# Patient Record
Sex: FEMALE | Race: WHITE | Employment: OTHER | URBAN - METROPOLITAN AREA
[De-identification: names, ages, dates, MRNs, and addresses within clinical notes are randomized per-mention and may not be internally consistent; named-entity substitution may affect disease eponyms.]

---

## 2017-01-05 ENCOUNTER — HOSPITAL ENCOUNTER (OUTPATIENT)
Dept: GENERAL RADIOLOGY | Age: 57
Discharge: HOME | End: 2017-01-05
Payer: COMMERCIAL

## 2017-01-05 DIAGNOSIS — C43.9 MALIGNANT MELANOMA, UNSPECIFIED SITE (HCC): ICD-10-CM

## 2017-01-05 PROCEDURE — 71020 XR CHEST STANDARD TWO VW: CPT

## 2017-01-27 ENCOUNTER — OFFICE VISIT (OUTPATIENT)
Dept: INTERNAL MEDICINE | Age: 57
End: 2017-01-27

## 2017-01-27 VITALS
WEIGHT: 237 LBS | TEMPERATURE: 97.3 F | DIASTOLIC BLOOD PRESSURE: 88 MMHG | SYSTOLIC BLOOD PRESSURE: 110 MMHG | BODY MASS INDEX: 40.46 KG/M2 | HEART RATE: 109 BPM | HEIGHT: 64 IN

## 2017-01-27 DIAGNOSIS — E53.8 LOW VITAMIN B12 LEVEL: ICD-10-CM

## 2017-01-27 DIAGNOSIS — E03.9 ACQUIRED HYPOTHYROIDISM: ICD-10-CM

## 2017-01-27 DIAGNOSIS — J01.10 ACUTE NON-RECURRENT FRONTAL SINUSITIS: Primary | ICD-10-CM

## 2017-01-27 DIAGNOSIS — E55.9 VITAMIN D INSUFFICIENCY: ICD-10-CM

## 2017-01-27 DIAGNOSIS — R73.01 IFG (IMPAIRED FASTING GLUCOSE): ICD-10-CM

## 2017-01-27 LAB
ANION GAP SERPL CALCULATED.3IONS-SCNC: 15 MEQ/L (ref 7–13)
BUN BLDV-MCNC: 8 MG/DL (ref 6–20)
CALCIUM SERPL-MCNC: 9.7 MG/DL (ref 8.6–10.2)
CHLORIDE BLD-SCNC: 101 MEQ/L (ref 98–107)
CO2: 25 MEQ/L (ref 22–29)
CREAT SERPL-MCNC: 0.84 MG/DL (ref 0.5–0.9)
FOLATE: >20 NG/ML (ref 7.3–26.1)
GFR AFRICAN AMERICAN: >60
GFR NON-AFRICAN AMERICAN: >60
GLUCOSE BLD-MCNC: 87 MG/DL (ref 74–109)
HBA1C MFR BLD: 5.1 % (ref 4.8–5.9)
POTASSIUM SERPL-SCNC: 4.3 MEQ/L (ref 3.5–5.1)
SODIUM BLD-SCNC: 141 MEQ/L (ref 132–144)
TSH SERPL DL<=0.05 MIU/L-ACNC: 3.81 UIU/ML (ref 0.27–4.2)
VITAMIN B-12: 321 PG/ML (ref 211–946)
VITAMIN D 25-HYDROXY: 27.7 NG/ML (ref 30–100)

## 2017-01-27 PROCEDURE — 99213 OFFICE O/P EST LOW 20 MIN: CPT | Performed by: INTERNAL MEDICINE

## 2017-01-27 RX ORDER — GUAIFENESIN 600 MG/1
600 TABLET, EXTENDED RELEASE ORAL 2 TIMES DAILY
Qty: 14 TABLET | Refills: 0 | Status: SHIPPED | OUTPATIENT
Start: 2017-01-27 | End: 2017-02-03

## 2017-01-27 RX ORDER — AMOXICILLIN 500 MG/1
TABLET, FILM COATED ORAL
Qty: 20 TABLET | Refills: 0 | Status: SHIPPED | OUTPATIENT
Start: 2017-01-27 | End: 2017-03-31

## 2017-01-27 RX ORDER — METHYLPREDNISOLONE 4 MG/1
TABLET ORAL
Qty: 1 KIT | Refills: 0 | Status: SHIPPED | OUTPATIENT
Start: 2017-01-27 | End: 2017-03-31

## 2017-01-27 ASSESSMENT — ENCOUNTER SYMPTOMS
TROUBLE SWALLOWING: 0
EYE PAIN: 0
DIARRHEA: 0
SWOLLEN GLANDS: 1
ABDOMINAL PAIN: 0
SHORTNESS OF BREATH: 0
VOMITING: 0
SINUS PRESSURE: 1
RESPIRATORY NEGATIVE: 1
COUGH: 0
SINUS PAIN: 1
SORE THROAT: 1
RHINORRHEA: 0
VOICE CHANGE: 0

## 2017-02-13 ENCOUNTER — APPOINTMENT (OUTPATIENT)
Dept: GENERAL RADIOLOGY | Age: 57
End: 2017-02-13
Payer: COMMERCIAL

## 2017-02-13 ENCOUNTER — HOSPITAL ENCOUNTER (EMERGENCY)
Age: 57
Discharge: HOME | End: 2017-02-13
Attending: EMERGENCY MEDICINE
Payer: COMMERCIAL

## 2017-02-13 VITALS
HEIGHT: 66 IN | SYSTOLIC BLOOD PRESSURE: 131 MMHG | BODY MASS INDEX: 33.59 KG/M2 | WEIGHT: 209 LBS | RESPIRATION RATE: 15 BRPM | DIASTOLIC BLOOD PRESSURE: 91 MMHG | HEART RATE: 101 BPM | TEMPERATURE: 98.9 F | OXYGEN SATURATION: 98 %

## 2017-02-13 DIAGNOSIS — J40 BRONCHITIS: Primary | ICD-10-CM

## 2017-02-13 LAB
ALBUMIN SERPL-MCNC: 4.6 G/DL (ref 3.9–4.9)
ALP BLD-CCNC: 82 U/L (ref 40–130)
ALT SERPL-CCNC: 30 U/L (ref 0–33)
ANION GAP SERPL CALCULATED.3IONS-SCNC: 15 MEQ/L (ref 7–13)
AST SERPL-CCNC: 31 U/L (ref 0–35)
BASE EXCESS ARTERIAL: 4 (ref -3–3)
BASOPHILS ABSOLUTE: 0 K/UL (ref 0–0.2)
BASOPHILS RELATIVE PERCENT: 0.5 %
BILIRUB SERPL-MCNC: 0.4 MG/DL (ref 0–1.2)
BUN BLDV-MCNC: 15 MG/DL (ref 6–20)
CALCIUM IONIZED: 1.23 MMOL/L (ref 1.12–1.32)
CALCIUM SERPL-MCNC: 10.4 MG/DL (ref 8.6–10.2)
CHLORIDE BLD-SCNC: 99 MEQ/L (ref 98–107)
CO2: 25 MEQ/L (ref 22–29)
CREAT SERPL-MCNC: 0.87 MG/DL (ref 0.5–0.9)
EOSINOPHILS ABSOLUTE: 0.1 K/UL (ref 0–0.7)
EOSINOPHILS RELATIVE PERCENT: 1.1 %
GFR AFRICAN AMERICAN: >60
GFR NON-AFRICAN AMERICAN: 57
GFR NON-AFRICAN AMERICAN: >60
GFR NON-AFRICAN AMERICAN: >60
GLOBULIN: 2.8 G/DL (ref 2.3–3.5)
GLUCOSE BLD-MCNC: 119 MG/DL (ref 74–109)
GLUCOSE BLD-MCNC: 132 MG/DL (ref 60–115)
HCO3 ARTERIAL: 27.9 MMOL/L (ref 21–29)
HCT VFR BLD CALC: 44.6 % (ref 37–47)
HEMOGLOBIN: 14.2 GM/DL (ref 12–16)
HEMOGLOBIN: 15.2 G/DL (ref 12–16)
LACTATE: 1.46 MMOL/L (ref 0.4–2)
LYMPHOCYTES ABSOLUTE: 3 K/UL (ref 1–4.8)
LYMPHOCYTES RELATIVE PERCENT: 28.4 %
MCH RBC QN AUTO: 30.5 PG (ref 27–31.3)
MCHC RBC AUTO-ENTMCNC: 34.1 % (ref 33–37)
MCV RBC AUTO: 89.5 FL (ref 82–100)
MONOCYTES ABSOLUTE: 0.4 K/UL (ref 0.2–0.8)
MONOCYTES RELATIVE PERCENT: 3.8 %
NEUTROPHILS ABSOLUTE: 7 K/UL (ref 1.4–6.5)
NEUTROPHILS RELATIVE PERCENT: 66.2 %
O2 SAT, ARTERIAL: 95 % (ref 93–100)
PCO2 ARTERIAL: 41 MM HG (ref 35–45)
PDW BLD-RTO: 13.7 % (ref 11.5–14.5)
PERFORMED ON: ABNORMAL
PERFORMED ON: ABNORMAL
PH ARTERIAL: 7.44 (ref 7.35–7.45)
PLATELET # BLD: 309 K/UL (ref 130–400)
PO2 ARTERIAL: 74 MM HG (ref 75–108)
POC CHLORIDE: 105 MEQ/L (ref 99–110)
POC CREATININE: 0.8 MG/DL (ref 0.6–1.1)
POC CREATININE: 1 MG/DL (ref 0.6–1.1)
POC HEMATOCRIT: 42 % (ref 36–48)
POC POTASSIUM: 3.8 MEQ/L (ref 3.5–5.1)
POC SAMPLE TYPE: ABNORMAL
POC SAMPLE TYPE: ABNORMAL
POC SODIUM: 142 MEQ/L (ref 136–145)
POTASSIUM SERPL-SCNC: 4 MEQ/L (ref 3.5–5.1)
RBC # BLD: 4.99 M/UL (ref 4.2–5.4)
SODIUM BLD-SCNC: 139 MEQ/L (ref 132–144)
TCO2 ARTERIAL: 29 (ref 22–29)
TOTAL PROTEIN: 7.4 G/DL (ref 6.4–8.1)
TROPONIN: <0.01 NG/ML (ref 0–0.01)
WBC # BLD: 10.6 K/UL (ref 4.8–10.8)

## 2017-02-13 PROCEDURE — 85025 COMPLETE CBC W/AUTO DIFF WBC: CPT

## 2017-02-13 PROCEDURE — 36415 COLL VENOUS BLD VENIPUNCTURE: CPT

## 2017-02-13 PROCEDURE — 84484 ASSAY OF TROPONIN QUANT: CPT

## 2017-02-13 PROCEDURE — 80053 COMPREHEN METABOLIC PANEL: CPT

## 2017-02-13 PROCEDURE — 82435 ASSAY OF BLOOD CHLORIDE: CPT

## 2017-02-13 PROCEDURE — 93701 BIOIMPEDANCE CV ANALYSIS: CPT

## 2017-02-13 PROCEDURE — 82803 BLOOD GASES ANY COMBINATION: CPT

## 2017-02-13 PROCEDURE — 82330 ASSAY OF CALCIUM: CPT

## 2017-02-13 PROCEDURE — 83605 ASSAY OF LACTIC ACID: CPT

## 2017-02-13 PROCEDURE — 84132 ASSAY OF SERUM POTASSIUM: CPT

## 2017-02-13 PROCEDURE — 6370000000 HC RX 637 (ALT 250 FOR IP): Performed by: EMERGENCY MEDICINE

## 2017-02-13 PROCEDURE — 85014 HEMATOCRIT: CPT

## 2017-02-13 PROCEDURE — 71020 XR CHEST STANDARD TWO VW: CPT

## 2017-02-13 PROCEDURE — 82565 ASSAY OF CREATININE: CPT

## 2017-02-13 PROCEDURE — 36600 WITHDRAWAL OF ARTERIAL BLOOD: CPT

## 2017-02-13 PROCEDURE — 99285 EMERGENCY DEPT VISIT HI MDM: CPT

## 2017-02-13 PROCEDURE — 93005 ELECTROCARDIOGRAM TRACING: CPT

## 2017-02-13 RX ORDER — AZITHROMYCIN 250 MG/1
TABLET, FILM COATED ORAL
Qty: 1 PACKET | Refills: 0 | Status: SHIPPED | OUTPATIENT
Start: 2017-02-13 | End: 2017-02-23

## 2017-02-13 RX ORDER — AZITHROMYCIN 500 MG/1
500 TABLET, FILM COATED ORAL ONCE
Status: COMPLETED | OUTPATIENT
Start: 2017-02-13 | End: 2017-02-13

## 2017-02-13 RX ORDER — METHYLPREDNISOLONE 4 MG/1
TABLET ORAL
Qty: 1 KIT | Refills: 0 | Status: SHIPPED | OUTPATIENT
Start: 2017-02-13 | End: 2017-02-19

## 2017-02-13 RX ORDER — ALBUTEROL SULFATE 90 UG/1
1-2 AEROSOL, METERED RESPIRATORY (INHALATION) EVERY 4 HOURS PRN
Qty: 1 INHALER | Refills: 0 | Status: SHIPPED | OUTPATIENT
Start: 2017-02-13 | End: 2018-06-11 | Stop reason: SDUPTHER

## 2017-02-13 RX ADMIN — AZITHROMYCIN 500 MG: 500 TABLET, FILM COATED ORAL at 17:29

## 2017-02-13 ASSESSMENT — ENCOUNTER SYMPTOMS
COLOR CHANGE: 0
STRIDOR: 0
WHEEZING: 0
ABDOMINAL PAIN: 0
SORE THROAT: 0
EYE REDNESS: 0
CHOKING: 0
ABDOMINAL DISTENTION: 0
SINUS PRESSURE: 0
SHORTNESS OF BREATH: 0
CHEST TIGHTNESS: 0
EYE PAIN: 0
NAUSEA: 0
ANAL BLEEDING: 0
RHINORRHEA: 0
VOICE CHANGE: 0
CONSTIPATION: 0
TROUBLE SWALLOWING: 0
DIARRHEA: 0
FACIAL SWELLING: 0
BACK PAIN: 0
BLOOD IN STOOL: 0
EYE ITCHING: 0
PHOTOPHOBIA: 0
VOMITING: 0
EYE DISCHARGE: 0
COUGH: 0

## 2017-02-14 LAB
EKG ATRIAL RATE: 108 BPM
EKG P AXIS: 30 DEGREES
EKG P-R INTERVAL: 142 MS
EKG Q-T INTERVAL: 340 MS
EKG QRS DURATION: 88 MS
EKG QTC CALCULATION (BAZETT): 455 MS
EKG R AXIS: -18 DEGREES
EKG T AXIS: 10 DEGREES
EKG VENTRICULAR RATE: 108 BPM

## 2017-03-13 RX ORDER — CETIRIZINE HYDROCHLORIDE 10 MG/1
TABLET ORAL
Qty: 30 TABLET | Refills: 3 | Status: SHIPPED | OUTPATIENT
Start: 2017-03-13 | End: 2017-12-12

## 2017-03-13 RX ORDER — UBIDECARENONE 100 MG
CAPSULE ORAL
Refills: 6 | COMMUNITY
Start: 2017-02-24 | End: 2017-10-25

## 2017-03-23 RX ORDER — FAMOTIDINE 20 MG/1
TABLET, FILM COATED ORAL
Qty: 60 TABLET | Refills: 2 | Status: SHIPPED | OUTPATIENT
Start: 2017-03-23 | End: 2017-06-24 | Stop reason: SDUPTHER

## 2017-03-29 RX ORDER — KETOCONAZOLE 20 MG/G
CREAM TOPICAL
Qty: 30 G | Refills: 1 | Status: SHIPPED | OUTPATIENT
Start: 2017-03-29 | End: 2017-06-07 | Stop reason: ALTCHOICE

## 2017-03-31 ENCOUNTER — OFFICE VISIT (OUTPATIENT)
Dept: INTERNAL MEDICINE | Age: 57
End: 2017-03-31

## 2017-03-31 VITALS
WEIGHT: 239 LBS | TEMPERATURE: 96.6 F | DIASTOLIC BLOOD PRESSURE: 88 MMHG | HEART RATE: 80 BPM | SYSTOLIC BLOOD PRESSURE: 130 MMHG | BODY MASS INDEX: 40.8 KG/M2 | HEIGHT: 64 IN

## 2017-03-31 DIAGNOSIS — R25.1 TREMOR OF BOTH HANDS: Primary | ICD-10-CM

## 2017-03-31 DIAGNOSIS — R10.31 RIGHT LOWER QUADRANT ABDOMINAL PAIN: ICD-10-CM

## 2017-03-31 DIAGNOSIS — E03.9 ACQUIRED HYPOTHYROIDISM: Chronic | ICD-10-CM

## 2017-03-31 PROCEDURE — 99213 OFFICE O/P EST LOW 20 MIN: CPT | Performed by: INTERNAL MEDICINE

## 2017-03-31 RX ORDER — SENNA PLUS 8.6 MG/1
1 TABLET ORAL 2 TIMES DAILY
Qty: 40 TABLET | Refills: 0 | Status: SHIPPED | OUTPATIENT
Start: 2017-03-31 | End: 2017-12-12 | Stop reason: SDUPTHER

## 2017-03-31 ASSESSMENT — ENCOUNTER SYMPTOMS
COUGH: 0
SHORTNESS OF BREATH: 0
BLOOD IN STOOL: 0
ABDOMINAL DISTENTION: 0
RESPIRATORY NEGATIVE: 1
ABDOMINAL PAIN: 1

## 2017-05-12 RX ORDER — DIPHENOXYLATE HYDROCHLORIDE AND ATROPINE SULFATE 2.5; .025 MG/1; MG/1
TABLET ORAL
Qty: 30 TABLET | Refills: 5 | Status: SHIPPED | OUTPATIENT
Start: 2017-05-12 | End: 2017-10-21 | Stop reason: SDUPTHER

## 2017-05-12 RX ORDER — BENZTROPINE MESYLATE 0.5 MG/1
0.5 TABLET ORAL DAILY
COMMUNITY
Start: 2017-05-09 | End: 2017-10-25

## 2017-05-31 ENCOUNTER — OFFICE VISIT (OUTPATIENT)
Dept: INTERNAL MEDICINE | Age: 57
End: 2017-05-31

## 2017-05-31 VITALS
OXYGEN SATURATION: 97 % | SYSTOLIC BLOOD PRESSURE: 122 MMHG | WEIGHT: 233 LBS | BODY MASS INDEX: 39.78 KG/M2 | DIASTOLIC BLOOD PRESSURE: 88 MMHG | HEIGHT: 64 IN | TEMPERATURE: 98.6 F | HEART RATE: 109 BPM

## 2017-05-31 DIAGNOSIS — R53.82 CHRONIC FATIGUE: Primary | Chronic | ICD-10-CM

## 2017-05-31 DIAGNOSIS — E66.9 OBESITY (BMI 30-39.9): Chronic | ICD-10-CM

## 2017-05-31 DIAGNOSIS — R06.83 SNORING: Chronic | ICD-10-CM

## 2017-05-31 PROCEDURE — 99213 OFFICE O/P EST LOW 20 MIN: CPT | Performed by: INTERNAL MEDICINE

## 2017-05-31 RX ORDER — BENZTROPINE MESYLATE 0.5 MG/1
0.5 TABLET ORAL DAILY
Qty: 30 TABLET | Refills: 5 | Status: CANCELLED | OUTPATIENT
Start: 2017-05-31

## 2017-05-31 RX ORDER — PRAVASTATIN SODIUM 20 MG
TABLET ORAL
Qty: 30 TABLET | Refills: 5 | Status: SHIPPED | OUTPATIENT
Start: 2017-05-31 | End: 2017-06-07 | Stop reason: SDDI

## 2017-05-31 ASSESSMENT — PATIENT HEALTH QUESTIONNAIRE - PHQ9
2. FEELING DOWN, DEPRESSED OR HOPELESS: 0
1. LITTLE INTEREST OR PLEASURE IN DOING THINGS: 0
SUM OF ALL RESPONSES TO PHQ9 QUESTIONS 1 & 2: 0
SUM OF ALL RESPONSES TO PHQ QUESTIONS 1-9: 0

## 2017-05-31 ASSESSMENT — ENCOUNTER SYMPTOMS
CHANGE IN BOWEL HABIT: 0
COUGH: 0
GASTROINTESTINAL NEGATIVE: 1
SHORTNESS OF BREATH: 0
ABDOMINAL PAIN: 0
RESPIRATORY NEGATIVE: 1
SWOLLEN GLANDS: 0

## 2017-06-07 ENCOUNTER — HOSPITAL ENCOUNTER (EMERGENCY)
Age: 57
Discharge: HOME | End: 2017-06-07
Payer: COMMERCIAL

## 2017-06-07 VITALS
OXYGEN SATURATION: 98 % | SYSTOLIC BLOOD PRESSURE: 143 MMHG | DIASTOLIC BLOOD PRESSURE: 101 MMHG | HEART RATE: 112 BPM | HEIGHT: 66 IN | RESPIRATION RATE: 20 BRPM | TEMPERATURE: 98.4 F | WEIGHT: 207 LBS | BODY MASS INDEX: 33.27 KG/M2

## 2017-06-07 DIAGNOSIS — F41.9 ANXIETY: Primary | ICD-10-CM

## 2017-06-07 DIAGNOSIS — F32.A DEPRESSION, UNSPECIFIED DEPRESSION TYPE: ICD-10-CM

## 2017-06-07 LAB
ALBUMIN SERPL-MCNC: 4.5 G/DL (ref 3.9–4.9)
ALP BLD-CCNC: 79 U/L (ref 40–130)
ALT SERPL-CCNC: 20 U/L (ref 0–33)
AMPHETAMINE SCREEN, URINE: NORMAL
ANION GAP SERPL CALCULATED.3IONS-SCNC: 14 MEQ/L (ref 7–13)
AST SERPL-CCNC: 17 U/L (ref 0–35)
BARBITURATE SCREEN URINE: NORMAL
BASOPHILS ABSOLUTE: 0.1 K/UL (ref 0–0.2)
BASOPHILS RELATIVE PERCENT: 0.9 %
BENZODIAZEPINE SCREEN, URINE: NORMAL
BILIRUB SERPL-MCNC: 0.1 MG/DL (ref 0–1.2)
BILIRUBIN URINE: NEGATIVE
BLOOD, URINE: NEGATIVE
BUN BLDV-MCNC: 8 MG/DL (ref 6–20)
CALCIUM SERPL-MCNC: 9.6 MG/DL (ref 8.6–10.2)
CANNABINOID SCREEN URINE: NORMAL
CHLORIDE BLD-SCNC: 102 MEQ/L (ref 98–107)
CLARITY: CLEAR
CO2: 22 MEQ/L (ref 22–29)
COCAINE METABOLITE SCREEN URINE: NORMAL
COLOR: YELLOW
CREAT SERPL-MCNC: 0.71 MG/DL (ref 0.5–0.9)
EOSINOPHILS ABSOLUTE: 0.1 K/UL (ref 0–0.7)
EOSINOPHILS RELATIVE PERCENT: 1.1 %
ETHANOL PERCENT: NORMAL G/DL
ETHANOL: <10 MG/DL (ref 0–0.08)
GFR AFRICAN AMERICAN: >60
GFR NON-AFRICAN AMERICAN: >60
GLOBULIN: 2.6 G/DL (ref 2.3–3.5)
GLUCOSE BLD-MCNC: 98 MG/DL (ref 74–109)
GLUCOSE URINE: NEGATIVE MG/DL
HCT VFR BLD CALC: 43 % (ref 37–47)
HEMOGLOBIN: 14.7 G/DL (ref 12–16)
KETONES, URINE: NEGATIVE MG/DL
LEUKOCYTE ESTERASE, URINE: NEGATIVE
LYMPHOCYTES ABSOLUTE: 3.4 K/UL (ref 1–4.8)
LYMPHOCYTES RELATIVE PERCENT: 31.9 %
Lab: NORMAL
MCH RBC QN AUTO: 30.1 PG (ref 27–31.3)
MCHC RBC AUTO-ENTMCNC: 34.2 % (ref 33–37)
MCV RBC AUTO: 88 FL (ref 82–100)
MONOCYTES ABSOLUTE: 0.6 K/UL (ref 0.2–0.8)
MONOCYTES RELATIVE PERCENT: 5.9 %
NEUTROPHILS ABSOLUTE: 6.3 K/UL (ref 1.4–6.5)
NEUTROPHILS RELATIVE PERCENT: 60.2 %
NITRITE, URINE: NEGATIVE
OPIATE SCREEN URINE: NORMAL
PDW BLD-RTO: 13.5 % (ref 11.5–14.5)
PH UA: 6 (ref 5–9)
PHENCYCLIDINE SCREEN URINE: NORMAL
PLATELET # BLD: 303 K/UL (ref 130–400)
POTASSIUM SERPL-SCNC: 4.3 MEQ/L (ref 3.5–5.1)
PROTEIN UA: NEGATIVE MG/DL
RBC # BLD: 4.89 M/UL (ref 4.2–5.4)
SODIUM BLD-SCNC: 138 MEQ/L (ref 132–144)
SPECIFIC GRAVITY UA: 1 (ref 1–1.03)
TOTAL CK: 148 U/L (ref 0–170)
TOTAL PROTEIN: 7.1 G/DL (ref 6.4–8.1)
TSH SERPL DL<=0.05 MIU/L-ACNC: 3.5 UIU/ML (ref 0.27–4.2)
URINE REFLEX TO CULTURE: NORMAL
UROBILINOGEN, URINE: 0.2 E.U./DL
WBC # BLD: 10.5 K/UL (ref 4.8–10.8)

## 2017-06-07 PROCEDURE — 85025 COMPLETE CBC W/AUTO DIFF WBC: CPT

## 2017-06-07 PROCEDURE — 80053 COMPREHEN METABOLIC PANEL: CPT

## 2017-06-07 PROCEDURE — 99284 EMERGENCY DEPT VISIT MOD MDM: CPT

## 2017-06-07 PROCEDURE — 81003 URINALYSIS AUTO W/O SCOPE: CPT

## 2017-06-07 PROCEDURE — G0480 DRUG TEST DEF 1-7 CLASSES: HCPCS

## 2017-06-07 PROCEDURE — 36415 COLL VENOUS BLD VENIPUNCTURE: CPT

## 2017-06-07 PROCEDURE — 82550 ASSAY OF CK (CPK): CPT

## 2017-06-07 PROCEDURE — 80307 DRUG TEST PRSMV CHEM ANLYZR: CPT

## 2017-06-07 PROCEDURE — 6370000000 HC RX 637 (ALT 250 FOR IP): Performed by: PHYSICIAN ASSISTANT

## 2017-06-07 PROCEDURE — 84443 ASSAY THYROID STIM HORMONE: CPT

## 2017-06-07 RX ORDER — LORAZEPAM 1 MG/1
1 TABLET ORAL ONCE
Status: COMPLETED | OUTPATIENT
Start: 2017-06-07 | End: 2017-06-07

## 2017-06-07 RX ORDER — HYDROXYZINE PAMOATE 25 MG/1
25-50 CAPSULE ORAL 3 TIMES DAILY PRN
Qty: 15 CAPSULE | Refills: 0 | Status: SHIPPED | OUTPATIENT
Start: 2017-06-07 | End: 2017-06-21

## 2017-06-07 RX ADMIN — LORAZEPAM 1 MG: 1 TABLET ORAL at 16:25

## 2017-06-07 ASSESSMENT — ENCOUNTER SYMPTOMS
DIARRHEA: 0
COLOR CHANGE: 0
WHEEZING: 0
NAUSEA: 0
SHORTNESS OF BREATH: 0
STRIDOR: 0
RHINORRHEA: 0
ABDOMINAL DISTENTION: 0
VOMITING: 0
ABDOMINAL PAIN: 0
COUGH: 0
SORE THROAT: 0
EYE DISCHARGE: 0
CONSTIPATION: 0
CHOKING: 0

## 2017-06-26 RX ORDER — FAMOTIDINE 20 MG/1
TABLET, FILM COATED ORAL
Qty: 60 TABLET | Refills: 2 | Status: SHIPPED | OUTPATIENT
Start: 2017-06-26 | End: 2017-09-15 | Stop reason: SDUPTHER

## 2017-07-12 ENCOUNTER — OFFICE VISIT (OUTPATIENT)
Dept: INTERNAL MEDICINE | Age: 57
End: 2017-07-12

## 2017-07-12 VITALS
BODY MASS INDEX: 38.76 KG/M2 | TEMPERATURE: 96.9 F | SYSTOLIC BLOOD PRESSURE: 102 MMHG | WEIGHT: 227 LBS | OXYGEN SATURATION: 96 % | HEART RATE: 89 BPM | HEIGHT: 64 IN | DIASTOLIC BLOOD PRESSURE: 78 MMHG

## 2017-07-12 DIAGNOSIS — E78.2 MIXED HYPERLIPIDEMIA: ICD-10-CM

## 2017-07-12 DIAGNOSIS — R25.1 TREMOR OF BOTH HANDS: ICD-10-CM

## 2017-07-12 DIAGNOSIS — R42 VERTIGO: Primary | ICD-10-CM

## 2017-07-12 DIAGNOSIS — H60.331 ACUTE SWIMMER'S EAR OF RIGHT SIDE: ICD-10-CM

## 2017-07-12 DIAGNOSIS — E78.2 MIXED HYPERLIPIDEMIA: Chronic | ICD-10-CM

## 2017-07-12 LAB
CHOLESTEROL, TOTAL: 234 MG/DL (ref 0–199)
HDLC SERPL-MCNC: 46 MG/DL (ref 40–59)
LDL CHOLESTEROL CALCULATED: 131 MG/DL (ref 0–129)
TRIGL SERPL-MCNC: 286 MG/DL (ref 0–200)

## 2017-07-12 PROCEDURE — 99214 OFFICE O/P EST MOD 30 MIN: CPT | Performed by: INTERNAL MEDICINE

## 2017-07-12 RX ORDER — NEOMYCIN SULFATE, POLYMYXIN B SULFATE AND HYDROCORTISONE 10; 3.5; 1 MG/ML; MG/ML; [USP'U]/ML
3 SUSPENSION/ DROPS AURICULAR (OTIC) 4 TIMES DAILY
Qty: 1 BOTTLE | Refills: 0 | Status: SHIPPED | OUTPATIENT
Start: 2017-07-12 | End: 2017-07-19

## 2017-07-12 RX ORDER — PRAVASTATIN SODIUM 20 MG
TABLET ORAL
Refills: 5 | COMMUNITY
Start: 2017-06-27 | End: 2017-07-19 | Stop reason: SDUPTHER

## 2017-07-12 ASSESSMENT — ENCOUNTER SYMPTOMS
ABDOMINAL PAIN: 0
COUGH: 0
SWOLLEN GLANDS: 0
RESPIRATORY NEGATIVE: 1
BLOOD IN STOOL: 0
SHORTNESS OF BREATH: 0
ABDOMINAL DISTENTION: 0

## 2017-07-19 RX ORDER — PRAVASTATIN SODIUM 40 MG
TABLET ORAL
Qty: 30 TABLET | Refills: 3 | Status: SHIPPED | OUTPATIENT
Start: 2017-07-19 | End: 2017-11-03 | Stop reason: SDUPTHER

## 2017-08-14 ENCOUNTER — HOSPITAL ENCOUNTER (OUTPATIENT)
Dept: NON INVASIVE DIAGNOSTICS | Age: 57
Discharge: HOME | End: 2017-08-14
Payer: COMMERCIAL

## 2017-08-14 LAB
EKG ATRIAL RATE: 97 BPM
EKG P AXIS: 55 DEGREES
EKG P-R INTERVAL: 138 MS
EKG Q-T INTERVAL: 344 MS
EKG QRS DURATION: 88 MS
EKG QTC CALCULATION (BAZETT): 436 MS
EKG R AXIS: -19 DEGREES
EKG T AXIS: 22 DEGREES
EKG VENTRICULAR RATE: 97 BPM

## 2017-08-14 PROCEDURE — 93005 ELECTROCARDIOGRAM TRACING: CPT

## 2017-08-14 PROCEDURE — 93010 ELECTROCARDIOGRAM REPORT: CPT | Performed by: INTERNAL MEDICINE

## 2017-09-12 ENCOUNTER — HOSPITAL ENCOUNTER (OUTPATIENT)
Dept: GENERAL RADIOLOGY | Age: 57
Discharge: HOME | End: 2017-09-12
Payer: COMMERCIAL

## 2017-09-12 ENCOUNTER — OFFICE VISIT (OUTPATIENT)
Dept: INTERNAL MEDICINE | Age: 57
End: 2017-09-12

## 2017-09-12 VITALS
SYSTOLIC BLOOD PRESSURE: 120 MMHG | HEART RATE: 94 BPM | DIASTOLIC BLOOD PRESSURE: 90 MMHG | TEMPERATURE: 97 F | WEIGHT: 217 LBS | BODY MASS INDEX: 37.05 KG/M2 | HEIGHT: 64 IN | OXYGEN SATURATION: 98 %

## 2017-09-12 DIAGNOSIS — M19.012 PRIMARY OSTEOARTHRITIS OF LEFT SHOULDER: ICD-10-CM

## 2017-09-12 DIAGNOSIS — Z23 NEED FOR INFLUENZA VACCINATION: ICD-10-CM

## 2017-09-12 DIAGNOSIS — M25.512 ACUTE PAIN OF LEFT SHOULDER: Primary | ICD-10-CM

## 2017-09-12 DIAGNOSIS — M25.512 ACUTE PAIN OF LEFT SHOULDER: ICD-10-CM

## 2017-09-12 PROCEDURE — 90688 IIV4 VACCINE SPLT 0.5 ML IM: CPT | Performed by: INTERNAL MEDICINE

## 2017-09-12 PROCEDURE — 99213 OFFICE O/P EST LOW 20 MIN: CPT | Performed by: INTERNAL MEDICINE

## 2017-09-12 PROCEDURE — 73030 X-RAY EXAM OF SHOULDER: CPT

## 2017-09-12 PROCEDURE — 90471 IMMUNIZATION ADMIN: CPT | Performed by: INTERNAL MEDICINE

## 2017-09-12 ASSESSMENT — ENCOUNTER SYMPTOMS
COLOR CHANGE: 0
RESPIRATORY NEGATIVE: 1
BACK PAIN: 0
GASTROINTESTINAL NEGATIVE: 1

## 2017-09-16 RX ORDER — FAMOTIDINE 20 MG/1
TABLET, FILM COATED ORAL
Qty: 60 TABLET | Refills: 2 | Status: SHIPPED | OUTPATIENT
Start: 2017-09-16 | End: 2017-12-12 | Stop reason: SDUPTHER

## 2017-10-23 RX ORDER — DIPHENOXYLATE HYDROCHLORIDE AND ATROPINE SULFATE 2.5; .025 MG/1; MG/1
TABLET ORAL
Qty: 30 TABLET | Refills: 3 | Status: SHIPPED | OUTPATIENT
Start: 2017-10-23 | End: 2020-02-21 | Stop reason: SDUPTHER

## 2017-11-03 RX ORDER — PRAVASTATIN SODIUM 40 MG
TABLET ORAL
Qty: 30 TABLET | Refills: 3 | Status: SHIPPED | OUTPATIENT
Start: 2017-11-03 | End: 2018-02-21 | Stop reason: SDUPTHER

## 2017-12-12 ENCOUNTER — OFFICE VISIT (OUTPATIENT)
Dept: INTERNAL MEDICINE | Age: 57
End: 2017-12-12

## 2017-12-12 VITALS
SYSTOLIC BLOOD PRESSURE: 120 MMHG | BODY MASS INDEX: 36.02 KG/M2 | WEIGHT: 211 LBS | TEMPERATURE: 97.4 F | DIASTOLIC BLOOD PRESSURE: 86 MMHG | OXYGEN SATURATION: 96 % | HEIGHT: 64 IN | HEART RATE: 102 BPM

## 2017-12-12 DIAGNOSIS — E78.2 MIXED HYPERLIPIDEMIA: Primary | ICD-10-CM

## 2017-12-12 DIAGNOSIS — K59.01 SLOW TRANSIT CONSTIPATION: ICD-10-CM

## 2017-12-12 DIAGNOSIS — Z23 NEED FOR TETANUS BOOSTER: ICD-10-CM

## 2017-12-12 PROCEDURE — 99213 OFFICE O/P EST LOW 20 MIN: CPT | Performed by: INTERNAL MEDICINE

## 2017-12-12 PROCEDURE — 90714 TD VACC NO PRESV 7 YRS+ IM: CPT | Performed by: INTERNAL MEDICINE

## 2017-12-12 PROCEDURE — G8417 CALC BMI ABV UP PARAM F/U: HCPCS | Performed by: INTERNAL MEDICINE

## 2017-12-12 PROCEDURE — 90471 IMMUNIZATION ADMIN: CPT | Performed by: INTERNAL MEDICINE

## 2017-12-12 PROCEDURE — G8484 FLU IMMUNIZE NO ADMIN: HCPCS | Performed by: INTERNAL MEDICINE

## 2017-12-12 PROCEDURE — 1036F TOBACCO NON-USER: CPT | Performed by: INTERNAL MEDICINE

## 2017-12-12 PROCEDURE — 3014F SCREEN MAMMO DOC REV: CPT | Performed by: INTERNAL MEDICINE

## 2017-12-12 PROCEDURE — G8427 DOCREV CUR MEDS BY ELIG CLIN: HCPCS | Performed by: INTERNAL MEDICINE

## 2017-12-12 PROCEDURE — 3017F COLORECTAL CA SCREEN DOC REV: CPT | Performed by: INTERNAL MEDICINE

## 2017-12-12 RX ORDER — LEVOTHYROXINE SODIUM 0.05 MG/1
TABLET ORAL
Qty: 30 TABLET | Refills: 5 | Status: CANCELLED | OUTPATIENT
Start: 2017-12-12

## 2017-12-12 RX ORDER — SENNA PLUS 8.6 MG/1
1 TABLET ORAL 2 TIMES DAILY
Qty: 60 TABLET | Refills: 3 | Status: SHIPPED | OUTPATIENT
Start: 2017-12-12 | End: 2018-07-24

## 2017-12-12 RX ORDER — FAMOTIDINE 20 MG/1
TABLET, FILM COATED ORAL
Qty: 60 TABLET | Refills: 3 | Status: SHIPPED | OUTPATIENT
Start: 2017-12-12 | End: 2018-12-12 | Stop reason: SDUPTHER

## 2017-12-12 RX ORDER — CETIRIZINE HYDROCHLORIDE 10 MG/1
TABLET ORAL
Qty: 30 TABLET | Refills: 3 | Status: SHIPPED | OUTPATIENT
Start: 2017-12-12 | End: 2018-03-31 | Stop reason: SDUPTHER

## 2017-12-12 RX ORDER — LEVOTHYROXINE SODIUM 0.05 MG/1
50 TABLET ORAL DAILY
Qty: 30 TABLET | Refills: 3 | Status: SHIPPED | OUTPATIENT
Start: 2017-12-12 | End: 2018-03-29 | Stop reason: SDUPTHER

## 2017-12-12 ASSESSMENT — ENCOUNTER SYMPTOMS
BACK PAIN: 0
SHORTNESS OF BREATH: 0
COLOR CHANGE: 0
RECTAL PAIN: 0
CONSTIPATION: 1
RESPIRATORY NEGATIVE: 1
HEMATOCHEZIA: 0
ABDOMINAL PAIN: 0

## 2017-12-12 NOTE — PROGRESS NOTES
normal.   Abdominal: Soft. She exhibits no distension. There is no tenderness. Musculoskeletal: Normal range of motion. Neurological: She is alert and oriented to person, place, and time. Skin: Skin is warm, dry and intact. Nonscarring, diffuse, moderate loss of frontal and coronal hair, no friability, no areas of alopecia    Psychiatric: Her speech is normal. Her affect is not labile. She is withdrawn. She is not slowed. Thought content is not delusional. Cognition and memory are normal. She does not express inappropriate judgment. She exhibits a depressed mood. She expresses no suicidal ideation. She exhibits normal recent memory and normal remote memory. Good motivation, insight limited  She is attentive. Assessment:    Malik Beard was seen today for hyperlipidemia, constipation and immunizations. Diagnoses and all orders for this visit:    Mixed hyperlipidemia             The patient lost weight, improve diet, motivated to continue, given positive feedback    Slow transit constipation             Resume Senokot, keep up with water intake, patient has been limited in her physical activity due to arthritic pain    Need for tetanus booster    Other orders  -     Cancel: levothyroxine (SYNTHROID) 50 MCG tablet; TAKE 1 TABLET BY MOUTH EVERY DAY  -     Cancel: metFORMIN (GLUCOPHAGE) 500 MG tablet; TAKE 1 TABLET BY MOUTH DAILY (WITH BREAKFAST)  -     cetirizine (ZYRTEC) 10 MG tablet; TAKE 1 TABLET BY MOUTH EVERY DAY AS NEEDED FOR ALLERGIES  -     famotidine (PEPCID) 20 MG tablet; TAKE 1 TABLET BY MOUTH TWICE A DAY AS NEEDED FOR HEARTBURN  -     senna (SENOKOT) 8.6 MG tablet; Take 1 tablet by mouth 2 times daily        Plan:    Reviewed with the patient (/and caregiver if present): current health status, medications, activities and diet. See also orders and comments in the assessment section.    Today's diagnosis (in the context of chronic problems) was discussed with patient (/and caregiver if present),

## 2017-12-29 ENCOUNTER — HOSPITAL ENCOUNTER (OUTPATIENT)
Dept: GENERAL RADIOLOGY | Age: 57
Discharge: HOME | End: 2017-12-29
Payer: COMMERCIAL

## 2017-12-29 PROCEDURE — 71020 XR CHEST STANDARD TWO VW: CPT

## 2018-01-10 ENCOUNTER — HOSPITAL ENCOUNTER (OUTPATIENT)
Dept: CT IMAGING | Age: 58
Discharge: HOME | End: 2018-01-10
Payer: COMMERCIAL

## 2018-01-10 VITALS
WEIGHT: 196 LBS | RESPIRATION RATE: 16 BRPM | SYSTOLIC BLOOD PRESSURE: 128 MMHG | HEART RATE: 101 BPM | HEIGHT: 66 IN | DIASTOLIC BLOOD PRESSURE: 90 MMHG | BODY MASS INDEX: 31.5 KG/M2

## 2018-01-10 DIAGNOSIS — R09.89 ABNORMAL FINDING OF LUNG: ICD-10-CM

## 2018-01-10 PROCEDURE — 6360000004 HC RX CONTRAST MEDICATION: Performed by: INTERNAL MEDICINE

## 2018-01-10 PROCEDURE — 71260 CT THORAX DX C+: CPT

## 2018-01-10 RX ADMIN — IOPAMIDOL 75 ML: 755 INJECTION, SOLUTION INTRAVENOUS at 13:53

## 2018-02-17 ENCOUNTER — APPOINTMENT (OUTPATIENT)
Dept: GENERAL RADIOLOGY | Age: 58
End: 2018-02-17
Payer: COMMERCIAL

## 2018-02-17 ENCOUNTER — HOSPITAL ENCOUNTER (EMERGENCY)
Age: 58
Discharge: HOME | End: 2018-02-17
Attending: STUDENT IN AN ORGANIZED HEALTH CARE EDUCATION/TRAINING PROGRAM
Payer: COMMERCIAL

## 2018-02-17 VITALS
HEIGHT: 66 IN | DIASTOLIC BLOOD PRESSURE: 96 MMHG | WEIGHT: 177 LBS | BODY MASS INDEX: 28.45 KG/M2 | SYSTOLIC BLOOD PRESSURE: 129 MMHG | OXYGEN SATURATION: 95 % | HEART RATE: 80 BPM | TEMPERATURE: 98.3 F | RESPIRATION RATE: 14 BRPM

## 2018-02-17 DIAGNOSIS — R07.89 CHEST WALL PAIN: ICD-10-CM

## 2018-02-17 DIAGNOSIS — J06.9 ACUTE UPPER RESPIRATORY INFECTION: ICD-10-CM

## 2018-02-17 DIAGNOSIS — J45.902 REACTIVE AIRWAY DISEASE WITH STATUS ASTHMATICUS, UNSPECIFIED ASTHMA SEVERITY, UNSPECIFIED WHETHER PERSISTENT: ICD-10-CM

## 2018-02-17 DIAGNOSIS — J11.1 INFLUENZA WITH RESPIRATORY MANIFESTATION OTHER THAN PNEUMONIA: Primary | ICD-10-CM

## 2018-02-17 LAB
EKG ATRIAL RATE: 83 BPM
EKG P AXIS: 31 DEGREES
EKG P-R INTERVAL: 124 MS
EKG Q-T INTERVAL: 372 MS
EKG QRS DURATION: 82 MS
EKG QTC CALCULATION (BAZETT): 437 MS
EKG R AXIS: -18 DEGREES
EKG T AXIS: 12 DEGREES
EKG VENTRICULAR RATE: 83 BPM

## 2018-02-17 PROCEDURE — 99285 EMERGENCY DEPT VISIT HI MDM: CPT

## 2018-02-17 PROCEDURE — 93005 ELECTROCARDIOGRAM TRACING: CPT

## 2018-02-17 PROCEDURE — 94640 AIRWAY INHALATION TREATMENT: CPT

## 2018-02-17 PROCEDURE — 71046 X-RAY EXAM CHEST 2 VIEWS: CPT

## 2018-02-17 PROCEDURE — 6370000000 HC RX 637 (ALT 250 FOR IP): Performed by: STUDENT IN AN ORGANIZED HEALTH CARE EDUCATION/TRAINING PROGRAM

## 2018-02-17 PROCEDURE — 94760 N-INVAS EAR/PLS OXIMETRY 1: CPT

## 2018-02-17 RX ORDER — IPRATROPIUM BROMIDE AND ALBUTEROL SULFATE 2.5; .5 MG/3ML; MG/3ML
1 SOLUTION RESPIRATORY (INHALATION) ONCE
Status: COMPLETED | OUTPATIENT
Start: 2018-02-17 | End: 2018-02-17

## 2018-02-17 RX ORDER — OSELTAMIVIR PHOSPHATE 75 MG/1
75 CAPSULE ORAL ONCE
Status: COMPLETED | OUTPATIENT
Start: 2018-02-17 | End: 2018-02-17

## 2018-02-17 RX ORDER — PROMETHAZINE HYDROCHLORIDE AND CODEINE PHOSPHATE 6.25; 1 MG/5ML; MG/5ML
5 SYRUP ORAL 4 TIMES DAILY PRN
Qty: 120 ML | Refills: 0 | Status: SHIPPED | OUTPATIENT
Start: 2018-02-17 | End: 2018-02-24

## 2018-02-17 RX ORDER — CITALOPRAM 40 MG/1
40 TABLET ORAL DAILY
COMMUNITY
End: 2018-07-24

## 2018-02-17 RX ORDER — LORAZEPAM 0.5 MG/1
0.5 TABLET ORAL
COMMUNITY
End: 2018-04-10

## 2018-02-17 RX ORDER — BENZONATATE 100 MG/1
100 CAPSULE ORAL 3 TIMES DAILY PRN
COMMUNITY
End: 2018-02-17 | Stop reason: ALTCHOICE

## 2018-02-17 RX ORDER — BENZONATATE 100 MG/1
200 CAPSULE ORAL 3 TIMES DAILY PRN
Qty: 21 CAPSULE | Refills: 0 | Status: SHIPPED | OUTPATIENT
Start: 2018-02-17 | End: 2018-07-24 | Stop reason: ALTCHOICE

## 2018-02-17 RX ADMIN — IPRATROPIUM BROMIDE AND ALBUTEROL SULFATE 1 AMPULE: .5; 3 SOLUTION RESPIRATORY (INHALATION) at 13:20

## 2018-02-17 RX ADMIN — OSELTAMIVIR PHOSPHATE 75 MG: 75 CAPSULE ORAL at 13:21

## 2018-02-17 ASSESSMENT — ENCOUNTER SYMPTOMS
CHEST TIGHTNESS: 0
RHINORRHEA: 1
DIARRHEA: 0
SHORTNESS OF BREATH: 0
VOMITING: 0
WHEEZING: 1
TROUBLE SWALLOWING: 0
SINUS PRESSURE: 0
ABDOMINAL PAIN: 0
COUGH: 1
BACK PAIN: 0

## 2018-02-17 ASSESSMENT — PULMONARY FUNCTION TESTS: PEFR_L/MIN: 120

## 2018-02-17 ASSESSMENT — PAIN DESCRIPTION - PAIN TYPE: TYPE: ACUTE PAIN

## 2018-02-17 ASSESSMENT — PAIN DESCRIPTION - LOCATION: LOCATION: CHEST

## 2018-02-17 ASSESSMENT — PAIN SCALES - GENERAL: PAINLEVEL_OUTOF10: 3

## 2018-02-17 NOTE — ED NOTES
Bed: 06  Expected date: 2/17/18  Expected time: 12:17 PM  Means of arrival:   Comments:  Chest pain JARAD Tompkins  02/17/18 5216

## 2018-02-17 NOTE — ED PROVIDER NOTES
[address] [facility] ED  eMERGENCY dEPARTMENT eNCOUnter      Pt Name: Tomi Yeung  MRN: [id number]  [place] 1960  Date of evaluation: 2/17/2018  Provider: Marisol Robles, [facility]       Chief Complaint   Patient presents with    Chest Pain     onset yesterday    Cough     pt at [facility] clinic and tested positive for flu         HISTORY OF PRESENT ILLNESS   (Location/Symptom, Timing/Onset, Context/Setting, Quality, Duration, Modifying Factors, Severity)  Note limiting factors. Tomi Yeung is a 65 y.o. female who presents to the emergency department With complaint of cough with yellow sputum. Patient states that she went to the [facility] clinic and tested positive for the flu. Patient states that she coughs so hard that it hurts her chest.  I asked the patient if she has chest pain at any other time, and the patient's replies no only from coughing. Patient denies any nausea or vomiting. Patient denies a sore throat. On physical exam the patient is wheezing in bilateral lung bases. HPI    Nursing Notes were reviewed. REVIEW OF SYSTEMS    (2-9 systems for level 4, 10 or more for level 5)     Review of Systems   Constitutional: Positive for fever. Negative for activity change, appetite change, chills and unexpected weight change. HENT: Positive for congestion and rhinorrhea. Negative for drooling, ear pain, nosebleeds, sinus pressure and trouble swallowing. Respiratory: Positive for cough and wheezing. Negative for chest tightness and shortness of breath. Cardiovascular: Positive for chest pain ( With cough only). Negative for leg swelling. Gastrointestinal: Negative for abdominal pain, diarrhea and vomiting. Endocrine: Negative for polydipsia and polyphagia. Genitourinary: Negative for dysuria, flank pain and frequency. Musculoskeletal: Negative for back pain and myalgias. Skin: Negative for pallor and rash.    Neurological: Negative for syncope, weakness and Patient states that she has an inhaler at home and she does have a use it. CONSULTS:  None    PROCEDURES:  Unless otherwise noted below, none     Procedures    FINAL IMPRESSION      1. Influenza with respiratory manifestation other than pneumonia    2.  Chest wall pain    3. Reactive airway disease with status asthmaticus, unspecified asthma severity, unspecified whether persistent          DISPOSITION/PLAN   DISPOSITION Decision To Discharge 02/17/2018 02:20:26 PM      PATIENT REFERRED TO:  Shannan Ley MD  [address] [place] [id number]  [phone]    Call in 2 days      Orin Schneider Edmund Tamez [address] [address]  [phone]    Schedule an appointment as soon as possible for a visit in 3 days        DISCHARGE MEDICATIONS:  New Prescriptions    No medications on file          (Please note that portions of this note were completed with a voice recognition program.  Efforts were made to edit the dictations but occasionally words are mis-transcribed.)    Eugene Logan DO (electronically signed)  Attending Emergency Physician          Eugene Logan DO  02/17/18 0205

## 2018-02-21 RX ORDER — PRAVASTATIN SODIUM 40 MG
TABLET ORAL
Qty: 30 TABLET | Refills: 3 | Status: SHIPPED | OUTPATIENT
Start: 2018-02-21 | End: 2018-06-13 | Stop reason: SDUPTHER

## 2018-02-21 RX ORDER — BENZTROPINE MESYLATE 0.5 MG/1
TABLET ORAL
Refills: 3 | COMMUNITY
Start: 2018-01-13 | End: 2018-11-02

## 2018-03-29 RX ORDER — LEVOTHYROXINE SODIUM 0.05 MG/1
50 TABLET ORAL DAILY
Qty: 30 TABLET | Refills: 3 | Status: SHIPPED | OUTPATIENT
Start: 2018-03-29 | End: 2018-07-20 | Stop reason: SDUPTHER

## 2018-04-02 RX ORDER — CETIRIZINE HYDROCHLORIDE 10 MG/1
TABLET ORAL
Qty: 30 TABLET | Refills: 3 | Status: SHIPPED | OUTPATIENT
Start: 2018-04-02 | End: 2018-07-20 | Stop reason: SDUPTHER

## 2018-04-10 ENCOUNTER — HOSPITAL ENCOUNTER (OUTPATIENT)
Dept: WOMENS IMAGING | Age: 58
Discharge: HOME | End: 2018-04-12
Payer: COMMERCIAL

## 2018-04-10 ENCOUNTER — OFFICE VISIT (OUTPATIENT)
Dept: INTERNAL MEDICINE CLINIC | Age: 58
End: 2018-04-10
Payer: COMMERCIAL

## 2018-04-10 VITALS
BODY MASS INDEX: 37.22 KG/M2 | WEIGHT: 218 LBS | DIASTOLIC BLOOD PRESSURE: 80 MMHG | HEART RATE: 91 BPM | SYSTOLIC BLOOD PRESSURE: 120 MMHG | TEMPERATURE: 96.7 F | HEIGHT: 64 IN | OXYGEN SATURATION: 99 %

## 2018-04-10 DIAGNOSIS — E78.5 HYPERLIPIDEMIA, UNSPECIFIED HYPERLIPIDEMIA TYPE: ICD-10-CM

## 2018-04-10 DIAGNOSIS — G89.29 CHRONIC RIGHT SHOULDER PAIN: Primary | Chronic | ICD-10-CM

## 2018-04-10 DIAGNOSIS — M19.019 PRIMARY OSTEOARTHRITIS OF SHOULDER, UNSPECIFIED LATERALITY: Chronic | ICD-10-CM

## 2018-04-10 DIAGNOSIS — Z12.39 BREAST CANCER SCREENING: ICD-10-CM

## 2018-04-10 DIAGNOSIS — M25.511 CHRONIC RIGHT SHOULDER PAIN: Primary | Chronic | ICD-10-CM

## 2018-04-10 PROCEDURE — G8417 CALC BMI ABV UP PARAM F/U: HCPCS | Performed by: INTERNAL MEDICINE

## 2018-04-10 PROCEDURE — 3014F SCREEN MAMMO DOC REV: CPT | Performed by: INTERNAL MEDICINE

## 2018-04-10 PROCEDURE — 3017F COLORECTAL CA SCREEN DOC REV: CPT | Performed by: INTERNAL MEDICINE

## 2018-04-10 PROCEDURE — 96372 THER/PROPH/DIAG INJ SC/IM: CPT | Performed by: INTERNAL MEDICINE

## 2018-04-10 PROCEDURE — 1036F TOBACCO NON-USER: CPT | Performed by: INTERNAL MEDICINE

## 2018-04-10 PROCEDURE — 99213 OFFICE O/P EST LOW 20 MIN: CPT | Performed by: INTERNAL MEDICINE

## 2018-04-10 PROCEDURE — 77067 SCR MAMMO BI INCL CAD: CPT

## 2018-04-10 PROCEDURE — G8427 DOCREV CUR MEDS BY ELIG CLIN: HCPCS | Performed by: INTERNAL MEDICINE

## 2018-04-10 RX ORDER — CELECOXIB 100 MG/1
100 CAPSULE ORAL 2 TIMES DAILY
Qty: 60 CAPSULE | Refills: 3 | Status: SHIPPED | OUTPATIENT
Start: 2018-04-10 | End: 2020-02-17

## 2018-04-10 RX ORDER — METHYLPREDNISOLONE ACETATE 80 MG/ML
80 INJECTION, SUSPENSION INTRA-ARTICULAR; INTRALESIONAL; INTRAMUSCULAR; SOFT TISSUE ONCE
Status: COMPLETED | OUTPATIENT
Start: 2018-04-10 | End: 2018-04-10

## 2018-04-10 RX ADMIN — METHYLPREDNISOLONE ACETATE 80 MG: 80 INJECTION, SUSPENSION INTRA-ARTICULAR; INTRALESIONAL; INTRAMUSCULAR; SOFT TISSUE at 15:50

## 2018-04-10 ASSESSMENT — ENCOUNTER SYMPTOMS
BLOOD IN STOOL: 0
RESPIRATORY NEGATIVE: 1
ABDOMINAL PAIN: 0
CHEST TIGHTNESS: 0
SHORTNESS OF BREATH: 0

## 2018-04-17 ENCOUNTER — HOSPITAL ENCOUNTER (OUTPATIENT)
Dept: PHYSICAL THERAPY | Age: 58
Setting detail: THERAPIES SERIES
Discharge: HOME | End: 2018-04-17
Payer: COMMERCIAL

## 2018-04-17 PROCEDURE — 97161 PT EVAL LOW COMPLEX 20 MIN: CPT

## 2018-04-17 PROCEDURE — 97110 THERAPEUTIC EXERCISES: CPT

## 2018-04-17 ASSESSMENT — PAIN DESCRIPTION - LOCATION: LOCATION: SHOULDER

## 2018-04-17 ASSESSMENT — PAIN DESCRIPTION - ORIENTATION: ORIENTATION: RIGHT;ANTERIOR

## 2018-04-17 ASSESSMENT — PAIN DESCRIPTION - PROGRESSION: CLINICAL_PROGRESSION: GRADUALLY WORSENING

## 2018-04-17 ASSESSMENT — PAIN DESCRIPTION - PAIN TYPE: TYPE: CHRONIC PAIN

## 2018-04-17 ASSESSMENT — PAIN DESCRIPTION - DESCRIPTORS: DESCRIPTORS: NAGGING

## 2018-04-17 ASSESSMENT — PAIN SCALES - GENERAL: PAINLEVEL_OUTOF10: 0

## 2018-04-17 ASSESSMENT — PAIN DESCRIPTION - FREQUENCY: FREQUENCY: INTERMITTENT

## 2018-04-20 ENCOUNTER — HOSPITAL ENCOUNTER (OUTPATIENT)
Dept: PHYSICAL THERAPY | Age: 58
Setting detail: THERAPIES SERIES
Discharge: HOME | End: 2018-04-20
Payer: COMMERCIAL

## 2018-04-24 ENCOUNTER — HOSPITAL ENCOUNTER (OUTPATIENT)
Dept: PHYSICAL THERAPY | Age: 58
Setting detail: THERAPIES SERIES
Discharge: HOME | End: 2018-04-24
Payer: COMMERCIAL

## 2018-04-24 PROCEDURE — 97110 THERAPEUTIC EXERCISES: CPT

## 2018-04-24 PROCEDURE — 97140 MANUAL THERAPY 1/> REGIONS: CPT

## 2018-04-24 ASSESSMENT — PAIN DESCRIPTION - ORIENTATION: ORIENTATION: RIGHT

## 2018-04-24 ASSESSMENT — PAIN DESCRIPTION - PAIN TYPE: TYPE: CHRONIC PAIN

## 2018-04-24 ASSESSMENT — PAIN SCALES - GENERAL: PAINLEVEL_OUTOF10: 7

## 2018-04-24 ASSESSMENT — PAIN DESCRIPTION - LOCATION: LOCATION: SHOULDER

## 2018-04-27 ENCOUNTER — HOSPITAL ENCOUNTER (OUTPATIENT)
Dept: PHYSICAL THERAPY | Age: 58
Setting detail: THERAPIES SERIES
Discharge: HOME | End: 2018-04-27
Payer: COMMERCIAL

## 2018-04-27 PROCEDURE — 97140 MANUAL THERAPY 1/> REGIONS: CPT

## 2018-04-27 PROCEDURE — 97110 THERAPEUTIC EXERCISES: CPT

## 2018-04-27 ASSESSMENT — PAIN DESCRIPTION - DESCRIPTORS: DESCRIPTORS: SHARP

## 2018-04-27 ASSESSMENT — PAIN DESCRIPTION - LOCATION: LOCATION: SHOULDER

## 2018-04-27 ASSESSMENT — PAIN SCALES - GENERAL: PAINLEVEL_OUTOF10: 6

## 2018-04-27 ASSESSMENT — PAIN DESCRIPTION - ORIENTATION: ORIENTATION: RIGHT

## 2018-05-01 ENCOUNTER — APPOINTMENT (OUTPATIENT)
Dept: PHYSICAL THERAPY | Age: 58
End: 2018-05-01
Payer: COMMERCIAL

## 2018-05-02 ENCOUNTER — HOSPITAL ENCOUNTER (OUTPATIENT)
Dept: PHYSICAL THERAPY | Age: 58
Setting detail: THERAPIES SERIES
Discharge: HOME | End: 2018-05-02
Payer: COMMERCIAL

## 2018-05-04 ENCOUNTER — HOSPITAL ENCOUNTER (OUTPATIENT)
Dept: PHYSICAL THERAPY | Age: 58
Setting detail: THERAPIES SERIES
Discharge: HOME | End: 2018-05-04
Payer: COMMERCIAL

## 2018-05-04 PROCEDURE — 97110 THERAPEUTIC EXERCISES: CPT

## 2018-05-04 RX ORDER — OMEGA-3/DHA/EPA/FISH OIL 35-113.5MG
1000 TABLET,CHEWABLE ORAL DAILY
Qty: 30 TABLET | Refills: 6 | Status: SHIPPED | OUTPATIENT
Start: 2018-05-04 | End: 2018-12-03 | Stop reason: SDUPTHER

## 2018-05-04 RX ORDER — UBIDECARENONE 100 MG
1000 CAPSULE ORAL
Qty: 30 CAPSULE | Refills: 6 | Status: SHIPPED | OUTPATIENT
Start: 2018-05-04 | End: 2018-12-03 | Stop reason: SDUPTHER

## 2018-05-04 ASSESSMENT — PAIN DESCRIPTION - DESCRIPTORS: DESCRIPTORS: ACHING

## 2018-05-04 ASSESSMENT — PAIN DESCRIPTION - ORIENTATION: ORIENTATION: RIGHT

## 2018-05-04 ASSESSMENT — PAIN SCALES - GENERAL: PAINLEVEL_OUTOF10: 8

## 2018-05-04 ASSESSMENT — PAIN DESCRIPTION - LOCATION: LOCATION: SHOULDER

## 2018-05-04 ASSESSMENT — PAIN DESCRIPTION - PAIN TYPE: TYPE: CHRONIC PAIN

## 2018-05-08 ENCOUNTER — HOSPITAL ENCOUNTER (OUTPATIENT)
Dept: GENERAL RADIOLOGY | Age: 58
Discharge: HOME | End: 2018-05-10
Payer: COMMERCIAL

## 2018-05-08 ENCOUNTER — OFFICE VISIT (OUTPATIENT)
Dept: INTERNAL MEDICINE CLINIC | Age: 58
End: 2018-05-08
Payer: COMMERCIAL

## 2018-05-08 VITALS
DIASTOLIC BLOOD PRESSURE: 80 MMHG | HEART RATE: 60 BPM | HEIGHT: 64 IN | BODY MASS INDEX: 36.02 KG/M2 | WEIGHT: 211 LBS | TEMPERATURE: 97.3 F | SYSTOLIC BLOOD PRESSURE: 120 MMHG

## 2018-05-08 DIAGNOSIS — M25.511 CHRONIC RIGHT SHOULDER PAIN: Primary | ICD-10-CM

## 2018-05-08 DIAGNOSIS — M75.51 DELTOID BURSITIS, RIGHT: ICD-10-CM

## 2018-05-08 DIAGNOSIS — G89.29 CHRONIC RIGHT SHOULDER PAIN: Primary | ICD-10-CM

## 2018-05-08 DIAGNOSIS — M25.511 CHRONIC RIGHT SHOULDER PAIN: ICD-10-CM

## 2018-05-08 DIAGNOSIS — G89.29 CHRONIC RIGHT SHOULDER PAIN: ICD-10-CM

## 2018-05-08 PROCEDURE — 73030 X-RAY EXAM OF SHOULDER: CPT

## 2018-05-08 PROCEDURE — 3017F COLORECTAL CA SCREEN DOC REV: CPT | Performed by: INTERNAL MEDICINE

## 2018-05-08 PROCEDURE — 99213 OFFICE O/P EST LOW 20 MIN: CPT | Performed by: INTERNAL MEDICINE

## 2018-05-08 PROCEDURE — G8427 DOCREV CUR MEDS BY ELIG CLIN: HCPCS | Performed by: INTERNAL MEDICINE

## 2018-05-08 PROCEDURE — 1036F TOBACCO NON-USER: CPT | Performed by: INTERNAL MEDICINE

## 2018-05-08 PROCEDURE — G8417 CALC BMI ABV UP PARAM F/U: HCPCS | Performed by: INTERNAL MEDICINE

## 2018-05-08 ASSESSMENT — ENCOUNTER SYMPTOMS
COUGH: 0
RESPIRATORY NEGATIVE: 1
ABDOMINAL PAIN: 0
CHEST TIGHTNESS: 0
BLOOD IN STOOL: 0
SHORTNESS OF BREATH: 0

## 2018-05-09 ENCOUNTER — HOSPITAL ENCOUNTER (OUTPATIENT)
Dept: PHYSICAL THERAPY | Age: 58
Setting detail: THERAPIES SERIES
Discharge: HOME | End: 2018-05-09
Payer: COMMERCIAL

## 2018-05-09 ENCOUNTER — TELEPHONE (OUTPATIENT)
Dept: INTERNAL MEDICINE CLINIC | Age: 58
End: 2018-05-09

## 2018-05-09 DIAGNOSIS — M19.111 POST-TRAUMATIC OSTEOARTHRITIS OF RIGHT SHOULDER: Primary | ICD-10-CM

## 2018-05-09 PROCEDURE — G0283 ELEC STIM OTHER THAN WOUND: HCPCS

## 2018-05-09 PROCEDURE — 97140 MANUAL THERAPY 1/> REGIONS: CPT

## 2018-05-09 PROCEDURE — 97110 THERAPEUTIC EXERCISES: CPT

## 2018-05-09 ASSESSMENT — PAIN DESCRIPTION - ORIENTATION: ORIENTATION: RIGHT

## 2018-05-09 ASSESSMENT — PAIN DESCRIPTION - LOCATION: LOCATION: ARM

## 2018-05-09 ASSESSMENT — PAIN DESCRIPTION - PAIN TYPE: TYPE: ACUTE PAIN

## 2018-05-09 ASSESSMENT — PAIN DESCRIPTION - FREQUENCY: FREQUENCY: INTERMITTENT

## 2018-05-09 ASSESSMENT — PAIN DESCRIPTION - DESCRIPTORS: DESCRIPTORS: STABBING

## 2018-05-09 ASSESSMENT — PAIN SCALES - GENERAL: PAINLEVEL_OUTOF10: 6

## 2018-05-11 ENCOUNTER — HOSPITAL ENCOUNTER (OUTPATIENT)
Dept: PHYSICAL THERAPY | Age: 58
Setting detail: THERAPIES SERIES
End: 2018-05-11
Payer: COMMERCIAL

## 2018-05-15 ENCOUNTER — APPOINTMENT (OUTPATIENT)
Dept: PHYSICAL THERAPY | Age: 58
End: 2018-05-15
Payer: COMMERCIAL

## 2018-05-18 ENCOUNTER — APPOINTMENT (OUTPATIENT)
Dept: PHYSICAL THERAPY | Age: 58
End: 2018-05-18
Payer: COMMERCIAL

## 2018-06-11 ENCOUNTER — OFFICE VISIT (OUTPATIENT)
Dept: INTERNAL MEDICINE CLINIC | Age: 58
End: 2018-06-11
Payer: COMMERCIAL

## 2018-06-11 VITALS
BODY MASS INDEX: 35 KG/M2 | OXYGEN SATURATION: 98 % | HEART RATE: 94 BPM | TEMPERATURE: 97.6 F | HEIGHT: 64 IN | WEIGHT: 205 LBS | DIASTOLIC BLOOD PRESSURE: 78 MMHG | SYSTOLIC BLOOD PRESSURE: 110 MMHG

## 2018-06-11 DIAGNOSIS — E78.5 HYPERLIPIDEMIA, UNSPECIFIED HYPERLIPIDEMIA TYPE: ICD-10-CM

## 2018-06-11 DIAGNOSIS — J45.20 MILD INTERMITTENT ASTHMA WITHOUT COMPLICATION: Primary | ICD-10-CM

## 2018-06-11 LAB
ANION GAP SERPL CALCULATED.3IONS-SCNC: 14 MEQ/L (ref 7–13)
BUN BLDV-MCNC: 18 MG/DL (ref 6–20)
CALCIUM SERPL-MCNC: 9.3 MG/DL (ref 8.6–10.2)
CHLORIDE BLD-SCNC: 101 MEQ/L (ref 98–107)
CHOLESTEROL, TOTAL: 232 MG/DL (ref 0–199)
CO2: 25 MEQ/L (ref 22–29)
CREAT SERPL-MCNC: 0.95 MG/DL (ref 0.5–0.9)
GFR AFRICAN AMERICAN: >60
GFR NON-AFRICAN AMERICAN: >60
GLUCOSE BLD-MCNC: 91 MG/DL (ref 74–109)
HBA1C MFR BLD: 5.4 % (ref 4.8–5.9)
HDLC SERPL-MCNC: 55 MG/DL (ref 40–59)
LDL CHOLESTEROL CALCULATED: 146 MG/DL (ref 0–129)
POTASSIUM SERPL-SCNC: 4.8 MEQ/L (ref 3.5–5.1)
SODIUM BLD-SCNC: 140 MEQ/L (ref 132–144)
TRIGL SERPL-MCNC: 157 MG/DL (ref 0–200)
TSH SERPL DL<=0.05 MIU/L-ACNC: 3.82 UIU/ML (ref 0.27–4.2)

## 2018-06-11 PROCEDURE — 99213 OFFICE O/P EST LOW 20 MIN: CPT | Performed by: INTERNAL MEDICINE

## 2018-06-11 PROCEDURE — G8427 DOCREV CUR MEDS BY ELIG CLIN: HCPCS | Performed by: INTERNAL MEDICINE

## 2018-06-11 PROCEDURE — 1036F TOBACCO NON-USER: CPT | Performed by: INTERNAL MEDICINE

## 2018-06-11 PROCEDURE — G8417 CALC BMI ABV UP PARAM F/U: HCPCS | Performed by: INTERNAL MEDICINE

## 2018-06-11 PROCEDURE — 3017F COLORECTAL CA SCREEN DOC REV: CPT | Performed by: INTERNAL MEDICINE

## 2018-06-11 RX ORDER — ALBUTEROL SULFATE 90 UG/1
2 AEROSOL, METERED RESPIRATORY (INHALATION) EVERY 4 HOURS PRN
Qty: 1 INHALER | Refills: 1 | Status: SHIPPED | OUTPATIENT
Start: 2018-06-11 | End: 2018-12-12 | Stop reason: SDUPTHER

## 2018-06-11 RX ORDER — MELOXICAM 15 MG/1
TABLET ORAL
Refills: 0 | COMMUNITY
Start: 2018-05-18 | End: 2018-07-24

## 2018-06-11 RX ORDER — EPINEPHRINE 0.3 MG/.3ML
INJECTION SUBCUTANEOUS
Qty: 2 EACH | Refills: 0 | Status: SHIPPED | OUTPATIENT
Start: 2018-06-11 | End: 2021-04-19 | Stop reason: SDUPTHER

## 2018-06-11 ASSESSMENT — ENCOUNTER SYMPTOMS
RHINORRHEA: 0
STRIDOR: 0
CHEST TIGHTNESS: 1
BLOOD IN STOOL: 0
ORTHOPNEA: 0
SHORTNESS OF BREATH: 1
TROUBLE SWALLOWING: 0
SPUTUM PRODUCTION: 0
ABDOMINAL PAIN: 0
WHEEZING: 1
COUGH: 0
SORE THROAT: 0
FACIAL SWELLING: 0
CHOKING: 0

## 2018-06-11 ASSESSMENT — PATIENT HEALTH QUESTIONNAIRE - PHQ9
1. LITTLE INTEREST OR PLEASURE IN DOING THINGS: 0
SUM OF ALL RESPONSES TO PHQ9 QUESTIONS 1 & 2: 0
2. FEELING DOWN, DEPRESSED OR HOPELESS: 0
SUM OF ALL RESPONSES TO PHQ QUESTIONS 1-9: 0

## 2018-06-14 RX ORDER — PRAVASTATIN SODIUM 40 MG
TABLET ORAL
Qty: 30 TABLET | Refills: 3 | Status: SHIPPED | OUTPATIENT
Start: 2018-06-14 | End: 2018-10-07 | Stop reason: SDUPTHER

## 2018-07-20 RX ORDER — CETIRIZINE HYDROCHLORIDE 10 MG/1
TABLET, FILM COATED ORAL
Qty: 30 TABLET | Refills: 3 | Status: SHIPPED | OUTPATIENT
Start: 2018-07-20 | End: 2018-11-08 | Stop reason: SDUPTHER

## 2018-07-20 RX ORDER — LEVOTHYROXINE SODIUM 0.05 MG/1
50 TABLET ORAL DAILY
Qty: 30 TABLET | Refills: 3 | Status: SHIPPED | OUTPATIENT
Start: 2018-07-20 | End: 2018-11-08 | Stop reason: SDUPTHER

## 2018-07-24 ENCOUNTER — OFFICE VISIT (OUTPATIENT)
Dept: INTERNAL MEDICINE CLINIC | Age: 58
End: 2018-07-24
Payer: COMMERCIAL

## 2018-07-24 VITALS
OXYGEN SATURATION: 98 % | BODY MASS INDEX: 33.63 KG/M2 | TEMPERATURE: 98.1 F | SYSTOLIC BLOOD PRESSURE: 108 MMHG | DIASTOLIC BLOOD PRESSURE: 80 MMHG | WEIGHT: 197 LBS | HEIGHT: 64 IN | HEART RATE: 110 BPM

## 2018-07-24 DIAGNOSIS — R00.0 TACHYCARDIA: ICD-10-CM

## 2018-07-24 DIAGNOSIS — G43.909 MIGRAINE WITHOUT STATUS MIGRAINOSUS, NOT INTRACTABLE, UNSPECIFIED MIGRAINE TYPE: Primary | ICD-10-CM

## 2018-07-24 PROCEDURE — G8427 DOCREV CUR MEDS BY ELIG CLIN: HCPCS | Performed by: INTERNAL MEDICINE

## 2018-07-24 PROCEDURE — 99213 OFFICE O/P EST LOW 20 MIN: CPT | Performed by: INTERNAL MEDICINE

## 2018-07-24 PROCEDURE — 1036F TOBACCO NON-USER: CPT | Performed by: INTERNAL MEDICINE

## 2018-07-24 PROCEDURE — G8417 CALC BMI ABV UP PARAM F/U: HCPCS | Performed by: INTERNAL MEDICINE

## 2018-07-24 PROCEDURE — 3017F COLORECTAL CA SCREEN DOC REV: CPT | Performed by: INTERNAL MEDICINE

## 2018-07-24 RX ORDER — ACETAMINOPHEN, ASPIRIN AND CAFFEINE 250; 250; 65 MG/1; MG/1; MG/1
1 TABLET, FILM COATED ORAL EVERY 8 HOURS PRN
Qty: 90 TABLET | Refills: 3 | Status: SHIPPED | OUTPATIENT
Start: 2018-07-24 | End: 2020-05-27

## 2018-07-24 ASSESSMENT — ENCOUNTER SYMPTOMS
EYE WATERING: 0
BLURRED VISION: 0
VISUAL CHANGE: 0
PHOTOPHOBIA: 0
RHINORRHEA: 0
CHOKING: 0
CHEST TIGHTNESS: 0
ABDOMINAL PAIN: 0
TROUBLE SWALLOWING: 0
BLOOD IN STOOL: 0
WHEEZING: 0
SINUS PRESSURE: 0
FACIAL SWELLING: 0
SCALP TENDERNESS: 0
SORE THROAT: 0

## 2018-07-24 NOTE — PROGRESS NOTES
Dany Escamilla is a 63 y.o. female nonsmoker, history of asthma, diabetes, hypothyroidism, PTSD, remote  cardiac arrhythmia, who presents with     Chief Complaint   Patient presents with    Migraine     used to have migraines in the past, now one episode last night, pain was in the back then in the front, took a Tylenol, it lasted 2 days        Interim history: Since past office visit the month ago, has not been in the emergency room or hospital.  Overall, she is doing well. Has lost 8 pounds and feels very good about it. No new medications from other providers. No lifestyle changes except for a better diet. The following laboratory reports since the past visit were reviewed (the ones pertinent to today's visit were discussed with the patient):    Orders Only on 06/11/2018   Component Date Value Ref Range Status    Sodium 06/11/2018 140  132 - 144 mEq/L Final    Potassium 06/11/2018 4.8  3.5 - 5.1 mEq/L Final    Chloride 06/11/2018 101  98 - 107 mEq/L Final    CO2 06/11/2018 25  22 - 29 mEq/L Final    Anion Gap 06/11/2018 14* 7 - 13 mEq/L Final    Glucose 06/11/2018 91  74 - 109 mg/dL Final    BUN 06/11/2018 18  6 - 20 mg/dL Final    CREATININE 06/11/2018 0.95* 0.50 - 0.90 mg/dL Final    GFR Non- 06/11/2018 >60.0  >60 Final    Comment: >60 mL/min/1.73m2 EGFR, calc. for ages 25 and older using the  MDRD formula (not corrected for weight), is valid for stable  renal function.  GFR  06/11/2018 >60.0  >60 Final    Comment: >60 mL/min/1.73m2 EGFR, calc. for ages 25 and older using the  MDRD formula (not corrected for weight), is valid for stable  renal function.  Calcium 06/11/2018 9.3  8.6 - 10.2 mg/dL Final    Cholesterol, Total 06/11/2018 232* 0 - 199 mg/dL Final    ATP III Cholesterol Classification is Borderline High.  Triglycerides 06/11/2018 157  0 - 200 mg/dL Final    ATP III Triglycerides Classification is Borderline High.     HDL 06/11/2018 55  40 - 59 mg/dL Final    Comment: ATP III HDL Cholesterol Classification is Desirable. Expected Values:    Males:    >55 = No Risk            35-55 = Moderate Risk            <35 = High Risk    Females:  >65 = No Risk            45-65 = Moderate Risk            <45 = High Risk    NCEP Guidelines: Third Report May 2001  >59 = negative risk factor for CHD  <40 = major risk factor for CHD      LDL Calculated 06/11/2018 146* 0 - 129 mg/dL Final    ATT III Classification is Borderline High.  TSH 06/11/2018 3.820  0.270 - 4.200 uIU/mL Final    Hemoglobin A1C 06/11/2018 5.4  4.8 - 5.9 % Final       HPI:    Migraine    This is a new problem. The problem has been resolved. The pain is located in the bilateral, occipital and parietal region. The pain radiates to the face. The pain quality is similar to prior headaches. The quality of the pain is described as aching and throbbing. The pain is severe. Pertinent negatives include no abdominal pain, blurred vision, dizziness, eye watering, loss of balance, neck pain, phonophobia, photophobia, rhinorrhea, scalp tenderness, sinus pressure, sore throat, tinnitus, visual change or weakness. Nothing aggravates the symptoms. She has tried acetaminophen for the symptoms. The treatment provided no relief. Her past medical history is significant for migraine headaches and obesity. There is no history of recent head traumas, sinus disease or TMJ. More detail above in the chief complaint(s), interim history and below in the review of systems.      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 She is not slowed and not withdrawn. Thought content is not delusional. Cognition and memory are normal. She does not express inappropriate judgment. She does not exhibit a depressed mood. She exhibits normal recent memory and normal remote memory. Good motivation, insight  She is attentive. Assessment:    Jennifer Almeida was seen today for migraine. Diagnoses and all orders for this visit:    Migraine without status migrainosus, not intractable, unspecified migraine type          Patient will keep migraine dairy, short interval follow up to decide on choice of medication for acute attacks, if recurrent, or for prophylaxis     Tachycardia           Isolated episode, asymptomatic, watch caffeine, reevaluate    Other orders  -     aspirin-acetaminophen-caffeine (EXCEDRIN MIGRAINE) 250-250-65 MG per tablet; Take 1 tablet by mouth every 8 hours as needed for Headaches        Plan:    Reviewed with the patient (/and caregiver if present): current health status, medications, activities and diet. See also orders and comments in the assessment section. Today's diagnosis (in the context of chronic problems) was discussed with patient (/and caregiver if present), questions answered. Side effects, adverse effects of the medication prescribed (or refilled) today, treatment plan/ rationale and result expectations have (again) been discussed with the patient who expresses understanding and consents to proceed as outlined above. Additional advise included in the \"after visit summary\". Orders Placed This Encounter   Medications    aspirin-acetaminophen-caffeine (EXCEDRIN MIGRAINE) 250-250-65 MG per tablet     Sig: Take 1 tablet by mouth every 8 hours as needed for Headaches     Dispense:  90 tablet     Refill:  3     Further workup and plan will be determined based on clinical progression and follow up test/ treatment results.     Close follow up needed to evaluate treatment results

## 2018-08-10 ENCOUNTER — OFFICE VISIT (OUTPATIENT)
Dept: INTERNAL MEDICINE CLINIC | Age: 58
End: 2018-08-10
Payer: COMMERCIAL

## 2018-08-10 VITALS
BODY MASS INDEX: 35.34 KG/M2 | OXYGEN SATURATION: 98 % | TEMPERATURE: 97.4 F | SYSTOLIC BLOOD PRESSURE: 110 MMHG | HEIGHT: 64 IN | WEIGHT: 207 LBS | HEART RATE: 90 BPM | DIASTOLIC BLOOD PRESSURE: 86 MMHG

## 2018-08-10 DIAGNOSIS — G44.219 EPISODIC TENSION-TYPE HEADACHE, NOT INTRACTABLE: Primary | ICD-10-CM

## 2018-08-10 DIAGNOSIS — Z23 NEED FOR 23-POLYVALENT PNEUMOCOCCAL POLYSACCHARIDE VACCINE: ICD-10-CM

## 2018-08-10 PROCEDURE — 90471 IMMUNIZATION ADMIN: CPT | Performed by: INTERNAL MEDICINE

## 2018-08-10 PROCEDURE — G8427 DOCREV CUR MEDS BY ELIG CLIN: HCPCS | Performed by: INTERNAL MEDICINE

## 2018-08-10 PROCEDURE — 99213 OFFICE O/P EST LOW 20 MIN: CPT | Performed by: INTERNAL MEDICINE

## 2018-08-10 PROCEDURE — 3017F COLORECTAL CA SCREEN DOC REV: CPT | Performed by: INTERNAL MEDICINE

## 2018-08-10 PROCEDURE — G8417 CALC BMI ABV UP PARAM F/U: HCPCS | Performed by: INTERNAL MEDICINE

## 2018-08-10 PROCEDURE — 90732 PPSV23 VACC 2 YRS+ SUBQ/IM: CPT | Performed by: INTERNAL MEDICINE

## 2018-08-10 PROCEDURE — 1036F TOBACCO NON-USER: CPT | Performed by: INTERNAL MEDICINE

## 2018-08-10 RX ORDER — TOPIRAMATE 50 MG/1
50 TABLET, FILM COATED ORAL NIGHTLY
Qty: 30 TABLET | Refills: 3 | Status: SHIPPED | OUTPATIENT
Start: 2018-08-10 | End: 2018-12-03 | Stop reason: SDUPTHER

## 2018-08-10 ASSESSMENT — ENCOUNTER SYMPTOMS
TROUBLE SWALLOWING: 0
VOMITING: 0
CHEST TIGHTNESS: 0
FACIAL SWELLING: 0
VISUAL CHANGE: 0
SINUS PRESSURE: 0
BLOOD IN STOOL: 0
RHINORRHEA: 0
ABDOMINAL PAIN: 0
SORE THROAT: 0
PHOTOPHOBIA: 0
CHOKING: 0
WHEEZING: 0
EYE WATERING: 0

## 2018-08-10 NOTE — PROGRESS NOTES
Risk            35-55 = Moderate Risk            <35 = High Risk    Females:  >65 = No Risk            45-65 = Moderate Risk            <45 = High Risk    NCEP Guidelines: Third Report May 2001  >59 = negative risk factor for CHD  <40 = major risk factor for CHD      LDL Calculated 06/11/2018 146* 0 - 129 mg/dL Final    ATT III Classification is Borderline High.  TSH 06/11/2018 3.820  0.270 - 4.200 uIU/mL Final    Hemoglobin A1C 06/11/2018 5.4  4.8 - 5.9 % Final       HPI:    Headache    This is a recurrent problem. The current episode started more than 1 month ago. The problem occurs intermittently. The problem has been waxing and waning. The pain is located in the bilateral, occipital, parietal and temporal region. The pain quality is similar to prior headaches. The quality of the pain is described as aching and throbbing. The pain is moderate. Pertinent negatives include no abdominal pain, dizziness, eye watering, loss of balance, neck pain, photophobia, rhinorrhea, sinus pressure, sore throat, tinnitus, visual change, vomiting or weakness. The symptoms are aggravated by emotional stress and fatigue. She has tried acetaminophen, NSAIDs and Excedrin for the symptoms. The treatment provided moderate relief. Her past medical history is significant for migraine headaches and obesity. There is no history of immunosuppression, recent head traumas or sinus disease. More detail above in the chief complaint(s), interim history and below in the review of systems.      Past Medical History:   Diagnosis Date    Arrhythmia     Asthma 2015    Depression     was with Dr Michael Goodman, now the [facility]    Diabetes mellitus [facility])     pre diabetic    Diverticulosis of sigmoid colon 2012    Dr Dykes Innocent DJD of left shoulder     Environmental allergies     Fatty liver 2013    GERD (gastroesophageal reflux disease)     Hydatidiform mole     age 26     Hyperlipidemia     Hypothyroidism 2011    IFG (impaired fasting (SYNTHROID) 50 MCG tablet TAKE 1 TABLET BY MOUTH DAILY 30 tablet 3    pravastatin (PRAVACHOL) 40 MG tablet TAKE 1 TABLET BY MOUTH EVERY EVENING. 30 tablet 3    albuterol sulfate HFA (PROVENTIL HFA) 108 (90 Base) MCG/ACT inhaler Inhale 2 puffs into the lungs every 4 hours as needed for Wheezing or Shortness of Breath Space out to every 6 hours as symptoms improve. 1 Inhaler 1    EPINEPHrine (EPIPEN 2-PRAVEENA) 0.3 MG/0.3ML SOAJ injection Use as directed for allergic reaction 2 each 0    hydrocortisone 2.5 % ointment APPLY TOPICALLY DAILY FOR 14 DAYS.  0    [employer] VITAMIN B12 1000 MCG tablet TAKE 1 TABLET BY MOUTH DAILY 30 tablet 6    D3-1000 1000 units CAPS TAKE 1 TABLET BY MOUTH DAILY WITH SUPPER 30 capsule 6    celecoxib (CELEBREX) 100 MG capsule Take 1 capsule by mouth 2 times daily 60 capsule 3    benztropine (COGENTIN) 0.5 MG tablet TAKE ONE TABLET BY MOUTH THREE TIMES A DAY FOR TREMORS  3    famotidine (PEPCID) 20 MG tablet TAKE 1 TABLET BY MOUTH TWICE A DAY AS NEEDED FOR HEARTBURN 60 tablet 3    PAIN & FEVER EXTRA STRENGTH 500 MG tablet TAKE 1 TABLET BY MOUTH EVERY 6 HOURS AS NEEDED FOR FEVER UP TO 7 DAYS MAX 6 TABS IN 24 HOURS  0    benztropine (COGENTIN) 1 MG tablet TAKE 0.5mg BY MOUTH EVERY DAY FOR TREMORS  0    fluticasone (FLONASE) 50 MCG/ACT nasal spray USE 1 SPRAY INTO EACH NOSTRIL DAILY  0    Multiple Vitamin (MULTI-VITAMINS) TABS TAKE 1 TABLET BY MOUTH EVERY DAY 30 tablet 3    ARIPiprazole (ABILIFY) 15 MG tablet TAKE 1 TABLET BY MOUTH EVERY DAY  2    buPROPion (WELLBUTRIN XL) 300 MG XL tablet Take 1 tablet by mouth daily 30 tablet 6    [DISCONTINUED] Multiple Vitamins-Minerals (THERAPEUTIC MULTIVITAMIN-MINERALS) tablet Take 1 tablet by mouth daily 30 tablet 5    [DISCONTINUED] albuterol (PROVENTIL;VENTOLIN) 90 MCG/ACT inhaler Inhale 2 puffs into the lungs every 6 hours as needed. 1 Inhaler 6     No current facility-administered medications on file prior to visit.         Review of Systems Constitutional: Positive for unexpected weight change (weight gain). HENT: Negative for congestion, facial swelling, rhinorrhea, sinus pressure, sore throat, tinnitus and trouble swallowing. Eyes: Negative for photophobia. Respiratory: Negative for choking, chest tightness and wheezing. Cardiovascular: Negative for chest pain. Gastrointestinal: Negative for abdominal pain, blood in stool and vomiting. Endocrine: Negative for cold intolerance and heat intolerance. Genitourinary: Negative for dysuria. Musculoskeletal: Negative for neck pain and neck stiffness. Skin: Negative for rash. Allergic/Immunologic: Negative for immunocompromised state. Neurological: Positive for headaches. Negative for dizziness, syncope, speech difficulty, weakness, light-headedness and loss of balance. Psychiatric/Behavioral: Negative for decreased concentration, dysphoric mood and sleep disturbance. The patient is nervous/anxious. Vitals:    08/10/18 1002   BP: 110/86   Site: Right Arm   Position: Sitting   Cuff Size: Large Adult   Pulse: 90   Temp: 97.4 °F (36.3 °C)   TempSrc: Tympanic   SpO2: 98%   Weight: 207 lb (93.9 kg)   Height: 5' 4\" (1.626 m)       Physical Exam   Constitutional: She is oriented to person, place, and time. She appears distressed (anxious). Obese, age appropriate, chronically ill-appearing   HENT:   Head: Atraumatic. Eyes: Conjunctivae are normal. Left eye exhibits no discharge. Right eye prosthesis    Neck: Neck supple. Cardiovascular: Regular rhythm, normal heart sounds and intact distal pulses. Pulmonary/Chest: Effort normal and breath sounds normal.   Abdominal: Soft. She exhibits no distension. Musculoskeletal:        Cervical back: Normal. She exhibits normal range of motion and no tenderness. Neurological: She is alert and oriented to person, place, and time. Coordination normal.   Skin: Skin is warm, dry and intact. No rash noted.    Psychiatric: Her behavior progression and follow up test/ treatment results. Close follow up needed to evaluate treatment results and for care coordination. Return in about 2 months (around 10/10/2018). I have reviewed the patient's medical and surgical, family and social history, health maintenance schedule, and updated the computerized patient record. Please note this report has been partially produced by using speech recognition hardware. It may contain errors related to the system, including grammar, punctuation and spelling as well as words and phrases that may seem inaccurate. For any questions or concerns, please feel free to contact me for clarification.         Electronically signed by Iron Blanca MD

## 2018-08-29 ENCOUNTER — OFFICE VISIT (OUTPATIENT)
Dept: INTERNAL MEDICINE CLINIC | Age: 58
End: 2018-08-29
Payer: COMMERCIAL

## 2018-08-29 VITALS
SYSTOLIC BLOOD PRESSURE: 115 MMHG | OXYGEN SATURATION: 98 % | BODY MASS INDEX: 33.97 KG/M2 | WEIGHT: 199 LBS | HEART RATE: 83 BPM | HEIGHT: 64 IN | DIASTOLIC BLOOD PRESSURE: 88 MMHG | TEMPERATURE: 97.6 F

## 2018-08-29 DIAGNOSIS — K57.10 DIVERTICULOSIS OF SMALL INTESTINE WITHOUT HEMORRHAGE: ICD-10-CM

## 2018-08-29 DIAGNOSIS — R10.32 ABDOMINAL PAIN, LEFT LOWER QUADRANT: Primary | ICD-10-CM

## 2018-08-29 PROCEDURE — 99214 OFFICE O/P EST MOD 30 MIN: CPT | Performed by: FAMILY MEDICINE

## 2018-08-29 NOTE — PROGRESS NOTES
EPINEPHrine (EPIPEN 2-PRAVEENA) 0.3 MG/0.3ML SOAJ injection Use as directed for allergic reaction 2 each 0    hydrocortisone 2.5 % ointment APPLY TOPICALLY DAILY FOR 14 DAYS.  0    CVS VITAMIN B12 1000 MCG tablet TAKE 1 TABLET BY MOUTH DAILY 30 tablet 6    D3-1000 1000 units CAPS TAKE 1 TABLET BY MOUTH DAILY WITH SUPPER 30 capsule 6    celecoxib (CELEBREX) 100 MG capsule Take 1 capsule by mouth 2 times daily 60 capsule 3    benztropine (COGENTIN) 0.5 MG tablet TAKE ONE TABLET BY MOUTH THREE TIMES A DAY FOR TREMORS  3    famotidine (PEPCID) 20 MG tablet TAKE 1 TABLET BY MOUTH TWICE A DAY AS NEEDED FOR HEARTBURN 60 tablet 3    PAIN & FEVER EXTRA STRENGTH 500 MG tablet TAKE 1 TABLET BY MOUTH EVERY 6 HOURS AS NEEDED FOR FEVER UP TO 7 DAYS MAX 6 TABS IN 24 HOURS  0    benztropine (COGENTIN) 1 MG tablet TAKE 0.5mg BY MOUTH EVERY DAY FOR TREMORS  0    fluticasone (FLONASE) 50 MCG/ACT nasal spray USE 1 SPRAY INTO EACH NOSTRIL DAILY  0    Multiple Vitamin (MULTI-VITAMINS) TABS TAKE 1 TABLET BY MOUTH EVERY DAY 30 tablet 3    ARIPiprazole (ABILIFY) 15 MG tablet TAKE 1 TABLET BY MOUTH EVERY DAY  2    buPROPion (WELLBUTRIN XL) 300 MG XL tablet Take 1 tablet by mouth daily 30 tablet 6     No current facility-administered medications for this visit. ROS  CONSTITUTIONAL: The patient denies fevers, chills, sweats and body ache. HEENT: Denies headache, blurry vision, eye pain, tinnitus, vertigo,  sore throat, neck or thyroid masses. RESPIRATORY: Denies cough, sputum, hemoptysis. CARDIAC: Denies chest pain, pressure, palpitations, Denies lower extremity edema. GASTROINTESTINAL: Admitted to abdominal pain and diarrhea. Vomiting has resolved  GENITOURINARY: Denies dysuria, hematuria, nocturia or frequency, urinary incontinence. NEUROLOGIC: Denies headaches, dizziness, syncope, weakness  MUSCULOSKELETAL: denies changes in range of motion, joint pain, stiffness. ENDOCRINOLOGY: Denies heat or cold intolerance. HEMATOLOGY: Denies easy bleeding or blood transfusion,anemia  DERMATOLOGY: Denies changes in moles or pigmentation changes. PSYCHIATRY: Denies depression, agitation or anxiety.     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 Carole Win S/P hysterectomy with oophorectomy 2012    Dr Tarik Henning Tremor     Dr [place] Vitamin D deficiency         Past Surgical History:   Procedure Laterality Date    ENDOMETRIAL ABLATION  6/2012    EYE REMOVAL      OD for melanoma, age 30    FOOT OSTEOTOMY Left 09/23/2016    Kieran Showers DPM      LINDSAY AND BSO  2012    Dr Halima Long        Family History   Problem Relation Age of Onset    Heart Failure Mother         dec 76    Diabetes Mother     Diabetes Father     Stroke Father         dec age 80        Social History     Social History    Marital status: Single     Spouse name: N/A    Number of children: 3    Years of education: N/A     Occupational History    SSI      Social History Main Topics    Smoking status: Never Smoker    Smokeless tobacco: Never Used    Alcohol use No    Drug use: No    Sexual activity: Not Currently     Other Topics Concern    Not on file     Social History Narrative    [place], one of 4 children, one sister disappeared at age 29, never found    [place]oved to [place] 40        Lives in an apartment in [facility], alone    , 3

## 2018-09-06 ENCOUNTER — HOSPITAL ENCOUNTER (OUTPATIENT)
Dept: CT IMAGING | Age: 58
Discharge: HOME | End: 2018-09-08
Payer: COMMERCIAL

## 2018-09-06 DIAGNOSIS — R10.32 ABDOMINAL PAIN, LEFT LOWER QUADRANT: ICD-10-CM

## 2018-09-06 PROCEDURE — 6360000004 HC RX CONTRAST MEDICATION: Performed by: FAMILY MEDICINE

## 2018-09-06 PROCEDURE — 2580000003 HC RX 258: Performed by: FAMILY MEDICINE

## 2018-09-06 PROCEDURE — 74177 CT ABD & PELVIS W/CONTRAST: CPT

## 2018-09-06 PROCEDURE — 2500000003 HC RX 250 WO HCPCS: Performed by: FAMILY MEDICINE

## 2018-09-06 RX ORDER — SODIUM CHLORIDE 0.9 % (FLUSH) 0.9 %
10 SYRINGE (ML) INJECTION
Status: COMPLETED | OUTPATIENT
Start: 2018-09-06 | End: 2018-09-06

## 2018-09-06 RX ADMIN — Medication 10 ML: at 12:19

## 2018-09-06 RX ADMIN — IOPAMIDOL 100 ML: 755 INJECTION, SOLUTION INTRAVENOUS at 12:13

## 2018-09-06 RX ADMIN — BARIUM SULFATE 450 ML: 20 SUSPENSION ORAL at 11:05

## 2018-09-12 ENCOUNTER — OFFICE VISIT (OUTPATIENT)
Dept: INTERNAL MEDICINE CLINIC | Age: 58
End: 2018-09-12
Payer: COMMERCIAL

## 2018-09-12 VITALS
SYSTOLIC BLOOD PRESSURE: 120 MMHG | BODY MASS INDEX: 35 KG/M2 | DIASTOLIC BLOOD PRESSURE: 80 MMHG | OXYGEN SATURATION: 98 % | TEMPERATURE: 97.9 F | HEIGHT: 64 IN | WEIGHT: 205 LBS | HEART RATE: 90 BPM

## 2018-09-12 DIAGNOSIS — Z23 NEED FOR INFLUENZA VACCINATION: ICD-10-CM

## 2018-09-12 DIAGNOSIS — K57.30 DIVERTICULOSIS OF COLON: Primary | ICD-10-CM

## 2018-09-12 PROCEDURE — 99212 OFFICE O/P EST SF 10 MIN: CPT | Performed by: INTERNAL MEDICINE

## 2018-09-12 PROCEDURE — 3017F COLORECTAL CA SCREEN DOC REV: CPT | Performed by: INTERNAL MEDICINE

## 2018-09-12 PROCEDURE — G8427 DOCREV CUR MEDS BY ELIG CLIN: HCPCS | Performed by: INTERNAL MEDICINE

## 2018-09-12 PROCEDURE — G8417 CALC BMI ABV UP PARAM F/U: HCPCS | Performed by: INTERNAL MEDICINE

## 2018-09-12 PROCEDURE — 1036F TOBACCO NON-USER: CPT | Performed by: INTERNAL MEDICINE

## 2018-09-12 ASSESSMENT — ENCOUNTER SYMPTOMS
SHORTNESS OF BREATH: 0
COLOR CHANGE: 0
ABDOMINAL DISTENTION: 0
ABDOMINAL PAIN: 0
COUGH: 0
CONSTIPATION: 1

## 2018-09-12 NOTE — PATIENT INSTRUCTIONS
Patient Education        Diverticulosis: Care Instructions  Your Care Instructions  In diverticulosis, pouches called diverticula form in the wall of the large intestine (colon). The pouches do not cause any pain or other symptoms. Most people who have diverticulosis do not know they have it. But the pouches sometimes bleed, and if they become infected, they can cause pain and other symptoms. When this happens, it is called diverticulitis. Diverticula form when pressure pushes the wall of the colon outward at certain weak points. A diet that is too low in fiber can cause diverticula. Follow-up care is a key part of your treatment and safety. Be sure to make and go to all appointments, and call your doctor if you are having problems. It's also a good idea to know your test results and keep a list of the medicines you take. How can you care for yourself at home? · Include fruits, leafy green vegetables, beans, and whole grains in your diet each day. These foods are high in fiber. · Take a fiber supplement, such as Citrucel or Metamucil, every day if needed. Read and follow all instructions on the label. · Drink plenty of fluids, enough so that your urine is light yellow or clear like water. If you have kidney, heart, or liver disease and have to limit fluids, talk with your doctor before you increase the amount of fluids you drink. · Get at least 30 minutes of exercise on most days of the week. Walking is a good choice. You also may want to do other activities, such as running, swimming, cycling, or playing tennis or team sports. · Cut out foods that cause gas, pain, or other symptoms. When should you call for help?   Call your doctor now or seek immediate medical care if:    · You have belly pain.     · You pass maroon or very bloody stools.     · You have a fever.     · You have nausea and vomiting.     · You have unusual changes in your bowel movements or abdominal swelling.     · You have burning pain a few hours to a week or more. They include:  ¨ Belly pain. This is usually in the lower left side. It is sometimes worse when you move. This is the most common symptom. ¨ Fever and chills. ¨ Bloating and gas. ¨ Diarrhea or constipation. ¨ Nausea and sometimes vomiting. ¨ Not feeling like eating. How can you prevent these problems? You may be able to lower your chance of getting diverticulitis. You can do this by taking steps to prevent constipation. · Eat fruits, vegetables, beans, and whole grains every day. These foods are high in fiber. · Drink plenty of fluids (enough so that your urine is light yellow or clear like water). If you have kidney, heart, or liver disease and have to limit fluids, talk with your doctor before you increase the amount of fluids you drink. · Get at least 30 minutes of exercise on most days of the week. Walking is a good choice. You also may want to do other activities, such as running, swimming, cycling, or playing tennis or team sports. · Take a fiber supplement, such as Citrucel or Metamucil, every day if needed. Read and follow all instructions on the label. · Schedule time each day for a bowel movement. Having a daily routine may help. Take your time and do not strain when having a bowel movement. Some people avoid nuts, seeds, berries, and popcorn. They believe that these foods might get trapped in the diverticula and cause pain. But there is no proof that these foods cause diverticulitis or make it worse. How are these problems treated? · The best way to treat diverticulosis is to avoid constipation. (See the tips above.)  · Treatment for diverticulitis includes antibiotics and often a change in your diet. You may need only liquids at first. Your doctor may suggest pain medicines for pain or belly cramps. In some cases, surgery may be needed. Follow-up care is a key part of your treatment and safety.  Be sure to make and go to all appointments, and call your doctor

## 2018-09-12 NOTE — PROGRESS NOTES
Anabell Edmondson is a 63 y.o. female with depression, asthma, hypothyroidism, who presents with     Chief Complaint   Patient presents with    Diverticulosis, Patient presented for an office visit with abdominal discomfort (left upper quadrant), CT of the abdomen revealed colon diverticulosis without diverticulitis, bilateral kidney cysts, no other significant abnormality. Patient comes today for follow-up and states the abdominal discomfort has fully resolved.  Immunizations       Interim history: as above. The following laboratory reports since the past visit were reviewed (the ones pertinent to today's visit were discussed with the patient):    No visits with results within 3 Month(s) from this visit. Latest known visit with results is:   Orders Only on 06/11/2018   Component Date Value Ref Range Status    Sodium 06/11/2018 140  132 - 144 mEq/L Final    Potassium 06/11/2018 4.8  3.5 - 5.1 mEq/L Final    Chloride 06/11/2018 101  98 - 107 mEq/L Final    CO2 06/11/2018 25  22 - 29 mEq/L Final    Anion Gap 06/11/2018 14* 7 - 13 mEq/L Final    Glucose 06/11/2018 91  74 - 109 mg/dL Final    BUN 06/11/2018 18  6 - 20 mg/dL Final    CREATININE 06/11/2018 0.95* 0.50 - 0.90 mg/dL Final    GFR Non- 06/11/2018 >60.0  >60 Final    Comment: >60 mL/min/1.73m2 EGFR, calc. for ages 25 and older using the  MDRD formula (not corrected for weight), is valid for stable  renal function.  GFR  06/11/2018 >60.0  >60 Final    Comment: >60 mL/min/1.73m2 EGFR, calc. for ages 25 and older using the  MDRD formula (not corrected for weight), is valid for stable  renal function.  Calcium 06/11/2018 9.3  8.6 - 10.2 mg/dL Final    Cholesterol, Total 06/11/2018 232* 0 - 199 mg/dL Final    ATP III Cholesterol Classification is Borderline High.  Triglycerides 06/11/2018 157  0 - 200 mg/dL Final    ATP III Triglycerides Classification is Borderline High.     HDL 06/11/2018 55  40 - 59 mg/dL Final    Comment: ATP III HDL Cholesterol Classification is Desirable. Expected Values:    Males:    >55 = No Risk            35-55 = Moderate Risk            <35 = High Risk    Females:  >65 = No Risk            45-65 = Moderate Risk            <45 = High Risk    NCEP Guidelines: Third Report May 2001  >59 = negative risk factor for CHD  <40 = major risk factor for CHD      LDL Calculated 06/11/2018 146* 0 - 129 mg/dL Final    ATT III Classification is Borderline High.  TSH 06/11/2018 3.820  0.270 - 4.200 uIU/mL Final    Hemoglobin A1C 06/11/2018 5.4  4.8 - 5.9 % Final       HPI:    HPI     Diverticulosis    Symptoms have been completely resolved since that time. Associated symptoms: none. Aggravating factors: none. Alleviating factors: none. The patient denies anorexia, chills, diarrhea, diffuse abdominal pain, fever, hematochezia, LLQ abdominal pain, melena, mucus in the stool, nausea and night sweats. She does admit to chronic constipation  As detailed above in the chief complaint(s), interim history and below in the review of systems.      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 Dasha Bennett S/P hysterectomy with oophorectomy 2012    Dr [facility]    Tremor     Dr Kimberly Eric Vitamin D deficiency        Past Surgical History:   Procedure Laterality Date    ENDOMETRIAL ABLATION  6/2012    EYE REMOVAL      OD for melanoma, age EPINEPHrine (EPIPEN 2-PRAVEENA) 0.3 MG/0.3ML SOAJ injection Use as directed for allergic reaction 2 each 0    hydrocortisone 2.5 % ointment APPLY TOPICALLY DAILY FOR 14 DAYS.  0    [employer] VITAMIN B12 1000 MCG tablet TAKE 1 TABLET BY MOUTH DAILY 30 tablet 6    D3-1000 1000 units CAPS TAKE 1 TABLET BY MOUTH DAILY WITH SUPPER 30 capsule 6    celecoxib (CELEBREX) 100 MG capsule Take 1 capsule by mouth 2 times daily 60 capsule 3    benztropine (COGENTIN) 0.5 MG tablet TAKE ONE TABLET BY MOUTH THREE TIMES A DAY FOR TREMORS  3    famotidine (PEPCID) 20 MG tablet TAKE 1 TABLET BY MOUTH TWICE A DAY AS NEEDED FOR HEARTBURN 60 tablet 3    PAIN & FEVER EXTRA STRENGTH 500 MG tablet TAKE 1 TABLET BY MOUTH EVERY 6 HOURS AS NEEDED FOR FEVER UP TO 7 DAYS MAX 6 TABS IN 24 HOURS  0    benztropine (COGENTIN) 1 MG tablet TAKE 0.5mg BY MOUTH EVERY DAY FOR TREMORS  0    fluticasone (FLONASE) 50 MCG/ACT nasal spray USE 1 SPRAY INTO EACH NOSTRIL DAILY  0    Multiple Vitamin (MULTI-VITAMINS) TABS TAKE 1 TABLET BY MOUTH EVERY DAY 30 tablet 3    ARIPiprazole (ABILIFY) 15 MG tablet TAKE 1 TABLET BY MOUTH EVERY DAY  2    buPROPion (WELLBUTRIN XL) 300 MG XL tablet Take 1 tablet by mouth daily 30 tablet 6    [DISCONTINUED] Multiple Vitamins-Minerals (THERAPEUTIC MULTIVITAMIN-MINERALS) tablet Take 1 tablet by mouth daily 30 tablet 5    [DISCONTINUED] albuterol (PROVENTIL;VENTOLIN) 90 MCG/ACT inhaler Inhale 2 puffs into the lungs every 6 hours as needed. 1 Inhaler 6     No current facility-administered medications on file prior to visit. Review of Systems   Constitutional: Negative for chills, diaphoresis, fever and unexpected weight change. HENT: Negative for nosebleeds. Respiratory: Negative for cough and shortness of breath. Cardiovascular: Negative for chest pain. Gastrointestinal: Positive for constipation. Negative for abdominal distention and abdominal pain.    Endocrine: Negative for cold intolerance and heat intolerance. Genitourinary: Negative for dysuria, hematuria and pelvic pain. Musculoskeletal: Negative for joint swelling and myalgias. Skin: Negative for color change. Neurological: Negative for dizziness and light-headedness. Psychiatric/Behavioral: Negative for decreased concentration, dysphoric mood and sleep disturbance. The patient is nervous/anxious. Vitals:    09/12/18 1226   BP: 120/80   Site: Right Upper Arm   Position: Sitting   Cuff Size: Large Adult   Pulse: 90   Temp: 97.9 °F (36.6 °C)   TempSrc: Tympanic   SpO2: 98%   Weight: 205 lb (93 kg)   Height: 5' 4\" (1.626 m)       Physical Exam   Constitutional: She is oriented to person, place, and time. No distress. Obese, age appropriate, well groomed     HENT:   Head: Atraumatic. Eyes: Conjunctivae are normal. No scleral icterus. Right eye prosthesis    Neck: Neck supple. Cardiovascular: Regular rhythm, normal heart sounds and intact distal pulses. Pulmonary/Chest: Effort normal and breath sounds normal.   Abdominal: Soft. She exhibits no distension. There is no tenderness. There is no guarding. Exam limited due to abdominal adiposity      Musculoskeletal: Normal range of motion. Cervical back: Normal. She exhibits normal range of motion and no tenderness. Neurological: She is alert and oriented to person, place, and time. Skin: Skin is warm and intact. Psychiatric: Her behavior is normal. Judgment normal. Her mood appears anxious. Her speech is rapid and/or pressured. She is not slowed and not withdrawn. Thought content is not delusional. Cognition and memory are normal. She does not express inappropriate judgment. She does not exhibit a depressed mood. She exhibits normal recent memory and normal remote memory. Good motivation, insight  She is attentive. Assessment:    Dollie Jeans was seen today for diverticulosis and immunizations.     Diagnoses and all orders for this visit:    Diverticulosis of colon health maintenance schedule, and updated the computerized patient record. Please note this report has been partially produced by using speech recognition hardware. It may contain errors related to the system, including grammar, punctuation and spelling as well as words and phrases that may seem inaccurate. For any questions or concerns, please feel free to contact me for clarification.         Electronically signed by Becky Andrews MD

## 2018-09-13 PROCEDURE — 90471 IMMUNIZATION ADMIN: CPT | Performed by: INTERNAL MEDICINE

## 2018-09-13 PROCEDURE — 90688 IIV4 VACCINE SPLT 0.5 ML IM: CPT | Performed by: INTERNAL MEDICINE

## 2018-10-08 RX ORDER — PRAVASTATIN SODIUM 40 MG
TABLET ORAL
Qty: 30 TABLET | Refills: 3 | Status: SHIPPED | OUTPATIENT
Start: 2018-10-08 | End: 2019-01-25 | Stop reason: SDUPTHER

## 2018-11-02 ENCOUNTER — HOSPITAL ENCOUNTER (OUTPATIENT)
Dept: GENERAL RADIOLOGY | Age: 58
Discharge: HOME | End: 2018-11-04
Payer: COMMERCIAL

## 2018-11-02 ENCOUNTER — OFFICE VISIT (OUTPATIENT)
Dept: FAMILY MEDICINE CLINIC | Age: 58
End: 2018-11-02
Payer: COMMERCIAL

## 2018-11-02 VITALS
BODY MASS INDEX: 33.68 KG/M2 | HEART RATE: 94 BPM | TEMPERATURE: 98 F | SYSTOLIC BLOOD PRESSURE: 124 MMHG | HEIGHT: 66 IN | WEIGHT: 209.6 LBS | OXYGEN SATURATION: 98 % | DIASTOLIC BLOOD PRESSURE: 70 MMHG

## 2018-11-02 DIAGNOSIS — M17.12 OSTEOARTHRITIS OF LEFT KNEE, UNSPECIFIED OSTEOARTHRITIS TYPE: Primary | ICD-10-CM

## 2018-11-02 DIAGNOSIS — M25.562 ACUTE PAIN OF LEFT KNEE: ICD-10-CM

## 2018-11-02 PROCEDURE — G8482 FLU IMMUNIZE ORDER/ADMIN: HCPCS | Performed by: NURSE PRACTITIONER

## 2018-11-02 PROCEDURE — 99213 OFFICE O/P EST LOW 20 MIN: CPT | Performed by: NURSE PRACTITIONER

## 2018-11-02 PROCEDURE — G8427 DOCREV CUR MEDS BY ELIG CLIN: HCPCS | Performed by: NURSE PRACTITIONER

## 2018-11-02 PROCEDURE — G8417 CALC BMI ABV UP PARAM F/U: HCPCS | Performed by: NURSE PRACTITIONER

## 2018-11-02 PROCEDURE — 96372 THER/PROPH/DIAG INJ SC/IM: CPT | Performed by: NURSE PRACTITIONER

## 2018-11-02 PROCEDURE — 73562 X-RAY EXAM OF KNEE 3: CPT

## 2018-11-02 PROCEDURE — 1036F TOBACCO NON-USER: CPT | Performed by: NURSE PRACTITIONER

## 2018-11-02 PROCEDURE — 3017F COLORECTAL CA SCREEN DOC REV: CPT | Performed by: NURSE PRACTITIONER

## 2018-11-02 RX ORDER — KETOROLAC TROMETHAMINE 30 MG/ML
30 INJECTION, SOLUTION INTRAMUSCULAR; INTRAVENOUS ONCE
Status: COMPLETED | OUTPATIENT
Start: 2018-11-02 | End: 2018-11-02

## 2018-11-02 RX ORDER — IBUPROFEN 800 MG/1
800 TABLET ORAL EVERY 8 HOURS PRN
Qty: 30 TABLET | Refills: 0 | Status: SHIPPED | OUTPATIENT
Start: 2018-11-02 | End: 2018-11-06

## 2018-11-02 RX ADMIN — KETOROLAC TROMETHAMINE 30 MG: 30 INJECTION, SOLUTION INTRAMUSCULAR; INTRAVENOUS at 13:11
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
## 2018-11-02 NOTE — PROGRESS NOTES
Please let Bryce Meyer know the XR of her knee shows severe arthritis. F/U next week with Felicity Bower MD as planned to discuss further evaluation and treatment.     Thanks,  Rodrigo Lea

## 2018-11-05 PROBLEM — M17.12 OSTEOARTHRITIS OF LEFT KNEE: Status: ACTIVE | Noted: 2018-11-05

## 2018-11-05 ASSESSMENT — ENCOUNTER SYMPTOMS
CHEST TIGHTNESS: 0
BACK PAIN: 0
COLOR CHANGE: 0
SHORTNESS OF BREATH: 0

## 2018-11-06 ENCOUNTER — OFFICE VISIT (OUTPATIENT)
Dept: INTERNAL MEDICINE CLINIC | Age: 58
End: 2018-11-06
Payer: COMMERCIAL

## 2018-11-06 VITALS
BODY MASS INDEX: 33.75 KG/M2 | WEIGHT: 210 LBS | HEIGHT: 66 IN | DIASTOLIC BLOOD PRESSURE: 88 MMHG | SYSTOLIC BLOOD PRESSURE: 112 MMHG | HEART RATE: 90 BPM | OXYGEN SATURATION: 99 % | TEMPERATURE: 96.9 F

## 2018-11-06 DIAGNOSIS — M17.12 TRICOMPARTMENT OSTEOARTHRITIS OF LEFT KNEE: Primary | ICD-10-CM

## 2018-11-06 PROCEDURE — 99213 OFFICE O/P EST LOW 20 MIN: CPT | Performed by: INTERNAL MEDICINE

## 2018-11-06 PROCEDURE — G8427 DOCREV CUR MEDS BY ELIG CLIN: HCPCS | Performed by: INTERNAL MEDICINE

## 2018-11-06 PROCEDURE — 3017F COLORECTAL CA SCREEN DOC REV: CPT | Performed by: INTERNAL MEDICINE

## 2018-11-06 PROCEDURE — G8417 CALC BMI ABV UP PARAM F/U: HCPCS | Performed by: INTERNAL MEDICINE

## 2018-11-06 PROCEDURE — 1036F TOBACCO NON-USER: CPT | Performed by: INTERNAL MEDICINE

## 2018-11-06 PROCEDURE — G8482 FLU IMMUNIZE ORDER/ADMIN: HCPCS | Performed by: INTERNAL MEDICINE

## 2018-11-06 RX ORDER — CELECOXIB 100 MG/1
100 CAPSULE ORAL 2 TIMES DAILY PRN
Qty: 60 CAPSULE | Refills: 3 | Status: SHIPPED | OUTPATIENT
Start: 2018-11-06 | End: 2019-02-26 | Stop reason: SDUPTHER

## 2018-11-06 ASSESSMENT — ENCOUNTER SYMPTOMS
BACK PAIN: 0
GASTROINTESTINAL NEGATIVE: 1
RESPIRATORY NEGATIVE: 1

## 2018-11-06 NOTE — PROGRESS NOTES
and unexpected weight change. Respiratory: Negative. Cardiovascular: Negative. Gastrointestinal: Negative. Endocrine: Negative for cold intolerance and heat intolerance. Genitourinary: Negative. Musculoskeletal: Negative for back pain, joint swelling, neck pain and neck stiffness. Skin: Negative for rash. Allergic/Immunologic: Negative for immunocompromised state. Neurological: Negative for tingling and numbness. Hematological: Does not bruise/bleed easily. Psychiatric/Behavioral: Negative for decreased concentration, dysphoric mood and sleep disturbance. The patient is nervous/anxious. Vitals:    11/06/18 1257   BP: 112/88   Site: Right Upper Arm   Position: Sitting   Cuff Size: Medium Adult   Pulse: 90   Temp: 96.9 °F (36.1 °C)   TempSrc: Tympanic   SpO2: 99%   Weight: 210 lb (95.3 kg)   Height: 5' 6\" (1.676 m)       Physical Exam   Constitutional: She is oriented to person, place, and time. No distress. Obese, age appropriate, well groomed     HENT:   Head: Atraumatic. Neck: Neck supple. Cardiovascular: Regular rhythm and normal heart sounds. Pulmonary/Chest: No respiratory distress. Abdominal: Soft. She exhibits no distension. Musculoskeletal: She exhibits no deformity. Right knee: She exhibits normal range of motion and no swelling. No tenderness found. Left knee: She exhibits abnormal patellar mobility. She exhibits normal range of motion, no swelling and no effusion. Tenderness found. Medial joint line and patellar tendon tenderness noted. Neurological: She is alert and oriented to person, place, and time. Coordination normal.   Skin: Skin is warm and dry. Psychiatric: She has a normal mood and affect. Her behavior is normal. Judgment normal.       Assessment:    Bryce Meyer was seen today for knee pain.     Diagnoses and all orders for this visit:    Tricompartment osteoarthritis of left knee              Patient advised to decrease current exercise on the stationary bike by 50%, start Celebrex 100 mg twice a day, reevaluate. She declined physical therapy referral since she has been doing biking exercises. Results of the recent x-rays discussed with the patient  -     Amb External Referral To Orthopedic Surgery    Other orders  -     celecoxib (CELEBREX) 100 MG capsule; Take 1 capsule by mouth 2 times daily as needed for Pain        Plan:    Reviewed with the patient (/and caregiver if present): current health status, medications, activities and diet. See also orders and comments in the assessment section. Today's diagnosis (in the context ofchronic problems) was discussed with patient (/and caregiver if present), questions answered. Patient counseled and encouraged re: daily effort to improve diet (a high fiber, low calorie, low sodium and caffeine diet, divided into 3 main meals daily) and exercise habits (more aerobic exercise as tolerated), better stress management, will result in improved health and help in better management of the current chronic conditions in the long run. Side effects, adverse effects of the medication prescribed (or refilled) today, treatment plan/ rationale and result expectations have (again) been discussed with the patient who expresses understanding and consents to proceed as outlined above. Additional advise included in the \"after visit summary\".        Orders Placed This Encounter   Medications    celecoxib (CELEBREX) 100 MG capsule     Sig: Take 1 capsule by mouth 2 times daily as needed for Pain     Dispense:  60 capsule     Refill:  3       Orders Placed This Encounter   Procedures    Amb External Referral To Orthopedic Surgery     Referral Priority:   Routine     Referral Type:   Consult for Advice and Opinion     Referral Reason:   Specialty Services Required     Referred to Provider:   Figueroa Faustin MD     Requested Specialty:   Orthopedic Surgery     Number of Visits Requested:   1     Further workup and plan

## 2018-12-06 RX ORDER — TOPIRAMATE 50 MG/1
TABLET, FILM COATED ORAL
Qty: 30 TABLET | Refills: 3 | Status: SHIPPED | OUTPATIENT
Start: 2018-12-06 | End: 2019-03-27 | Stop reason: SDUPTHER

## 2018-12-06 RX ORDER — UBIDECARENONE 100 MG
1000 CAPSULE ORAL
Qty: 30 CAPSULE | Refills: 6 | Status: SHIPPED | OUTPATIENT
Start: 2018-12-06 | End: 2019-07-03 | Stop reason: SDUPTHER

## 2018-12-06 RX ORDER — OMEGA-3/DHA/EPA/FISH OIL 35-113.5MG
1000 TABLET,CHEWABLE ORAL DAILY
Qty: 30 TABLET | Refills: 6 | Status: SHIPPED | OUTPATIENT
Start: 2018-12-06 | End: 2019-07-03 | Stop reason: SDUPTHER

## 2018-12-12 ENCOUNTER — OFFICE VISIT (OUTPATIENT)
Dept: INTERNAL MEDICINE CLINIC | Age: 58
End: 2018-12-12
Payer: COMMERCIAL

## 2018-12-12 VITALS
DIASTOLIC BLOOD PRESSURE: 88 MMHG | HEART RATE: 94 BPM | TEMPERATURE: 98 F | HEIGHT: 66 IN | OXYGEN SATURATION: 98 % | WEIGHT: 210 LBS | BODY MASS INDEX: 33.75 KG/M2 | SYSTOLIC BLOOD PRESSURE: 124 MMHG

## 2018-12-12 DIAGNOSIS — K21.9 GASTROESOPHAGEAL REFLUX DISEASE WITHOUT ESOPHAGITIS: ICD-10-CM

## 2018-12-12 DIAGNOSIS — J45.30 MILD PERSISTENT ASTHMA WITHOUT COMPLICATION: Primary | ICD-10-CM

## 2018-12-12 PROCEDURE — 1036F TOBACCO NON-USER: CPT | Performed by: INTERNAL MEDICINE

## 2018-12-12 PROCEDURE — G8482 FLU IMMUNIZE ORDER/ADMIN: HCPCS | Performed by: INTERNAL MEDICINE

## 2018-12-12 PROCEDURE — G8417 CALC BMI ABV UP PARAM F/U: HCPCS | Performed by: INTERNAL MEDICINE

## 2018-12-12 PROCEDURE — 3017F COLORECTAL CA SCREEN DOC REV: CPT | Performed by: INTERNAL MEDICINE

## 2018-12-12 PROCEDURE — 99213 OFFICE O/P EST LOW 20 MIN: CPT | Performed by: INTERNAL MEDICINE

## 2018-12-12 PROCEDURE — G8427 DOCREV CUR MEDS BY ELIG CLIN: HCPCS | Performed by: INTERNAL MEDICINE

## 2018-12-12 RX ORDER — ALBUTEROL SULFATE 90 UG/1
2 AEROSOL, METERED RESPIRATORY (INHALATION) EVERY 4 HOURS PRN
Qty: 1 INHALER | Refills: 3 | Status: SHIPPED | OUTPATIENT
Start: 2018-12-12 | End: 2019-07-09 | Stop reason: SDUPTHER

## 2018-12-12 RX ORDER — FAMOTIDINE 20 MG/1
TABLET, FILM COATED ORAL
Qty: 60 TABLET | Refills: 3 | Status: SHIPPED | OUTPATIENT
Start: 2018-12-12 | End: 2019-03-30 | Stop reason: SDUPTHER

## 2018-12-12 ASSESSMENT — ENCOUNTER SYMPTOMS
HEARTBURN: 1
WHEEZING: 0
SORE THROAT: 0
ABDOMINAL PAIN: 0
COUGH: 0
TROUBLE SWALLOWING: 0
BACK PAIN: 0
CHOKING: 0
HOARSE VOICE: 0

## 2018-12-12 NOTE — PROGRESS NOTES
Classification is Borderline High.  HDL 06/11/2018 55  40 - 59 mg/dL Final    Comment: ATP III HDL Cholesterol Classification is Desirable. Expected Values:    Males:    >55 = No Risk            35-55 = Moderate Risk            <35 = High Risk    Females:  >65 = No Risk            45-65 = Moderate Risk            <45 = High Risk    NCEP Guidelines: Third Report May 2001  >59 = negative risk factor for CHD  <40 = major risk factor for CHD      LDL Calculated 06/11/2018 146* 0 - 129 mg/dL Final    ATT III Classification is Borderline High.  TSH 06/11/2018 3.820  0.270 - 4.200 uIU/mL Final    Hemoglobin A1C 06/11/2018 5.4  4.8 - 5.9 % Final       HPI:    Asthma   There is no cough, hoarse voice or wheezing. This is a chronic problem. The problem occurs intermittently. The problem has been waxing and waning. Associated symptoms include heartburn. Pertinent negatives include no chest pain, dyspnea on exertion, malaise/fatigue, orthopnea, PND, postnasal drip, sore throat, trouble swallowing or weight loss. Her symptoms are aggravated by emotional stress, strenuous activity and URI. Her symptoms are alleviated by leukotriene antagonist and beta-agonist. She reports significant improvement on treatment. Her symptoms are not alleviated by OTC cough suppressant. There are no known risk factors for lung disease. Her past medical history is significant for asthma. There is no history of bronchiectasis or COPD. Gastroesophageal Reflux   She complains of heartburn. She reports no abdominal pain, no chest pain, no choking, no coughing, no dysphagia, no early satiety, no hoarse voice, no sore throat or no wheezing. This is a chronic problem. The current episode started more than 1 year ago. The problem occurs frequently. The problem has been unchanged. The heartburn is located in the substernum. The heartburn is of moderate intensity. The heartburn does not wake her from sleep. The heartburn does not limit her activity. in an apartment in [facility], alone    , 3 children, all grown, in [place]     2 granddaughters     Hobbies exercising, birds    Attends [employer], has volunteered at [employer]       Family History   Problem Relation Age of Onset    Heart Failure Mother         dec 76    Diabetes Mother     Diabetes Father     Stroke Father         dec age 80       Family and socialhistory were reviewed and pertinent changes documented. ALLERGIES    Patient has no known allergies. Current Outpatient Prescriptions on File Prior to Visit   Medication Sig Dispense Refill    topiramate (TOPAMAX) 50 MG tablet TAKE 1 TABLET BY MOUTH EVERY DAY AT NIGHT 30 tablet 3    D3-1000 1000 units CAPS TAKE 1 TABLET BY MOUTH DAILY WITH SUPPER 30 capsule 6    CVS VITAMIN B12 1000 MCG tablet TAKE 1 TABLET BY MOUTH DAILY 30 tablet 6    ALL DAY ALLERGY 10 MG tablet TAKE 1 TABLET BY MOUTH EVERY DAY AS NEEDED FOR ALLERGIES 30 tablet 5    levothyroxine (SYNTHROID) 50 MCG tablet TAKE 1 TABLET BY MOUTH DAILY 30 tablet 5    celecoxib (CELEBREX) 100 MG capsule Take 1 capsule by mouth 2 times daily as needed for Pain 60 capsule 3    pravastatin (PRAVACHOL) 40 MG tablet TAKE 1 TABLET BY MOUTH EVERY EVENING.  30 tablet 3    aspirin-acetaminophen-caffeine (EXCEDRIN MIGRAINE) 548-466-72 MG per tablet Take 1 tablet by mouth every 8 hours as needed for Headaches 90 tablet 3    EPINEPHrine (EPIPEN 2-PRAVEENA) 0.3 MG/0.3ML SOAJ injection Use as directed for allergic reaction 2 each 0    hydrocortisone 2.5 % ointment APPLY TOPICALLY DAILY FOR 14 DAYS.  0    celecoxib (CELEBREX) 100 MG capsule Take 1 capsule by mouth 2 times daily 60 capsule 3    PAIN & FEVER EXTRA STRENGTH 500 MG tablet TAKE 1 TABLET BY MOUTH EVERY 6 HOURS AS NEEDED FOR FEVER UP TO 7 DAYS MAX 6 TABS IN 24 HOURS  0    benztropine (COGENTIN) 1 MG tablet TAKE 0.5mg BY MOUTH EVERY DAY FOR TREMORS  0    fluticasone (FLONASE) 50 MCG/ACT nasal spray USE 1 SPRAY INTO EACH NOSTRIL DAILY  0    Multiple Vitamin (MULTI-VITAMINS) TABS TAKE 1 TABLET BY MOUTH EVERY DAY 30 tablet 3    ARIPiprazole (ABILIFY) 15 MG tablet TAKE 1 TABLET BY MOUTH EVERY DAY  2    buPROPion (WELLBUTRIN XL) 300 MG XL tablet Take 1 tablet by mouth daily 30 tablet 6    [DISCONTINUED] Multiple Vitamins-Minerals (THERAPEUTIC MULTIVITAMIN-MINERALS) tablet Take 1 tablet by mouth daily 30 tablet 5    [DISCONTINUED] albuterol (PROVENTIL;VENTOLIN) 90 MCG/ACT inhaler Inhale 2 puffs into the lungs every 6 hours as needed. 1 Inhaler 6     No current facility-administered medications on file prior to visit. Review of Systems   Constitutional: Negative for fatigue, malaise/fatigue, unexpected weight change and weight loss. HENT: Negative for hoarse voice, postnasal drip, sore throat and trouble swallowing. Respiratory: Negative for cough, choking and wheezing. Cardiovascular: Negative for chest pain, dyspnea on exertion and PND. Gastrointestinal: Positive for heartburn. Negative for abdominal pain and dysphagia. Endocrine: Negative for cold intolerance and heat intolerance. Genitourinary: Negative. Musculoskeletal: Negative for back pain, joint swelling, neck pain and neck stiffness. Skin: Negative for rash. Allergic/Immunologic: Negative for immunocompromised state. Neurological: Negative for numbness. Hematological: Does not bruise/bleed easily. Psychiatric/Behavioral: Negative for decreased concentration, dysphoric mood and sleep disturbance. The patient is nervous/anxious. Vitals:    12/12/18 1001 12/12/18 1029   BP: (!) 130/90 124/88   Site: Right Upper Arm    Position: Sitting    Cuff Size: Medium Adult    Pulse: 94    Temp: 98 °F (36.7 °C)    TempSrc: Tympanic    SpO2: 98%    Weight: 210 lb (95.3 kg)    Height: 5' 6\" (1.676 m)        Physical Exam   Constitutional: She is oriented to person, place, and time. No distress.    Obese, age appropriate, well groomed     HENT:   Head:

## 2018-12-14 DIAGNOSIS — C69.90 MALIGNANT MELANOMA OF EYE, UNSPECIFIED LATERALITY (HCC): ICD-10-CM

## 2018-12-14 LAB
ALBUMIN SERPL-MCNC: 4.1 G/DL (ref 3.9–4.9)
ALP BLD-CCNC: 77 U/L (ref 40–130)
ALT SERPL-CCNC: 21 U/L (ref 0–33)
ANION GAP SERPL CALCULATED.3IONS-SCNC: 16 MEQ/L (ref 7–13)
AST SERPL-CCNC: 14 U/L (ref 0–35)
BILIRUB SERPL-MCNC: <0.2 MG/DL (ref 0–1.2)
BUN BLDV-MCNC: 10 MG/DL (ref 6–20)
CALCIUM SERPL-MCNC: 9.4 MG/DL (ref 8.6–10.2)
CHLORIDE BLD-SCNC: 101 MEQ/L (ref 98–107)
CO2: 19 MEQ/L (ref 22–29)
CREAT SERPL-MCNC: 0.88 MG/DL (ref 0.5–0.9)
GFR AFRICAN AMERICAN: >60
GFR NON-AFRICAN AMERICAN: >60
GLOBULIN: 2.8 G/DL (ref 2.3–3.5)
GLUCOSE BLD-MCNC: 198 MG/DL (ref 74–109)
POTASSIUM SERPL-SCNC: 4.1 MEQ/L (ref 3.5–5.1)
SODIUM BLD-SCNC: 136 MEQ/L (ref 132–144)
TOTAL PROTEIN: 6.9 G/DL (ref 6.4–8.1)

## 2019-01-25 RX ORDER — PRAVASTATIN SODIUM 40 MG
TABLET ORAL
Qty: 30 TABLET | Refills: 3 | Status: SHIPPED | OUTPATIENT
Start: 2019-01-25 | End: 2019-05-16 | Stop reason: SDUPTHER

## 2019-03-12 ENCOUNTER — OFFICE VISIT (OUTPATIENT)
Dept: INTERNAL MEDICINE CLINIC | Age: 59
End: 2019-03-12
Payer: COMMERCIAL

## 2019-03-12 VITALS
OXYGEN SATURATION: 98 % | BODY MASS INDEX: 34.72 KG/M2 | HEIGHT: 66 IN | DIASTOLIC BLOOD PRESSURE: 80 MMHG | SYSTOLIC BLOOD PRESSURE: 110 MMHG | TEMPERATURE: 97.6 F | HEART RATE: 86 BPM | WEIGHT: 216 LBS

## 2019-03-12 DIAGNOSIS — E03.9 ACQUIRED HYPOTHYROIDISM: ICD-10-CM

## 2019-03-12 DIAGNOSIS — Z23 NEED FOR SHINGLES VACCINE: ICD-10-CM

## 2019-03-12 DIAGNOSIS — Z11.3 SCREENING FOR STD (SEXUALLY TRANSMITTED DISEASE): ICD-10-CM

## 2019-03-12 DIAGNOSIS — Z11.3 SCREENING FOR STD (SEXUALLY TRANSMITTED DISEASE): Primary | ICD-10-CM

## 2019-03-12 LAB — HEPATITIS C ANTIBODY INTERPRETATION: NORMAL

## 2019-03-12 PROCEDURE — 1036F TOBACCO NON-USER: CPT | Performed by: INTERNAL MEDICINE

## 2019-03-12 PROCEDURE — G8417 CALC BMI ABV UP PARAM F/U: HCPCS | Performed by: INTERNAL MEDICINE

## 2019-03-12 PROCEDURE — G8482 FLU IMMUNIZE ORDER/ADMIN: HCPCS | Performed by: INTERNAL MEDICINE

## 2019-03-12 PROCEDURE — 3017F COLORECTAL CA SCREEN DOC REV: CPT | Performed by: INTERNAL MEDICINE

## 2019-03-12 PROCEDURE — 99213 OFFICE O/P EST LOW 20 MIN: CPT | Performed by: INTERNAL MEDICINE

## 2019-03-12 PROCEDURE — G8427 DOCREV CUR MEDS BY ELIG CLIN: HCPCS | Performed by: INTERNAL MEDICINE

## 2019-03-12 RX ORDER — LANOLIN ALCOHOL/MO/W.PET/CERES
1 CREAM (GRAM) TOPICAL DAILY
Qty: 30 TABLET | Refills: 3 | Status: SHIPPED | OUTPATIENT
Start: 2019-03-12 | End: 2020-11-13 | Stop reason: SDUPTHER

## 2019-03-12 ASSESSMENT — ENCOUNTER SYMPTOMS
ABDOMINAL PAIN: 0
SORE THROAT: 0
BLOOD IN STOOL: 0
TROUBLE SWALLOWING: 0
CHOKING: 0
COUGH: 0
WHEEZING: 0

## 2019-03-13 LAB — RPR: NORMAL

## 2019-03-14 LAB — HIV 1,2 COMBO ANTIGEN/ANTIBODY: NEGATIVE

## 2019-03-18 LAB
C. TRACHOMATIS DNA ,URINE: NEGATIVE
N. GONORRHOEAE DNA, URINE: NEGATIVE

## 2019-03-29 RX ORDER — TOPIRAMATE 50 MG/1
TABLET, FILM COATED ORAL
Qty: 30 TABLET | Refills: 3 | Status: SHIPPED | OUTPATIENT
Start: 2019-03-29 | End: 2019-07-31 | Stop reason: SDUPTHER

## 2019-04-01 RX ORDER — FAMOTIDINE 20 MG/1
TABLET, FILM COATED ORAL
Qty: 60 TABLET | Refills: 3 | Status: SHIPPED | OUTPATIENT
Start: 2019-04-01 | End: 2019-07-31 | Stop reason: SDUPTHER

## 2019-04-19 ENCOUNTER — HOSPITAL ENCOUNTER (OUTPATIENT)
Dept: WOMENS IMAGING | Age: 59
Discharge: HOME | End: 2019-04-21
Payer: COMMERCIAL

## 2019-04-19 DIAGNOSIS — Z12.31 ENCOUNTER FOR SCREENING MAMMOGRAM FOR BREAST CANCER: ICD-10-CM

## 2019-04-19 PROCEDURE — 77067 SCR MAMMO BI INCL CAD: CPT

## 2019-05-08 RX ORDER — CETIRIZINE HYDROCHLORIDE 10 MG/1
TABLET ORAL
Qty: 30 TABLET | Refills: 5 | Status: SHIPPED | OUTPATIENT
Start: 2019-05-08 | End: 2021-08-23 | Stop reason: SDUPTHER

## 2019-05-08 RX ORDER — LEVOTHYROXINE SODIUM 0.05 MG/1
50 TABLET ORAL DAILY
Qty: 30 TABLET | Refills: 5 | Status: SHIPPED | OUTPATIENT
Start: 2019-05-08 | End: 2020-02-17 | Stop reason: ALTCHOICE

## 2019-05-16 RX ORDER — PRAVASTATIN SODIUM 40 MG
TABLET ORAL
Qty: 30 TABLET | Refills: 3 | Status: SHIPPED | OUTPATIENT
Start: 2019-05-16 | End: 2019-09-01 | Stop reason: SDUPTHER

## 2019-05-20 RX ORDER — CELECOXIB 100 MG/1
100 CAPSULE ORAL 2 TIMES DAILY PRN
Qty: 60 CAPSULE | Refills: 0 | Status: SHIPPED | OUTPATIENT
Start: 2019-05-20 | End: 2019-06-14 | Stop reason: SDUPTHER

## 2019-05-22 RX ORDER — CELECOXIB 100 MG/1
100 CAPSULE ORAL 2 TIMES DAILY PRN
Qty: 60 CAPSULE | Refills: 0 | Status: SHIPPED | OUTPATIENT
Start: 2019-05-22 | End: 2019-06-14 | Stop reason: SDUPTHER

## 2019-06-14 ENCOUNTER — OFFICE VISIT (OUTPATIENT)
Dept: INTERNAL MEDICINE CLINIC | Age: 59
End: 2019-06-14
Payer: COMMERCIAL

## 2019-06-14 VITALS
WEIGHT: 208.8 LBS | TEMPERATURE: 98.4 F | RESPIRATION RATE: 16 BRPM | SYSTOLIC BLOOD PRESSURE: 109 MMHG | DIASTOLIC BLOOD PRESSURE: 76 MMHG | HEART RATE: 84 BPM | HEIGHT: 66 IN | BODY MASS INDEX: 33.56 KG/M2

## 2019-06-14 DIAGNOSIS — R05.9 COUGH: Primary | ICD-10-CM

## 2019-06-14 DIAGNOSIS — R25.1 TREMOR: ICD-10-CM

## 2019-06-14 DIAGNOSIS — E03.9 HYPOTHYROIDISM (ACQUIRED): ICD-10-CM

## 2019-06-14 LAB
ANION GAP SERPL CALCULATED.3IONS-SCNC: 14 MEQ/L (ref 9–15)
BUN BLDV-MCNC: 17 MG/DL (ref 6–20)
CALCIUM SERPL-MCNC: 10 MG/DL (ref 8.5–9.9)
CHLORIDE BLD-SCNC: 107 MEQ/L (ref 95–107)
CO2: 23 MEQ/L (ref 20–31)
CREAT SERPL-MCNC: 0.95 MG/DL (ref 0.5–0.9)
GFR AFRICAN AMERICAN: >60
GFR NON-AFRICAN AMERICAN: >60
GLUCOSE BLD-MCNC: 107 MG/DL (ref 70–99)
POTASSIUM SERPL-SCNC: 4.4 MEQ/L (ref 3.4–4.9)
SODIUM BLD-SCNC: 144 MEQ/L (ref 135–144)
TSH SERPL DL<=0.05 MIU/L-ACNC: 2.43 UIU/ML (ref 0.44–3.86)

## 2019-06-14 PROCEDURE — G8417 CALC BMI ABV UP PARAM F/U: HCPCS | Performed by: INTERNAL MEDICINE

## 2019-06-14 PROCEDURE — G8427 DOCREV CUR MEDS BY ELIG CLIN: HCPCS | Performed by: INTERNAL MEDICINE

## 2019-06-14 PROCEDURE — 1036F TOBACCO NON-USER: CPT | Performed by: INTERNAL MEDICINE

## 2019-06-14 PROCEDURE — 99213 OFFICE O/P EST LOW 20 MIN: CPT | Performed by: INTERNAL MEDICINE

## 2019-06-14 PROCEDURE — 3017F COLORECTAL CA SCREEN DOC REV: CPT | Performed by: INTERNAL MEDICINE

## 2019-06-14 RX ORDER — PROPRANOLOL HYDROCHLORIDE 20 MG/1
20 TABLET ORAL NIGHTLY
Qty: 30 TABLET | Refills: 1 | Status: SHIPPED | OUTPATIENT
Start: 2019-06-14 | End: 2019-08-06 | Stop reason: SDUPTHER

## 2019-06-14 RX ORDER — GUAIFENESIN AND DEXTROMETHORPHAN HYDROBROMIDE 600; 30 MG/1; MG/1
1 TABLET, EXTENDED RELEASE ORAL
COMMUNITY
Start: 2019-06-07 | End: 2019-10-25

## 2019-06-14 RX ORDER — BENZONATATE 100 MG/1
100 CAPSULE ORAL 3 TIMES DAILY PRN
Qty: 30 CAPSULE | Refills: 0 | Status: SHIPPED | OUTPATIENT
Start: 2019-06-14 | End: 2019-06-21

## 2019-06-14 RX ORDER — CETIRIZINE HYDROCHLORIDE 10 MG/1
10 TABLET ORAL
COMMUNITY
Start: 2019-05-23 | End: 2019-06-14

## 2019-06-14 ASSESSMENT — ENCOUNTER SYMPTOMS
TROUBLE SWALLOWING: 0
SORE THROAT: 0
SHORTNESS OF BREATH: 0
WHEEZING: 0
VOICE CHANGE: 0
ABDOMINAL PAIN: 0
COUGH: 1

## 2019-06-14 NOTE — PROGRESS NOTES
Deborah Pantoja is a 63 y.o. female non-smoker, history of depression, asthma, PTSD, who presents with     Chief Complaint   Patient presents with    Cough     x past 3 weeks, seen at the walk in clinic, cough medication does not seem to help    Tremors     started 6+ months ago, more when emotional, Cogentin did not help, psychiatric meds changed 6 months ago, she believes some of her family members also suffer from tremors but she does not keep in touch with any    Hypothyroidism       Interim history: Since her last office visit with me 3 months ago, the patient has been well in general, lost some weight, has been in a new, happy, relationship with a male friend who plans to move in with her. Was seen at the walk-in clinic for mild asthma exacerbation. Medication refills requests were received by the office and done electronically. The following laboratory reports since the past visit were reviewed (the ones pertinent to today's visit were discussed with the patient/ caregiver):    Orders Only on 06/14/2019   Component Date Value Ref Range Status    Sodium 06/14/2019 144  135 - 144 mEq/L Final    Potassium 06/14/2019 4.4  3.4 - 4.9 mEq/L Final    Chloride 06/14/2019 107  95 - 107 mEq/L Final    CO2 06/14/2019 23  20 - 31 mEq/L Final    Anion Gap 06/14/2019 14  9 - 15 mEq/L Final    Glucose 06/14/2019 107* 70 - 99 mg/dL Final    BUN 06/14/2019 17  6 - 20 mg/dL Final    CREATININE 06/14/2019 0.95* 0.50 - 0.90 mg/dL Final    GFR Non- 06/14/2019 >60.0  >60 Final    Comment: >60 mL/min/1.73m2 EGFR, calc. for ages 25 and older using the  MDRD formula (not corrected for weight), is valid for stable  renal function.  GFR  06/14/2019 >60.0  >60 Final    Comment: >60 mL/min/1.73m2 EGFR, calc. for ages 25 and older using the  MDRD formula (not corrected for weight), is valid for stable  renal function.       Calcium 06/14/2019 10.0* 8.5 - 9.9 mg/dL Final    TSH 06/14/2019 2.430  0.440 - 3.860 uIU/mL Final       HPI:    Cough   This is a new problem. The current episode started 1 to 4 weeks ago. The problem has been waxing and waning. The cough is non-productive. Pertinent negatives include no chest pain, myalgias, postnasal drip, rash, sore throat, shortness of breath or wheezing. The symptoms are aggravated by lying down and cold air. She has tried rest and prescription cough suppressant for the symptoms. The treatment provided no relief. Her past medical history is significant for asthma. There is no history of bronchiectasis or COPD. Hypothyroidism  Current symptoms: none. Patient denies change in energy level, diarrhea, heat / cold intolerance, nervousness and palpitations. Onset of symptoms was several years ago. Symptoms have stabilized and been well-controlled. More detail above in the chiefcomplaint(s), interim history and below in the review of systems.      Past Medical History:   Diagnosis Date    Asthma 2015    Bilateral renal cysts 2013    Depression     [facility] Jasmine Nunez[facility]    Diverticulosis of sigmoid colon 2012    Dr Jayme Dorado DJD of left shoulder     Environmental allergies     Fatty liver 2013    GERD (gastroesophageal reflux disease)     Hydatidiform mole     age 26     Hyperlipidemia     Hypothyroidism 2011    IFG (impaired fasting glucose) 2013    Melanoma of eye ([address]     age 30, R eye prosthesis, Dr Neri Hernandez    Obesity     Prediabetes     PTSD (post-traumatic stress disorder)     age 35, [address] [facility]    S/P colonoscopy 04/19/2012    Dr. Iron Cook S/P hysterectomy with oophorectomy 2012    Dr Pam Mendoza Tremor     Dr Nalini Beltran Vitamin D deficiency        Past Surgical History:   Procedure Laterality Date    DILATION AND CURETTAGE OF UTERUS      ENDOMETRIAL ABLATION  6/2012    EYE REMOVAL      OD for melanoma, age 31    FOOT OSTEOTOMY Left 09/23/2016    DEJA JACKMAN DPM      LINDSAY AND BSO  2012    Dr Ly Puente Social History     Socioeconomic History    Marital status: Single     Spouse name: Not on file    Number of children: 3    Years of education: Not on file    Highest education level: Not on file   Occupational History    Occupation: SSI   Social Needs    Financial resource strain: Not on file    Food insecurity:     Worry: Not on file     Inability: Not on file    Transportation needs:     Medical: Not on file     Non-medical: Not on file   Tobacco Use    Smoking status: Never Smoker    Smokeless tobacco: Never Used   Substance and Sexual Activity    Alcohol use: No    Drug use: No    Sexual activity: Not on file   Lifestyle    Physical activity:     Days per week: Not on file     Minutes per session: Not on file    Stress: Not on file   Relationships    Social connections:     Talks on phone: Not on file     Gets together: Not on file     Attends Anabaptist service: Not on file     Active member of club or organization: Not on file     Attends meetings of clubs or organizations: Not on file     Relationship status: Not on file    Intimate partner violence:     Fear of current or ex partner: Not on file     Emotionally abused: Not on file     Physically abused: Not on file     Forced sexual activity: Not on file   Other Topics Concern    Not on file   Social History Narrative    Born in [address] [place] [address], one of 4 children, one sister disappeared at age 30, never found    Moved to [place] at age 40        Lives in an apartment in [facility], alone    , 3 children, all grown, in [place]     2 granddaughters     Hobbies exercising, birds    Attends [employer], has volunteered at [employer]       Family History   Problem Relation Age of Onset    Heart Failure Mother         dec 76    Diabetes Mother     Diabetes Father     Stroke Father         dec age 80    Cancer Father         Sarcoma    Breast Cancer Sister     Stomach Cancer Other        Family and socialhistory were reviewed and pertinent changes documented. ALLERGIES    Patient has no known allergies. Current Outpatient Medications on File Prior to Visit   Medication Sig Dispense Refill    Dextromethorphan-guaiFENesin (MUCINEX DM)  MG TB12 Take 1 tablet by mouth      pravastatin (PRAVACHOL) 40 MG tablet TAKE 1 TABLET BY MOUTH EVERY EVENING. 30 tablet 3    levothyroxine (SYNTHROID) 50 MCG tablet TAKE 1 TABLET BY MOUTH DAILY 30 tablet 5    cetirizine (ZYRTEC) 10 MG tablet TAKE 1 TABLET BY MOUTH EVERY DAY AS NEEDED FOR ALLERGIES 30 tablet 5    famotidine (PEPCID) 20 MG tablet TAKE 1 TABLET BY MOUTH TWICE A DAY AS NEEDED FOR HEARTBURN 60 tablet 3    topiramate (TOPAMAX) 50 MG tablet TAKE 1 TABLET BY MOUTH EVERY DAY AT NIGHT 30 tablet 3    Biotin 300 MCG TABS Take 1 tablet by mouth daily 30 tablet 3    albuterol sulfate HFA (PROVENTIL HFA) 108 (90 Base) MCG/ACT inhaler Inhale 2 puffs into the lungs every 4 hours as needed for Wheezing or Shortness of Breath Space out to every 6 hours as symptoms improve.  1 Inhaler 3    D3-1000 1000 units CAPS TAKE 1 TABLET BY MOUTH DAILY WITH SUPPER 30 capsule 6    CVS VITAMIN B12 1000 MCG tablet TAKE 1 TABLET BY MOUTH DAILY 30 tablet 6    aspirin-acetaminophen-caffeine (EXCEDRIN MIGRAINE) 250-250-65 MG per tablet Take 1 tablet by mouth every 8 hours as needed for Headaches 90 tablet 3    EPINEPHrine (EPIPEN 2-PRAVEENA) 0.3 MG/0.3ML SOAJ injection Use as directed for allergic reaction 2 each 0    celecoxib (CELEBREX) 100 MG capsule Take 1 capsule by mouth 2 times daily 60 capsule 3    benztropine (COGENTIN) 1 MG tablet TAKE 0.5mg BY MOUTH EVERY DAY FOR TREMORS  0    fluticasone (FLONASE) 50 MCG/ACT nasal spray USE 1 SPRAY INTO EACH NOSTRIL DAILY  0    Multiple Vitamin (MULTI-VITAMINS) TABS TAKE 1 TABLET BY MOUTH EVERY DAY 30 tablet 3    ARIPiprazole (ABILIFY) 15 MG tablet TAKE 1 TABLET BY MOUTH EVERY DAY  2    buPROPion (WELLBUTRIN XL) 300 MG XL tablet Take 1 tablet by mouth daily 30 tablet 6    PAIN & FEVER EXTRA STRENGTH 500 MG tablet TAKE 1 TABLET BY MOUTH EVERY 6 HOURS AS NEEDED FOR FEVER UP TO 7 DAYS MAX 6 TABS IN 24 HOURS  0    [DISCONTINUED] Multiple Vitamins-Minerals (THERAPEUTIC MULTIVITAMIN-MINERALS) tablet Take 1 tablet by mouth daily 30 tablet 5    [DISCONTINUED] albuterol (PROVENTIL;VENTOLIN) 90 MCG/ACT inhaler Inhale 2 puffs into the lungs every 6 hours as needed. 1 Inhaler 6     No current facility-administered medications on file prior to visit. Review of Systems   Constitutional: Negative for fatigue and unexpected weight change. HENT: Negative for congestion, postnasal drip, sore throat, trouble swallowing and voice change. Respiratory: Positive for cough. Negative for shortness of breath and wheezing. Cardiovascular: Negative for chest pain and palpitations. Gastrointestinal: Negative for abdominal pain. Endocrine: Negative for cold intolerance, heat intolerance, polydipsia and polyuria. Genitourinary: Negative for dysuria. Musculoskeletal: Negative for joint swelling and myalgias. Skin: Negative for rash. Neurological: Positive for tremors. Negative for dizziness, weakness and light-headedness. Psychiatric/Behavioral: Negative for confusion, decreased concentration and dysphoric mood. The patient is not nervous/anxious. Vitals:    06/14/19 0855   BP: 109/76   Site: Left Upper Arm   Position: Sitting   Cuff Size: Large Adult   Pulse: 84   Resp: 16   Temp: 98.4 °F (36.9 °C)   TempSrc: Tympanic   Weight: 208 lb 12.8 oz (94.7 kg)   Height: 5' 6\" (1.676 m)       Physical Exam   Constitutional: She is oriented to person, place, and time. No distress. Obese, age appropriate, well groomed     HENT:   Head: Atraumatic. Eyes: Conjunctivae are normal. No scleral icterus. Right eye prosthesis    Neck: Neck supple. Cardiovascular: Regular rhythm, normal heart sounds and intact distal pulses.    Pulmonary/Chest: Effort normal and breath sounds normal. She has no wheezes. She has no rales. Abdominal: Soft. She exhibits no distension. Exam limited due to abdominal adiposity      Musculoskeletal:        Cervical back: Normal. She exhibits normal range of motion and no tenderness. Lymphadenopathy:     She has no cervical adenopathy. Neurological: She is alert and oriented to person, place, and time. Skin: Skin is intact. No rash noted. Exam of the scalp reveals diffuse hair loss   Psychiatric: Her speech is normal and behavior is normal. Judgment normal. Her mood appears not anxious. She is not slowed and not withdrawn. Thought content is not delusional. Cognition and memory are normal. She does not express inappropriate judgment. She does not exhibit a depressed mood. She exhibits normal recent memory and normal remote memory. Good motivation, insight  She is attentive. Assessment:    Ashli Gallardo was seen today for cough, tremors and hypothyroidism. Diagnoses and all orders for this visit:    Cough             Postinfectious, in the setting of mild intermittent asthma. Start benzonatate 100 mg 3 times daily, call if no relief the next couple of weeks    Tremor              Differential includes essential tremor, drug-induced tremor, thyroid, other. Will start a treatment trial with the blocker low-dose (reassess in 1 month), dose limit due to underlying asthma, may try primidone or refer to neurology if no response  -     TSH without Reflex; Future  -     Basic Metabolic Panel; Future    Hypothyroidism (acquired)              On levothyroxine, asymptomatic, continue to monitor TSH  -     TSH without Reflex; Future  -     Basic Metabolic Panel; Future    Other orders  -     propranolol (INDERAL) 20 MG tablet; Take 1 tablet by mouth nightly  -     benzonatate (TESSALON) 100 MG capsule;  Take 1 capsule by mouth 3 times daily as needed for Cough        Plan:    Reviewed with the patient (/and caregiver if present): current health status, medications, activities and diet. See also orders and comments in the assessment section. Today's diagnosis (in the context ofchronic problems) was discussed with patient (/and caregiver if present), questions answered. Side effects, adverse effects of the medication prescribed (or refilled) today, treatment plan/ rationale and result expectations have (again) been discussed with the patient who expresses understanding and consents to proceed as outlined above. Additional advise included in the \"after visit summary\". Orders Placed This Encounter   Medications    propranolol (INDERAL) 20 MG tablet     Sig: Take 1 tablet by mouth nightly     Dispense:  30 tablet     Refill:  1    benzonatate (TESSALON) 100 MG capsule     Sig: Take 1 capsule by mouth 3 times daily as needed for Cough     Dispense:  30 capsule     Refill:  0       Orders Placed This Encounter   Procedures    TSH without Reflex     Standing Status:   Future     Number of Occurrences:   1     Standing Expiration Date:   6/13/2020    Basic Metabolic Panel     Standing Status:   Future     Number of Occurrences:   1     Standing Expiration Date:   6/13/2020     Further workup and plan will be determined based on clinical progression and follow up test/ treatment results. Close follow up needed to evaluate treatment results and for care coordination. Return in about 2 weeks (around 6/28/2019). I have reviewed the patient's medical and surgical, family and social history, health maintenance schedule, and updated the computerized patient record. Please note this report has been partially produced by using speech recognition hardware. It may contain errors related to the system, including grammar, punctuation and spelling as well as words and phrases that may seem inaccurate. For anyquestions or concerns, please feel free to contact me for clarification.         Electronically signed by Lb Walton MD

## 2019-07-09 RX ORDER — UBIDECARENONE 100 MG
1000 CAPSULE ORAL
Qty: 30 CAPSULE | Refills: 6 | Status: SHIPPED | OUTPATIENT
Start: 2019-07-09 | End: 2020-02-21 | Stop reason: SDUPTHER

## 2019-07-09 RX ORDER — ALBUTEROL SULFATE 90 UG/1
2 AEROSOL, METERED RESPIRATORY (INHALATION) EVERY 4 HOURS PRN
Qty: 1 INHALER | Refills: 3 | Status: SHIPPED | OUTPATIENT
Start: 2019-07-09 | End: 2020-10-13 | Stop reason: SDUPTHER

## 2019-07-09 RX ORDER — OMEGA-3/DHA/EPA/FISH OIL 35-113.5MG
1000 TABLET,CHEWABLE ORAL DAILY
Qty: 30 TABLET | Refills: 6 | Status: SHIPPED | OUTPATIENT
Start: 2019-07-09 | End: 2020-02-21 | Stop reason: SDUPTHER

## 2019-07-19 ENCOUNTER — APPOINTMENT (OUTPATIENT)
Dept: GENERAL RADIOLOGY | Age: 59
End: 2019-07-19
Payer: COMMERCIAL

## 2019-07-19 ENCOUNTER — HOSPITAL ENCOUNTER (EMERGENCY)
Age: 59
Discharge: HOME | End: 2019-07-19
Attending: EMERGENCY MEDICINE
Payer: COMMERCIAL

## 2019-07-19 VITALS
SYSTOLIC BLOOD PRESSURE: 136 MMHG | BODY MASS INDEX: 32.14 KG/M2 | DIASTOLIC BLOOD PRESSURE: 83 MMHG | HEIGHT: 66 IN | HEART RATE: 86 BPM | WEIGHT: 200 LBS | TEMPERATURE: 97.7 F | RESPIRATION RATE: 18 BRPM

## 2019-07-19 DIAGNOSIS — M25.562 ACUTE PAIN OF LEFT KNEE: Primary | ICD-10-CM

## 2019-07-19 PROCEDURE — 99283 EMERGENCY DEPT VISIT LOW MDM: CPT

## 2019-07-19 PROCEDURE — 6370000000 HC RX 637 (ALT 250 FOR IP): Performed by: EMERGENCY MEDICINE

## 2019-07-19 PROCEDURE — 73562 X-RAY EXAM OF KNEE 3: CPT

## 2019-07-19 RX ORDER — OXYCODONE HYDROCHLORIDE AND ACETAMINOPHEN 5; 325 MG/1; MG/1
1 TABLET ORAL ONCE
Status: COMPLETED | OUTPATIENT
Start: 2019-07-19 | End: 2019-07-19

## 2019-07-19 RX ORDER — TRAMADOL HYDROCHLORIDE 50 MG/1
50 TABLET ORAL EVERY 4 HOURS PRN
Qty: 18 TABLET | Refills: 0 | Status: SHIPPED | OUTPATIENT
Start: 2019-07-19 | End: 2019-07-22

## 2019-07-19 RX ORDER — ALBUTEROL SULFATE 90 UG/1
AEROSOL, METERED RESPIRATORY (INHALATION)
Qty: 1 INHALER | Refills: 3 | Status: SHIPPED | OUTPATIENT
Start: 2019-07-19 | End: 2021-08-06 | Stop reason: SDUPTHER

## 2019-07-19 RX ADMIN — OXYCODONE HYDROCHLORIDE AND ACETAMINOPHEN 1 TABLET: 5; 325 TABLET ORAL at 08:21

## 2019-07-19 ASSESSMENT — PAIN DESCRIPTION - LOCATION: LOCATION: KNEE

## 2019-07-19 ASSESSMENT — ENCOUNTER SYMPTOMS
SHORTNESS OF BREATH: 0
SORE THROAT: 0
COUGH: 0
BACK PAIN: 0
NAUSEA: 0
DIARRHEA: 0
ABDOMINAL PAIN: 0
VOMITING: 0

## 2019-07-19 ASSESSMENT — PAIN DESCRIPTION - ORIENTATION: ORIENTATION: LEFT

## 2019-07-19 ASSESSMENT — PAIN DESCRIPTION - DESCRIPTORS: DESCRIPTORS: STABBING

## 2019-07-19 ASSESSMENT — PAIN DESCRIPTION - ONSET: ONSET: ON-GOING

## 2019-07-19 ASSESSMENT — PAIN DESCRIPTION - PAIN TYPE: TYPE: ACUTE PAIN

## 2019-07-19 ASSESSMENT — PAIN DESCRIPTION - PROGRESSION: CLINICAL_PROGRESSION: GRADUALLY WORSENING

## 2019-07-19 ASSESSMENT — PAIN SCALES - GENERAL
PAINLEVEL_OUTOF10: 7
PAINLEVEL_OUTOF10: 7

## 2019-07-19 ASSESSMENT — PAIN DESCRIPTION - FREQUENCY: FREQUENCY: CONTINUOUS

## 2019-07-19 NOTE — ED NOTES
Knee immabalizer applied  msps intact      Mike Lima [facility], [address] [facility]  07/19/19 0539

## 2019-07-30 ENCOUNTER — HOSPITAL ENCOUNTER (EMERGENCY)
Age: 59
Discharge: HOME | End: 2019-07-30
Payer: COMMERCIAL

## 2019-07-30 VITALS
RESPIRATION RATE: 18 BRPM | HEIGHT: 66 IN | HEART RATE: 90 BPM | SYSTOLIC BLOOD PRESSURE: 130 MMHG | OXYGEN SATURATION: 99 % | DIASTOLIC BLOOD PRESSURE: 70 MMHG | BODY MASS INDEX: 31.98 KG/M2 | WEIGHT: 199 LBS | TEMPERATURE: 98.1 F

## 2019-07-30 DIAGNOSIS — R10.819 SUPRAPUBIC TENDERNESS: ICD-10-CM

## 2019-07-30 DIAGNOSIS — N89.8 VAGINAL DISCHARGE: Primary | ICD-10-CM

## 2019-07-30 LAB
BACTERIA: ABNORMAL /HPF
BILIRUBIN URINE: NEGATIVE
BLOOD, URINE: NEGATIVE
CLARITY: CLEAR
CLUE CELLS: ABNORMAL
COLOR: YELLOW
EPITHELIAL CELLS, UA: ABNORMAL /HPF (ref 0–5)
GLUCOSE URINE: NEGATIVE MG/DL
HYALINE CASTS: ABNORMAL /HPF (ref 0–5)
KETONES, URINE: NEGATIVE MG/DL
LEUKOCYTE ESTERASE, URINE: ABNORMAL
NITRITE, URINE: NEGATIVE
PH UA: 7.5 (ref 5–9)
PROTEIN UA: NEGATIVE MG/DL
RBC UA: ABNORMAL /HPF (ref 0–5)
SPECIFIC GRAVITY UA: 1 (ref 1–1.03)
TRICHOMONAS PREP: ABNORMAL
TRICHOMONAS VAGINALIS SCREEN: NEGATIVE
URINE REFLEX TO CULTURE: YES
UROBILINOGEN, URINE: 0.2 E.U./DL
WBC UA: ABNORMAL /HPF (ref 0–5)
YEAST WET PREP: ABNORMAL

## 2019-07-30 PROCEDURE — 87661 TRICHOMONAS VAGINALIS AMPLIF: CPT

## 2019-07-30 PROCEDURE — 96372 THER/PROPH/DIAG INJ SC/IM: CPT

## 2019-07-30 PROCEDURE — 6360000002 HC RX W HCPCS: Performed by: PHYSICIAN ASSISTANT

## 2019-07-30 PROCEDURE — 87186 SC STD MICRODIL/AGAR DIL: CPT

## 2019-07-30 PROCEDURE — 99283 EMERGENCY DEPT VISIT LOW MDM: CPT

## 2019-07-30 PROCEDURE — 87591 N.GONORRHOEAE DNA AMP PROB: CPT

## 2019-07-30 PROCEDURE — 87491 CHLMYD TRACH DNA AMP PROBE: CPT

## 2019-07-30 PROCEDURE — 87077 CULTURE AEROBIC IDENTIFY: CPT

## 2019-07-30 PROCEDURE — 6370000000 HC RX 637 (ALT 250 FOR IP): Performed by: PHYSICIAN ASSISTANT

## 2019-07-30 PROCEDURE — 87210 SMEAR WET MOUNT SALINE/INK: CPT

## 2019-07-30 PROCEDURE — 81001 URINALYSIS AUTO W/SCOPE: CPT

## 2019-07-30 PROCEDURE — 2580000003 HC RX 258

## 2019-07-30 PROCEDURE — 87660 TRICHOMONAS VAGIN DIR PROBE: CPT

## 2019-07-30 PROCEDURE — 87086 URINE CULTURE/COLONY COUNT: CPT

## 2019-07-30 RX ORDER — CEFTRIAXONE SODIUM 250 MG/1
250 INJECTION, POWDER, FOR SOLUTION INTRAMUSCULAR; INTRAVENOUS ONCE
Status: COMPLETED | OUTPATIENT
Start: 2019-07-30 | End: 2019-07-30

## 2019-07-30 RX ORDER — AZITHROMYCIN 250 MG/1
1000 TABLET, FILM COATED ORAL ONCE
Status: COMPLETED | OUTPATIENT
Start: 2019-07-30 | End: 2019-07-30

## 2019-07-30 RX ORDER — IBUPROFEN 800 MG/1
800 TABLET ORAL ONCE
Status: COMPLETED | OUTPATIENT
Start: 2019-07-30 | End: 2019-07-30

## 2019-07-30 RX ORDER — METRONIDAZOLE 500 MG/1
2000 TABLET ORAL ONCE
Status: COMPLETED | OUTPATIENT
Start: 2019-07-30 | End: 2019-07-30

## 2019-07-30 RX ADMIN — AZITHROMYCIN MONOHYDRATE 1000 MG: 250 TABLET ORAL at 12:12

## 2019-07-30 RX ADMIN — CEFTRIAXONE SODIUM 250 MG: 250 INJECTION, POWDER, FOR SOLUTION INTRAMUSCULAR; INTRAVENOUS at 12:13

## 2019-07-30 RX ADMIN — IBUPROFEN 800 MG: 800 TABLET, FILM COATED ORAL at 12:53

## 2019-07-30 RX ADMIN — METRONIDAZOLE 2000 MG: 500 TABLET, FILM COATED ORAL at 12:12

## 2019-07-30 RX ADMIN — WATER 10 ML: 1 INJECTION INTRAMUSCULAR; INTRAVENOUS; SUBCUTANEOUS at 12:14

## 2019-07-30 ASSESSMENT — ENCOUNTER SYMPTOMS
RESPIRATORY NEGATIVE: 1
EYES NEGATIVE: 1
GASTROINTESTINAL NEGATIVE: 1

## 2019-07-30 ASSESSMENT — PAIN DESCRIPTION - ONSET: ONSET: PROGRESSIVE

## 2019-07-30 ASSESSMENT — PAIN DESCRIPTION - PAIN TYPE: TYPE: ACUTE PAIN

## 2019-07-30 ASSESSMENT — PAIN SCALES - GENERAL
PAINLEVEL_OUTOF10: 5
PAINLEVEL_OUTOF10: 7

## 2019-07-30 ASSESSMENT — PAIN DESCRIPTION - LOCATION: LOCATION: BACK;FLANK

## 2019-07-30 ASSESSMENT — PAIN DESCRIPTION - DESCRIPTORS: DESCRIPTORS: SHARP

## 2019-07-30 ASSESSMENT — PAIN DESCRIPTION - FREQUENCY: FREQUENCY: CONTINUOUS

## 2019-07-30 ASSESSMENT — PAIN DESCRIPTION - ORIENTATION: ORIENTATION: RIGHT

## 2019-07-30 NOTE — ED PROVIDER NOTES
ENDOMETRIAL ABLATION  6/2012    EYE REMOVAL      OD for melanoma, age 30    FOOT OSTEOTOMY Left 09/23/2016    DEJA JACKMAN DPM      LINDSAY AND BSO  2012    Dr Maggie Kahn       Previous Medications    ALBUTEROL SULFATE HFA (PROVENTIL HFA) 108 (90 BASE) MCG/ACT INHALER    Inhale 2 puffs into the lungs every 4 hours as needed for Wheezing or Shortness of Breath Space out to every 6 hours as symptoms improve.     ALBUTEROL SULFATE  (90 BASE) MCG/ACT INHALER    USE 2 PUFFS EVERY 4 HRS AS NEEDED FOR WHEEZING/SOB-SPACE OUT TO EVERY 6 HR AS SYMPTOMS IMPROVE    ARIPIPRAZOLE (ABILIFY) 15 MG TABLET    TAKE 1 TABLET BY MOUTH EVERY DAY    ASPIRIN-ACETAMINOPHEN-CAFFEINE (EXCEDRIN MIGRAINE) 250-250-65 MG PER TABLET    Take 1 tablet by mouth every 8 hours as needed for Headaches    BENZTROPINE (COGENTIN) 1 MG TABLET    TAKE 0.5mg BY MOUTH EVERY DAY FOR TREMORS    BIOTIN 300 MCG TABS    Take 1 tablet by mouth daily    BUPROPION (WELLBUTRIN XL) 300 MG XL TABLET    Take 1 tablet by mouth daily    CELECOXIB (CELEBREX) 100 MG CAPSULE    Take 1 capsule by mouth 2 times daily    CETIRIZINE (ZYRTEC) 10 MG TABLET    TAKE 1 TABLET BY MOUTH EVERY DAY AS NEEDED FOR ALLERGIES    CVS VITAMIN B12 1000 MCG TABLET    TAKE 1 TABLET BY MOUTH DAILY    D3-1000 1000 UNITS CAPS    TAKE 1 TABLET BY MOUTH DAILY WITH SUPPER    DEXTROMETHORPHAN-GUAIFENESIN (MUCINEX DM)  MG TB12    Take 1 tablet by mouth    EPINEPHRINE (EPIPEN 2-PRAVEENA) 0.3 MG/0.3ML SOAJ INJECTION    Use as directed for allergic reaction    FAMOTIDINE (PEPCID) 20 MG TABLET    TAKE 1 TABLET BY MOUTH TWICE A DAY AS NEEDED FOR HEARTBURN    FLUTICASONE (FLONASE) 50 MCG/ACT NASAL SPRAY    USE 1 SPRAY INTO EACH NOSTRIL DAILY    LEVOTHYROXINE (SYNTHROID) 50 MCG TABLET    TAKE 1 TABLET BY MOUTH DAILY    MULTIPLE VITAMIN (MULTI-VITAMINS) TABS    TAKE 1 TABLET BY MOUTH EVERY DAY    PAIN & FEVER EXTRA STRENGTH 500 MG TABLET    TAKE 1 TABLET BY MOUTH EVERY 6 HOURS AS

## 2019-08-01 LAB
ORGANISM: ABNORMAL
URINE CULTURE, ROUTINE: ABNORMAL
URINE CULTURE, ROUTINE: ABNORMAL

## 2019-08-02 ENCOUNTER — OFFICE VISIT (OUTPATIENT)
Dept: INTERNAL MEDICINE CLINIC | Age: 59
End: 2019-08-02
Payer: COMMERCIAL

## 2019-08-02 VITALS
HEART RATE: 88 BPM | WEIGHT: 205 LBS | DIASTOLIC BLOOD PRESSURE: 83 MMHG | BODY MASS INDEX: 33.09 KG/M2 | OXYGEN SATURATION: 94 % | SYSTOLIC BLOOD PRESSURE: 132 MMHG

## 2019-08-02 DIAGNOSIS — B37.31 CANDIDA VAGINITIS: ICD-10-CM

## 2019-08-02 DIAGNOSIS — R25.1 TREMOR OF BOTH HANDS: Primary | ICD-10-CM

## 2019-08-02 LAB
SPECIMEN SOURCE: NORMAL
T. VAGINALIS AMPLIFIED: NEGATIVE

## 2019-08-02 PROCEDURE — 3017F COLORECTAL CA SCREEN DOC REV: CPT | Performed by: INTERNAL MEDICINE

## 2019-08-02 PROCEDURE — G8417 CALC BMI ABV UP PARAM F/U: HCPCS | Performed by: INTERNAL MEDICINE

## 2019-08-02 PROCEDURE — 99213 OFFICE O/P EST LOW 20 MIN: CPT | Performed by: INTERNAL MEDICINE

## 2019-08-02 PROCEDURE — 1036F TOBACCO NON-USER: CPT | Performed by: INTERNAL MEDICINE

## 2019-08-02 PROCEDURE — G8428 CUR MEDS NOT DOCUMENT: HCPCS | Performed by: INTERNAL MEDICINE

## 2019-08-02 RX ORDER — TOPIRAMATE 50 MG/1
TABLET, FILM COATED ORAL
Qty: 30 TABLET | Refills: 3 | Status: SHIPPED | OUTPATIENT
Start: 2019-08-02 | End: 2020-10-27

## 2019-08-02 RX ORDER — FLUCONAZOLE 100 MG/1
100 TABLET ORAL DAILY
Qty: 3 TABLET | Refills: 0 | Status: SHIPPED | OUTPATIENT
Start: 2019-08-02 | End: 2019-08-05

## 2019-08-02 RX ORDER — CELECOXIB 100 MG/1
100 CAPSULE ORAL DAILY PRN
Qty: 30 CAPSULE | Refills: 0 | Status: SHIPPED | OUTPATIENT
Start: 2019-08-02 | End: 2019-08-29 | Stop reason: SDUPTHER

## 2019-08-02 RX ORDER — METHYLPREDNISOLONE 4 MG/1
TABLET ORAL
Refills: 0 | COMMUNITY
Start: 2019-07-26 | End: 2020-02-17

## 2019-08-02 RX ORDER — CYANOCOBALAMIN (VITAMIN B-12) 1000 MCG
TABLET ORAL
COMMUNITY
Start: 2019-03-28 | End: 2020-02-17 | Stop reason: SDUPTHER

## 2019-08-02 RX ORDER — FAMOTIDINE 20 MG/1
TABLET, FILM COATED ORAL
Qty: 60 TABLET | Refills: 3 | Status: SHIPPED | OUTPATIENT
Start: 2019-08-02 | End: 2020-07-28 | Stop reason: SDUPTHER

## 2019-08-02 ASSESSMENT — ENCOUNTER SYMPTOMS
WHEEZING: 0
SHORTNESS OF BREATH: 0
VOICE CHANGE: 0
TROUBLE SWALLOWING: 0
SORE THROAT: 0
ABDOMINAL PAIN: 0
DIARRHEA: 0
COUGH: 0
EYE PAIN: 0
CONSTIPATION: 0

## 2019-08-02 NOTE — PROGRESS NOTES
Kevan Omer is a 63 y.o. female non-smoker, history of hypothyroidism, hyperlipidemia, depression, asthma, who presents for evaluation of:     Chief Complaint   Patient presents with    Tremors     The hands tremors have diminished since she started taking propranolol, no increased asthma symptoms    Vaginitis     Concerned about STD and other relationship problems       Interim history: Since her last office visit 6 weeks ago, the patient has been in the emergency room twice, 2 weeks ago for left knee pain which has resolved, 3 days ago for vaginal discharge and STD concerns. She tested positive for yeast and for E. coli UTI, received Rocephin and azithromycin with some improvement. Continues to be concerned about her relationship of 6 months duration with her boyfriend. [place] has been brought into question by both partners. The following laboratory reports since the past visit were reviewed (the ones pertinent to today's visit were discussed with the patient/ caregiver):     Admission on 07/30/2019, Discharged on 07/30/2019   Component Date Value Ref Range Status    Color, UA 07/30/2019 Yellow  Straw/Yellow Final    Clarity, UA 07/30/2019 Clear  Clear Final    Glucose, Ur 07/30/2019 Negative  Negative mg/dL Final    Bilirubin Urine 07/30/2019 Negative  Negative Final    Ketones, Urine 07/30/2019 Negative  Negative mg/dL Final    Specific Gravity, UA 07/30/2019 1.004  1.005 - 1.030 Final    Blood, Urine 07/30/2019 Negative  Negative Final    pH, UA 07/30/2019 7.5  5.0 - 9.0 Final    Protein, UA 07/30/2019 Negative  Negative mg/dL Final    Urobilinogen, Urine 07/30/2019 0.2  <2.0 E.U./dL Final    Nitrite, Urine 07/30/2019 Negative  Negative Final    Leukocyte Esterase, Urine 07/30/2019 TRACE* Negative Final    Urine Reflex to Culture 07/30/2019 YES   Final    Specimen Source 07/30/2019 Urine   Final    Organism 07/30/2019 Escherichia coli*  Final    Urine Culture, Routine 07/30/2019

## 2019-08-06 LAB
C. TRACHOMATIS DNA ,URINE: NEGATIVE
N. GONORRHOEAE DNA, URINE: NEGATIVE

## 2019-08-06 RX ORDER — PROPRANOLOL HYDROCHLORIDE 20 MG/1
TABLET ORAL
Qty: 30 TABLET | Refills: 3 | Status: SHIPPED | OUTPATIENT
Start: 2019-08-06 | End: 2019-11-05 | Stop reason: SDUPTHER

## 2019-08-29 NOTE — TELEPHONE ENCOUNTER
[facility] requesting medication refill.  Please approve or deny this request.    Rx requested:  Requested Prescriptions     Pending Prescriptions Disp Refills    celecoxib (CELEBREX) 100 MG capsule [Pharmacy Med Name: CELECOXIB 100 MG CAPSULE] 30 capsule 0     Sig: TAKE 1 CAPSULE BY MOUTH DAILY AS NEEDED FOR PAIN         Last Office Visit:   8/2/2019      Next Visit Date:  Future Appointments   Date Time Provider Jay Wong   11/5/2019  9:30 AM Bernarda Aguilera MD [facility]

## 2019-08-30 RX ORDER — CELECOXIB 100 MG/1
100 CAPSULE ORAL DAILY PRN
Qty: 30 CAPSULE | Refills: 0 | Status: SHIPPED | OUTPATIENT
Start: 2019-08-30 | End: 2020-02-17

## 2019-09-03 RX ORDER — PRAVASTATIN SODIUM 40 MG
TABLET ORAL
Qty: 90 TABLET | Refills: 1 | Status: SHIPPED | OUTPATIENT
Start: 2019-09-03 | End: 2021-05-20 | Stop reason: SDUPTHER

## 2019-09-03 NOTE — TELEPHONE ENCOUNTER
pharmacy requesting medication refill.  Please approve or deny this request.    Rx requested:  Requested Prescriptions     Pending Prescriptions Disp Refills    pravastatin (PRAVACHOL) 40 MG tablet [Pharmacy Med Name: PRAVASTATIN SODIUM 40 MG TAB] 30 tablet 3     Sig: TAKE 1 TABLET BY MOUTH EVERY DAY IN THE EVENING         Last Office Visit:   Visit date not found      Next Visit Date:  Future Appointments   Date Time Provider Jay Wong   11/5/2019  9:30 AM Balbir Dunn MD [facility]

## 2019-10-18 ENCOUNTER — OFFICE VISIT (OUTPATIENT)
Dept: FAMILY MEDICINE CLINIC | Age: 59
End: 2019-10-18
Payer: COMMERCIAL

## 2019-10-18 VITALS
OXYGEN SATURATION: 99 % | SYSTOLIC BLOOD PRESSURE: 114 MMHG | HEIGHT: 66 IN | RESPIRATION RATE: 18 BRPM | BODY MASS INDEX: 33.43 KG/M2 | WEIGHT: 208 LBS | DIASTOLIC BLOOD PRESSURE: 68 MMHG | TEMPERATURE: 97.6 F | HEART RATE: 72 BPM

## 2019-10-18 DIAGNOSIS — K13.0 CHAPPED LIPS: Primary | Chronic | ICD-10-CM

## 2019-10-18 DIAGNOSIS — K13.0 ANGULAR CHEILITIS: ICD-10-CM

## 2019-10-18 PROCEDURE — 3017F COLORECTAL CA SCREEN DOC REV: CPT | Performed by: FAMILY MEDICINE

## 2019-10-18 PROCEDURE — G8482 FLU IMMUNIZE ORDER/ADMIN: HCPCS | Performed by: FAMILY MEDICINE

## 2019-10-18 PROCEDURE — 1036F TOBACCO NON-USER: CPT | Performed by: FAMILY MEDICINE

## 2019-10-18 PROCEDURE — 99213 OFFICE O/P EST LOW 20 MIN: CPT | Performed by: FAMILY MEDICINE

## 2019-10-18 PROCEDURE — G8417 CALC BMI ABV UP PARAM F/U: HCPCS | Performed by: FAMILY MEDICINE

## 2019-10-18 PROCEDURE — G8427 DOCREV CUR MEDS BY ELIG CLIN: HCPCS | Performed by: FAMILY MEDICINE

## 2019-10-18 RX ORDER — BENZTROPINE MESYLATE 0.5 MG/1
TABLET ORAL DAILY
COMMUNITY
Start: 2019-10-10 | End: 2020-10-13 | Stop reason: SDUPTHER

## 2019-10-18 RX ORDER — CLOTRIMAZOLE AND BETAMETHASONE DIPROPIONATE 10; .64 MG/G; MG/G
CREAM TOPICAL
Qty: 15 G | Refills: 0 | Status: SHIPPED | OUTPATIENT
Start: 2019-10-18 | End: 2020-02-17

## 2019-10-25 RX ORDER — FLUTICASONE FUROATE AND VILANTEROL 100; 25 UG/1; UG/1
1 POWDER RESPIRATORY (INHALATION) DAILY
Qty: 28 EACH | Refills: 3 | Status: SHIPPED | OUTPATIENT
Start: 2019-10-25 | End: 2020-03-10

## 2019-10-28 ENCOUNTER — APPOINTMENT (OUTPATIENT)
Dept: GENERAL RADIOLOGY | Age: 59
End: 2019-10-28
Payer: COMMERCIAL

## 2019-10-28 ENCOUNTER — HOSPITAL ENCOUNTER (EMERGENCY)
Age: 59
Discharge: HOME | End: 2019-10-28
Payer: COMMERCIAL

## 2019-10-28 VITALS
HEIGHT: 66 IN | BODY MASS INDEX: 33.59 KG/M2 | DIASTOLIC BLOOD PRESSURE: 93 MMHG | SYSTOLIC BLOOD PRESSURE: 148 MMHG | TEMPERATURE: 98.5 F | RESPIRATION RATE: 16 BRPM | WEIGHT: 209 LBS | OXYGEN SATURATION: 98 % | HEART RATE: 84 BPM

## 2019-10-28 DIAGNOSIS — S82.831A CLOSED FRACTURE OF DISTAL END OF RIGHT FIBULA, UNSPECIFIED FRACTURE MORPHOLOGY, INITIAL ENCOUNTER: Primary | ICD-10-CM

## 2019-10-28 PROCEDURE — 99283 EMERGENCY DEPT VISIT LOW MDM: CPT

## 2019-10-28 PROCEDURE — 73610 X-RAY EXAM OF ANKLE: CPT

## 2019-10-28 PROCEDURE — 73630 X-RAY EXAM OF FOOT: CPT

## 2019-10-28 RX ORDER — TRAMADOL HYDROCHLORIDE 50 MG/1
50 TABLET ORAL EVERY 4 HOURS PRN
Qty: 18 TABLET | Refills: 0 | Status: SHIPPED | OUTPATIENT
Start: 2019-10-28 | End: 2019-10-31

## 2019-10-28 ASSESSMENT — PAIN DESCRIPTION - ORIENTATION: ORIENTATION: RIGHT

## 2019-10-28 ASSESSMENT — PAIN DESCRIPTION - PAIN TYPE: TYPE: ACUTE PAIN

## 2019-10-28 ASSESSMENT — PAIN SCALES - GENERAL: PAINLEVEL_OUTOF10: 9

## 2019-10-28 ASSESSMENT — ENCOUNTER SYMPTOMS
ABDOMINAL PAIN: 0
COUGH: 0
SHORTNESS OF BREATH: 0
BACK PAIN: 0

## 2019-10-28 ASSESSMENT — PAIN DESCRIPTION - LOCATION: LOCATION: ANKLE

## 2019-11-05 ENCOUNTER — OFFICE VISIT (OUTPATIENT)
Dept: INTERNAL MEDICINE CLINIC | Age: 59
End: 2019-11-05
Payer: COMMERCIAL

## 2019-11-05 VITALS
DIASTOLIC BLOOD PRESSURE: 90 MMHG | TEMPERATURE: 96.8 F | HEIGHT: 66 IN | WEIGHT: 209 LBS | SYSTOLIC BLOOD PRESSURE: 127 MMHG | BODY MASS INDEX: 33.59 KG/M2 | OXYGEN SATURATION: 99 % | RESPIRATION RATE: 18 BRPM | HEART RATE: 78 BPM

## 2019-11-05 DIAGNOSIS — E03.9 HYPOTHYROIDISM, UNSPECIFIED TYPE: ICD-10-CM

## 2019-11-05 DIAGNOSIS — R30.0 DYSURIA: Primary | ICD-10-CM

## 2019-11-05 DIAGNOSIS — J45.30 MILD PERSISTENT ASTHMA WITHOUT COMPLICATION: ICD-10-CM

## 2019-11-05 DIAGNOSIS — R30.0 DYSURIA: ICD-10-CM

## 2019-11-05 DIAGNOSIS — L82.1 SEBORRHEIC KERATOSIS: ICD-10-CM

## 2019-11-05 LAB
BACTERIA: ABNORMAL /HPF
BILIRUBIN URINE: NEGATIVE
BLOOD, URINE: NEGATIVE
CHOLESTEROL, TOTAL: 194 MG/DL (ref 0–199)
CLARITY: CLEAR
COLOR: YELLOW
EPITHELIAL CELLS, UA: ABNORMAL /HPF (ref 0–5)
GLUCOSE URINE: NEGATIVE MG/DL
HDLC SERPL-MCNC: 53 MG/DL (ref 40–59)
HYALINE CASTS: ABNORMAL /HPF (ref 0–5)
KETONES, URINE: NEGATIVE MG/DL
LDL CHOLESTEROL CALCULATED: 116 MG/DL (ref 0–129)
LEUKOCYTE ESTERASE, URINE: ABNORMAL
NITRITE, URINE: POSITIVE
PH UA: 5.5 (ref 5–9)
PROTEIN UA: NEGATIVE MG/DL
RBC UA: ABNORMAL /HPF (ref 0–5)
SPECIFIC GRAVITY UA: 1.01 (ref 1–1.03)
TRIGL SERPL-MCNC: 127 MG/DL (ref 0–150)
URINE REFLEX TO CULTURE: YES
UROBILINOGEN, URINE: 0.2 E.U./DL
WBC UA: ABNORMAL /HPF (ref 0–5)

## 2019-11-05 PROCEDURE — G8482 FLU IMMUNIZE ORDER/ADMIN: HCPCS | Performed by: INTERNAL MEDICINE

## 2019-11-05 PROCEDURE — 3017F COLORECTAL CA SCREEN DOC REV: CPT | Performed by: INTERNAL MEDICINE

## 2019-11-05 PROCEDURE — 1036F TOBACCO NON-USER: CPT | Performed by: INTERNAL MEDICINE

## 2019-11-05 PROCEDURE — G8417 CALC BMI ABV UP PARAM F/U: HCPCS | Performed by: INTERNAL MEDICINE

## 2019-11-05 PROCEDURE — G8427 DOCREV CUR MEDS BY ELIG CLIN: HCPCS | Performed by: INTERNAL MEDICINE

## 2019-11-05 PROCEDURE — 99213 OFFICE O/P EST LOW 20 MIN: CPT | Performed by: INTERNAL MEDICINE

## 2019-11-05 RX ORDER — FLUORIDE TOOTHPASTE
15 TOOTHPASTE DENTAL 3 TIMES DAILY PRN
Qty: 237 ML | Refills: 1 | Status: SHIPPED | OUTPATIENT
Start: 2019-11-05 | End: 2020-02-17

## 2019-11-05 RX ORDER — NITROFURANTOIN 25; 75 MG/1; MG/1
100 CAPSULE ORAL 2 TIMES DAILY
Qty: 10 CAPSULE | Refills: 1 | Status: SHIPPED | OUTPATIENT
Start: 2019-11-05 | End: 2019-11-10

## 2019-11-05 RX ORDER — PROPRANOLOL HYDROCHLORIDE 20 MG/1
TABLET ORAL
Qty: 30 TABLET | Refills: 3 | Status: SHIPPED | OUTPATIENT
Start: 2019-11-05 | End: 2020-01-06

## 2019-11-05 ASSESSMENT — ENCOUNTER SYMPTOMS
EYE PAIN: 0
COUGH: 0
VOICE CHANGE: 0
NAUSEA: 0
WHEEZING: 0
DIARRHEA: 0
ABDOMINAL PAIN: 0
SHORTNESS OF BREATH: 0
CONSTIPATION: 0
TROUBLE SWALLOWING: 0

## 2019-11-07 LAB
ORGANISM: ABNORMAL
URINE CULTURE, ROUTINE: ABNORMAL

## 2020-02-07 ENCOUNTER — OFFICE VISIT (OUTPATIENT)
Dept: INTERNAL MEDICINE CLINIC | Age: 60
End: 2020-02-07
Payer: COMMERCIAL

## 2020-02-07 VITALS
DIASTOLIC BLOOD PRESSURE: 84 MMHG | BODY MASS INDEX: 33.57 KG/M2 | RESPIRATION RATE: 10 BRPM | WEIGHT: 208 LBS | HEART RATE: 75 BPM | TEMPERATURE: 97.7 F | SYSTOLIC BLOOD PRESSURE: 125 MMHG

## 2020-02-07 DIAGNOSIS — I49.8 OTHER CARDIAC ARRHYTHMIA: ICD-10-CM

## 2020-02-07 DIAGNOSIS — E03.9 ACQUIRED HYPOTHYROIDISM: ICD-10-CM

## 2020-02-07 DIAGNOSIS — Z86.39 HISTORY OF VITAMIN D DEFICIENCY: ICD-10-CM

## 2020-02-07 DIAGNOSIS — R73.9 BLOOD GLUCOSE ELEVATED: ICD-10-CM

## 2020-02-07 LAB
ANION GAP SERPL CALCULATED.3IONS-SCNC: 10 MEQ/L (ref 9–15)
BUN BLDV-MCNC: 15 MG/DL (ref 6–20)
CALCIUM SERPL-MCNC: 9.4 MG/DL (ref 8.5–9.9)
CHLORIDE BLD-SCNC: 102 MEQ/L (ref 95–107)
CO2: 25 MEQ/L (ref 20–31)
CREAT SERPL-MCNC: 0.86 MG/DL (ref 0.5–0.9)
FOLATE: 18.1 NG/ML (ref 7.3–26.1)
GFR AFRICAN AMERICAN: >60
GFR NON-AFRICAN AMERICAN: >60
GLUCOSE BLD-MCNC: 89 MG/DL (ref 70–99)
HBA1C MFR BLD: 5.4 % (ref 4.8–5.9)
POTASSIUM SERPL-SCNC: 4.5 MEQ/L (ref 3.4–4.9)
SODIUM BLD-SCNC: 137 MEQ/L (ref 135–144)
TSH SERPL DL<=0.05 MIU/L-ACNC: 2.7 UIU/ML (ref 0.44–3.86)
VITAMIN B-12: 649 PG/ML (ref 232–1245)
VITAMIN D 25-HYDROXY: 36 NG/ML (ref 30–100)

## 2020-02-07 PROCEDURE — G8482 FLU IMMUNIZE ORDER/ADMIN: HCPCS | Performed by: INTERNAL MEDICINE

## 2020-02-07 PROCEDURE — G8417 CALC BMI ABV UP PARAM F/U: HCPCS | Performed by: INTERNAL MEDICINE

## 2020-02-07 PROCEDURE — 93000 ELECTROCARDIOGRAM COMPLETE: CPT | Performed by: INTERNAL MEDICINE

## 2020-02-07 PROCEDURE — 3017F COLORECTAL CA SCREEN DOC REV: CPT | Performed by: INTERNAL MEDICINE

## 2020-02-07 PROCEDURE — 1036F TOBACCO NON-USER: CPT | Performed by: INTERNAL MEDICINE

## 2020-02-07 PROCEDURE — G8427 DOCREV CUR MEDS BY ELIG CLIN: HCPCS | Performed by: INTERNAL MEDICINE

## 2020-02-07 PROCEDURE — 99214 OFFICE O/P EST MOD 30 MIN: CPT | Performed by: INTERNAL MEDICINE

## 2020-02-07 RX ORDER — PROPRANOLOL HYDROCHLORIDE 40 MG/1
TABLET ORAL
Qty: 30 TABLET | Refills: 2 | Status: SHIPPED | OUTPATIENT
Start: 2020-02-07 | End: 2020-03-20 | Stop reason: SDUPTHER

## 2020-02-07 RX ORDER — ASPIRIN 81 MG/1
81 TABLET ORAL DAILY
Qty: 90 TABLET | Refills: 0 | Status: SHIPPED | OUTPATIENT
Start: 2020-02-07 | End: 2020-02-07

## 2020-02-07 ASSESSMENT — ENCOUNTER SYMPTOMS
SHORTNESS OF BREATH: 0
COLOR CHANGE: 0
COUGH: 0
SORE THROAT: 0
VOMITING: 0
NAUSEA: 0
WHEEZING: 0
ABDOMINAL PAIN: 0
ABDOMINAL DISTENTION: 0
CHEST TIGHTNESS: 0

## 2020-02-07 NOTE — PROGRESS NOTES
pg/mL Final    Folate 02/07/2020 18.1  7.3 - 26.1 ng/mL Final    Comment: As of 8/10/16, the methodology has changed. Results from  this methodology should not be compared with results from  previous methodology.  Vit D, 25-Hydroxy 02/07/2020 36.0  30.0 - 100.0 ng/mL Final    Comment: ( ng/mL)  Optimum Level    This assay accurately quantifies the sum of vitamin D3, 25-Hydroxy and  vitamin D2, 25-Hyroxy. HPI:    Palpitations    This is a new problem. The current episode started more than 1 month ago. The problem occurs intermittently. The problem has been waxing and waning. The symptoms are aggravated by stress. Associated symptoms include anxiety and malaise/fatigue. Pertinent negatives include no chest pain, coughing, diaphoresis, dizziness, nausea, near-syncope, shortness of breath, syncope or vomiting. She has tried nothing for the symptoms. Risk factors include stress, sedentary lifestyle, post menopause and obesity. Her past medical history is significant for anxiety. There is no history of drug use. History of cardiac arrythmia, remote episode, due to stress, was on medication       More detail above in the chiefcomplaint(s), interim history and below in the review of systems.      Past Medical History:   Diagnosis Date    Asthma 2015    Bilateral renal cysts 2013    Depression     since age 35, was with Dr Phill Shafer, now the [facility] Diverticulosis of sigmoid colon 2012    Dr Cirilo Knowles DJD of left shoulder     Environmental allergies     Fatty liver 2013    GERD (gastroesophageal reflux disease)     History of cardiac arrhythmia     \"due to stress, was placed on medication\"    Hydatidiform mole     age 26     Hyperlipidemia     Hypothyroidism 2011    IFG (impaired fasting glucose) 2013    Melanoma of eye ([address]     age 30, R eye prosthesis, Dr Ladarius Linda    Obesity     Prediabetes     PTSD (post-traumatic stress disorder)     age 35, [facility]    S/P colonoscopy 04/19/2012    Dr. Olga Islas S/P hysterectomy with oophorectomy 2012    Dr Phu Loaiza Service Vitamin D deficiency        Past Surgical History:   Procedure Laterality Date    DILATION AND CURETTAGE OF UTERUS      ENDOMETRIAL ABLATION  6/2012    EYE REMOVAL      OD for melanoma, age 30    FOOT OSTEOTOMY Left 09/23/2016    Kellie Dillon DPM      LINDSAY AND BSO  2012    Dr Owen To       Social History     Socioeconomic History    Marital status: Single     Spouse name: Not on file    Number of children: 3    Years of education: Not on file    Highest education level: Not on file   Occupational History    Occupation: SSI   Social Needs    Financial resource strain: Not on file    Food insecurity:     Worry: Not on file     Inability: Not on file    Transportation needs:     Medical: Not on file     Non-medical: Not on file   Tobacco Use    Smoking status: Never Smoker    Smokeless tobacco: Never Used   Substance and Sexual Activity    Alcohol use: No    Drug use: No    Sexual activity: Not on file   Lifestyle    Physical activity:     Days per week: Not on file     Minutes per session: Not on file    Stress: Not on file   Relationships    Social connections:     Talks on phone: Not on file     Gets together: Not on file     Attends Che[employer] service: Not on file     Active member of club or organization: Not on file     Attends meetings of clubs or organizations: Not on file     Relationship status: Not on file    Intimate partner violence:     Fear of current or ex partner: Not on file     Emotionally abused: Not on file     Physically abused: Not on file     Forced sexual activity: Not on file   Other Topics Concern    Not on file   Social History Narrative    Born in [place], one of 4 children, one sister disappeared at age 30, never found    Moved to [place] at age 40        Lives in an apartment in [facility], alone    , 3 children, all grown, in [place]     2 granddaughters Hobbies exercising, birds    Attends [employer], has volunteered at [employer]       Family History   Problem Relation Age of Onset    Heart Failure Mother         dec 76    Diabetes Mother     Diabetes Father     Stroke Father         dec age 80    Cancer Father         Sarcoma    Breast Cancer Sister     Stomach Cancer Other        Family and socialhistory were reviewed and pertinent changes documented. ALLERGIES    Patient has no known allergies. Current Outpatient Medications on File Prior to Visit   Medication Sig Dispense Refill    Mouthwashes (BIOTENE) LIQD oral solution Swish and spit 15 mLs 3 times daily as needed (dry mouth) 237 mL 1    fluticasone-vilanterol (BREO ELLIPTA) 100-25 MCG/INH AEPB inhaler Inhale 1 puff into the lungs daily 28 each 3    benztropine (COGENTIN) 0.5 MG tablet       clotrimazole-betamethasone (LOTRISONE) 1-0.05 % cream Apply topically 2 times daily.  15 g 0    pravastatin (PRAVACHOL) 40 MG tablet TAKE 1 TABLET BY MOUTH EVERY DAY IN THE EVENING 90 tablet 1    celecoxib (CELEBREX) 100 MG capsule TAKE 1 CAPSULE BY MOUTH DAILY AS NEEDED FOR PAIN 30 capsule 0    famotidine (PEPCID) 20 MG tablet TAKE 1 TABLET BY MOUTH TWICE A DAY AS NEEDED FOR HEARTBURN 60 tablet 3    topiramate (TOPAMAX) 50 MG tablet TAKE 1 TABLET BY MOUTH EVERY DAY AT NIGHT 30 tablet 3    methylPREDNISolone (MEDROL DOSEPACK) 4 MG tablet TAKE AS INSTRUSTED ON PACKAGE  0    Cyanocobalamin (B-12) 1000 MCG TABS       albuterol sulfate  (90 Base) MCG/ACT inhaler USE 2 PUFFS EVERY 4 HRS AS NEEDED FOR WHEEZING/SOB-SPACE OUT TO EVERY 6 HR AS SYMPTOMS IMPROVE 1 Inhaler 3    CVS VITAMIN B12 1000 MCG tablet TAKE 1 TABLET BY MOUTH DAILY 30 tablet 6    D3-1000 1000 units CAPS TAKE 1 TABLET BY MOUTH DAILY WITH SUPPER 30 capsule 6    albuterol sulfate HFA (PROVENTIL HFA) 108 (90 Base) MCG/ACT inhaler Inhale 2 puffs into the lungs every 4 hours as needed for Wheezing or Shortness of Breath Space lower leg: No edema. Left lower leg: No edema. Lymphadenopathy:      Cervical: No cervical adenopathy. Skin:     General: Skin is warm. Comments: Exam of the scalp reveals diffuse hair loss and now braiding    Neurological:      Mental Status: She is oriented to person, place, and time. Mental status is at baseline. Motor: No weakness. Coordination: Coordination normal.      Comments: Coarse intentional tremor of the hands   Psychiatric:         Attention and Perception: She is attentive. Mood and Affect: Mood is anxious. Mood is not depressed. Affect is blunt. Speech: Speech is rapid and pressured. Behavior: Behavior is not slowed or withdrawn. Thought Content: Thought content is not paranoid or delusional.         Cognition and Memory: She does not exhibit impaired recent memory or impaired remote memory. Judgment: Judgment normal. Judgment is not impulsive or inappropriate. Comments: Good motivation, insight          Assessment:    Stephon Cheung was seen today for irregular heart beat, hypothyroidism and abnormal test results. Diagnoses and all orders for this visit:    Intermittent atrial flutter ([address]              Repeat EKG today in the office appears inconclusive  -     Holter Monitor 48 Hour; Future  -     Jerry Griggs MD, Cardiology, [place]  -     Echocardiogram complete; Future    Other cardiac arrhythmia  -     EKG 12 lead; Future  -     Basic Metabolic Panel; Future  -     EKG 12 lead  -     Jerry Griggs MD, Cardiology, [place]  -     Echocardiogram complete; Future    Acquired hypothyroidism  -     TSH without Reflex; Future  -     Vitamin B12 & Folate; Future    Blood glucose elevated              Resume metformin bid, reevaluate  -     Hemoglobin A1C; Future    History of vitamin D deficiency  -     Vitamin D 25 Hydroxy; Future    Other orders  -     propranolol (INDERAL) 40 MG tablet;  Take 1 tab po daily at HS  -     metFORMIN (GLUCOPHAGE) 500 MG tablet; Take 1 tablet by mouth 2 times daily (with meals)  -     aspirin EC 81 MG EC tablet; Take 1 tablet by mouth daily        Plan:    See all orders and comments in the assessment section. Reviewed with the patient (/and caregiver if present): current health status, medications, activities and diet. Today's diagnosis (in the context ofchronic problems) was discussed with patient (/and caregiver if present), questions answered. Patient counseled and encouraged re: daily effort to improve diet (a high fiber, low calorie, low sodium and caffeine diet, divided into 3 main meals daily) and exercise habits (more aerobic exercise as tolerated), better stress management, will result in improved health and help in better management of the current chronic conditions in the long run. Side effects, adverse effects of the medication prescribed (or refilled) today, treatment plan/ rationale and result expectations have (again) been discussed with the patient who expresses understanding and consents to proceed as outlined above. Additional advise included in the \"after visit summary\". Orders Placed This Encounter   Medications    propranolol (INDERAL) 40 MG tablet     Sig: Take 1 tab po daily at HS     Dispense:  30 tablet     Refill:  2    metFORMIN (GLUCOPHAGE) 500 MG tablet     Sig: Take 1 tablet by mouth 2 times daily (with meals)     Dispense:  60 tablet     Refill:  2    aspirin EC 81 MG EC tablet     Sig: Take 1 tablet by mouth daily     Dispense:  90 tablet     Refill:  0     Laboratories ordered to monitor chronic illness status and to help direct management. Age appropriate screening tests ordered per current clinical guidelines and based on patient's age and risk factors.     Orders Placed This Encounter   Procedures    Hemoglobin A1C     Standing Status:   Future     Number of Occurrences:   1     Standing Expiration Date:   2/6/2021    TSH without Reflex     Standing Status:   Future     Number of Occurrences:   1     Standing Expiration Date:   2/6/2021    Basic Metabolic Panel     Standing Status:   Future     Number of Occurrences:   1     Standing Expiration Date:   2/6/2021    Vitamin B12 & Folate     Standing Status:   Future     Number of Occurrences:   1     Standing Expiration Date:   2/6/2021    Vitamin D 25 Hydroxy     Standing Status:   Future     Number of Occurrences:   1     Standing Expiration Date:   2/6/2021   Tavares Foote MD, Cardiology, [place]     Referral Priority:   Routine     Referral Type:   Eval and Treat     Referral Reason:   Specialty Services Required     Referred to Provider:   Saray Coppola MD     Requested Specialty:   Interventional Cardiology     Number of Visits Requested:   1    Holter Monitor 48 Hour     Standing Status:   Future     Standing Expiration Date:   2/7/2021     Order Specific Question:   Reason for Exam?     Answer:   Irregular heart rate     Order Specific Question:   Reason for Exam?     Answer: Tachycardia    EKG 12 lead     Standing Status:   Future     Number of Occurrences:   1     Standing Expiration Date:   4/7/2020     Order Specific Question:   Reason for Exam?     Answer:   Irregular heart rate    Echocardiogram complete     Standing Status:   Future     Standing Expiration Date:   4/7/2020     Order Specific Question:   Reason for exam:     Answer:   new onset arrhythmia     Further workup and plan will be determined based on clinical progression and follow up test/ treatment results. Close follow up needed to evaluate treatment results and for care coordination. Return in about 2 weeks (around 2/21/2020). I have reviewed the patient's medical and surgical, family and social history, health maintenance schedule, and updated the computerized patient record.     Please note this report has been partially produced by using speech recognition

## 2020-02-17 ENCOUNTER — OFFICE VISIT (OUTPATIENT)
Dept: CARDIOLOGY CLINIC | Age: 60
End: 2020-02-17
Payer: COMMERCIAL

## 2020-02-17 VITALS
DIASTOLIC BLOOD PRESSURE: 78 MMHG | WEIGHT: 202.2 LBS | RESPIRATION RATE: 16 BRPM | BODY MASS INDEX: 32.64 KG/M2 | SYSTOLIC BLOOD PRESSURE: 108 MMHG | HEART RATE: 66 BPM | OXYGEN SATURATION: 97 %

## 2020-02-17 PROCEDURE — G8427 DOCREV CUR MEDS BY ELIG CLIN: HCPCS | Performed by: INTERNAL MEDICINE

## 2020-02-17 PROCEDURE — G8482 FLU IMMUNIZE ORDER/ADMIN: HCPCS | Performed by: INTERNAL MEDICINE

## 2020-02-17 PROCEDURE — 99244 OFF/OP CNSLTJ NEW/EST MOD 40: CPT | Performed by: INTERNAL MEDICINE

## 2020-02-17 PROCEDURE — G8417 CALC BMI ABV UP PARAM F/U: HCPCS | Performed by: INTERNAL MEDICINE

## 2020-02-17 RX ORDER — TRAZODONE HYDROCHLORIDE 50 MG/1
TABLET ORAL NIGHTLY
COMMUNITY
End: 2021-10-25 | Stop reason: ALTCHOICE

## 2020-02-17 NOTE — PROGRESS NOTES
[facility] OFFICE CONSULT      Patient: Michoacano Sandoval  YOB: 1960  MRN: [id number]    Chief Complaint:  Chief Complaint   Patient presents with   [facility] Establ[facility] Cardiologist     referral Dr. Naldo Stevens     notes h/o arrythmia at age 30    Chest Pain     happens in the cold       Subjective/HPI    The patient presents for evaluation as a referral from Dr. Gildardo Salcedo. The patient has been having the following problems: abnormal EKG at psychiatrist office. Cardiac diagnoses prior to this visit: none    Testing performed prior to this visit: none    The patient reports chest tightness in cold air and shortnessof breath with stairs chronically. Chronic ankle swelling. Additional history includes benign essential tremor, sees Dr. Simeon Antunez. EKG: NSR artifact from tremor.     Past Medical History:   Diagnosis Date    Asthma 2015    Bilateral renal cysts 2013    Depression     since age 35, was with Dr Jairo Em, now the [facility]    Diverticulosis of sigmoid colon 2012    Dr Kaiser Smoke DJD of left shoulder     Environmental allergies     Fatty liver 2013    GERD (gastroesophageal reflux disease)     History of cardiac arrhythmia     \"due to stress, was placed on medication\"    Hydatidiform mole     age 26     Hyperlipidemia     Hypothyroidism 2011    IFG (impaired fasting glucose) 2013    Melanoma of eye ([address]     age 30, R eye prosthesis, Dr King Khanna    Obesity     Prediabetes     PTSD (post-traumatic stress disorder)     age 35, [facility]    S/P colonoscopy 04/19/2012    Dr. Kerry Irene S/P hysterectomy with oophorectomy 2012    Dr Jeannie Charles Vitamin D deficiency        Past Surgical History:   Procedure Laterality Date    DILATION AND CURETTAGE OF UTERUS      ENDOMETRIAL ABLATION  6/2012    EYE REMOVAL      OD for melanoma, age 31    FOOT OSTEOTOMY Left 09/23/2016    DEJA JACKMAN DPM      LINDSAY AND BSO  2012    Dr Von Aguilera Family History   Problem Relation Age of Onset    Heart Failure Mother         dec 76    Diabetes Mother     Diabetes Father     Stroke Father         dec age 80    Cancer Father         Sarcoma    Breast Cancer Sister     Stomach Cancer Other        Social History     Socioeconomic History    Marital status: Single     Spouse name: None    Number of children: 3    Years of education: None    Highest education level: None   Occupational History    Occupation: SSI   Social Needs    Financial resource strain: None    Food insecurity:     Worry: None     Inability: None    Transportation needs:     Medical: None     Non-medical: None   Tobacco Use    Smoking status: Never Smoker    Smokeless tobacco: Never Used   Substance and Sexual Activity    Alcohol use: No    Drug use: No    Sexual activity: None   Lifestyle    Physical activity:     Days per week: None     Minutes per session: None    Stress: None   Relationships    Social connections:     Talks on phone: None     Gets together: None     Attends Jewish service: None     Active member of club or organization: None     Attends meetings of clubs or organizations: None     Relationship status: None    Intimate partner violence:     Fear of current or ex partner: None     Emotionally abused: None     Physically abused: None     Forced sexual activity: None   Other Topics Concern    None   Social History Narrative    Born in [address] [place] [address], one of 4 children, one sister disappeared at age 30, never found    Moved to [place] at age 40        Lives in an apartment in [facility], alone    , 3 children, all grown, in [place]     2 granddaughters     Hobbies exercising, birds    Attends [employer], has volunteered at [employer]       No Known Allergies      Review of Systems:   Review of Systems   Constitutional: Negative for activity change and appetite change. HENT: Negative for congestion.     Respiratory: Negative for apnea, choking and traZODone (DESYREL) 50 MG tablet Take by mouth nightly 1-2 tablets nightly      propranolol (INDERAL) 40 MG tablet Take 1 tab po daily at HS 30 tablet 2    metFORMIN (GLUCOPHAGE) 500 MG tablet Take 1 tablet by mouth 2 times daily (with meals) 60 tablet 2    aspirin EC 81 MG EC tablet Take 1 tablet by mouth daily 90 tablet 0    fluticasone-vilanterol (BREO ELLIPTA) 100-25 MCG/INH AEPB inhaler Inhale 1 puff into the lungs daily 28 each 3    benztropine (COGENTIN) 0.5 MG tablet       famotidine (PEPCID) 20 MG tablet TAKE 1 TABLET BY MOUTH TWICE A DAY AS NEEDED FOR HEARTBURN 60 tablet 3    topiramate (TOPAMAX) 50 MG tablet TAKE 1 TABLET BY MOUTH EVERY DAY AT NIGHT 30 tablet 3    cetirizine (ZYRTEC) 10 MG tablet TAKE 1 TABLET BY MOUTH EVERY DAY AS NEEDED FOR ALLERGIES 30 tablet 5    Biotin 300 MCG TABS Take 1 tablet by mouth daily 30 tablet 3    EPINEPHrine (EPIPEN 2-PRAVEENA) 0.3 MG/0.3ML SOAJ injection Use as directed for allergic reaction 2 each 0    PAIN & FEVER EXTRA STRENGTH 500 MG tablet TAKE 1 TABLET BY MOUTH EVERY 6 HOURS AS NEEDED FOR FEVER UP TO 7 DAYS MAX 6 TABS IN 24 HOURS  0    fluticasone (FLONASE) 50 MCG/ACT nasal spray USE 1 SPRAY INTO EACH NOSTRIL DAILY  0    ARIPiprazole (ABILIFY) 15 MG tablet TAKE 1 TABLET BY MOUTH EVERY DAY  2    buPROPion (WELLBUTRIN XL) 300 MG XL tablet Take 1 tablet by mouth daily 30 tablet 6    acetaminophen-codeine (TYLENOL #3) 300-30 MG per tablet TAKE 1 TABLET BY MOUTH EVERY 6 HOURS AS NEEDED FOR PAIN      levothyroxine (SYNTHROID) 50 MCG tablet       cyanocobalamin (CVS VITAMIN B12) 1000 MCG tablet Take 1 tablet by mouth daily 30 tablet 5    vitamin D (D3-1000) 25 MCG (1000 UT) CAPS Take 1 capsule by mouth Daily with supper 30 capsule 5    Multiple Vitamin (MULTI-VITAMINS) TABS TAKE 1 TABLET BY MOUTH EVERY DAY 30 tablet 5    pravastatin (PRAVACHOL) 40 MG tablet TAKE 1 TABLET BY MOUTH EVERY DAY IN THE EVENING 90 tablet 1    albuterol sulfate  (90 Base)

## 2020-02-21 ENCOUNTER — OFFICE VISIT (OUTPATIENT)
Dept: INTERNAL MEDICINE CLINIC | Age: 60
End: 2020-02-21
Payer: COMMERCIAL

## 2020-02-21 VITALS
DIASTOLIC BLOOD PRESSURE: 86 MMHG | SYSTOLIC BLOOD PRESSURE: 126 MMHG | RESPIRATION RATE: 12 BRPM | WEIGHT: 198 LBS | BODY MASS INDEX: 31.96 KG/M2 | TEMPERATURE: 98.1 F | OXYGEN SATURATION: 97 % | HEART RATE: 67 BPM

## 2020-02-21 DIAGNOSIS — R53.83 OTHER FATIGUE: ICD-10-CM

## 2020-02-21 LAB
ALBUMIN SERPL-MCNC: 4.4 G/DL (ref 3.5–4.6)
ALP BLD-CCNC: 76 U/L (ref 40–130)
ALT SERPL-CCNC: 13 U/L (ref 0–33)
AST SERPL-CCNC: 14 U/L (ref 0–35)
BILIRUB SERPL-MCNC: 0.5 MG/DL (ref 0.2–0.7)
BILIRUBIN DIRECT: <0.2 MG/DL (ref 0–0.4)
BILIRUBIN, INDIRECT: NORMAL MG/DL (ref 0–0.6)
HCT VFR BLD CALC: 44.2 % (ref 37–47)
HEMOGLOBIN: 14.8 G/DL (ref 12–16)
MCH RBC QN AUTO: 30.9 PG (ref 27–31.3)
MCHC RBC AUTO-ENTMCNC: 33.5 % (ref 33–37)
MCV RBC AUTO: 92.3 FL (ref 82–100)
PDW BLD-RTO: 13.2 % (ref 11.5–14.5)
PLATELET # BLD: 308 K/UL (ref 130–400)
RBC # BLD: 4.79 M/UL (ref 4.2–5.4)
TOTAL PROTEIN: 7.3 G/DL (ref 6.3–8)
WBC # BLD: 5.7 K/UL (ref 4.8–10.8)

## 2020-02-21 PROCEDURE — G8482 FLU IMMUNIZE ORDER/ADMIN: HCPCS | Performed by: INTERNAL MEDICINE

## 2020-02-21 PROCEDURE — G8417 CALC BMI ABV UP PARAM F/U: HCPCS | Performed by: INTERNAL MEDICINE

## 2020-02-21 PROCEDURE — G8427 DOCREV CUR MEDS BY ELIG CLIN: HCPCS | Performed by: INTERNAL MEDICINE

## 2020-02-21 PROCEDURE — 1036F TOBACCO NON-USER: CPT | Performed by: INTERNAL MEDICINE

## 2020-02-21 PROCEDURE — 3017F COLORECTAL CA SCREEN DOC REV: CPT | Performed by: INTERNAL MEDICINE

## 2020-02-21 PROCEDURE — 99213 OFFICE O/P EST LOW 20 MIN: CPT | Performed by: INTERNAL MEDICINE

## 2020-02-21 RX ORDER — DIPHENOXYLATE HYDROCHLORIDE AND ATROPINE SULFATE 2.5; .025 MG/1; MG/1
TABLET ORAL
Qty: 30 TABLET | Refills: 5 | Status: SHIPPED | OUTPATIENT
Start: 2020-02-21 | End: 2020-08-19

## 2020-02-21 RX ORDER — ACETAMINOPHEN AND CODEINE PHOSPHATE 300; 30 MG/1; MG/1
TABLET ORAL
COMMUNITY
Start: 2020-02-11 | End: 2020-06-11 | Stop reason: ALTCHOICE

## 2020-02-21 RX ORDER — LEVOTHYROXINE SODIUM 0.05 MG/1
TABLET ORAL
COMMUNITY
Start: 2020-02-17 | End: 2020-03-20 | Stop reason: SDUPTHER

## 2020-02-21 ASSESSMENT — ENCOUNTER SYMPTOMS
ABDOMINAL DISTENTION: 0
COLOR CHANGE: 0
SORE THROAT: 0
WHEEZING: 0
ABDOMINAL PAIN: 0
TROUBLE SWALLOWING: 0
CHANGE IN BOWEL HABIT: 0
COUGH: 0
SHORTNESS OF BREATH: 0
NAUSEA: 0
VOMITING: 0
CHEST TIGHTNESS: 0

## 2020-02-21 NOTE — PROGRESS NOTES
Humberto Silva is a 63 y.o. female non-smoker, history of asthma, depression, PTSD, hypothyroidism, essential tremor, who presents for evaluation of:     Chief Complaint   Patient presents with    2 Week Follow-Up    Tremors  Of both hands, partially controlled by taking propranolol a slightly higher dose of 40 mg daily    Fatigue       Interim history: Since her last office visit with me 2 weeks ago the patient has seen the cardiologist who confirmed patient's arrhythmia (atrial flutter). No new medications were started. She was scheduled for monitoring and for an echocardiogram.  With increase of the beta-blocker dose at the last visit, the patient feels better overall. The following laboratory reports since the past visit were reviewed (the ones pertinent to today's visit were discussed with the patient/ caregiver):    Orders Only on 02/21/2020   Component Date Value Ref Range Status    Total Protein 02/21/2020 7.3  6.3 - 8.0 g/dL Final    Alb 02/21/2020 4.4  3.5 - 4.6 g/dL Final    Alkaline Phosphatase 02/21/2020 76  40 - 130 U/L Final    ALT 02/21/2020 13  0 - 33 U/L Final    AST 02/21/2020 14  0 - 35 U/L Final    Total Bilirubin 02/21/2020 0.5  0.2 - 0.7 mg/dL Final    Bilirubin, Direct 02/21/2020 <0.2  0.0 - 0.4 mg/dL Final    Bilirubin, Indirect 02/21/2020 see below  0.0 - 0.6 mg/dL Final    Comment: Indirect Bilirubin cannot be calculated since Total Bilirubin  and/or Direct Bilirubin is below measurable range.       WBC 02/21/2020 5.7  4.8 - 10.8 K/uL Final    RBC 02/21/2020 4.79  4.20 - 5.40 M/uL Final    Hemoglobin 02/21/2020 14.8  12.0 - 16.0 g/dL Final    Hematocrit 02/21/2020 44.2  37.0 - 47.0 % Final    MCV 02/21/2020 92.3  82.0 - 100.0 fL Final    MCH 02/21/2020 30.9  27.0 - 31.3 pg Final    MCHC 02/21/2020 33.5  33.0 - 37.0 % Final    RDW 02/21/2020 13.2  11.5 - 14.5 % Final    Platelets 44/02/3791 308  130 - 400 K/uL Final   Orders Only on 02/07/2020   Component Date Value the symptoms. The treatment provided mild relief. More detail above in the chiefcomplaint(s), interim history and below in the review of systems.      Past Medical History:   Diagnosis Date    Asthma 2015    Bilateral renal cysts 2013    Depression     since age 35, was with Dr Naresh Stock, now the 2000 [employer] Diverticulosis of sigmoid colon 2012    Dr Elaine Lee DJD of left shoulder     Environmental allergies     Fatty liver 2013    GERD (gastroesophageal reflux disease)     History of cardiac arrhythmia     \"due to stress, was placed on medication\"    Hydatidiform mole     age 26     Hyperlipidemia     Hypothyroidism 2011    IFG (impaired fasting glucose) 2013    Melanoma of eye ([address]     age 30, R eye prosthesis, Dr Carlene Zavala    Obesity     Prediabetes     PTSD (post-traumatic stress disorder)     age 35, [facility]    S/P colonoscopy 04/19/2012    Dr. Brannon Stone S/P hysterectomy with oophorectomy 2012    Dr Annika Ventura Vitamin D deficiency        Past Surgical History:   Procedure Laterality Date    DILATION AND CURETTAGE OF UTERUS      ENDOMETRIAL ABLATION  6/2012    EYE REMOVAL      OD for melanoma, age 30    FOOT OSTEOTOMY Left 09/23/2016    [facility]      LINDSAY AND BSO  2012    Dr Keyon Kennedy       Social History     Socioeconomic History    Marital status: Single     Spouse name: Not on file    Number of children: 3    Years of education: Not on file    Highest education level: Not on file   Occupational History    Occupation: SSI   Social Needs    Financial resource strain: Not on file    Food insecurity:     Worry: Not on file     Inability: Not on file    Transportation needs:     Medical: Not on file     Non-medical: Not on file   Tobacco Use    Smoking status: Never Smoker    Smokeless tobacco: Never Used   Substance and Sexual Activity    Alcohol use: No    Drug use: No    Sexual activity: Not on file   Lifestyle    Physical activity: Days per week: Not on file     Minutes per session: Not on file    Stress: Not on file   Relationships    Social connections:     Talks on phone: Not on file     Gets together: Not on file     Attends Spiritism service: Not on file     Active member of club or organization: Not on file     Attends meetings of clubs or organizations: Not on file     Relationship status: Not on file    Intimate partner violence:     Fear of current or ex partner: Not on file     Emotionally abused: Not on file     Physically abused: Not on file     Forced sexual activity: Not on file   Other Topics Concern    Not on file   Social History Narrative    Born in [address] [place] [address], one of 4 children, one sister disappeared at age 30, never found    Moved to [place] at age 40        Lives in an apartment in [facility], alone    , 3 children, all grown, in [place]     2 granddaughters     Hobbies exercising, birds    Attends [employer], has volunteered at [employer]       Family History   Problem Relation Age of Onset    Heart Failure Mother         dec 76    Diabetes Mother     Diabetes Father     Stroke Father         dec age 80    Cancer Father         Sarcoma    Breast Cancer Sister     Stomach Cancer Other        Family and socialhistory were reviewed and pertinent changes documented. ALLERGIES    Patient has no known allergies.     Current Outpatient Medications on File Prior to Visit   Medication Sig Dispense Refill    acetaminophen-codeine (TYLENOL #3) 300-30 MG per tablet TAKE 1 TABLET BY MOUTH EVERY 6 HOURS AS NEEDED FOR PAIN      levothyroxine (SYNTHROID) 50 MCG tablet       traZODone (DESYREL) 50 MG tablet Take by mouth nightly 1-2 tablets nightly      propranolol (INDERAL) 40 MG tablet Take 1 tab po daily at HS 30 tablet 2    metFORMIN (GLUCOPHAGE) 500 MG tablet Take 1 tablet by mouth 2 times daily (with meals) 60 tablet 2    aspirin EC 81 MG EC tablet Take 1 tablet by mouth daily 90 tablet 0    fluticasone-vilanterol (BREO ELLIPTA) 100-25 MCG/INH AEPB inhaler Inhale 1 puff into the lungs daily 28 each 3    benztropine (COGENTIN) 0.5 MG tablet       pravastatin (PRAVACHOL) 40 MG tablet TAKE 1 TABLET BY MOUTH EVERY DAY IN THE EVENING 90 tablet 1    famotidine (PEPCID) 20 MG tablet TAKE 1 TABLET BY MOUTH TWICE A DAY AS NEEDED FOR HEARTBURN 60 tablet 3    topiramate (TOPAMAX) 50 MG tablet TAKE 1 TABLET BY MOUTH EVERY DAY AT NIGHT 30 tablet 3    albuterol sulfate  (90 Base) MCG/ACT inhaler USE 2 PUFFS EVERY 4 HRS AS NEEDED FOR WHEEZING/SOB-SPACE OUT TO EVERY 6 HR AS SYMPTOMS IMPROVE 1 Inhaler 3    albuterol sulfate HFA (PROVENTIL HFA) 108 (90 Base) MCG/ACT inhaler Inhale 2 puffs into the lungs every 4 hours as needed for Wheezing or Shortness of Breath Space out to every 6 hours as symptoms improve. 1 Inhaler 3    cetirizine (ZYRTEC) 10 MG tablet TAKE 1 TABLET BY MOUTH EVERY DAY AS NEEDED FOR ALLERGIES 30 tablet 5    Biotin 300 MCG TABS Take 1 tablet by mouth daily 30 tablet 3    aspirin-acetaminophen-caffeine (EXCEDRIN MIGRAINE) 250-250-65 MG per tablet Take 1 tablet by mouth every 8 hours as needed for Headaches 90 tablet 3    EPINEPHrine (EPIPEN 2-PRAVEENA) 0.3 MG/0.3ML SOAJ injection Use as directed for allergic reaction 2 each 0    PAIN & FEVER EXTRA STRENGTH 500 MG tablet TAKE 1 TABLET BY MOUTH EVERY 6 HOURS AS NEEDED FOR FEVER UP TO 7 DAYS MAX 6 TABS IN 24 HOURS  0    fluticasone (FLONASE) 50 MCG/ACT nasal spray USE 1 SPRAY INTO EACH NOSTRIL DAILY  0    ARIPiprazole (ABILIFY) 15 MG tablet TAKE 1 TABLET BY MOUTH EVERY DAY  2    buPROPion (WELLBUTRIN XL) 300 MG XL tablet Take 1 tablet by mouth daily 30 tablet 6    [DISCONTINUED] Multiple Vitamins-Minerals (THERAPEUTIC MULTIVITAMIN-MINERALS) tablet Take 1 tablet by mouth daily 30 tablet 5    [DISCONTINUED] albuterol (PROVENTIL;VENTOLIN) 90 MCG/ACT inhaler Inhale 2 puffs into the lungs every 6 hours as needed.  1 Inhaler 6     No current facility-administered medications on file prior to visit. Review of Systems   Constitutional: Positive for fatigue. Negative for diaphoresis and fever. HENT: Negative for congestion, nosebleeds, sore throat and trouble swallowing. Eyes: Negative for visual disturbance. Respiratory: Negative for cough, chest tightness, shortness of breath and wheezing. Cardiovascular: Negative for chest pain, palpitations and leg swelling. Gastrointestinal: Negative for abdominal distention, abdominal pain, change in bowel habit, nausea and vomiting. Endocrine: Negative for cold intolerance, heat intolerance, polydipsia and polyuria. Genitourinary: Negative for flank pain and hematuria. Musculoskeletal: Negative for joint swelling and myalgias. Skin: Negative for color change. Neurological: Positive for tremors. Negative for dizziness, syncope, speech difficulty, weakness and light-headedness. Psychiatric/Behavioral: Positive for dysphoric mood and sleep disturbance. Negative for confusion, decreased concentration and suicidal ideas. The patient is nervous/anxious. Vitals:    02/21/20 0939   BP: 126/86   Site: Left Upper Arm   Position: Sitting   Cuff Size: Large Adult   Pulse: 67   Resp: 12   Temp: 98.1 °F (36.7 °C)   TempSrc: Oral   SpO2: 97%   Weight: 198 lb (89.8 kg)       Physical Exam  Constitutional:       General: She is not in acute distress. Appearance: She is obese. She is not ill-appearing. HENT:      Head: Atraumatic. Nose: No congestion. Eyes:      Conjunctiva/sclera: Conjunctivae normal.      Comments: Right eye prosthesis    Neck:      Musculoskeletal: Neck supple. Cardiovascular:      Rate and Rhythm: Regular rhythm. Bradycardia present. Occasional extrasystoles are present. Pulses: Normal pulses. Carotid pulses are 2+ on the right side and 2+ on the left side. Radial pulses are 2+ on the right side and 2+ on the left side.       Heart sounds: Heart sounds are distant. No gallop. Pulmonary:      Effort: Pulmonary effort is normal.      Breath sounds: Normal breath sounds. Abdominal:      General: There is no distension. Palpations: Abdomen is soft. Comments: Exam limited due to abdominal adiposity      Musculoskeletal:      Cervical back: Normal. She exhibits normal range of motion and no tenderness. Right lower leg: No edema. Left lower leg: No edema. Skin:     General: Skin is warm. Coloration: Skin is pale. Comments: Exam of the scalp reveals diffuse hair loss and now braiding    Neurological:      Mental Status: She is oriented to person, place, and time. Mental status is at baseline. Motor: No weakness. Coordination: Coordination normal.      Comments: The intentional tremor of the hands appears diminished   Psychiatric:         Attention and Perception: She is attentive. Mood and Affect: Mood is anxious and depressed. Speech: Speech normal.         Behavior: Behavior is not slowed or withdrawn. Thought Content: Thought content is not paranoid or delusional. Thought content does not include suicidal ideation. Cognition and Memory: She does not exhibit impaired recent memory or impaired remote memory. Judgment: Judgment normal. Judgment is not impulsive or inappropriate. Comments: Good motivation, insight          Assessment:    Brown Garcia was seen today for 2 week follow-up, tremors and fatigue. Diagnoses and all orders for this visit:    Tremor of both hands             Slight improvement with increased in the beta-blocker dose, continue current dose. Of note is that patient's heart rate is decreased as well  Multifactorial,    Other fatigue               Contributing factors are reactive depression due to recent separation, beta-blocker and other medication effect,  Recent onset of cardiac arrhythmia.   Continue close follow-up in the office, proceed with cardiology work-up as scheduled              -     CBC; Future  -     Hepatic Function Panel; Future    Other orders  -     cyanocobalamin ([employer] VITAMIN B12) 1000 MCG tablet; Take 1 tablet by mouth daily  -     vitamin D (D3-1000) 25 MCG (1000 UT) CAPS; Take 1 capsule by mouth Daily with supper  -     Multiple Vitamin (MULTI-VITAMINS) TABS; TAKE 1 TABLET BY MOUTH EVERY DAY        Plan:    See all orders and comments in the assessment section. Reviewed with the patient (/and caregiver if present): current health status, medications, activities and diet. Today's diagnosis (in the context ofchronic problems) was discussed with patient (/and caregiver if present), questions answered. The current medical regimen is effective;  continue present plan and medications. Side effects, adverse effects of the medication prescribed (or refilled) today, treatment plan/ rationale and result expectations have (again) been discussed with the patient who expresses understanding and consents to proceed as outlined above. Additional advise included in the \"after visit summary\". Orders Placed This Encounter   Medications    cyanocobalamin ([employer] VITAMIN B12) 1000 MCG tablet     Sig: Take 1 tablet by mouth daily     Dispense:  30 tablet     Refill:  5    vitamin D (D3-1000) 25 MCG (1000 UT) CAPS     Sig: Take 1 capsule by mouth Daily with supper     Dispense:  30 capsule     Refill:  5    Multiple Vitamin (MULTI-VITAMINS) TABS     Sig: TAKE 1 TABLET BY MOUTH EVERY DAY     Dispense:  30 tablet     Refill:  5       Orders Placed This Encounter   Procedures    CBC     Standing Status:   Future     Number of Occurrences:   1     Standing Expiration Date:   2/20/2021    Hepatic Function Panel     Standing Status:   Future     Number of Occurrences:   1     Standing Expiration Date:   2/20/2021       Close follow up needed to evaluate treatment results and for care coordination.    Return in about 4 weeks (around 3/20/2020). I have reviewed the patient's medical and surgical, family and social history, health maintenance schedule, and updated the computerized patient record. Please note this report has been partially produced by using speech recognition hardware. It may contain errors related to the system, including grammar, punctuation and spelling as well as words and phrases that may seem inaccurate. For anyquestions or concerns, please feel free to contact me for clarification.         Electronically signed by Ashley Og MD

## 2020-02-23 ASSESSMENT — ENCOUNTER SYMPTOMS
ABDOMINAL PAIN: 0
CHEST TIGHTNESS: 0
COLOR CHANGE: 0
ABDOMINAL DISTENTION: 0
CHOKING: 0
BACK PAIN: 0
APNEA: 0

## 2020-02-28 ENCOUNTER — HOSPITAL ENCOUNTER (OUTPATIENT)
Dept: NON INVASIVE DIAGNOSTICS | Age: 60
Discharge: HOME | End: 2020-02-28
Payer: COMMERCIAL

## 2020-02-28 LAB
LV EF: 60 %
LVEF MODALITY: NORMAL

## 2020-02-28 PROCEDURE — 93225 XTRNL ECG REC<48 HRS REC: CPT

## 2020-02-28 PROCEDURE — 93306 TTE W/DOPPLER COMPLETE: CPT

## 2020-02-28 PROCEDURE — 93226 XTRNL ECG REC<48 HR SCAN A/R: CPT

## 2020-03-03 ENCOUNTER — TELEPHONE (OUTPATIENT)
Dept: INTERNAL MEDICINE CLINIC | Age: 60
End: 2020-03-03

## 2020-03-06 ENCOUNTER — TELEPHONE (OUTPATIENT)
Dept: INTERNAL MEDICINE CLINIC | Age: 60
End: 2020-03-06

## 2020-03-06 ENCOUNTER — TELEPHONE (OUTPATIENT)
Dept: ENDOCRINOLOGY | Age: 60
End: 2020-03-06

## 2020-03-06 NOTE — TELEPHONE ENCOUNTER
Called the patient, discussed tests reports, she also has appointment with the cardiologist at the end of March

## 2020-03-10 RX ORDER — BUDESONIDE AND FORMOTEROL FUMARATE DIHYDRATE 160; 4.5 UG/1; UG/1
2 AEROSOL RESPIRATORY (INHALATION) 2 TIMES DAILY
Qty: 1 INHALER | Refills: 3 | Status: SHIPPED | OUTPATIENT
Start: 2020-03-10 | End: 2021-08-23 | Stop reason: SDUPTHER

## 2020-03-20 ENCOUNTER — OFFICE VISIT (OUTPATIENT)
Dept: INTERNAL MEDICINE CLINIC | Age: 60
End: 2020-03-20
Payer: COMMERCIAL

## 2020-03-20 VITALS
WEIGHT: 199 LBS | HEART RATE: 79 BPM | RESPIRATION RATE: 12 BRPM | SYSTOLIC BLOOD PRESSURE: 116 MMHG | DIASTOLIC BLOOD PRESSURE: 81 MMHG | BODY MASS INDEX: 32.12 KG/M2 | TEMPERATURE: 98.2 F | OXYGEN SATURATION: 98 %

## 2020-03-20 PROBLEM — K13.0 CHEILOSIS: Status: ACTIVE | Noted: 2019-10-18

## 2020-03-20 PROCEDURE — 99213 OFFICE O/P EST LOW 20 MIN: CPT | Performed by: INTERNAL MEDICINE

## 2020-03-20 PROCEDURE — G8417 CALC BMI ABV UP PARAM F/U: HCPCS | Performed by: INTERNAL MEDICINE

## 2020-03-20 PROCEDURE — G8427 DOCREV CUR MEDS BY ELIG CLIN: HCPCS | Performed by: INTERNAL MEDICINE

## 2020-03-20 PROCEDURE — G8482 FLU IMMUNIZE ORDER/ADMIN: HCPCS | Performed by: INTERNAL MEDICINE

## 2020-03-20 PROCEDURE — 1036F TOBACCO NON-USER: CPT | Performed by: INTERNAL MEDICINE

## 2020-03-20 PROCEDURE — 3017F COLORECTAL CA SCREEN DOC REV: CPT | Performed by: INTERNAL MEDICINE

## 2020-03-20 RX ORDER — LEVOTHYROXINE SODIUM 0.05 MG/1
50 TABLET ORAL EVERY MORNING
Qty: 90 TABLET | Refills: 1 | Status: SHIPPED | OUTPATIENT
Start: 2020-03-20 | End: 2020-06-04 | Stop reason: SDUPTHER

## 2020-03-20 RX ORDER — PROPRANOLOL HYDROCHLORIDE 40 MG/1
TABLET ORAL
Qty: 90 TABLET | Refills: 1 | Status: SHIPPED | OUTPATIENT
Start: 2020-03-20 | End: 2020-09-02 | Stop reason: SDUPTHER

## 2020-03-21 ASSESSMENT — ENCOUNTER SYMPTOMS
COUGH: 0
SHORTNESS OF BREATH: 0
TROUBLE SWALLOWING: 0
ABDOMINAL PAIN: 0
CHEST TIGHTNESS: 0

## 2020-03-21 NOTE — PROGRESS NOTES
capsule by mouth Daily with supper 30 capsule 5    Multiple Vitamin (MULTI-VITAMINS) TABS TAKE 1 TABLET BY MOUTH EVERY DAY 30 tablet 5    traZODone (DESYREL) 50 MG tablet Take by mouth nightly 1-2 tablets nightly      metFORMIN (GLUCOPHAGE) 500 MG tablet Take 1 tablet by mouth 2 times daily (with meals) 60 tablet 2    aspirin EC 81 MG EC tablet Take 1 tablet by mouth daily 90 tablet 0    benztropine (COGENTIN) 0.5 MG tablet       pravastatin (PRAVACHOL) 40 MG tablet TAKE 1 TABLET BY MOUTH EVERY DAY IN THE EVENING 90 tablet 1    famotidine (PEPCID) 20 MG tablet TAKE 1 TABLET BY MOUTH TWICE A DAY AS NEEDED FOR HEARTBURN 60 tablet 3    topiramate (TOPAMAX) 50 MG tablet TAKE 1 TABLET BY MOUTH EVERY DAY AT NIGHT 30 tablet 3    albuterol sulfate  (90 Base) MCG/ACT inhaler USE 2 PUFFS EVERY 4 HRS AS NEEDED FOR WHEEZING/SOB-SPACE OUT TO EVERY 6 HR AS SYMPTOMS IMPROVE 1 Inhaler 3    albuterol sulfate HFA (PROVENTIL HFA) 108 (90 Base) MCG/ACT inhaler Inhale 2 puffs into the lungs every 4 hours as needed for Wheezing or Shortness of Breath Space out to every 6 hours as symptoms improve.  1 Inhaler 3    cetirizine (ZYRTEC) 10 MG tablet TAKE 1 TABLET BY MOUTH EVERY DAY AS NEEDED FOR ALLERGIES 30 tablet 5    Biotin 300 MCG TABS Take 1 tablet by mouth daily 30 tablet 3    aspirin-acetaminophen-caffeine (EXCEDRIN MIGRAINE) 250-250-65 MG per tablet Take 1 tablet by mouth every 8 hours as needed for Headaches 90 tablet 3    EPINEPHrine (EPIPEN 2-PRAVEENA) 0.3 MG/0.3ML SOAJ injection Use as directed for allergic reaction 2 each 0    PAIN & FEVER EXTRA STRENGTH 500 MG tablet TAKE 1 TABLET BY MOUTH EVERY 6 HOURS AS NEEDED FOR FEVER UP TO 7 DAYS MAX 6 TABS IN 24 HOURS  0    fluticasone (FLONASE) 50 MCG/ACT nasal spray USE 1 SPRAY INTO EACH NOSTRIL DAILY  0    ARIPiprazole (ABILIFY) 15 MG tablet TAKE 1 TABLET BY MOUTH EVERY DAY  2    buPROPion (WELLBUTRIN XL) 300 MG XL tablet Take 1 tablet by mouth daily 30 tablet 6    [DISCONTINUED] Multiple Vitamins-Minerals (THERAPEUTIC MULTIVITAMIN-MINERALS) tablet Take 1 tablet by mouth daily 30 tablet 5    [DISCONTINUED] albuterol (PROVENTIL;VENTOLIN) 90 MCG/ACT inhaler Inhale 2 puffs into the lungs every 6 hours as needed. 1 Inhaler 6     No current facility-administered medications on file prior to visit. Review of Systems   Constitutional: Positive for fatigue. Negative for diaphoresis. HENT: Negative for congestion, nosebleeds and trouble swallowing. Respiratory: Negative for cough, chest tightness and shortness of breath. Cardiovascular: Negative for chest pain, palpitations and leg swelling. Gastrointestinal: Negative for abdominal pain. Endocrine: Negative for cold intolerance, heat intolerance, polydipsia and polyuria. Genitourinary: Negative for dysuria and flank pain. Musculoskeletal: Negative for gait problem and joint swelling. Skin: Negative for rash. Neurological: Positive for tremors. Negative for dizziness, syncope, speech difficulty, weakness and light-headedness. Psychiatric/Behavioral: Positive for dysphoric mood and sleep disturbance. Negative for confusion and decreased concentration. The patient is nervous/anxious. Vitals:    03/20/20 0914   BP: 116/81   Site: Left Upper Arm   Position: Sitting   Cuff Size: Medium Adult   Pulse: 79   Resp: 12   Temp: 98.2 °F (36.8 °C)   TempSrc: Oral   SpO2: 98%   Weight: 199 lb (90.3 kg)       Physical Exam  Constitutional:       General: She is not in acute distress. Appearance: She is obese. She is not ill-appearing. HENT:      Head: Normocephalic. Nose: No rhinorrhea. Mouth/Throat:      Mouth: Mucous membranes are moist.   Eyes:      Conjunctiva/sclera: Conjunctivae normal.      Comments: Right eye prosthesis    Neck:      Musculoskeletal: No neck rigidity. Cardiovascular:      Rate and Rhythm: Regular rhythm. Occasional extrasystoles are present. Pulses: Normal pulses. Carotid pulses are 2+ on the right side and 2+ on the left side. Radial pulses are 2+ on the right side and 2+ on the left side. Heart sounds: Heart sounds are distant. Pulmonary:      Effort: No respiratory distress. Abdominal:      General: There is no distension. Comments: Exam limited due to abdominal adiposity      Musculoskeletal:      Cervical back: Normal. She exhibits normal range of motion and no tenderness. Right lower leg: No edema. Left lower leg: No edema. Skin:     General: Skin is dry. Comments: Exam of the scalp reveals diffuse hair loss and now braiding    Neurological:      Mental Status: She is oriented to person, place, and time. Mental status is at baseline. Motor: No weakness. Coordination: Coordination normal.      Gait: Gait normal.      Comments: The intentional tremor of the hands appears diminished   Psychiatric:         Attention and Perception: Attention normal.         Mood and Affect: Mood is anxious and depressed. Speech: Speech normal.         Behavior: Behavior is not withdrawn. Behavior is cooperative. Thought Content: Thought content is not delusional.         Cognition and Memory: Memory normal.         Judgment: Judgment normal.      Comments: Good motivation, insight          Assessment:    Carroll Gandhi was seen today for hypothyroidism, medication refill and results. Diagnoses and all orders for this visit:    Cardiac arrhythmia, unspecified cardiac arrhythmia type              Stable, continue propranolol 40 mg daily, reassured of normal cardiac studies findings, follow-up with cardiologist as scheduled      Other fatigue  -     [facility] Sleep Study with PAP Titration; Future    Snoring  -     [facility] Sleep Study with PAP Titration; Future    Acquired hypothyroidism              Stable on levothyroxine which is well-tolerated      Other orders  -     levothyroxine (SYNTHROID) 50 MCG tablet;  Take 1 tablet by mouth every morning  -     propranolol (INDERAL) 40 MG tablet; Take 1 tab po daily at HS        Plan:    See all orders and comments in the assessment section. Reviewed with the patient (/and caregiver if present): current health status, medications, activities and diet. Today's diagnosis (in the context ofchronic problems) was discussed with patient (/and caregiver if present), questions answered. Side effects, adverse effects of the medication prescribed (or refilled) today, treatment plan/ rationale and result expectations have (again) been discussed with the patient who expresses understanding and consents to proceed as outlined above. Additional advise included in the \"after visit summary\". Patient counseled and encouraged re: daily effort to improve diet (a high fiber, low calorie, low sodium and caffeine diet, divided into 3 main meals daily) and exercise habits (more aerobic exercise as tolerated), better stress management, will result in improved health and help in better management of the current chronic conditions in the long run. Orders Placed This Encounter   Medications    levothyroxine (SYNTHROID) 50 MCG tablet     Sig: Take 1 tablet by mouth every morning     Dispense:  90 tablet     Refill:  1    propranolol (INDERAL) 40 MG tablet     Sig: Take 1 tab po daily at HS     Dispense:  90 tablet     Refill:  1       Orders Placed This Encounter   Procedures   [facility] Sleep Study with PAP Titration     Patients with abnormal results will be assessed and referred to the sleep  as needed.      Standing Status:   Future     Standing Expiration Date:   3/20/2021     Scheduling Instructions:      Sleep Study with PAP Titration - [place]       Scheduling and Pre-Certification: [phone]      The following must be completed and FAXED to [phone]      1) Sleep Evaluation Orders Form      2) Office note justifying study      3) Demographic info / insurance card     Order Specific Question:   Sleep Study Titration Type     Answer:   Split Night Study (Baseline followed by PAP Titration)     Order Specific Question:   Location For Sleep Study     Answer:   [place]     Order Specific Question:   Select Sleep Lab Location     Answer:   [facility]     Comments:   [place]       Close follow up needed to evaluate treatment results and for care coordination. Return in about 6 weeks (around 5/1/2020). I have reviewed the patient's medical and surgical, family and social history, health maintenance schedule, and updated the computerized patient record. Please note this report has been partially produced by using speech recognition hardware. It may contain errors related to the system, including grammar, punctuation and spelling as well as words and phrases that may seem inaccurate. For anyquestions or concerns, please feel free to contact me for clarification.         Electronically signed by Makenzie Valenzuela MD

## 2020-04-20 ENCOUNTER — OFFICE VISIT (OUTPATIENT)
Dept: FAMILY MEDICINE CLINIC | Age: 60
End: 2020-04-20
Payer: COMMERCIAL

## 2020-04-20 ENCOUNTER — TELEPHONE (OUTPATIENT)
Dept: ADMINISTRATIVE | Age: 60
End: 2020-04-20

## 2020-04-20 VITALS
HEART RATE: 101 BPM | DIASTOLIC BLOOD PRESSURE: 80 MMHG | WEIGHT: 202 LBS | HEIGHT: 66 IN | BODY MASS INDEX: 32.47 KG/M2 | RESPIRATION RATE: 16 BRPM | TEMPERATURE: 98.4 F | OXYGEN SATURATION: 98 % | SYSTOLIC BLOOD PRESSURE: 124 MMHG

## 2020-04-20 PROCEDURE — G8427 DOCREV CUR MEDS BY ELIG CLIN: HCPCS | Performed by: FAMILY MEDICINE

## 2020-04-20 PROCEDURE — G8417 CALC BMI ABV UP PARAM F/U: HCPCS | Performed by: FAMILY MEDICINE

## 2020-04-20 PROCEDURE — 3017F COLORECTAL CA SCREEN DOC REV: CPT | Performed by: FAMILY MEDICINE

## 2020-04-20 PROCEDURE — 99213 OFFICE O/P EST LOW 20 MIN: CPT | Performed by: FAMILY MEDICINE

## 2020-04-20 PROCEDURE — 1036F TOBACCO NON-USER: CPT | Performed by: FAMILY MEDICINE

## 2020-04-20 RX ORDER — DIAPER,BRIEF,INFANT-TODD,DISP
EACH MISCELLANEOUS
Qty: 30 G | Refills: 0 | Status: CANCELLED | OUTPATIENT
Start: 2020-04-20

## 2020-04-20 RX ORDER — DIAPER,BRIEF,INFANT-TODD,DISP
EACH MISCELLANEOUS
Qty: 30 G | Refills: 0 | Status: SHIPPED | OUTPATIENT
Start: 2020-04-20 | End: 2020-06-11 | Stop reason: ALTCHOICE

## 2020-04-20 NOTE — TELEPHONE ENCOUNTER
The Pt called to scheduled a smart phone appt with dr Ondina Capps. The Upper & Lower Lips are   Cracked & Bleeding. Dr Joann Ferrer was not in office today. The Pt wanted  to seen by someone today. I called the office for assistance. I spoke  To [facility]. There was no one in the office today. She recommended the  Pt be seen at the nearest [address] to be seen today. The Pt stated she doesn't have transportation. She was going to call her    for assistance to take to the [facility] clinic. If Not she was going   to walk to the near by [facility] close to where she works.

## 2020-04-20 NOTE — PROGRESS NOTES
Chief Complaint   Patient presents with    Skin Problem     dry lips x's 2 months, pt has oral pain cannot get dentures in has tried OTC        HPI: Giuseppe Dunbar 62 y.o. female presenting for     Patient coming with a chief complaint of cracked lips. Is been going for last 2 months. Patient reports that she is unable to put any dentures or her teeth in. Patient denies any fevers, chills, nausea vomiting, chest pain, shortness breath, abdominal again to the nares, change in stools. Patient denies any bleeding or discharge. Current Outpatient Medications   Medication Sig Dispense Refill    hydrocortisone 1 % ointment Apply topically 2 times daily on the affected area for 1 week.  30 g 0    levothyroxine (SYNTHROID) 50 MCG tablet Take 1 tablet by mouth every morning 90 tablet 1    propranolol (INDERAL) 40 MG tablet Take 1 tab po daily at HS 90 tablet 1    budesonide-formoterol (SYMBICORT) 160-4.5 MCG/ACT AERO Inhale 2 puffs into the lungs 2 times daily 1 Inhaler 3    acetaminophen-codeine (TYLENOL #3) 300-30 MG per tablet TAKE 1 TABLET BY MOUTH EVERY 6 HOURS AS NEEDED FOR PAIN      cyanocobalamin ([employer] VITAMIN B12) 1000 MCG tablet Take 1 tablet by mouth daily 30 tablet 5    vitamin D (D3-1000) 25 MCG (1000 UT) CAPS Take 1 capsule by mouth Daily with supper 30 capsule 5    Multiple Vitamin (MULTI-VITAMINS) TABS TAKE 1 TABLET BY MOUTH EVERY DAY 30 tablet 5    traZODone (DESYREL) 50 MG tablet Take by mouth nightly 1-2 tablets nightly      metFORMIN (GLUCOPHAGE) 500 MG tablet Take 1 tablet by mouth 2 times daily (with meals) 60 tablet 2    aspirin EC 81 MG EC tablet Take 1 tablet by mouth daily 90 tablet 0    benztropine (COGENTIN) 0.5 MG tablet       pravastatin (PRAVACHOL) 40 MG tablet TAKE 1 TABLET BY MOUTH EVERY DAY IN THE EVENING 90 tablet 1    famotidine (PEPCID) 20 MG tablet TAKE 1 TABLET BY MOUTH TWICE A DAY AS NEEDED FOR HEARTBURN 60 tablet 3    topiramate (TOPAMAX) 50 MG tablet TAKE 1 recognition software  and may cause  and /or contain errors related to that system including grammar, punctuation and spelling as well as words and phrases that may seem inappropriate. If there are questions or concerns please feel free to contact me to clarify.

## 2020-05-19 ENCOUNTER — OFFICE VISIT (OUTPATIENT)
Dept: INTERNAL MEDICINE CLINIC | Age: 60
End: 2020-05-19
Payer: COMMERCIAL

## 2020-05-19 VITALS
BODY MASS INDEX: 34.54 KG/M2 | WEIGHT: 214 LBS | OXYGEN SATURATION: 97 % | TEMPERATURE: 98.7 F | HEART RATE: 100 BPM | SYSTOLIC BLOOD PRESSURE: 126 MMHG | DIASTOLIC BLOOD PRESSURE: 89 MMHG | RESPIRATION RATE: 16 BRPM

## 2020-05-19 DIAGNOSIS — K13.0 CHEILITIS: ICD-10-CM

## 2020-05-19 LAB
ALBUMIN SERPL-MCNC: 4.5 G/DL (ref 3.5–4.6)
ALP BLD-CCNC: 81 U/L (ref 40–130)
ALT SERPL-CCNC: 20 U/L (ref 0–33)
ANION GAP SERPL CALCULATED.3IONS-SCNC: 9 MEQ/L (ref 9–15)
AST SERPL-CCNC: 17 U/L (ref 0–35)
BILIRUB SERPL-MCNC: <0.2 MG/DL (ref 0.2–0.7)
BUN BLDV-MCNC: 10 MG/DL (ref 6–20)
CALCIUM SERPL-MCNC: 9.5 MG/DL (ref 8.5–9.9)
CHLORIDE BLD-SCNC: 103 MEQ/L (ref 95–107)
CO2: 25 MEQ/L (ref 20–31)
CREAT SERPL-MCNC: 0.85 MG/DL (ref 0.5–0.9)
GFR AFRICAN AMERICAN: >60
GFR NON-AFRICAN AMERICAN: >60
GLOBULIN: 2.7 G/DL (ref 2.3–3.5)
GLUCOSE BLD-MCNC: 111 MG/DL (ref 70–99)
HCT VFR BLD CALC: 43.3 % (ref 37–47)
HEMOGLOBIN: 14.5 G/DL (ref 12–16)
MCH RBC QN AUTO: 30.9 PG (ref 27–31.3)
MCHC RBC AUTO-ENTMCNC: 33.5 % (ref 33–37)
MCV RBC AUTO: 92.2 FL (ref 82–100)
PDW BLD-RTO: 13.5 % (ref 11.5–14.5)
PLATELET # BLD: 328 K/UL (ref 130–400)
POTASSIUM SERPL-SCNC: 4.6 MEQ/L (ref 3.4–4.9)
RBC # BLD: 4.69 M/UL (ref 4.2–5.4)
SODIUM BLD-SCNC: 137 MEQ/L (ref 135–144)
TOTAL PROTEIN: 7.2 G/DL (ref 6.3–8)
VITAMIN B-12: 1747 PG/ML (ref 232–1245)
WBC # BLD: 8.1 K/UL (ref 4.8–10.8)

## 2020-05-19 PROCEDURE — 99213 OFFICE O/P EST LOW 20 MIN: CPT | Performed by: FAMILY MEDICINE

## 2020-05-19 PROCEDURE — G8417 CALC BMI ABV UP PARAM F/U: HCPCS | Performed by: FAMILY MEDICINE

## 2020-05-19 PROCEDURE — 3017F COLORECTAL CA SCREEN DOC REV: CPT | Performed by: FAMILY MEDICINE

## 2020-05-19 PROCEDURE — 1036F TOBACCO NON-USER: CPT | Performed by: FAMILY MEDICINE

## 2020-05-19 PROCEDURE — G8427 DOCREV CUR MEDS BY ELIG CLIN: HCPCS | Performed by: FAMILY MEDICINE

## 2020-05-19 RX ORDER — METHYLPREDNISOLONE 4 MG/1
TABLET ORAL
Qty: 1 KIT | Refills: 0 | Status: SHIPPED | OUTPATIENT
Start: 2020-05-19 | End: 2020-05-27

## 2020-05-19 NOTE — PROGRESS NOTES
Vit b12  Subjective:      Patient ID: Minerva Thompson is a 62 y.o. female who presents for:  Chief Complaint   Patient presents with    Dental Pain     Patient states she is having oral pain. Dry lips have been present for over a month and the patient states nothing she is doing is helping      Patient was seen and examined in the office today. She reports having several non-healing \"cracks\" on her lips of more than 3 months' duration and it is associated with burning but lingering pain severe enough as to prevent her from wearing her dentures. She also admits to associated erosion with bleeding, swelling of her upper lip and her inability to place her dentures in her mouth and thus cannot chew her food well enough which most likely resulted in her recent weight loss but she denies any associated anorexia, or nocturnal night sweat. She has been seen by other Physicians for this condition but to no avail and she would like to have another opinion.     Past Medical History:   Diagnosis Date    Asthma 2015    Bilateral renal cysts 2013    Depression     since age 35, was with Dr Ulysses Florence, now the [facility]    Diverticulosis of sigmoid colon 2012    Dr Ruth Hermosillo DJD of left shoulder     Environmental allergies     Fatty liver 2013    GERD (gastroesophageal reflux disease)     History of cardiac arrhythmia     \"due to stress, was placed on medication\"    Hydatidiform mole     age 26     Hyperlipidemia     Hypothyroidism 2011    IFG (impaired fasting glucose) 2013    Melanoma of eye ([address]     age 30, R eye prosthesis, Dr Luma Cornejo    Obesity     Prediabetes     PTSD (post-traumatic stress disorder)     age 35, 1970 [facility]    S/P colonoscopy 04/19/2012    Dr. Deepak Horne S/P hysterectomy with oophorectomy 2012    Dr Genny Morris Tremor     Dr Rudolph Holliday D deficiency      Past Surgical History:   Procedure Laterality Date    DILATION AND CURETTAGE OF UTERUS      ENDOMETRIAL ABLATION  6/2012    EYE Sarcoma    Breast Cancer Sister     Stomach Cancer Other      Allergies:  Patient has no known allergies. Review of Systems   Constitutional: Positive for unexpected weight change. Negative for appetite change. HENT: Negative for trouble swallowing and voice change. Gastrointestinal: Negative for nausea and vomiting. Musculoskeletal: Positive for myalgias. Skin: Positive for color change and wound (on her lips). Hematological: Does not bruise/bleed easily. Psychiatric/Behavioral: The patient is nervous/anxious. Objective:   /89   Pulse 100   Temp 98.7 °F (37.1 °C) (Oral)   Resp 16   Wt 214 lb (97.1 kg)   SpO2 97%   BMI 34.54 kg/m²      Physical Exam  Vitals signs and nursing note reviewed. Exam conducted with a chaperone present. Constitutional:       Appearance: She is obese. She is not ill-appearing or toxic-appearing. HENT:      Head: Normocephalic and atraumatic. Right Ear: Tympanic membrane, ear canal and external ear normal. There is no impacted cerumen. Left Ear: Tympanic membrane, ear canal and external ear normal. There is no impacted cerumen. Nose: Nose normal.      Mouth/Throat:      Mouth: Mucous membranes are moist.      Comments: Edentulous oral cavity and moderately tender indurated swelling of the upper lip with shallow ulcerations couple of which bled during this examination. Eyes:      Extraocular Movements: Extraocular movements intact. Comments: Prosthetic right eye   Neck:      Musculoskeletal: Neck supple. Cardiovascular:      Rate and Rhythm: Normal rate. Heart sounds: Normal heart sounds. Pulmonary:      Effort: Pulmonary effort is normal.      Breath sounds: Normal breath sounds. Skin:     General: Skin is warm and dry. Neurological:      Mental Status: She is alert and oriented to person, place, and time. Assessment:       Diagnosis Orders   1.  Cheilitis  Vitamin B12    MELLISA Screen with Reflex    CBC

## 2020-05-19 NOTE — PATIENT INSTRUCTIONS
[employer] disclaims any warranty or liability for your use of this information.

## 2020-05-22 LAB — ANA IGG, ELISA: NORMAL

## 2020-05-25 ASSESSMENT — ENCOUNTER SYMPTOMS
COLOR CHANGE: 1
TROUBLE SWALLOWING: 0
NAUSEA: 0
VOMITING: 0
VOICE CHANGE: 0

## 2020-05-27 ENCOUNTER — OFFICE VISIT (OUTPATIENT)
Dept: INTERNAL MEDICINE CLINIC | Age: 60
End: 2020-05-27
Payer: COMMERCIAL

## 2020-05-27 ENCOUNTER — OFFICE VISIT (OUTPATIENT)
Dept: CARDIOLOGY CLINIC | Age: 60
End: 2020-05-27
Payer: COMMERCIAL

## 2020-05-27 VITALS
BODY MASS INDEX: 33.96 KG/M2 | SYSTOLIC BLOOD PRESSURE: 118 MMHG | RESPIRATION RATE: 16 BRPM | HEART RATE: 97 BPM | WEIGHT: 210.4 LBS | DIASTOLIC BLOOD PRESSURE: 72 MMHG | OXYGEN SATURATION: 98 %

## 2020-05-27 VITALS
TEMPERATURE: 98.7 F | WEIGHT: 210.6 LBS | OXYGEN SATURATION: 96 % | BODY MASS INDEX: 33.99 KG/M2 | DIASTOLIC BLOOD PRESSURE: 84 MMHG | HEART RATE: 95 BPM | RESPIRATION RATE: 12 BRPM | SYSTOLIC BLOOD PRESSURE: 127 MMHG

## 2020-05-27 PROCEDURE — 3017F COLORECTAL CA SCREEN DOC REV: CPT | Performed by: INTERNAL MEDICINE

## 2020-05-27 PROCEDURE — G8427 DOCREV CUR MEDS BY ELIG CLIN: HCPCS | Performed by: FAMILY MEDICINE

## 2020-05-27 PROCEDURE — 99213 OFFICE O/P EST LOW 20 MIN: CPT | Performed by: FAMILY MEDICINE

## 2020-05-27 PROCEDURE — 1036F TOBACCO NON-USER: CPT | Performed by: INTERNAL MEDICINE

## 2020-05-27 PROCEDURE — 99213 OFFICE O/P EST LOW 20 MIN: CPT | Performed by: INTERNAL MEDICINE

## 2020-05-27 PROCEDURE — G8417 CALC BMI ABV UP PARAM F/U: HCPCS | Performed by: INTERNAL MEDICINE

## 2020-05-27 PROCEDURE — G8417 CALC BMI ABV UP PARAM F/U: HCPCS | Performed by: FAMILY MEDICINE

## 2020-05-27 PROCEDURE — 3017F COLORECTAL CA SCREEN DOC REV: CPT | Performed by: FAMILY MEDICINE

## 2020-05-27 PROCEDURE — G8427 DOCREV CUR MEDS BY ELIG CLIN: HCPCS | Performed by: INTERNAL MEDICINE

## 2020-05-27 PROCEDURE — 1036F TOBACCO NON-USER: CPT | Performed by: FAMILY MEDICINE

## 2020-05-27 RX ORDER — CEPHALEXIN 500 MG/1
500 CAPSULE ORAL 2 TIMES DAILY
Qty: 14 CAPSULE | Refills: 0 | Status: SHIPPED | OUTPATIENT
Start: 2020-05-27 | End: 2020-06-03

## 2020-05-27 RX ORDER — PREDNISONE 20 MG/1
40 TABLET ORAL DAILY
Qty: 10 TABLET | Refills: 0 | Status: SHIPPED | OUTPATIENT
Start: 2020-05-27 | End: 2020-06-01

## 2020-05-27 ASSESSMENT — ENCOUNTER SYMPTOMS
RHINORRHEA: 0
TROUBLE SWALLOWING: 0
NAUSEA: 0
VOICE CHANGE: 0
VOMITING: 0
BLOOD IN STOOL: 0
CONSTIPATION: 0

## 2020-05-27 NOTE — PROGRESS NOTES
[facility] CARDIOLOGY OFFICE FOLLOW-UP      Patient: Kalry Flores  YOB: 1960  MRN: [id number]    Chief Complaint:  Chief Complaint   Patient presents with    Results     Holter, ECHO    Atrial Flutter       Subjective/HPI    The patient is followed in the cardiology office chronically for the following problems: abnormal EKG, thought to be atrial flutter but is artifact    The last encounter focused on the following: testing and assessement    Testing/hospitalizations/procedures performed since last encounter: holter negative for aflutter    Since the last encounter, the patient has had a episode of chest pain that awakened her from sleep, lasting for several minutes. No agg or all. No assoc.     Past Medical History:   Diagnosis Date    Asthma 2015    Bilateral renal cysts 2013    Depression     since age 35, was with Dr Ada Camejo, now the [facility]    Diverticulosis of sigmoid colon 2012    Dr Margie Samayoa DJD of left shoulder     Environmental allergies     Fatty liver 2013    GERD (gastroesophageal reflux disease)     History of cardiac arrhythmia     \"due to stress, was placed on medication\"    Hydatidiform mole     age 26     Hyperlipidemia     Hypothyroidism 2011    IFG (impaired fasting glucose) 2013    Melanoma of eye ([address]     age 30, R eye prosthesis, Dr Vila Cook    Obesity     Prediabetes     PTSD (post-traumatic stress disorder)     age 35, 1970 [facility]    S/P colonoscopy 04/19/2012    Dr. Elton Beverly S/P hysterectomy with oophorectomy 2012    Dr Estephania Iraheta Vitamin D deficiency        Past Surgical History:   Procedure Laterality Date    DILATION AND CURETTAGE OF UTERUS      ENDOMETRIAL ABLATION  6/2012    EYE REMOVAL      OD for melanoma, age 30    FOOT OSTEOTOMY Left 09/23/2016    Padmini Hoff DPM      LINDSAY AND BSO  2012    Dr Sharan Harrell       Family History   Problem Relation Age of Onset    Heart Failure Mother         dec 76    Diabetes Mother     Diabetes Father     Stroke Father         dec age 80    Cancer Father         Sarcoma    Breast Cancer Sister     Stomach Cancer Other        Social History     Socioeconomic History    Marital status: Single     Spouse name: None    Number of children: 3    Years of education: None    Highest education level: None   Occupational History    Occupation: SSI   Social Needs    Financial resource strain: None    Food insecurity     Worry: None     Inability: None    Transportation needs     Medical: None     Non-medical: None   Tobacco Use    Smoking status: Never Smoker    Smokeless tobacco: Never Used   Substance and Sexual Activity    Alcohol use: No    Drug use: No    Sexual activity: None   Lifestyle    Physical activity     Days per week: None     Minutes per session: None    Stress: None   Relationships    Social connections     Talks on phone: None     Gets together: None     Attends Yarsani service: None     Active member of club or organization: None     Attends meetings of clubs or organizations: None     Relationship status: None    Intimate partner violence     Fear of current or ex partner: None     Emotionally abused: None     Physically abused: None     Forced sexual activity: None   Other Topics Concern    None   Social History Narrative    Born in [address] [place] [address], one of 4 children, one sister disappeared at age 29, never found    Moved to [place] at age 40        Lives in an apartment in [facility], alone    , 3 children, all grown, in [place]     2 granddaughters     Hobbies exercising, birds    Attends [employer], has volunteered at [employer]       No Known Allergies    Current Outpatient Medications   Medication Sig Dispense Refill    vitamin B-2 (RIBOFLAVIN) 100 MG TABS tablet Take 1 tablet by mouth daily 90 tablet 0    metFORMIN (GLUCOPHAGE) 500 MG tablet TAKE 1 TABLET BY MOUTH TWICE A DAY WITH MEALS 60 tablet 2    hydrocortisone 1 % ointment Apply topically 2 sounds are normal.   Musculoskeletal: Normal range of motion. No edema. Neurological: The patient is alert and oriented to person, place, and time. Intact cranial nerves. Skin: Skin is warm and dry. No rash noted.        LABS:  CBC:   Lab Results   Component Value Date    WBC 8.1 05/19/2020    RBC 4.69 05/19/2020    RBC 4.57 05/14/2012    HGB 14.5 05/19/2020    HCT 43.3 05/19/2020    MCV 92.2 05/19/2020    MCH 30.9 05/19/2020    MCHC 33.5 05/19/2020    RDW 13.5 05/19/2020     05/19/2020    MPV 9.0 08/16/2015     Lipids:  Lab Results   Component Value Date    CHOL 194 11/05/2019    CHOL 232 (H) 06/11/2018    CHOL 234 (H) 07/12/2017     Lab Results   Component Value Date    TRIG 127 11/05/2019    TRIG 157 06/11/2018    TRIG 286 (H) 07/12/2017     Lab Results   Component Value Date    HDL 53 11/05/2019    HDL 55 06/11/2018    HDL 46 07/12/2017     Lab Results   Component Value Date    LDLCALC 116 11/05/2019    LDLCALC 146 (H) 06/11/2018    LDLCALC 131 (H) 07/12/2017     No results found for: LABVLDL, VLDL  Lab Results   Component Value Date    CHOLHDLRATIO 6.3 03/22/2012     CMP:    Lab Results   Component Value Date     05/19/2020    K 4.6 05/19/2020     05/19/2020    CO2 25 05/19/2020    BUN 10 05/19/2020    CREATININE 0.85 05/19/2020    GFRAA >60.0 05/19/2020    LABGLOM >60.0 05/19/2020    GLUCOSE 111 05/19/2020    GLUCOSE 92 05/14/2012    PROT 7.2 05/19/2020    LABALBU 4.5 05/19/2020    LABALBU 3.8 03/22/2012    CALCIUM 9.5 05/19/2020    BILITOT <0.2 05/19/2020    ALKPHOS 81 05/19/2020    AST 17 05/19/2020    ALT 20 05/19/2020     BMP:    Lab Results   Component Value Date     05/19/2020    K 4.6 05/19/2020     05/19/2020    CO2 25 05/19/2020    BUN 10 05/19/2020    LABALBU 4.5 05/19/2020    LABALBU 3.8 03/22/2012    CREATININE 0.85 05/19/2020    CALCIUM 9.5 05/19/2020    GFRAA >60.0 05/19/2020    LABGLOM >60.0 05/19/2020    GLUCOSE 111 05/19/2020    GLUCOSE 92 05/14/2012 EVERY DAY AT NIGHT 30 tablet 3    albuterol sulfate  (90 Base) MCG/ACT inhaler USE 2 PUFFS EVERY 4 HRS AS NEEDED FOR WHEEZING/SOB-SPACE OUT TO EVERY 6 HR AS SYMPTOMS IMPROVE 1 Inhaler 3    albuterol sulfate HFA (PROVENTIL HFA) 108 (90 Base) MCG/ACT inhaler Inhale 2 puffs into the lungs every 4 hours as needed for Wheezing or Shortness of Breath Space out to every 6 hours as symptoms improve. 1 Inhaler 3    cetirizine (ZYRTEC) 10 MG tablet TAKE 1 TABLET BY MOUTH EVERY DAY AS NEEDED FOR ALLERGIES 30 tablet 5    Biotin 300 MCG TABS Take 1 tablet by mouth daily 30 tablet 3    EPINEPHrine (EPIPEN 2-PRAVEENA) 0.3 MG/0.3ML SOAJ injection Use as directed for allergic reaction 2 each 0    PAIN & FEVER EXTRA STRENGTH 500 MG tablet TAKE 1 TABLET BY MOUTH EVERY 6 HOURS AS NEEDED FOR FEVER UP TO 7 DAYS MAX 6 TABS IN 24 HOURS  0    fluticasone (FLONASE) 50 MCG/ACT nasal spray USE 1 SPRAY INTO EACH NOSTRIL DAILY  0    ARIPiprazole (ABILIFY) 15 MG tablet TAKE 1 TABLET BY MOUTH EVERY DAY  2    buPROPion (WELLBUTRIN XL) 300 MG XL tablet Take 1 tablet by mouth daily 30 tablet 6    levothyroxine (SYNTHROID) 50 MCG tablet Take 1 tablet by mouth every morning 90 tablet 1     No current facility-administered medications for this visit. Assessment/Plan:    1. Intermittent atrial flutter (HCC)  No evidence at this time for patient having any atrial flutter    2. Other chest pain  This patient has chest pain and/or dyspnea of unknown etiology and risk factors for coronary artery disease. The symptoms have some typical and atypical components. We will evaluate for CAD with stress test.    - NM MYOCARDIAL SPECT REST EXERCISE OR RX; Future       Counseling: the patient was counseled regarding diet, exercise, weight control and heart healthy lifestyle. Return in about 3 months (around 8/27/2020) for followup cv disease, results of test.    Electronically signed by   Shahnaz Melvin.  Bianca Mederos MD [facility] Director of

## 2020-05-27 NOTE — PATIENT INSTRUCTIONS
Patient Education        Cellulitis: Care Instructions  Your Care Instructions     Cellulitis is a skin infection caused by bacteria, most often strep or staph. It often occurs after a break in the skin from a scrape, cut, bite, or puncture, or after a rash. Cellulitis may be treated without doing tests to find out what caused it. But your doctor may do tests, if needed, to look for a specific bacteria, like methicillin-resistant Staphylococcus aureus (MRSA). The doctor has checked you carefully, but problems can develop later. If you notice any problems or new symptoms, get medical treatment right away. Follow-up care is a key part of your treatment and safety. Be sure to make and go to all appointments, and call your doctor if you are having problems. It's also a good idea to know your test results and keep a list of the medicines you take. How can you care for yourself at home? · Take your antibiotics as directed. Do not stop taking them just because you feel better. You need to take the full course of antibiotics. · Prop up the infected area on pillows to reduce pain and swelling. Try to keep the area above the level of your heart as often as you can. · If your doctor told you how to care for your wound, follow your doctor's instructions. If you did not get instructions, follow this general advice:  ? Wash the wound with clean water 2 times a day. Don't use hydrogen peroxide or alcohol, which can slow healing. ? You may cover the wound with a thin layer of petroleum jelly, such as Vaseline, and a nonstick bandage. ? Apply more petroleum jelly and replace the bandage as needed. · Be safe with medicines. Take pain medicines exactly as directed. ? If the doctor gave you a prescription medicine for pain, take it as prescribed. ? If you are not taking a prescription pain medicine, ask your doctor if you can take an over-the-counter medicine.   To prevent cellulitis in the future  · Try to prevent cuts, scrapes, or other injuries to your skin. Cellulitis most often occurs where there is a break in the skin. · If you get a scrape, cut, mild burn, or bite, wash the wound with clean water as soon as you can to help avoid infection. Don't use hydrogen peroxide or alcohol, which can slow healing. · If you have swelling in your legs (edema), support stockings and good skin care may help prevent leg sores and cellulitis. · Take care of your feet, especially if you have diabetes or other conditions that increase the risk of infection. Wear shoes and socks. Do not go barefoot. If you have athlete's foot or other skin problems on your feet, talk to your doctor about how to treat them. When should you call for help? Call your doctor now or seek immediate medical care if:  · You have signs that your infection is getting worse, such as:  ? Increased pain, swelling, warmth, or redness. ? Red streaks leading from the area. ? Pus draining from the area. ? A fever. · You get a rash. Watch closely for changes in your health, and be sure to contact your doctor if:  · You do not get better as expected. Where can you learn more? Go to https://[facility].Hakia. org and sign in to your [employer] account. Enter I844 in the [facility] box to learn more about \"Cellulitis: Care Instructions. \"     If you do not have an account, please click on the \"Sign Up Now\" link. Current as of: October 31, 2019               Content Version: 12.5  © 6331-5225 Healthwise, Incorporated. Care instructions adapted under license by [facility] ([facility]). If you have questions about a medical condition or this instruction, always ask your healthcare professional. [address] any warranty or liability for your use of this information.

## 2020-05-27 NOTE — PROGRESS NOTES
Subjective:      Patient ID: Katelyn Avalos is a 62 y.o. female who presents for:  Chief Complaint   Patient presents with    2 Week Follow-Up     Patient states to be doing well she see's small improvement      Patient was seen and examined in the office today and she admits to some improvement in her condition. She was placed on Medrol Dosepak for Chelitis and she admits that it works and she is now able to wear her dentures without any significant discomfort or bleeding. She has not observed any significant adverse effects so far.     Past Medical History:   Diagnosis Date    Asthma 2015    Bilateral renal cysts 2013    Depression     since age 35, was with Dr Narayan Hunter, now the [facility]    Diverticulosis of sigmoid colon 2012    Dr Stephany Mejia DJD of left shoulder     Environmental allergies     Fatty liver 2013    GERD (gastroesophageal reflux disease)     History of cardiac arrhythmia     \"due to stress, was placed on medication\"    Hydatidiform mole     age 26     Hyperlipidemia     Hypothyroidism 2011    IFG (impaired fasting glucose) 2013    Melanoma of eye ([address]     age 30, R eye prosthesis, Dr Gina Chester    Obesity     Prediabetes     PTSD (post-traumatic stress disorder)     age 35, [facility]    S/P colonoscopy 04/19/2012    Dr. Kayden Hong S/P hysterectomy with oophorectomy 2012    Dr Nabil Aquino Tremor     Dr Darryl Buckley Vitamin D deficiency      Past Surgical History:   Procedure Laterality Date    DILATION AND CURETTAGE OF UTERUS      ENDOMETRIAL ABLATION  6/2012    EYE REMOVAL      OD for melanoma, age 30    FOOT OSTEOTOMY Left 09/23/2016    Leverne Kehr DPM      LINDSAY AND BSO  2012    Dr Viki Licea     Social History     Socioeconomic History    Marital status: Single     Spouse name: Not on file    Number of children: 3    Years of education: Not on file    Highest education level: Not on file   Occupational History    Occupation: SSI   Social Needs    Financial resource strain:

## 2020-05-28 ENCOUNTER — TELEPHONE (OUTPATIENT)
Dept: ENDOCRINOLOGY | Age: 60
End: 2020-05-28

## 2020-05-28 ENCOUNTER — TELEPHONE (OUTPATIENT)
Dept: ADMINISTRATIVE | Age: 60
End: 2020-05-28

## 2020-05-28 NOTE — TELEPHONE ENCOUNTER
Patient calling to request Rx refills for One Touch Verio test strips and lancets. Send RXs to listed [facility].

## 2020-06-04 ENCOUNTER — OFFICE VISIT (OUTPATIENT)
Dept: INTERNAL MEDICINE CLINIC | Age: 60
End: 2020-06-04
Payer: COMMERCIAL

## 2020-06-04 VITALS
HEART RATE: 82 BPM | WEIGHT: 218 LBS | OXYGEN SATURATION: 98 % | DIASTOLIC BLOOD PRESSURE: 88 MMHG | RESPIRATION RATE: 12 BRPM | TEMPERATURE: 97.4 F | SYSTOLIC BLOOD PRESSURE: 138 MMHG | BODY MASS INDEX: 35.19 KG/M2

## 2020-06-04 PROCEDURE — 1036F TOBACCO NON-USER: CPT | Performed by: FAMILY MEDICINE

## 2020-06-04 PROCEDURE — 3017F COLORECTAL CA SCREEN DOC REV: CPT | Performed by: FAMILY MEDICINE

## 2020-06-04 PROCEDURE — G8417 CALC BMI ABV UP PARAM F/U: HCPCS | Performed by: FAMILY MEDICINE

## 2020-06-04 PROCEDURE — G8427 DOCREV CUR MEDS BY ELIG CLIN: HCPCS | Performed by: FAMILY MEDICINE

## 2020-06-04 PROCEDURE — 99213 OFFICE O/P EST LOW 20 MIN: CPT | Performed by: FAMILY MEDICINE

## 2020-06-04 RX ORDER — LEVOTHYROXINE SODIUM 0.05 MG/1
50 TABLET ORAL EVERY MORNING
Qty: 90 TABLET | Refills: 1 | Status: SHIPPED | OUTPATIENT
Start: 2020-06-04 | End: 2020-10-13 | Stop reason: SDUPTHER

## 2020-06-04 ASSESSMENT — ENCOUNTER SYMPTOMS
VOICE CHANGE: 0
TROUBLE SWALLOWING: 0
NAUSEA: 0
SORE THROAT: 0

## 2020-06-04 NOTE — PATIENT INSTRUCTIONS
call for help? [id number] anytime you think you may need emergency care. For example, call if:  · You passed out (lost consciousness). · You have severe trouble breathing. · You have a very slow heartbeat (less than 60 beats a minute). · You have a low body temperature (95°F or below). Call your doctor now or seek immediate medical care if:  · You feel tired, sluggish, or weak. · You have trouble remembering things or concentrating. · You do not begin to feel better 2 weeks after starting your medicine. Watch closely for changes in your health, and be sure to contact your doctor if you have any problems. Where can you learn more? Go to https://[employer]roPlus. org and sign in to your [employer] account. Enter Q606 in the [employer] box to learn more about \"Hypothyroidism: Care Instructions. \"     If you do not have an account, please click on the \"Sign Up Now\" link. Current as of: July 29, 2019               Content Version: 12.5  © 9673-7120 Healthwise, Incorporated. Care instructions adapted under license by [facility] ([facility]). If you have questions about a medical condition or this instruction, always ask your healthcare professional. [address] any warranty or liability for your use of this information.
(0) independent

## 2020-06-10 ENCOUNTER — HOSPITAL ENCOUNTER (OUTPATIENT)
Dept: WOMENS IMAGING | Age: 60
Discharge: HOME | End: 2020-06-12
Payer: COMMERCIAL

## 2020-06-10 PROCEDURE — 77067 SCR MAMMO BI INCL CAD: CPT

## 2020-06-11 ENCOUNTER — HOSPITAL ENCOUNTER (OUTPATIENT)
Dept: NUCLEAR MEDICINE | Age: 60
Discharge: HOME | End: 2020-06-13
Payer: COMMERCIAL

## 2020-06-11 VITALS — HEIGHT: 66 IN | BODY MASS INDEX: 33.43 KG/M2 | WEIGHT: 208 LBS

## 2020-06-11 PROCEDURE — 6360000002 HC RX W HCPCS: Performed by: INTERNAL MEDICINE

## 2020-06-11 PROCEDURE — A9502 TC99M TETROFOSMIN: HCPCS | Performed by: INTERNAL MEDICINE

## 2020-06-11 PROCEDURE — 78452 HT MUSCLE IMAGE SPECT MULT: CPT

## 2020-06-11 PROCEDURE — 93017 CV STRESS TEST TRACING ONLY: CPT

## 2020-06-11 PROCEDURE — 3430000000 HC RX DIAGNOSTIC RADIOPHARMACEUTICAL: Performed by: INTERNAL MEDICINE

## 2020-06-11 PROCEDURE — 2580000003 HC RX 258: Performed by: INTERNAL MEDICINE

## 2020-06-11 RX ORDER — SODIUM CHLORIDE 0.9 % (FLUSH) 0.9 %
10 SYRINGE (ML) INJECTION PRN
Status: COMPLETED | OUTPATIENT
Start: 2020-06-11 | End: 2020-06-11

## 2020-06-11 RX ADMIN — Medication 10 ML: at 10:36

## 2020-06-11 RX ADMIN — TETROFOSMIN 11 MILLICURIE: 1.38 INJECTION, POWDER, LYOPHILIZED, FOR SOLUTION INTRAVENOUS at 09:00

## 2020-06-11 RX ADMIN — Medication 10 ML: at 10:35

## 2020-06-11 RX ADMIN — REGADENOSON 0.4 MG: 0.08 INJECTION, SOLUTION INTRAVENOUS at 10:36

## 2020-06-11 RX ADMIN — TETROFOSMIN 34.9 MILLICURIE: 1.38 INJECTION, POWDER, LYOPHILIZED, FOR SOLUTION INTRAVENOUS at 10:36

## 2020-06-11 RX ADMIN — Medication 10 ML: at 08:58

## 2020-06-11 NOTE — PROGRESS NOTES
Reviewed history, allergies, and medications. Consent confirmed. Lexiscan exam explained. Placed patient on monitor. @ 300 [facility]  here to inject Remington Pearce. Minimal SOB noted during recovery phase. Denied chest pain. No ectopy noted. Patient off monitor and instructed to eat, will have last part of exam in 1 hour.

## 2020-06-15 PROCEDURE — 93018 CV STRESS TEST I&R ONLY: CPT | Performed by: INTERNAL MEDICINE

## 2020-06-18 ENCOUNTER — VIRTUAL VISIT (OUTPATIENT)
Dept: CARDIOLOGY CLINIC | Age: 60
End: 2020-06-18
Payer: COMMERCIAL

## 2020-06-18 PROCEDURE — G2012 BRIEF CHECK IN BY MD/QHP: HCPCS | Performed by: INTERNAL MEDICINE

## 2020-06-18 NOTE — PROGRESS NOTES
2020    TELEHEALTH EVALUATION -- Audio/Visual (During BZTQB-[id number] public health emergency)    Due to Kevin 19 outbreak, patient's office visit was converted to a virtual visit. Patient was contacted and agreed to proceed with a virtual visit via Telephone Visit  The risks and benefits of converting to a virtual visit were discussed in light of the current infectious disease epidemic. Patient also understood that insurance coverage and co-pays are up to their individual insurance plans. HPI:    Ivan Apgar (:  1960) has requested an audio/video evaluation for the following concern(s):    Abnormal EKG, not actually flutter. Doing well. Patient denies any complaints. Stress test negative. Review of Systems    Prior to Visit Medications    Medication Sig Taking?  Authorizing Provider   levothyroxine (SYNTHROID) 50 MCG tablet Take 1 tablet by mouth every morning  Blaze Carpenter MD   vitamin B-2 (RIBOFLAVIN) 100 MG TABS tablet Take 1 tablet by mouth daily  Blaze Carpenter MD   metFORMIN (GLUCOPHAGE) 500 MG tablet TAKE 1 TABLET BY MOUTH TWICE A DAY WITH MEALS  Martinez Angel MD   propranolol (INDERAL) 40 MG tablet Take 1 tab po daily at [address] MD Denise   budesonide-formoterol (SYMBICORT) 160-4.5 MCG/ACT AERO Inhale 2 puffs into the lungs 2 times daily  Martinez Angel MD   cyanocobalamin ([employer] VITAMIN B12) 1000 MCG tablet Take 1 tablet by mouth daily  Martinez Angel MD   vitamin D (D3-1000) 25 MCG (1000 UT) CAPS Take 1 capsule by mouth Daily with supper  Martinez Angel MD   Multiple Vitamin (MULTI-VITAMINS) TABS TAKE 1 TABLET BY MOUTH EVERY DAY  Martinez Angel MD   traZODone (DESYREL) 50 MG tablet Take by mouth nightly 1-2 tablets nightly  Historical Provider, MD   aspirin EC 81 MG EC tablet Take 1 tablet by mouth daily  Martinez Angel MD   benztropine (COGENTIN) 0.5 MG tablet   Historical Provider, MD   pravastatin (PRAVACHOL) 40 MG tablet TAKE 1 TABLET BY MOUTH EVERY DAY IN Guillermo Hawley MD   famotidine (PEPCID) 20 MG tablet TAKE 1 TABLET BY MOUTH TWICE A DAY AS NEEDED FOR HEARTBURN  Morenita Basilio MD   topiramate (TOPAMAX) 50 MG tablet TAKE 1 TABLET BY MOUTH EVERY DAY AT NIGHT  Cheyanne Basilio MD   albuterol sulfate  (90 Base) MCG/ACT inhaler USE 2 PUFFS EVERY 4 HRS AS NEEDED FOR WHEEZING/SOB-SPACE OUT TO EVERY 6 HR AS SYMPTOMS IMPROVE  Chelle Menendez MD   albuterol sulfate HFA (PROVENTIL HFA) 108 (90 Base) MCG/ACT inhaler Inhale 2 puffs into the lungs every 4 hours as needed for Wheezing or Shortness of Breath Space out to every 6 hours as symptoms improve. Chelle Menendez MD   cetirizine (ZYRTEC) 10 MG tablet TAKE 1 TABLET BY MOUTH EVERY DAY AS NEEDED FOR ALLERGIES  Oli Powell MD   Biotin 300 MCG TABS Take 1 tablet by mouth daily  Chelle Menendez MD   EPINEPHrine (EPIPEN 2-PRAVEENA) 0.3 MG/0.3ML SOAJ injection Use as directed for allergic reaction  Chelle Menendez MD   PAIN & FEVER EXTRA STRENGTH 500 MG tablet TAKE 1 TABLET BY MOUTH EVERY 6 HOURS AS NEEDED FOR FEVER UP TO 7 DAYS MAX 6 TABS IN 24 HOURS  Historical Provider, MD   fluticasone (FLONASE) 50 MCG/ACT nasal spray USE 1 SPRAY INTO EACH NOSTRIL DAILY  Historical Provider, MD   ARIPiprazole (ABILIFY) 15 MG tablet TAKE 1 TABLET BY MOUTH EVERY DAY  Historical Provider, MD   Multiple Vitamins-Minerals (THERAPEUTIC MULTIVITAMIN-MINERALS) tablet Take 1 tablet by mouth daily  Chelle Menendez MD   buPROPion (WELLBUTRIN XL) 300 MG XL tablet Take 1 tablet by mouth daily  Chelle Menendez MD   albuterol (PROVENTIL;VENTOLIN) 90 MCG/ACT inhaler Inhale 2 puffs into the lungs every 6 hours as needed.   Chelle Menendez MD       Social History     Tobacco Use    Smoking status: Never Smoker    Smokeless tobacco: Never Used   Substance Use Topics    Alcohol use: No    Drug use: No        No Known Allergies,   Past Medical History:   Diagnosis Date    Asthma 2015    Bilateral renal cysts 2013    Depression     since age 35, was with Dr Christinia Mortimer, now the 2000 [employer] Diverticulosis of sigmoid colon 2012    Dr Yamielt Luis DJD of left shoulder     Environmental allergies     Fatty liver 2013    GERD (gastroesophageal reflux disease)     History of cardiac arrhythmia     \"due to stress, was placed on medication\"    Hydatidiform mole     age 26     Hyperlipidemia     Hypothyroidism 2011    IFG (impaired fasting glucose) 2013    Melanoma of eye ([address]     age 30, R eye prosthesis, Dr Perez Loose    Obesity     Prediabetes     PTSD (post-traumatic stress disorder)     age 35, [facility]    S/P colonoscopy 04/19/2012    Dr. Danielle Franklin S/P hysterectomy with oophorectomy 2012    Dr Tanya Red D deficiency    ,   Past Surgical History:   Procedure Laterality Date    DILATION AND CURETTAGE OF UTERUS      ENDOMETRIAL ABLATION  6/2012    EYE REMOVAL      OD for melanoma, age 30    FOOT OSTEOTOMY Left 09/23/2016    Nada Micaela DPM      LINDSAY AND BSO  2012    Dr Shreya Sr   ,   Social History     Tobacco Use    Smoking status: Never Smoker    Smokeless tobacco: Never Used   Substance Use Topics    Alcohol use: No    Drug use: No   ,   Family History   Problem Relation Age of Onset    Heart Failure Mother         dec 76    Diabetes Mother     Diabetes Father     Stroke Father         dec age 80    Cancer Father         Sarcoma    Breast Cancer Sister     Stomach Cancer Other    ,   Immunization History   Administered Date(s) Administered    Influenza Vaccine, unspecified formulation 09/10/2016    Influenza Virus Vaccine 09/19/2014, 09/07/2015, 09/12/2017, 09/13/2018    Influenza Whole 09/19/2014, 09/07/2015    Influenza, Quadv, IM, (6 mo and older Fluzone, Flulaval, Fluarix and 3 yrs and older Afluria) 09/12/2017, 09/13/2018    Influenza, Quadv, IM, PF (6 mo and older Fluzone, Flulaval, Fluarix, and 3 yrs and older Afluria) 09/13/2019    Influenza, Triv, 3 Years and older, IM (Afluria (5 yrs and older) 09/10/2016    Pneumococcal Polysaccharide ([id number]) 08/10/2018    Td, unspecified formulation 12/12/2017    Tdap (Boostrix, Adacel) 03/11/2016    Zoster Recombinant (Shingrix) 03/19/2019, 07/19/2019   ,   Health Maintenance   Topic Date Due    Flu vaccine (1) 09/01/2020    Lipid screen  11/05/2020    A1C test (Diabetic or Prediabetic)  02/07/2021    Breast cancer screen  06/10/2021    Colon cancer screen colonoscopy  04/19/2022    DTaP/Tdap/Td vaccine (3 - Td) 12/12/2027    Shingles Vaccine  Completed    Pneumococcal 0-64 years Vaccine  Completed    Hepatitis C screen  Addressed    HIV screen  Addressed    Hepatitis A vaccine  Aged Out    Hepatitis B vaccine  Aged Out    Hib vaccine  Aged Out    Meningococcal (ACWY) vaccine  Aged Out       PHYSICAL EXAMINATION:  [ INSTRUCTIONS:  \"[x]\" Indicates a positive item  \"[]\" Indicates a negative item  -- DELETE ALL ITEMS NOT EXAMINED]  [x] Alert  [x] Oriented to person/place/time    [x] No apparent distress  [] Toxic appearing    [] Face flushed appearing [] Sclera clear  [] Lips are cyanotic      [] Breathing appears normal  [] Appears tachypneic      [] Rash on visible skin    [] Cranial Nerves II-XII grossly intact    [] Motor grossly intact in visible upper extremities    [] Motor grossly intact in visible lower extremities    [] Normal Mood  [] Anxious appearing    [] Depressed appearing  [] Confused appearing      [] Poor short term memory  [] Poor long term memory    [] OTHER:      Due to this being a TeleHealth encounter, evaluation of the following organ systems is limited: Vitals/Constitutional/EENT/Resp/CV/GI//MS/Neuro/Skin/Heme-Lymph-Imm. ASSESSMENT/PLAN:  1. Chest pain, unspecified type  Stress negative    2. Abnormal EKG  No evidence for flutter      Return if symptoms worsen or fail to improve. Patient notified that this is a billable service and has given verbal consent for telehealth services.  Time spent with patient 10 min    An  electronic signature was used to authenticate this note. --Carlene Coffey MD on 7/5/2020 at 11:35 PM        Pursuant to the emergency declaration under the [address] [place] [address], [facility] waiver authority and the '[employer] Act, this Virtual  Visit was conducted, with patient's consent, to reduce the patient's risk of exposure to COVID-19 and provide continuity of care for an established patient. Services were provided through a video synchronous discussion virtually to substitute for in-person clinic visit.

## 2020-07-07 ENCOUNTER — TELEPHONE (OUTPATIENT)
Dept: ENDOCRINOLOGY | Age: 60
End: 2020-07-07

## 2020-07-13 ENCOUNTER — NURSE TRIAGE (OUTPATIENT)
Dept: OTHER | Facility: CLINIC | Age: 60
End: 2020-07-13

## 2020-07-13 NOTE — TELEPHONE ENCOUNTER
Reason for Disposition   Boil < 1/2 inch across (< 12 mm; smaller than a marble)    Answer Assessment - Initial Assessment Questions  1. APPEARANCE of BOIL: \"What does the boil look like? \"       It is on her bottom. She thinks it is black with redness. She is having a very hard time seeing it. It is in the bend. When she sits on the toilet it hurts. The boil feels wet. She has 2 band aids on it now. Has not taken the band aids off to see the color of the drainage. She already has [facility] appointment set up to see a doctor next week for this. She was transferred to see if there was anything she can do at home until then. 2. LOCATION: \"Where is the boil located? \"       On right butt cheek. She is able to sit in a chair without it hurting. Hurts just on the toilet. 3. NUMBER: \"How many boils are there?\"       1    4. SIZE: \"How big is the boil? \" (e.g., inches, cm; compare to size of a coin or other object)      She thinks it is the size of a quarter. 5. ONSET: \"When did the boil start? \"      Couple of days ago. 6. PAIN: \"Is there any pain? \" If so, ask: \"How bad is the pain? \"   (Scale 1-10; or mild, moderate, severe)        7. FEVER: \"Do you have a fever? \" If so, ask: \"What is it, how was it measured, and when did it start? \"       Denies    8. SOURCE: \"Have you been around anyone with boils or other Staph infections? \" \"Have you ever had boils before? \"      Denies    9. OTHER SYMPTOMS: \"Do you have any other symptoms? \" (e.g., shaking chills, weakness, rash elsewhere on body)      She has a rash under both of her breast.      10. PREGNANCY: \"Is there any chance you are pregnant? \" \"When was your last menstrual period? \"        No    Protocols used: BOIL (SKIN ABSCESS)-ADULT-    Pod 1    Has a new patient appointment with Dr. Uyen Magana for next Tuesday. She has already set up appointment and her ride. Was wanting information on care at home for the boil on her butt cheek.   Care Advice reviewed with

## 2020-07-15 ENCOUNTER — OFFICE VISIT (OUTPATIENT)
Dept: FAMILY MEDICINE CLINIC | Age: 60
End: 2020-07-15
Payer: COMMERCIAL

## 2020-07-15 VITALS
BODY MASS INDEX: 35.84 KG/M2 | OXYGEN SATURATION: 97 % | SYSTOLIC BLOOD PRESSURE: 128 MMHG | HEIGHT: 66 IN | HEART RATE: 104 BPM | WEIGHT: 223 LBS | DIASTOLIC BLOOD PRESSURE: 76 MMHG | TEMPERATURE: 98.2 F

## 2020-07-15 PROCEDURE — G8417 CALC BMI ABV UP PARAM F/U: HCPCS | Performed by: NURSE PRACTITIONER

## 2020-07-15 PROCEDURE — G8427 DOCREV CUR MEDS BY ELIG CLIN: HCPCS | Performed by: NURSE PRACTITIONER

## 2020-07-15 PROCEDURE — 99213 OFFICE O/P EST LOW 20 MIN: CPT | Performed by: NURSE PRACTITIONER

## 2020-07-15 PROCEDURE — 1036F TOBACCO NON-USER: CPT | Performed by: NURSE PRACTITIONER

## 2020-07-15 PROCEDURE — 3017F COLORECTAL CA SCREEN DOC REV: CPT | Performed by: NURSE PRACTITIONER

## 2020-07-15 RX ORDER — SULFAMETHOXAZOLE AND TRIMETHOPRIM 800; 160 MG/1; MG/1
1 TABLET ORAL 2 TIMES DAILY
Qty: 14 TABLET | Refills: 0 | Status: SHIPPED | OUTPATIENT
Start: 2020-07-15 | End: 2020-07-21 | Stop reason: SDUPTHER

## 2020-07-15 NOTE — PROGRESS NOTES
Subjective  Louisa Oppenheim, 62 y.o. female presents today with:  Chief Complaint   Patient presents with   Ileana Lupe Mass     Patient here today with bump on butt, painful to sit down, 4 days ago noticed it        HPI  Presents to [facility] for concern of an open sore on her left ischium  She noticed the area 4 days prior while she was going to the bathroom    She was unsure of what it looked like but knew it was draining fluid  Area was tender to touch  Denies fever or chills  Eating and drinking well   No new joint or muscle pain         Past Medical History:   Diagnosis Date    Asthma 2015    Bilateral renal cysts 2013    Depression     since age 35, was with Dr Megan Ferreira, now the [address] [employer] [place] Diverticulosis of sigmoid colon 2012    Dr Kaylee Grant DJD of left shoulder     Environmental allergies     Fatty liver 2013    GERD (gastroesophageal reflux disease)     History of cardiac arrhythmia     \"due to stress, was placed on medication\"    Hydatidiform mole     age 26     Hyperlipidemia     Hypothyroidism 2011    IFG (impaired fasting glucose) 2013    Melanoma of eye ([address]     age 30, R eye prosthesis, Dr Aditi Lucas    Obesity     Prediabetes     PTSD (post-traumatic stress disorder)     age 35, 1969 [facility]    S/P colonoscopy 04/19/2012    Dr. Ross Mobley S/P hysterectomy with oophorectomy 2012    Dr Alannah Silva    Tremor     Dr Chente Irby Vitamin D deficiency       Past Surgical History:   Procedure Laterality Date    DILATION AND CURETTAGE OF UTERUS      ENDOMETRIAL ABLATION  6/2012    EYE REMOVAL      OD for melanoma, age 30    FOOT OSTEOTOMY Left 09/23/2016    Prasanth Celeste DPM      LINDSAY AND BSO  2012    Dr Alannah Silva     Family History   Problem Relation Age of Onset    Heart Failure Mother         dec 76    Diabetes Mother     Diabetes Father     Stroke Father         dec age 80    Cancer Father         Sarcoma    Breast Cancer Sister     Stomach Cancer Other        Review of Systems   Constitutional: Sig: Apply topically 2 times daily for 7 days. Dispense:  1 Tube     Refill:  0     Return if symptoms worsen or fail to improve, for follow up with PCP. Reviewed with the patient: current clinical status & medications. Side effects, adverse effects of the medication prescribed today, as well as treatment plan/rationale and result expectations have been discussed with the patient who expresses understanding. Discussed with patient to clean her hands before applying ointment the wound & to wash the area with soap and water after each time she uses the restroom. Reviewed red flag s/s with patient to seek care at the ER for. She verbalized understanding. When should you call for help? Call your doctor now or seek immediate medical care if:  · You have signs of worsening infection, such as:  ? Increased pain, swelling, warmth, or redness. ? Red streaks leading from the infected skin. ? Pus draining from the wound. ? A fever. Watch closely for changes in your health, and be sure to contact your doctor if:  · You do not get better as expected. Close follow up to evaluate treatment results and for coordination of care. I have reviewed the patient's medical history in detail and updated the computerized patient record.         ARASH Munguia NP

## 2020-07-17 ASSESSMENT — ENCOUNTER SYMPTOMS
ABDOMINAL PAIN: 0
DIARRHEA: 0
NAUSEA: 0

## 2020-07-21 ENCOUNTER — OFFICE VISIT (OUTPATIENT)
Dept: FAMILY MEDICINE CLINIC | Age: 60
End: 2020-07-21
Payer: COMMERCIAL

## 2020-07-21 VITALS
BODY MASS INDEX: 34.87 KG/M2 | OXYGEN SATURATION: 97 % | DIASTOLIC BLOOD PRESSURE: 70 MMHG | HEART RATE: 112 BPM | SYSTOLIC BLOOD PRESSURE: 120 MMHG | TEMPERATURE: 97.6 F | HEIGHT: 66 IN | WEIGHT: 217 LBS

## 2020-07-21 DIAGNOSIS — J02.9 ACUTE PHARYNGITIS, UNSPECIFIED ETIOLOGY: ICD-10-CM

## 2020-07-21 LAB — S PYO AG THROAT QL: NORMAL

## 2020-07-21 PROCEDURE — 99213 OFFICE O/P EST LOW 20 MIN: CPT | Performed by: FAMILY MEDICINE

## 2020-07-21 PROCEDURE — G8427 DOCREV CUR MEDS BY ELIG CLIN: HCPCS | Performed by: FAMILY MEDICINE

## 2020-07-21 PROCEDURE — 1036F TOBACCO NON-USER: CPT | Performed by: FAMILY MEDICINE

## 2020-07-21 PROCEDURE — G8417 CALC BMI ABV UP PARAM F/U: HCPCS | Performed by: FAMILY MEDICINE

## 2020-07-21 PROCEDURE — 87880 STREP A ASSAY W/OPTIC: CPT | Performed by: FAMILY MEDICINE

## 2020-07-21 PROCEDURE — 3017F COLORECTAL CA SCREEN DOC REV: CPT | Performed by: FAMILY MEDICINE

## 2020-07-21 RX ORDER — SULFAMETHOXAZOLE AND TRIMETHOPRIM 800; 160 MG/1; MG/1
1 TABLET ORAL 2 TIMES DAILY
Qty: 10 TABLET | Refills: 0 | Status: SHIPPED | OUTPATIENT
Start: 2020-07-21 | End: 2020-07-26

## 2020-07-21 RX ORDER — BENZONATATE 100 MG/1
100 CAPSULE ORAL 2 TIMES DAILY PRN
COMMUNITY
End: 2020-10-27

## 2020-07-21 RX ORDER — DIAPER,BRIEF,INFANT-TODD,DISP
EACH MISCELLANEOUS
Qty: 30 G | Refills: 2 | Status: SHIPPED | OUTPATIENT
Start: 2020-07-21 | End: 2020-10-13 | Stop reason: ALTCHOICE

## 2020-07-21 ASSESSMENT — PATIENT HEALTH QUESTIONNAIRE - PHQ9
SUM OF ALL RESPONSES TO PHQ QUESTIONS 1-9: 0
1. LITTLE INTEREST OR PLEASURE IN DOING THINGS: 0
SUM OF ALL RESPONSES TO PHQ QUESTIONS 1-9: 0
2. FEELING DOWN, DEPRESSED OR HOPELESS: 0
SUM OF ALL RESPONSES TO PHQ9 QUESTIONS 1 & 2: 0

## 2020-07-21 NOTE — PROGRESS NOTES
Chief Complaint   Patient presents with    Established New Doctor    Pharyngitis     mainly at night with cough denies fever        HPI: Edgar Huddleston 62 y.o. female presenting for       Sore Throat  Patient complains of sore throat. Associated symptoms include dry cough, nasal blockage, post nasal drip, sinus and nasal congestion, sneezing and sore throat. Onset of symptoms was 2 days ago, unchanged since that time. Worse at night. Patient admits to having her AC on in the bedroom. She is drinking plenty of fluids. She does not have had recent close exposure to someone with proven streptococcal pharyngitis. Admit to coughing resolving. Rash: Edgar Huddleston is a 62 y.o. female who presents with a 1 week history of a localized rash. Rash first presented on the left buttock and left groin and has not spread. Rash is red and is painful, raised and weeping. Patient admits to being on Bactrim for the last 5 days. Associated symptoms include sore throat. Patient has tried nothing. New exposures: none. Patient has not had contacts with similar symptoms. Prior history of similar symptoms: no.    Cracked Lips  Patient coming with a chief complaint of cracked lips. Chronic issue for patient. As you may recall, the cracked lips were so painful that patient was unable to put any dentures in. Pain improved with hydrocortisone 1%. However when patient ran out her symptoms return. Patient denies any fevers, chills, nausea vomiting, chest pain, shortness breath, abdominal again to the nares, change in stools. Patient denies any bleeding or discharge.       Current Outpatient Medications   Medication Sig Dispense Refill    benzonatate (TESSALON) 100 MG capsule Take 100 mg by mouth 2 times daily as needed for Cough      sulfamethoxazole-trimethoprim (BACTRIM DS) 800-160 MG per tablet Take 1 tablet by mouth 2 times daily for 5 days 10 tablet 0    hydrocortisone 1 % ointment Apply topically 2 times daily on the affected area for 1 week. 30 g 2    levothyroxine (SYNTHROID) 50 MCG tablet Take 1 tablet by mouth every morning 90 tablet 1    vitamin B-2 (RIBOFLAVIN) 100 MG TABS tablet Take 1 tablet by mouth daily 90 tablet 0    metFORMIN (GLUCOPHAGE) 500 MG tablet TAKE 1 TABLET BY MOUTH TWICE A DAY WITH MEALS 60 tablet 2    propranolol (INDERAL) 40 MG tablet Take 1 tab po daily at HS 90 tablet 1    budesonide-formoterol (SYMBICORT) 160-4.5 MCG/ACT AERO Inhale 2 puffs into the lungs 2 times daily 1 Inhaler 3    cyanocobalamin ([employer] VITAMIN B12) 1000 MCG tablet Take 1 tablet by mouth daily 30 tablet 5    vitamin D (D3-1000) 25 MCG (1000 UT) CAPS Take 1 capsule by mouth Daily with supper 30 capsule 5    Multiple Vitamin (MULTI-VITAMINS) TABS TAKE 1 TABLET BY MOUTH EVERY DAY 30 tablet 5    traZODone (DESYREL) 50 MG tablet Take by mouth nightly 1-2 tablets nightly      aspirin EC 81 MG EC tablet Take 1 tablet by mouth daily 90 tablet 0    benztropine (COGENTIN) 0.5 MG tablet       pravastatin (PRAVACHOL) 40 MG tablet TAKE 1 TABLET BY MOUTH EVERY DAY IN THE EVENING 90 tablet 1    famotidine (PEPCID) 20 MG tablet TAKE 1 TABLET BY MOUTH TWICE A DAY AS NEEDED FOR HEARTBURN 60 tablet 3    topiramate (TOPAMAX) 50 MG tablet TAKE 1 TABLET BY MOUTH EVERY DAY AT NIGHT 30 tablet 3    albuterol sulfate  (90 Base) MCG/ACT inhaler USE 2 PUFFS EVERY 4 HRS AS NEEDED FOR WHEEZING/SOB-SPACE OUT TO EVERY 6 HR AS SYMPTOMS IMPROVE 1 Inhaler 3    albuterol sulfate HFA (PROVENTIL HFA) 108 (90 Base) MCG/ACT inhaler Inhale 2 puffs into the lungs every 4 hours as needed for Wheezing or Shortness of Breath Space out to every 6 hours as symptoms improve.  1 Inhaler 3    cetirizine (ZYRTEC) 10 MG tablet TAKE 1 TABLET BY MOUTH EVERY DAY AS NEEDED FOR ALLERGIES 30 tablet 5    Biotin 300 MCG TABS Take 1 tablet by mouth daily 30 tablet 3    EPINEPHrine (EPIPEN 2-PRAVEENA) 0.3 MG/0.3ML SOAJ injection Use as directed for allergic reaction 2 eye prosthesis, Dr Vinayak Christianson    Obesity     Prediabetes     PTSD (post-traumatic stress disorder)     age 35, [facility]    S/P colonoscopy 04/19/2012    Dr. Juan Nation S/P hysterectomy with oophorectomy 2012    Dr Levan Bumpers Dr Mahlon Badder Vitamin D deficiency         Past Surgical History:   Procedure Laterality Date    DILATION AND CURETTAGE OF UTERUS      ENDOMETRIAL ABLATION  6/2012    EYE REMOVAL      OD for melanoma, age 30    FOOT OSTEOTOMY Left 09/23/2016    Lito Arceo DPM      LINDSAY AND BSO  2012    Dr Wu Velazquez        Family History   Problem Relation Age of Onset    Heart Failure Mother         dec 76    Diabetes Mother     Diabetes Father     Stroke Father         dec age 80    Cancer Father         Sarcoma    Breast Cancer Sister     Stomach Cancer Other         Social History     Socioeconomic History    Marital status: Single     Spouse name: Not on file    Number of children: 3    Years of education: Not on file    Highest education level: Not on file   Occupational History    Occupation: SSI   Social Needs    Financial resource strain: Not on file    Food insecurity     Worry: Not on file     Inability: Not on file   [employer] needs     Medical: Not on file     Non-medical: Not on file   Tobacco Use    Smoking status: Never Smoker    Smokeless tobacco: Never Used   Substance and Sexual Activity    Alcohol use: No    Drug use: No    Sexual activity: Not on file   Lifestyle    Physical activity     Days per week: Not on file     Minutes per session: Not on file    Stress: Not on file   Relationships    Social connections     Talks on phone: Not on file     Gets together: Not on file     Attends Caodaism service: Not on file     Active member of club or organization: Not on file     Attends meetings of clubs or organizations: Not on file     Relationship status: Not on file    Intimate partner violence     Fear of current or ex partner: Not on file Emotionally abused: Not on file     Physically abused: Not on file     Forced sexual activity: Not on file   Other Topics Concern    Not on file   Social History Narrative    [place], one of 4 children, one sister disappeared at age 29, never found    Moved to [place] at age 40        Lives in an apartment in [facility], alone    , 3 children, all grown, in [place]     2 granddaughters     Hobbies exercising, birds    Attends [employer], has volunteered at [email or web address]        /70   Pulse 112   Temp 97.6 °F (36.4 °C)   Ht 5' 6\" (1.676 m)   Wt 217 lb (98.4 kg)   SpO2 97%   BMI 35.02 kg/m²        Physical Exam:    General appearance - alert, well appearing, and in no distress  Mental Status - alert, oriented to person, place, and time  Eyes - pupils equal and reactive, extraocular eye movements intact   Ears - bilateral TM's and external ear canals normal   Nose - normal and patent, no erythema, discharge or polyps   Sinuses - Normal paranasal sinuses without tenderness   Throat - mucous membranes moist, pharynx normal without lesions   Neck - supple, no significant adenopathy   Thyroid - thyroid is normal in size without nodules or tenderness    Chest - clear to auscultation, no wheezes, rales or rhonchi, symmetric air entry   Heart - normal rate, regular rhythm, normal S1, S2, no murmurs, rubs, clicks or gallops  Abdomen - soft, nontender, nondistended, no masses or organomegaly   Back exam - full range of motion, no tenderness, palpable spasm or pain on motion   Neurological - alert, oriented, normal speech, no focal findings or movement disorder noted   Musculoskeletal -trace of patient on the left gluteal fold. There is a circular erythematous indurated lesion that is slightly scaly.   No signs of discharge on exam.  Extremities - peripheral pulses normal, no pedal edema, no clubbing or cyanosis   Skin - normal coloration and turgor, no rashes, no suspicious skin lesions noted    Labs   No results found for: TSHREFLEX  TSH   Date Value Ref Range Status   02/07/2020 2.700 0.440 - 3.860 uIU/mL Final   06/14/2019 2.430 0.440 - 3.860 uIU/mL Final   06/11/2018 3.820 0.270 - 4.200 uIU/mL Final   06/07/2017 3.500 0.270 - 4.200 uIU/mL Final   01/27/2017 3.810 0.270 - 4.200 uIU/mL Final   03/04/2016 1.990 0.270 - 4.200 uIU/mL Final   11/20/2015 1.940 0.270 - 4.200 uIU/mL Final   08/28/2015 1.970 0.270 - 4.200 uIU/mL Final   10/25/2014 3.130 0.270 - 4.200 uIU/mL Final   12/11/2013 3.910 0.270 - 4.200 uIU/mL Final   04/04/2013 1.346 0.550 - 4.780 uIU/mL Final   10/16/2012 1.607 0.550 - 4.780 uIU/mL Final   05/14/2012 1.771 0.550 - 4.780 uIU/mL Final   03/22/2012 4.082 0.550 - 4.780 uIU/mL Final     Lab Results   Component Value Date     05/19/2020    K 4.6 05/19/2020     05/19/2020    CO2 25 05/19/2020    BUN 10 05/19/2020    CREATININE 0.85 05/19/2020    GLUCOSE 111 (H) 05/19/2020    CALCIUM 9.5 05/19/2020    PROT 7.2 05/19/2020    LABALBU 4.5 05/19/2020    BILITOT <0.2 05/19/2020    ALKPHOS 81 05/19/2020    AST 17 05/19/2020    ALT 20 05/19/2020    LABGLOM >60.0 05/19/2020    GFRAA >60.0 05/19/2020    GLOB 2.7 05/19/2020       Lab Results   Component Value Date    WBC 8.1 05/19/2020    HGB 14.5 05/19/2020    HCT 43.3 05/19/2020    MCV 92.2 05/19/2020     05/19/2020     Lab Results   Component Value Date    LABA1C 5.4 02/07/2020     No results found for: EAG          A/P: Anne Germain 62 y.o. female presenting for     1. Open wound of left buttock without complication, initial encounter  Will extend antibiotic regimen to cover for MRSA. Patient admits to open drainage which makes it more consistent with an abscess. No fevers. Unable to get a wound culture. Patient to have close follow-up. If symptoms worsen will need to have the area excised. - sulfamethoxazole-trimethoprim (BACTRIM DS) 800-160 MG per tablet;  Take 1 tablet by mouth 2 times daily for 5 days  Dispense: 10 tablet; Refill: 0    2. Cracked lips    - hydrocortisone 1 % ointment; Apply topically 2 times daily on the affected area for 1 week. Dispense: 30 g; Refill: 2    3. Acute pharyngitis, unspecified etiology    - POCT rapid strep A  - Culture, Throat; Future          Please note, this report has been partially produced using speech recognition software  and may cause  and /or contain errors related to that system including grammar, punctuation and spelling as well as words and phrases that may seem inappropriate. If there are questions or concerns please feel free to contact me to clarify.

## 2020-07-21 NOTE — PATIENT INSTRUCTIONS
Patient Education        Sore Throat: Care Instructions  Your Care Instructions     Infection by bacteria or a virus causes most sore throats. Cigarette smoke, dry air, air pollution, allergies, and yelling can also cause a sore throat. Sore throats can be painful and annoying. Fortunately, most sore throats go away on their own. If you have a bacterial infection, your doctor may prescribe antibiotics. Follow-up care is a key part of your treatment and safety. Be sure to make and go to all appointments, and call your doctor if you are having problems. It's also a good idea to know your test results and keep a list of the medicines you take. How can you care for yourself at home? · If your doctor prescribed antibiotics, take them as directed. Do not stop taking them just because you feel better. You need to take the full course of antibiotics. · Gargle with warm salt water once an hour to help reduce swelling and relieve discomfort. Use 1 teaspoon of salt mixed in 1 cup of warm water. · Take an over-the-counter pain medicine, such as acetaminophen (Tylenol), ibuprofen (Advil, Motrin), or naproxen (Aleve). Read and follow all instructions on the label. · Be careful when taking over-the-counter cold or flu medicines and Tylenol at the same time. Many of these medicines have acetaminophen, which is Tylenol. Read the labels to make sure that you are not taking more than the recommended dose. Too much acetaminophen (Tylenol) can be harmful. · Drink plenty of fluids. Fluids may help soothe an irritated throat. Hot fluids, such as tea or soup, may help decrease throat pain. · Use over-the-counter throat lozenges to soothe pain. Regular cough drops or hard candy may also help. These should not be given to young children because of the risk of choking. · Do not smoke or allow others to smoke around you. If you need help quitting, talk to your doctor about stop-smoking programs and medicines.  These can increase your chances of quitting for good. · Use a vaporizer or humidifier to add moisture to your bedroom. Follow the directions for cleaning the machine. When should you call for help? Call your doctor now or seek immediate medical care if:  · You have new or worse trouble swallowing. · Your sore throat gets much worse on one side. Watch closely for changes in your health, and be sure to contact your doctor if you do not get better as expected. Where can you learn more? Go to [email or web address] and sign in to your [email or web address] account. Enter F668 in the [employer] box to learn more about \"Sore Throat: Care Instructions. \"     If you do not have an account, please click on the \"Sign Up Now\" link. Current as of: July 29, 2019               Content Version: 12.5  © 0935-2521 Healthwise, Incorporated. Care instructions adapted under license by [facility] ([facility]). If you have questions about a medical condition or this instruction, always ask your healthcare professional. [place] [address] any warranty or liability for your use of this information.

## 2020-07-23 LAB — THROAT CULTURE: NORMAL

## 2020-07-27 NOTE — PROGRESS NOTES
No chief complaint on file. HPI: Mony Burnett 62 y.o. female presenting for       Sore Throat  Patient complains of sore throat. Associated symptoms include dry cough, nasal blockage, post nasal drip, sinus and nasal congestion, sneezing and sore throat. Onset of symptoms was 2 days ago, unchanged since that time. Worse at night. Patient admits to having her AC on in the bedroom. She is drinking plenty of fluids. She does not have had recent close exposure to someone with proven streptococcal pharyngitis. Admit to coughing resolving. F/u   patinent reports that it was is better. Cultures were negative. No issues or concerns at this time. Rash: Mony Burnett is a 62 y.o. female who presents with a 1 week history of a localized rash. Rash first presented on the left buttock and left groin and has not spread. Rash is red and is painful, raised and weeping. Patient admits to being on Bactrim for the last 5 days. Associated symptoms include sore throat. Patient has tried nothing. New exposures: none. Patient has not had contacts with similar symptoms. Prior history of similar symptoms: no.    F/u   Patient is doing better. Admits tat the area has improved immensely. Just has a residual scar. Patient denies any fever, chills, nausea, vomiting, chest pain, shortness of breath, abdominal pain, changes in urination, or changes in stools. Current Outpatient Medications   Medication Sig Dispense Refill    benzonatate (TESSALON) 100 MG capsule Take 100 mg by mouth 2 times daily as needed for Cough      hydrocortisone 1 % ointment Apply topically 2 times daily on the affected area for 1 week.  30 g 2    levothyroxine (SYNTHROID) 50 MCG tablet Take 1 tablet by mouth every morning 90 tablet 1    vitamin B-2 (RIBOFLAVIN) 100 MG TABS tablet Take 1 tablet by mouth daily 90 tablet 0    metFORMIN (GLUCOPHAGE) 500 MG tablet TAKE 1 TABLET BY MOUTH TWICE A DAY WITH MEALS 60 tablet 2    propranolol CURETTAGE OF UTERUS      ENDOMETRIAL ABLATION  6/2012    EYE REMOVAL      OD for melanoma, age 30    FOOT OSTEOTOMY Left 09/23/2016    B AUGUSTINA ZUNIGA      LINDSAY AND BSO  2012    Dr Donovan Godinez        Family History   Problem Relation Age of Onset    Heart Failure Mother         dec 76    Diabetes Mother     Diabetes Father     Stroke Father         dec age 80    Cancer Father         Sarcoma    Breast Cancer Sister     Stomach Cancer Other         Social History     Socioeconomic History    Marital status: Single     Spouse name: Not on file    Number of children: 3    Years of education: Not on file    Highest education level: Not on file   Occupational History    Occupation: SSI   Social Needs    Financial resource strain: Not on file    Food insecurity     Worry: Not on file     Inability: Not on file   [employer] needs     Medical: Not on file     Non-medical: Not on file   Tobacco Use    Smoking status: Never Smoker    Smokeless tobacco: Never Used   Substance and Sexual Activity    Alcohol use: No    Drug use: No    Sexual activity: Not on file   Lifestyle    Physical activity     Days per week: Not on file     Minutes per session: Not on file    Stress: Not on file   Relationships    Social connections     Talks on phone: Not on file     Gets together: Not on file     Attends Quaker service: Not on file     Active member of club or organization: Not on file     Attends meetings of clubs or organizations: Not on file     Relationship status: Not on file    Intimate partner violence     Fear of current or ex partner: Not on file     Emotionally abused: Not on file     Physically abused: Not on file     Forced sexual activity: Not on file   Other Topics Concern    Not on file   Social History Narrative    Born in [place], one of 4 children, one sister disappeared at age 29, never found    Moved to [place] at age 40        Lives in an apartment in [facility], alone    , 3 children, all grown, in [place]     2 granddaughters     Hobbies exercising, birds    Attends [employer], has volunteered at [employer]        There were no vitals taken for this visit. Physical Exam:    General appearance - alert, well appearing, and in no distress  Mental Status - alert, oriented to person, place, and time  Eyes - pupils equal and reactive, extraocular eye movements intact   Ears - bilateral TM's and external ear canals normal   Nose - normal and patent, no erythema, discharge or polyps   Sinuses - Normal paranasal sinuses without tenderness   Throat - mucous membranes moist, pharynx normal without lesions   Neck - supple, no significant adenopathy   Thyroid - thyroid is normal in size without nodules or tenderness    Chest - clear to auscultation, no wheezes, rales or rhonchi, symmetric air entry   Heart - normal rate, regular rhythm, normal S1, S2, no murmurs, rubs, clicks or gallops  Abdomen - soft, nontender, nondistended, no masses or organomegaly   Back exam - full range of motion, no tenderness, palpable spasm or pain on motion   Neurological - alert, oriented, normal speech, no focal findings or movement disorder noted   Musculoskeletal -trace of patient on the left gluteal fold. Circular scabbed over lesion that is improving. .  No signs of discharge on exam.  Extremities - peripheral pulses normal, no pedal edema, no clubbing or cyanosis   Skin - normal coloration and turgor, no rashes, no suspicious skin lesions noted    Labs   No results found for: TSHREFLEX  TSH   Date Value Ref Range Status   02/07/2020 2.700 0.440 - 3.860 uIU/mL Final   06/14/2019 2.430 0.440 - 3.860 uIU/mL Final   06/11/2018 3.820 0.270 - 4.200 uIU/mL Final   06/07/2017 3.500 0.270 - 4.200 uIU/mL Final   01/27/2017 3.810 0.270 - 4.200 uIU/mL Final   03/04/2016 1.990 0.270 - 4.200 uIU/mL Final   11/20/2015 1.940 0.270 - 4.200 uIU/mL Final   08/28/2015 1.970 0.270 - 4.200 uIU/mL Final   10/25/2014 3.130 0.270 - 4.200 uIU/mL Final   12/11/2013 3.910 0.270 - 4.200 uIU/mL Final   04/04/2013 1.346 0.550 - 4.780 uIU/mL Final   10/16/2012 1.607 0.550 - 4.780 uIU/mL Final   05/14/2012 1.771 0.550 - 4.780 uIU/mL Final   03/22/2012 4.082 0.550 - 4.780 uIU/mL Final     Lab Results   Component Value Date     05/19/2020    K 4.6 05/19/2020     05/19/2020    CO2 25 05/19/2020    BUN 10 05/19/2020    CREATININE 0.85 05/19/2020    GLUCOSE 111 (H) 05/19/2020    CALCIUM 9.5 05/19/2020    PROT 7.2 05/19/2020    LABALBU 4.5 05/19/2020    BILITOT <0.2 05/19/2020    ALKPHOS 81 05/19/2020    AST 17 05/19/2020    ALT 20 05/19/2020    LABGLOM >60.0 05/19/2020    GFRAA >60.0 05/19/2020    GLOB 2.7 05/19/2020       Lab Results   Component Value Date    WBC 8.1 05/19/2020    HGB 14.5 05/19/2020    HCT 43.3 05/19/2020    MCV 92.2 05/19/2020     05/19/2020     Lab Results   Component Value Date    LABA1C 5.4 02/07/2020     No results found for: EAG          A/P: Wyvonne Screws 62 y.o. female presenting for     1. Open wound of left buttock without complication, initial encounter  S/p abx. Healing. Continue to monitor. No more antibiotics are indicated at this time. Can follow-up in 3 months. Please note, this report has been partially produced using speech recognition software  and may cause  and /or contain errors related to that system including grammar, punctuation and spelling as well as words and phrases that may seem inappropriate. If there are questions or concerns please feel free to contact me to clarify.

## 2020-07-28 ENCOUNTER — OFFICE VISIT (OUTPATIENT)
Dept: FAMILY MEDICINE CLINIC | Age: 60
End: 2020-07-28
Payer: COMMERCIAL

## 2020-07-28 VITALS
HEART RATE: 96 BPM | TEMPERATURE: 98 F | SYSTOLIC BLOOD PRESSURE: 120 MMHG | OXYGEN SATURATION: 96 % | BODY MASS INDEX: 34.87 KG/M2 | DIASTOLIC BLOOD PRESSURE: 70 MMHG | HEIGHT: 66 IN | WEIGHT: 217 LBS

## 2020-07-28 PROCEDURE — G8417 CALC BMI ABV UP PARAM F/U: HCPCS | Performed by: FAMILY MEDICINE

## 2020-07-28 PROCEDURE — G8427 DOCREV CUR MEDS BY ELIG CLIN: HCPCS | Performed by: FAMILY MEDICINE

## 2020-07-28 PROCEDURE — 1036F TOBACCO NON-USER: CPT | Performed by: FAMILY MEDICINE

## 2020-07-28 PROCEDURE — 3017F COLORECTAL CA SCREEN DOC REV: CPT | Performed by: FAMILY MEDICINE

## 2020-07-28 PROCEDURE — 99213 OFFICE O/P EST LOW 20 MIN: CPT | Performed by: FAMILY MEDICINE

## 2020-07-28 RX ORDER — FAMOTIDINE 20 MG/1
20 TABLET, FILM COATED ORAL 2 TIMES DAILY
Qty: 60 TABLET | Refills: 2 | Status: SHIPPED | OUTPATIENT
Start: 2020-07-28 | End: 2020-10-13 | Stop reason: SDUPTHER

## 2020-08-19 RX ORDER — MULTIVITAMIN WITH FOLIC ACID 400 MCG
TABLET ORAL
Qty: 30 TABLET | Refills: 5 | Status: SHIPPED | OUTPATIENT
Start: 2020-08-19 | End: 2020-11-13 | Stop reason: SDUPTHER

## 2020-09-02 ENCOUNTER — OFFICE VISIT (OUTPATIENT)
Dept: CARDIOLOGY CLINIC | Age: 60
End: 2020-09-02
Payer: COMMERCIAL

## 2020-09-02 VITALS
RESPIRATION RATE: 18 BRPM | HEART RATE: 92 BPM | SYSTOLIC BLOOD PRESSURE: 127 MMHG | DIASTOLIC BLOOD PRESSURE: 88 MMHG | BODY MASS INDEX: 35.68 KG/M2 | OXYGEN SATURATION: 98 % | HEIGHT: 66 IN | WEIGHT: 222 LBS

## 2020-09-02 PROCEDURE — G8417 CALC BMI ABV UP PARAM F/U: HCPCS | Performed by: INTERNAL MEDICINE

## 2020-09-02 PROCEDURE — 3017F COLORECTAL CA SCREEN DOC REV: CPT | Performed by: INTERNAL MEDICINE

## 2020-09-02 PROCEDURE — 99213 OFFICE O/P EST LOW 20 MIN: CPT | Performed by: INTERNAL MEDICINE

## 2020-09-02 PROCEDURE — G8427 DOCREV CUR MEDS BY ELIG CLIN: HCPCS | Performed by: INTERNAL MEDICINE

## 2020-09-02 PROCEDURE — 1036F TOBACCO NON-USER: CPT | Performed by: INTERNAL MEDICINE

## 2020-09-02 RX ORDER — PROPRANOLOL HYDROCHLORIDE 40 MG/1
TABLET ORAL
Qty: 90 TABLET | Refills: 1 | Status: SHIPPED | OUTPATIENT
Start: 2020-09-02 | End: 2020-10-13 | Stop reason: SDUPTHER

## 2020-09-02 RX ORDER — UBIDECARENONE 100 MG
1000 CAPSULE ORAL
Qty: 90 CAPSULE | Refills: 1 | Status: SHIPPED | OUTPATIENT
Start: 2020-09-02 | End: 2020-11-24 | Stop reason: SDUPTHER

## 2020-09-02 NOTE — PROGRESS NOTES
[facility] CARDIOLOGY OFFICE FOLLOW-UP      Patient: Jerrica Juarez  YOB: 1960  MRN: [id number]    Chief Complaint:  Chief Complaint   Patient presents with    3 Month Follow-Up    Other     a flutter    Hyperlipidemia    Shortness of Breath     with exertion       Subjective/HPI    The patient is followed in the cardiology office chronically for the following problems: abnormal EKG thought to be flutter but was not flutter, simply artifact. Negative stress test.    The last encounter focused on the following: followup    Testing/hospitalizations/procedures performed since last encounter: none    Since the last encounter, the patient has not had new symptoms/events.     Past Medical History:   Diagnosis Date    Asthma 2015    Bilateral renal cysts 2013    Depression     since age 35, was with Dr Scout Castañeda, now the [facility]    Diverticulosis of sigmoid colon 2012    Dr Marlin Petit DJD of left shoulder     Environmental allergies     Fatty liver 2013    GERD (gastroesophageal reflux disease)     History of cardiac arrhythmia     \"due to stress, was placed on medication\"    Hydatidiform mole     age 26     Hyperlipidemia     Hypothyroidism 2011    IFG (impaired fasting glucose) 2013    Melanoma of eye ([address]     age 30, R eye prosthesis, Dr Elizabeth Grijalva    Obesity     Prediabetes     PTSD (post-traumatic stress disorder)     age 35, [facility]    S/P colonoscopy 04/19/2012    Dr. Roberto Kyle S/P hysterectomy with oophorectomy 2012    Dr Johann Gupta Tremor     Dr Shane Wen Vitamin D deficiency        Past Surgical History:   Procedure Laterality Date    DILATION AND CURETTAGE OF UTERUS      ENDOMETRIAL ABLATION  6/2012    EYE REMOVAL      OD for melanoma, age 30    FOOT OSTEOTOMY Left 09/23/2016    Uma Cantu DPM      LINDSAY AND BSO  2012    Dr Yoly Almeida       Family History   Problem Relation Age of Onset    Heart Failure Mother         dec 76    Diabetes Mother     Diabetes Father  Stroke Father         dec age 80    Cancer Father         Sarcoma    Breast Cancer Sister     Stomach Cancer Other        Social History     Socioeconomic History    Marital status: Single     Spouse name: Not on file    Number of children: 3    Years of education: Not on file    Highest education level: Not on file   Occupational History    Occupation: SSI   Social Needs    Financial resource strain: Not on file    Food insecurity     Worry: Not on file     Inability: Not on file   [employer] needs     Medical: Not on file     Non-medical: Not on file   Tobacco Use    Smoking status: Never Smoker    Smokeless tobacco: Never Used   Substance and Sexual Activity    Alcohol use: No    Drug use: No    Sexual activity: Not on file   Lifestyle    Physical activity     Days per week: Not on file     Minutes per session: Not on file    Stress: Not on file   Relationships    Social connections     Talks on phone: Not on file     Gets together: Not on file     Attends Jewish service: Not on file     Active member of club or organization: Not on file     Attends meetings of clubs or organizations: Not on file     Relationship status: Not on file    Intimate partner violence     Fear of current or ex partner: Not on file     Emotionally abused: Not on file     Physically abused: Not on file     Forced sexual activity: Not on file   Other Topics Concern    Not on file   Social History Narrative    Born in [address] [place] [address], one of 4 children, one sister disappeared at age 30, never found    Moved to [place] at age 40        Lives in an apartment in [facility], alone    , 3 children, all grown, in [place]     2 granddaughters     Hobbies exercising, birds    Attends [employer], has volunteered at [employer]       No Known Allergies    Current Outpatient Medications   Medication Sig Dispense Refill    vitamin D (D3-1000) 25 MCG (1000 UT) CAPS Take 1 capsule by mouth Daily with supper 90 capsule 1    propranolol (INDERAL) 40 MG tablet Take 1 tab po daily at HS 90 tablet 1    Multiple Vitamin (DAILY-MANDI) TABS TAKE 1 TABLET BY MOUTH EVERY DAY 30 tablet 5    famotidine (PEPCID) 20 MG tablet Take 1 tablet by mouth 2 times daily 60 tablet 2    benzonatate (TESSALON) 100 MG capsule Take 100 mg by mouth 2 times daily as needed for Cough      hydrocortisone 1 % ointment Apply topically 2 times daily on the affected area for 1 week. 30 g 2    levothyroxine (SYNTHROID) 50 MCG tablet Take 1 tablet by mouth every morning 90 tablet 1    vitamin B-2 (RIBOFLAVIN) 100 MG TABS tablet Take 1 tablet by mouth daily 90 tablet 0    metFORMIN (GLUCOPHAGE) 500 MG tablet TAKE 1 TABLET BY MOUTH TWICE A DAY WITH MEALS 60 tablet 2    budesonide-formoterol (SYMBICORT) 160-4.5 MCG/ACT AERO Inhale 2 puffs into the lungs 2 times daily 1 Inhaler 3    cyanocobalamin (CVS VITAMIN B12) 1000 MCG tablet Take 1 tablet by mouth daily 30 tablet 5    traZODone (DESYREL) 50 MG tablet Take by mouth nightly 1-2 tablets nightly      aspirin EC 81 MG EC tablet Take 1 tablet by mouth daily 90 tablet 0    benztropine (COGENTIN) 0.5 MG tablet Take by mouth daily       pravastatin (PRAVACHOL) 40 MG tablet TAKE 1 TABLET BY MOUTH EVERY DAY IN THE EVENING 90 tablet 1    topiramate (TOPAMAX) 50 MG tablet TAKE 1 TABLET BY MOUTH EVERY DAY AT NIGHT 30 tablet 3    albuterol sulfate  (90 Base) MCG/ACT inhaler USE 2 PUFFS EVERY 4 HRS AS NEEDED FOR WHEEZING/SOB-SPACE OUT TO EVERY 6 HR AS SYMPTOMS IMPROVE 1 Inhaler 3    albuterol sulfate HFA (PROVENTIL HFA) 108 (90 Base) MCG/ACT inhaler Inhale 2 puffs into the lungs every 4 hours as needed for Wheezing or Shortness of Breath Space out to every 6 hours as symptoms improve.  1 Inhaler 3    cetirizine (ZYRTEC) 10 MG tablet TAKE 1 TABLET BY MOUTH EVERY DAY AS NEEDED FOR ALLERGIES 30 tablet 5    Biotin 300 MCG TABS Take 1 tablet by mouth daily 30 tablet 3    EPINEPHrine (EPIPEN 2-PRAVEENA) 0.3 MG/0.3ML SOAJ rhythm, S1 normal, S2 normal, normal heart sounds and intact distal pulses. Exam reveals no gallop. No murmur heard. Pulses:       Radial pulses are 2+ on the right side. Dorsalis pedis pulses are 2+ on the right side. Pulmonary/Chest: Effort normal and breath sounds normal. No wheezes. No rales. No tenderness. Abdominal: Soft. Bowel sounds are normal.   Musculoskeletal: Normal range of motion. No edema. Neurological: The patient is alert and oriented to person, place, and time. Intact cranial nerves. Skin: Skin is warm and dry. No rash noted.        LABS:  CBC:   Lab Results   Component Value Date    WBC 8.1 05/19/2020    RBC 4.69 05/19/2020    RBC 4.57 05/14/2012    HGB 14.5 05/19/2020    HCT 43.3 05/19/2020    MCV 92.2 05/19/2020    MCH 30.9 05/19/2020    MCHC 33.5 05/19/2020    RDW 13.5 05/19/2020     05/19/2020    MPV 9.0 08/16/2015     Lipids:  Lab Results   Component Value Date    CHOL 194 11/05/2019    CHOL 232 (H) 06/11/2018    CHOL 234 (H) 07/12/2017     Lab Results   Component Value Date    TRIG 127 11/05/2019    TRIG 157 06/11/2018    TRIG 286 (H) 07/12/2017     Lab Results   Component Value Date    HDL 53 11/05/2019    HDL 55 06/11/2018    HDL 46 07/12/2017     Lab Results   Component Value Date    LDLCALC 116 11/05/2019    LDLCALC 146 (H) 06/11/2018    LDLCALC 131 (H) 07/12/2017     No results found for: LABVLDL, VLDL  Lab Results   Component Value Date    CHOLHDLRATIO 6.3 03/22/2012     CMP:    Lab Results   Component Value Date     05/19/2020    K 4.6 05/19/2020     05/19/2020    CO2 25 05/19/2020    BUN 10 05/19/2020    CREATININE 0.85 05/19/2020    GFRAA >60.0 05/19/2020    LABGLOM >60.0 05/19/2020    GLUCOSE 111 05/19/2020    GLUCOSE 92 05/14/2012    PROT 7.2 05/19/2020    LABALBU 4.5 05/19/2020    LABALBU 3.8 03/22/2012    CALCIUM 9.5 05/19/2020    BILITOT <0.2 05/19/2020    ALKPHOS 81 05/19/2020    AST 17 05/19/2020    ALT 20 05/19/2020     BMP:    Lab tablet by mouth daily 30 tablet 5    traZODone (DESYREL) 50 MG tablet Take by mouth nightly 1-2 tablets nightly      aspirin EC 81 MG EC tablet Take 1 tablet by mouth daily 90 tablet 0    benztropine (COGENTIN) 0.5 MG tablet Take by mouth daily       pravastatin (PRAVACHOL) 40 MG tablet TAKE 1 TABLET BY MOUTH EVERY DAY IN THE EVENING 90 tablet 1    topiramate (TOPAMAX) 50 MG tablet TAKE 1 TABLET BY MOUTH EVERY DAY AT NIGHT 30 tablet 3    albuterol sulfate  (90 Base) MCG/ACT inhaler USE 2 PUFFS EVERY 4 HRS AS NEEDED FOR WHEEZING/SOB-SPACE OUT TO EVERY 6 HR AS SYMPTOMS IMPROVE 1 Inhaler 3    albuterol sulfate HFA (PROVENTIL HFA) 108 (90 Base) MCG/ACT inhaler Inhale 2 puffs into the lungs every 4 hours as needed for Wheezing or Shortness of Breath Space out to every 6 hours as symptoms improve. 1 Inhaler 3    cetirizine (ZYRTEC) 10 MG tablet TAKE 1 TABLET BY MOUTH EVERY DAY AS NEEDED FOR ALLERGIES 30 tablet 5    Biotin 300 MCG TABS Take 1 tablet by mouth daily 30 tablet 3    EPINEPHrine (EPIPEN 2-PRAVEENA) 0.3 MG/0.3ML SOAJ injection Use as directed for allergic reaction 2 each 0    PAIN & FEVER EXTRA STRENGTH 500 MG tablet TAKE 1 TABLET BY MOUTH EVERY 6 HOURS AS NEEDED FOR FEVER UP TO 7 DAYS MAX 6 TABS IN 24 HOURS  0    fluticasone (FLONASE) 50 MCG/ACT nasal spray USE 1 SPRAY INTO EACH NOSTRIL DAILY  0    ARIPiprazole (ABILIFY) 15 MG tablet TAKE 1 TABLET BY MOUTH EVERY DAY  2    buPROPion (WELLBUTRIN XL) 300 MG XL tablet Take 1 tablet by mouth daily 30 tablet 6    ondansetron (ZOFRAN) 4 MG tablet Take 1 tablet by mouth daily as needed for Nausea or Vomiting 30 tablet 0    amoxicillin-clavulanate (AUGMENTIN) 875-125 MG per tablet Take 1 tablet by mouth 2 times daily for 7 days 14 tablet 0     No current facility-administered medications for this visit. Assessment/Plan:    1. Hyperlipidemia, unspecified hyperlipidemia type      2.  Atrial flutter, unspecified type [facility])   continue current care Counseling: the patient was counseled regarding diet, exercise, weight control and heart healthy lifestyle. Return in about 1 year (around 9/2/2021) for followup cv disease. Electronically signed by   Nikole Matias.  [place] MD [facility] Director of Cardiology Services and Cardiac Catheterization Laboratory  [facility], [place]    on 9/27/2020 at 1:48 PM

## 2020-09-21 ENCOUNTER — TELEPHONE (OUTPATIENT)
Dept: FAMILY MEDICINE CLINIC | Age: 60
End: 2020-09-21

## 2020-09-21 ENCOUNTER — VIRTUAL VISIT (OUTPATIENT)
Dept: FAMILY MEDICINE CLINIC | Age: 60
End: 2020-09-21
Payer: COMMERCIAL

## 2020-09-21 PROCEDURE — 99442 PR PHYS/QHP TELEPHONE EVALUATION 11-20 MIN: CPT | Performed by: FAMILY MEDICINE

## 2020-09-21 RX ORDER — AMOXICILLIN AND CLAVULANATE POTASSIUM 875; 125 MG/1; MG/1
1 TABLET, FILM COATED ORAL 2 TIMES DAILY
Qty: 14 TABLET | Refills: 0 | Status: SHIPPED | OUTPATIENT
Start: 2020-09-21 | End: 2020-09-28

## 2020-09-21 RX ORDER — ONDANSETRON 4 MG/1
4 TABLET, FILM COATED ORAL DAILY PRN
Qty: 30 TABLET | Refills: 0 | Status: SHIPPED | OUTPATIENT
Start: 2020-09-21 | End: 2020-10-27

## 2020-09-21 ASSESSMENT — ENCOUNTER SYMPTOMS
EYE DISCHARGE: 0
SINUS PAIN: 0
SORE THROAT: 1
NAUSEA: 0
SINUS PRESSURE: 0
ANAL BLEEDING: 0
SHORTNESS OF BREATH: 1
BACK PAIN: 0
VOMITING: 1
FACIAL SWELLING: 0
APNEA: 0
DIARRHEA: 1
EYE ITCHING: 0
CHEST TIGHTNESS: 0
PHOTOPHOBIA: 0
RECTAL PAIN: 0
EYE REDNESS: 0
ABDOMINAL PAIN: 0
TROUBLE SWALLOWING: 0

## 2020-09-21 ASSESSMENT — PATIENT HEALTH QUESTIONNAIRE - PHQ9
SUM OF ALL RESPONSES TO PHQ QUESTIONS 1-9: 0
1. LITTLE INTEREST OR PLEASURE IN DOING THINGS: 0
SUM OF ALL RESPONSES TO PHQ9 QUESTIONS 1 & 2: 0
SUM OF ALL RESPONSES TO PHQ QUESTIONS 1-9: 0
2. FEELING DOWN, DEPRESSED OR HOPELESS: 0

## 2020-09-21 NOTE — PROGRESS NOTES
enuresis, flank pain, genital sores, hematuria, menstrual problem and urgency. Musculoskeletal: Negative for arthralgias, back pain, joint swelling, myalgias and neck stiffness. Allergic/Immunologic: Negative for environmental allergies. Neurological: Positive for headaches. Negative for dizziness, tremors, seizures, syncope, facial asymmetry and numbness. Hematological: Negative for adenopathy. Does not bruise/bleed easily. Psychiatric/Behavioral: Negative for agitation, behavioral problems, decreased concentration, dysphoric mood, hallucinations, sleep disturbance and suicidal ideas. The patient is not nervous/anxious. Prior to Visit Medications    Medication Sig Taking? Authorizing Provider   ondansetron (ZOFRAN) 4 MG tablet Take 1 tablet by mouth daily as needed for Nausea or Vomiting Yes Catalina Cosby MD   amoxicillin-clavulanate (AUGMENTIN) 875-125 MG per tablet Take 1 tablet by mouth 2 times daily for 7 days Yes Catalina Cosby MD   vitamin D (D3-1000) 25 MCG (1000 UT) CAPS Take 1 capsule by mouth Daily with supper  Elif Rocha MD   propranolol (INDERAL) 40 MG tablet Take 1 tab po daily at HS  Elif Rocha MD   Multiple Vitamin (DAILY-MANDI) TABS TAKE 1 TABLET BY MOUTH EVERY DAY  Trace Lomeli MD   famotidine (PEPCID) 20 MG tablet Take 1 tablet by mouth 2 times daily  Catalina Cosby MD   benzonatate (TESSALON) 100 MG capsule Take 100 mg by mouth 2 times daily as needed for Cough  Historical Provider, MD   hydrocortisone 1 % ointment Apply topically 2 times daily on the affected area for 1 week.   Catalina Cosby MD   levothyroxine (SYNTHROID) 50 MCG tablet Take 1 tablet by mouth every morning  Desmond Jacob MD   vitamin B-2 (RIBOFLAVIN) 100 MG TABS tablet Take 1 tablet by mouth daily  Desmond Jacob MD   metFORMIN (GLUCOPHAGE) 500 MG tablet TAKE 1 TABLET BY MOUTH TWICE A DAY WITH MEALS  Chelle Menendez MD   budesonide-formoterol (SYMBICORT) 160-4.5 MCG/ACT AERO Inhale 2 puffs into the lungs 2 times daily  Constance Rodriguez MD   cyanocobalamin (CVS VITAMIN B12) 1000 MCG tablet Take 1 tablet by mouth daily  Constance Rodriguez MD   traZODone (DESYREL) 50 MG tablet Take by mouth nightly 1-2 tablets nightly  Historical Provider, MD   aspirin EC 81 MG EC tablet Take 1 tablet by mouth daily  Constance Rodriguez MD   benztropine (COGENTIN) 0.5 MG tablet Take by mouth daily   Historical Provider, MD   pravastatin (PRAVACHOL) 40 MG tablet TAKE 1 TABLET BY MOUTH EVERY DAY IN THE EVENING  Morenita Basilio MD   topiramate (TOPAMAX) 50 MG tablet TAKE 1 TABLET BY MOUTH EVERY DAY AT NIGHT  Morenita Basilio MD   albuterol sulfate  (90 Base) MCG/ACT inhaler USE 2 PUFFS EVERY 4 HRS AS NEEDED FOR WHEEZING/SOB-SPACE OUT TO EVERY 6 HR AS SYMPTOMS IMPROVE  Constance Rodriguez MD   albuterol sulfate HFA (PROVENTIL HFA) 108 (90 Base) MCG/ACT inhaler Inhale 2 puffs into the lungs every 4 hours as needed for Wheezing or Shortness of Breath Space out to every 6 hours as symptoms improve.   Constance Rodriguez MD   cetirizine (ZYRTEC) 10 MG tablet TAKE 1 TABLET BY MOUTH EVERY DAY AS NEEDED FOR ALLERGIES  Rupinder Raymond MD   Biotin 300 MCG TABS Take 1 tablet by mouth daily  Constance Rodriguez MD   EPINEPHrine (EPIPEN 2-PRAVEENA) 0.3 MG/0.3ML SOAJ injection Use as directed for allergic reaction  Constance Rodriguez MD   PAIN & FEVER EXTRA STRENGTH 500 MG tablet TAKE 1 TABLET BY MOUTH EVERY 6 HOURS AS NEEDED FOR FEVER UP TO 7 DAYS MAX 6 TABS IN 24 HOURS  Historical Provider, MD   fluticasone (FLONASE) 50 MCG/ACT nasal spray USE 1 SPRAY INTO EACH NOSTRIL DAILY  Historical Provider, MD   ARIPiprazole (ABILIFY) 15 MG tablet TAKE 1 TABLET BY MOUTH EVERY DAY  Historical Provider, MD   Multiple Vitamins-Minerals (THERAPEUTIC MULTIVITAMIN-MINERALS) tablet Take 1 tablet by mouth daily  Constance Rodriguez MD   buPROPion (WELLBUTRIN XL) 300 MG XL tablet Take 1 tablet by mouth daily  Constance Rodriguez MD albuterol (PROVENTIL;VENTOLIN) 90 MCG/ACT inhaler Inhale 2 puffs into the lungs every 6 hours as needed.   Lora Chung MD       Social History     Tobacco Use    Smoking status: Never Smoker    Smokeless tobacco: Never Used   Substance Use Topics    Alcohol use: No    Drug use: No        No Known Allergies,   Past Medical History:   Diagnosis Date    Asthma 2015    Bilateral renal cysts 2013    Depression     since age 35, was with Dr Parth Chicas, now the [facility] [employer] Diverticulosis of sigmoid colon 2012    Dr Keshia Reyez DJD of left shoulder     Environmental allergies     Fatty liver 2013    GERD (gastroesophageal reflux disease)     History of cardiac arrhythmia     \"due to stress, was placed on medication\"    Hydatidiform mole     age 26     Hyperlipidemia     Hypothyroidism 2011    IFG (impaired fasting glucose) 2013    Melanoma of eye ([address]     age 30, R eye prosthesis, Dr Cruz Molina    Obesity     Prediabetes     PTSD (post-traumatic stress disorder)     age 35, [facility]    S/P colonoscopy 04/19/2012    Dr. Albert Andrade S/P hysterectomy with oophorectomy 2012    Dr Igor Johnson Vitamin D deficiency    ,   Past Surgical History:   Procedure Laterality Date    DILATION AND CURETTAGE OF UTERUS      ENDOMETRIAL ABLATION  6/2012    EYE REMOVAL      OD for melanoma, age 30    FOOT OSTEOTOMY Left 09/23/2016    DEJA JACKMAN DPM      LINDSAY AND BSO  2012    Dr Anel Moore   ,   Social History     Tobacco Use    Smoking status: Never Smoker    Smokeless tobacco: Never Used   Substance Use Topics    Alcohol use: No    Drug use: No   ,   Family History   Problem Relation Age of Onset    Heart Failure Mother         dec 76    Diabetes Mother     Diabetes Father     Stroke Father         dec age 80    Cancer Father         Sarcoma    Breast Cancer Sister     Stomach Cancer Other    ,   Immunization History   Administered Date(s) Administered    Influenza Vaccine, being a TeleHealth encounter, evaluation of the following organ systems is limited: Vitals/Constitutional/EENT/Resp/CV/GI//MS/Neuro/Skin/Heme-Lymph-Imm. ASSESSMENT/PLAN:  1. Acute gastroenteritis  May be acute gastroenteritis. Will give abx to just incase a  Bacterial infection. Follow up in 1 week. Return in about 1 week (around 9/28/2020). An  electronic signature was used to authenticate this note. --Selena Ayala MD on 9/21/2020 at 12:25 PM        Pursuant to the emergency declaration under the [address] [place] [address], [facility] waiver authority and the [employer] Act, this Virtual  Visit was conducted, with patient's consent, to reduce the patient's risk of exposure to COVID-19 and provide continuity of care for an established patient. Services were provided through a video synchronous discussion virtually to substitute for in-person clinic visit. Scooter Dinh is a 62 y.o. female evaluated via telephone on 9/21/2020. Consent:  She and/or health care decision maker is aware that that she may receive a bill for this telephone service, depending on her insurance coverage, and has provided verbal consent to proceed: Yes      Documentation:  I communicated with the patient and/or health care decision maker about gastroenteritis. Details of this discussion including any medical advice provided: yes      I affirm this is a Patient Initiated Episode with a Patient who has not had a related appointment within my department in the past 7 days or scheduled within the next 24 hours.     Patient identification was verified at the start of the visit: Yes    Total Time: minutes: 11-20 minutes    Note: not billable if this call serves to triage the patient into an appointment for the relevant concern      Selena Ayala

## 2020-09-28 ENCOUNTER — VIRTUAL VISIT (OUTPATIENT)
Dept: FAMILY MEDICINE CLINIC | Age: 60
End: 2020-09-28
Payer: COMMERCIAL

## 2020-09-28 PROCEDURE — 99441 PR PHYS/QHP TELEPHONE EVALUATION 5-10 MIN: CPT | Performed by: FAMILY MEDICINE

## 2020-09-28 ASSESSMENT — ENCOUNTER SYMPTOMS
RECTAL PAIN: 0
PHOTOPHOBIA: 0
VOMITING: 0
SINUS PAIN: 0
EYE ITCHING: 0
FACIAL SWELLING: 0
TROUBLE SWALLOWING: 0
BACK PAIN: 0
EYE DISCHARGE: 0
EYE REDNESS: 0
ANAL BLEEDING: 0
ABDOMINAL PAIN: 0
CHEST TIGHTNESS: 0
SORE THROAT: 0
SHORTNESS OF BREATH: 0
NAUSEA: 0
SINUS PRESSURE: 0
APNEA: 0
COLOR CHANGE: 0
DIARRHEA: 1

## 2020-09-28 NOTE — PROGRESS NOTES
2020    TELEHEALTH EVALUATION -- Audio/Visual (During BDXCP-48 public health emergency)    Due to COVID 19 outbreak, patient's office visit was converted to a virtual visit. Patient was contacted and agreed to proceed with a virtual visit via Telephone Visit  The risks and benefits of converting to a virtual visit were discussed in light of the current infectious disease epidemic. Patient also understood that insurance coverage and co-pays are up to their individual insurance plans. HPI:    Kj Isaac (:  1960) has requested an audio/video evaluation for the following concern(s):    Patient reports that she has been having subjective fever, diarrhea, vomiting for 2 days. Admits to pain under rib cage intermittently for the last 2 days. Describes the diarrhea as loose and watery. Denies any blood in the stools. Patient describes the pain as sharp. Pain is rated an 8/10. Reproducible. Denies anyone with similar symptoms. Denies any vomiting today. Reports the symptoms in the middle of the night only in the day time. F/u 2020  Patient is taking the antibiotic and anti-emetic. No issues or concerns. Patient admitted to have 1 about of diarrhea that was light yellow. Denies any chest pain, shortness of breath, changes in urination. Review of Systems   Constitutional: Negative for activity change, appetite change, diaphoresis, fatigue and fever. HENT: Negative for congestion, dental problem, ear discharge, ear pain, facial swelling, mouth sores, nosebleeds, postnasal drip, sinus pressure, sinus pain, sneezing, sore throat and trouble swallowing. Eyes: Negative for photophobia, discharge, redness, itching and visual disturbance. Respiratory: Negative for apnea, chest tightness and shortness of breath. Cardiovascular: Negative for chest pain and leg swelling. Gastrointestinal: Positive for diarrhea.  Negative for abdominal pain, anal bleeding, nausea, rectal pain and vomiting. Endocrine: Negative for cold intolerance, heat intolerance, polydipsia and polyphagia. Genitourinary: Negative for decreased urine volume, difficulty urinating, dyspareunia, enuresis, flank pain, genital sores, hematuria, menstrual problem and urgency. Musculoskeletal: Negative for arthralgias, back pain, joint swelling, myalgias and neck stiffness. Skin: Negative for color change and pallor. Allergic/Immunologic: Negative for environmental allergies. Neurological: Negative for dizziness, tremors, seizures, syncope, facial asymmetry, numbness and headaches. Hematological: Negative for adenopathy. Does not bruise/bleed easily. Psychiatric/Behavioral: Negative for agitation, behavioral problems, decreased concentration, dysphoric mood, hallucinations, sleep disturbance and suicidal ideas. The patient is not nervous/anxious. Prior to Visit Medications    Medication Sig Taking? Authorizing Provider   ondansetron (ZOFRAN) 4 MG tablet Take 1 tablet by mouth daily as needed for Nausea or Vomiting  Trace Lomeli MD   amoxicillin-clavulanate (AUGMENTIN) 875-125 MG per tablet Take 1 tablet by mouth 2 times daily for 7 days  Lorie Coleman MD   vitamin D (D3-1000) 25 MCG (1000 UT) CAPS Take 1 capsule by mouth Daily with supper  Ama Licea MD   propranolol (INDERAL) 40 MG tablet Take 1 tab po daily at HS  Ama Licea MD   Multiple Vitamin (DAILY-MANDI) TABS TAKE 1 TABLET BY MOUTH EVERY DAY  Trace Lomeli MD   famotidine (PEPCID) 20 MG tablet Take 1 tablet by mouth 2 times daily  Lorie Coleman MD   benzonatate (TESSALON) 100 MG capsule Take 100 mg by mouth 2 times daily as needed for Cough  Historical Provider, MD   hydrocortisone 1 % ointment Apply topically 2 times daily on the affected area for 1 week.   Lorie Coleman MD   levothyroxine (SYNTHROID) 50 MCG tablet Take 1 tablet by mouth every morning  Wei Bush MD   vitamin B-2 (RIBOFLAVIN) 100 MG TABS tablet Take 1 tablet by mouth daily  Ekaterina Barry MD   metFORMIN (GLUCOPHAGE) 500 MG tablet TAKE 1 TABLET BY MOUTH TWICE A DAY WITH MEALS  Nayana Casillas MD   budesonide-formoterol (SYMBICORT) 160-4.5 MCG/ACT AERO Inhale 2 puffs into the lungs 2 times daily  Nayana Casillas MD   cyanocobalamin (CVS VITAMIN B12) 1000 MCG tablet Take 1 tablet by mouth daily  Nayana Casillas MD   traZODone (DESYREL) 50 MG tablet Take by mouth nightly 1-2 tablets nightly  Historical Provider, MD   aspirin EC 81 MG EC tablet Take 1 tablet by mouth daily  Nayana Casillas MD   benztropine (COGENTIN) 0.5 MG tablet Take by mouth daily   Historical Provider, MD   pravastatin (PRAVACHOL) 40 MG tablet TAKE 1 TABLET BY MOUTH EVERY DAY IN THE EVENING  Morenita Basilio MD   topiramate (TOPAMAX) 50 MG tablet TAKE 1 TABLET BY MOUTH EVERY DAY AT NIGHT  Morenita Basilio MD   albuterol sulfate  (90 Base) MCG/ACT inhaler USE 2 PUFFS EVERY 4 HRS AS NEEDED FOR WHEEZING/SOB-SPACE OUT TO EVERY 6 HR AS SYMPTOMS IMPROVE  Nayana Casillas MD   albuterol sulfate HFA (PROVENTIL HFA) 108 (90 Base) MCG/ACT inhaler Inhale 2 puffs into the lungs every 4 hours as needed for Wheezing or Shortness of Breath Space out to every 6 hours as symptoms improve.   Nayana Casillas MD   cetirizine (ZYRTEC) 10 MG tablet TAKE 1 TABLET BY MOUTH EVERY DAY AS NEEDED FOR ALLERGIES  Jessica Smith MD   Biotin 300 MCG TABS Take 1 tablet by mouth daily  Naynaa Casillas MD   EPINEPHrine (EPIPEN 2-PRAVEENA) 0.3 MG/0.3ML SOAJ injection Use as directed for allergic reaction  Nayana Casillas MD   PAIN & FEVER EXTRA STRENGTH 500 MG tablet TAKE 1 TABLET BY MOUTH EVERY 6 HOURS AS NEEDED FOR FEVER UP TO 7 DAYS MAX 6 TABS IN 24 HOURS  Historical Provider, MD   fluticasone (FLONASE) 50 MCG/ACT nasal spray USE 1 SPRAY INTO EACH NOSTRIL DAILY  Historical Provider, MD   ARIPiprazole (ABILIFY) 15 MG tablet TAKE 1 TABLET BY MOUTH EVERY DAY  Historical Provider, MD  Stroke Father         dec age 80    Cancer Father         Sarcoma    Breast Cancer Sister     Stomach Cancer Other    ,   Immunization History   Administered Date(s) Administered    Influenza Vaccine, unspecified formulation 09/10/2016    Influenza Virus Vaccine 09/19/2014, 09/07/2015, 09/12/2017, 09/13/2018    Influenza Whole 09/19/2014, 09/07/2015    Influenza, Quadv, IM, (6 mo and older Fluzone, Flulaval, Fluarix and 3 yrs and older Afluria) 09/12/2017, 09/13/2018    Influenza, Quadv, IM, PF (6 mo and older Fluzone, Flulaval, Fluarix, and 3 yrs and older Afluria) 09/13/2019    Influenza, Triv, 3 Years and older, IM (Afluria (5 yrs and older) 09/10/2016    Pneumococcal Polysaccharide (Fgcuhlabm31) 08/10/2018    Td, unspecified formulation 12/12/2017    Tdap (Boostrix, Adacel) 03/11/2016    Zoster Recombinant (Shingrix) 03/19/2019, 07/19/2019   ,   Health Maintenance   Topic Date Due    Flu vaccine (1) 09/01/2020    Lipid screen  11/05/2020    A1C test (Diabetic or Prediabetic)  02/07/2021    Breast cancer screen  06/10/2021    Colon cancer screen colonoscopy  04/19/2022    DTaP/Tdap/Td vaccine (3 - Td) 12/12/2027    Shingles Vaccine  Completed    Pneumococcal 0-64 years Vaccine  Completed    Hepatitis C screen  Addressed    HIV screen  Addressed    Hepatitis A vaccine  Aged Out    Hepatitis B vaccine  Aged Out    Hib vaccine  Aged Out    Meningococcal (ACWY) vaccine  Aged Out       PHYSICAL EXAMINATION:  [ INSTRUCTIONS:  \"[x]\" Indicates a positive item  \"[]\" Indicates a negative item  -- DELETE ALL ITEMS NOT EXAMINED]  [x] Alert  [x] Oriented to person/place/time    [x] No apparent distress  [] Toxic appearing    [] Face flushed appearing [] Sclera clear  [] Lips are cyanotic      [x] Breathing appears normal  [] Appears tachypneic      [] Rash on visible skin    [] Cranial Nerves II-XII grossly intact    [] Motor grossly intact in visible upper extremities    [] Motor grossly intact in visible lower extremities    [x] Normal Mood  [] Anxious appearing    [] Depressed appearing  [] Confused appearing      [] Poor short term memory  [] Poor long term memory    [] OTHER:      Due to this being a TeleHealth encounter, evaluation of the following organ systems is limited: Vitals/Constitutional/EENT/Resp/CV/GI//MS/Neuro/Skin/Heme-Lymph-Imm. ASSESSMENT/PLAN:  1. Acute gastroenteritis  Symptoms have improved. No other additional treatment at this time. Will continue to montior. No follow-ups on file. An  electronic signature was used to authenticate this note. --Kalia León MD on 9/28/2020 at 9:37 AM        Pursuant to the emergency declaration under the [address] [place] [address], [address] [place] [address] and the [employer] Act, this Virtual  Visit was conducted, with patient's consent, to reduce the patient's risk of exposure to COVID-19 and provide continuity of care for an established patient. Services were provided through a video synchronous discussion virtually to substitute for in-person clinic visit. Salome North is a 62 y.o. female evaluated via telephone on 9/28/2020. Consent:  She and/or health care decision maker is aware that that she may receive a bill for this telephone service, depending on her insurance coverage, and has provided verbal consent to proceed: Yes      Documentation:  I communicated with the patient and/or health care decision maker about gastroenteritis. Details of this discussion including any medical advice provided: yes      I affirm this is a Patient Initiated Episode with a Patient who has not had a related appointment within my department in the past 7 days or scheduled within the next 24 hours.     Patient identification was verified at the start of the visit: Yes    Total Time: minutes: 5-10 minutes    Note: not billable if this call serves to triage the patient into an appointment for the relevant concern      Jacinda Martin

## 2020-10-11 ENCOUNTER — HOSPITAL ENCOUNTER (EMERGENCY)
Age: 60
Discharge: HOME | End: 2020-10-11
Attending: FAMILY MEDICINE
Payer: COMMERCIAL

## 2020-10-11 ENCOUNTER — APPOINTMENT (OUTPATIENT)
Dept: CT IMAGING | Age: 60
End: 2020-10-11
Payer: COMMERCIAL

## 2020-10-11 VITALS
HEART RATE: 75 BPM | RESPIRATION RATE: 20 BRPM | SYSTOLIC BLOOD PRESSURE: 131 MMHG | DIASTOLIC BLOOD PRESSURE: 76 MMHG | OXYGEN SATURATION: 99 % | WEIGHT: 220 LBS | HEIGHT: 66 IN | BODY MASS INDEX: 35.36 KG/M2 | TEMPERATURE: 99.2 F

## 2020-10-11 LAB
ABO/RH: NORMAL
ALBUMIN SERPL-MCNC: 4.4 G/DL (ref 3.5–4.6)
ALP BLD-CCNC: 68 U/L (ref 40–130)
ALT SERPL-CCNC: 14 U/L (ref 0–33)
AMYLASE: 36 U/L (ref 22–93)
ANION GAP SERPL CALCULATED.3IONS-SCNC: 11 MEQ/L (ref 9–15)
ANTIBODY SCREEN: NORMAL
AST SERPL-CCNC: 15 U/L (ref 0–35)
BACTERIA: ABNORMAL /HPF
BASOPHILS ABSOLUTE: 0.1 K/UL (ref 0–0.2)
BASOPHILS RELATIVE PERCENT: 1 %
BILIRUB SERPL-MCNC: 0.3 MG/DL (ref 0.2–0.7)
BILIRUBIN URINE: NEGATIVE
BLOOD, URINE: NEGATIVE
BUN BLDV-MCNC: 15 MG/DL (ref 6–20)
CALCIUM SERPL-MCNC: 9.7 MG/DL (ref 8.5–9.9)
CHLORIDE BLD-SCNC: 105 MEQ/L (ref 95–107)
CLARITY: ABNORMAL
CO2: 25 MEQ/L (ref 20–31)
COLOR: YELLOW
CREAT SERPL-MCNC: 0.85 MG/DL (ref 0.5–0.9)
CRYSTALS, UA: ABNORMAL /HPF
EOSINOPHILS ABSOLUTE: 0.1 K/UL (ref 0–0.7)
EOSINOPHILS RELATIVE PERCENT: 1.8 %
EPITHELIAL CELLS, UA: ABNORMAL /HPF (ref 0–5)
GFR AFRICAN AMERICAN: >60
GFR NON-AFRICAN AMERICAN: >60
GLOBULIN: 2.5 G/DL (ref 2.3–3.5)
GLUCOSE BLD-MCNC: 113 MG/DL (ref 70–99)
GLUCOSE URINE: NEGATIVE MG/DL
HCT VFR BLD CALC: 43.4 % (ref 37–47)
HEMOGLOBIN: 14.8 G/DL (ref 12–16)
HYALINE CASTS: ABNORMAL /HPF (ref 0–5)
KETONES, URINE: NEGATIVE MG/DL
LEUKOCYTE ESTERASE, URINE: ABNORMAL
LIPASE: 20 U/L (ref 12–95)
LYMPHOCYTES ABSOLUTE: 2.2 K/UL (ref 1–4.8)
LYMPHOCYTES RELATIVE PERCENT: 35.5 %
MCH RBC QN AUTO: 30.6 PG (ref 27–31.3)
MCHC RBC AUTO-ENTMCNC: 34.2 % (ref 33–37)
MCV RBC AUTO: 89.7 FL (ref 82–100)
MONOCYTES ABSOLUTE: 0.3 K/UL (ref 0.2–0.8)
MONOCYTES RELATIVE PERCENT: 5.6 %
NEUTROPHILS ABSOLUTE: 3.4 K/UL (ref 1.4–6.5)
NEUTROPHILS RELATIVE PERCENT: 56.1 %
NITRITE, URINE: NEGATIVE
PDW BLD-RTO: 14 % (ref 11.5–14.5)
PH UA: 6.5 (ref 5–9)
PLATELET # BLD: 303 K/UL (ref 130–400)
POC CREATININE WHOLE BLOOD: 0.9
POTASSIUM SERPL-SCNC: 4.6 MEQ/L (ref 3.4–4.9)
PROTEIN UA: NEGATIVE MG/DL
RBC # BLD: 4.84 M/UL (ref 4.2–5.4)
RBC UA: ABNORMAL /HPF (ref 0–2)
SODIUM BLD-SCNC: 141 MEQ/L (ref 135–144)
SPECIFIC GRAVITY UA: 1.02 (ref 1–1.03)
TOTAL PROTEIN: 6.9 G/DL (ref 6.3–8)
URINE REFLEX TO CULTURE: ABNORMAL
UROBILINOGEN, URINE: 0.2 E.U./DL
WBC # BLD: 6.1 K/UL (ref 4.8–10.8)
WBC UA: ABNORMAL /HPF (ref 0–5)
YEAST: PRESENT /HPF

## 2020-10-11 PROCEDURE — 83690 ASSAY OF LIPASE: CPT

## 2020-10-11 PROCEDURE — 99283 EMERGENCY DEPT VISIT LOW MDM: CPT

## 2020-10-11 PROCEDURE — 81001 URINALYSIS AUTO W/SCOPE: CPT

## 2020-10-11 PROCEDURE — 6360000004 HC RX CONTRAST MEDICATION: Performed by: NURSE PRACTITIONER

## 2020-10-11 PROCEDURE — 74177 CT ABD & PELVIS W/CONTRAST: CPT

## 2020-10-11 PROCEDURE — 6360000002 HC RX W HCPCS: Performed by: NURSE PRACTITIONER

## 2020-10-11 PROCEDURE — 96374 THER/PROPH/DIAG INJ IV PUSH: CPT

## 2020-10-11 PROCEDURE — 80053 COMPREHEN METABOLIC PANEL: CPT

## 2020-10-11 PROCEDURE — 36415 COLL VENOUS BLD VENIPUNCTURE: CPT

## 2020-10-11 PROCEDURE — 82150 ASSAY OF AMYLASE: CPT

## 2020-10-11 PROCEDURE — 86850 RBC ANTIBODY SCREEN: CPT

## 2020-10-11 PROCEDURE — 2580000003 HC RX 258: Performed by: NURSE PRACTITIONER

## 2020-10-11 PROCEDURE — 86900 BLOOD TYPING SEROLOGIC ABO: CPT

## 2020-10-11 PROCEDURE — 86901 BLOOD TYPING SEROLOGIC RH(D): CPT

## 2020-10-11 PROCEDURE — 85025 COMPLETE CBC W/AUTO DIFF WBC: CPT

## 2020-10-11 RX ORDER — ONDANSETRON 2 MG/ML
4 INJECTION INTRAMUSCULAR; INTRAVENOUS ONCE
Status: COMPLETED | OUTPATIENT
Start: 2020-10-11 | End: 2020-10-11

## 2020-10-11 RX ORDER — 0.9 % SODIUM CHLORIDE 0.9 %
1000 INTRAVENOUS SOLUTION INTRAVENOUS ONCE
Status: COMPLETED | OUTPATIENT
Start: 2020-10-11 | End: 2020-10-11

## 2020-10-11 RX ORDER — MAGNESIUM CARB/ALUMINUM HYDROX 105-160MG
296 TABLET,CHEWABLE ORAL ONCE
Qty: 1 BOTTLE | Refills: 0 | Status: SHIPPED | OUTPATIENT
Start: 2020-10-11 | End: 2020-10-11

## 2020-10-11 RX ADMIN — IOPAMIDOL 100 ML: 612 INJECTION, SOLUTION INTRAVENOUS at 11:16

## 2020-10-11 RX ADMIN — SODIUM CHLORIDE 1000 ML: 9 INJECTION, SOLUTION INTRAVENOUS at 10:18

## 2020-10-11 RX ADMIN — ONDANSETRON 4 MG: 2 INJECTION INTRAMUSCULAR; INTRAVENOUS at 10:18

## 2020-10-11 ASSESSMENT — ENCOUNTER SYMPTOMS
COUGH: 0
TROUBLE SWALLOWING: 0
SORE THROAT: 0
WHEEZING: 0
BACK PAIN: 0
NAUSEA: 1
SINUS PAIN: 0
SHORTNESS OF BREATH: 0
VOMITING: 0
ABDOMINAL PAIN: 1
DIARRHEA: 0

## 2020-10-11 ASSESSMENT — PAIN DESCRIPTION - PAIN TYPE: TYPE: ACUTE PAIN

## 2020-10-11 ASSESSMENT — PAIN SCALES - GENERAL: PAINLEVEL_OUTOF10: 7

## 2020-10-11 ASSESSMENT — PAIN DESCRIPTION - LOCATION: LOCATION: ABDOMEN

## 2020-10-11 ASSESSMENT — PAIN DESCRIPTION - FREQUENCY: FREQUENCY: CONTINUOUS

## 2020-10-11 ASSESSMENT — PAIN DESCRIPTION - ORIENTATION: ORIENTATION: LOWER;MID

## 2020-10-11 ASSESSMENT — PAIN DESCRIPTION - DESCRIPTORS: DESCRIPTORS: ACHING

## 2020-10-11 NOTE — ED PROVIDER NOTES
[address] [facility] ED  eMERGENCY dEPARTMENT eNCOUnter      Pt Name: Elijah Rivera  MRN: [id number]  [place] 1960  Date of evaluation: 10/11/2020  Provider: ARASH Hui CNP      HISTORY OF PRESENT ILLNESS    Elijah Rivera is a 62 y.o. female who presents to the Emergency Department with lower abdominal pain x 3 days. Patient states she has been having tarry stools x 1 month. She does take an iron supplement. She states her appetitie has been fair for the last 3 days ans she is able to keep food and drink down with some nausea. Denies fever, vomiting or recent illness. Pain is moderate. REVIEW OF SYSTEMS       Review of Systems   Constitutional: Negative for activity change, appetite change and fever. HENT: Negative for congestion, drooling, ear pain, sinus pain, sore throat and trouble swallowing. Respiratory: Negative for cough, shortness of breath and wheezing. Cardiovascular: Negative for chest pain. Gastrointestinal: Positive for abdominal pain and nausea. Negative for diarrhea and vomiting. Genitourinary: Negative for dysuria. Musculoskeletal: Negative for arthralgias and back pain. Skin: Negative for rash. All other systems reviewed and are negative.         PAST MEDICAL HISTORY     Past Medical History:   Diagnosis Date    Asthma 2015    Bilateral renal cysts 2013    Depression     since age 35, was with Dr Kendrick Devries, now the [facility]    Diverticulosis of sigmoid colon 2012    Dr Karen VILLANUEVA of left shoulder     Environmental allergies     Fatty liver 2013    GERD (gastroesophageal reflux disease)     History of cardiac arrhythmia     \"due to stress, was placed on medication\"    Hydatidiform mole     age 26     Hyperlipidemia     Hypothyroidism 2011    IFG (impaired fasting glucose) 2013    Melanoma of eye ([facility] [address]     age 30, R eye prosthesis, Dr Jeannie Fried    Obesity     Prediabetes     PTSD (post-traumatic stress disorder)     age 35, [employer]    S/P colonoscopy 04/19/2012    Dr. Toney Dent S/P hysterectomy with oophorectomy 2012    Dr Ace Gay Vitamin D deficiency          SURGICAL HISTORY       Past Surgical History:   Procedure Laterality Date    DILATION AND CURETTAGE OF UTERUS      ENDOMETRIAL ABLATION  6/2012    EYE REMOVAL      OD for melanoma, age 30    FOOT OSTEOTOMY Left 09/23/2016    B AUGUSTINA DPM      LINDSAY AND BSO  2012    Dr Wandy Mancia       Previous Medications    ALBUTEROL SULFATE HFA (PROVENTIL HFA) 108 (90 BASE) MCG/ACT INHALER    Inhale 2 puffs into the lungs every 4 hours as needed for Wheezing or Shortness of Breath Space out to every 6 hours as symptoms improve. ALBUTEROL SULFATE  (90 BASE) MCG/ACT INHALER    USE 2 PUFFS EVERY 4 HRS AS NEEDED FOR WHEEZING/SOB-SPACE OUT TO EVERY 6 HR AS SYMPTOMS IMPROVE    ARIPIPRAZOLE (ABILIFY) 15 MG TABLET    TAKE 1 TABLET BY MOUTH EVERY DAY    ASPIRIN EC 81 MG EC TABLET    Take 1 tablet by mouth daily    BENZONATATE (TESSALON) 100 MG CAPSULE    Take 100 mg by mouth 2 times daily as needed for Cough    BENZTROPINE (COGENTIN) 0.5 MG TABLET    Take by mouth daily     BIOTIN 300 MCG TABS    Take 1 tablet by mouth daily    BUDESONIDE-FORMOTEROL (SYMBICORT) 160-4.5 MCG/ACT AERO    Inhale 2 puffs into the lungs 2 times daily    BUPROPION (WELLBUTRIN XL) 300 MG XL TABLET    Take 1 tablet by mouth daily    CETIRIZINE (ZYRTEC) 10 MG TABLET    TAKE 1 TABLET BY MOUTH EVERY DAY AS NEEDED FOR ALLERGIES    CYANOCOBALAMIN (CVS VITAMIN B12) 1000 MCG TABLET    Take 1 tablet by mouth daily    EPINEPHRINE (EPIPEN 2-PRAVEENA) 0.3 MG/0.3ML SOAJ INJECTION    Use as directed for allergic reaction    FAMOTIDINE (PEPCID) 20 MG TABLET    Take 1 tablet by mouth 2 times daily    FLUTICASONE (FLONASE) 50 MCG/ACT NASAL SPRAY    USE 1 SPRAY INTO EACH NOSTRIL DAILY    HYDROCORTISONE 1 % OINTMENT    Apply topically 2 times daily on the affected area for 1 week. LEVOTHYROXINE (SYNTHROID) 50 MCG TABLET    Take 1 tablet by mouth every morning    METFORMIN (GLUCOPHAGE) 500 MG TABLET    TAKE 1 TABLET BY MOUTH TWICE A DAY WITH MEALS    MULTIPLE VITAMIN (DAILY-MANDI) TABS    TAKE 1 TABLET BY MOUTH EVERY DAY    ONDANSETRON (ZOFRAN) 4 MG TABLET    Take 1 tablet by mouth daily as needed for Nausea or Vomiting    PAIN & FEVER EXTRA STRENGTH 500 MG TABLET    TAKE 1 TABLET BY MOUTH EVERY 6 HOURS AS NEEDED FOR FEVER UP TO 7 DAYS MAX 6 TABS IN 24 HOURS    PRAVASTATIN (PRAVACHOL) 40 MG TABLET    TAKE 1 TABLET BY MOUTH EVERY DAY IN THE EVENING    PROPRANOLOL (INDERAL) 40 MG TABLET    Take 1 tab po daily at HS    TOPIRAMATE (TOPAMAX) 50 MG TABLET    TAKE 1 TABLET BY MOUTH EVERY DAY AT NIGHT    TRAZODONE (DESYREL) 50 MG TABLET    Take by mouth nightly 1-2 tablets nightly    VITAMIN B-2 (RIBOFLAVIN) 100 MG TABS TABLET    Take 1 tablet by mouth daily    VITAMIN D (D3-1000) 25 MCG (1000 UT) CAPS    Take 1 capsule by mouth Daily with supper       ALLERGIES     Patient has no known allergies.     FAMILY HISTORY       Family History   Problem Relation Age of Onset    Heart Failure Mother         dec 76    Diabetes Mother     Diabetes Father     Stroke Father         dec age 80    Cancer Father         Sarcoma    Breast Cancer Sister     Stomach Cancer Other           SOCIAL HISTORY       Social History     Socioeconomic History    Marital status: Single     Spouse name: None    Number of children: 3    Years of education: None    Highest education level: None   Occupational History    Occupation: SSI   Social Needs    Financial resource strain: None    Food insecurity     Worry: None     Inability: None    Transportation needs     Medical: None     Non-medical: None   Tobacco Use    Smoking status: Never Smoker    Smokeless tobacco: Never Used   Substance and Sexual Activity    Alcohol use: No    Drug use: No    Sexual activity: None   Lifestyle    Physical activity     Days per week: None     Minutes per session: None    Stress: None   Relationships    Social connections     Talks on phone: None     Gets together: None     Attends Yarsanism service: None     Active member of club or organization: None     Attends meetings of clubs or organizations: None     Relationship status: None    Intimate partner violence     Fear of current or ex partner: None     Emotionally abused: None     Physically abused: None     Forced sexual activity: None   Other Topics Concern    None   Social History Narrative    Born in [place], one of 4 children, one sister disappeared at age 29, never found    Moved to [place] at age 40        Lives in an apartment in [facility], alone    , 3 children, all grown, in [place]     2 granddaughters     Hobbies exercising, birds    Attends [employer], has volunteered at [facility] [address]    (up to 7 for level 4, 8 or more for level 5)     ED Triage Vitals   BP Temp Temp src Pulse Resp SpO2 Height Weight   -- -- -- -- -- -- -- --       Physical Exam  Vitals signs and nursing note reviewed. Constitutional:       Appearance: She is well-developed. HENT:      Head: Normocephalic and atraumatic. Right Ear: External ear normal.      Left Ear: External ear normal.      Nose: Nose normal.      Mouth/Throat:      Lips: Pink. Mouth: Mucous membranes are moist.      Pharynx: Oropharynx is clear. Eyes:      Conjunctiva/sclera: Conjunctivae normal.      Pupils: Pupils are equal, round, and reactive to light. Neck:      Musculoskeletal: Normal range of motion and neck supple. Cardiovascular:      Rate and Rhythm: Normal rate and regular rhythm. Heart sounds: Normal heart sounds. Pulmonary:      Effort: Pulmonary effort is normal. No accessory muscle usage or respiratory distress. Breath sounds: Normal breath sounds. No decreased air movement. No decreased breath sounds or wheezing.    Abdominal: General: Bowel sounds are normal. There is no distension. Palpations: Abdomen is soft. Tenderness: There is abdominal tenderness in the right lower quadrant and left lower quadrant. There is no right CVA tenderness or left CVA tenderness. Genitourinary:     Rectum: Normal. Guaiac result negative. No mass, tenderness, anal fissure, external hemorrhoid or internal hemorrhoid. Normal anal tone. Musculoskeletal: Normal range of motion. Skin:     General: Skin is warm and dry. Neurological:      Mental Status: She is alert and oriented to person, place, and time. Deep Tendon Reflexes: Reflexes are normal and symmetric. Psychiatric:         Judgment: Judgment normal.           All other labs were within normal range or not returned as of this dictation. EMERGENCY DEPARTMENT COURSE and DIFFERENTIALDIAGNOSIS/MDM:   Vitals:    Vitals:    10/11/20 0947 10/11/20 1200   BP: (!) 128/91 131/76   Pulse: 75    Resp: 20    Temp: 99.2 °F (37.3 °C)    TempSrc: Oral    SpO2: 96% 99%   Weight: 220 lb (99.8 kg)    Height: 5' 6\" (1.676 m)             62 yr old female with constipation. Prescription for magnesium citrate was given to the patient. F/U With PCP in 2 days. Patient is comfortable at discharge, drinking pop without difficulty. Patient verbalizes understanding. PROCEDURES:  Unless otherwise noted below, none     Procedures      FINAL IMPRESSION      1.  Constipation, unspecified constipation type          DISPOSITION/PLAN   DISPOSITION Decision To Discharge 10/11/2020 12:14:42 PM          ARASH Goodwin CNP (electronically signed)  Attending Emergency Physician     ARASH Goodwin CNP  10/11/20 3237

## 2020-10-11 NOTE — ED TRIAGE NOTES
Pt states that she feeling weak, having lower ABD pain, and headache. Pt states that she is also having dark stool \"black tar\". Pt states that this has been going on for x1 month, pt states that ABD pain and other symptoms started 2 days ago.

## 2020-10-11 NOTE — ED NOTES
Patient given DC instructions education and script  IV discontinued  To FU with PCP  Up with steady gait and guided to waiting room to call insurance for ride   Denies questions at time of 90 Monika Fields RN  10/11/20 0580

## 2020-10-11 NOTE — ED NOTES
Bed: 16  Expected date: 10/11/20  Expected time: 9:41 AM  Means of arrival: [facility]  Comments:  61 yo female  General Illness  130/76  HR 82  96% KARLA Tapia RN  10/11/20 2033

## 2020-10-12 ENCOUNTER — HOSPITAL ENCOUNTER (EMERGENCY)
Age: 60
Discharge: HOME | End: 2020-10-12
Payer: COMMERCIAL

## 2020-10-12 ENCOUNTER — CARE COORDINATION (OUTPATIENT)
Dept: CARE COORDINATION | Age: 60
End: 2020-10-12

## 2020-10-12 VITALS
HEART RATE: 70 BPM | WEIGHT: 220 LBS | HEIGHT: 66 IN | BODY MASS INDEX: 35.36 KG/M2 | RESPIRATION RATE: 14 BRPM | SYSTOLIC BLOOD PRESSURE: 121 MMHG | TEMPERATURE: 98.6 F | DIASTOLIC BLOOD PRESSURE: 76 MMHG | OXYGEN SATURATION: 98 %

## 2020-10-12 LAB
ALBUMIN SERPL-MCNC: 3.8 G/DL (ref 3.5–4.6)
ALP BLD-CCNC: 57 U/L (ref 40–130)
ALT SERPL-CCNC: 18 U/L (ref 0–33)
ANION GAP SERPL CALCULATED.3IONS-SCNC: 7 MEQ/L (ref 9–15)
AST SERPL-CCNC: 20 U/L (ref 0–35)
BASOPHILS ABSOLUTE: 0.1 K/UL (ref 0–0.2)
BASOPHILS RELATIVE PERCENT: 0.9 %
BILIRUB SERPL-MCNC: 0.3 MG/DL (ref 0.2–0.7)
BILIRUBIN URINE: NEGATIVE
BLOOD, URINE: NEGATIVE
BUN BLDV-MCNC: 16 MG/DL (ref 6–20)
CALCIUM SERPL-MCNC: 8.6 MG/DL (ref 8.5–9.9)
CHLORIDE BLD-SCNC: 106 MEQ/L (ref 95–107)
CLARITY: CLEAR
CO2: 27 MEQ/L (ref 20–31)
COLOR: YELLOW
CREAT SERPL-MCNC: 0.77 MG/DL (ref 0.5–0.9)
EKG ATRIAL RATE: 76 BPM
EKG P AXIS: 55 DEGREES
EKG P-R INTERVAL: 142 MS
EKG Q-T INTERVAL: 400 MS
EKG QRS DURATION: 94 MS
EKG QTC CALCULATION (BAZETT): 450 MS
EKG R AXIS: -14 DEGREES
EKG T AXIS: 40 DEGREES
EKG VENTRICULAR RATE: 76 BPM
EOSINOPHILS ABSOLUTE: 0.1 K/UL (ref 0–0.7)
EOSINOPHILS RELATIVE PERCENT: 1.3 %
GFR AFRICAN AMERICAN: >60
GFR AFRICAN AMERICAN: >60
GFR NON-AFRICAN AMERICAN: >60
GFR NON-AFRICAN AMERICAN: >60
GLOBULIN: 2.1 G/DL (ref 2.3–3.5)
GLUCOSE BLD-MCNC: 112 MG/DL (ref 70–99)
GLUCOSE URINE: NEGATIVE MG/DL
HCT VFR BLD CALC: 42.8 % (ref 37–47)
HEMOGLOBIN: 14.6 G/DL (ref 12–16)
KETONES, URINE: NEGATIVE MG/DL
LEUKOCYTE ESTERASE, URINE: NEGATIVE
LIPASE: 19 U/L (ref 12–95)
LYMPHOCYTES ABSOLUTE: 2.2 K/UL (ref 1–4.8)
LYMPHOCYTES RELATIVE PERCENT: 37.1 %
MCH RBC QN AUTO: 30.9 PG (ref 27–31.3)
MCHC RBC AUTO-ENTMCNC: 34.2 % (ref 33–37)
MCV RBC AUTO: 90.3 FL (ref 82–100)
MONOCYTES ABSOLUTE: 0.4 K/UL (ref 0.2–0.8)
MONOCYTES RELATIVE PERCENT: 6.9 %
NEUTROPHILS ABSOLUTE: 3.2 K/UL (ref 1.4–6.5)
NEUTROPHILS RELATIVE PERCENT: 53.8 %
NITRITE, URINE: NEGATIVE
PDW BLD-RTO: 14.3 % (ref 11.5–14.5)
PERFORMED ON: NORMAL
PH UA: 7 (ref 5–9)
PLATELET # BLD: 240 K/UL (ref 130–400)
POC CREATININE: 0.9 MG/DL (ref 0.6–1.1)
POC SAMPLE TYPE: NORMAL
POTASSIUM SERPL-SCNC: 4.1 MEQ/L (ref 3.4–4.9)
PROTEIN UA: NEGATIVE MG/DL
RBC # BLD: 4.74 M/UL (ref 4.2–5.4)
REASON FOR REJECTION: NORMAL
REJECTED TEST: NORMAL
SODIUM BLD-SCNC: 140 MEQ/L (ref 135–144)
SPECIFIC GRAVITY UA: 1.02 (ref 1–1.03)
TOTAL PROTEIN: 5.9 G/DL (ref 6.3–8)
URINE REFLEX TO CULTURE: NORMAL
UROBILINOGEN, URINE: 0.2 E.U./DL
WBC # BLD: 6 K/UL (ref 4.8–10.8)

## 2020-10-12 PROCEDURE — 96374 THER/PROPH/DIAG INJ IV PUSH: CPT

## 2020-10-12 PROCEDURE — 99285 EMERGENCY DEPT VISIT HI MDM: CPT

## 2020-10-12 PROCEDURE — 81003 URINALYSIS AUTO W/O SCOPE: CPT

## 2020-10-12 PROCEDURE — 80053 COMPREHEN METABOLIC PANEL: CPT

## 2020-10-12 PROCEDURE — 6360000002 HC RX W HCPCS: Performed by: PHYSICIAN ASSISTANT

## 2020-10-12 PROCEDURE — 99283 EMERGENCY DEPT VISIT LOW MDM: CPT

## 2020-10-12 PROCEDURE — 85025 COMPLETE CBC W/AUTO DIFF WBC: CPT

## 2020-10-12 PROCEDURE — 93005 ELECTROCARDIOGRAM TRACING: CPT | Performed by: PHYSICIAN ASSISTANT

## 2020-10-12 PROCEDURE — 83690 ASSAY OF LIPASE: CPT

## 2020-10-12 PROCEDURE — 2580000003 HC RX 258: Performed by: PHYSICIAN ASSISTANT

## 2020-10-12 PROCEDURE — 36415 COLL VENOUS BLD VENIPUNCTURE: CPT

## 2020-10-12 RX ORDER — 0.9 % SODIUM CHLORIDE 0.9 %
1000 INTRAVENOUS SOLUTION INTRAVENOUS ONCE
Status: COMPLETED | OUTPATIENT
Start: 2020-10-12 | End: 2020-10-12

## 2020-10-12 RX ORDER — ONDANSETRON 2 MG/ML
4 INJECTION INTRAMUSCULAR; INTRAVENOUS ONCE
Status: COMPLETED | OUTPATIENT
Start: 2020-10-12 | End: 2020-10-12

## 2020-10-12 RX ADMIN — ONDANSETRON 4 MG: 2 INJECTION INTRAMUSCULAR; INTRAVENOUS at 08:54

## 2020-10-12 RX ADMIN — SODIUM CHLORIDE 1000 ML: 9 INJECTION, SOLUTION INTRAVENOUS at 08:54

## 2020-10-12 ASSESSMENT — PAIN DESCRIPTION - DESCRIPTORS: DESCRIPTORS: PRESSURE

## 2020-10-12 ASSESSMENT — ENCOUNTER SYMPTOMS
SORE THROAT: 0
CHOKING: 0
COLOR CHANGE: 0
ABDOMINAL PAIN: 0
DIARRHEA: 0
RHINORRHEA: 0
CONSTIPATION: 0
SHORTNESS OF BREATH: 0
VOMITING: 0
EYE DISCHARGE: 0
NAUSEA: 1
COUGH: 0
BLOOD IN STOOL: 1
ABDOMINAL DISTENTION: 0

## 2020-10-12 ASSESSMENT — PAIN DESCRIPTION - ORIENTATION: ORIENTATION: LOWER

## 2020-10-12 ASSESSMENT — PAIN SCALES - GENERAL: PAINLEVEL_OUTOF10: 8

## 2020-10-12 ASSESSMENT — PAIN DESCRIPTION - PAIN TYPE: TYPE: CHRONIC PAIN

## 2020-10-12 ASSESSMENT — PAIN DESCRIPTION - LOCATION: LOCATION: ABDOMEN

## 2020-10-12 ASSESSMENT — PAIN DESCRIPTION - ONSET: ONSET: ON-GOING

## 2020-10-12 ASSESSMENT — PAIN DESCRIPTION - FREQUENCY: FREQUENCY: CONTINUOUS

## 2020-10-12 NOTE — ED TRIAGE NOTES
Pt states lower abd pain with rectal bleeding, c/o dizziness and nausea. Pt is A&Ox4, skin intact, msps intact, afebrile, breaths are equal and unlabored, lung sounds clear.

## 2020-10-12 NOTE — CARE COORDINATION
Contacted patient by phone. Patient currently in [address] [facility]. Informed patient that I will reach out to her after she is discharged from ER.

## 2020-10-12 NOTE — ED PROVIDER NOTES
[address] [facility] ED  eMERGENCY dEPARTMENT eNCOUnter      Pt Name: Chantal Lopez  MRN: [id number]  [place] 1960  Date of evaluation: 10/12/2020  Provider: Carlos Vanegas PA-C    CHIEF COMPLAINT       Chief Complaint   Patient presents with    Rectal Bleeding     nausea,+ weakness. HISTORY OF PRESENT ILLNESS   (Location/Symptom, Timing/Onset,Context/Setting, Quality, Duration, Modifying Factors, Severity)  Note limiting factors. Chantal Lopez is a 62 y.o. female who presents to the emergency department with a complaint of nausea, general weakness, and rectal bleeding which patient states she noticed yesterday. She states this is a GI upset for the last couple of days and is been using Pepto-Bismol. She states yesterday she noticed some black or dark stool. She states she feels weak and fatigued. She has nausea, and ongoing diarrhea. She is rating her current pain at this time is 8 out of 10 generalized abdominal pain. HPI    NursingNotes were reviewed. REVIEW OF SYSTEMS    (2-9 systems for level 4, 10 or more for level 5)     Review of Systems   Constitutional: Negative for activity change and appetite change. HENT: Negative for congestion, ear discharge, ear pain, nosebleeds, rhinorrhea and sore throat. Eyes: Negative for discharge. Respiratory: Negative for cough, choking and shortness of breath. Cardiovascular: Negative for chest pain, palpitations and leg swelling. Gastrointestinal: Positive for blood in stool and nausea. Negative for abdominal distention, abdominal pain, constipation, diarrhea and vomiting. Genitourinary: Negative for difficulty urinating, dysuria and flank pain. Musculoskeletal: Negative for arthralgias. Skin: Negative for color change, pallor, rash and wound. Neurological: Positive for weakness. Negative for dizziness, tremors, syncope, numbness and headaches. Psychiatric/Behavioral: Negative for agitation and confusion. Exam  Vitals signs and nursing note reviewed. Constitutional:       General: She is not in acute distress. Appearance: Normal appearance. She is well-developed. She is not ill-appearing, toxic-appearing or diaphoretic. HENT:      Head: Normocephalic. Right Ear: Tympanic membrane normal.      Left Ear: Tympanic membrane normal.      Nose: Nose normal. No congestion. Mouth/Throat:      Mouth: Mucous membranes are moist.      Pharynx: No oropharyngeal exudate or posterior oropharyngeal erythema. Eyes:      Extraocular Movements: Extraocular movements intact. Conjunctiva/sclera: Conjunctivae normal.      Pupils: Pupils are equal, round, and reactive to light. Neck:      Musculoskeletal: Normal range of motion and neck supple. No neck rigidity. Vascular: No JVD. Trachea: No tracheal deviation. Cardiovascular:      Rate and Rhythm: Normal rate. Pulses: Normal pulses. Heart sounds: Normal heart sounds. No murmur. No friction rub. No gallop. Pulmonary:      Effort: Pulmonary effort is normal. No tachypnea, accessory muscle usage, respiratory distress or retractions. Breath sounds: No stridor. No wheezing, rhonchi or rales. Chest:      Chest wall: No tenderness. Abdominal:      General: Abdomen is flat. Bowel sounds are normal. There is no distension or abdominal bruit. Palpations: Abdomen is soft. There is no shifting dullness, fluid wave, hepatomegaly, splenomegaly, mass or pulsatile mass. Tenderness: There is no abdominal tenderness. There is no right CVA tenderness, left CVA tenderness, guarding or rebound. Negative signs include Cannon's sign, Rovsing's sign and McBurney's sign. Genitourinary:     Rectum: Guaiac result negative. Comments: Rectal exam was completed with nurse Juhi present, there is no signs of internal or external hemorrhoids, stool is light green in color there is no gross blood, and guaiac is negative.   Musculoskeletal: otherwise noted below, none     Procedures    FINAL IMPRESSION      1. Diarrhea, unspecified type    2.  Dark stools          DISPOSITION/PLAN   DISPOSITION Decision To Discharge 10/12/2020 11:05:48 AM      PATIENT REFERRED TO:  Reid Almanzar MD  [address] [address] [phone]    In 3 days        DISCHARGE MEDICATIONS:  New Prescriptions    No medications on file          (Please note that portions of this note were completed with a voice recognition program.  Efforts were made to edit the dictations but occasionally words are mis-transcribed.)    Eddie Brandon PA-C (electronically signed)  Attending Emergency Physician         Eddie Brandon PA-C  10/12/20 1105

## 2020-10-12 NOTE — ED NOTES
Bed: 07  Expected date: 10/12/20  Expected time: 8:15 AM  Means of arrival: [facility]  Comments:  Rectal bleeding vss.       April Worthy JARAD Pruett  10/12/20 8648

## 2020-10-13 ENCOUNTER — TELEPHONE (OUTPATIENT)
Dept: PHARMACY | Facility: CLINIC | Age: 60
End: 2020-10-13

## 2020-10-13 ENCOUNTER — CARE COORDINATION (OUTPATIENT)
Dept: CARE COORDINATION | Age: 60
End: 2020-10-13

## 2020-10-13 PROCEDURE — 93010 ELECTROCARDIOGRAM REPORT: CPT | Performed by: INTERNAL MEDICINE

## 2020-10-13 RX ORDER — ASPIRIN 81 MG/1
81 TABLET ORAL DAILY
Qty: 90 TABLET | Refills: 1 | Status: SHIPPED | OUTPATIENT
Start: 2020-10-13 | End: 2020-11-24 | Stop reason: SDUPTHER

## 2020-10-13 RX ORDER — FLUTICASONE PROPIONATE 50 MCG
SPRAY, SUSPENSION (ML) NASAL
Qty: 1 BOTTLE | Refills: 0 | Status: SHIPPED | OUTPATIENT
Start: 2020-10-13 | End: 2021-05-06 | Stop reason: ALTCHOICE

## 2020-10-13 RX ORDER — LEVOTHYROXINE SODIUM 0.05 MG/1
50 TABLET ORAL EVERY MORNING
Qty: 90 TABLET | Refills: 1 | Status: SHIPPED | OUTPATIENT
Start: 2020-10-13 | End: 2020-11-13 | Stop reason: SDUPTHER

## 2020-10-13 RX ORDER — PROPRANOLOL HYDROCHLORIDE 40 MG/1
TABLET ORAL
Qty: 90 TABLET | Refills: 1 | Status: SHIPPED | OUTPATIENT
Start: 2020-10-13 | End: 2020-10-27

## 2020-10-13 RX ORDER — BENZTROPINE MESYLATE 0.5 MG/1
0.5 TABLET ORAL DAILY
Qty: 90 TABLET | Refills: 0 | Status: SHIPPED | OUTPATIENT
Start: 2020-10-13 | End: 2020-10-27

## 2020-10-13 RX ORDER — FAMOTIDINE 20 MG/1
20 TABLET, FILM COATED ORAL 2 TIMES DAILY
Qty: 60 TABLET | Refills: 2 | Status: SHIPPED | OUTPATIENT
Start: 2020-10-13 | End: 2020-11-13 | Stop reason: SDUPTHER

## 2020-10-13 SDOH — ECONOMIC STABILITY: FOOD INSECURITY: WITHIN THE PAST 12 MONTHS, THE FOOD YOU BOUGHT JUST DIDN'T LAST AND YOU DIDN'T HAVE MONEY TO GET MORE.: SOMETIMES TRUE

## 2020-10-13 SDOH — ECONOMIC STABILITY: INCOME INSECURITY: HOW HARD IS IT FOR YOU TO PAY FOR THE VERY BASICS LIKE FOOD, HOUSING, MEDICAL CARE, AND HEATING?: NOT VERY HARD

## 2020-10-13 SDOH — HEALTH STABILITY: MENTAL HEALTH
STRESS IS WHEN SOMEONE FEELS TENSE, NERVOUS, ANXIOUS, OR CAN'T SLEEP AT NIGHT BECAUSE THEIR MIND IS TROUBLED. HOW STRESSED ARE YOU?: RATHER MUCH

## 2020-10-13 SDOH — HEALTH STABILITY: PHYSICAL HEALTH: ON AVERAGE, HOW MANY DAYS PER WEEK DO YOU ENGAGE IN MODERATE TO STRENUOUS EXERCISE (LIKE A BRISK WALK)?: 0 DAYS

## 2020-10-13 SDOH — SOCIAL STABILITY: SOCIAL NETWORK
DO YOU BELONG TO ANY CLUBS OR ORGANIZATIONS SUCH AS CHURCH GROUPS UNIONS, FRATERNAL OR ATHLETIC GROUPS, OR SCHOOL GROUPS?: NO

## 2020-10-13 SDOH — SOCIAL STABILITY: SOCIAL NETWORK: ARE YOU MARRIED, WIDOWED, DIVORCED, SEPARATED, NEVER MARRIED, OR LIVING WITH A PARTNER?: DIVORCED

## 2020-10-13 SDOH — SOCIAL STABILITY: SOCIAL NETWORK: HOW OFTEN DO YOU GET TOGETHER WITH FRIENDS OR RELATIVES?: NEVER

## 2020-10-13 SDOH — SOCIAL STABILITY: SOCIAL NETWORK: IN A TYPICAL WEEK, HOW MANY TIMES DO YOU TALK ON THE PHONE WITH FAMILY, FRIENDS, OR NEIGHBORS?: TWICE A WEEK

## 2020-10-13 SDOH — HEALTH STABILITY: PHYSICAL HEALTH: ON AVERAGE, HOW MANY MINUTES DO YOU ENGAGE IN EXERCISE AT THIS LEVEL?: 0 MIN

## 2020-10-13 SDOH — SOCIAL STABILITY: SOCIAL NETWORK: HOW OFTEN DO YOU ATTEND CHURCH OR RELIGIOUS SERVICES?: NEVER

## 2020-10-13 SDOH — ECONOMIC STABILITY: FOOD INSECURITY: WITHIN THE PAST 12 MONTHS, YOU WORRIED THAT YOUR FOOD WOULD RUN OUT BEFORE YOU GOT MONEY TO BUY MORE.: SOMETIMES TRUE

## 2020-10-13 SDOH — ECONOMIC STABILITY: TRANSPORTATION INSECURITY
IN THE PAST 12 MONTHS, HAS LACK OF TRANSPORTATION KEPT YOU FROM MEETINGS, WORK, OR FROM GETTING THINGS NEEDED FOR DAILY LIVING?: NO

## 2020-10-13 SDOH — ECONOMIC STABILITY: TRANSPORTATION INSECURITY
IN THE PAST 12 MONTHS, HAS THE LACK OF TRANSPORTATION KEPT YOU FROM MEDICAL APPOINTMENTS OR FROM GETTING MEDICATIONS?: NO

## 2020-10-13 SDOH — SOCIAL STABILITY: SOCIAL NETWORK: HOW OFTEN DO YOU ATTENT MEETINGS OF THE CLUB OR ORGANIZATION YOU BELONG TO?: NEVER

## 2020-10-13 NOTE — LETTER
10/13/2020    Vargas Isaac  [address] [facility] [place] [address]    Dear Vargas Isaac,    I enjoyed speaking with you and wanted to send some additional information. Jessica Virk MD and I will work together to ensure your needs are met and help you achieve your health goals. We are committed to walk with you on this journey and look forward to working with you. Please feel free to contact me with any questions or concerns. I am available by phone or for appointments at the office. You can reach me at [phone].       In good health,       Julee Mancuso RN       Encl: Asthma Action Plan and Asthma Triggers

## 2020-10-13 NOTE — CARE COORDINATION
Patient contacted regarding recent discharge and COVID-19 risk. Discussed COVID-19 related testing which was not done at this time. Test results were not done. Patient informed of results, if available? N/A     Care Transition Nurse/ Ambulatory Care Manager contacted the patient by telephone to perform post discharge assessment. Verified name and  with patient as identifiers. Patient has following risk factors of: asthma. CTN/ACM reviewed discharge instructions, medical action plan and red flags related to discharge diagnosis. Reviewed and educated them on any new and changed medications related to discharge diagnosis. Advised obtaining a 90-day supply of all daily and as-needed medications. Education provided regarding infection prevention, and signs and symptoms of COVID-19 and when to seek medical attention with patient who verbalized understanding. Discussed exposure protocols and quarantine from [address] you at higher risk for severe illness  and given an opportunity for questions and concerns. The patient agrees to contact the COVID-19 hotline [phone] or PCP office for questions related to their healthcare. CTN/ACM provided contact information for future reference. From CDC: Are you at higher risk for severe illness?  Wash your hands often.  Avoid close contact (6 feet, which is about two arm lengths) with people who are sick.  Put distance between yourself and other people if COVID-19 is spreading in your community.  Clean and disinfect frequently touched surfaces.  Avoid all cruise travel and non-essential air travel.  Call your healthcare professional if you have concerns about COVID-19 and your underlying condition or if you are sick. For more information on steps you can take to protect yourself, see CDC's How to Protect Yourself    No further out reach planned at this time based on severity of symptoms and risk factors.

## 2020-10-13 NOTE — CARE COORDINATION
Ambulatory Care Coordination Note  10/13/2020  CM Risk Score: 2  Charlson 10 Year Mortality Risk Score: 79%     ACC: Leonarda Berkowitz RN    Summary Note:  Patient contacted by phone for Care Coordination enrollment. Introduced myself and the Care Coordination program. Patient agrees to participate in program. Completed initial Care Coordination assessment. Reconciled home medications. Patient requested refills on some of her medications. Discussed Clinical Rx referral. Patient verbalizes agreement. Discussed referral to [address] [place] [address]. Patient declined referral. Reviewed ACP documents. Patient does not have a Health Care Decision Maker. Patient does not have an Advance Directive Living Will . Referral to ACP. Patient verbalizes agreement. Patient indicates she will need assistance filling out forms. Instructed patient on availability of same day, next day, and Walk-In care. Discussed [facility]  referral for transportation and community resources. Patient declined. Referral to Clinical Rx for ACC review made by In-Basket messages. Referral to ACP. Refill request routed to Juan Grant MD.    A CC Welcome Letter with Asthma Action Plan and Asthma Triggers printed and mailed to patients home address. General Assessment    Do you have any symptoms that are causing concern?:  Yes  Progression since Onset:  Intermittent - Waxing/Waning  Reported Symptoms:  Nausea           Ambulatory Care Coordination Assessment    Care Coordination Protocol  Program Enrollment:  Rising Risk  Referral from Primary Care Provider:  No  Week 1 - Initial Assessment     Do you have all of your prescriptions and are they filled?:  Yes  Barriers to medication adherence:  None  Are you able to afford your medications?:  Yes  How often do you have trouble taking your medications the way you have been told to take them?:  Sometimes I take them as prescribed.      Do you have Home O2 Therapy?:  No      Ability to seek help/take action for Emergent Urgent situations i.e. fire, crime, inclement weather or health crisis. :  Independent  Ability to ambulate to restroom:  Independent  Ability handle personal hygeine needs (bathing/dressing/grooming): Independent  Ability to manage Medications: Independent  Ability to prepare Food Preparation:  Independent  Ability to maintain home (clean home, laundry): Independent  Ability to drive and/or has transportation:  Needs Assistance  Ability to do shopping:  Needs Assistance  Ability to manage finances: Independent  Is patient able to live independently?:  Yes     Current Housing:  Apartment        Per the Fall Risk Screening, did the patient have 2 or more falls or 1 fall with injury in the past year?:  No     Frequent urination at night?:  Yes  Do you use rails/bars?:  No  Do you have a non-slip tub mat?:  No     Are you experiencing loss of meaning?:  No  Are you experiencing loss of hope and peace?:  No     Thinking about your patient's physical health needs, are there any symptoms or problems (risk indicators) you are unsure about that require further investigation?:  No identified areas of uncertainly or problems already being investigated   Are the patients physical health problems impacting on their mental well-being?:  Mild impact on mental well-being e.g. \"\"feeling fed-up\"\", \"\"reduced enjoyment\"\"   Are there any problems with your patients lifestyle behaviors (alcohol, drugs, diet, exercise) that are impacting on physical or mental well-being?:  No identified areas of concern   Do you have any other concerns about your patients mental well-being?  How would you rate their severity and impact on the patient?:  Mild problems - don't interfere with function   How would you rate their home environment in terms of safety and stability (including domestic violence, insecure housing, neighbor harassment)?:  Consistently safe, supportive, stable, no identified problems   How do daily activities impact on the patient's well-being? (include current or anticipated unemployment, work, caregiving, access to transportation or other):  Some general dissatisfaction but no concern   How would you rate their social network (family, work, friends)?:  Restricted participation with some degree of social isolation   How would you rate their financial resources (including ability to afford all required medical care)?:  Financially secure, some resource challenges   How wells does the patient now understand their health and well-being (symptoms, signs or risk factors) and what they need to do to manage their health?:  Reasonable to good understanding and already engages in managing health or is willing to undertake better management   How well do you think your patient can engage in healthcare discussions? (Barriers include language, deafness, aphasia, alcohol or drug problems, learning difficulties, concentration):  Clear and open communication, no identified barriers   Do other services need to be involved to help this patient?:  Other care/services in place and adequate   Are current services involved with this patient well-coordinated? (Include coordination with other services you are now recommendation):  Required care/services in place and adequately coordinated   Suggested Interventions and [address]:   (Comment: Currently followed by [facility])     Other Services or Interventions:  10/13/2020 Instructed on same day, next day, and Walk-In Care. Pharmacist:  In Process   Registered Dietician:  Declined   Other Services: In Process   Transportation Services:  Declined         Set up/Review an Education Plan, Set up/Review Goals              Prior to Admission medications    Medication Sig Start Date End Date Taking?  Authorizing Provider   vitamin D (D3-1000) 25 MCG (1000 UT) CAPS Take 1 capsule by mouth Daily with supper 9/2/20  Yes Lily Carrasco MD propranolol (INDERAL) 40 MG tablet Take 1 tab po daily at [facility] 9/2/20  Yes Steve Be[place] MD   Multiple Vitamin (DAILY-MANDI) TABS TAKE 1 TABLET BY MOUTH EVERY DAY 8/19/20  Yes Manasa Baptiste MD   famotidine (PEPCID) 20 MG tablet Take 1 tablet by mouth 2 times daily 7/28/20  Yes Manasa Baptiste MD   levothyroxine (SYNTHROID) 50 MCG tablet Take 1 tablet by mouth every morning 6/4/20  Yes Cheryle Brave, MD   vitamin B-2 (RIBOFLAVIN) 100 MG TABS tablet Take 1 tablet by mouth daily 5/25/20  Yes Cheryle Brave, MD   metFORMIN (GLUCOPHAGE) 500 MG tablet TAKE 1 TABLET BY MOUTH TWICE A DAY WITH MEALS 5/15/20  Yes Naveen Hernandez MD   budesonide-formoterol (SYMBICORT) 160-4.5 MCG/ACT AERO Inhale 2 puffs into the lungs 2 times daily 3/10/20  Yes Naveen Hernandez MD   traZODone (DESYREL) 50 MG tablet Take by mouth nightly 1-2 tablets nightly   Yes Historical Provider, MD   benztropine (COGENTIN) 0.5 MG tablet Take by mouth daily  10/10/19  Yes Historical Provider, MD   topiramate (TOPAMAX) 50 MG tablet TAKE 1 TABLET BY MOUTH EVERY DAY AT NIGHT 8/2/19  Yes Naveen Hernandez MD   albuterol sulfate HFA (PROVENTIL HFA) 108 (90 Base) MCG/ACT inhaler Inhale 2 puffs into the lungs every 4 hours as needed for Wheezing or Shortness of Breath Space out to every 6 hours as symptoms improve.  7/9/19  Yes Naveen Hernandez MD   Biotin 300 MCG TABS Take 1 tablet by mouth daily 3/12/19  Yes Naveen Hernandez MD   ARIPiprazole (ABILIFY) 15 MG tablet TAKE 1 TABLET BY MOUTH EVERY DAY 1/17/17  Yes Historical Provider, MD   buPROPion (WELLBUTRIN XL) 300 MG XL tablet Take 1 tablet by mouth daily 8/28/15  Yes Naveen Hernandez MD   ondansetron (ZOFRAN) 4 MG tablet Take 1 tablet by mouth daily as needed for Nausea or Vomiting  Patient not taking: Reported on 10/13/2020 9/21/20   Manasa Baptiste MD   benzonatate (TESSALON) 100 MG capsule Take 100 mg by mouth 2 times daily as needed for Cough    Historical Provider, MD

## 2020-10-13 NOTE — TELEPHONE ENCOUNTER
----- Message from Ector Hernández RN sent at 10/13/2020 12:28 PM EDT -----  Regarding: ACC Clinical Rx Referral  Clinical Rx Referral

## 2020-10-13 NOTE — TELEPHONE ENCOUNTER
Received a referral:  from Care Coordinator to review patients medications. Called patient to schedule a time to speak with a pharmacist over the telephone. Spoke to patient and advised them of the above message. Patient verified understanding and scheduled their appointment: tomorrow at 12:30PM    Sharlene Reyna CPhT.   Clinical Pharmacy   Toll free: [phone], option 7

## 2020-10-14 ENCOUNTER — SCHEDULED TELEPHONE ENCOUNTER (OUTPATIENT)
Dept: PHARMACY | Facility: CLINIC | Age: 60
End: 2020-10-14

## 2020-10-14 ENCOUNTER — TELEPHONE (OUTPATIENT)
Dept: CARE COORDINATION | Age: 60
End: 2020-10-14

## 2020-10-14 NOTE — TELEPHONE ENCOUNTER
ACP specialist received referral for ACP 10/13. Called to continue discussion on Advance Care Planning. Patient reports that she would like to complete the Advance Directive and will need some assistance. Patient request to have forms mailed to her. Will follow up next week.

## 2020-10-14 NOTE — LETTER
[address]  [address], [place] [address]  Phone: [phone], option [address] [place] [facility] [address]           10/14/20     Dear Belkis Santana,    You are eligible for a complete medication therapy review performed by a [address] [place] [address],[address] licensed clinical pharmacist. This review helps ensure that you are getting the most benefit from the medications you receive and includes the following:  ? Review of your medications, including over-the-counter and herbal medications. ? Answering questions about your medications and how to get the most benefit from them. ? Identifying and helping to prevent potential drug interactions or side effects. ? Possibly identifying less costly alternatives that are equally effective. Under this program, [employer] will work with you and your doctor to manage your drug therapy. Please contact the [address] [place] [address] office to set up a time for your medication review with one of our clinical pharmacists. To contact us call [phone] and select Option 7. This will be a phone consult and therefore will not require a trip to the medical office. Please note: This is an optional program. It is a free service provided to help ensure that your medicines are safe, necessary, and effective. Your participation is encouraged, but not required.     Sincerely,  Louisa Wang, PharmD, [address] [facility], [address] [place] [address]  [phone], option 7

## 2020-10-14 NOTE — TELEPHONE ENCOUNTER
CLINICAL PHARMACY NOTE - Medication Review    Camilla Stone is a 62 y.o. female referred to a clinical pharmacy specialist by care coordinator for medication review and education given patient's recent admission into care coordination program. Multiple attempts made to reach patient by telephone for medication review - phone line busy each time. Appears patient's MyChart is active but unclear if patient uses so will prepare letter to mail to patient. Will let ACC know appointment missed.      Luis Hawley, PharmD, Aimee Russell, [facility]  Ambulatory Clinical Care Pharmacist   [address] [facility]  [phone], Option 7    =======================================================    For Pharmacy Admin Tracking Only  PHSO: Yes  Recommended intervention potential cost savings: 0  Time Spent (min): 15

## 2020-10-15 ENCOUNTER — HOSPITAL ENCOUNTER (OUTPATIENT)
Dept: GENERAL RADIOLOGY | Age: 60
Discharge: HOME | End: 2020-10-17
Payer: COMMERCIAL

## 2020-10-15 PROCEDURE — 71046 X-RAY EXAM CHEST 2 VIEWS: CPT

## 2020-10-20 ENCOUNTER — CARE COORDINATION (OUTPATIENT)
Dept: CARE COORDINATION | Age: 60
End: 2020-10-20

## 2020-10-20 NOTE — CARE COORDINATION
Contacted Pt for CC Outreach Call week 2. Pt stated that she is sob when going up and down stairs. She also stated that if she gets up too fast she can get dizzy at times. Pt received Asthma Action Plan in the mail. Went over Asthma Action Plan step by step. Pt verbalized understanding. Went over steps to reduce the risk for falls d/t dizziness. Pt verbalized understanding. Pt stated that she received ACP documents. She is concerned about how much there is to fill out. Advised her that the Health  will be contacting her to answer any questions she may have. Advised Pt that Clinical Pharmacist tried to reach her to go over her medications with her. Pt stated that private calls are blocked from her phone d/t past problems with an Ex. Let Pt know that Pharmacist has sent out a letter with contact information. Pt will call Pharmacist for Medication review. Pt has PCP f/u scheduled 10/27/2020.

## 2020-10-27 ENCOUNTER — TELEPHONE (OUTPATIENT)
Dept: CARE COORDINATION | Age: 60
End: 2020-10-27

## 2020-10-27 ENCOUNTER — CARE COORDINATION (OUTPATIENT)
Dept: CARE COORDINATION | Age: 60
End: 2020-10-27

## 2020-10-27 ENCOUNTER — OFFICE VISIT (OUTPATIENT)
Dept: FAMILY MEDICINE CLINIC | Age: 60
End: 2020-10-27
Payer: COMMERCIAL

## 2020-10-27 VITALS
WEIGHT: 219.2 LBS | BODY MASS INDEX: 35.23 KG/M2 | HEART RATE: 105 BPM | TEMPERATURE: 98.1 F | OXYGEN SATURATION: 98 % | DIASTOLIC BLOOD PRESSURE: 78 MMHG | HEIGHT: 66 IN | SYSTOLIC BLOOD PRESSURE: 118 MMHG

## 2020-10-27 PROCEDURE — 99213 OFFICE O/P EST LOW 20 MIN: CPT | Performed by: FAMILY MEDICINE

## 2020-10-27 PROCEDURE — G8417 CALC BMI ABV UP PARAM F/U: HCPCS | Performed by: FAMILY MEDICINE

## 2020-10-27 PROCEDURE — G8427 DOCREV CUR MEDS BY ELIG CLIN: HCPCS | Performed by: FAMILY MEDICINE

## 2020-10-27 PROCEDURE — 1036F TOBACCO NON-USER: CPT | Performed by: FAMILY MEDICINE

## 2020-10-27 PROCEDURE — 3017F COLORECTAL CA SCREEN DOC REV: CPT | Performed by: FAMILY MEDICINE

## 2020-10-27 PROCEDURE — G8482 FLU IMMUNIZE ORDER/ADMIN: HCPCS | Performed by: FAMILY MEDICINE

## 2020-10-27 RX ORDER — CITALOPRAM 40 MG/1
40 TABLET ORAL DAILY
COMMUNITY
End: 2021-10-25

## 2020-10-27 RX ORDER — BENZTROPINE MESYLATE 1 MG/1
1 TABLET ORAL 3 TIMES DAILY
COMMUNITY
Start: 2020-10-15 | End: 2021-08-05 | Stop reason: SINTOL

## 2020-10-27 ASSESSMENT — PATIENT HEALTH QUESTIONNAIRE - PHQ9
SUM OF ALL RESPONSES TO PHQ9 QUESTIONS 1 & 2: 0
SUM OF ALL RESPONSES TO PHQ QUESTIONS 1-9: 0
SUM OF ALL RESPONSES TO PHQ QUESTIONS 1-9: 0
2. FEELING DOWN, DEPRESSED OR HOPELESS: 0
1. LITTLE INTEREST OR PLEASURE IN DOING THINGS: 0
SUM OF ALL RESPONSES TO PHQ QUESTIONS 1-9: 0

## 2020-10-27 NOTE — CARE COORDINATION
Ambulatory Care Coordination Note  10/27/2020  CM Risk Score: 6  Charlson 10 Year Mortality Risk Score: 79%     ACC: Cirilo Ferreira RN    Summary Note: Patient denies any recent falls. Patient reports that her BM have changed from black to yellow in color. Patient reports daily BM. Answered patient's questions. Patient seen by Bisi Bland MD today. Patient ordered to have stool culture and  A gastrointestinal panel by DNA. Patient confirms she has container to collect specimen. Patient reports compliance with medications. Discussed CC follow-up in 1 week. Patient verbalizes agreement. General Assessment    Do you have any symptoms that are causing concern?:  Yes  Progression since Onset:  Unchanged  Reported Symptoms:  Other (Comment: BM have changed from black to yellow.)         Care Coordination Interventions    Program Enrollment:  Rising Risk  Referral from Primary Care Provider:  No  Suggested Interventions and [id number]:   (Comment: Currently followed by [facility])  Palliative Care:   (Comment: 10/13/20 Discussed services)  Pharmacist:  Completed (Comment: 10/13/20 Clinical Rx Referral)  Registered Dietician:  Declined (Comment: 10/13/2020)  Other Services:  Completed (Comment: 10/13/20 ACP Referral)  Transportation Support:  Declined  Zone Management Tools:  Completed (Comment: 10/13/2020 Asthma Action Plan (Zones). )  Other Services or Interventions:  10/13/2020 Instructed on same day, next day, and Walk-In Care. Goals Addressed                 This Visit's Progress     Conditions and Symptoms   On track     I will schedule office visits, as directed by my provider. I will keep my appointment or reschedule if I have to cancel. I will notify my provider of any barriers to my plan of care. I will follow my Zone Management tool to seek urgent or emergent care.   I will notify my provider of any symptoms that indicate a worsening of my condition. Barriers: lack of education  Plan for overcoming my barriers: Care Coordination  Confidence: 9/10  Anticipated Goal Completion Date: 3/13/2021         Medication Management   On track     I will take my medication as directed. I will notify my provider of any problems with medications, like adverse effects or side effects. I will notify my provider for advice before I stop taking any of my medication. Barriers: overwhelmed by complexity of regimen and lack of education  Plan for overcoming my barriers: Care Coordination, Clinical Rx  Confidence: 9/10  Anticipated Goal Completion Date: 3/13/2021            Prior to Admission medications    Medication Sig Start Date End Date Taking?  Authorizing Provider   citalopram (CELEXA) 40 MG tablet Take 40 mg by mouth daily    Historical Provider, MD   benztropine (COGENTIN) 1 MG tablet Take 1 mg by mouth 3 times daily 10/15/20   Historical Provider, MD   aspirin EC 81 MG EC tablet Take 1 tablet by mouth daily 10/13/20   Quinn Mcdonald, MD   famotidine (PEPCID) 20 MG tablet Take 1 tablet by mouth 2 times daily 10/13/20   Quinn Mcdonald, MD   levothyroxine (SYNTHROID) 50 MCG tablet Take 1 tablet by mouth every morning 10/13/20   Quinn Mcdonald MD   fluticasone (FLONASE) 50 MCG/ACT nasal spray USE 1 SPRAY INTO EACH NOSTRIL DAILY 10/13/20   Quinn Mcdonald MD   vitamin D (D3-1000) 25 MCG (1000 UT) CAPS Take 1 capsule by mouth Daily with supper 9/2/20   Daniel Pastrana MD   Multiple Vitamin (DAILY-MANDI) TABS TAKE 1 TABLET BY MOUTH EVERY DAY 8/19/20   Quinn Mcdonald MD   vitamin B-2 (RIBOFLAVIN) 100 MG TABS tablet Take 1 tablet by mouth daily 5/25/20   Lang Magallanes MD   metFORMIN (GLUCOPHAGE) 500 MG tablet TAKE 1 TABLET BY MOUTH TWICE A DAY WITH MEALS 5/15/20   Nancy Bertrand MD   budesonide-formoterol [facility]) 160-4.5 MCG/ACT AERO Inhale 2 puffs into the lungs 2 times daily 3/10/20   Nancy Bertrand MD   cyanocobalamin ([employer] VITAMIN B12) 1000 MCG tablet Take 1 tablet by mouth daily 2/21/20   Jerry Hernandez MD   traZODone (DESYREL) 50 MG tablet Take by mouth nightly 1-2 tablets nightly    Historical Provider, MD   pravastatin (PRAVACHOL) 40 MG tablet TAKE 1 TABLET BY MOUTH EVERY DAY IN THE EVENING 9/3/19   Jeryr Hernandez MD   albuterol sulfate  (90 Base) MCG/ACT inhaler USE 2 PUFFS EVERY 4 HRS AS NEEDED FOR WHEEZING/SOB-SPACE OUT TO EVERY 6 HR AS SYMPTOMS IMPROVE 7/19/19   Jerry Hernandez MD   cetirizine (ZYRTEC) 10 MG tablet TAKE 1 TABLET BY MOUTH EVERY DAY AS NEEDED FOR ALLERGIES 5/8/19   Katey Arreaga MD   Biotin 300 MCG TABS Take 1 tablet by mouth daily 3/12/19   Jerry Hernandez MD   EPINEPHrine (EPIPEN 2-PRAVEENA) 0.3 MG/0.3ML SOAJ injection Use as directed for allergic reaction 6/11/18   Jerry Hernandez MD   ARIPiprazole (ABILIFY) 15 MG tablet TAKE 1 TABLET BY MOUTH EVERY DAY 1/17/17   Historical Provider, MD   Multiple Vitamins-Minerals (THERAPEUTIC MULTIVITAMIN-MINERALS) tablet Take 1 tablet by mouth daily 12/29/15 6/12/16  Jerry Hernandez MD   buPROPion (WELLBUTRIN XL) 300 MG XL tablet Take 1 tablet by mouth daily 8/28/15   Jerry Hernandez MD   albuterol (PROVENTIL;VENTOLIN) 90 MCG/ACT inhaler Inhale 2 puffs into the lungs every 6 hours as needed.  12/21/12 11/5/19  Jerry Hernandez MD       Future Appointments   Date Time Provider Jay Wong   11/24/2020 11:30 AM Libby Salomon  [place] [address]   9/1/2021  8:30 AM Parvin Wadsworth MD [facility]

## 2020-10-27 NOTE — TELEPHONE ENCOUNTER
ACP specialist called to follow up with patient. Patient shared that she received her packet in mail and will need help completing forms. Discussed scheduling a date and time to review forms 10/28.

## 2020-10-27 NOTE — PROGRESS NOTES
MCG/ACT AERO Inhale 2 puffs into the lungs 2 times daily 1 Inhaler 3    cyanocobalamin (CVS VITAMIN B12) 1000 MCG tablet Take 1 tablet by mouth daily 30 tablet 5    traZODone (DESYREL) 50 MG tablet Take by mouth nightly 1-2 tablets nightly      pravastatin (PRAVACHOL) 40 MG tablet TAKE 1 TABLET BY MOUTH EVERY DAY IN THE EVENING 90 tablet 1    albuterol sulfate  (90 Base) MCG/ACT inhaler USE 2 PUFFS EVERY 4 HRS AS NEEDED FOR WHEEZING/SOB-SPACE OUT TO EVERY 6 HR AS SYMPTOMS IMPROVE 1 Inhaler 3    cetirizine (ZYRTEC) 10 MG tablet TAKE 1 TABLET BY MOUTH EVERY DAY AS NEEDED FOR ALLERGIES 30 tablet 5    Biotin 300 MCG TABS Take 1 tablet by mouth daily 30 tablet 3    EPINEPHrine (EPIPEN 2-PRAVEENA) 0.3 MG/0.3ML SOAJ injection Use as directed for allergic reaction 2 each 0    ARIPiprazole (ABILIFY) 15 MG tablet TAKE 1 TABLET BY MOUTH EVERY DAY  2    buPROPion (WELLBUTRIN XL) 300 MG XL tablet Take 1 tablet by mouth daily 30 tablet 6     No current facility-administered medications for this visit. ROS  CONSTITUTIONAL: The patient denies fevers, chills, sweats and body ache. HEENT: Denies headache, blurry vision, eye pain, tinnitus, vertigo,  sore throat, neck or thyroid masses. RESPIRATORY: Denies cough, sputum, hemoptysis. CARDIAC: Denies chest pain, pressure, palpitations, Denies lower extremity edema. GASTROINTESTINAL: Denies abdominal pain, constipation, admits to diarrhea, denies bleeding in the stools,   GENITOURINARY: Denies dysuria, hematuria, nocturia or frequency, urinary incontinence. NEUROLOGIC: Denies headaches, dizziness, syncope, weakness  MUSCULOSKELETAL: denies changes in range of motion, joint pain, stiffness. ENDOCRINOLOGY: Denies heat or cold intolerance. HEMATOLOGY: Denies easy bleeding or blood transfusion,anemia  DERMATOLOGY: Denies changes in moles or pigmentation changes. PSYCHIATRY: Denies depression, agitation or anxiety.     Past Medical History:   Diagnosis Date    Asthma 2015    Bilateral renal cysts 2013    Depression     since age 35, was with Dr Daniel Babcock, now the [address] Diverticulosis of sigmoid colon 2012    Dr Latoya Mueller DJD of left shoulder     Environmental allergies     Fatty liver 2013    GERD (gastroesophageal reflux disease)     History of cardiac arrhythmia     \"due to stress, was placed on medication\"    Hydatidiform mole     age 26     Hyperlipidemia     Hypothyroidism 2011    IFG (impaired fasting glucose) 2013    Melanoma of eye ([address]     age 30, R eye prosthesis, Dr Elie Hauser    Obesity     Prediabetes     PTSD (post-traumatic stress disorder)     age 35, [facility]    S/P colonoscopy 04/19/2012    Dr. Jessica Daniels S/P hysterectomy with oophorectomy 2012    Dr Nino Mora D deficiency         Past Surgical History:   Procedure Laterality Date    DILATION AND CURETTAGE OF UTERUS      ENDOMETRIAL ABLATION  6/2012    EYE REMOVAL      OD for melanoma, age 30    FOOT OSTEOTOMY Left 09/23/2016    Berlin Basurto DPM      LINDSAY AND BSO  2012    Dr Sandy Urban        Family History   Problem Relation Age of Onset    Heart Failure Mother         dec 76    Diabetes Mother     Diabetes Father     Stroke Father         dec age 80    Cancer Father         Sarcoma    Breast Cancer Sister     Stomach Cancer Other         Social History     Socioeconomic History    Marital status:      Spouse name: Not on file    Number of children: 3    Years of education: 15    Highest education level: High school graduate   Occupational History    Occupation: SSI   Social Needs    Financial resource strain: Not very hard    Food insecurity     Worry: Sometimes true     Inability: Sometimes true    Transportation needs     Medical: No     Non-medical: No   Tobacco Use    Smoking status: Never Smoker    Smokeless tobacco: Never Used   Substance and Sexual Activity    Alcohol use: No    Drug use: No    Sexual activity: Not Currently     Birth control/protection: Surgical   Lifestyle    Physical activity     Days per week: 0 days     Minutes per session: 0 min    Stress: Rather much   Relationships    Social connections     Talks on phone: Twice a week     Gets together: Never     Attends Congregation service: Never     Active member of club or organization: No     Attends meetings of clubs or organizations: Never     Relationship status:     Intimate partner violence     Fear of current or ex partner: Not on file     Emotionally abused: Not on file     Physically abused: Not on file     Forced sexual activity: Not on file   Other Topics Concern    Not on file   Social History Narrative    Born in [address] [place] [address], one of 4 children, one sister disappeared at age 30, never found    Moved to [address] [place] [address] Dr at age 40        Lives in an apartment in [facility], alone    , 3 children, all grown, in [place]     2 granddaughters     Hobbies exercising, birds    Attends [employer], has volunteered at [employer]        /78 (Site: Left Upper Arm, Position: Sitting, Cuff Size: Large Adult)   Pulse 105   Temp 98.1 °F (36.7 °C) (Oral)   Ht 5' 6\" (1.676 m)   Wt 219 lb 3.2 oz (99.4 kg)   SpO2 98%   BMI 35.38 kg/m²        Physical Exam:    General appearance - alert, well appearing, and in no distress  Mental Status - alert, oriented to person, place, and time  Eyes - pupils equal and reactive, extraocular eye movements intact   Ears - bilateral TM's and external ear canals normal   Nose - normal and patent, no erythema, discharge or polyps   Sinuses - Normal paranasal sinuses without tenderness   Throat - mucous membranes moist, pharynx normal without lesions   Neck - supple, no significant adenopathy   Thyroid - thyroid is normal in size without nodules or tenderness    Chest - clear to auscultation, no wheezes, rales or rhonchi, symmetric air entry   Heart - normal rate, regular rhythm, normal S1, S2, no murmurs, rubs, Lab Results   Component Value Date    TRIG 127 11/05/2019    TRIG 157 06/11/2018    TRIG 286 (H) 07/12/2017     Lab Results   Component Value Date    HDL 53 11/05/2019    HDL 55 06/11/2018    HDL 46 07/12/2017     Lab Results   Component Value Date    LDLCALC 116 11/05/2019    LDLCALC 146 (H) 06/11/2018    LDLCALC 131 (H) 07/12/2017     No results found for: LABVLDL, VLDL  Lab Results   Component Value Date    CHOLHDLRATIO 6.3 03/22/2012     Lab Results   Component Value Date    LIPASE 19 10/12/2020           A/P: Elijah Orf 62 y.o. female presenting for      1. Diarrhea, unspecified type  Vitals are stable. Labs are normal. Will obtain stool culture to rule out infection.   - Culture, Stool; Future  - Gastrointestinal Panel by DNA; Future          Please note, this report has been partially produced using speech recognition software  and may cause  and /or contain errors related to that system including grammar, punctuation and spelling as well as words and phrases that may seem inappropriate. If there are questions or concerns please feel free to contact me to clarify.

## 2020-10-28 ENCOUNTER — TELEPHONE (OUTPATIENT)
Dept: CARE COORDINATION | Age: 60
End: 2020-10-28

## 2020-10-28 DIAGNOSIS — R19.7 DIARRHEA, UNSPECIFIED TYPE: ICD-10-CM

## 2020-10-28 NOTE — TELEPHONE ENCOUNTER
ACP specialist was scheduled to call patient to review and complete Advance Directive today with patient, however patient reports that she will have a friend help her since she has trouble seeing out of 1 eye. Patient was able to  provide with Primary Healthcare Decision Maker information. Patient is aware that once Advance Directive is completed to take to PCP office to have scanned in. Advance Care Planning Clinical Specialist  Conversation Note      Date of ACP Conversation: 10/28/2020    Conversation Conducted with: Patient with Decision Making Capacity    ACP Clinical Specialist: Itzel Fernandez Decision Maker:     If no Authorized Decision Maker has previously been identified, then patient chooses Health Care Decision Maker:  \"Who would you like to name as your primary health care decision-maker? \"               Name: Viviana Manriquez        Relationship: Friend        Phone number: [phone]  Lavelle Montanez this person be reached easily? \" Yes      Note: If the relationship of these Decision-Makers to the patient does NOT follow your state's Next of Kin hierarchy, recommend that patient complete ACP document that meets state-specific requirements to allow them to act on the patient's behalf when appropriate. Care Preferences    Hospitalization: \"If your health worsens and it becomes clear that your chance of recovery is unlikely, what would your preference be regarding hospitalization? \"    Choice:  [] The patient wants hospitalization  [x] The patient prefers comfort-focused treatment without hospitalization. Ventilation: \"If you were in your present state of health and suddenly became very ill and were unable to breathe on your own, what would your preference be about the use of a ventilator (breathing machine) if it were available to you? \"      Would the patient desire the use of ventilator (breathing machine)?: no    \"If your health worsens and it becomes clear that your chance of recovery is unlikely, what would your preference be about the use of a ventilator (breathing machine) if it were available to you? \"     Would the patient desire the use of ventilator (breathing machine)?: No      Resuscitation  \"CPR works best to restart the heart when there is a sudden event, like a heart attack, in someone who is otherwise healthy. Unfortunately, CPR does not typically restart the heart for people who have serious health conditions or who are very sick. \"    \"In the event your heart stopped as a result of an underlying serious health condition, would you want attempts to be made to restart your heart (answer \"yes\" for attempt to resuscitate) or would you prefer a natural death (answer \"no\" for do not attempt to resuscitate)? \" yes      NOTE: If the patient has a valid advance directive AND now provides care preference(s) that are inconsistent with that prior directive, advise the patient to consider either: creating a new advance directive that complies with state-specific requirements; or, if that is not possible, orally revoking that prior directive in accordance with state-specific requirements, which must be documented in the EHR. [x] Yes   [] No   Educated Patient / Ankurt Brevard regarding differences between Advance Directives and portable DNR orders.     Length of ACP Conversation in minutes:      Conversation Outcomes:  [x] ACP discussion completed  [] Existing advance directive reviewed with patient; no changes to patient's previously recorded wishes  [] New Advance Directive completed  [] Portable Do Not Rescitate prepared for Provider review and signature  [] POLST/POST/MOLST/MOST prepared for Provider review and signature      Follow-up plan:    [] Schedule follow-up conversation to continue planning  [x] Referred individual to Provider for additional questions/concerns   [] Advised patient/agent/surrogate to review completed ACP document and update if needed with changes in condition, patient preferences or care setting    [x] This note routed to one or more involved healthcare providers

## 2020-10-28 NOTE — ACP (ADVANCE CARE PLANNING)
Advance Care Planning Clinical Specialist  Conversation Note      Date of ACP Conversation: 10/28/2020    Conversation Conducted with: Patient with Decision Making Capacity    ACP Clinical Specialist: [phone] Decision Maker:     If no Authorized Decision Maker has previously been identified, then patient chooses Health Care Decision Maker:  \"Who would you like to name as your primary health care decision-maker? \"               Name: Selwyn Longoria        Relationship: Friend        Phone number: [phone]  Ulysess Prim this person be reached easily? \" Yes      Note: If the relationship of these Decision-Makers to the patient does NOT follow your state's Next of Kin hierarchy, recommend that patient complete ACP document that meets state-specific requirements to allow them to act on the patient's behalf when appropriate. Care Preferences    Hospitalization: \"If your health worsens and it becomes clear that your chance of recovery is unlikely, what would your preference be regarding hospitalization? \"    Choice:  [] The patient wants hospitalization  [x] The patient prefers comfort-focused treatment without hospitalization. Ventilation: \"If you were in your present state of health and suddenly became very ill and were unable to breathe on your own, what would your preference be about the use of a ventilator (breathing machine) if it were available to you? \"      Would the patient desire the use of ventilator (breathing machine)?: no    \"If your health worsens and it becomes clear that your chance of recovery is unlikely, what would your preference be about the use of a ventilator (breathing machine) if it were available to you? \"     Would the patient desire the use of ventilator (breathing machine)?: No      Resuscitation  \"CPR works best to restart the heart when there is a sudden event, like a heart attack, in someone who is otherwise healthy.  Unfortunately, CPR does not typically restart the heart for people who have serious health conditions or who are very sick. \"    \"In the event your heart stopped as a result of an underlying serious health condition, would you want attempts to be made to restart your heart (answer \"yes\" for attempt to resuscitate) or would you prefer a natural death (answer \"no\" for do not attempt to resuscitate)? \" yes      NOTE: If the patient has a valid advance directive AND now provides care preference(s) that are inconsistent with that prior directive, advise the patient to consider either: creating a new advance directive that complies with state-specific requirements; or, if that is not possible, orally revoking that prior directive in accordance with state-specific requirements, which must be documented in the EHR. [x] Yes   [] No   Educated Patient / Val Mullen regarding differences between Advance Directives and portable DNR orders.     Length of ACP Conversation in minutes:      Conversation Outcomes:  [x] ACP discussion completed  [] Existing advance directive reviewed with patient; no changes to patient's previously recorded wishes  [] New Advance Directive completed  [] Portable Do Not Rescitate prepared for Provider review and signature  [] POLST/POST/MOLST/MOST prepared for Provider review and signature      Follow-up plan:    [] Schedule follow-up conversation to continue planning  [x] Referred individual to Provider for additional questions/concerns   [] Advised patient/agent/surrogate to review completed ACP document and update if needed with changes in condition, patient preferences or care setting    [x] This note routed to one or more involved healthcare providers

## 2020-10-29 LAB — GI BACTERIAL PATHOGENS BY PCR: NORMAL

## 2020-11-03 ENCOUNTER — CARE COORDINATION (OUTPATIENT)
Dept: CARE COORDINATION | Age: 60
End: 2020-11-03

## 2020-11-03 NOTE — CARE COORDINATION
Ambulatory Care Coordination Note  11/3/2020  CM Risk Score: 6  Charlson 10 Year Mortality Risk Score: 79%     ACC: Leonarda Berkowitz RN    Summary Note: CC follow-up call placed to patient. Patient denies any recent falls or injury. Patient reports increasing fiber in her diet. She reports she is eating bananas and oatmeal to increase fiber. Patient reports diarrhea has resolved. Discussed referral to [address] [place] [address] for education on high fiber diet. Patient verbalizes agreement. Referral to [facility] Dietician sent by [facility] message. Care Coordination Interventions    Program Enrollment:  Rising Risk  Referral from Primary Care Provider:  No  Suggested Interventions and Community Resources  [facility]   (Comment: [facility])  Palliative Care:   (Comment: 10/13/20 Discussed services)  Pharmacist:  Completed (Comment: 10/13/20 Clinical Rx Referral)  Registered Dietician: In Process (Comment: 10/13/2020 Declines; 11/3/2020 [facility] Dietician referral - patient agreed.)  Other Services:  Completed (Comment: 10/13/20 ACP Referral)  Transportation Support:  Declined  Zone Management Tools:  Completed (Comment: 10/13/2020 Asthma Action Plan (Zones). )  Other Services or Interventions:  10/13/2020 Instructed on same day, next day, and Walk-In Care. Goals Addressed                 This Visit's Progress     Conditions and Symptoms   On track     I will schedule office visits, as directed by my provider. I will keep my appointment or reschedule if I have to cancel. I will notify my provider of any barriers to my plan of care. I will follow my Zone Management tool to seek urgent or emergent care. I will notify my provider of any symptoms that indicate a worsening of my condition.     Barriers: lack of education  Plan for overcoming my barriers: Care Coordination  Confidence: 9/10  Anticipated Goal Completion Date: 3/13/2021         Medication Management   On track     I will take my medication as directed. I will notify my provider of any problems with medications, like adverse effects or side effects. I will notify my provider for advice before I stop taking any of my medication. Barriers: overwhelmed by complexity of regimen and lack of education  Plan for overcoming my barriers: Care Coordination, Clinical Rx  Confidence: 9/10  Anticipated Goal Completion Date: 3/13/2021            Prior to Admission medications    Medication Sig Start Date End Date Taking?  Authorizing Provider   psyllium 0.52 g capsule Take 1 capsule by mouth daily 10/30/20   Bj Quinones MD   citalopram (CELEXA) 40 MG tablet Take 40 mg by mouth daily    Historical Provider, MD   benztropine (COGENTIN) 1 MG tablet Take 1 mg by mouth 3 times daily 10/15/20   Historical Provider, MD   aspirin EC 81 MG EC tablet Take 1 tablet by mouth daily 10/13/20   Bj Quinones MD   famotidine (PEPCID) 20 MG tablet Take 1 tablet by mouth 2 times daily 10/13/20   Bj Quinones MD   levothyroxine (SYNTHROID) 50 MCG tablet Take 1 tablet by mouth every morning 10/13/20   Bj Quinones MD   fluticasone (FLONASE) 50 MCG/ACT nasal spray USE 1 SPRAY INTO EACH NOSTRIL DAILY 10/13/20   Bj Quinones MD   vitamin D (D3-1000) 25 MCG (1000 UT) CAPS Take 1 capsule by mouth Daily with supper 9/2/20   Chanelle Bird MD   Multiple Vitamin (DAILY-MANDI) TABS TAKE 1 TABLET BY MOUTH EVERY DAY 8/19/20   Bj Quinones MD   vitamin B-2 (RIBOFLAVIN) 100 MG TABS tablet Take 1 tablet by mouth daily 5/25/20   Sandy Leonard MD   metFORMIN (GLUCOPHAGE) 500 MG tablet TAKE 1 TABLET BY MOUTH TWICE A DAY WITH MEALS 5/15/20   Yuli Aguilar MD   budesonide-formoterol (SYMBICORT) 160-4.5 MCG/ACT AERO Inhale 2 puffs into the lungs 2 times daily 3/10/20   Yuli Aguilar MD   cyanocobalamin (CVS VITAMIN B12) 1000 MCG tablet Take 1 tablet by mouth daily 2/21/20   Yuli Aguilar MD   traZODone (DESYREL) 50 MG tablet Take by mouth nightly 1-2 tablets nightly    Historical Provider, MD   pravastatin (PRAVACHOL) 40 MG tablet TAKE 1 TABLET BY MOUTH EVERY DAY IN THE EVENING 9/3/19   Neri Hernandez MD   albuterol sulfate  (90 Base) MCG/ACT inhaler USE 2 PUFFS EVERY 4 HRS AS NEEDED FOR WHEEZING/SOB-SPACE OUT TO EVERY 6 HR AS SYMPTOMS IMPROVE 7/19/19   Neri Hernandez MD   cetirizine (ZYRTEC) 10 MG tablet TAKE 1 TABLET BY MOUTH EVERY DAY AS NEEDED FOR ALLERGIES 5/8/19   William Dinh MD   Biotin 300 MCG TABS Take 1 tablet by mouth daily 3/12/19   Neri Hernandez MD   EPINEPHrine (EPIPEN 2-PRAVEENA) 0.3 MG/0.3ML SOAJ injection Use as directed for allergic reaction 6/11/18   Neri Hernandez MD   ARIPiprazole (ABILIFY) 15 MG tablet TAKE 1 TABLET BY MOUTH EVERY DAY 1/17/17   Historical Provider, MD   Multiple Vitamins-Minerals (THERAPEUTIC MULTIVITAMIN-MINERALS) tablet Take 1 tablet by mouth daily 12/29/15 6/12/16  Neri Hernandez MD   buPROPion (WELLBUTRIN XL) 300 MG XL tablet Take 1 tablet by mouth daily 8/28/15   Neri Hernandez MD   albuterol (PROVENTIL;VENTOLIN) 90 MCG/ACT inhaler Inhale 2 puffs into the lungs every 6 hours as needed.  12/21/12 11/5/19  Neri Hernandez MD       Future Appointments   Date Time Provider Jay Judith   11/24/2020 11:30 AM Birdie Wyatt  [place], [place]   9/1/2021  8:30 AM Aysha Doherty  [facility]

## 2020-11-04 ENCOUNTER — CARE COORDINATION (OUTPATIENT)
Dept: CASE MANAGEMENT | Age: 60
End: 2020-11-04

## 2020-11-04 NOTE — CARE COORDINATION
Contacted Satish Campos and left voicemail regarding Dietitian referral. Left call back number and will follow up as appropriate.          [address] [facility], [facility] 14

## 2020-11-09 ENCOUNTER — CARE COORDINATION (OUTPATIENT)
Dept: CASE MANAGEMENT | Age: 60
End: 2020-11-09

## 2020-11-09 NOTE — CARE COORDINATION
Contacted Radha Bailey and left voicemail regarding Dietitian referral. Left call back number and will follow up as appropriate.          [address] [facility], [facility]

## 2020-11-10 ENCOUNTER — CARE COORDINATION (OUTPATIENT)
Dept: CARE COORDINATION | Age: 60
End: 2020-11-10

## 2020-11-11 NOTE — CARE COORDINATION
Ambulatory Care Coordination Note  11/10/20  CM Risk Score: 6  Charlson 10 Year Mortality Risk Score: 79%     ACC: Ambrose Beckham, RN    Summary Note: call to mirza to [facility] health coaching. She reports sore throat,  apolonia arm pain and vomiting. Advised she be evaluated at walk in clinic or ER. She reports she has to schedule ride due to no other transportation. Advised to call ambulance if needed. Discussed need for adequate fluid intake to avoid dehydration. Care Coordination Interventions    Program Enrollment:  Rising Risk  Referral from Primary Care Provider:  No  Suggested Interventions and Community Resources  [facility]   [facility]  Palliative Care:   (Comment: 10/13/20 Discussed services)  Pharmacist:  Completed (Comment: 10/13/20 Clinical Rx Referral)  Registered Dietician: In Process (Comment: 10/13/2020 Declines; 11/3/2020 [facility] Dietician referral - patient agreed.)  Other Services:  Completed (Comment: 10/13/20 ACP Referral)  Transportation Support:  Declined  Zone Management Tools:  Completed (Comment: 10/13/2020 Asthma Action Plan (Zones). )  Other Services or Interventions:  10/13/2020 Instructed on same day, next day, and Walk-In Care. Goals Addressed                 This Visit's Progress     Conditions and Symptoms   Worsening     I will schedule office visits, as directed by my provider. I will keep my appointment or reschedule if I have to cancel. I will notify my provider of any barriers to my plan of care. I will follow my Zone Management tool to seek urgent or emergent care. I will notify my provider of any symptoms that indicate a worsening of my condition. Barriers: lack of education  Plan for overcoming my barriers: Care Coordination  Confidence: 9/10  Anticipated Goal Completion Date: 3/13/2021              Prior to Admission medications    Medication Sig Start Date End Date Taking?  Authorizing Provider   psyllium 0.52 g capsule Take 1 capsule by mouth daily 10/30/20   Steph Mensah MD   citalopram (CELEXA) 40 MG tablet Take 40 mg by mouth daily    Historical Provider, MD   benztropine (COGENTIN) 1 MG tablet Take 1 mg by mouth 3 times daily 10/15/20   Historical Provider, MD   aspirin EC 81 MG EC tablet Take 1 tablet by mouth daily 10/13/20   Steph Mensah MD   famotidine (PEPCID) 20 MG tablet Take 1 tablet by mouth 2 times daily 10/13/20   Steph Mensah MD   levothyroxine (SYNTHROID) 50 MCG tablet Take 1 tablet by mouth every morning 10/13/20   Steph Mensah MD   fluticasone (FLONASE) 50 MCG/ACT nasal spray USE 1 SPRAY INTO EACH NOSTRIL DAILY 10/13/20   Steph Mensah MD   vitamin D (D3-1000) 25 MCG (1000 UT) CAPS Take 1 capsule by mouth Daily with supper 9/2/20   Clinton Grijalva MD   Multiple Vitamin (DAILY-MANDI) TABS TAKE 1 TABLET BY MOUTH EVERY DAY 8/19/20   Steph Mensah MD   vitamin B-2 (RIBOFLAVIN) 100 MG TABS tablet Take 1 tablet by mouth daily 5/25/20   Fatou Mueller MD   metFORMIN (GLUCOPHAGE) 500 MG tablet TAKE 1 TABLET BY MOUTH TWICE A DAY WITH MEALS 5/15/20   Cesar Justin MD   budesonide-formoterol (SYMBICORT) 160-4.5 MCG/ACT AERO Inhale 2 puffs into the lungs 2 times daily 3/10/20   Cesar Justin MD   cyanocobalamin (CVS VITAMIN B12) 1000 MCG tablet Take 1 tablet by mouth daily 2/21/20   Cesar Justin MD   traZODone (DESYREL) 50 MG tablet Take by mouth nightly 1-2 tablets nightly    Historical Provider, MD   pravastatin (PRAVACHOL) 40 MG tablet TAKE 1 TABLET BY MOUTH EVERY DAY IN THE EVENING 9/3/19   Cesar Justin MD   albuterol sulfate  (90 Base) MCG/ACT inhaler USE 2 PUFFS EVERY 4 HRS AS NEEDED FOR WHEEZING/SOB-SPACE OUT TO EVERY 6 HR AS SYMPTOMS IMPROVE 7/19/19   Cesar Justin MD   cetirizine (ZYRTEC) 10 MG tablet TAKE 1 TABLET BY MOUTH EVERY DAY AS NEEDED FOR ALLERGIES 5/8/19   Vikki Argueta MD   Biotin 300 MCG TABS Take 1 tablet by mouth daily 3/12/19 Corey Rivas MD   EPINEPHrine (EPIPEN 2-PRAVEENA) 0.3 MG/0.3ML SOAJ injection Use as directed for allergic reaction 6/11/18   Corey Rivas MD   ARIPiprazole (ABILIFY) 15 MG tablet TAKE 1 TABLET BY MOUTH EVERY DAY 1/17/17   Historical Provider, MD   Multiple Vitamins-Minerals (THERAPEUTIC MULTIVITAMIN-MINERALS) tablet Take 1 tablet by mouth daily 12/29/15 6/12/16  Corey Rivas MD   buPROPion (WELLBUTRIN XL) 300 MG XL tablet Take 1 tablet by mouth daily 8/28/15   Corey Rivas MD   albuterol (PROVENTIL;VENTOLIN) 90 MCG/ACT inhaler Inhale 2 puffs into the lungs every 6 hours as needed.  12/21/12 11/5/19  Corey Rivas MD       Future Appointments   Date Time Provider Jay Wong   11/24/2020 11:30 AM Christiane Russell  [place]   9/1/2021  8:30 AM Gregg Torres MD [facility]

## 2020-11-12 ENCOUNTER — CARE COORDINATION (OUTPATIENT)
Dept: CARE COORDINATION | Age: 60
End: 2020-11-12

## 2020-11-12 NOTE — CARE COORDINATION
Call to mirza due to complaints yesterday. She reports she is slightly improved and will go to [facility] if needed for any decline in symptoms.

## 2020-11-13 ENCOUNTER — VIRTUAL VISIT (OUTPATIENT)
Dept: FAMILY MEDICINE CLINIC | Age: 60
End: 2020-11-13
Payer: COMMERCIAL

## 2020-11-13 PROCEDURE — 99442 PR PHYS/QHP TELEPHONE EVALUATION 11-20 MIN: CPT | Performed by: NURSE PRACTITIONER

## 2020-11-13 RX ORDER — LANOLIN ALCOHOL/MO/W.PET/CERES
1 CREAM (GRAM) TOPICAL DAILY
Qty: 30 TABLET | Refills: 3 | Status: SHIPPED | OUTPATIENT
Start: 2020-11-13 | End: 2021-04-19 | Stop reason: SDUPTHER

## 2020-11-13 RX ORDER — LEVOTHYROXINE SODIUM 0.05 MG/1
50 TABLET ORAL EVERY MORNING
Qty: 90 TABLET | Refills: 0 | Status: SHIPPED | OUTPATIENT
Start: 2020-11-13 | End: 2021-03-30

## 2020-11-13 RX ORDER — FAMOTIDINE 20 MG/1
20 TABLET, FILM COATED ORAL 2 TIMES DAILY
Qty: 60 TABLET | Refills: 2 | Status: SHIPPED | OUTPATIENT
Start: 2020-11-13 | End: 2021-01-14

## 2020-11-13 RX ORDER — MULTIVITAMIN WITH FOLIC ACID 400 MCG
TABLET ORAL
Qty: 30 TABLET | Refills: 5 | Status: SHIPPED | OUTPATIENT
Start: 2020-11-13 | End: 2021-04-26

## 2020-11-13 ASSESSMENT — ENCOUNTER SYMPTOMS
SHORTNESS OF BREATH: 0
NAUSEA: 1
WHEEZING: 0
COUGH: 1
VOMITING: 1
SORE THROAT: 1
RHINORRHEA: 0

## 2020-11-13 NOTE — PROGRESS NOTES
 Number of children: 3    Years of education: 15    Highest education level: High school graduate   Occupational History    Occupation: SSI   Social Needs    Financial resource strain: Not very hard    Food insecurity     Worry: Sometimes true     Inability: Sometimes true    Transportation needs     Medical: No     Non-medical: No   Tobacco Use    Smoking status: Never Smoker    Smokeless tobacco: Never Used   Substance and Sexual Activity    Alcohol use: No    Drug use: No    Sexual activity: Not Currently     Birth control/protection: Surgical   Lifestyle    Physical activity     Days per week: 0 days     Minutes per session: 0 min    Stress: Rather much   Relationships    Social connections     Talks on phone: Twice a week     Gets together: Never     Attends Confucianism service: Never     Active member of club or organization: No     Attends meetings of clubs or organizations: Never     Relationship status:     Intimate partner violence     Fear of current or ex partner: Not on file     Emotionally abused: Not on file     Physically abused: Not on file     Forced sexual activity: Not on file   Other Topics Concern    Not on file   Social History Narrative    Born in [address] [place] [address], one of 4 children, one sister disappeared at age 30, never found    Moved to [place] age 40        Lives in an apartment in [facility] [place], alone    , 3 children, all grown, in [place]     2 granddaughters     Cas Coleman exercising, birds    Attends [employer], has volunteered at [employer]     Current Outpatient Medications on File Prior to Visit   Medication Sig Dispense Refill    psyllium 0.52 g capsule Take 1 capsule by mouth daily 30 capsule 5    citalopram (CELEXA) 40 MG tablet Take 40 mg by mouth daily      benztropine (COGENTIN) 1 MG tablet Take 1 mg by mouth 3 times daily      aspirin EC 81 MG EC tablet Take 1 tablet by mouth daily 90 tablet 1    fluticasone (FLONASE) 50 MCG/ACT nasal spray USE 1 SPRAY INTO EACH NOSTRIL DAILY 1 Bottle 0    vitamin D (D3-1000) 25 MCG (1000 UT) CAPS Take 1 capsule by mouth Daily with supper 90 capsule 1    vitamin B-2 (RIBOFLAVIN) 100 MG TABS tablet Take 1 tablet by mouth daily 90 tablet 0    budesonide-formoterol (SYMBICORT) 160-4.5 MCG/ACT AERO Inhale 2 puffs into the lungs 2 times daily 1 Inhaler 3    cyanocobalamin (CVS VITAMIN B12) 1000 MCG tablet Take 1 tablet by mouth daily 30 tablet 5    traZODone (DESYREL) 50 MG tablet Take by mouth nightly 1-2 tablets nightly      pravastatin (PRAVACHOL) 40 MG tablet TAKE 1 TABLET BY MOUTH EVERY DAY IN THE EVENING 90 tablet 1    albuterol sulfate  (90 Base) MCG/ACT inhaler USE 2 PUFFS EVERY 4 HRS AS NEEDED FOR WHEEZING/SOB-SPACE OUT TO EVERY 6 HR AS SYMPTOMS IMPROVE 1 Inhaler 3    cetirizine (ZYRTEC) 10 MG tablet TAKE 1 TABLET BY MOUTH EVERY DAY AS NEEDED FOR ALLERGIES 30 tablet 5    EPINEPHrine (EPIPEN 2-PRAVEENA) 0.3 MG/0.3ML SOAJ injection Use as directed for allergic reaction 2 each 0    ARIPiprazole (ABILIFY) 15 MG tablet TAKE 1 TABLET BY MOUTH EVERY DAY  2    [DISCONTINUED] Multiple Vitamins-Minerals (THERAPEUTIC MULTIVITAMIN-MINERALS) tablet Take 1 tablet by mouth daily 30 tablet 5    buPROPion (WELLBUTRIN XL) 300 MG XL tablet Take 1 tablet by mouth daily 30 tablet 6    [DISCONTINUED] albuterol (PROVENTIL;VENTOLIN) 90 MCG/ACT inhaler Inhale 2 puffs into the lungs every 6 hours as needed. 1 Inhaler 6     No current facility-administered medications on file prior to visit. Allergies:  Patient has no known allergies. Review of Systems   Constitutional: Negative for chills, fatigue and fever. HENT: Positive for congestion (nasal) and sore throat (resolved). Negative for ear discharge, ear pain, postnasal drip and rhinorrhea. Respiratory: Positive for cough. Negative for shortness of breath and wheezing. Gastrointestinal: Positive for nausea (resolved) and vomiting (resolved). Objective:    There were no vitals taken for this visit. Physical Exam  Constitutional:       General: She is awake. Neurological:      Mental Status: She is alert. Psychiatric:         Mood and Affect: Mood normal.         Speech: Speech normal.         Behavior: Behavior is cooperative. Assessment:          Diagnosis Orders   1. Sore throat     2. Acquired hypothyroidism  levothyroxine (SYNTHROID) 50 MCG tablet   3. Gastroesophageal reflux disease without esophagitis  famotidine (PEPCID) 20 MG tablet   4. Nasal congestion         Plan:      No orders of the defined types were placed in this encounter. Orders Placed This Encounter   Medications    metFORMIN (GLUCOPHAGE) 500 MG tablet     Sig: TAKE 1 TABLET BY MOUTH TWICE A DAY WITH MEALS     Dispense:  60 tablet     Refill:  1    Multiple Vitamin (DAILY-MANDI) TABS     Sig: TAKE 1 TABLET BY MOUTH EVERY DAY     Dispense:  30 tablet     Refill:  5    levothyroxine (SYNTHROID) 50 MCG tablet     Sig: Take 1 tablet by mouth every morning     Dispense:  90 tablet     Refill:  0    famotidine (PEPCID) 20 MG tablet     Sig: Take 1 tablet by mouth 2 times daily     Dispense:  60 tablet     Refill:  2    Biotin 300 MCG TABS     Sig: Take 1 tablet by mouth daily     Dispense:  30 tablet     Refill:  3       Return if symptoms worsen or fail to improve. Pt unable to go get COVID-19 test due to lack of transportation. Symptoms have mostly improved since Tuesday. Continues to have mild nasal congestion and cough. Discussed supportive care. Any severe symptoms pt should go to ER otherwise follow up if not continuing to improve as expected. She was verbalized understanding. Reviewed with the patient: current clinicalstatus, medications, activities and diet.      Side effects, adverse effects of the medication prescribedtoday, as well as treatment plan/ rationale and result expectations have been discussedwith the patient who expresses understanding and desires to proceed. Close follow upto evaluate treatment results and for coordination of care. I have reviewedthe patient's medical history in detail and updated the computerized patient record. Dimple Bear is a 62 y.o. female evaluated via telephone on 11/13/2020. Consent:  She and/or health care decision maker is aware that that she may receive a bill for this telephone service, depending on her insurance coverage, and has provided verbal consent to proceed: Yes        This visit was a telephone encounter. Patient was located at their home. Provider was located at their _X__ home or        ____ office. I affirm this is a Patient Initiated Episode with an Established Patient who has not had a related appointment within my department in the past 7 days or scheduled within the next 24 hours.     Total Time: minutes: 11-20 minutes    Note: not billable if this call serves to triage the patient into an appointment for the relevant concern          Flex Pickering, ARASH - CNP

## 2020-11-16 ENCOUNTER — CARE COORDINATION (OUTPATIENT)
Dept: CARE COORDINATION | Age: 60
End: 2020-11-16

## 2020-11-16 NOTE — CARE COORDINATION
Camilla Stone  November 16, 2020    Initial Referral Reason: High Fiber Diet Education     Patient Care Team:  Tami Giron MD as PCP - General (Family Medicine)  Tami Giron MD as PCP - [facility]  Leyla Small MD as Consulting Physician (Hematology and Oncology)  Harper Otero MD as Cardiologist (Interventional Cardiology)  Nbole Dey, RN as Ambulatory Care Manager  Yosvany Eli RD, LD as Dietitian (Dietitian)    Past Medical History:    Current Outpatient Medications   Medication Sig Dispense Refill    metFORMIN (GLUCOPHAGE) 500 MG tablet TAKE 1 TABLET BY MOUTH TWICE A DAY WITH MEALS 60 tablet 1    Multiple Vitamin (DAILY-MANDI) TABS TAKE 1 TABLET BY MOUTH EVERY DAY 30 tablet 5    levothyroxine (SYNTHROID) 50 MCG tablet Take 1 tablet by mouth every morning 90 tablet 0    famotidine (PEPCID) 20 MG tablet Take 1 tablet by mouth 2 times daily 60 tablet 2    Biotin 300 MCG TABS Take 1 tablet by mouth daily 30 tablet 3    psyllium 0.52 g capsule Take 1 capsule by mouth daily 30 capsule 5    citalopram (CELEXA) 40 MG tablet Take 40 mg by mouth daily      benztropine (COGENTIN) 1 MG tablet Take 1 mg by mouth 3 times daily      aspirin EC 81 MG EC tablet Take 1 tablet by mouth daily 90 tablet 1    fluticasone (FLONASE) 50 MCG/ACT nasal spray USE 1 SPRAY INTO EACH NOSTRIL DAILY 1 Bottle 0    vitamin D (D3-1000) 25 MCG (1000 UT) CAPS Take 1 capsule by mouth Daily with supper 90 capsule 1    vitamin B-2 (RIBOFLAVIN) 100 MG TABS tablet Take 1 tablet by mouth daily 90 tablet 0    budesonide-formoterol (SYMBICORT) 160-4.5 MCG/ACT AERO Inhale 2 puffs into the lungs 2 times daily 1 Inhaler 3    cyanocobalamin (CVS VITAMIN B12) 1000 MCG tablet Take 1 tablet by mouth daily 30 tablet 5    traZODone (DESYREL) 50 MG tablet Take by mouth nightly 1-2 tablets nightly      pravastatin (PRAVACHOL) 40 MG tablet TAKE 1 TABLET BY MOUTH EVERY DAY IN THE EVENING 90 tablet 1    albuterol sulfate  (90 Base) MCG/ACT inhaler USE 2 PUFFS EVERY 4 HRS AS NEEDED FOR WHEEZING/SOB-SPACE OUT TO EVERY 6 HR AS SYMPTOMS IMPROVE 1 Inhaler 3    cetirizine (ZYRTEC) 10 MG tablet TAKE 1 TABLET BY MOUTH EVERY DAY AS NEEDED FOR ALLERGIES 30 tablet 5    EPINEPHrine (EPIPEN 2-PRAVEENA) 0.3 MG/0.3ML SOAJ injection Use as directed for allergic reaction 2 each 0    ARIPiprazole (ABILIFY) 15 MG tablet TAKE 1 TABLET BY MOUTH EVERY DAY  2    buPROPion (WELLBUTRIN XL) 300 MG XL tablet Take 1 tablet by mouth daily 30 tablet 6     No current facility-administered medications for this visit. Biochemical Data, Medical Tests and Procedures:    Lab Results   Component Value Date    LABA1C 5.4 02/07/2020     No results found for: EAG    Lab Results   Component Value Date    CHOL 194 11/05/2019    CHOL 232 (H) 06/11/2018    CHOL 234 (H) 07/12/2017     Lab Results   Component Value Date    TRIG 127 11/05/2019    TRIG 157 06/11/2018    TRIG 286 (H) 07/12/2017     Lab Results   Component Value Date    HDL 53 11/05/2019    HDL 55 06/11/2018    HDL 46 07/12/2017     Lab Results   Component Value Date    LDLCALC 116 11/05/2019    LDLCALC 146 (H) 06/11/2018    LDLCALC 131 (H) 07/12/2017     No results found for: LABVLDL  Lab Results   Component Value Date    CHOLHDLRATIO 6.3 03/22/2012       Lab Results   Component Value Date    WBC 6.0 10/12/2020    HGB 14.6 10/12/2020    HCT 42.8 10/12/2020    MCV 90.3 10/12/2020     10/12/2020       Lab Results   Component Value Date    CREATININE 0.77 10/12/2020    BUN 16 10/12/2020     10/12/2020    K 4.1 10/12/2020     10/12/2020    CO2 27 10/12/2020         Anthropometric Measurements:    Height: 66 inches (167.6 cm)  Weight: 219 lb (99.4 kg)  BMI: 35.38 (obesity class II)  IBW: 130 lb (59.09 kg) +-10%  %IBW: 168.5%   AdBW: 166 lb (75.5 kg)    Physical Exam Findings:  Deferred    Nutrition Interview: RD called pt, explained reason for call and role in care.  Pt severity of disease state. Discussed potential impact of health threat on patient's lifestyle. Used open-ended questions to assess patient's perception regarding benefits of and barriers to implementation of nutrition therapy. #3 Nutrition Education: Clearly defined the benefits of nutrition therapy. Summarized and affirmed positive aspects of current nutrition patterns. Provided education regarding value of adherence to regular diet, high fiber. Discussed ways to establish applying concepts of alternatives and choices regarding implementation of diet. Explored ideas for small, incremental goals to initiate behavior change. Monitoring and Evaluation:    Indicator/Goal Criteria   #1 Eat balanced meals consistently throughout the day. #1 Continue eating 3 meals/day. Focus on making these meals balanced using the MyPlate [id number] plate fruits and/or vegetables, 1/4 plate protein and 1/4 plate starchy carbohydrates with 8 oz glass of low fat milk if desired. #2 Slowly increase fiber intake. #2 Incorporate more fruits, vegetables and whole grains to meals. Follow Up: RD will call pt in 2 weeks to follow up and make sure pt received handouts in mail. RD will answer any nutrition related questions at this time.      [address] [facility]

## 2020-11-17 ENCOUNTER — CARE COORDINATION (OUTPATIENT)
Dept: CARE COORDINATION | Age: 60
End: 2020-11-17

## 2020-11-17 NOTE — CARE COORDINATION
Ambulatory Care Coordination Note  11/17/2020  CM Risk Score: 6  Charlson 10 Year Mortality Risk Score: 79%     ACC: Deborah Tenorio RN    Summary Note:  Patient verbalizes concern regarding frequent sore throat. Patient denies sore throat currently. Discussed calling Lucille Conway MD office if sore throat returns. Reconciled home medications. Patient verbalizes compliance. Provided education on COVID-19 precautions. Lab Results   Component Value Date    LABA1C 5.4 02/07/2020    LABA1C 5.5 11/26/2018    LABA1C 5.4 06/11/2018        General Assessment    Do you have any symptoms that are causing concern?:  Yes  Progression since Onset:  Intermittent - Waxing/Waning  Reported Symptoms:  Other (Comment: sore throat)           Care Coordination Interventions    Program Enrollment:  Rising Risk  Referral from Primary Care Provider:  No  Suggested Interventions and Community Resources  [address] [facility] [facility] [address]:   (Comment: Currently followed by [facility])  Palliative Care:   (Comment: 10/13/20 Discussed services)  Pharmacist:  Completed (Comment: 10/13/20 Clinical Rx Referral)  Registered Dietician: In Process (Comment: 10/13/2020 Declines; 11/3/2020 [facility] Dietician referral - patient agreed.)  Other Services:  Completed (Comment: 10/13/20 ACP Referral)  Transportation Support:  Declined  Zone Management Tools:  Completed (Comment: 10/13/2020 Asthma Action Plan (Zones). )  Other Services or Interventions:  10/13/2020 Instructed on same day, next day, and Walk-In Care. Goals Addressed                 This Visit's Progress     Conditions and Symptoms   On track     I will schedule office visits, as directed by my provider. I will keep my appointment or reschedule if I have to cancel. I will notify my provider of any barriers to my plan of care. I will follow my Zone Management tool to seek urgent or emergent care. I will notify my provider of any symptoms that indicate a worsening of my condition.     Barriers: VITAMIN B12) 1000 MCG tablet Take 1 tablet by mouth daily 2/21/20  Yes Cesar Justin MD   traZODone (DESYREL) 50 MG tablet Take by mouth nightly 1-2 tablets nightly   Yes Historical Provider, MD   pravastatin (PRAVACHOL) 40 MG tablet TAKE 1 TABLET BY MOUTH EVERY DAY IN THE EVENING 9/3/19  Yes Cesar Justin MD   albuterol sulfate  (90 Base) MCG/ACT inhaler USE 2 PUFFS EVERY 4 HRS AS NEEDED FOR WHEEZING/SOB-SPACE OUT TO EVERY 6 HR AS SYMPTOMS IMPROVE 7/19/19  Yes Cesar Justin MD   cetirizine (ZYRTEC) 10 MG tablet TAKE 1 TABLET BY MOUTH EVERY DAY AS NEEDED FOR ALLERGIES 5/8/19  Yes Vikki Argueta MD   EPINEPHrine (EPIPEN 2-PRAVEENA) 0.3 MG/0.3ML SOAJ injection Use as directed for allergic reaction 6/11/18  Yes Cesar Justin MD   ARIPiprazole (ABILIFY) 15 MG tablet TAKE 1 TABLET BY MOUTH EVERY DAY 1/17/17  Yes Historical Provider, MD   buPROPion (WELLBUTRIN XL) 300 MG XL tablet Take 1 tablet by mouth daily 8/28/15  Yes Cesar Justin MD   Multiple Vitamin (DAILY-MANDI) TABS TAKE 1 TABLET BY MOUTH EVERY DAY 11/13/20   Carolynn Navas APRN - CNP   levothyroxine (SYNTHROID) 50 MCG tablet Take 1 tablet by mouth every morning 11/13/20   Herson Tate APRN - CNP   Multiple Vitamins-Minerals (THERAPEUTIC MULTIVITAMIN-MINERALS) tablet Take 1 tablet by mouth daily 12/29/15 6/12/16  Cesar Justin MD   albuterol (PROVENTIL;VENTOLIN) 90 MCG/ACT inhaler Inhale 2 puffs into the lungs every 6 hours as needed.  12/21/12 11/5/19  Cesar Justin MD       Future Appointments   Date Time Provider Jay Wong   11/24/2020 11:30 AM Steph Mensah  [place] [address]   9/1/2021  8:30 AM Clinton Grijalva MD [facility]

## 2020-11-24 ENCOUNTER — CARE COORDINATION (OUTPATIENT)
Dept: CARE COORDINATION | Age: 60
End: 2020-11-24

## 2020-11-24 ENCOUNTER — OFFICE VISIT (OUTPATIENT)
Dept: FAMILY MEDICINE CLINIC | Age: 60
End: 2020-11-24
Payer: COMMERCIAL

## 2020-11-24 VITALS
HEIGHT: 66 IN | HEART RATE: 105 BPM | WEIGHT: 225.2 LBS | SYSTOLIC BLOOD PRESSURE: 132 MMHG | DIASTOLIC BLOOD PRESSURE: 82 MMHG | BODY MASS INDEX: 36.19 KG/M2 | TEMPERATURE: 98.3 F | OXYGEN SATURATION: 97 %

## 2020-11-24 PROCEDURE — G8427 DOCREV CUR MEDS BY ELIG CLIN: HCPCS | Performed by: FAMILY MEDICINE

## 2020-11-24 PROCEDURE — 99213 OFFICE O/P EST LOW 20 MIN: CPT | Performed by: FAMILY MEDICINE

## 2020-11-24 PROCEDURE — G8417 CALC BMI ABV UP PARAM F/U: HCPCS | Performed by: FAMILY MEDICINE

## 2020-11-24 PROCEDURE — 3017F COLORECTAL CA SCREEN DOC REV: CPT | Performed by: FAMILY MEDICINE

## 2020-11-24 PROCEDURE — G8482 FLU IMMUNIZE ORDER/ADMIN: HCPCS | Performed by: FAMILY MEDICINE

## 2020-11-24 PROCEDURE — 1036F TOBACCO NON-USER: CPT | Performed by: FAMILY MEDICINE

## 2020-11-24 RX ORDER — ASPIRIN 81 MG/1
81 TABLET ORAL DAILY
Qty: 30 TABLET | Refills: 5 | Status: SHIPPED | OUTPATIENT
Start: 2020-11-24 | End: 2021-04-26

## 2020-11-24 RX ORDER — UBIDECARENONE 100 MG
1000 CAPSULE ORAL
Qty: 90 CAPSULE | Refills: 1 | Status: SHIPPED | OUTPATIENT
Start: 2020-11-24 | End: 2021-05-24

## 2020-11-24 ASSESSMENT — PATIENT HEALTH QUESTIONNAIRE - PHQ9
SUM OF ALL RESPONSES TO PHQ9 QUESTIONS 1 & 2: 0
2. FEELING DOWN, DEPRESSED OR HOPELESS: 0
SUM OF ALL RESPONSES TO PHQ QUESTIONS 1-9: 0
SUM OF ALL RESPONSES TO PHQ QUESTIONS 1-9: 0
1. LITTLE INTEREST OR PLEASURE IN DOING THINGS: 0
SUM OF ALL RESPONSES TO PHQ QUESTIONS 1-9: 0

## 2020-11-24 NOTE — CARE COORDINATION
Ambulatory Care Coordination Note  11/24/2020  CM Risk Score: 6  Charlson 10 Year Mortality Risk Score: 79%     ACC: Chelly Gil, RN    Summary Note: Patient states she is doing well. Patient reports she has had no further diarrhea or bloody stools. Patient denies any recent falls or injury. Patient reports compliance with medications. Patient was seen by Abrahan Gruber MD today for follow-up. Discussed CC follow-up in approximately 3 weeks. Directed patient to call with any question or concerns that may come up before then. Patient verbalizes agreement. General Assessment    Do you have any symptoms that are causing concern?:  No         Care Coordination Interventions    Program Enrollment:  Rising Risk  Referral from Primary Care Provider:  No  Suggested Interventions and [address]:   (Comment: Currently followed by [facility])  Palliative Care:   (Comment: 10/13/20 Discussed services)  Pharmacist:  Completed (Comment: 10/13/20 Clinical Rx Referral)  Registered Dietician: In Process (Comment: 10/13/2020 Declines; 11/3/2020 [address] Dietician referral - patient agreed.)  Other Services:  Completed (Comment: 10/13/20 ACP Referral)  Transportation Support:  Declined  Zone Management Tools:  Completed (Comment: 10/13/2020 Asthma Action Plan (Zones). )  Other Services or Interventions:  10/13/2020 Instructed on same day, next day, and Walk-In Care. Goals Addressed                 This Visit's Progress     Conditions and Symptoms   On track     I will schedule office visits, as directed by my provider. I will keep my appointment or reschedule if I have to cancel. I will notify my provider of any barriers to my plan of care. I will follow my Zone Management tool to seek urgent or emergent care. I will notify my provider of any symptoms that indicate a worsening of my condition.     Barriers: lack of education  Plan for overcoming my barriers: Care Coordination  Confidence: 9/10  Anticipated Goal Completion Date: 3/13/2021         Medication Management   On track     I will take my medication as directed. I will notify my provider of any problems with medications, like adverse effects or side effects. I will notify my provider for advice before I stop taking any of my medication. Barriers: overwhelmed by complexity of regimen and lack of education  Plan for overcoming my barriers: Care Coordination, Clinical Rx  Confidence: 9/10  Anticipated Goal Completion Date: 3/13/2021            Prior to Admission medications    Medication Sig Start Date End Date Taking? Authorizing Provider   vitamin D (D3-1000) 25 MCG (1000 UT) CAPS Take 1 capsule by mouth Daily with supper 11/24/20   Fabiana Mann MD   ciclopirox (PENLAC) 8 % solution Apply to adjacent skin and affected nails daily. Remove with alcohol every 7 days.  11/24/20   Fabiana Mann MD   aspirin EC 81 MG EC tablet Take 1 tablet by mouth daily 11/24/20   Fabiana Mann MD   metFORMIN (GLUCOPHAGE) 500 MG tablet TAKE 1 TABLET BY MOUTH TWICE A DAY WITH MEALS 11/13/20   ARASH Orozco CNP   Multiple Vitamin (DAILY-MANDI) TABS TAKE 1 TABLET BY MOUTH EVERY DAY 11/13/20   ARASH Orozco CNP   levothyroxine (SYNTHROID) 50 MCG tablet Take 1 tablet by mouth every morning 11/13/20   ARASH Bianchi CNP   famotidine (PEPCID) 20 MG tablet Take 1 tablet by mouth 2 times daily 11/13/20   ARASH Bianchi CNP   Biotin 300 MCG TABS Take 1 tablet by mouth daily 11/13/20   ARASH Bianchi CNP   psyllium 0.52 g capsule Take 1 capsule by mouth daily 10/30/20   Fabiana Mann MD   citalopram (CELEXA) 40 MG tablet Take 40 mg by mouth daily    Historical Provider, MD   benztropine (COGENTIN) 1 MG tablet Take 1 mg by mouth 3 times daily 10/15/20   Historical Provider, MD   fluticasone (FLONASE) 50 MCG/ACT nasal spray USE 1 SPRAY INTO EACH NOSTRIL DAILY 10/13/20   Dhara Raya MD   vitamin B-2 (RIBOFLAVIN) 100 MG TABS tablet Take 1 tablet by mouth daily 5/25/20   Valencia Esparza MD   budesonide-formoterol (SYMBICORT) 160-4.5 MCG/ACT AERO Inhale 2 puffs into the lungs 2 times daily 3/10/20   Risa Florez MD   cyanocobalamin (CVS VITAMIN B12) 1000 MCG tablet Take 1 tablet by mouth daily 2/21/20   Risa Florez MD   traZODone (DESYREL) 50 MG tablet Take by mouth nightly 1-2 tablets nightly    Historical Provider, MD   pravastatin (PRAVACHOL) 40 MG tablet TAKE 1 TABLET BY MOUTH EVERY DAY IN THE EVENING 9/3/19   Risa Florez MD   albuterol sulfate  (90 Base) MCG/ACT inhaler USE 2 PUFFS EVERY 4 HRS AS NEEDED FOR WHEEZING/SOB-SPACE OUT TO EVERY 6 HR AS SYMPTOMS IMPROVE 7/19/19   Risa Florez MD   cetirizine (ZYRTEC) 10 MG tablet TAKE 1 TABLET BY MOUTH EVERY DAY AS NEEDED FOR ALLERGIES 5/8/19   Roseline Cruz MD   EPINEPHrine (EPIPEN 2-PRAVEENA) 0.3 MG/0.3ML SOAJ injection Use as directed for allergic reaction 6/11/18   Risa Florez MD   ARIPiprazole (ABILIFY) 15 MG tablet TAKE 1 TABLET BY MOUTH EVERY DAY 1/17/17   Historical Provider, MD   Multiple Vitamins-Minerals (THERAPEUTIC MULTIVITAMIN-MINERALS) tablet Take 1 tablet by mouth daily 12/29/15 6/12/16  Risa Florez MD   buPROPion (WELLBUTRIN XL) 300 MG XL tablet Take 1 tablet by mouth daily 8/28/15   Risa Florez MD   albuterol (PROVENTIL;VENTOLIN) 90 MCG/ACT inhaler Inhale 2 puffs into the lungs every 6 hours as needed.  12/21/12 11/5/19  Risa Florez MD       Future Appointments   Date Time Provider Jay Wong   2/24/2021  8:45 AM Dhara Raya MD [facility]   9/1/2021  8:30 AM Mini Juarez MD [facility]

## 2020-11-24 NOTE — PROGRESS NOTES
supper 90 capsule 1    ciclopirox (PENLAC) 8 % solution Apply to adjacent skin and affected nails daily. Remove with alcohol every 7 days.  18 mL 3    aspirin EC 81 MG EC tablet Take 1 tablet by mouth daily 30 tablet 5    metFORMIN (GLUCOPHAGE) 500 MG tablet TAKE 1 TABLET BY MOUTH TWICE A DAY WITH MEALS 60 tablet 1    Multiple Vitamin (DAILY-MANDI) TABS TAKE 1 TABLET BY MOUTH EVERY DAY 30 tablet 5    levothyroxine (SYNTHROID) 50 MCG tablet Take 1 tablet by mouth every morning 90 tablet 0    famotidine (PEPCID) 20 MG tablet Take 1 tablet by mouth 2 times daily 60 tablet 2    Biotin 300 MCG TABS Take 1 tablet by mouth daily 30 tablet 3    psyllium 0.52 g capsule Take 1 capsule by mouth daily 30 capsule 5    citalopram (CELEXA) 40 MG tablet Take 40 mg by mouth daily      benztropine (COGENTIN) 1 MG tablet Take 1 mg by mouth 3 times daily      fluticasone (FLONASE) 50 MCG/ACT nasal spray USE 1 SPRAY INTO EACH NOSTRIL DAILY 1 Bottle 0    vitamin B-2 (RIBOFLAVIN) 100 MG TABS tablet Take 1 tablet by mouth daily 90 tablet 0    budesonide-formoterol (SYMBICORT) 160-4.5 MCG/ACT AERO Inhale 2 puffs into the lungs 2 times daily 1 Inhaler 3    cyanocobalamin (CVS VITAMIN B12) 1000 MCG tablet Take 1 tablet by mouth daily 30 tablet 5    traZODone (DESYREL) 50 MG tablet Take by mouth nightly 1-2 tablets nightly      pravastatin (PRAVACHOL) 40 MG tablet TAKE 1 TABLET BY MOUTH EVERY DAY IN THE EVENING 90 tablet 1    albuterol sulfate  (90 Base) MCG/ACT inhaler USE 2 PUFFS EVERY 4 HRS AS NEEDED FOR WHEEZING/SOB-SPACE OUT TO EVERY 6 HR AS SYMPTOMS IMPROVE 1 Inhaler 3    cetirizine (ZYRTEC) 10 MG tablet TAKE 1 TABLET BY MOUTH EVERY DAY AS NEEDED FOR ALLERGIES 30 tablet 5    EPINEPHrine (EPIPEN 2-PRAVEENA) 0.3 MG/0.3ML SOAJ injection Use as directed for allergic reaction 2 each 0    ARIPiprazole (ABILIFY) 15 MG tablet TAKE 1 TABLET BY MOUTH EVERY DAY  2    buPROPion (WELLBUTRIN XL) 300 MG XL tablet Take 1 tablet by 6/2012    EYE REMOVAL      OD for melanoma, age 30    FOOT OSTEOTOMY Left 09/23/2016    DEJA JACKMAN DPM      LINDSAY AND BSO  2012    Dr Verónica Warren        Family History   Problem Relation Age of Onset    Heart Failure Mother         dec 76    Diabetes Mother     Diabetes Father     Stroke Father         dec age 80    Cancer Father         Sarcoma    Breast Cancer Sister     Stomach Cancer Other         Social History     Socioeconomic History    Marital status:      Spouse name: Not on file    Number of children: 3    Years of education: 15    Highest education level: High school graduate   Occupational History    Occupation: SSI   Social Needs    Financial resource strain: Not very hard    Food insecurity     Worry: Sometimes true     Inability: Sometimes true    Transportation needs     Medical: No     Non-medical: No   Tobacco Use    Smoking status: Never Smoker    Smokeless tobacco: Never Used   Substance and Sexual Activity    Alcohol use: No    Drug use: No    Sexual activity: Not Currently     Birth control/protection: Surgical   Lifestyle    Physical activity     Days per week: 0 days     Minutes per session: 0 min    Stress: Rather much   Relationships    Social connections     Talks on phone: Twice a week     Gets together: Never     Attends Jainism service: Never     Active member of club or organization: No     Attends meetings of clubs or organizations: Never     Relationship status:     Intimate partner violence     Fear of current or ex partner: Not on file     Emotionally abused: Not on file     Physically abused: Not on file     Forced sexual activity: Not on file   Other Topics Concern    Not on file   Social History Narrative    Born in [place], one of 4 children, one sister disappeared at age 29, never found    Moved to [place] at age 40        Lives in an apartment in [place] [place], alone    , 3 children, all grown, in [place]     2 granddaughters     Hobbies exercising, birds    Attends [address], has volunteered at [employer]        /82 (Site: Right Upper Arm, Position: Sitting, Cuff Size: Large Adult)   Pulse 105   Temp 98.3 °F (36.8 °C) (Oral)   Ht 5' 6\" (1.676 m)   Wt 225 lb 3.2 oz (102.2 kg)   SpO2 97%   BMI 36.35 kg/m²        Physical Exam:    General appearance - alert, well appearing, and in no distress  Mental Status - alert, oriented to person, place, and time  Eyes - pupils equal and reactive, extraocular eye movements intact   Ears - bilateral TM's and external ear canals normal   Nose - normal and patent, no erythema, discharge or polyps   Sinuses - Normal paranasal sinuses without tenderness   Throat - mucous membranes moist, pharynx normal without lesions   Neck - supple, no significant adenopathy   Thyroid - thyroid is normal in size without nodules or tenderness    Chest - clear to auscultation, no wheezes, rales or rhonchi, symmetric air entry   Heart - normal rate, regular rhythm, normal S1, S2, no murmurs, rubs, clicks or gallops  Abdomen - soft, nontender, nondistended, no masses or organomegaly   Back exam - full range of motion, no tenderness, palpable spasm or pain on motion   Neurological - alert, oriented, normal speech, no focal findings or movement disorder noted   Musculoskeletal - no joint tenderness, deformity or swelling   Extremities - peripheral pulses normal, no pedal edema, no clubbing or cyanosis   Skin - normal coloration and turgor, no rashes, no suspicious skin lesions noted. Thick toenails.     Labs   No results found for: TSHREFLEX  TSH   Date Value Ref Range Status   02/07/2020 2.700 0.440 - 3.860 uIU/mL Final   06/14/2019 2.430 0.440 - 3.860 uIU/mL Final   06/11/2018 3.820 0.270 - 4.200 uIU/mL Final   06/07/2017 3.500 0.270 - 4.200 uIU/mL Final   01/27/2017 3.810 0.270 - 4.200 uIU/mL Final   03/04/2016 1.990 0.270 - 4.200 uIU/mL Final   11/20/2015 1.940 0.270 - 4.200 uIU/mL Final   08/28/2015 1.970 0.270 -

## 2020-12-01 ENCOUNTER — CARE COORDINATION (OUTPATIENT)
Dept: CARE COORDINATION | Age: 60
End: 2020-12-01

## 2020-12-01 NOTE — CARE COORDINATION
Contacted Veronika Garza regarding Dietitian follow up. Pt answered, RD explained reason for call and role in care. Pt requested RD call back on a different day- pt states she is going to the store with her  at time of call. Will contact pt in three days and follow up as appropriate.        [address] [facility], [facility]

## 2020-12-04 ENCOUNTER — CARE COORDINATION (OUTPATIENT)
Dept: CARE COORDINATION | Age: 60
End: 2020-12-04

## 2020-12-04 NOTE — CARE COORDINATION
Aldo Branham  12/4/2020    Registered Dietitian Progress Note for Care Coordination    Assessment: Mariposa Stack is a 62 y.o. female. RD referred for high fiber diet education. RD spoke with patient for initial nutrition assessment on 11/16. RD called to follow up with pt today 12/4. Pt states she received the educational handouts via [address] RD discussed previous goals with pt. Reiterated the importance of slowly increasing fiber intake and drinking plenty of fluids to help with bowel movements. Pt states she drinks plenty of fluids and has been eating more fiber but her stools are hard. RD discussed sources of fiber include fruits, vegetables and whole grains. Encouraged patient to continue focusing on adding more fiber to meals. Pt verbalizes understanding. Pt has no nutrition related questions at this time. RD offered to follow up with patient in a few weeks, pt prefers to call RD in the future with any nutrition related questions. Barriers to meeting goals: stress      Action:  RD explained importance of slowly increasing fiber intake with time to 25-35 grams/day. Discussed the importance of drinking plenty of fluids- recommendation is 8 cups/day unless otherwise noted. Explained fluid helps our bodies process fiber without discomfort. Pt will focus on adding more fiber to meals and continue to drink plenty of water. Nutrition Monitoring and Evaluation  Indicator/Goal Criteria Progress   #1 Eat balanced meals consistently throughout the day. #1 Continue eating 3 meals/day. Focus on making these meals balanced using the MyPlate MUJDGTPAS-4/6 plate fruits and/or vegetables, 1/4 plate protein and 1/4 plate starchy carbohydrates with 8 oz glass of low fat milk if desired. #1 Pt continues to eat 3 meals/day. #2  Slowly increase fiber intake. #2 Incorporate more fruits, vegetables and whole grains to meals. #2 Pt states she is getting more fiber in her diet.       Plan of Care:  RD encouraged pt to keep working toward goals set. RD explained to pt this is final follow up call and provided contact information to pt. Encouraged pt to call RD in future with any nutrition related questions or concerns. Follow Up:    Final follow up call today 12/4. RD will continue to follow/assist with patient return call.         [facility] [facility], [address] [place] [address]

## 2020-12-18 ENCOUNTER — CARE COORDINATION (OUTPATIENT)
Dept: CARE COORDINATION | Age: 60
End: 2020-12-18

## 2020-12-18 NOTE — CARE COORDINATION
Ambulatory Care Coordination Note  12/18/2020  CM Risk Score: 6  Charlson 10 Year Mortality Risk Score: 79%     ACC: Ирина Cornejo RN    Summary Note: Patient required redirection several times. Patient talking about her break-up with boyfriend and his visit to her residence yesterday. Patient denies any concern for safety. Patient reports diarrhea and black stools have resolved. Patient states her sore throat came back. Patient reports sore throat, cough, and body aches. Patient denies headache, SOB, fever, chills, nausea, vomiting, diarrhea, loss of sense of taste or smell. Instructed on COVID precautions. Instructed on S/S of COVID and when to call provider. Patient verbalizes understanding. General Assessment    Do you have any symptoms that are causing concern?: No         Care Coordination Interventions    Program Enrollment: Rising Risk  Referral from Primary Care Provider: No  Suggested Interventions and [address]:  (Comment: Currently followed by [facility])  Palliative Care:  (Comment: 10/13/20 Discussed services)  Pharmacist: Completed (Comment: 10/13/20 Clinical Rx Referral)  Registered Dietician: In Process (Comment: 10/13/2020 Declines; 11/3/2020 [facility] Dietician referral - patient agreed.)  Other Services: Completed (Comment: 10/13/20 ACP Referral)  Transportation Support: Declined  Zone Management Tools: Completed (Comment: 10/13/2020 Asthma Action Plan (Zones). )  Other Services or Interventions: 10/13/2020 Instructed on same day, next day, and Walk-In Care. Goals Addressed                 This Visit's Progress     Conditions and Symptoms   On track     I will schedule office visits, as directed by my provider. I will keep my appointment or reschedule if I have to cancel. I will notify my provider of any barriers to my plan of care. I will follow my Zone Management tool to seek urgent or emergent care.   I will notify my provider of any symptoms that indicate a worsening of my condition. Barriers: lack of education  Plan for overcoming my barriers: Care Coordination  Confidence: 9/10  Anticipated Goal Completion Date: 3/13/2021         Medication Management   On track     I will take my medication as directed. I will notify my provider of any problems with medications, like adverse effects or side effects. I will notify my provider for advice before I stop taking any of my medication. Barriers: overwhelmed by complexity of regimen and lack of education  Plan for overcoming my barriers: Care Coordination, Clinical Rx  Confidence: 9/10  Anticipated Goal Completion Date: 3/13/2021            Prior to Admission medications    Medication Sig Start Date End Date Taking? Authorizing Provider   vitamin D (D3-1000) 25 MCG (1000 UT) CAPS Take 1 capsule by mouth Daily with supper 11/24/20   Willy Echevarria MD   ciclopirox (PENLAC) 8 % solution Apply to adjacent skin and affected nails daily. Remove with alcohol every 7 days.  11/24/20   Willy Echevarria MD   aspirin EC 81 MG EC tablet Take 1 tablet by mouth daily 11/24/20   Willy Echevarria MD   metFORMIN (GLUCOPHAGE) 500 MG tablet TAKE 1 TABLET BY MOUTH TWICE A DAY WITH MEALS 11/13/20   Carolynn Armstrong Sober, APRN - CNP   Multiple Vitamin (DAILY-MANDI) TABS TAKE 1 TABLET BY MOUTH EVERY DAY 11/13/20   ARASH Mac CNP   levothyroxine (SYNTHROID) 50 MCG tablet Take 1 tablet by mouth every morning 11/13/20   AARSH Drew CNP   famotidine (PEPCID) 20 MG tablet Take 1 tablet by mouth 2 times daily 11/13/20   ARASH Drew CNP   Biotin 300 MCG TABS Take 1 tablet by mouth daily 11/13/20   ARASH Drew CNP   psyllium 0.52 g capsule Take 1 capsule by mouth daily 10/30/20   Willy Echevarria MD   citalopram (CELEXA) 40 MG tablet Take 40 mg by mouth daily    Historical Provider, MD   benztropine (COGENTIN) 1 MG tablet Take 1 mg by mouth 3 times daily 10/15/20 Historical Provider, MD   fluticasone (FLONASE) 50 MCG/ACT nasal spray USE 1 SPRAY INTO EACH NOSTRIL DAILY 10/13/20   Trace Lomeli MD   vitamin B-2 (RIBOFLAVIN) 100 MG TABS tablet Take 1 tablet by mouth daily 5/25/20   Alvin Baires MD   budesonide-formoterol (SYMBICORT) 160-4.5 MCG/ACT AERO Inhale 2 puffs into the lungs 2 times daily 3/10/20   Jerry Hernandez MD   cyanocobalamin ([employer]S VITAMIN B12) 1000 MCG tablet Take 1 tablet by mouth daily 2/21/20   Jerry Hernandez MD   traZODone (DESYREL) 50 MG tablet Take by mouth nightly 1-2 tablets nightly    Historical Provider, MD   pravastatin (PRAVACHOL) 40 MG tablet TAKE 1 TABLET BY MOUTH EVERY DAY IN THE EVENING 9/3/19   Jerry Hernandez MD   albuterol sulfate  (90 Base) MCG/ACT inhaler USE 2 PUFFS EVERY 4 HRS AS NEEDED FOR WHEEZING/SOB-SPACE OUT TO EVERY 6 HR AS SYMPTOMS IMPROVE 7/19/19   Jerry Hernandez MD   cetirizine (ZYRTEC) 10 MG tablet TAKE 1 TABLET BY MOUTH EVERY DAY AS NEEDED FOR ALLERGIES 5/8/19   Katey Arreaga MD   EPINEPHrine (EPIPEN 2-PRAVEENA) 0.3 MG/0.3ML SOAJ injection Use as directed for allergic reaction 6/11/18   Jerry Hernandez MD   ARIPiprazole (ABILIFY) 15 MG tablet TAKE 1 TABLET BY MOUTH EVERY DAY 1/17/17   Historical Provider, MD   Multiple Vitamins-Minerals (THERAPEUTIC MULTIVITAMIN-MINERALS) tablet Take 1 tablet by mouth daily 12/29/15 6/12/16  Jerry Hernandez MD   buPROPion (WELLBUTRIN XL) 300 MG XL tablet Take 1 tablet by mouth daily 8/28/15   Jerry Hernandez MD   albuterol (PROVENTIL;VENTOLIN) 90 MCG/ACT inhaler Inhale 2 puffs into the lungs every 6 hours as needed.  12/21/12 11/5/19  Jerry Hernandez MD       Future Appointments   Date Time Provider Jay Wong   2/24/2021  8:45 AM Libby Salomon MD [facility]   9/1/2021  8:30 AM Parvin Wadsworth MD [facility]

## 2021-01-07 ENCOUNTER — CARE COORDINATION (OUTPATIENT)
Dept: CARE COORDINATION | Age: 61
End: 2021-01-07

## 2021-01-07 NOTE — CARE COORDINATION
Ambulatory Care Coordination Note  1/7/2021  CM Risk Score: 6  Charlson 10 Year Mortality Risk Score: 79%     ACC: Margie Alejandro RN    Summary Note: Patient contacted for CC follow-up. Patient reports increase in sob and cough. Patient reports she just arrived home from walking to the store. She states she has increase SOB and cough after walking up 4 flights of stairs to her apartment. Patient reports improvement with rescue inhaler and rest. Discussed cold air/temperature triggers. Reviewed S/S to report to provider. Patient states she is interested in receiving the COVID-19 Vaccine. Encouraged patient to discuss with David Hernandez MD at upcoming appointment. Informed patient that I will let her know any information on vaccine as I'm made aware. Discussed CC follow-up in 2 weeks. Patient verbalizes agreement. General Assessment    Do you have any symptoms that are causing concern?: Yes  Progression since Onset: Gradually Worsening  Reported Symptoms: Cough, Shortness of Breath (Comment: Patient reports inrease SOB and cough after walking to store and up 4 flights of stairs to her apartment.)           Care Coordination Interventions    Program Enrollment: Rising Risk  Referral from Primary Care Provider: No  Suggested Interventions and [address]:  (Comment: Currently followed by [facility])  Palliative Care:  (Comment: 10/13/20 Discussed services)  Pharmacist: Completed (Comment: 10/13/20 Clinical Rx Referral)  Registered Dietician: Completed (Comment: 10/13/2020 Declines; 11/3/2020 [address] Dietician referral - patient agreed.)  Other Services: Completed (Comment: 10/13/20 ACP Referral)  Transportation Support: Declined  Zone Management Tools: Completed (Comment: 10/13/2020 Asthma Action Plan (Zones). )  Other Services or Interventions: 10/13/2020 Instructed on same day, next day, and Walk-In Care.          Goals Addressed                 This Visit's Progress     Conditions and Symptoms   On track     I will schedule office visits, as directed by my provider. I will keep my appointment or reschedule if I have to cancel. I will notify my provider of any barriers to my plan of care. I will follow my Zone Management tool to seek urgent or emergent care. I will notify my provider of any symptoms that indicate a worsening of my condition. Barriers: lack of education  Plan for overcoming my barriers: Care Coordination  Confidence: 9/10  Anticipated Goal Completion Date: 3/13/2021         Medication Management   On track     I will take my medication as directed. I will notify my provider of any problems with medications, like adverse effects or side effects. I will notify my provider for advice before I stop taking any of my medication. Barriers: overwhelmed by complexity of regimen and lack of education  Plan for overcoming my barriers: Care Coordination, Clinical Rx  Confidence: 9/10  Anticipated Goal Completion Date: 3/13/2021            Prior to Admission medications    Medication Sig Start Date End Date Taking? Authorizing Provider   metFORMIN (GLUCOPHAGE) 500 MG tablet TAKE 1 TABLET BY MOUTH TWICE A DAY WITH MEALS (AM,PM) 1/4/21   Trace Lomeli MD   vitamin D (D3-1000) 25 MCG (1000 UT) CAPS Take 1 capsule by mouth Daily with supper 11/24/20   Audrey Guerrero MD   ciclopirox (PENLAC) 8 % solution Apply to adjacent skin and affected nails daily. Remove with alcohol every 7 days.  11/24/20   Tom Lomeli MD   aspirin EC 81 MG EC tablet Take 1 tablet by mouth daily 11/24/20   Trace Lomeli MD   Multiple Vitamin (DAILY-MANDI) TABS TAKE 1 TABLET BY MOUTH EVERY DAY 11/13/20   ARASH Lee CNP   levothyroxine (SYNTHROID) 50 MCG tablet Take 1 tablet by mouth every morning 11/13/20   ARASH Tyson CNP   famotidine (PEPCID) 20 MG tablet Take 1 tablet by mouth 2 times daily 11/13/20   ARASH Tyson CNP   Biotin 300 MCG TABS Take 1 tablet by mouth daily 11/13/20   ARASH Madrigal - CNP   psyllium 0.52 g capsule Take 1 capsule by mouth daily 10/30/20   Sandi Paulson MD   citalopram (CELEXA) 40 MG tablet Take 40 mg by mouth daily    Historical Provider, MD   benztropine (COGENTIN) 1 MG tablet Take 1 mg by mouth 3 times daily 10/15/20   Historical Provider, MD   fluticasone (FLONASE) 50 MCG/ACT nasal spray USE 1 SPRAY INTO EACH NOSTRIL DAILY 10/13/20   Sandi Paulson MD   vitamin B-2 (RIBOFLAVIN) 100 MG TABS tablet Take 1 tablet by mouth daily 5/25/20   Gladystjudson Allison MD   budesonide-formoterol (SYMBICORT) 160-4.5 MCG/ACT AERO Inhale 2 puffs into the lungs 2 times daily 3/10/20   Binta River MD   cyanocobalamin ([employer] VITAMIN B12) 1000 MCG tablet Take 1 tablet by mouth daily 2/21/20   Binta River MD   traZODone (DESYREL) 50 MG tablet Take by mouth nightly 1-2 tablets nightly    Historical Provider, MD   pravastatin (PRAVACHOL) 40 MG tablet TAKE 1 TABLET BY MOUTH EVERY DAY IN THE EVENING 9/3/19   Binta River MD   albuterol sulfate  (90 Base) MCG/ACT inhaler USE 2 PUFFS EVERY 4 HRS AS NEEDED FOR WHEEZING/SOB-SPACE OUT TO EVERY 6 HR AS SYMPTOMS IMPROVE 7/19/19   Binta River MD   cetirizine (ZYRTEC) 10 MG tablet TAKE 1 TABLET BY MOUTH EVERY DAY AS NEEDED FOR ALLERGIES 5/8/19   Breana Henry MD   EPINEPHrine (EPIPEN 2-PRAVEENA) 0.3 MG/0.3ML SOAJ injection Use as directed for allergic reaction 6/11/18   Binta River MD   ARIPiprazole (ABILIFY) 15 MG tablet TAKE 1 TABLET BY MOUTH EVERY DAY 1/17/17   Historical Provider, MD   Multiple Vitamins-Minerals (THERAPEUTIC MULTIVITAMIN-MINERALS) tablet Take 1 tablet by mouth daily 12/29/15 6/12/16  Binta River MD   buPROPion (WELLBUTRIN XL) 300 MG XL tablet Take 1 tablet by mouth daily 8/28/15   Binta River MD   albuterol (PROVENTIL;VENTOLIN) 90 MCG/ACT inhaler Inhale 2 puffs into the lungs every 6 hours as needed.  12/21/12 11/5/19  Binta River MD Future Appointments   Date Time Provider Jay Wong   2/24/2021  8:45 AM [address]  [place], [address]

## 2021-01-10 ENCOUNTER — NURSE TRIAGE (OUTPATIENT)
Dept: OTHER | Facility: CLINIC | Age: 61
End: 2021-01-10

## 2021-01-10 NOTE — TELEPHONE ENCOUNTER
Call received on [address]. Brief description of triage: covid symptoms, want to know if you should be tested before getting a vaccination. Triage indicates for patient to Get a covid test and follow up with your PCP, quarantine at home if able. Care advice provided. Recommended using local department of health website for testing locations and up to date information. Caller verbalizes understanding, denies any other questions or concerns; instructed to call back for any new or worsening symptoms. Reason for Disposition   [1] COVID-19 diagnosed by HCP (doctor, NP or PA) AND [2] mild symptoms (e.g., cough, fever, others) AND [8] no complications or SOB    Answer Assessment - Initial Assessment Questions  1. COVID-19 DIAGNOSIS: \"Who made your Coronavirus (COVID-19) diagnosis? \" \"Was it confirmed by a positive lab test?\" If not diagnosed by a HCP, ask \"Are there lots of cases (community spread) where you live? \" (See public health department website, if unsure)      Suspected covid exposure    2. COVID-19 EXPOSURE: \"Was there any known exposure to COVID before the symptoms began? \" CDC Definition of close contact: within 6 feet (2 meters) for a total of 15 minutes or more over a 24-hour period. Unknown    3. ONSET: \"When did the COVID-19 symptoms start? \"       2 days ago, 1/8/10/21    4. WORST SYMPTOM: \"What is your worst symptom? \" (e.g., cough, fever, shortness of breath, muscle aches)      Sore throat, muscle aches    5. COUGH: \"Do you have a cough? \" If so, ask: \"How bad is the cough? \"        Cough is mild    6. FEVER: \"Do you have a fever? \" If so, ask: \"What is your temperature, how was it measured, and when did it start? \"      Suspected because of chills    7. RESPIRATORY STATUS: \"Describe your breathing? \" (e.g., shortness of breath, wheezing, unable to speak)       Short of breath with exertion    8.  BETTER-SAME-WORSE: Wai Taylor you getting better, staying the same or getting worse

## 2021-01-11 ENCOUNTER — VIRTUAL VISIT (OUTPATIENT)
Dept: FAMILY MEDICINE CLINIC | Age: 61
End: 2021-01-11
Payer: COMMERCIAL

## 2021-01-11 DIAGNOSIS — J40 BRONCHITIS: Primary | ICD-10-CM

## 2021-01-11 PROCEDURE — 99442 PR PHYS/QHP TELEPHONE EVALUATION 11-20 MIN: CPT | Performed by: FAMILY MEDICINE

## 2021-01-11 RX ORDER — AZITHROMYCIN 250 MG/1
250 TABLET, FILM COATED ORAL SEE ADMIN INSTRUCTIONS
Qty: 6 TABLET | Refills: 0 | Status: SHIPPED | OUTPATIENT
Start: 2021-01-11 | End: 2021-01-16

## 2021-01-11 RX ORDER — METHYLPREDNISOLONE 4 MG/1
TABLET ORAL
Qty: 1 KIT | Refills: 0 | Status: SHIPPED | OUTPATIENT
Start: 2021-01-11 | End: 2021-01-17

## 2021-01-11 ASSESSMENT — ENCOUNTER SYMPTOMS
NAUSEA: 0
ANAL BLEEDING: 0
COUGH: 1
EYE ITCHING: 0
SINUS PRESSURE: 0
SHORTNESS OF BREATH: 1
EYE DISCHARGE: 0
ABDOMINAL PAIN: 0
DIARRHEA: 0
SINUS PAIN: 0
RECTAL PAIN: 0
COLOR CHANGE: 0
FACIAL SWELLING: 0
BACK PAIN: 0
TROUBLE SWALLOWING: 0
PHOTOPHOBIA: 0
SORE THROAT: 0
EYE REDNESS: 0
CHEST TIGHTNESS: 0
VOMITING: 0
APNEA: 0

## 2021-01-11 NOTE — PROGRESS NOTES
2021    TELEHEALTH EVALUATION -- Audio/Visual (During IQKXL-64 public health emergency)    Due to Kevin 19 outbreak, patient's office visit was converted to a virtual visit. Patient was contacted and agreed to proceed with a virtual visit via Telephone Visit  The risks and benefits of converting to a virtual visit were discussed in light of the current infectious disease epidemic. Patient also understood that insurance coverage and co-pays are up to their individual insurance plans. HPI:    Obdulio Engle (:  1960) has requested an audio/video evaluation for the following concern(s):    Viral Illness   Patient complains of symptoms since Thursday. Admits to coughing, with rib pain on the left side. Admits to loss of taste   Denies diarrhea   Admits to sweats  Denies any sick contacts. Admits to symptoms improving. Admits to sore throat for 3 days. Denies nausea. Review of Systems   Constitutional: Negative for activity change, appetite change, diaphoresis, fatigue and fever. HENT: Negative for congestion, dental problem, ear discharge, ear pain, facial swelling, mouth sores, nosebleeds, postnasal drip, sinus pressure, sinus pain, sneezing, sore throat and trouble swallowing. Eyes: Negative for photophobia, discharge, redness, itching and visual disturbance. Respiratory: Positive for cough and shortness of breath. Negative for apnea and chest tightness. Cardiovascular: Negative for chest pain and leg swelling. Gastrointestinal: Negative for abdominal pain, anal bleeding, diarrhea, nausea, rectal pain and vomiting. Endocrine: Negative for cold intolerance, heat intolerance, polydipsia and polyphagia. Genitourinary: Negative for decreased urine volume, difficulty urinating, dyspareunia, enuresis, flank pain, genital sores, hematuria, menstrual problem and urgency. Musculoskeletal: Negative for arthralgias, back pain, joint swelling, myalgias and neck stiffness. Skin: Negative for color change and pallor. Allergic/Immunologic: Negative for environmental allergies. Neurological: Negative for dizziness, tremors, seizures, syncope, facial asymmetry, numbness and headaches. Hematological: Negative for adenopathy. Does not bruise/bleed easily. Psychiatric/Behavioral: Negative for agitation, behavioral problems, decreased concentration, dysphoric mood, hallucinations, sleep disturbance and suicidal ideas. The patient is not nervous/anxious. Prior to Visit Medications    Medication Sig Taking? Authorizing Provider   azithromycin (ZITHROMAX) 250 MG tablet Take 1 tablet by mouth See Admin Instructions for 5 days 500mg on day 1 followed by 250mg on days 2 - 5 Yes [address] MD   methylPREDNISolone (MEDROL DOSEPACK) 4 MG tablet Take by mouth. Yes [address] Infante MD   metFORMIN (GLUCOPHAGE) 500 MG tablet TAKE 1 TABLET BY MOUTH TWICE A DAY WITH MEALS (AM,PM)  Trace Lomeli MD   vitamin D (D3-1000) 25 MCG (1000 UT) CAPS Take 1 capsule by mouth Daily with supper  [address] Inf[address] MD   ciclopirox (PENLAC) 8 % solution Apply to adjacent skin and affected nails daily. Remove with alcohol every 7 days.   [address] Infante MD   aspirin EC 81 MG EC tablet Take 1 tablet by mouth daily  Trace Lomeli MD   Multiple Vitamin (DAILY-MANDI) TABS TAKE 1 TABLET BY MOUTH EVERY DAY  ARASH Rodríguez CNP   levothyroxine (SYNTHROID) 50 MCG tablet Take 1 tablet by mouth every morning  ARASH Banda CNP   famotidine (PEPCID) 20 MG tablet Take 1 tablet by mouth 2 times daily  ARASH Banda CNP   Biotin 300 MCG TABS Take 1 tablet by mouth daily  ARASH Banda CNP   psyllium 0.52 g capsule Take 1 capsule by mouth daily  [address] Infante MD   citalopram (CELEXA) 40 MG tablet Take 40 mg by mouth daily  Historical Provider, MD   benztropine (COGENTIN) 1 MG tablet Take 1 mg by mouth 3 times daily  Historical Provider, MD  Fatty liver 2013    GERD (gastroesophageal reflux disease)     History of cardiac arrhythmia     \"due to stress, was placed on medication\"    Hydatidiform mole     age 26     Hyperlipidemia     Hypothyroidism 2011    IFG (impaired fasting glucose) 2013    Melanoma of eye ([address]     age 30, R eye prosthesis, Dr Kvng Allred    Obesity     Prediabetes     PTSD (post-traumatic stress disorder)     age 35, [facility]    S/P colonoscopy 04/19/2012    Dr. Sharlene Healy S/P hysterectomy with oophorectomy 2012    Dr Alma Abreu D deficiency    ,   Past Surgical History:   Procedure Laterality Date    DILATION AND CURETTAGE OF UTERUS      ENDOMETRIAL ABLATION  6/2012    EYE REMOVAL      OD for melanoma, age 30    FOOT OSTEOTOMY Left 09/23/2016    Barry Daniels DPM      LINDSAY AND BSO  2012    Dr Alden Kaur   ,   Social History     Tobacco Use    Smoking status: Never Smoker    Smokeless tobacco: Never Used   Substance Use Topics    Alcohol use: No    Drug use: No   ,   Family History   Problem Relation Age of Onset    Heart Failure Mother         dec 76    Diabetes Mother     Diabetes Father     Stroke Father         dec age 80    Cancer Father         Sarcoma    Breast Cancer Sister     Stomach Cancer Other    ,   Immunization History   Administered Date(s) Administered    Influenza Vaccine, unspecified formulation 09/10/2016    Influenza Virus Vaccine 09/19/2014, 09/07/2015, 09/12/2017, 09/13/2018, 09/13/2019    Influenza Whole 09/19/2014, 09/07/2015    Influenza, Quadv, IM, (6 mo and older Fluzone, Flulaval, Fluarix and 3 yrs and older Afluria) 09/12/2017, 09/13/2018    Influenza, Quadv, IM, PF (6 mo and older Fluzone, Flulaval, Fluarix, and 3 yrs and older Afluria) 09/13/2019, 08/20/2020    Influenza, Triv, 3 Years and older, IM (Afluria (5 yrs and older) 09/10/2016    Pneumococcal Polysaccharide ([id number]) 08/10/2018    Td, unspecified formulation 12/12/2017 Return in about 1 week (around 1/18/2021), or if symptoms worsen or fail to improve. An  electronic signature was used to authenticate this note. --Jassi Myers MD on 1/11/2021 at 2:00 PM        Pursuant to the emergency declaration under the [address] [place] [address] [facility] authority and the [employer] Act, this Virtual  Visit was conducted, with patient's consent, to reduce the patient's risk of exposure to COVID-19 and provide continuity of care for an established patient. Services were provided through a video synchronous discussion virtually to substitute for in-person clinic visit. Kenia Venegas is a 62 y.o. female evaluated via telephone on 1/11/2021. Consent:  She and/or health care decision maker is aware that that she may receive a bill for this telephone service, depending on her insurance coverage, and has provided verbal consent to proceed: Yes      Documentation:  I communicated with the patient and/or health care decision maker about gastroenteritis. Details of this discussion including any medical advice provided: yes      I affirm this is a Patient Initiated Episode with a Patient who has not had a related appointment within my department in the past 7 days or scheduled within the next 24 hours.     Patient identification was verified at the start of the visit: Yes    Total Time: 15 minutes      Note: not billable if this call serves to triage the patient into an appointment for the relevant concern      Jassi Myers

## 2021-01-14 DIAGNOSIS — K21.9 GASTROESOPHAGEAL REFLUX DISEASE WITHOUT ESOPHAGITIS: ICD-10-CM

## 2021-01-14 RX ORDER — FAMOTIDINE 20 MG/1
TABLET, FILM COATED ORAL
Qty: 60 TABLET | Refills: 2 | Status: SHIPPED | OUTPATIENT
Start: 2021-01-14 | End: 2021-04-19 | Stop reason: SDUPTHER

## 2021-01-19 ENCOUNTER — VIRTUAL VISIT (OUTPATIENT)
Dept: FAMILY MEDICINE CLINIC | Age: 61
End: 2021-01-19
Payer: COMMERCIAL

## 2021-01-19 DIAGNOSIS — R05.9 COUGH: Primary | ICD-10-CM

## 2021-01-19 DIAGNOSIS — I48.92 ATRIAL FLUTTER, UNSPECIFIED TYPE (HCC): ICD-10-CM

## 2021-01-19 DIAGNOSIS — J45.30 MILD PERSISTENT ASTHMA WITHOUT COMPLICATION: ICD-10-CM

## 2021-01-19 PROCEDURE — 99442 PR PHYS/QHP TELEPHONE EVALUATION 11-20 MIN: CPT | Performed by: FAMILY MEDICINE

## 2021-01-19 RX ORDER — BENZONATATE 100 MG/1
100 CAPSULE ORAL 2 TIMES DAILY PRN
Qty: 20 CAPSULE | Refills: 0 | Status: SHIPPED | OUTPATIENT
Start: 2021-01-19 | End: 2021-01-26

## 2021-01-19 ASSESSMENT — ENCOUNTER SYMPTOMS
APNEA: 0
NAUSEA: 0
EYE ITCHING: 0
PHOTOPHOBIA: 0
SORE THROAT: 0
EYE DISCHARGE: 0
SINUS PRESSURE: 0
CHEST TIGHTNESS: 0
DIARRHEA: 0
TROUBLE SWALLOWING: 0
VOMITING: 0
SHORTNESS OF BREATH: 0
COLOR CHANGE: 0
COUGH: 1
EYE REDNESS: 0
RECTAL PAIN: 0
FACIAL SWELLING: 0
ANAL BLEEDING: 0
ABDOMINAL PAIN: 0
BACK PAIN: 0
SINUS PAIN: 0

## 2021-01-19 ASSESSMENT — PATIENT HEALTH QUESTIONNAIRE - PHQ9
SUM OF ALL RESPONSES TO PHQ9 QUESTIONS 1 & 2: 0
SUM OF ALL RESPONSES TO PHQ QUESTIONS 1-9: 0
1. LITTLE INTEREST OR PLEASURE IN DOING THINGS: 0

## 2021-01-19 NOTE — PROGRESS NOTES
2021    TELEHEALTH EVALUATION -- Audio/Visual (During JXXZN-27 public health emergency)    Due to Kevin 19 outbreak, patient's office visit was converted to a virtual visit. Patient was contacted and agreed to proceed with a virtual visit via Telephone Visit  The risks and benefits of converting to a virtual visit were discussed in light of the current infectious disease epidemic. Patient also understood that insurance coverage and co-pays are up to their individual insurance plans. HPI:    Jeremi Salvadorbindu (:  1960) has requested an audio/video evaluation for the following concern(s):    Viral Illness   Has improved substantially since last Thursday however admits to still having coughing. Patient admits that her throat is dry and the cough is nonproductive. Patient is concerned that she may have pneumonia. Reports that the steroids antibiotics were given to her did help. Denies diarrhea   Admits to sweats  Denies any sick contacts. Admits to symptoms improving. Admits to sore throat for 3 days. Denies nausea. Atrial fibrillation  Patient has a history of atrial fibrillation. Patient was seen cardiology in the past however her old cardiologist left. Patient denies any palpitations or chest pain. Patient is compliant with her medication. Patient would like to reestablish care with a cardiologist.      Review of Systems   Constitutional: Negative for activity change, appetite change, diaphoresis, fatigue and fever. HENT: Negative for congestion, dental problem, ear discharge, ear pain, facial swelling, mouth sores, nosebleeds, postnasal drip, sinus pressure, sinus pain, sneezing, sore throat and trouble swallowing. Eyes: Negative for photophobia, discharge, redness, itching and visual disturbance. Respiratory: Positive for cough. Negative for apnea, chest tightness and shortness of breath. Cardiovascular: Negative for chest pain and leg swelling. Gastrointestinal: Negative for abdominal pain, anal bleeding, diarrhea, nausea, rectal pain and vomiting. Endocrine: Negative for cold intolerance, heat intolerance, polydipsia and polyphagia. Genitourinary: Negative for decreased urine volume, difficulty urinating, dyspareunia, enuresis, flank pain, genital sores, hematuria, menstrual problem and urgency. Musculoskeletal: Negative for arthralgias, back pain, joint swelling, myalgias and neck stiffness. Skin: Negative for color change and pallor. Allergic/Immunologic: Negative for environmental allergies. Neurological: Negative for dizziness, tremors, seizures, syncope, facial asymmetry, numbness and headaches. Hematological: Negative for adenopathy. Does not bruise/bleed easily. Psychiatric/Behavioral: Negative for agitation, behavioral problems, decreased concentration, dysphoric mood, hallucinations, sleep disturbance and suicidal ideas. The patient is not nervous/anxious. Prior to Visit Medications    Medication Sig Taking? Authorizing Provider   famotidine (PEPCID) 20 MG tablet TAKE 1 TABLET BY MOUTH 2 TIMES DAILY (AM,PM)  Steven Hightower MD   metFORMIN (GLUCOPHAGE) 500 MG tablet TAKE 1 TABLET BY MOUTH TWICE A DAY WITH MEALS (AM,PM)  Trace Lomeli MD   vitamin D (D3-1000) 25 MCG (1000 UT) CAPS Take 1 capsule by mouth Daily with supper  Steven Hightower MD   ciclopirox (PENLAC) 8 % solution Apply to adjacent skin and affected nails daily. Remove with alcohol every 7 days.   Steven Hightower MD   aspirin EC 81 MG EC tablet Take 1 tablet by mouth daily  Trace Lomeli MD   Multiple Vitamin (DAILY-MANDI) TABS TAKE 1 TABLET BY MOUTH EVERY DAY  ARASH Rodríguez - CNP   levothyroxine (SYNTHROID) 50 MCG tablet Take 1 tablet by mouth every morning  ARASH Pryor - CNP   Biotin 300 MCG TABS Take 1 tablet by mouth daily  Via ARASH Montoya - ENRIQUE psyllium 0.52 g capsule Take 1 capsule by mouth daily  Jonas Plaza MD   citalopram (CELEXA) 40 MG tablet Take 40 mg by mouth daily  Historical Provider, MD   benztropine (COGENTIN) 1 MG tablet Take 1 mg by mouth 3 times daily  Historical Provider, MD   fluticasone (FLONASE) 50 MCG/ACT nasal spray USE 1 SPRAY INTO EACH NOSTRIL DAILY  Trace Lomeli MD   vitamin B-2 (RIBOFLAVIN) 100 MG TABS tablet Take 1 tablet by mouth daily  Jayesh Carrillo MD   budesonide-formoterol (SYMBICORT) 160-4.5 MCG/ACT AERO Inhale 2 puffs into the lungs 2 times daily  Sharon Kim MD   cyanocobalamin ([employer] VITAMIN B12) 1000 MCG tablet Take 1 tablet by mouth daily  Sharon Kim MD   traZODone (DESYREL) 50 MG tablet Take by mouth nightly 1-2 tablets nightly  Historical Provider, MD   pravastatin (PRAVACHOL) 40 MG tablet TAKE 1 TABLET BY MOUTH EVERY DAY IN THE EVENING  Morenita Basilio MD   albuterol sulfate  (90 Base) MCG/ACT inhaler USE 2 PUFFS EVERY 4 HRS AS NEEDED FOR WHEEZING/SOB-SPACE OUT TO EVERY 6 HR AS SYMPTOMS IMPROVE  Tank Basilio MD   cetirizine (ZYRTEC) 10 MG tablet TAKE 1 TABLET BY MOUTH EVERY DAY AS NEEDED FOR ALLERGIES  Endy Chicas MD   EPINEPHrine (EPIPEN 2-PRAVEENA) 0.3 MG/0.3ML SOAJ injection Use as directed for allergic reaction  Sharon Kim MD   ARIPiprazole (ABILIFY) 15 MG tablet TAKE 1 TABLET BY MOUTH EVERY DAY  Historical Provider, MD   Multiple Vitamins-Minerals (THERAPEUTIC MULTIVITAMIN-MINERALS) tablet Take 1 tablet by mouth daily  Sharon Kim MD   buPROPion (WELLBUTRIN XL) 300 MG XL tablet Take 1 tablet by mouth daily  Sharon Kim MD   albuterol (PROVENTIL;VENTOLIN) 90 MCG/ACT inhaler Inhale 2 puffs into the lungs every 6 hours as needed.   Sharon Kim MD       Social History     Tobacco Use    Smoking status: Never Smoker    Smokeless tobacco: Never Used   Substance Use Topics    Alcohol use: No    Drug use: No        No Known Allergies,   Past Medical History: Diagnosis Date    Asthma 2015    Bilateral renal cysts 2013    Depression     since age 35, was with Dr Timmy Casas, now the [address] [employer] Diverticulosis of sigmoid colon 2012    Dr Huseyin Parkinson DJD of left shoulder     Environmental allergies     Fatty liver 2013    GERD (gastroesophageal reflux disease)     History of cardiac arrhythmia     \"due to stress, was placed on medication\"    Hydatidiform mole     age 26     Hyperlipidemia     Hypothyroidism 2011    IFG (impaired fasting glucose) 2013    Melanoma of eye ([address]     age 30, R eye prosthesis, Dr Chava Gomez    Obesity     Prediabetes     PTSD (post-traumatic stress disorder)     age 35, [facility]    S/P colonoscopy 04/19/2012    Dr. Larissa Prado S/P hysterectomy with oophorectomy 2012    Dr Gatha Sandhoff Tremor     Dr Yaakov Buckner D deficiency    ,   Past Surgical History:   Procedure Laterality Date    DILATION AND CURETTAGE OF UTERUS      ENDOMETRIAL ABLATION  6/2012    EYE REMOVAL      OD for melanoma, age 30    FOOT OSTEOTOMY Left 09/23/2016    Houston Postin DPM      LINDSAY AND BSO  2012    Dr Kayleigh Keys   ,   Social History     Tobacco Use    Smoking status: Never Smoker    Smokeless tobacco: Never Used   Substance Use Topics    Alcohol use: No    Drug use: No   ,   Family History   Problem Relation Age of Onset    Heart Failure Mother         dec 76    Diabetes Mother     Diabetes Father     Stroke Father         dec age 80    Cancer Father         Sarcoma    Breast Cancer Sister     Stomach Cancer Other    ,   Immunization History   Administered Date(s) Administered    Influenza Vaccine, unspecified formulation 09/10/2016    Influenza Virus Vaccine 09/19/2014, 09/07/2015, 09/12/2017, 09/13/2018, 09/13/2019    Influenza Whole 09/19/2014, 09/07/2015    Influenza, Quadv, IM, (6 mo and older Fluzone, Flulaval, Fluarix and 3 yrs and older Afluria) 09/12/2017, 09/13/2018  Influenza, Quadv, IM, PF (6 mo and older Fluzone, Flulaval, Fluarix, and 3 yrs and older Afluria) 09/13/2019, 08/20/2020    Influenza, Triv, 3 Years and older, IM (Afluria (5 yrs and older) 09/10/2016    Pneumococcal Polysaccharide ([id number]) 08/10/2018    Td, unspecified formulation 12/12/2017    Tdap (Boostrix, Adacel) 03/11/2016    Zoster Recombinant (Shingrix) 03/19/2019, 07/19/2019, 02/21/2020   ,   Health Maintenance   Topic Date Due    Lipid screen  11/05/2020    A1C test (Diabetic or Prediabetic)  02/07/2021    Breast cancer screen  06/10/2021    Colon cancer screen colonoscopy  04/19/2022    DTaP/Tdap/Td vaccine (3 - Td) 12/12/2027    Flu vaccine  Completed    Shingles Vaccine  Completed    Pneumococcal 0-64 years Vaccine  Completed    Hepatitis C screen  Completed    HIV screen  Completed    Hepatitis A vaccine  Aged Out    Hepatitis B vaccine  Aged Out    Hib vaccine  Aged Out    Meningococcal (ACWY) vaccine  Aged Out       PHYSICAL EXAMINATION:  [ INSTRUCTIONS:  \"[x]\" Indicates a positive item  \"[]\" Indicates a negative item  -- DELETE ALL ITEMS NOT EXAMINED]  [x] Alert  [x] Oriented to person/place/time    [x] No apparent distress  [] Toxic appearing    [] Face flushed appearing [] Sclera clear  [] Lips are cyanotic      [x] Breathing appears normal  [] Appears tachypneic      [] Rash on visible skin    [] Cranial Nerves II-XII grossly intact    [] Motor grossly intact in visible upper extremities    [] Motor grossly intact in visible lower extremities    [x] Normal Mood  [] Anxious appearing    [] Depressed appearing  [] Confused appearing      [] Poor short term memory  [] Poor long term memory    [] OTHER:      Due to this being a TeleHealth encounter, evaluation of the following organ systems is limited: Vitals/Constitutional/EENT/Resp/CV/GI//MS/Neuro/Skin/Heme-Lymph-Imm. ASSESSMENT/PLAN:      1.  Atrial flutter, unspecified type ([address] Stable. patient needs a new cardiologist.  Will place in the referral.      2. Mild persistent asthma without complication  Continue with medication. Will obtain x-ray to evaluate for any other etiology for coughing. 3. Cough    - XR CHEST STANDARD (2 VW); Future  - benzonatate (TESSALON) 100 MG capsule; Take 1 capsule by mouth 2 times daily as needed for Cough  Dispense: 20 capsule; Refill: 0      No follow-ups on file. An  electronic signature was used to authenticate this note. --José Bird MD on 1/19/2021 at 1:37 PM        Pursuant to the emergency declaration under the [address] [place] [address], [facility] waiver authority and the [employer] Act, this Virtual  Visit was conducted, with patient's consent, to reduce the patient's risk of exposure to COVID-19 and provide continuity of care for an established patient. Services were provided through a video synchronous discussion virtually to substitute for in-person clinic visit. Nae Curran is a 62 y.o. female evaluated via telephone on 1/19/2021. Consent:  She and/or health care decision maker is aware that that she may receive a bill for this telephone service, depending on her insurance coverage, and has provided verbal consent to proceed: Yes      Documentation:  I communicated with the patient and/or health care decision maker about gastroenteritis. Details of this discussion including any medical advice provided: yes      I affirm this is a Patient Initiated Episode with a Patient who has not had a related appointment within my department in the past 7 days or scheduled within the next 24 hours.     Patient identification was verified at the start of the visit: Yes    Total Time: 15 minutes      Note: not billable if this call serves to triage the patient into an appointment for the relevant concern      José Bird

## 2021-01-20 ENCOUNTER — TELEPHONE (OUTPATIENT)
Dept: FAMILY MEDICINE CLINIC | Age: 61
End: 2021-01-20

## 2021-01-20 NOTE — TELEPHONE ENCOUNTER
Patient called asking if our office did Covid testing. I the writer informed her that we do not, she needs to make appt at [facility] and pt states that she can;t drive up on car. Patient states that she spoke with [facility] nurse and she stated pt should go to ER. Patient states she has no ride home if she goes, she states she is going to just get chest xray.

## 2021-01-22 ENCOUNTER — NURSE TRIAGE (OUTPATIENT)
Dept: OTHER | Facility: CLINIC | Age: 61
End: 2021-01-22

## 2021-01-22 ENCOUNTER — CARE COORDINATION (OUTPATIENT)
Dept: CARE COORDINATION | Age: 61
End: 2021-01-22

## 2021-01-22 NOTE — TELEPHONE ENCOUNTER
Reason for Disposition   [1] COVID-19 infection suspected by caller or triager AND [2] mild symptoms (cough, fever, or others) AND [9] no complications or SOB    Answer Assessment - Initial Assessment Questions  1. COVID-19 DIAGNOSIS: \"Who made your Coronavirus (COVID-19) diagnosis? \" \"Was it confirmed by a positive lab test?\" If not diagnosed by a HCP, ask \"Are there lots of cases (community spread) where you live? \" (See public health department website, if unsure)      No diagnosis yet    2. COVID-19 EXPOSURE: \"Was there any known exposure to COVID before the symptoms began? \" CDC Definition of close contact: within 6 feet (2 meters) for a total of 15 minutes or more over a 24-hour period. No known exposure    3. ONSET: \"When did the COVID-19 symptoms start? \"       About a week now    4. WORST SYMPTOM: \"What is your worst symptom? \" (e.g., cough, fever, shortness of breath, muscle aches)      Out of no where, I wake up and have sore throat. . It comes and goes    5. COUGH: \"Do you have a cough? \" If so, ask: \"How bad is the cough? \"        Yes    6. FEVER: \"Do you have a fever? \" If so, ask: \"What is your temperature, how was it measured, and when did it start? \"      No    7. RESPIRATORY STATUS: \"Describe your breathing? \" (e.g., shortness of breath, wheezing, unable to speak)       Sob with exertion    8. BETTER-SAME-WORSE: Norlene Pilar you getting better, staying the same or getting worse compared to yesterday? \"  If getting worse, ask, \"In what way? \"      Worse than yesterday because throat is hurting today. 9. HIGH RISK DISEASE: \"Do you have any chronic medical problems? \" (e.g., asthma, heart or lung disease, weak immune system, obesity, etc.)      Asthma    10. PREGNANCY: \"Is there any chance you are pregnant? \" \"When was your last menstrual period? \"        No    11. OTHER SYMPTOMS: \"Do you have any other symptoms? \"  (e.g., chills, fatigue, headache, loss of smell or taste, muscle pain, sore throat; new loss of smell or taste especially support the diagnosis of COVID-19)        Fatigue, loss of smell and taste,  Cough and SOB    Protocols used: CORONAVIRUS (COVID-19) DIAGNOSED OR SUSPECTED-ADULT-  Call received on [address]. Brief description of triage: covid question    Triage indicates for patient to see PCP. Care advice provided. Recommended using local department of health website for testing locations and up to date information. Caller verbalizes understanding, denies any other questions or concerns; instructed to call back for any new or worsening symptoms. Getting tested on Tuesday. At the health dept.

## 2021-01-22 NOTE — CARE COORDINATION
Ambulatory Care Coordination Note  1/22/2021  CM Risk Score: 6  Charlson 10 Year Mortality Risk Score: 79%     ACC: Jimenez Ortega RN    Summary Note:     Phone call to patient today to follow up with her care:   -Spoke to patient herself. Informs, \"that next week she is having the COVID 19 testing\". -She, \" has transportation to go get this done next week\". -discussed with patient [employer] transportation is going to be taking her on Tuesday\". -Informs, Nabil Mcfarland is going to job/family services to get tested\". Reports, To Lock called me today to see if I could come today, but unfortunately couldn't get the test then; so is now getting the test on Tuesday\". -discussed with patient quarantining until gets COVID 19 test results back.  -Encourage fluids: \"has a case of bottled water and is trying to increase her fluid intake\". -Reports, \"does still have sore throat\". -Monitoring herself for fever; denies. -reports, Nabil Mcfarland does get more short of breath when exerts herself; but this has been going on for while\". -Denies loss of taste/smell. Covid 19 precautions reviewed:   From CDC: Are you at higher risk for severe illness?  Wash your hands often.  Avoid close contact (6 feet, which is about two arm lengths) with people who are sick.  Put distance between yourself and other people if COVID-19 is spreading in your community.  Clean and disinfect frequently touched surfaces.  Avoid all cruise travel and non-essential air travel.  Call your healthcare professional if you have concerns about COVID-19 and your underlying condition or if you are sick. Follow up xray:   -per patient, \"she seen her results in My Chart'. [employer] :   -Michael: pateint did not have 's number right now; informs, Nabil Mcfarland is doing okay right now, and denies need for this nurse to call .     Transportation issues:   -patient has [employer] which should provide rides  -Patient Completed (Comment: 10/13/2020 Asthma Action Plan (Zones). )  Other Services or Interventions: 10/13/2020 Instructed on same day, next day, and Walk-In Care. Education Reviewed Today: See Module for details. Goals Addressed                 This Visit's Progress       Care Coordination     Conditions and Symptoms   Improving     I will schedule office visits, as directed by my provider. I will keep my appointment or reschedule if I have to cancel. I will notify my provider of any barriers to my plan of care. I will follow my Zone Management tool to seek urgent or emergent care. I will notify my provider of any symptoms that indicate a worsening of my condition. Barriers: lack of education  Plan for overcoming my barriers: Care Coordination  Confidence: 9/10  Anticipated Goal Completion Date: 3/13/2021         Medication Management   Improving     I will take my medication as directed. I will notify my provider of any problems with medications, like adverse effects or side effects. I will notify my provider for advice before I stop taking any of my medication. Barriers: overwhelmed by complexity of regimen and lack of education  Plan for overcoming my barriers: Care Coordination, Clinical Rx  Confidence: 9/10  Anticipated Goal Completion Date: 3/13/2021            Prior to Admission medications    Medication Sig Start Date End Date Taking?  Authorizing Provider   benzonatate (TESSALON) 100 MG capsule Take 1 capsule by mouth 2 times daily as needed for Cough 1/19/21 1/26/21  Ul. Lalo Infante MD   famotidine (PEPCID) 20 MG tablet TAKE 1 TABLET BY MOUTH 2 TIMES DAILY (AM,PM) 1/14/21   Ul. Lalo Infante MD   metFORMIN (GLUCOPHAGE) 500 MG tablet TAKE 1 TABLET BY MOUTH TWICE A DAY WITH MEALS (AM,PM) 1/4/21   Trace Lomeli MD   vitamin D (D3-1000) 25 MCG (1000 UT) CAPS Take 1 capsule by mouth Daily with supper 11/24/20   Ul. Lalo Infante MD   ciclopirox (PENLAC) 8 % solution Apply to TAKE 1 TABLET BY MOUTH EVERY DAY 1/17/17   Historical Provider, MD   Multiple Vitamins-Minerals (THERAPEUTIC MULTIVITAMIN-MINERALS) tablet Take 1 tablet by mouth daily 12/29/15 6/12/16  Sully Dale MD   buPROPion (WELLBUTRIN XL) 300 MG XL tablet Take 1 tablet by mouth daily 8/28/15   Sully Dale MD   albuterol (PROVENTIL;VENTOLIN) 90 MCG/ACT inhaler Inhale 2 puffs into the lungs every 6 hours as needed.  12/21/12 11/5/19  Sully Dale MD       Future Appointments   Date Time Provider Jay Wong   2/1/2021 12:30 PM Sherryn Gottron,  [facility]   2/24/2021  8:45 AM Shelby Shaver MD [address] [place] [address] 7

## 2021-01-24 DIAGNOSIS — R05.9 COUGH: Primary | ICD-10-CM

## 2021-01-24 DIAGNOSIS — J18.9 PNEUMONIA DUE TO INFECTIOUS ORGANISM, UNSPECIFIED LATERALITY, UNSPECIFIED PART OF LUNG: ICD-10-CM

## 2021-01-24 RX ORDER — DOXYCYCLINE HYCLATE 100 MG
100 TABLET ORAL 2 TIMES DAILY
Qty: 20 TABLET | Refills: 0 | Status: SHIPPED | OUTPATIENT
Start: 2021-01-24 | End: 2021-02-03

## 2021-01-25 ENCOUNTER — CARE COORDINATION (OUTPATIENT)
Dept: CARE COORDINATION | Age: 61
End: 2021-01-25

## 2021-01-25 NOTE — CARE COORDINATION
Case referred to this writer by ERIKA Castro to assist patient with community resources. Telephone call with patient who indicated that her biggest need at this time is transportation. She is scheduled for COVID testing tomorrow and could not find someone to take her. Places she called did not want to take her for COVID testing. She indicated that she has scheduled with [employer] for tomorrow and did not tell them she is going for COVID testing. Patient indicated that she does have case management with [facility]. The  does take her to do her errands and grocery shopping. Patient was in favor of this writer sending her a list of transportation resources. Discussed food and patient feels she has adequate food in the home. No problems afford rent or utilities. She indicates that she pays this bill the first of the month before her other bills. No problems affording medications and states that [facility] covers them.

## 2021-02-01 ENCOUNTER — OFFICE VISIT (OUTPATIENT)
Dept: CARDIOLOGY CLINIC | Age: 61
End: 2021-02-01
Payer: COMMERCIAL

## 2021-02-01 ENCOUNTER — TELEPHONE (OUTPATIENT)
Dept: FAMILY MEDICINE CLINIC | Age: 61
End: 2021-02-01

## 2021-02-01 VITALS
OXYGEN SATURATION: 98 % | HEIGHT: 66 IN | BODY MASS INDEX: 36.48 KG/M2 | SYSTOLIC BLOOD PRESSURE: 134 MMHG | DIASTOLIC BLOOD PRESSURE: 82 MMHG | WEIGHT: 227 LBS | RESPIRATION RATE: 24 BRPM | HEART RATE: 118 BPM

## 2021-02-01 DIAGNOSIS — I48.92 ATRIAL FLUTTER, UNSPECIFIED TYPE (HCC): Primary | ICD-10-CM

## 2021-02-01 PROCEDURE — G8427 DOCREV CUR MEDS BY ELIG CLIN: HCPCS | Performed by: INTERNAL MEDICINE

## 2021-02-01 PROCEDURE — 1036F TOBACCO NON-USER: CPT | Performed by: INTERNAL MEDICINE

## 2021-02-01 PROCEDURE — G8417 CALC BMI ABV UP PARAM F/U: HCPCS | Performed by: INTERNAL MEDICINE

## 2021-02-01 PROCEDURE — 3017F COLORECTAL CA SCREEN DOC REV: CPT | Performed by: INTERNAL MEDICINE

## 2021-02-01 PROCEDURE — 93000 ELECTROCARDIOGRAM COMPLETE: CPT | Performed by: INTERNAL MEDICINE

## 2021-02-01 PROCEDURE — 99215 OFFICE O/P EST HI 40 MIN: CPT | Performed by: INTERNAL MEDICINE

## 2021-02-01 PROCEDURE — G8482 FLU IMMUNIZE ORDER/ADMIN: HCPCS | Performed by: INTERNAL MEDICINE

## 2021-02-01 RX ORDER — METOPROLOL TARTRATE 50 MG/1
50 TABLET, FILM COATED ORAL 2 TIMES DAILY
Qty: 60 TABLET | Refills: 0 | Status: SHIPPED | OUTPATIENT
Start: 2021-02-01 | End: 2021-04-19 | Stop reason: SDUPTHER

## 2021-02-01 ASSESSMENT — ENCOUNTER SYMPTOMS
SHORTNESS OF BREATH: 0
APNEA: 0
VOMITING: 0
DIARRHEA: 0
BLOOD IN STOOL: 0
FACIAL SWELLING: 0
NAUSEA: 0
ANAL BLEEDING: 0
ABDOMINAL DISTENTION: 0
CHEST TIGHTNESS: 0
COLOR CHANGE: 0
VOICE CHANGE: 0
WHEEZING: 0
TROUBLE SWALLOWING: 0

## 2021-02-01 NOTE — PROGRESS NOTES
[facility] CARDIOLOGY OFFICE FOLLOW-UP      Patient: Yane Dhaliwal  YOB: 1960  MRN: [id number]    Chief Complaint:  Chief Complaint   Patient presents with    Follow-up     5 month f/u - PREVIOUS  [facility] pt    Atrial Flutter    Chest Pain     C/O CP         Subjective/HPI:  2/1/21: Patient presents today for complaints of palpitation. This comes up especially when she climbs stairs. Her heart rate goes too fast has not had syncope. In the past she has seen Dr. Freddie Chsiholm and there was mention of atrial flutter but never documented. She is on multiple psych medication. She denies smoking denies alcohol abuse denies illicit drug abuse. When she has a fast heartbeat and she will get some chest pain. No radiation. No nausea no vomiting no syncope no fever or chills.   Last EKG from October 28 shows sinus rhythm incomplete right bundle branch block and is the same findings on today's EKG except she has sinus tachycardia 120 pm.  We will start her on Lopressor 50 twice daily see me in a week             Past Medical History:   Diagnosis Date    Asthma 2015    Bilateral renal cysts 2013    Depression     since age 35, was with Dr Smita Dillard, now the [address] [employer] Diverticulosis of sigmoid colon 2012    Dr Nasrin Lopes DJD of left shoulder     Environmental allergies     Fatty liver 2013    GERD (gastroesophageal reflux disease)     History of cardiac arrhythmia     \"due to stress, was placed on medication\"    Hydatidiform mole     age 26     Hyperlipidemia     Hypothyroidism 2011    IFG (impaired fasting glucose) 2013    Melanoma of eye ([address]     age 30, R eye prosthesis, Dr Yaz Aguilera    Obesity     Prediabetes     PTSD (post-traumatic stress disorder)     age 35, [facility]    S/P colonoscopy 04/19/2012    Dr. Ashley Dangelo S/P hysterectomy with oophorectomy 2012    Dr Garcia Mouse Tremor     Dr Cresencio Lawson Vitamin D deficiency        Past Surgical History: Procedure Laterality Date    DILATION AND CURETTAGE OF UTERUS      ENDOMETRIAL ABLATION  6/2012    EYE REMOVAL      OD for melanoma, age 30    FOOT OSTEOTOMY Left 09/23/2016    Radha Suggs DPM      LINDSAY AND BSO  2012    Dr Micah Galicia       Family History   Problem Relation Age of Onset    Heart Failure Mother         dec 76    Diabetes Mother     Diabetes Father     Stroke Father         dec age 80    Cancer Father         Sarcoma    Breast Cancer Sister     Stomach Cancer Other        Social History     Socioeconomic History    Marital status:      Spouse name: None    Number of children: 3    Years of education: 15    Highest education level: High school graduate   Occupational History    Occupation: SSI   Social Needs    Financial resource strain: Not very hard    Food insecurity     Worry: Sometimes true     Inability: Sometimes true    Transportation needs     Medical: No     Non-medical: No   Tobacco Use    Smoking status: Never Smoker    Smokeless tobacco: Never Used   Substance and Sexual Activity    Alcohol use: No    Drug use: No    Sexual activity: Not Currently     Birth control/protection: Surgical   Lifestyle    Physical activity     Days per week: 0 days     Minutes per session: 0 min    Stress: Rather much   Relationships    Social connections     Talks on phone: Twice a week     Gets together: Never     Attends Sikhism service: Never     Active member of club or organization: No     Attends meetings of clubs or organizations: Never     Relationship status:     Intimate partner violence     Fear of current or ex partner: None     Emotionally abused: None     Physically abused: None     Forced sexual activity: None   Other Topics Concern    None   Social History Narrative    Born in [address] [place] [address], one of 4 children, one sister disappeared at age 30, never found    Moved to [facility] at age 40        Lives in an apartment in [facility] , 3 children, all grown, in [place]     2 granddaughters     Hobbies exercising, birds    Attends [employer], has volunteered at [employer]       No Known Allergies    Current Outpatient Medications   Medication Sig Dispense Refill    metoprolol tartrate (LOPRESSOR) 50 MG tablet Take 1 tablet by mouth 2 times daily 60 tablet 0    doxycycline hyclate (VIBRA-TABS) 100 MG tablet Take 1 tablet by mouth 2 times daily for 10 days 20 tablet 0    famotidine (PEPCID) 20 MG tablet TAKE 1 TABLET BY MOUTH 2 TIMES DAILY (AM,PM) 60 tablet 2    metFORMIN (GLUCOPHAGE) 500 MG tablet TAKE 1 TABLET BY MOUTH TWICE A DAY WITH MEALS (AM,PM) 60 tablet 1    vitamin D (D3-1000) 25 MCG (1000 UT) CAPS Take 1 capsule by mouth Daily with supper 90 capsule 1    ciclopirox (PENLAC) 8 % solution Apply to adjacent skin and affected nails daily. Remove with alcohol every 7 days.  18 mL 3    aspirin EC 81 MG EC tablet Take 1 tablet by mouth daily 30 tablet 5    Multiple Vitamin (DAILY-MANDI) TABS TAKE 1 TABLET BY MOUTH EVERY DAY 30 tablet 5    levothyroxine (SYNTHROID) 50 MCG tablet Take 1 tablet by mouth every morning 90 tablet 0    Biotin 300 MCG TABS Take 1 tablet by mouth daily 30 tablet 3    psyllium 0.52 g capsule Take 1 capsule by mouth daily 30 capsule 5    citalopram (CELEXA) 40 MG tablet Take 40 mg by mouth daily      benztropine (COGENTIN) 1 MG tablet Take 1 mg by mouth 3 times daily      fluticasone (FLONASE) 50 MCG/ACT nasal spray USE 1 SPRAY INTO EACH NOSTRIL DAILY 1 Bottle 0    vitamin B-2 (RIBOFLAVIN) 100 MG TABS tablet Take 1 tablet by mouth daily 90 tablet 0    budesonide-formoterol (SYMBICORT) 160-4.5 MCG/ACT AERO Inhale 2 puffs into the lungs 2 times daily 1 Inhaler 3    cyanocobalamin (CVS VITAMIN B12) 1000 MCG tablet Take 1 tablet by mouth daily 30 tablet 5    traZODone (DESYREL) 50 MG tablet Take by mouth nightly 1-2 tablets nightly  pravastatin (PRAVACHOL) 40 MG tablet TAKE 1 TABLET BY MOUTH EVERY DAY IN THE EVENING 90 tablet 1    albuterol sulfate  (90 Base) MCG/ACT inhaler USE 2 PUFFS EVERY 4 HRS AS NEEDED FOR WHEEZING/SOB-SPACE OUT TO EVERY 6 HR AS SYMPTOMS IMPROVE 1 Inhaler 3    cetirizine (ZYRTEC) 10 MG tablet TAKE 1 TABLET BY MOUTH EVERY DAY AS NEEDED FOR ALLERGIES 30 tablet 5    EPINEPHrine (EPIPEN 2-PRAVEENA) 0.3 MG/0.3ML SOAJ injection Use as directed for allergic reaction 2 each 0    ARIPiprazole (ABILIFY) 15 MG tablet TAKE 1 TABLET BY MOUTH EVERY DAY  2    buPROPion (WELLBUTRIN XL) 300 MG XL tablet Take 1 tablet by mouth daily 30 tablet 6     No current facility-administered medications for this visit. Review of Systems:   Review of Systems   Constitutional: Negative for activity change, appetite change, diaphoresis, fatigue and unexpected weight change. HENT: Negative for facial swelling, nosebleeds, trouble swallowing and voice change. Respiratory: Negative for apnea, chest tightness, shortness of breath and wheezing. Cardiovascular: Negative for chest pain, palpitations and leg swelling. Gastrointestinal: Negative for abdominal distention, anal bleeding, blood in stool, diarrhea, nausea and vomiting. Genitourinary: Negative for decreased urine volume and dysuria. Musculoskeletal: Negative for gait problem, myalgias, neck pain and neck stiffness. Skin: Negative for color change, pallor, rash and wound. Neurological: Negative for dizziness, seizures, syncope, facial asymmetry, weakness, light-headedness, numbness and headaches. Hematological: Does not bruise/bleed easily. Psychiatric/Behavioral: Negative for agitation, behavioral problems, confusion, hallucinations and suicidal ideas. The patient is not nervous/anxious. All other systems reviewed and are negative. Review of System is negative except for as mentioned above.       Physical Examination: 1. Atrial flutter, unspecified type ([facility] [address]  I48.92 EKG 12 lead       Orders Placed This Encounter   Medications    metoprolol tartrate (LOPRESSOR) 50 MG tablet     Sig: Take 1 tablet by mouth 2 times daily     Dispense:  60 tablet     Refill:  0       There are no discontinued medications. Orders Placed This Encounter   Procedures    EKG 12 lead     Order Specific Question:   Reason for Exam?     Answer:   Angina         Plan:  Prescribed Lopressor 50mg Twice a Day    Stay on same medications.     See me in 1 week        Electronically signed by: Yanna Tsang MD  2/3/2021 11:27 AM

## 2021-02-01 NOTE — TELEPHONE ENCOUNTER
Her cardiologist does not finish his note yet. As soon as he finishes a will be able to review it. In the meantime, was there anything that patient was concerned about?

## 2021-02-02 ENCOUNTER — CARE COORDINATION (OUTPATIENT)
Dept: CARE COORDINATION | Age: 61
End: 2021-02-02

## 2021-02-02 NOTE — CARE COORDINATION
Ambulatory Care Coordination Note  2/2/2021  CM Risk Score: 6  Charlson 10 Year Mortality Risk Score: 79%     ACC: Jeannette Lipscomb RN    Summary Note: ACM CALLED PATIENT  TO FOLLOW UP ON ONGOING CARE NEEDS, NO ANSWER -LEFT MESSAGE REQUESTING CALL BACK TO DISCUSS:      1. Covid 19 test: did she get this done? What were the results? Mary Alicert: who is psychiatrist? Counselor? Can we communicate with them to work together??  3. Cough/congestion: has this improved?, completion of ATB? ?  4. Cardiology: is she taking lopressor, what did they tell her??   5. follow up  referral- does she need any assistance- did she get transportation list??        Prior to Admission medications    Medication Sig Start Date End Date Taking? Authorizing Provider   metoprolol tartrate (LOPRESSOR) 50 MG tablet Take 1 tablet by mouth 2 times daily 2/1/21   Maicol Mcmullen MD   doxycycline hyclate (VIBRA-TABS) 100 MG tablet Take 1 tablet by mouth 2 times daily for 10 days 1/24/21 2/3/21  Mara Schultz MD   famotidine (PEPCID) 20 MG tablet TAKE 1 TABLET BY MOUTH 2 TIMES DAILY (AM,PM) 1/14/21   Mara Schultz MD   metFORMIN (GLUCOPHAGE) 500 MG tablet TAKE 1 TABLET BY MOUTH TWICE A DAY WITH MEALS (AM,PM) 1/4/21   Trace Lomeli MD   vitamin D (D3-1000) 25 MCG (1000 UT) CAPS Take 1 capsule by mouth Daily with supper 11/24/20   Mara Schultz MD   ciclopirox (PENLAC) 8 % solution Apply to adjacent skin and affected nails daily. Remove with alcohol every 7 days.  11/24/20   Ann Lomeli MD   aspirin EC 81 MG EC tablet Take 1 tablet by mouth daily 11/24/20   Trace Lomeli MD   Multiple Vitamin (DAILY-MANDI) TABS TAKE 1 TABLET BY MOUTH EVERY DAY 11/13/20   ARASH Acharya CNP   levothyroxine (SYNTHROID) 50 MCG tablet Take 1 tablet by mouth every morning 11/13/20   ARASH Klein CNP   Biotin 300 MCG TABS Take 1 tablet by mouth daily 11/13/20   ARASH Klein CNP psyllium 0.52 g capsule Take 1 capsule by mouth daily 10/30/20   Lucio Mcconnell MD   citalopram (CELEXA) 40 MG tablet Take 40 mg by mouth daily    Historical Provider, MD   benztropine (COGENTIN) 1 MG tablet Take 1 mg by mouth 3 times daily 10/15/20   Historical Provider, MD   fluticasone (FLONASE) 50 MCG/ACT nasal spray USE 1 SPRAY INTO EACH NOSTRIL DAILY 10/13/20   Lucio Mcconnell MD   vitamin B-2 (RIBOFLAVIN) 100 MG TABS tablet Take 1 tablet by mouth daily 5/25/20   Yovana Arriaza MD   budesonide-formoterol (SYMBICORT) 160-4.5 MCG/ACT AERO Inhale 2 puffs into the lungs 2 times daily 3/10/20   Milli Hernández MD   cyanocobalamin (CVS VITAMIN B12) 1000 MCG tablet Take 1 tablet by mouth daily 2/21/20   Milli Hernández MD   traZODone (DESYREL) 50 MG tablet Take by mouth nightly 1-2 tablets nightly    Historical Provider, MD   pravastatin (PRAVACHOL) 40 MG tablet TAKE 1 TABLET BY MOUTH EVERY DAY IN THE EVENING 9/3/19   Milli Hernández MD   albuterol sulfate  (90 Base) MCG/ACT inhaler USE 2 PUFFS EVERY 4 HRS AS NEEDED FOR WHEEZING/SOB-SPACE OUT TO EVERY 6 HR AS SYMPTOMS IMPROVE 7/19/19   Milli Hernández MD   cetirizine (ZYRTEC) 10 MG tablet TAKE 1 TABLET BY MOUTH EVERY DAY AS NEEDED FOR ALLERGIES 5/8/19   Isabelle Benavides MD   EPINEPHrine (EPIPEN 2-PRAVEENA) 0.3 MG/0.3ML SOAJ injection Use as directed for allergic reaction 6/11/18   Milli Hernández MD   ARIPiprazole (ABILIFY) 15 MG tablet TAKE 1 TABLET BY MOUTH EVERY DAY 1/17/17   Historical Provider, MD   Multiple Vitamins-Minerals (THERAPEUTIC MULTIVITAMIN-MINERALS) tablet Take 1 tablet by mouth daily 12/29/15 6/12/16  Milli Hernández MD   buPROPion (WELLBUTRIN XL) 300 MG XL tablet Take 1 tablet by mouth daily 8/28/15   Milli Hernández MD   albuterol (PROVENTIL;VENTOLIN) 90 MCG/ACT inhaler Inhale 2 puffs into the lungs every 6 hours as needed.  12/21/12 11/5/19  Milli Hernández MD       Future Appointments   Date Time Provider Jay Wong 2/8/2021 11:00 AM Minda Hernandez MD [facility]   2/24/2021  8:45 AM Abdelrahman Londono  [place]

## 2021-02-03 ENCOUNTER — CARE COORDINATION (OUTPATIENT)
Dept: CARE COORDINATION | Age: 61
End: 2021-02-03

## 2021-02-03 NOTE — CARE COORDINATION
Telephone call with patient. She indicated that she has not received transportation information. She relayed that she  has upcoming medical appointments but has transportation. She stated that her  from [facility] is taking her shopping and errands. [address] is delivering her medications.

## 2021-02-08 ENCOUNTER — OFFICE VISIT (OUTPATIENT)
Dept: CARDIOLOGY CLINIC | Age: 61
End: 2021-02-08
Payer: COMMERCIAL

## 2021-02-08 VITALS
SYSTOLIC BLOOD PRESSURE: 132 MMHG | HEART RATE: 80 BPM | HEIGHT: 66 IN | WEIGHT: 227 LBS | BODY MASS INDEX: 36.48 KG/M2 | DIASTOLIC BLOOD PRESSURE: 82 MMHG | RESPIRATION RATE: 22 BRPM | OXYGEN SATURATION: 99 %

## 2021-02-08 DIAGNOSIS — I48.92 INTERMITTENT ATRIAL FLUTTER (HCC): ICD-10-CM

## 2021-02-08 DIAGNOSIS — R07.89 OTHER CHEST PAIN: ICD-10-CM

## 2021-02-08 DIAGNOSIS — I48.92 ATRIAL FLUTTER, UNSPECIFIED TYPE (HCC): Primary | ICD-10-CM

## 2021-02-08 DIAGNOSIS — R07.9 CHEST PAIN, UNSPECIFIED TYPE: ICD-10-CM

## 2021-02-08 DIAGNOSIS — R94.31 ABNORMAL EKG: ICD-10-CM

## 2021-02-08 DIAGNOSIS — E78.5 HYPERLIPIDEMIA, UNSPECIFIED HYPERLIPIDEMIA TYPE: ICD-10-CM

## 2021-02-08 PROCEDURE — 1036F TOBACCO NON-USER: CPT | Performed by: INTERNAL MEDICINE

## 2021-02-08 PROCEDURE — 3017F COLORECTAL CA SCREEN DOC REV: CPT | Performed by: INTERNAL MEDICINE

## 2021-02-08 PROCEDURE — G8482 FLU IMMUNIZE ORDER/ADMIN: HCPCS | Performed by: INTERNAL MEDICINE

## 2021-02-08 PROCEDURE — G8427 DOCREV CUR MEDS BY ELIG CLIN: HCPCS | Performed by: INTERNAL MEDICINE

## 2021-02-08 PROCEDURE — G8417 CALC BMI ABV UP PARAM F/U: HCPCS | Performed by: INTERNAL MEDICINE

## 2021-02-08 PROCEDURE — 99214 OFFICE O/P EST MOD 30 MIN: CPT | Performed by: INTERNAL MEDICINE

## 2021-02-08 ASSESSMENT — ENCOUNTER SYMPTOMS
CHEST TIGHTNESS: 0
BLOOD IN STOOL: 0
DIARRHEA: 0
VOMITING: 0
NAUSEA: 0
SHORTNESS OF BREATH: 0
APNEA: 0

## 2021-02-08 NOTE — PROGRESS NOTES
[facility] CARDIOLOGY OFFICE FOLLOW-UP      Patient: Merle Lugo  YOB: 1960  MRN: [id number]    Chief Complaint:  Chief Complaint   Patient presents with    Follow-up     1 week f/u    Atrial Flutter    Chest Pain    Medication Check     Lopressor 50mg BID         Subjective/HPI:  2/8/21: Patient presents today for follow-up of palpitation. She is better after Lopressor was started 50 twice a day. She is diabetic. Also on multiple psych medication which does not help while on Symbicort. When she does not get any she is short of breath while climbing stairs as she used to do before Lopressor was started. No syncope no dizziness. No chest pain. She has a history of depression PTSD and reactive airway disease the symptoms are controlled. We have never documented flutter. I think current psych medication she was tachycardic and also because of anxiety. That has been controlled for now. See me in 2 months       2/1/21: Patient presents today for complaints of palpitation. This comes up especially when she climbs stairs. Her heart rate goes too fast has not had syncope. In the past she has seen Dr. Sultana Caldwell and there was mention of atrial flutter but never documented. She is on multiple psych medication. She denies smoking denies alcohol abuse denies illicit drug abuse. When she has a fast heartbeat and she will get some chest pain. No radiation. No nausea no vomiting no syncope no fever or chills.   Last EKG from October 28 shows sinus rhythm incomplete right bundle branch block and is the same findings on today's EKG except she has sinus tachycardia 120 pm.  We will start her on Lopressor 50 twice daily see me in a week        Past Medical History:   Diagnosis Date    Asthma 2015    Bilateral renal cysts 2013    Depression     since age 35, was with Dr Annita Miller, now the [facility]    Diverticulosis of sigmoid colon 2012    Dr Desi VILLANUEVA of left shoulder  Environmental allergies     Fatty liver 2013    GERD (gastroesophageal reflux disease)     History of cardiac arrhythmia     \"due to stress, was placed on medication\"    Hydatidiform mole     age 26     Hyperlipidemia     Hypothyroidism 2011    IFG (impaired fasting glucose) 2013    Melanoma of eye ([address]     age 30, R eye prosthesis, Dr Oliveros Deep    Obesity     Prediabetes     PTSD (post-traumatic stress disorder)     age 35, [facility]    S/P colonoscopy 04/19/2012    Dr. Sasha Rabago S/P hysterectomy with oophorectomy 2012    Dr Darcy Dinh D deficiency        Past Surgical History:   Procedure Laterality Date    DILATION AND CURETTAGE OF UTERUS      ENDOMETRIAL ABLATION  6/2012    EYE REMOVAL      OD for melanoma, age 30    FOOT OSTEOTOMY Left 09/23/2016    Sonia Villalba DPM      LINDSAY AND BSO  2012    Dr Jose García       Family History   Problem Relation Age of Onset    Heart Failure Mother         dec 76    Diabetes Mother     Diabetes Father     Stroke Father         dec age 80    Cancer Father         Sarcoma    Breast Cancer Sister     Stomach Cancer Other        Social History     Socioeconomic History    Marital status:      Spouse name: None    Number of children: 3    Years of education: 15    Highest education level: High school graduate   Occupational History    Occupation: SSI   Social Needs    Financial resource strain: Not very hard    Food insecurity     Worry: Sometimes true     Inability: Sometimes true    Transportation needs     Medical: No     Non-medical: No   Tobacco Use    Smoking status: Never Smoker    Smokeless tobacco: Never Used   Substance and Sexual Activity    Alcohol use: No    Drug use: No    Sexual activity: Not Currently     Birth control/protection: Surgical   Lifestyle    Physical activity     Days per week: 0 days     Minutes per session: 0 min    Stress: Rather much   Relationships  Social connections     Talks on phone: Twice a week     Gets together: Never     Attends Pentecostal service: Never     Active member of club or organization: No     Attends meetings of clubs or organizations: Never     Relationship status:     Intimate partner violence     Fear of current or ex partner: None     Emotionally abused: None     Physically abused: None     Forced sexual activity: None   Other Topics Concern    None   Social History Narrative    Born in [address] [place] [address], one of 4 children, one sister disappeared at age 30, never found    Moved to [place] at age 40        Lives in an apartment in [facility], alone    , 3 children, all grown, in [place]     2 granddaughters     [employer] exercising, birds    Attends [employer], has volunteered at [employer]       No Known Allergies    Current Outpatient Medications   Medication Sig Dispense Refill    metoprolol tartrate (LOPRESSOR) 50 MG tablet Take 1 tablet by mouth 2 times daily 60 tablet 0    famotidine (PEPCID) 20 MG tablet TAKE 1 TABLET BY MOUTH 2 TIMES DAILY (AM,PM) 60 tablet 2    metFORMIN (GLUCOPHAGE) 500 MG tablet TAKE 1 TABLET BY MOUTH TWICE A DAY WITH MEALS (AM,PM) 60 tablet 1    vitamin D (D3-1000) 25 MCG (1000 UT) CAPS Take 1 capsule by mouth Daily with supper 90 capsule 1    ciclopirox (PENLAC) 8 % solution Apply to adjacent skin and affected nails daily. Remove with alcohol every 7 days.  18 mL 3    aspirin EC 81 MG EC tablet Take 1 tablet by mouth daily 30 tablet 5    Multiple Vitamin (DAILY-MANDI) TABS TAKE 1 TABLET BY MOUTH EVERY DAY 30 tablet 5    levothyroxine (SYNTHROID) 50 MCG tablet Take 1 tablet by mouth every morning 90 tablet 0    Biotin 300 MCG TABS Take 1 tablet by mouth daily 30 tablet 3    psyllium 0.52 g capsule Take 1 capsule by mouth daily 30 capsule 5    citalopram (CELEXA) 40 MG tablet Take 40 mg by mouth daily      benztropine (COGENTIN) 1 MG tablet Take 1 mg by mouth 3 times daily  fluticasone (FLONASE) 50 MCG/ACT nasal spray USE 1 SPRAY INTO EACH NOSTRIL DAILY 1 Bottle 0    vitamin B-2 (RIBOFLAVIN) 100 MG TABS tablet Take 1 tablet by mouth daily 90 tablet 0    budesonide-formoterol (SYMBICORT) 160-4.5 MCG/ACT AERO Inhale 2 puffs into the lungs 2 times daily 1 Inhaler 3    cyanocobalamin (CVS VITAMIN B12) 1000 MCG tablet Take 1 tablet by mouth daily 30 tablet 5    traZODone (DESYREL) 50 MG tablet Take by mouth nightly 1-2 tablets nightly      pravastatin (PRAVACHOL) 40 MG tablet TAKE 1 TABLET BY MOUTH EVERY DAY IN THE EVENING 90 tablet 1    albuterol sulfate  (90 Base) MCG/ACT inhaler USE 2 PUFFS EVERY 4 HRS AS NEEDED FOR WHEEZING/SOB-SPACE OUT TO EVERY 6 HR AS SYMPTOMS IMPROVE 1 Inhaler 3    cetirizine (ZYRTEC) 10 MG tablet TAKE 1 TABLET BY MOUTH EVERY DAY AS NEEDED FOR ALLERGIES 30 tablet 5    EPINEPHrine (EPIPEN 2-PRAVEENA) 0.3 MG/0.3ML SOAJ injection Use as directed for allergic reaction 2 each 0    ARIPiprazole (ABILIFY) 15 MG tablet TAKE 1 TABLET BY MOUTH EVERY DAY  2    buPROPion (WELLBUTRIN XL) 300 MG XL tablet Take 1 tablet by mouth daily 30 tablet 6     No current facility-administered medications for this visit. Review of Systems:   Review of Systems   Constitutional: Negative for diaphoresis and fatigue. HENT: Negative for nosebleeds. Respiratory: Negative for apnea, cough, chest tightness, shortness of breath, wheezing and stridor. Cardiovascular: Negative for chest pain, palpitations and leg swelling. Gastrointestinal: Negative for blood in stool, diarrhea, nausea and vomiting. Musculoskeletal: Negative for myalgias, neck pain and neck stiffness. Neurological: Negative for dizziness, seizures, syncope, weakness, light-headedness, numbness and headaches. Hematological: Does not bruise/bleed easily. Psychiatric/Behavioral: Negative for confusion and suicidal ideas. The patient is not nervous/anxious. All other systems reviewed and are negative. Review of System is negative except for as mentioned above. Physical Examination:    /82 (Site: Left Upper Arm, Position: Sitting, Cuff Size: Large Adult)   Pulse 80   Resp 22   Ht 5' 6\" (1.676 m)   Wt 227 lb (103 kg)   SpO2 99%   BMI 36.64 kg/m²    Physical Exam   Constitutional: She appears healthy. No distress. HENT:   Nose: Nose normal.   Mouth/Throat: Dentition is normal. Oropharynx is clear. Eyes: Pupils are equal, round, and reactive to light. Conjunctivae are normal.   Neck: Normal range of motion and thyroid normal. Neck supple. Cardiovascular: Regular rhythm, S1 normal, S2 normal, normal heart sounds, intact distal pulses and normal pulses. PMI is not displaced. No murmur heard. Pulmonary/Chest: She has no wheezes. She has no rales. She exhibits no tenderness. Abdominal: Soft. Bowel sounds are normal. She exhibits no distension and no mass. There is no splenomegaly or hepatomegaly. There is no abdominal tenderness. No hernia. Neurological: She is alert and oriented to person, place, and time. She has normal motor skills. Gait normal.   Skin: Skin is warm and dry. No cyanosis. No jaundice. Nails show no clubbing. Patient Active Problem List   Diagnosis    PTSD (post-traumatic stress disorder)    Depression    Reactive airway disease    Environmental allergies    Hyperlipidemia    S/P colonoscopy    Fatty liver disease, nonalcoholic    Vitamin D deficiency    IFG (impaired fasting glucose)    Obesity (BMI 30-39. 9)    DJD of left shoulder    Asthma    Osteoarthritis of left knee    Cheilosis    Atrial flutter (HCC)           No orders of the defined types were placed in this encounter. No orders of the defined types were placed in this encounter. Assessment/Orders:       ICD-10-CM    1.  Atrial flutter, unspecified type ([facility] [address]  I48.92 2. Hyperlipidemia, unspecified hyperlipidemia type  E78.5    3. Chest pain, unspecified type  R07.9    4. Intermittent atrial flutter (HCC)  I48.92    5. Abnormal EKG  R94.31    6. Other chest pain  R07.89        No orders of the defined types were placed in this encounter. There are no discontinued medications. No orders of the defined types were placed in this encounter. Plan:    Stay on same medications.     See me in 2 months        Electronically signed by: Olam Osler, MD  2/15/2021 10:41 AM

## 2021-02-09 ENCOUNTER — CARE COORDINATION (OUTPATIENT)
Dept: CARE COORDINATION | Age: 61
End: 2021-02-09

## 2021-02-09 ASSESSMENT — ENCOUNTER SYMPTOMS
STRIDOR: 0
COUGH: 0
WHEEZING: 0

## 2021-02-09 NOTE — CARE COORDINATION
Ambulatory Care Coordination Note  2/9/2021  CM Risk Score: 6  Charlson 10 Year Mortality Risk Score: 79%     ACC: Taiwo Mustafa RN      Summary Note: ACM CALLED PATIENT  TO FOLLOW UP ON ONGOING CARE NEEDS,   Spoke with patient herself      \"Making biscuits, feeling much better\"    1. Covid 19 test: -States covid test was negative  2. [facility]: has support from - Erum Barron takes to her appointments and helps with some errands as needed  3. Cough/congestion: Has improved- completion of ATB  4. Cardiology: is she taking lopressor 50mg po twice a day- states no more palpitations   Making biscuits feeling much better    Care Coordination Plan of Care: This nurse Care Coordinator will  - follow ups as scheduled  -cough- is she breathing ok  -cardiology- palpitations  -graduate soon         General Assessment    Do you have any symptoms that are causing concern?: No         Care Coordination Interventions    Program Enrollment: Rising Risk  Referral from Primary Care Provider: No  Suggested Interventions and [facility] [address] [address]:  (Comment: Currently followed by [address] [place] [address],[address])  Palliative Care:  (Comment: 10/13/20 Discussed services)  Pharmacist: Completed (Comment: 10/13/20 Clinical Rx Referral)  Registered Dietician: Completed (Comment: 10/13/2020 Declines; 11/3/2020 [facility] Dietician referral - patient agreed.)  Social Work: In Process (Comment: 01/22: referring to [facility]  for community resources)  Other Services: Completed (Comment: 10/13/20 ACP Referral)  Transportation Support: Declined  Zone Management Tools: Completed (Comment: 10/13/2020 Asthma Action Plan (Zones). )  Other Services or Interventions: 10/13/2020 Instructed on same day, next day, and Walk-In Care. Goals Addressed                 This Visit's Progress     Conditions and Symptoms   On track     I will schedule office visits, as directed by my provider.   I will keep my appointment or reschedule if I have to cancel. I will notify my provider of any barriers to my plan of care. I will follow my Zone Management tool to seek urgent or emergent care. I will notify my provider of any symptoms that indicate a worsening of my condition. Barriers: lack of education  Plan for overcoming my barriers: Care Coordination  Confidence: 9/10  Anticipated Goal Completion Date: 3/13/2021         Medication Management   On track     I will take my medication as directed. I will notify my provider of any problems with medications, like adverse effects or side effects. I will notify my provider for advice before I stop taking any of my medication. Barriers: overwhelmed by complexity of regimen and lack of education  Plan for overcoming my barriers: Care Coordination, Clinical Rx  Confidence: 9/10  Anticipated Goal Completion Date: 3/13/2021            Prior to Admission medications    Medication Sig Start Date End Date Taking? Authorizing Provider   metoprolol tartrate (LOPRESSOR) 50 MG tablet Take 1 tablet by mouth 2 times daily 2/1/21   Reza Andersen MD   famotidine (PEPCID) 20 MG tablet TAKE 1 TABLET BY MOUTH 2 TIMES DAILY (AM,PM) 1/14/21   Lucio Mcconnell MD   metFORMIN (GLUCOPHAGE) 500 MG tablet TAKE 1 TABLET BY MOUTH TWICE A DAY WITH MEALS (AM,PM) 1/4/21   Trace Lomeli MD   vitamin D (D3-1000) 25 MCG (1000 UT) CAPS Take 1 capsule by mouth Daily with supper 11/24/20   Lucio Mcconnell MD   ciclopirox (PENLAC) 8 % solution Apply to adjacent skin and affected nails daily. Remove with alcohol every 7 days.  11/24/20   Gregg Lomeli MD   aspirin EC 81 MG EC tablet Take 1 tablet by mouth daily 11/24/20   Trace Lomeli MD   Multiple Vitamin (DAILY-MANDI) TABS TAKE 1 TABLET BY MOUTH EVERY DAY 11/13/20   ARASH Chavez CNP   levothyroxine (SYNTHROID) 50 MCG tablet Take 1 tablet by mouth every morning 11/13/20   ARASH Kay CNP   Biotin 300 MCG TABS Take 1 tablet by mouth daily 11/13/20   ARASH Holman CNP   psyllium 0.52 g capsule Take 1 capsule by mouth daily 10/30/20   Steven Hightower MD   citalopram (CELEXA) 40 MG tablet Take 40 mg by mouth daily    Historical Provider, MD   benztropine (COGENTIN) 1 MG tablet Take 1 mg by mouth 3 times daily 10/15/20   Historical Provider, MD   fluticasone (FLONASE) 50 MCG/ACT nasal spray USE 1 SPRAY INTO EACH NOSTRIL DAILY 10/13/20   Steven Hightower MD   vitamin B-2 (RIBOFLAVIN) 100 MG TABS tablet Take 1 tablet by mouth daily 5/25/20   Denise Zhao MD   budesonide-formoterol (SYMBICORT) 160-4.5 MCG/ACT AERO Inhale 2 puffs into the lungs 2 times daily 3/10/20   Mandi Brandon MD   cyanocobalamin ([employer]S VITAMIN B12) 1000 MCG tablet Take 1 tablet by mouth daily 2/21/20   Mandi Brandon MD   traZODone (DESYREL) 50 MG tablet Take by mouth nightly 1-2 tablets nightly    Historical Provider, MD   pravastatin (PRAVACHOL) 40 MG tablet TAKE 1 TABLET BY MOUTH EVERY DAY IN THE EVENING 9/3/19   Mandi Brandon MD   albuterol sulfate  (90 Base) MCG/ACT inhaler USE 2 PUFFS EVERY 4 HRS AS NEEDED FOR WHEEZING/SOB-SPACE OUT TO EVERY 6 HR AS SYMPTOMS IMPROVE 7/19/19   Mandi Brandon MD   cetirizine (ZYRTEC) 10 MG tablet TAKE 1 TABLET BY MOUTH EVERY DAY AS NEEDED FOR ALLERGIES 5/8/19   Nalini Wu MD   EPINEPHrine (EPIPEN 2-PRAVEENA) 0.3 MG/0.3ML SOAJ injection Use as directed for allergic reaction 6/11/18   Mandi Brandon MD   ARIPiprazole (ABILIFY) 15 MG tablet TAKE 1 TABLET BY MOUTH EVERY DAY 1/17/17   Historical Provider, MD   Multiple Vitamins-Minerals (THERAPEUTIC MULTIVITAMIN-MINERALS) tablet Take 1 tablet by mouth daily 12/29/15 6/12/16  Mandi Brandon MD   buPROPion (WELLBUTRIN XL) 300 MG XL tablet Take 1 tablet by mouth daily 8/28/15   Mandi Brandon MD   albuterol (PROVENTIL;VENTOLIN) 90 MCG/ACT inhaler Inhale 2 puffs into the lungs every 6 hours as needed.  12/21/12 11/5/19  Mandi Brandon MD Future Appointments   Date Time Provider Jay Wong   2/24/2021  8:45 AM [facility]   4/12/2021  9:00 AM Sherren Laine, MD [facility]

## 2021-02-17 ENCOUNTER — CARE COORDINATION (OUTPATIENT)
Dept: CARE COORDINATION | Age: 61
End: 2021-02-17

## 2021-02-17 NOTE — CARE COORDINATION
Telephone call with patient today. She stated that she did not receive the transportation information that this writer sent her by mail. Confirmed patient's mailing address and discussed plan to send out information again. Patient is using [employer] for Medical transportation. Also Erinn   Takes her to get groceries. Patient stated that she has three  at [facility] and is confusing. Discussed that she call [facility] to get clarification on  situation. Patient denied having problems affording medications. She receives food stamps and stated that she has adequate food. No problems affording rent or utilities. Patient having questions about her eligibility for COVID 19 vaccination. Provided patient with contact information for [facility] and [address]

## 2021-02-17 NOTE — LETTER
Familia Bernard  [address] [place] [address]      Dear Sunny Felder,    I hope this letter finds you doing well! I am sending you resource information on [employer]. I hope you find the information helpful. Please look them over and let me know if you have any questions or need further assistance. You can contact me at any of the numbers listed below. Sincerely,    Corey Mathew, [address]  , [facility]  Cell Phone: [phone]  Email: [email or web address]

## 2021-02-20 DIAGNOSIS — B35.1 ONYCHOMYCOSIS: ICD-10-CM

## 2021-02-20 NOTE — TELEPHONE ENCOUNTER
Rx requested:  Requested Prescriptions     Pending Prescriptions Disp Refills    ciclopirox (PENLAC) 8 % solution [Pharmacy Med Name: CICLOPIROX 8% SOLUTION 8 Solution] 6.6 mL 3     Sig: APPLY TO ADJACENT SKIN AND AFFECTED NAILS DAILY. REMOVE WITH ALCOHOL EVERY 7 DAYS.        Last Office Visit:   1/19/2021        Next Visit Date:  Future Appointments   Date Time Provider Jay Wong   2/24/2021  8:45 AM Unruly Larios MD [facility]   4/12/2021  9:00 AM Zachery Álvarez MD [facility]

## 2021-02-22 ENCOUNTER — CARE COORDINATION (OUTPATIENT)
Dept: CARE COORDINATION | Age: 61
End: 2021-02-22
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
## 2021-02-23 ENCOUNTER — CARE COORDINATION (OUTPATIENT)
Dept: CARE COORDINATION | Age: 61
End: 2021-02-23
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
## 2021-02-23 NOTE — CARE COORDINATION
Telephone call to [facility]. Representative indicated that they spoke to patient several times yesterday and she feels patient was confused. Representative indicated that a supervisor spoke with patient on 2/18/2021 to explain why  was changed. Representative indicated that her previous  was Ana Sarmiento who was reassigned to community case management. Patient was changed to in house case management/Jasmin Bennett because it was felt that she would be better served. Patient only has one .

## 2021-02-23 NOTE — CARE COORDINATION
Telephone call to patient. Explained results of call with [facility].

## 2021-02-24 ENCOUNTER — CARE COORDINATION (OUTPATIENT)
Dept: CARE COORDINATION | Age: 61
End: 2021-02-24

## 2021-02-24 ENCOUNTER — VIRTUAL VISIT (OUTPATIENT)
Dept: FAMILY MEDICINE CLINIC | Age: 61
End: 2021-02-24
Payer: COMMERCIAL

## 2021-02-24 ENCOUNTER — TELEPHONE (OUTPATIENT)
Dept: FAMILY MEDICINE CLINIC | Age: 61
End: 2021-02-24

## 2021-02-24 DIAGNOSIS — F41.9 ANXIETY: Primary | ICD-10-CM

## 2021-02-24 DIAGNOSIS — R73.01 IFG (IMPAIRED FASTING GLUCOSE): ICD-10-CM

## 2021-02-24 DIAGNOSIS — F32.A DEPRESSION, UNSPECIFIED DEPRESSION TYPE: ICD-10-CM

## 2021-02-24 PROCEDURE — 99442 PR PHYS/QHP TELEPHONE EVALUATION 11-20 MIN: CPT | Performed by: FAMILY MEDICINE

## 2021-02-24 ASSESSMENT — PATIENT HEALTH QUESTIONNAIRE - PHQ9
2. FEELING DOWN, DEPRESSED OR HOPELESS: 0
SUM OF ALL RESPONSES TO PHQ QUESTIONS 1-9: 0
1. LITTLE INTEREST OR PLEASURE IN DOING THINGS: 0
SUM OF ALL RESPONSES TO PHQ9 QUESTIONS 1 & 2: 0
SUM OF ALL RESPONSES TO PHQ QUESTIONS 1-9: 0

## 2021-02-24 ASSESSMENT — ENCOUNTER SYMPTOMS
BACK PAIN: 0
EYE ITCHING: 0
ABDOMINAL PAIN: 0
RECTAL PAIN: 0
COUGH: 0
SINUS PAIN: 0
ANAL BLEEDING: 0
SINUS PRESSURE: 0
FACIAL SWELLING: 0
TROUBLE SWALLOWING: 0
SORE THROAT: 0
SHORTNESS OF BREATH: 0
CHEST TIGHTNESS: 0
EYE REDNESS: 0
APNEA: 0
PHOTOPHOBIA: 0
EYE DISCHARGE: 0
VOMITING: 0
DIARRHEA: 0
COLOR CHANGE: 0
NAUSEA: 0

## 2021-02-24 NOTE — CARE COORDINATION
Asked by Dr. Kathy Wisdom to assist patient in getting clarification why she was asked about how much she made and signing paperwork.

## 2021-02-24 NOTE — PROGRESS NOTES
2021    TELEHEALTH EVALUATION -- Audio/Visual (During [facility] public health emergency)    Due to Kevin 19 outbreak, patient's office visit was converted to a virtual visit. Patient was contacted and agreed to proceed with a virtual visit via Telephone Visit  The risks and benefits of converting to a virtual visit were discussed in light of the current infectious disease epidemic. Patient also understood that insurance coverage and co-pays are up to their individual insurance plans. HPI:    Jeremi Wright (:  1960) has requested an audio/video evaluation for the following concern(s):    Has a new  at [facility]   Was upset that asked her how much she makes   Reports that they made her sign papers and is unsure why she is signing   Patient has not been able to sleep because of the situation   Chhay[place]urvi would like [facility] to know that she would like to       Review of Systems   Constitutional: Negative for activity change, appetite change, diaphoresis, fatigue and fever. HENT: Negative for congestion, dental problem, ear discharge, ear pain, facial swelling, mouth sores, nosebleeds, postnasal drip, sinus pressure, sinus pain, sneezing, sore throat and trouble swallowing. Eyes: Negative for photophobia, discharge, redness, itching and visual disturbance. Respiratory: Negative for apnea, cough, chest tightness and shortness of breath. Cardiovascular: Negative for chest pain and leg swelling. Gastrointestinal: Negative for abdominal pain, anal bleeding, diarrhea, nausea, rectal pain and vomiting. Endocrine: Negative for cold intolerance, heat intolerance, polydipsia and polyphagia. Genitourinary: Negative for decreased urine volume, difficulty urinating, dyspareunia, enuresis, flank pain, genital sores, hematuria, menstrual problem and urgency. Musculoskeletal: Negative for arthralgias, back pain, joint swelling, myalgias and neck stiffness. Skin: Negative for color change and pallor. Allergic/Immunologic: Negative for environmental allergies. Neurological: Negative for dizziness, tremors, seizures, syncope, facial asymmetry, numbness and headaches. Hematological: Negative for adenopathy. Does not bruise/bleed easily. Psychiatric/Behavioral: Positive for sleep disturbance. Negative for agitation, behavioral problems, decreased concentration, dysphoric mood, hallucinations and suicidal ideas. The patient is nervous/anxious. Prior to Visit Medications    Medication Sig Taking? Authorizing Provider   ciclopirox (PENLAC) 8 % solution APPLY TO ADJACENT SKIN AND AFFECTED NAILS DAILY. REMOVE WITH ALCOHOL EVERY 7 DAYS.   Marvin Adams MD   metoprolol tartrate (LOPRESSOR) 50 MG tablet Take 1 tablet by mouth 2 times daily  Sienna Silva MD   famotidine (PEPCID) 20 MG tablet TAKE 1 TABLET BY MOUTH 2 TIMES DAILY (AM,PM)  Marvin Adams MD   metFORMIN (GLUCOPHAGE) 500 MG tablet TAKE 1 TABLET BY MOUTH TWICE A DAY WITH MEALS (AM,PM)  Marvin Adams MD   vitamin D (D3-1000) 25 MCG (1000 UT) CAPS Take 1 capsule by mouth Daily with supper  Marvin Adams MD   aspirin EC 81 MG EC tablet Take 1 tablet by mouth daily  Trace Lomeli MD   Multiple Vitamin (DAILY-MANDI) TABS TAKE 1 TABLET BY MOUTH EVERY DAY  ARASH Rodríguez CNP   levothyroxine (SYNTHROID) 50 MCG tablet Take 1 tablet by mouth every morning  Melissa Devonna Cheadle, APRN - CNP   Biotin 300 MCG TABS Take 1 tablet by mouth daily  Melissa Devonna Cheadle, APRN - CNP   psyllium 0.52 g capsule Take 1 capsule by mouth daily  Marvin Adams MD   citalopram (CELEXA) 40 MG tablet Take 40 mg by mouth daily  Historical Provider, MD   benztropine (COGENTIN) 1 MG tablet Take 1 mg by mouth 3 times daily  Historical Provider, MD   fluticasone (FLONASE) 50 MCG/ACT nasal spray USE 1 SPRAY INTO EACH NOSTRIL DAILY  Marvin Adams MD vitamin B-2 (RIBOFLAVIN) 100 MG TABS tablet Take 1 tablet by mouth daily  Jaskaran Powell MD   budesonide-formoterol (SYMBICORT) 160-4.5 MCG/ACT AERO Inhale 2 puffs into the lungs 2 times daily  Keshawn Napoles MD   cyanocobalamin ([employer] VITAMIN B12) 1000 MCG tablet Take 1 tablet by mouth daily  Keshawn Napoles MD   traZODone (DESYREL) 50 MG tablet Take by mouth nightly 1-2 tablets nightly  Historical Provider, MD   pravastatin (PRAVACHOL) 40 MG tablet TAKE 1 TABLET BY MOUTH EVERY DAY IN THE EVENING  Morenita Basilio MD   albuterol sulfate  (90 Base) MCG/ACT inhaler USE 2 PUFFS EVERY 4 HRS AS NEEDED FOR WHEEZING/SOB-SPACE OUT TO EVERY 6 HR AS SYMPTOMS IMPROVE  Avery Basilio MD   cetirizine (ZYRTEC) 10 MG tablet TAKE 1 TABLET BY MOUTH EVERY DAY AS NEEDED FOR ALLERGIES  Shankar Ruffin MD   EPINEPHrine (EPIPEN 2-PRAVEENA) 0.3 MG/0.3ML SOAJ injection Use as directed for allergic reaction  Keshawn Napoles MD   ARIPiprazole (ABILIFY) 15 MG tablet TAKE 1 TABLET BY MOUTH EVERY DAY  Historical Provider, MD   Multiple Vitamins-Minerals (THERAPEUTIC MULTIVITAMIN-MINERALS) tablet Take 1 tablet by mouth daily  Keshawn Napoles MD   buPROPion (WELLBUTRIN XL) 300 MG XL tablet Take 1 tablet by mouth daily  Keshawn Napoles MD   albuterol (PROVENTIL;VENTOLIN) 90 MCG/ACT inhaler Inhale 2 puffs into the lungs every 6 hours as needed.   Keshawn Napoles MD       Social History     Tobacco Use    Smoking status: Never Smoker    Smokeless tobacco: Never Used   Substance Use Topics    Alcohol use: No    Drug use: No        No Known Allergies,   Past Medical History:   Diagnosis Date    Asthma 2015    Bilateral renal cysts 2013    Depression     since age 35, was with Dr Allen Lopez, now the [facility]    Diverticulosis of sigmoid colon 2012    Dr Shai Alvarez DJJOSE of left shoulder     Environmental allergies     Fatty liver 2013    GERD (gastroesophageal reflux disease)     History of cardiac arrhythmia \"due to stress, was placed on medication\"    Hydatidiform mole     age 26     Hyperlipidemia     Hypothyroidism 2011    IFG (impaired fasting glucose) 2013    Melanoma of eye ([address]     age 30, R eye prosthesis, Dr Odessa Argueta    Obesity     Prediabetes     PTSD (post-traumatic stress disorder)     age 35, [facility]    S/P colonoscopy 04/19/2012    Dr. Leah Menjivar S/P hysterectomy with oophorectomy 2012    Dr Isaías Yao D deficiency    ,   Past Surgical History:   Procedure Laterality Date    DILATION AND CURETTAGE OF UTERUS      ENDOMETRIAL ABLATION  6/2012    EYE REMOVAL      OD for melanoma, age 30    FOOT OSTEOTOMY Left 09/23/2016    Richrd Port DPM      LINDSAY AND BSO  2012    Dr Isadora Eisenberg   ,   Social History     Tobacco Use    Smoking status: Never Smoker    Smokeless tobacco: Never Used   Substance Use Topics    Alcohol use: No    Drug use: No   ,   Family History   Problem Relation Age of Onset    Heart Failure Mother         dec 76    Diabetes Mother     Diabetes Father     Stroke Father         dec age 80    Cancer Father         Sarcoma    Breast Cancer Sister     Stomach Cancer Other    ,   Immunization History   Administered Date(s) Administered    Influenza Vaccine, unspecified formulation 09/10/2016    Influenza Virus Vaccine 09/19/2014, 09/07/2015, 09/12/2017, 09/13/2018, 09/13/2019    Influenza Whole 09/19/2014, 09/07/2015    Influenza, Quadv, IM, (6 mo and older Fluzone, Flulaval, Fluarix and 3 yrs and older Afluria) 09/12/2017, 09/13/2018    Influenza, Quadv, IM, PF (6 mo and older Fluzone, Flulaval, Fluarix, and 3 yrs and older Afluria) 09/13/2019, 08/20/2020    Influenza, Triv, 3 Years and older, IM (Afluria (5 yrs and older) 09/10/2016    Pneumococcal Polysaccharide ([id number]) 08/10/2018    Td, unspecified formulation 12/12/2017    Tdap (Boostrix, Adacel) 03/11/2016  Zoster Recombinant (Shingrix) 03/19/2019, 07/19/2019, 02/21/2020   ,   Health Maintenance   Topic Date Due    Lipid screen  11/05/2020    A1C test (Diabetic or Prediabetic)  02/07/2021    Breast cancer screen  06/10/2021    Colon cancer screen colonoscopy  04/19/2022    DTaP/Tdap/Td vaccine (3 - Td) 12/12/2027    Flu vaccine  Completed    Shingles Vaccine  Completed    Pneumococcal 0-64 years Vaccine  Completed    Hepatitis C screen  Completed    HIV screen  Completed    Hepatitis A vaccine  Aged Out    Hepatitis B vaccine  Aged Out    Hib vaccine  Aged Out    Meningococcal (ACWY) vaccine  Aged Out       PHYSICAL EXAMINATION:  [ INSTRUCTIONS:  \"[x]\" Indicates a positive item  \"[]\" Indicates a negative item  -- DELETE ALL ITEMS NOT EXAMINED]  [x] Alert  [x] Oriented to person/place/time    [x] No apparent distress  [] Toxic appearing    [] Face flushed appearing [] Sclera clear  [] Lips are cyanotic      [x] Breathing appears normal  [] Appears tachypneic      [] Rash on visible skin    [] Cranial Nerves II-XII grossly intact    [] Motor grossly intact in visible upper extremities    [] Motor grossly intact in visible lower extremities    [x] Normal Mood  [] Anxious appearing    [] Depressed appearing  [] Confused appearing      [] Poor short term memory  [] Poor long term memory    [] OTHER:      Due to this being a TeleHealth encounter, evaluation of the following organ systems is limited: Vitals/Constitutional/EENT/Resp/CV/GI//MS/Neuro/Skin/Heme-Lymph-Imm. ASSESSMENT/PLAN:    1. Anxiety  Patient is very upset about encounter with [facility]. Patient woiuld like provider to reach out and see if they would speak with her. Will send a telephone encounter to .     2. Depression, unspecified depression type      3. IFG (impaired fasting glucose)    - Hemoglobin A1C; Future        No follow-ups on file. An  electronic signature was used to authenticate this note. --Jassi Myers MD on 2/24/2021 at 6:05 PM        Pursuant to the emergency declaration under the [address] [place] [address], [facility] waiver authority and the [employer] Act, this Virtual  Visit was conducted, with patient's consent, to reduce the patient's risk of exposure to COVID-19 and provide continuity of care for an established patient. Services were provided through a video synchronous discussion virtually to substitute for in-person clinic visit. Kenia Venegas is a 62 y.o. female evaluated via telephone on 2/24/2021. Consent:  She and/or health care decision maker is aware that that she may receive a bill for this telephone service, depending on her insurance coverage, and has provided verbal consent to proceed: Yes      Documentation:  I communicated with the patient and/or health care decision maker about gastroenteritis. Details of this discussion including any medical advice provided: yes      I affirm this is a Patient Initiated Episode with a Patient who has not had a related appointment within my department in the past 7 days or scheduled within the next 24 hours.     Patient identification was verified at the start of the visit: Yes    Total Time: 15 minutes      Note: not billable if this call serves to triage the patient into an appointment for the relevant concern      Jassi Myers

## 2021-02-24 NOTE — CARE COORDINATION
Telephone call with [facility] . Jasmin explained that patient has a /Va. Jasmin discussed that on 2/15/2021 she did  assessment with patient. This is done to make sure a patient has what they need. This assessment does ask if patient has a source of income. They confirmed she receives SSD and was not asked about a dollar amount. Discussed also about any paperwork patient may have been asked to sign. Jasmin indicated that patient was asked to sign a release of information so physicians can talk for continuum of care. Jasmin also stated that they have had a number of phone calls with patient over the past two weeks. Jasmin is noting that patient has been confused which is new. Jasmin expressed concern that patient may not be taking her medications. Jasmin expressed plan to follow up with patient later today.

## 2021-02-24 NOTE — CARE COORDINATION
Telephone call with patient.  Explained results of phone call with [facility]  at [place],

## 2021-02-24 NOTE — CARE COORDINATION
Received request from [facility] Javon/Dr. Lomeli office to contact patient. Spoke with patient who indicated that her phone is not working and afraid she missed [address] phone call. Explained that Jasmin said she was going to call later today. Discussed with patient that Jasmin would leave her a voicemail . Patient expressed plan that she is going to the store and buy a new .

## 2021-02-25 ENCOUNTER — CARE COORDINATION (OUTPATIENT)
Dept: CARE COORDINATION | Age: 61
End: 2021-02-25

## 2021-02-25 ENCOUNTER — NURSE TRIAGE (OUTPATIENT)
Dept: OTHER | Facility: CLINIC | Age: 61
End: 2021-02-25

## 2021-02-25 NOTE — CARE COORDINATION
Telephone call with patient who is waiting on telehealth visit due to her physicial complaints. Patient having issues with a friend who smokes marijuana. Patient doesn't want anything to do with marijuana. She is no longer talking to this friend. Patient had to hang up because she has a telehealth visit scheduled.

## 2021-02-25 NOTE — CARE COORDINATION
Received incoming text message from patient. Message -\"I won't bother you anymore I don't need your help. Thank Gisela Nielson. \"

## 2021-02-25 NOTE — CARE COORDINATION
Conference with ERIKA Jackson, Jasmin-on house casemanager and [facility]. Relayed concerns about patient's decompensation mentally, confusion and question if she is taking her medications. Jasmin and Cole Wright unsure if patient is not dealing with change in  or if she is attention seeking. Asked if patient could have an in person assessment. At this time the agency is doing only telehealth. Cole Wright expressed plan to reach out to patient after this call. Asked that Jasmin contact this writer about results of phone call and plan for patient.

## 2021-02-25 NOTE — CARE COORDINATION
Telephone call with patient. She called to let this writer know that she ate food that was spoiled. She feels this is the reason she has not been feeling well. She threw out all of her food. Her landlord fixed the refrigerator. Patient claims she will have enough food until Monday when she goes to the grocery store.

## 2021-02-25 NOTE — CARE COORDINATION
Telephone call with [address] She indicated that Mireya did speak with patient today. She stated that she not having issues adjusting. She wants only Friday appontments with Jasmin. She also just wants one weekly appointment with their agency.

## 2021-02-25 NOTE — TELEPHONE ENCOUNTER
Patient called Olaf Adams at [facility])  with red flag complaint. Brief description of triage: bilateral ear itching and white thick / sometime watery drainage. Triage indicates for patient to be seen today. Care advice provided, patient verbalizes understanding; denies any other questions or concerns; instructed to call back for any new or worsening symptoms. Writer provided warm transfer to [facility] for appointment scheduling. Attention Provider: Thank you for allowing me to participate in the care of your patient. The patient was connected to triage in response to information provided to the [facility]. Please do not respond through this encounter as the response is not directed to a shared pool. Reason for Disposition   Cloudy - white discharge    Answer Assessment - Initial Assessment Questions  1. LOCATION: \"Which ear is involved? \"      Bilateral ear drainage and   2. COLOR: \"What is the color of the discharge? \"       whtie  3. CONSISTENCY: \"How runny is the discharge? Could it be water? \"       Thick and sometime watery. 4. ONSET: \"When did you first notice the discharge?\"     1 week  5. PAIN: \"Is there any earache? \" \"How bad is it? \"  (Scale 1-10; or mild, moderate, severe)  5/10  6. OBJECTS: [place] use of q-tips or have you inserted anything else in your ear? \"    Uses q tips to clean ears. 7. OTHER SYMPTOMS: \"Do you have any other symptoms? \" (e.g., headache, fever, dizziness, vomiting, runny nose)    Headache, cough    8. PREGNANCY: \"Is there any chance you are pregnant? \" \"When was your la  NA    Protocols used: EAR - DISCHARGE-ADULT-[place]

## 2021-02-26 ENCOUNTER — CARE COORDINATION (OUTPATIENT)
Dept: CARE COORDINATION | Age: 61
End: 2021-02-26

## 2021-02-26 ENCOUNTER — HOSPITAL ENCOUNTER (EMERGENCY)
Age: 61
Discharge: HOME | End: 2021-02-26
Payer: COMMERCIAL

## 2021-02-26 ENCOUNTER — APPOINTMENT (OUTPATIENT)
Dept: GENERAL RADIOLOGY | Age: 61
End: 2021-02-26
Payer: COMMERCIAL

## 2021-02-26 ENCOUNTER — TELEPHONE (OUTPATIENT)
Dept: FAMILY MEDICINE CLINIC | Age: 61
End: 2021-02-26

## 2021-02-26 VITALS
RESPIRATION RATE: 24 BRPM | OXYGEN SATURATION: 98 % | SYSTOLIC BLOOD PRESSURE: 120 MMHG | HEIGHT: 66 IN | HEART RATE: 88 BPM | WEIGHT: 200 LBS | DIASTOLIC BLOOD PRESSURE: 69 MMHG | TEMPERATURE: 98.5 F | BODY MASS INDEX: 32.14 KG/M2

## 2021-02-26 DIAGNOSIS — N39.0 URINARY TRACT INFECTION WITH HEMATURIA, SITE UNSPECIFIED: Primary | ICD-10-CM

## 2021-02-26 DIAGNOSIS — R31.9 URINARY TRACT INFECTION WITH HEMATURIA, SITE UNSPECIFIED: Primary | ICD-10-CM

## 2021-02-26 LAB
ALBUMIN SERPL-MCNC: 4.2 G/DL (ref 3.5–4.6)
ALP BLD-CCNC: 73 U/L (ref 40–130)
ALT SERPL-CCNC: 23 U/L (ref 0–33)
ANION GAP SERPL CALCULATED.3IONS-SCNC: 14 MEQ/L (ref 9–15)
AST SERPL-CCNC: 20 U/L (ref 0–35)
BACTERIA: ABNORMAL /HPF
BASOPHILS ABSOLUTE: 0 K/UL (ref 0–0.2)
BASOPHILS RELATIVE PERCENT: 0.7 %
BILIRUB SERPL-MCNC: 0.5 MG/DL (ref 0.2–0.7)
BILIRUBIN URINE: ABNORMAL
BLOOD, URINE: ABNORMAL
BUN BLDV-MCNC: 12 MG/DL (ref 6–20)
CALCIUM SERPL-MCNC: 9.9 MG/DL (ref 8.5–9.9)
CHLORIDE BLD-SCNC: 104 MEQ/L (ref 95–107)
CLARITY: ABNORMAL
CO2: 23 MEQ/L (ref 20–31)
COLOR: ABNORMAL
CREAT SERPL-MCNC: 0.82 MG/DL (ref 0.5–0.9)
EKG ATRIAL RATE: 85 BPM
EKG P AXIS: 42 DEGREES
EKG P-R INTERVAL: 136 MS
EKG Q-T INTERVAL: 370 MS
EKG QRS DURATION: 86 MS
EKG QTC CALCULATION (BAZETT): 440 MS
EKG R AXIS: -26 DEGREES
EKG T AXIS: 31 DEGREES
EKG VENTRICULAR RATE: 85 BPM
EOSINOPHILS ABSOLUTE: 0 K/UL (ref 0–0.7)
EOSINOPHILS RELATIVE PERCENT: 0.8 %
EPITHELIAL CELLS, UA: ABNORMAL /HPF (ref 0–5)
GFR AFRICAN AMERICAN: >60
GFR NON-AFRICAN AMERICAN: >60
GLOBULIN: 2.6 G/DL (ref 2.3–3.5)
GLUCOSE BLD-MCNC: 110 MG/DL (ref 70–99)
GLUCOSE URINE: NEGATIVE MG/DL
HCT VFR BLD CALC: 42 % (ref 37–47)
HEMOGLOBIN: 14.2 G/DL (ref 12–16)
INFLUENZA A BY PCR: NEGATIVE
INFLUENZA B BY PCR: NEGATIVE
KETONES, URINE: ABNORMAL MG/DL
LEUKOCYTE ESTERASE, URINE: ABNORMAL
LYMPHOCYTES ABSOLUTE: 2 K/UL (ref 1–4.8)
LYMPHOCYTES RELATIVE PERCENT: 36.3 %
MCH RBC QN AUTO: 31.1 PG (ref 27–31.3)
MCHC RBC AUTO-ENTMCNC: 33.8 % (ref 33–37)
MCV RBC AUTO: 91.9 FL (ref 82–100)
MONOCYTES ABSOLUTE: 0.4 K/UL (ref 0.2–0.8)
MONOCYTES RELATIVE PERCENT: 6.9 %
NEUTROPHILS ABSOLUTE: 3.1 K/UL (ref 1.4–6.5)
NEUTROPHILS RELATIVE PERCENT: 55.3 %
NITRITE, URINE: POSITIVE
PDW BLD-RTO: 14 % (ref 11.5–14.5)
PH UA: 5.5 (ref 5–9)
PLATELET # BLD: 275 K/UL (ref 130–400)
POTASSIUM SERPL-SCNC: 4.1 MEQ/L (ref 3.4–4.9)
PROTEIN UA: ABNORMAL MG/DL
RBC # BLD: 4.57 M/UL (ref 4.2–5.4)
RBC UA: ABNORMAL /HPF (ref 0–5)
SARS-COV-2, NAAT: NOT DETECTED
SODIUM BLD-SCNC: 141 MEQ/L (ref 135–144)
SPECIFIC GRAVITY UA: 1.02 (ref 1–1.03)
TOTAL PROTEIN: 6.8 G/DL (ref 6.3–8)
URINE REFLEX TO CULTURE: YES
UROBILINOGEN, URINE: 0.2 E.U./DL
WBC # BLD: 5.5 K/UL (ref 4.8–10.8)
WBC UA: ABNORMAL /HPF (ref 0–5)

## 2021-02-26 PROCEDURE — 85025 COMPLETE CBC W/AUTO DIFF WBC: CPT

## 2021-02-26 PROCEDURE — 87086 URINE CULTURE/COLONY COUNT: CPT

## 2021-02-26 PROCEDURE — 36415 COLL VENOUS BLD VENIPUNCTURE: CPT

## 2021-02-26 PROCEDURE — 2580000003 HC RX 258: Performed by: PHYSICIAN ASSISTANT

## 2021-02-26 PROCEDURE — 93010 ELECTROCARDIOGRAM REPORT: CPT | Performed by: INTERNAL MEDICINE

## 2021-02-26 PROCEDURE — 81001 URINALYSIS AUTO W/SCOPE: CPT

## 2021-02-26 PROCEDURE — 96374 THER/PROPH/DIAG INJ IV PUSH: CPT

## 2021-02-26 PROCEDURE — 6360000002 HC RX W HCPCS: Performed by: PHYSICIAN ASSISTANT

## 2021-02-26 PROCEDURE — 80053 COMPREHEN METABOLIC PANEL: CPT

## 2021-02-26 PROCEDURE — 87635 SARS-COV-2 COVID-19 AMP PRB: CPT

## 2021-02-26 PROCEDURE — 87077 CULTURE AEROBIC IDENTIFY: CPT

## 2021-02-26 PROCEDURE — 96375 TX/PRO/DX INJ NEW DRUG ADDON: CPT

## 2021-02-26 PROCEDURE — 87502 INFLUENZA DNA AMP PROBE: CPT

## 2021-02-26 PROCEDURE — 87186 SC STD MICRODIL/AGAR DIL: CPT

## 2021-02-26 PROCEDURE — 99285 EMERGENCY DEPT VISIT HI MDM: CPT

## 2021-02-26 PROCEDURE — 71045 X-RAY EXAM CHEST 1 VIEW: CPT

## 2021-02-26 PROCEDURE — 6370000000 HC RX 637 (ALT 250 FOR IP): Performed by: PHYSICIAN ASSISTANT

## 2021-02-26 PROCEDURE — 93005 ELECTROCARDIOGRAM TRACING: CPT | Performed by: PHYSICIAN ASSISTANT

## 2021-02-26 RX ORDER — AMOXICILLIN AND CLAVULANATE POTASSIUM 875; 125 MG/1; MG/1
1 TABLET, FILM COATED ORAL 2 TIMES DAILY
Qty: 20 TABLET | Refills: 0 | Status: SHIPPED | OUTPATIENT
Start: 2021-02-26 | End: 2021-03-08

## 2021-02-26 RX ORDER — NITROFURANTOIN 25; 75 MG/1; MG/1
100 CAPSULE ORAL ONCE
Status: COMPLETED | OUTPATIENT
Start: 2021-02-26 | End: 2021-02-26

## 2021-02-26 RX ORDER — AMOXICILLIN AND CLAVULANATE POTASSIUM 875; 125 MG/1; MG/1
1 TABLET, FILM COATED ORAL 2 TIMES DAILY
Qty: 20 TABLET | Refills: 0 | Status: SHIPPED | OUTPATIENT
Start: 2021-02-26 | End: 2021-02-26 | Stop reason: SDUPTHER

## 2021-02-26 RX ORDER — NITROFURANTOIN 25; 75 MG/1; MG/1
100 CAPSULE ORAL 2 TIMES DAILY
Qty: 10 CAPSULE | Refills: 0 | Status: SHIPPED | OUTPATIENT
Start: 2021-02-26 | End: 2021-03-03

## 2021-02-26 RX ORDER — KETOROLAC TROMETHAMINE 15 MG/ML
15 INJECTION, SOLUTION INTRAMUSCULAR; INTRAVENOUS ONCE
Status: COMPLETED | OUTPATIENT
Start: 2021-02-26 | End: 2021-02-26

## 2021-02-26 RX ORDER — 0.9 % SODIUM CHLORIDE 0.9 %
1000 INTRAVENOUS SOLUTION INTRAVENOUS ONCE
Status: COMPLETED | OUTPATIENT
Start: 2021-02-26 | End: 2021-02-26

## 2021-02-26 RX ORDER — ONDANSETRON 2 MG/ML
4 INJECTION INTRAMUSCULAR; INTRAVENOUS ONCE
Status: COMPLETED | OUTPATIENT
Start: 2021-02-26 | End: 2021-02-26

## 2021-02-26 RX ADMIN — ONDANSETRON 4 MG: 2 INJECTION INTRAMUSCULAR; INTRAVENOUS at 09:48

## 2021-02-26 RX ADMIN — KETOROLAC TROMETHAMINE 15 MG: 15 INJECTION, SOLUTION INTRAMUSCULAR; INTRAVENOUS at 09:40

## 2021-02-26 RX ADMIN — NITROFURANTOIN (MONOHYDRATE/MACROCRYSTALS) 100 MG: 75; 25 CAPSULE ORAL at 11:47

## 2021-02-26 RX ADMIN — SODIUM CHLORIDE 1000 ML: 9 INJECTION, SOLUTION INTRAVENOUS at 09:40

## 2021-02-26 ASSESSMENT — ENCOUNTER SYMPTOMS
COUGH: 1
SHORTNESS OF BREATH: 1
CONSTIPATION: 0
VOMITING: 1
ABDOMINAL PAIN: 0
NAUSEA: 1
RHINORRHEA: 0
EYE DISCHARGE: 0
SORE THROAT: 0
ABDOMINAL DISTENTION: 0
WHEEZING: 0
COLOR CHANGE: 0
DIARRHEA: 1

## 2021-02-26 ASSESSMENT — PAIN DESCRIPTION - DESCRIPTORS: DESCRIPTORS: ACHING

## 2021-02-26 NOTE — TELEPHONE ENCOUNTER
Patient called crying and sounded unstable. States not feeling well, stoamach hurts and coughing, doesn't have ride, ears hurt, and she says her mental health is not well. Has an 66 VV w PCP but doesn't think she is able to wait until then. She said she is calling 911 and having them pick her up.

## 2021-02-26 NOTE — TELEPHONE ENCOUNTER
Patient is doing better. Patient reports that she has bilateral ear pain with drainage. Was not given any medication specifically for her otalgia. We will send Augmentin to her pharmacy.

## 2021-02-26 NOTE — ED PROVIDER NOTES
[facility] [facility] ED  eMERGENCY dEPARTMENT eNCOUnter      Pt Name: Maureen Sbaa  MRN: [id number]  [place] 1960  Date of evaluation: 2/26/2021  Provider: Delaney Severin, PA-C    CHIEF COMPLAINT       Chief Complaint   Patient presents with    Illness         HISTORY OF PRESENT ILLNESS   (Location/Symptom, Timing/Onset,Context/Setting, Quality, Duration, Modifying Factors, Severity)  Note limiting factors. Maureen Saba is a 62 y.o. female who presents to the emergency department complaint of flulike symptoms which patient states been ongoing for approximately 1 week. She states that she has nausea vomiting diarrhea cough chills body aches, and intermittent shortness of breath which she states is been ongoing since last weekend 2/20/2021. Patient is concerned that she may have contracted the coronavirus. She states she has not been around anybody known to have it at this point. She states decreased appetite due to nausea and vomiting. She rates her current overall body aches is 7 out of 10. She does have past history of asthma, and states she has been using her home nebulizers with temporary relief. Has medical history significant for asthma hyperlipidemia melanoma of the eye prediabetes PTSD gastric reflux depression hypothyroidism. HPI    NursingNotes were reviewed. REVIEW OF SYSTEMS    (2-9 systems for level 4, 10 or more for level 5)     Review of Systems   Constitutional: Positive for fatigue. Negative for activity change, appetite change, chills and fever. HENT: Negative for congestion, ear discharge, ear pain, nosebleeds, rhinorrhea and sore throat. Eyes: Negative for discharge. Respiratory: Positive for cough and shortness of breath. Negative for wheezing. Cardiovascular: Negative for chest pain, palpitations and leg swelling. Gastrointestinal: Positive for diarrhea, nausea and vomiting. Negative for abdominal distention, abdominal pain and constipation. Genitourinary: Negative for difficulty urinating, dysuria, flank pain, hematuria and urgency. Musculoskeletal: Positive for myalgias. Negative for arthralgias. Skin: Negative for color change, pallor, rash and wound. Neurological: Negative for dizziness, tremors, syncope, weakness, numbness and headaches. Psychiatric/Behavioral: Negative for agitation and confusion. Except as noted above the remainder of the review of systems was reviewed and negative.        PAST MEDICAL HISTORY     Past Medical History:   Diagnosis Date    Asthma 2015    Bilateral renal cysts 2013    Depression     since age 35, was with Dr Alesha Gonzalez, now the [facility]    Diverticulosis of sigmoid colon 2012    Dr Fatou Villafuerte DJD of left shoulder     Environmental allergies     Fatty liver 2013    GERD (gastroesophageal reflux disease)     History of cardiac arrhythmia     \"due to stress, was placed on medication\"    Hydatidiform mole     age 26     Hyperlipidemia     Hypothyroidism 2011    IFG (impaired fasting glucose) 2013    Melanoma of eye ([facility] [address]     age 30, R eye prosthesis, Dr Bobbi Arce    Obesity     Prediabetes     PTSD (post-traumatic stress disorder)     age 35, [facility]    S/P colonoscopy 04/19/2012    Dr. Kana Liz S/P hysterectomy with oophorectomy 2012    Dr Wiliam Moore Vitamin D deficiency          SURGICALHISTORY       Past Surgical History:   Procedure Laterality Date    DILATION AND CURETTAGE OF UTERUS      ENDOMETRIAL ABLATION  6/2012    EYE REMOVAL      OD for melanoma, age 30    FOOT OSTEOTOMY Left 09/23/2016    DEJA JACKMAN DPM      LINDSAY AND BSO  2012    Dr Renato Henderson       Previous Medications    ALBUTEROL SULFATE  (90 BASE) MCG/ACT INHALER    USE 2 PUFFS EVERY 4 HRS AS NEEDED FOR WHEEZING/SOB-SPACE OUT TO EVERY 6 HR AS SYMPTOMS IMPROVE    ARIPIPRAZOLE (ABILIFY) 15 MG TABLET    TAKE 1 TABLET BY MOUTH EVERY DAY ASPIRIN EC 81 MG EC TABLET    Take 1 tablet by mouth daily    BENZTROPINE (COGENTIN) 1 MG TABLET    Take 1 mg by mouth 3 times daily    BIOTIN 300 MCG TABS    Take 1 tablet by mouth daily    BUDESONIDE-FORMOTEROL (SYMBICORT) 160-4.5 MCG/ACT AERO    Inhale 2 puffs into the lungs 2 times daily    BUPROPION (WELLBUTRIN XL) 300 MG XL TABLET    Take 1 tablet by mouth daily    CETIRIZINE (ZYRTEC) 10 MG TABLET    TAKE 1 TABLET BY MOUTH EVERY DAY AS NEEDED FOR ALLERGIES    CICLOPIROX (PENLAC) 8 % SOLUTION    APPLY TO ADJACENT SKIN AND AFFECTED NAILS DAILY. REMOVE WITH ALCOHOL EVERY 7 DAYS. CITALOPRAM (CELEXA) 40 MG TABLET    Take 40 mg by mouth daily    CYANOCOBALAMIN ([employer]S VITAMIN B12) 1000 MCG TABLET    Take 1 tablet by mouth daily    EPINEPHRINE (EPIPEN 2-PRAVEENA) 0.3 MG/0.3ML SOAJ INJECTION    Use as directed for allergic reaction    FAMOTIDINE (PEPCID) 20 MG TABLET    TAKE 1 TABLET BY MOUTH 2 TIMES DAILY (AM,PM)    FLUTICASONE (FLONASE) 50 MCG/ACT NASAL SPRAY    USE 1 SPRAY INTO EACH NOSTRIL DAILY    LEVOTHYROXINE (SYNTHROID) 50 MCG TABLET    Take 1 tablet by mouth every morning    METFORMIN (GLUCOPHAGE) 500 MG TABLET    TAKE 1 TABLET BY MOUTH TWICE A DAY WITH MEALS (AM,PM)    METOPROLOL TARTRATE (LOPRESSOR) 50 MG TABLET    Take 1 tablet by mouth 2 times daily    MULTIPLE VITAMIN (DAILY-MANDI) TABS    TAKE 1 TABLET BY MOUTH EVERY DAY    PRAVASTATIN (PRAVACHOL) 40 MG TABLET    TAKE 1 TABLET BY MOUTH EVERY DAY IN THE EVENING    PSYLLIUM 0.52 G CAPSULE    Take 1 capsule by mouth daily    TRAZODONE (DESYREL) 50 MG TABLET    Take by mouth nightly 1-2 tablets nightly    VITAMIN B-2 (RIBOFLAVIN) 100 MG TABS TABLET    Take 1 tablet by mouth daily    VITAMIN D (D3-1000) 25 MCG (1000 UT) CAPS    Take 1 capsule by mouth Daily with supper       ALLERGIES     Patient has no known allergies.     FAMILY HISTORY       Family History   Problem Relation Age of Onset    Heart Failure Mother         dec 76    Diabetes Mother Brock Coma Scale Score: 15 @FLOW([id number])@      PHYSICAL EXAM    (up to 7 for level 4, 8 or more for level 5)     ED Triage Vitals [02/26/21 0908]   BP Temp Temp Source Pulse Resp SpO2 Height Weight   (!) 154/101 98.5 °F (36.9 °C) Oral 101 20 95 % 5' 6\" (1.676 m) 200 lb (90.7 kg)       Physical Exam  Vitals signs and nursing note reviewed. Constitutional:       General: She is not in acute distress. Appearance: She is well-developed. She is not ill-appearing, toxic-appearing or diaphoretic. HENT:      Head: Normocephalic. Right Ear: Tympanic membrane normal.      Left Ear: Tympanic membrane normal.      Nose: Nose normal. No congestion. Mouth/Throat:      Mouth: Mucous membranes are moist.      Pharynx: No oropharyngeal exudate or posterior oropharyngeal erythema. Eyes:      Extraocular Movements: Extraocular movements intact. Conjunctiva/sclera: Conjunctivae normal.      Pupils: Pupils are equal, round, and reactive to light. Neck:      Musculoskeletal: Normal range of motion and neck supple. No neck rigidity. Vascular: No JVD. Trachea: No tracheal deviation. Cardiovascular:      Rate and Rhythm: Tachycardia present. Pulses: Normal pulses. Heart sounds: Normal heart sounds. No murmur. No friction rub. No gallop. Pulmonary:      Effort: Pulmonary effort is normal. No tachypnea, accessory muscle usage, respiratory distress or retractions. Breath sounds: Normal breath sounds. No stridor. No wheezing, rhonchi or rales. Comments: Lung sounds are clear in all fields, there is no wheezes rales or rhonchi, no accessory muscle use, no retractions, room air saturations are 95%. Patient appears in no acute distress. Chest:      Chest wall: No tenderness. Abdominal:      General: Abdomen is flat. Bowel sounds are normal. There is no distension or abdominal bruit. Palpations: There is no shifting dullness, fluid wave, hepatomegaly, splenomegaly, mass or pulsatile mass. Tenderness: There is no abdominal tenderness. There is no right CVA tenderness, left CVA tenderness, guarding or rebound. Negative signs include Cannon's sign, Rovsing's sign and McBurney's sign. Musculoskeletal:         General: No deformity. Right lower leg: No edema. Left lower leg: No edema. Skin:     General: Skin is warm and dry. Capillary Refill: Capillary refill takes less than 2 seconds. Coloration: Skin is not jaundiced. Neurological:      General: No focal deficit present. Mental Status: She is alert and oriented to person, place, and time. Mental status is at baseline. Cranial Nerves: No cranial nerve deficit. Sensory: No sensory deficit. Motor: No weakness. Coordination: Coordination normal.   Psychiatric:         Mood and Affect: Mood normal.         DIAGNOSTIC RESULTS     EKG: All EKG's are interpreted by the Emergency Department Physician who either signs or Co-signsthis chart in the absence of a cardiologist.    EKG shows normal sinus rhythm at 85 bpm no acute ST segment abnormality no ventricular ectopy QTC is 440 ms    RADIOLOGY:   Non-plain filmimages such as CT, Ultrasound and MRI are read by the radiologist. Plain radiographic images are visualized and preliminarily interpreted by the emergency physician with the below findings:    CXR shows no acute pulmonary process    Interpretation per the Radiologist below, if available at the time ofthis note:    XR CHEST PORTABLE   Final Result   No radiographic evidence of acute intrathoracic process.                    ED BEDSIDE ULTRASOUND:   Performed by ED Physician - none    LABS:  Labs Reviewed   COMPREHENSIVE METABOLIC PANEL - Abnormal; Notable for the following components:       Result Value    Glucose 110 (*)     All other components within normal limits URINE RT REFLEX TO CULTURE - Abnormal; Notable for the following components:    Color, UA DARK YELLOW (*)     Clarity, UA CLOUDY (*)     Bilirubin Urine SMALL (*)     Ketones, Urine TRACE (*)     Blood, Urine SMALL (*)     Protein, UA TRACE (*)     Nitrite, Urine POSITIVE (*)     Leukocyte Esterase, Urine SMALL (*)     All other components within normal limits   MICROSCOPIC URINALYSIS - Abnormal; Notable for the following components:    Bacteria, UA MANY (*)     WBC, UA  (*)     RBC, UA 3-5 (*)     All other components within normal limits   RAPID INFLUENZA A/B ANTIGENS   COVID-19, RAPID   CULTURE, URINE   CBC WITH AUTO DIFFERENTIAL       All other labs were within normal range or not returned as of this dictation. EMERGENCY DEPARTMENT COURSE and DIFFERENTIAL DIAGNOSIS/MDM:   Vitals:    Vitals:    02/26/21 0908 02/26/21 1002   BP: (!) 154/101 (!) 137/105   Pulse: 101 89   Resp: 20 25   Temp: 98.5 °F (36.9 °C)    TempSrc: Oral    SpO2: 95% 96%   Weight: 200 lb (90.7 kg)    Height: 5' 6\" (1.676 m)             MDM  Number of Diagnoses or Management Options  Urinary tract infection with hematuria, site unspecified  Diagnosis management comments: Patient presented to ED with concerns for the potential of COVID-19. She states she has had body aches chills some nausea some diarrhea which is been ongoing for approximately 1 week. She denies any contact with anybody known to have the coronavirus, her work-up today is negative for COVID-19, negative for influenza, chest x-ray shows no acute pulmonary process, and labs are fairly unimpressive. She does show signs of urinary tract infection, and was started on Macrobid while in the ER. She was given a prescription for Macrobid at home. She is advised to contact her family physician for follow-up in the next 2 to 3 days, and return to ER if she has any worsening or change in symptoms. During patient's visit in the emergency department patient's  Gerson had spoke with our care coordinator, she was stating that patient does show some concerns for decompensation of her psychiatric issues. She states increasing paranoia. She is asking that the patient receive a psychiatric evaluation while in the emergency department. I did speak with the patient as well as did the care coordinator in regards to this. She states that she is here purely for medical issues, she is alert and oriented, she is answering all questions appropriately, she denies any suicidal or homicidal ideation. And she is refusing psychiatric evaluation on this visit today. CRITICAL CARE TIME   Total Critical Care time was 0 minutes, excluding separately reportableprocedures. There was a high probability of clinicallysignificant/life threatening deterioration in the patient's condition which required my urgent intervention. CONSULTS:  None    PROCEDURES:  Unless otherwise noted below, none     Procedures    FINAL IMPRESSION      1.  Urinary tract infection with hematuria, site unspecified          DISPOSITION/PLAN   DISPOSITION Decision To Discharge 02/26/2021 11:23:52 AM      PATIENT REFERRED TO:  Unruly Larios MD  [address] [place] [address]  [phone]    In 2 days        DISCHARGE MEDICATIONS:  New Prescriptions    NITROFURANTOIN, MACROCRYSTAL-MONOHYDRATE, (MACROBID) 100 MG CAPSULE    Take 1 capsule by mouth 2 times daily for 5 days          (Please note that portions of this note were completed with a voice recognition program.  Efforts were made to edit the dictations but occasionally words are mis-transcribed.)    Delaney Severin, PA-C (electronically signed)  Attending Emergency Physician         Delaney Severin, PA-C  02/26/21 9052

## 2021-02-26 NOTE — CARE COORDINATION
Ambulatory Care Coordination Note  2/26/2021  CM Risk Score: 6  Charlson 10 Year Mortality Risk Score: 79%     ACC: Kassidy Edwards RN    Summary Note: ACM called [facility] ER and spoke with ER Care Coordinator Nina Pantoja, informed her of some concerns Care Coordination has with mental status changes the Roger Sandoval has exhibited in the past few days, Roger Sandoval has voiced some concerns regarding her mental health providers [facility], and also has verbalized some concerns regarding and ex boyfriend that keeps \"calling her\", she seems anxious, fast speech and easily frustrated. She had also mentioned \"not feeling well\" related to eating some food that was in a refrigerator that was broken, she was supposed to have virtual visit with Dr. Guillermo Rousseau this am, but call 911 instead. Will follow for ER outcome and mental status assessment. Ambulatory Care Coordination Assessment    Care Coordination Protocol  Program Enrollment: Rising Risk  Referral from Primary Care Provider: No  Week 1 - Initial Assessment     Do you have all of your prescriptions and are they filled?: Yes  Barriers to medication adherence: None  Are you able to afford your medications?: Yes  How often do you have trouble taking your medications the way you have been told to take them?: Sometimes I take them as prescribed. Do you have Home O2 Therapy?: No      Ability to seek help/take action for Emergent Urgent situations i.e. fire, crime, inclement weather or health crisis. : Independent  Ability to ambulate to restroom: Independent  Ability handle personal hygeine needs (bathing/dressing/grooming): Independent  Ability to manage Medications: Independent  Ability to prepare Food Preparation: Independent  Ability to maintain home (clean home, laundry): Independent  Ability to drive and/or has transportation: Needs Assistance  Ability to do shopping: Needs Assistance  Ability to manage finances:  Independent  Is patient able to live independently?: Yes Current Housing: Apartment        Per the Fall Risk Screening, did the patient have 2 or more falls or 1 fall with injury in the past year?: No     Frequent urination at night?: Yes  Do you use rails/bars?: No  Do you have a non-slip tub mat?: No     Are you experiencing loss of meaning?: No  Are you experiencing loss of hope and peace?: No     Thinking about your patient's physical health needs, are there any symptoms or problems (risk indicators) you are unsure about that require further investigation?: No identified areas of uncertainly or problems already being investigated   Are the patients physical health problems impacting on their mental well-being?: Mild impact on mental well-being e.g. \"\"feeling fed-up\"\", \"\"reduced enjoyment\"\"   Are there any problems with your patients lifestyle behaviors (alcohol, drugs, diet, exercise) that are impacting on physical or mental well-being?: No identified areas of concern   Do you have any other concerns about your patients mental well-being?  How would you rate their severity and impact on the patient?: Mild problems - don't interfere with function   How would you rate their home environment in terms of safety and stability (including domestic violence, insecure housing, neighbor harassment)?: Consistently safe, supportive, stable, no identified problems   How do daily activities impact on the patient's well-being? (include current or anticipated unemployment, work, caregiving, access to transportation or other): Some general dissatisfaction but no concern   How would you rate their social network (family, work, friends)?: Restricted participation with some degree of social isolation   How would you rate their financial resources (including ability to afford all required medical care)?: Financially secure, some resource challenges   How wells does the patient now understand their health and well-being (symptoms, signs or risk factors) and what they need to do to manage their health?: Reasonable to good understanding and already engages in managing health or is willing to undertake better management   How well do you think your patient can engage in healthcare discussions? (Barriers include language, deafness, aphasia, alcohol or drug problems, learning difficulties, concentration): Clear and open communication, no identified barriers   Do other services need to be involved to help this patient?: Other care/services in place and adequate   Are current services involved with this patient well-coordinated? (Include coordination with other services you are now recommendation): Required care/services in place and adequately coordinated   Suggested Interventions and [address]:  (Comment: [facility]     Other Services or Interventions: 10/13/2020 Instructed on same day, next day, and Walk-In Care. Pharmacist: Completed   Registered Dietician: Completed   Social Work: In Process   Other Services: Completed   Transportation Services: Declined   Zone Management Tools: Completed                  Prior to Admission medications    Medication Sig Start Date End Date Taking? Authorizing Provider   ciclopirox (PENLAC) 8 % solution APPLY TO ADJACENT SKIN AND AFFECTED NAILS DAILY.  REMOVE WITH ALCOHOL EVERY 7 DAYS. 2/21/21   Kath Warren MD   metoprolol tartrate (LOPRESSOR) 50 MG tablet Take 1 tablet by mouth 2 times daily 2/1/21   Porsche Valenzuela MD   famotidine (PEPCID) 20 MG tablet TAKE 1 TABLET BY MOUTH 2 TIMES DAILY (AM,PM) 1/14/21   Kath Warren MD   metFORMIN (GLUCOPHAGE) 500 MG tablet TAKE 1 TABLET BY MOUTH TWICE A DAY WITH MEALS (AM,PM) 1/4/21   Kath Warren MD   vitamin D (D3-1000) 25 MCG (1000 UT) CAPS Take 1 capsule by mouth Daily with supper 11/24/20   Kath Warren MD   aspirin EC 81 MG EC tablet Take 1 tablet by mouth daily 11/24/20   Kath Warren MD   Multiple Vitamin (DAILY-MANDI) TABS TAKE 1 TABLET BY MOUTH EVERY DAY 11/13/20   Carolynn Bennett, APRN - CNP   levothyroxine (SYNTHROID) 50 MCG tablet Take 1 tablet by mouth every morning 11/13/20   [facility] ARASH - CNP   Biotin 300 MCG TABS Take 1 tablet by mouth daily 11/13/20   [facility] APRN - CNP   psyllium 0.52 g capsule Take 1 capsule by mouth daily 10/30/20   Марина Alarcon MD   citalopram (CELEXA) 40 MG tablet Take 40 mg by mouth daily    Historical Provider, MD   benztropine (COGENTIN) 1 MG tablet Take 1 mg by mouth 3 times daily 10/15/20   Historical Provider, MD   fluticasone (FLONASE) 50 MCG/ACT nasal spray USE 1 SPRAY INTO EACH NOSTRIL DAILY 10/13/20   Марина Alarcon MD   vitamin B-2 (RIBOFLAVIN) 100 MG TABS tablet Take 1 tablet by mouth daily 5/25/20   Sully Stokes MD   budesonide-formoterol (SYMBICORT) 160-4.5 MCG/ACT AERO Inhale 2 puffs into the lungs 2 times daily 3/10/20   Abrahan Giron MD   cyanocobalamin (CVS VITAMIN B12) 1000 MCG tablet Take 1 tablet by mouth daily 2/21/20   Abrahan Giron MD   traZODone (DESYREL) 50 MG tablet Take by mouth nightly 1-2 tablets nightly    Historical Provider, MD   pravastatin (PRAVACHOL) 40 MG tablet TAKE 1 TABLET BY MOUTH EVERY DAY IN THE EVENING 9/3/19   Abrahan Giron MD   albuterol sulfate  (90 Base) MCG/ACT inhaler USE 2 PUFFS EVERY 4 HRS AS NEEDED FOR WHEEZING/SOB-SPACE OUT TO EVERY 6 HR AS SYMPTOMS IMPROVE 7/19/19   Abrahan Giron MD   cetirizine (ZYRTEC) 10 MG tablet TAKE 1 TABLET BY MOUTH EVERY DAY AS NEEDED FOR ALLERGIES 5/8/19   Emanuel Yu MD   EPINEPHrine (EPIPEN 2-PRAVEENA) 0.3 MG/0.3ML SOAJ injection Use as directed for allergic reaction 6/11/18   Abrahan Giron MD   ARIPiprazole (ABILIFY) 15 MG tablet TAKE 1 TABLET BY MOUTH EVERY DAY 1/17/17   Historical Provider, MD   Multiple Vitamins-Minerals (THERAPEUTIC MULTIVITAMIN-MINERALS) tablet Take 1 tablet by mouth daily 12/29/15 6/12/16  Abrahan Giron MD   buPROPion (WELLBUTRIN XL) 300 MG XL tablet Take 1 tablet by mouth daily 8/28/15   Kj Shankar MD   albuterol (PROVENTIL;VENTOLIN) 90 MCG/ACT inhaler Inhale 2 puffs into the lungs every 6 hours as needed.  12/21/12 11/5/19  Kj Shankar MD       Future Appointments   Date Time Provider Jay Linaresisti   2/26/2021 11:45 AM Abdelrahman Londono  [place]   3/1/2021  3:00 PM Abdelrahman Londono MD [facility]   4/12/2021  9:00 AM Minda Hernandez MD [facility]

## 2021-02-26 NOTE — ED NOTES
Pt resting on ED cot quietly watching TV. No vomiting or diarrhea.  rr even and unlabored, skin p/w/d.will monitor     Ann Jc RN  02/26/21 1007

## 2021-02-26 NOTE — CARE COORDINATION
Gerson, Care Coordinator from the community, called about this pt who is in the Er today. She had some concerns about pt decompensating and maybe needs a psych eval while she is here. She asked that we take a look at this. Abhijeet the PA was notified about this phone call. I apoke to the pt and told her bradley Gerson and her  Omer Antony have been concerned about her this week because of her recent behavior. Pt stated that she has been acting this way because she has been physically ill, feeling very sick with nausea and diarrhea ro the whole week and when she doesn't feel good she acts this way. She said that she is fine otherwise and declined a psychiatric evaluation.

## 2021-02-26 NOTE — CARE COORDINATION
Confirmation number for [id number] to return home is [id number]. They will be here within the hour.

## 2021-02-26 NOTE — ED NOTES
Bed: 03  Expected date: 2/26/21  Expected time:   Means of arrival:   Comments:  62year old female  flu like symptoms.  140/80, 90, 99% ra     Anne PIKE [facility]  02/26/21 7264

## 2021-02-26 NOTE — ED TRIAGE NOTES
pt presents to ED via EMS. C/o flu-like symptoms x1 wk that started 2 days ago. Pt states her fridge broke and ate some shrimp from her fridge 2 days ago. C/o chills, N/V, diarrhea, cough and malaise x1wk. Skin p/w/d. rr even and unlabored.

## 2021-02-28 LAB
ORGANISM: ABNORMAL
URINE CULTURE, ROUTINE: ABNORMAL

## 2021-03-01 ENCOUNTER — VIRTUAL VISIT (OUTPATIENT)
Dept: FAMILY MEDICINE CLINIC | Age: 61
End: 2021-03-01
Payer: COMMERCIAL

## 2021-03-01 ENCOUNTER — CARE COORDINATION (OUTPATIENT)
Dept: CARE COORDINATION | Age: 61
End: 2021-03-01

## 2021-03-01 DIAGNOSIS — K76.0 HEPATIC STEATOSIS: ICD-10-CM

## 2021-03-01 DIAGNOSIS — R42 VERTIGO: ICD-10-CM

## 2021-03-01 DIAGNOSIS — K52.9 CHRONIC DIARRHEA: Primary | ICD-10-CM

## 2021-03-01 DIAGNOSIS — K57.30 SIGMOID DIVERTICULOSIS: ICD-10-CM

## 2021-03-01 PROCEDURE — 99442 PR PHYS/QHP TELEPHONE EVALUATION 11-20 MIN: CPT | Performed by: FAMILY MEDICINE

## 2021-03-01 RX ORDER — MECLIZINE HCL 12.5 MG/1
12.5 TABLET ORAL 3 TIMES DAILY PRN
Qty: 15 TABLET | Refills: 0 | Status: SHIPPED | OUTPATIENT
Start: 2021-03-01 | End: 2021-03-11

## 2021-03-01 RX ORDER — ONDANSETRON 4 MG/1
4 TABLET, ORALLY DISINTEGRATING ORAL EVERY 12 HOURS PRN
Qty: 21 TABLET | Refills: 0 | Status: SHIPPED | OUTPATIENT
Start: 2021-03-01 | End: 2021-04-19

## 2021-03-01 ASSESSMENT — ENCOUNTER SYMPTOMS
FACIAL SWELLING: 0
VOMITING: 0
EYE ITCHING: 0
CHEST TIGHTNESS: 0
SINUS PRESSURE: 0
ANAL BLEEDING: 0
SHORTNESS OF BREATH: 0
EYE DISCHARGE: 0
RECTAL PAIN: 0
BACK PAIN: 0
PHOTOPHOBIA: 0
COLOR CHANGE: 0
EYE REDNESS: 0
ABDOMINAL PAIN: 0
COUGH: 0
SINUS PAIN: 0
TROUBLE SWALLOWING: 0
APNEA: 0
SORE THROAT: 0

## 2021-03-01 NOTE — CARE COORDINATION
Ambulatory Care Coordination Note  3/1/2021  CM Risk Score: 6  Charlson 10 Year Mortality Risk Score: 79%     ACC: Leidy Olsen RN    Summary Note: ACM - I was going to call Jessica Marcum for an ER follow up today- she has a virtual visit with Dr. Filiberto Kelley today, I will call her tomorrow for follow up        Care Coordination Interventions    Program Enrollment: Rising Risk  Referral from Primary Care Provider: No  Suggested Interventions and [address]:  (Comment: Currently followed by [facility])  Palliative Care:  (Comment: 10/13/20 Discussed services)  Pharmacist: Completed (Comment: 10/13/20 Clinical Rx Referral)  Registered Dietician: Completed (Comment: 10/13/2020 Declines; 11/3/2020 [facility] Dietician referral - patient agreed.)  Social Work: In Process (Comment: 01/22: referring to [facility]  for community resources)  Other Services: Completed (Comment: 10/13/20 ACP Referral)  Transportation Support: Declined  Zone Management Tools: Completed (Comment: 10/13/2020 Asthma Action Plan (Zones). )  Other Services or Interventions: 10/13/2020 Instructed on same day, next day, and Walk-In Care. Goals Addressed    None         Prior to Admission medications    Medication Sig Start Date End Date Taking? Authorizing Provider   metFORMIN (GLUCOPHAGE) 500 MG tablet TAKE 1 TABLET BY MOUTH TWICE A DAY WITH MEALS (AM,PM) 3/1/21   Trace Lomeli MD   nitrofurantoin, macrocrystal-monohydrate, (MACROBID) 100 MG capsule Take 1 capsule by mouth 2 times daily for 5 days 2/26/21 3/3/21  Hilario Landaverde PA-C   amoxicillin-clavulanate (AUGMENTIN) 875-125 MG per tablet Take 1 tablet by mouth 2 times daily for 10 days 2/26/21 3/8/21  Avelina Hansen MD   ciclopirox (PENLAC) 8 % solution APPLY TO ADJACENT SKIN AND AFFECTED NAILS DAILY.  REMOVE WITH ALCOHOL EVERY 7 DAYS. 2/21/21   Trace Lomeli MD   metoprolol tartrate (LOPRESSOR) 50 MG tablet Take 1 tablet by mouth 2 times daily 2/1/21   Sienna Silva MD   famotidine (PEPCID) 20 MG tablet TAKE 1 TABLET BY MOUTH 2 TIMES DAILY (AM,PM) 1/14/21   Trace Lomeli MD   vitamin D (D3-1000) 25 MCG (1000 UT) CAPS Take 1 capsule by mouth Daily with supper 11/24/20   Marvin Adams MD   aspirin EC 81 MG EC tablet Take 1 tablet by mouth daily 11/24/20   Trace Lomeli MD   Multiple Vitamin (DAILY-MANDI) TABS TAKE 1 TABLET BY MOUTH EVERY DAY 11/13/20   Melissa Devonna Cheadle, APRN - CNP   levothyroxine (SYNTHROID) 50 MCG tablet Take 1 tablet by mouth every morning 11/13/20   ARASH Deras CNP   Biotin 300 MCG TABS Take 1 tablet by mouth daily 11/13/20   ARASH Deras CNP   psyllium 0.52 g capsule Take 1 capsule by mouth daily 10/30/20   Marvin Adams MD   citalopram (CELEXA) 40 MG tablet Take 40 mg by mouth daily    Historical Provider, MD   benztropine (COGENTIN) 1 MG tablet Take 1 mg by mouth 3 times daily 10/15/20   Historical Provider, MD   fluticasone (FLONASE) 50 MCG/ACT nasal spray USE 1 SPRAY INTO EACH NOSTRIL DAILY 10/13/20   Marvin Adams MD   vitamin B-2 (RIBOFLAVIN) 100 MG TABS tablet Take 1 tablet by mouth daily 5/25/20   Aj Middleton MD   budesonide-formoterol (SYMBICORT) 160-4.5 MCG/ACT AERO Inhale 2 puffs into the lungs 2 times daily 3/10/20   Gallito Mckeon MD   cyanocobalamin ([employer] VITAMIN B12) 1000 MCG tablet Take 1 tablet by mouth daily 2/21/20   Gallito Mckeon MD   traZODone (DESYREL) 50 MG tablet Take by mouth nightly 1-2 tablets nightly    Historical Provider, MD   pravastatin (PRAVACHOL) 40 MG tablet TAKE 1 TABLET BY MOUTH EVERY DAY IN THE EVENING 9/3/19   Gallito Mckeon MD   albuterol sulfate  (90 Base) MCG/ACT inhaler USE 2 PUFFS EVERY 4 HRS AS NEEDED FOR WHEEZING/SOB-SPACE OUT TO EVERY 6 HR AS SYMPTOMS IMPROVE 7/19/19   Gallito Mckeon MD   cetirizine (ZYRTEC) 10 MG tablet TAKE 1 TABLET BY MOUTH EVERY DAY AS NEEDED FOR ALLERGIES 5/8/19   Shiloh Kaur MD EPINEPHrine (EPIPEN 2-PRAVEENA) 0.3 MG/0.3ML SOAJ injection Use as directed for allergic reaction 6/11/18   Sameer Suazo MD   ARIPiprazole (ABILIFY) 15 MG tablet TAKE 1 TABLET BY MOUTH EVERY DAY 1/17/17   Historical Provider, MD   Multiple Vitamins-Minerals (THERAPEUTIC MULTIVITAMIN-MINERALS) tablet Take 1 tablet by mouth daily 12/29/15 6/12/16  Sameer Suazo MD   buPROPion (WELLBUTRIN XL) 300 MG XL tablet Take 1 tablet by mouth daily 8/28/15   Sameer Suazo MD   albuterol (PROVENTIL;VENTOLIN) 90 MCG/ACT inhaler Inhale 2 puffs into the lungs every 6 hours as needed.  12/21/12 11/5/19  Sameer Suazo MD       Future Appointments   Date Time Provider Jay Wong   3/1/2021  3:00 PM Deborah Bertrand MD [facility]   4/12/2021  9:00 AM Ravindra Roldan MD [facility]

## 2021-03-01 NOTE — PROGRESS NOTES
3/1/2021    TELEHEALTH EVALUATION -- Audio/Visual (During EOLPW-61 public health emergency)    Due to [place] 19 outbreak, patient's office visit was converted to a virtual visit. Patient was contacted and agreed to proceed with a virtual visit via Telephone Visit  The risks and benefits of converting to a virtual visit were discussed in light of the current infectious disease epidemic. Patient also understood that insurance coverage and co-pays are up to their individual insurance plans. HPI:    Lucas Soto (:  1960) has requested an audio/video evaluation for the following concern(s):    Chronic Abdominal pain   Was in the ED for abdominal pain on 21. Found to have a UTI. Was given abx for the UTI  Admits that her symptoms have improved (in regards to the UTI) but now complaining of chronic intermittent diarrhea. Diarrhea associated with nausea and vertigo   As you may recall patient has had chronic diarrhea and abdominal pain for the last several months   Blood work shown in her last hospital visit several days prior were unremarkable. Scans in the past did show hepatic steatosis. Patient denies any fever, chills, vomiting, chest pain, shortness of breath, changes in urination. Review of Systems   Constitutional: Negative for activity change, appetite change, diaphoresis, fatigue and fever. HENT: Negative for congestion, dental problem, ear discharge, ear pain, facial swelling, mouth sores, nosebleeds, postnasal drip, sinus pressure, sinus pain, sneezing, sore throat and trouble swallowing. Eyes: Negative for photophobia, discharge, redness, itching and visual disturbance. Respiratory: Negative for apnea, cough, chest tightness and shortness of breath. Cardiovascular: Negative for chest pain and leg swelling. Gastrointestinal: Positive for diarrhea and nausea. Negative for abdominal pain, anal bleeding, rectal pain and vomiting. Intermittent abdominal pain. Carolynn Grosssuly Ferrari APRN - CNP   levothyroxine (SYNTHROID) 50 MCG tablet Take 1 tablet by mouth every morning  Carolynn Grosssuly Ferrari APRN - CNP   Biotin 300 MCG TABS Take 1 tablet by mouth daily  Carolynn Grosssuly Ferrari APRN - CNP   psyllium 0.52 g capsule Take 1 capsule by mouth daily  Abdelrahman Londono MD   citalopram (CELEXA) 40 MG tablet Take 40 mg by mouth daily  Historical Provider, MD   benztropine (COGENTIN) 1 MG tablet Take 1 mg by mouth 3 times daily  Historical Provider, MD   fluticasone (FLONASE) 50 MCG/ACT nasal spray USE 1 SPRAY INTO EACH NOSTRIL DAILY  Trace Lomeil MD   vitamin B-2 (RIBOFLAVIN) 100 MG TABS tablet Take 1 tablet by mouth daily  Jorge Rodgers MD   budesonide-formoterol (SYMBICORT) 160-4.5 MCG/ACT AERO Inhale 2 puffs into the lungs 2 times daily  Kj Shankar MD   cyanocobalamin (CVS VITAMIN B12) 1000 MCG tablet Take 1 tablet by mouth daily  Kj Shankar MD   traZODone (DESYREL) 50 MG tablet Take by mouth nightly 1-2 tablets nightly  Historical Provider, MD   pravastatin (PRAVACHOL) 40 MG tablet TAKE 1 TABLET BY MOUTH EVERY DAY IN THE EVENING  Morenita Basilio MD   albuterol sulfate  (90 Base) MCG/ACT inhaler USE 2 PUFFS EVERY 4 HRS AS NEEDED FOR WHEEZING/SOB-SPACE OUT TO EVERY 6 HR AS SYMPTOMS IMPROVE  Maria D Basilio MD   cetirizine (ZYRTEC) 10 MG tablet TAKE 1 TABLET BY MOUTH EVERY DAY AS NEEDED FOR ALLERGIES  Rosales Mott MD   EPINEPHrine (EPIPEN 2-PRAVEENA) 0.3 MG/0.3ML SOAJ injection Use as directed for allergic reaction  Kj Shankar MD   ARIPiprazole (ABILIFY) 15 MG tablet TAKE 1 TABLET BY MOUTH EVERY DAY  Historical Provider, MD   Multiple Vitamins-Minerals (THERAPEUTIC MULTIVITAMIN-MINERALS) tablet Take 1 tablet by mouth daily  Kj Shankar MD   buPROPion (WELLBUTRIN XL) 300 MG XL tablet Take 1 tablet by mouth daily  Kj Shankar MD   albuterol (PROVENTIL;VENTOLIN) 90 MCG/ACT inhaler Inhale 2 puffs into the lungs every 6 hours as needed.   Kj Shankar, MD       Social History     Tobacco Use    Smoking status: Never Smoker    Smokeless tobacco: Never Used   Substance Use Topics    Alcohol use: No    Drug use: No        No Known Allergies,   Past Medical History:   Diagnosis Date    Asthma 2015    Bilateral renal cysts 2013    Depression     since age 35, [facility] Dr Sarah Newsome, [facility]verticulosis of sigmoid colon 2012    Dr Anna Marie Aguilar DJD of left shoulder     Environmental allergies     Fatty liver 2013    GERD (gastroesophageal reflux disease)     History of cardiac arrhythmia     \"due to stress, was placed on medication\"    Hydatidiform mole     age 26     Hyperlipidemia     Hypothyroidism 2011    IFG (impaired fasting glucose) 2013    Melanoma of eye ([address]     age 30, R eye prosthesis, Dr Jenny Johnston    Obesity     Prediabetes     PTSD (post-traumatic stress disorder)     age 35, [facility]    S/P colonoscopy 04/19/2012    Dr. Reilly Knowles S/P hysterectomy with oophorectomy 2012    Dr Sophia Donaldson D deficiency    ,   Past Surgical History:   Procedure Laterality Date    DILATION AND CURETTAGE OF UTERUS      ENDOMETRIAL ABLATION  6/2012    EYE REMOVAL      OD for melanoma, age 30    FOOT OSTEOTOMY Left 09/23/2016    Megan Cuadra DPM      LINDSAY AND BSO  2012    Dr West Rodriguez   ,   Social History     Tobacco Use    Smoking status: Never Smoker    Smokeless tobacco: Never Used   Substance Use Topics    Alcohol use: No    Drug use: No   ,   Family History   Problem Relation Age of Onset    Heart Failure Mother         dec 76    Diabetes Mother     Diabetes Father     Stroke Father         dec age 80    Cancer Father         Sarcoma    Breast Cancer Sister     Stomach Cancer Other    ,   Immunization History   Administered Date(s) Administered    Influenza Vaccine, unspecified formulation 09/10/2016    Influenza Virus Vaccine 09/19/2014, 09/07/2015, 09/12/2017, 09/13/2018, 09/13/2019    Influenza Whole 09/19/2014, 09/07/2015    Influenza, [place], IM, (6 mo and older Fluzone, Flulaval, Fluarix and 3 yrs and older Afluria) 09/12/2017, 09/13/2018    Influenza, [place], IM, PF (6 mo and older Fluzone, Flulaval, Fluarix, and 3 yrs and older Afluria) 09/13/2019, 08/20/2020    Influenza, [place], 3 Years and older, IM (Afluria (5 yrs and older) 09/10/2016    Pneumococcal Polysaccharide ([id number]) 08/10/2018    Td, unspecified formulation 12/12/2017    Tdap (Boostrix, Adacel) 03/11/2016    Zoster Recombinant (Shingrix) 03/19/2019, 07/19/2019, 02/21/2020   ,   Health Maintenance   Topic Date Due    Lipid screen  11/05/2020    A1C test (Diabetic or Prediabetic)  02/07/2021    Breast cancer screen  06/10/2021    Colon cancer screen colonoscopy  04/19/2022    DTaP/Tdap/Td vaccine (3 - Td) 12/12/2027    Flu vaccine  Completed    Shingles Vaccine  Completed    Pneumococcal 0-64 years Vaccine  Completed    Hepatitis C screen  Completed    HIV screen  Completed    Hepatitis A vaccine  Aged Out    Hepatitis B vaccine  Aged Out    Hib vaccine  Aged Out    Meningococcal (ACWY) vaccine  Aged Out       PHYSICAL EXAMINATION:  [ INSTRUCTIONS:  \"[x]\" Indicates a positive item  \"[]\" Indicates a negative item  -- DELETE ALL ITEMS NOT EXAMINED]  [x] Alert  [x] Oriented to person/place/time    [x] No apparent distress  [] Toxic appearing    [] Face flushed appearing [] Sclera clear  [] Lips are cyanotic      [x] Breathing appears normal  [] Appears tachypneic      [] Rash on visible skin    [] Cranial Nerves II-XII grossly intact    [] Motor grossly intact in visible upper extremities    [] Motor grossly intact in visible lower extremities    [x] Normal Mood  [] Anxious appearing    [] Depressed appearing  [] Confused appearing      [] Poor short term memory  [] Poor long term memory    [] OTHER:      Due to this being a TeleHealth encounter, evaluation of the following organ systems is limited: Vitals/Constitutional/EENT/Resp/CV/GI//MS/Neuro/Skin/Heme-Lymph-Imm. Lab Results   Component Value Date     02/26/2021    K 4.1 02/26/2021     02/26/2021    CO2 23 02/26/2021    BUN 12 02/26/2021    CREATININE 0.82 02/26/2021    GLUCOSE 110 (H) 02/26/2021    CALCIUM 9.9 02/26/2021    PROT 6.8 02/26/2021    LABALBU 4.2 02/26/2021    BILITOT 0.5 02/26/2021    ALKPHOS 73 02/26/2021    AST 20 02/26/2021    ALT 23 02/26/2021    LABGLOM >60.0 02/26/2021    GFRAA >60.0 02/26/2021    GLOB 2.6 02/26/2021       Lab Results   Component Value Date    WBC 5.5 02/26/2021    HGB 14.2 02/26/2021    HCT 42.0 02/26/2021    MCV 91.9 02/26/2021     02/26/2021     CXR 2/26/21  Exam: XR CHEST PORTABLE       History:  sob, cough        Technique: AP portable view of the chest obtained.       Comparison: Chest x-ray from January 21, 2021       Chest x-ray portable    Findings:        The cardiomediastinal silhouette is within normal limits. There are no infiltrates, consolidations or effusions. Bones of the thorax appear intact.          Urine culture 2/26/21   Impression   No radiographic evidence of acute intrathoracic process.         Component Collected Lab   Organism Abnormal  02/26/2021  9:30 AM MH - [facility]   Klebsiella pneumoniae ssp pneumoniae    Urine Culture, Routine 02/26/2021  9:30 AM [facility] - [facility]   >100,000 CFU/ml    Testing Performed By    Lab - [address] Name Director Address Valid Date Range   [address] [facility] Prasanth Red MD [address] [place] [id number] 07/12/18 0959-Present   Narrative  Performed by: [facility]  ORDER#: [id number]                          ORDERED BY: Coco Carrasco   SOURCE: Urine Clean Catch                  COLLECTED:  02/26/21 09:30   [facility]. :                      RECEIVED :  02/26/21 13:00   Susceptibility    Klebsiella pneumoniae ssp pneumoniae (1)    Antibiotic Interpretation MARZENA Status    amoxicillin-clavulanate Sensitive 8 mcg/mL     ceFAZolin Sensitive <=4 mcg/mL     ciprofloxacin Sensitive <=0.25 mcg/mL     gentamicin Sensitive <=1 mcg/mL     nitrofurantoin Sensitive 32 mcg/mL     trimethoprim-sulfamethoxazole Sensitive <=20 mcg/mL     Lab and Collection    Culture, Urine - 2/26/2021  Result History    Culture, Urine on 2/28/2021   Result Information    Flag: AbnormalAbnormal   Status: Final result (Collected: 2/26/2021 09:30) Provider Status: Ordered   All Reviewers List    Jason Salgado RN on 3/1/2021 08:23   Routing History    Priority Sent On From To Message Type    2/27/2021 10:47 AM Michael, Chpo Incoming Lab Results From Soft P Mlo Ed Late Results F/U Results    2/26/2021 10:30 AM Michael, [facility] Incoming Lab Results From [place], RN CC'd Results   Click to [employer] Info    Culture, Urine (Order #[id number]) on 2/26/21   Order Report    Order Details    2/26/2021 10:44 AM - Michael, Chpo Incoming Lab Results From Soft    Specimen Information: Nasopharyngeal Swab        Component Value Ref Range & Units Status Collected Lab   SARS-CoV-2, NAAT Not Detected  Not Detected Final 02/26/2021  9:55 AM  - [facility]       CT Abdomen 10/11/2020  Constipation.       Sigmoid diverticulosis.       Hepatic steatosis.         All CT scans at this facility use dose modulation, iterative reconstruction, and/or weight based dosing when appropriate to reduce radiation dose to as low as reasonably achievable.               ASSESSMENT/PLAN:    1. Chronic diarrhea  Associated with nausea. Will need a work up. Will obtain a stool culture to evaluate for any infectious cause to the diarrhea. Given the history of the abdominal pain may need to see for any inflammation or IBS. Previous scans in the past shows patient has diverticulosis and hepatic steatosis. Still complaining of symptoms after her ED visit for UTI.    - ondansetron (ZOFRAN-ODT) 4 MG disintegrating tablet; Take 1 tablet by mouth every 12 hours as needed for Nausea or Vomiting  Dispense: 21 tablet; Refill: 0  - CT ABDOMEN PELVIS W WO CONTRAST Additional Contrast? Radiologist Recommendation; Future  - Gastrointestinal Panel by DNA; Future    2. Vertigo    - meclizine (ANTIVERT) 12.5 MG tablet; Take 1 tablet by mouth 3 times daily as needed for Dizziness  Dispense: 15 tablet; Refill: 0          No follow-ups on file. An  electronic signature was used to authenticate this note. --Natalya Yen MD on 3/1/2021 at 2:58 PM        Pursuant to the emergency declaration under the [facility] waiver authority and the [employer] Act, this Virtual  Visit was conducted, with patient's consent, to reduce the patient's risk of exposure to COVID-19 and provide continuity of care for an established patient. Services were provided through a video synchronous discussion virtually to substitute for in-person clinic visit. Subhash Stallings is a 62 y.o. female evaluated via telephone on 3/1/2021. Consent:  She and/or health care decision maker is aware that that she may receive a bill for this telephone service, depending on her insurance coverage, and has provided verbal consent to proceed: Yes      Documentation:  I communicated with the patient and/or health care decision maker about gastroenteritis. Details of this discussion including any medical advice provided: yes      I affirm this is a Patient Initiated Episode with a Patient who has not had a related appointment within my department in the past 7 days or scheduled within the next 24 hours.     Patient identification was verified at the start of the visit: Yes    Total Time: 15 minutes      Note: not billable if this call serves to triage the patient into an appointment for the relevant concern      Natalya Yen

## 2021-03-02 ENCOUNTER — CARE COORDINATION (OUTPATIENT)
Dept: CARE COORDINATION | Age: 61
End: 2021-03-02

## 2021-03-02 ENCOUNTER — TELEPHONE (OUTPATIENT)
Dept: FAMILY MEDICINE CLINIC | Age: 61
End: 2021-03-02

## 2021-03-02 NOTE — CARE COORDINATION
Ambulatory Care Coordination Note  3/2/2021  CM Risk Score: 6  Charlson 10 Year Mortality Risk Score: 79%     ACC: Juana Li, RN    Summary Note:     Care Coordination Plan of Care: This [address]  following for ongoing needs   Care Coordination Plan of Care: spoke with patient herself  1. Diarrhea-States she ate some \"bad shrimp last week, and had diarrhea for about 4 days', diarrhea has finallly stopped, states she is eating better today, able to keep soup down, taking fluids well, drinking about 2 liters day. She will be taking a stool specimen to lab, CT scan of the abdomen ordered, advised patient that she that needs test done prior to her visit with Dr. Tyler Bautista, I called access center and they will call her to schedule   2. Recent ER visit- diagnosed with UTI- still has pain in left side of back- states taking antibiotics as ordered amoxicillin and macrodantin, may need repeat urine after antibiotics are complete, will ask her to review with Dr. Devin Chen. 3.Medications- Wants to switch her medications back to [place] form [facility]- she will discuss with [facility] team member - Charu Amaya to assist with this  4. Pt did not verbalize any pysch concerns at this encounter     5.  cough-  Cough is better, no new concerns, \"breathing ok\"     6. cardiology-- \"no palpitations noted\", follow up in April  with Dr. Rueda Sheets of Care: This nurse Care Coordinator will follow on ongoing needs   - outcome of follow up with Dr. Tyler Bautista- uti, back pain, ct abdomen nausea. vomiting,   follow up scheduled on 3/15/21  -outcome of concerns with [facility]- were meds changed to charak from [address] [place] [address]  -cardiology visit  -mental status? ?           Care Coordination Interventions    Program Enrollment: Rising Risk  Referral from Primary Care Provider: No  Suggested Interventions and Community Resources  Behavorial Health:  (Comment: [facility])  Palliative Care: (Comment: 10/13/20 Discussed services)  Pharmacist: Completed (Comment: 10/13/20 Clinical Rx Referral)  Registered Dietician: Completed (Comment: 10/13/2020 Declines; 11/3/2020 [facility] Dietician referral - patient agreed.)  Social Work: In Process (Comment: 01/22: referring to [facility]  for community resources)  Other Services: Completed (Comment: 10/13/20 ACP Referral)  Transportation Support: Declined  Zone Management Tools: Completed (Comment: 10/13/2020 Asthma Action Plan (Zones). )  Other Services or Interventions: 10/13/2020 Instructed on same day, next day, and Walk-In Care. Goals Addressed    None         Prior to Admission medications    Medication Sig Start Date End Date Taking? Authorizing Provider   metFORMIN (GLUCOPHAGE) 500 MG tablet TAKE 1 TABLET BY MOUTH TWICE A DAY WITH MEALS (AM,PM) 3/1/21   Trace Lomeli MD   ondansetron (ZOFRAN-ODT) 4 MG disintegrating tablet Take 1 tablet by mouth every 12 hours as needed for Nausea or Vomiting 3/1/21   Mara Schultz MD   meclizine (ANTIVERT) 12.5 MG tablet Take 1 tablet by mouth 3 times daily as needed for Dizziness 3/1/21 3/11/21  Mara Schultz MD   nitrofurantoin, macrocrystal-monohydrate, (MACROBID) 100 MG capsule Take 1 capsule by mouth 2 times daily for 5 days 2/26/21 3/3/21  Herbert Tovar PA-C   amoxicillin-clavulanate (AUGMENTIN) 875-125 MG per tablet Take 1 tablet by mouth 2 times daily for 10 days 2/26/21 3/8/21  Mara Schultz MD   ciclopirox (PENLAC) 8 % solution APPLY TO ADJACENT SKIN AND AFFECTED NAILS DAILY.  REMOVE WITH ALCOHOL EVERY 7 DAYS. 2/21/21   Ann Lomlei MD   metoprolol tartrate (LOPRESSOR) 50 MG tablet Take 1 tablet by mouth 2 times daily 2/1/21   Maicol Mcmullen MD   famotidine (PEPCID) 20 MG tablet TAKE 1 TABLET BY MOUTH 2 TIMES DAILY (AM,PM) 1/14/21   Mara Schultz MD   vitamin D (D3-1000) 25 MCG (1000 UT) CAPS Take 1 capsule by mouth Daily with supper 11/24/20   Ann Marvin MD [employer]   aspirin EC 81 MG EC tablet Take 1 tablet by mouth daily 11/24/20   Trace Lomeli MD   Multiple Vitamin (DAILY-MANDI) TABS TAKE 1 TABLET BY MOUTH EVERY DAY 11/13/20   Carolynn Griffinlon JoeyARASH frederick - CNP   levothyroxine (SYNTHROID) 50 MCG tablet Take 1 tablet by mouth every morning 11/13/20   ARASH Acuña - CNP   Biotin 300 MCG TABS Take 1 tablet by mouth daily 11/13/20   ARASH Acuña - CNP   psyllium 0.52 g capsule Take 1 capsule by mouth daily 10/30/20   Arturo Morrell MD   citalopram (CELEXA) 40 MG tablet Take 40 mg by mouth daily    Historical Provider, MD   benztropine (COGENTIN) 1 MG tablet Take 1 mg by mouth 3 times daily 10/15/20   Historical Provider, MD   fluticasone (FLONASE) 50 MCG/ACT nasal spray USE 1 SPRAY INTO EACH NOSTRIL DAILY 10/13/20   Arturo Morrell MD   vitamin B-2 (RIBOFLAVIN) 100 MG TABS tablet Take 1 tablet by mouth daily 5/25/20   Jaskaran Powell MD   budesonide-formoterol (SYMBICORT) 160-4.5 MCG/ACT AERO Inhale 2 puffs into the lungs 2 times daily 3/10/20   Keshawn Napoles MD   cyanocobalamin ([employer] VITAMIN B12) 1000 MCG tablet Take 1 tablet by mouth daily 2/21/20   Keshawn Napoles MD   traZODone (DESYREL) 50 MG tablet Take by mouth nightly 1-2 tablets nightly    Historical Provider, MD   pravastatin (PRAVACHOL) 40 MG tablet TAKE 1 TABLET BY MOUTH EVERY DAY IN THE EVENING 9/3/19   Keshawn Napoles MD   albuterol sulfate  (90 Base) MCG/ACT inhaler USE 2 PUFFS EVERY 4 HRS AS NEEDED FOR WHEEZING/SOB-SPACE OUT TO EVERY 6 HR AS SYMPTOMS IMPROVE 7/19/19   Keshawn Napoles MD   cetirizine (ZYRTEC) 10 MG tablet TAKE 1 TABLET BY MOUTH EVERY DAY AS NEEDED FOR ALLERGIES 5/8/19   Shankar Ruffin MD   EPINEPHrine (EPIPEN 2-PRAVEENA) 0.3 MG/0.3ML SOAJ injection Use as directed for allergic reaction 6/11/18   Keshawn Napolse MD   ARIPiprazole (ABILIFY) 15 MG tablet TAKE 1 TABLET BY MOUTH EVERY DAY 1/17/17   Historical Provider, MD   Multiple Vitamins-Minerals (THERAPEUTIC MULTIVITAMIN-MINERALS) tablet Take 1 tablet by mouth daily 12/29/15 6/12/16  Keshawn Napoles MD   buPROPion (WELLBUTRIN XL) 300 MG XL tablet Take 1 tablet by mouth daily 8/28/15   Keshawn Napoles MD   albuterol (PROVENTIL;VENTOLIN) 90 MCG/ACT inhaler Inhale 2 puffs into the lungs every 6 hours as needed.  12/21/12 11/5/19  Keshawn Napoles MD       Future Appointments   Date Time Provider Jay Lini   3/15/2021  9:00 AM Arturo Morrell  [place] [address]   4/12/2021  9:00 AM Ness Spivey MD [facility]

## 2021-03-03 ASSESSMENT — ENCOUNTER SYMPTOMS
DIARRHEA: 1
NAUSEA: 1

## 2021-03-05 ENCOUNTER — OFFICE VISIT (OUTPATIENT)
Dept: FAMILY MEDICINE CLINIC | Age: 61
End: 2021-03-05
Payer: COMMERCIAL

## 2021-03-05 VITALS
BODY MASS INDEX: 36.32 KG/M2 | TEMPERATURE: 97.6 F | SYSTOLIC BLOOD PRESSURE: 118 MMHG | OXYGEN SATURATION: 98 % | DIASTOLIC BLOOD PRESSURE: 78 MMHG | HEIGHT: 66 IN | HEART RATE: 112 BPM | WEIGHT: 226 LBS

## 2021-03-05 DIAGNOSIS — R19.7 DIARRHEA, UNSPECIFIED TYPE: Primary | ICD-10-CM

## 2021-03-05 PROCEDURE — G8417 CALC BMI ABV UP PARAM F/U: HCPCS | Performed by: PHYSICIAN ASSISTANT

## 2021-03-05 PROCEDURE — 99213 OFFICE O/P EST LOW 20 MIN: CPT | Performed by: PHYSICIAN ASSISTANT

## 2021-03-05 PROCEDURE — 3017F COLORECTAL CA SCREEN DOC REV: CPT | Performed by: PHYSICIAN ASSISTANT

## 2021-03-05 PROCEDURE — 1036F TOBACCO NON-USER: CPT | Performed by: PHYSICIAN ASSISTANT

## 2021-03-05 PROCEDURE — G8427 DOCREV CUR MEDS BY ELIG CLIN: HCPCS | Performed by: PHYSICIAN ASSISTANT

## 2021-03-05 PROCEDURE — G8482 FLU IMMUNIZE ORDER/ADMIN: HCPCS | Performed by: PHYSICIAN ASSISTANT

## 2021-03-05 ASSESSMENT — ENCOUNTER SYMPTOMS
BACK PAIN: 0
COUGH: 0
SHORTNESS OF BREATH: 0
CHEST TIGHTNESS: 0
ABDOMINAL PAIN: 0
TROUBLE SWALLOWING: 0
DIARRHEA: 1
SINUS PRESSURE: 0
VOMITING: 0

## 2021-03-05 NOTE — PROGRESS NOTES
Highest education level: High school graduate   Occupational History    Occupation: SSI   Social Needs    Financial resource strain: Not very hard    Food insecurity     Worry: Sometimes true     Inability: Sometimes true    Transportation needs     Medical: No     Non-medical: No   Tobacco Use    Smoking status: Never Smoker    Smokeless tobacco: Never Used   Substance and Sexual Activity    Alcohol use: No    Drug use: No    Sexual activity: Not Currently     Birth control/protection: Surgical   Lifestyle    Physical activity     Days per week: 0 days     Minutes per session: 0 min    Stress: Rather much   Relationships    Social connections     Talks on phone: Twice a week     Gets together: Never     Attends Jewish service: Never     Active member of club or organization: No     Attends meetings of clubs or organizations: Never     Relationship status:     Intimate partner violence     Fear of current or ex partner: Not on file     Emotionally abused: Not on file     Physically abused: Not on file     Forced sexual activity: Not on file   Other Topics Concern    Not on file   Social History Narrative    Born in [place], one of 4 children, one sister disappeared at age 29, never found    Moved to [place] at age 40        Lives in an apartment in [place], alone    , 3 children, all grown, in [place]     2 granddaughters     Hobbies exercising, birds    Attends [employer], has volunteered at [employer]     Family History   Problem Relation Age of Onset    Heart Failure Mother         dec 76    Diabetes Mother     Diabetes Father     Stroke Father         dec age 80    Cancer Father         Sarcoma    Breast Cancer Sister     Stomach Cancer Other      No Known Allergies  Current Outpatient Medications   Medication Sig Dispense Refill    metFORMIN (GLUCOPHAGE) 500 MG tablet TAKE 1 TABLET BY MOUTH TWICE A DAY WITH MEALS (AM,PM) 60 tablet 5    ondansetron (ZOFRAN-ODT) 4 MG disintegrating tablet Take 1 tablet by mouth every 12 hours as needed for Nausea or Vomiting 21 tablet 0    meclizine (ANTIVERT) 12.5 MG tablet Take 1 tablet by mouth 3 times daily as needed for Dizziness 15 tablet 0    amoxicillin-clavulanate (AUGMENTIN) 875-125 MG per tablet Take 1 tablet by mouth 2 times daily for 10 days 20 tablet 0    ciclopirox (PENLAC) 8 % solution APPLY TO ADJACENT SKIN AND AFFECTED NAILS DAILY. REMOVE WITH ALCOHOL EVERY 7 DAYS.  6.6 mL 3    metoprolol tartrate (LOPRESSOR) 50 MG tablet Take 1 tablet by mouth 2 times daily 60 tablet 0    famotidine (PEPCID) 20 MG tablet TAKE 1 TABLET BY MOUTH 2 TIMES DAILY (AM,PM) 60 tablet 2    vitamin D (D3-1000) 25 MCG (1000 UT) CAPS Take 1 capsule by mouth Daily with supper 90 capsule 1    aspirin EC 81 MG EC tablet Take 1 tablet by mouth daily 30 tablet 5    Multiple Vitamin (DAILY-MANDI) TABS TAKE 1 TABLET BY MOUTH EVERY DAY 30 tablet 5    levothyroxine (SYNTHROID) 50 MCG tablet Take 1 tablet by mouth every morning 90 tablet 0    Biotin 300 MCG TABS Take 1 tablet by mouth daily 30 tablet 3    psyllium 0.52 g capsule Take 1 capsule by mouth daily 30 capsule 5    citalopram (CELEXA) 40 MG tablet Take 40 mg by mouth daily      benztropine (COGENTIN) 1 MG tablet Take 1 mg by mouth 3 times daily      fluticasone (FLONASE) 50 MCG/ACT nasal spray USE 1 SPRAY INTO EACH NOSTRIL DAILY 1 Bottle 0    vitamin B-2 (RIBOFLAVIN) 100 MG TABS tablet Take 1 tablet by mouth daily 90 tablet 0    budesonide-formoterol (SYMBICORT) 160-4.5 MCG/ACT AERO Inhale 2 puffs into the lungs 2 times daily 1 Inhaler 3    cyanocobalamin ([employer]S VITAMIN B12) 1000 MCG tablet Take 1 tablet by mouth daily 30 tablet 5    traZODone (DESYREL) 50 MG tablet Take by mouth nightly 1-2 tablets nightly      pravastatin (PRAVACHOL) 40 MG tablet TAKE 1 TABLET BY MOUTH EVERY DAY IN THE EVENING 90 tablet 1    albuterol sulfate  (90 Base) MCG/ACT inhaler USE 2 PUFFS EVERY 4 HRS AS diaphoretic. Comments: No photophobia. No phonophobia. HENT:      Head: Normocephalic and atraumatic. No Duncan's sign. Right Ear: External ear normal.      Left Ear: External ear normal.      Nose: Nose normal. No congestion or rhinorrhea. Mouth/Throat:      Mouth: Mucous membranes are moist.      Pharynx: Oropharynx is clear. No oropharyngeal exudate or posterior oropharyngeal erythema. Eyes:      General: No scleral icterus. Right eye: No foreign body or discharge. Left eye: No discharge. Extraocular Movements: Extraocular movements intact. Conjunctiva/sclera: Conjunctivae normal.      Left eye: No exudate. Pupils: Pupils are equal, round, and reactive to light. Neck:      Musculoskeletal: Normal range of motion and neck supple. No neck rigidity. Vascular: No JVD. Trachea: No tracheal deviation. Comments: No meningismus. Cardiovascular:      Rate and Rhythm: Normal rate and regular rhythm. Heart sounds: Normal heart sounds. Heart sounds not distant. No murmur. No friction rub. No gallop. Pulmonary:      Effort: Pulmonary effort is normal. No respiratory distress. Breath sounds: Normal breath sounds. No stridor. No wheezing, rhonchi or rales. Chest:      Chest wall: No tenderness. Abdominal:      General: Abdomen is flat. Bowel sounds are normal. There is no distension. Palpations: Abdomen is soft. There is no mass. Tenderness: There is no abdominal tenderness. There is no right CVA tenderness, left CVA tenderness, guarding or rebound. Hernia: No hernia is present. Musculoskeletal: Normal range of motion. General: No swelling, tenderness, deformity or signs of injury. Lymphadenopathy:      Head:      Right side of head: No submental adenopathy. Left side of head: No submental adenopathy. Skin:     General: Skin is warm and dry. Capillary Refill: Capillary refill takes less than 2 seconds. Coloration: Skin is not jaundiced or pale. Findings: No bruising, erythema, lesion or rash. Neurological:      General: No focal deficit present. Mental Status: She is alert and oriented to person, place, and time. Mental status is at baseline. Cranial Nerves: No cranial nerve deficit. Sensory: No sensory deficit. Motor: No weakness. Coordination: Coordination normal.      Deep Tendon Reflexes: Reflexes are normal and symmetric. Psychiatric:         Mood and Affect: Mood normal.         Behavior: Behavior normal. Behavior is cooperative. Thought Content: Thought content normal.         Judgment: Judgment normal.         Assessment:       Diagnosis Orders   1. Diarrhea, unspecified type       No results found for this visit on 03/05/21. Plan:     Assessment & Plan   Ana Leroy was seen today for diarrhea. Diagnoses and all orders for this visit:    Diarrhea, unspecified type    the patient is scheduled to give a stool specimen and have a CT of the abdomin next week. She will be given a cup for the specimen. And confirmed with CT date. Patient given information BRAT diet. No orders of the defined types were placed in this encounter. No orders of the defined types were placed in this encounter. There are no discontinued medications. Return if symptoms worsen or fail to improve. Reviewed with the patient/family: current clinical status & medications. Side effects of the medication prescribed today, as well as treatment plan/rationale and result expectations have been discussed with the patient/family who expresses understanding. Patient will be discharged home in stable condition. Follow up with PCP to evaluate treatment results or return if symptoms worsen or fail to improve. Discussed signs and symptoms which require immediate follow-up in ED/call to 911. Understanding verbalized.      I have reviewed the patient's medical history in detail and updated

## 2021-03-05 NOTE — PATIENT INSTRUCTIONS

## 2021-03-09 ENCOUNTER — TELEPHONE (OUTPATIENT)
Dept: FAMILY MEDICINE CLINIC | Age: 61
End: 2021-03-09

## 2021-03-09 DIAGNOSIS — K52.9 CHRONIC DIARRHEA: ICD-10-CM

## 2021-03-10 ENCOUNTER — HOSPITAL ENCOUNTER (OUTPATIENT)
Dept: CT IMAGING | Age: 61
Discharge: HOME | End: 2021-03-12
Payer: COMMERCIAL

## 2021-03-10 VITALS — WEIGHT: 226 LBS | HEIGHT: 66 IN | BODY MASS INDEX: 36.32 KG/M2

## 2021-03-10 DIAGNOSIS — K52.9 CHRONIC DIARRHEA: ICD-10-CM

## 2021-03-10 LAB — GI BACTERIAL PATHOGENS BY PCR: NORMAL

## 2021-03-10 PROCEDURE — 6360000004 HC RX CONTRAST MEDICATION: Performed by: FAMILY MEDICINE

## 2021-03-10 PROCEDURE — 74177 CT ABD & PELVIS W/CONTRAST: CPT

## 2021-03-10 PROCEDURE — 2500000003 HC RX 250 WO HCPCS: Performed by: FAMILY MEDICINE

## 2021-03-10 RX ORDER — SODIUM CHLORIDE 0.9 % (FLUSH) 0.9 %
10 SYRINGE (ML) INJECTION ONCE
Status: DISCONTINUED | OUTPATIENT
Start: 2021-03-10 | End: 2021-03-13 | Stop reason: HOSPADM

## 2021-03-10 RX ADMIN — BARIUM SULFATE 450 ML: 20 SUSPENSION ORAL at 12:14

## 2021-03-10 RX ADMIN — IOPAMIDOL 100 ML: 612 INJECTION, SOLUTION INTRAVENOUS at 12:14

## 2021-03-11 ENCOUNTER — CARE COORDINATION (OUTPATIENT)
Dept: CARE COORDINATION | Age: 61
End: 2021-03-11

## 2021-03-11 NOTE — CARE COORDINATION
Patient is indicating from conversation on 2/25/2021 that she didn't need the help  provided by this writer. Will discharge patient from [facility] Cinthia Corcoran

## 2021-03-15 ENCOUNTER — OFFICE VISIT (OUTPATIENT)
Dept: FAMILY MEDICINE CLINIC | Age: 61
End: 2021-03-15
Payer: COMMERCIAL

## 2021-03-15 VITALS
TEMPERATURE: 97.4 F | WEIGHT: 229.2 LBS | OXYGEN SATURATION: 99 % | BODY MASS INDEX: 36.83 KG/M2 | SYSTOLIC BLOOD PRESSURE: 130 MMHG | DIASTOLIC BLOOD PRESSURE: 88 MMHG | HEIGHT: 66 IN

## 2021-03-15 DIAGNOSIS — F32.A DEPRESSION, UNSPECIFIED DEPRESSION TYPE: ICD-10-CM

## 2021-03-15 DIAGNOSIS — K21.9 GASTROESOPHAGEAL REFLUX DISEASE WITHOUT ESOPHAGITIS: ICD-10-CM

## 2021-03-15 DIAGNOSIS — E78.5 HYPERLIPIDEMIA, UNSPECIFIED HYPERLIPIDEMIA TYPE: ICD-10-CM

## 2021-03-15 DIAGNOSIS — I48.92 ATRIAL FLUTTER, UNSPECIFIED TYPE (HCC): ICD-10-CM

## 2021-03-15 DIAGNOSIS — R19.7 DIARRHEA, UNSPECIFIED TYPE: Primary | ICD-10-CM

## 2021-03-15 DIAGNOSIS — R73.03 PREDIABETES: ICD-10-CM

## 2021-03-15 DIAGNOSIS — F43.10 PTSD (POST-TRAUMATIC STRESS DISORDER): ICD-10-CM

## 2021-03-15 DIAGNOSIS — J45.30 MILD PERSISTENT ASTHMA WITHOUT COMPLICATION: ICD-10-CM

## 2021-03-15 DIAGNOSIS — K76.0 FATTY LIVER DISEASE, NONALCOHOLIC: ICD-10-CM

## 2021-03-15 LAB — HBA1C MFR BLD: 5.2 %

## 2021-03-15 PROCEDURE — 83036 HEMOGLOBIN GLYCOSYLATED A1C: CPT | Performed by: FAMILY MEDICINE

## 2021-03-15 PROCEDURE — G8482 FLU IMMUNIZE ORDER/ADMIN: HCPCS | Performed by: FAMILY MEDICINE

## 2021-03-15 PROCEDURE — 99214 OFFICE O/P EST MOD 30 MIN: CPT | Performed by: FAMILY MEDICINE

## 2021-03-15 PROCEDURE — G8417 CALC BMI ABV UP PARAM F/U: HCPCS | Performed by: FAMILY MEDICINE

## 2021-03-15 PROCEDURE — 1036F TOBACCO NON-USER: CPT | Performed by: FAMILY MEDICINE

## 2021-03-15 PROCEDURE — G8427 DOCREV CUR MEDS BY ELIG CLIN: HCPCS | Performed by: FAMILY MEDICINE

## 2021-03-15 PROCEDURE — 3017F COLORECTAL CA SCREEN DOC REV: CPT | Performed by: FAMILY MEDICINE

## 2021-03-15 ASSESSMENT — PATIENT HEALTH QUESTIONNAIRE - PHQ9
2. FEELING DOWN, DEPRESSED OR HOPELESS: 0
SUM OF ALL RESPONSES TO PHQ QUESTIONS 1-9: 0
SUM OF ALL RESPONSES TO PHQ QUESTIONS 1-9: 0
SUM OF ALL RESPONSES TO PHQ9 QUESTIONS 1 & 2: 0
1. LITTLE INTEREST OR PLEASURE IN DOING THINGS: 0

## 2021-03-15 NOTE — PROGRESS NOTES
Chief Complaint   Patient presents with    2 Week Follow-Up     Is no longer having abd. pain or diarrhea. States she is doing better & has no concerns at this time. HPI: Omar Enamorado 62 y.o. female presenting for    Abdominal Pain  Was in the ED for abdominal pain   Found to have a UTI   Was given abx for the UTI  admits that her symptoms have improved but now complaining of chronic intermittent diarrhea   Diarrhea associated with nausea and vertigo     F/u  Abdominal pain has resolved. No more issues or concerns.       Hpyothyroidism   Stable. Takes medication. Lab Results   Component Value Date    TSH 2.700 02/07/2020       HLD   Stable. On pravastatin  Lab Results   Component Value Date    CHOL 194 11/05/2019    CHOL 232 (H) 06/11/2018    CHOL 234 (H) 07/12/2017     Lab Results   Component Value Date    TRIG 127 11/05/2019    TRIG 157 06/11/2018    TRIG 286 (H) 07/12/2017     Lab Results   Component Value Date    HDL 53 11/05/2019    HDL 55 06/11/2018    HDL 46 07/12/2017     Lab Results   Component Value Date    LDLCALC 116 11/05/2019    LDLCALC 146 (H) 06/11/2018    LDLCALC 131 (H) 07/12/2017     No results found for: LABVLDL, VLDL  Lab Results   Component Value Date    CHOLHDLRATIO 6.3 03/22/2012       PTSD/Depression   Patient is going to [facility]. Plans on going 4 days a go    Follows with psych    GERD   Stable. Trying to avoid food sthat make it worse         Review of Systems   Constitutional: Negative for activity change, appetite change, diaphoresis, fatigue and fever. HENT: Negative for congestion, dental problem, ear discharge, ear pain, facial swelling, mouth sores, nosebleeds, postnasal drip, sinus pressure, sinus pain, sneezing, sore throat and trouble swallowing. Eyes: Negative for photophobia, discharge, redness, itching and visual disturbance. Respiratory: Negative for apnea, cough, chest tightness and shortness of breath.     Cardiovascular: Negative for chest pain and leg swelling. Gastrointestinal: Positive for diarrhea and nausea. Negative for abdominal pain, anal bleeding, rectal pain and vomiting. Endocrine: Negative for cold intolerance, heat intolerance, polydipsia and polyphagia. Genitourinary: Negative for decreased urine volume, difficulty urinating, dyspareunia, enuresis, flank pain, genital sores, hematuria, menstrual problem and urgency. Musculoskeletal: Negative for arthralgias, back pain, joint swelling, myalgias and neck stiffness. Skin: Negative for color change and pallor. Allergic/Immunologic: Negative for environmental allergies. Neurological: Negative for dizziness, tremors, seizures, syncope, facial asymmetry, numbness and headaches. Hematological: Negative for adenopathy. Does not bruise/bleed easily. Psychiatric/Behavioral: Negative for agitation, behavioral problems, decreased concentration, dysphoric mood, hallucinations, sleep disturbance and suicidal ideas. The patient is not nervous/anxious. Current Outpatient Medications   Medication Sig Dispense Refill    ondansetron (ZOFRAN-ODT) 4 MG disintegrating tablet Take 1 tablet by mouth every 12 hours as needed for Nausea or Vomiting 21 tablet 0    ciclopirox (PENLAC) 8 % solution APPLY TO ADJACENT SKIN AND AFFECTED NAILS DAILY. REMOVE WITH ALCOHOL EVERY 7 DAYS.  6.6 mL 3    famotidine (PEPCID) 20 MG tablet TAKE 1 TABLET BY MOUTH 2 TIMES DAILY (AM,PM) 60 tablet 2    vitamin D (D3-1000) 25 MCG (1000 UT) CAPS Take 1 capsule by mouth Daily with supper 90 capsule 1    aspirin EC 81 MG EC tablet Take 1 tablet by mouth daily 30 tablet 5    Multiple Vitamin (DAILY-MANDI) TABS TAKE 1 TABLET BY MOUTH EVERY DAY 30 tablet 5    levothyroxine (SYNTHROID) 50 MCG tablet Take 1 tablet by mouth every morning 90 tablet 0    Biotin 300 MCG TABS Take 1 tablet by mouth daily 30 tablet 3    psyllium 0.52 g capsule Take 1 capsule by mouth daily 30 capsule 5    citalopram (CELEXA) 40 MG tablet Take 40 mg by mouth daily      benztropine (COGENTIN) 1 MG tablet Take 1 mg by mouth 3 times daily      fluticasone (FLONASE) 50 MCG/ACT nasal spray USE 1 SPRAY INTO EACH NOSTRIL DAILY 1 Bottle 0    vitamin B-2 (RIBOFLAVIN) 100 MG TABS tablet Take 1 tablet by mouth daily 90 tablet 0    budesonide-formoterol (SYMBICORT) 160-4.5 MCG/ACT AERO Inhale 2 puffs into the lungs 2 times daily 1 Inhaler 3    cyanocobalamin (CVS VITAMIN B12) 1000 MCG tablet Take 1 tablet by mouth daily 30 tablet 5    traZODone (DESYREL) 50 MG tablet Take by mouth nightly 1-2 tablets nightly      pravastatin (PRAVACHOL) 40 MG tablet TAKE 1 TABLET BY MOUTH EVERY DAY IN THE EVENING 90 tablet 1    albuterol sulfate  (90 Base) MCG/ACT inhaler USE 2 PUFFS EVERY 4 HRS AS NEEDED FOR WHEEZING/SOB-SPACE OUT TO EVERY 6 HR AS SYMPTOMS IMPROVE 1 Inhaler 3    cetirizine (ZYRTEC) 10 MG tablet TAKE 1 TABLET BY MOUTH EVERY DAY AS NEEDED FOR ALLERGIES 30 tablet 5    EPINEPHrine (EPIPEN 2-PRAVEENA) 0.3 MG/0.3ML SOAJ injection Use as directed for allergic reaction 2 each 0    ARIPiprazole (ABILIFY) 15 MG tablet TAKE 1 TABLET BY MOUTH EVERY DAY  2    buPROPion (WELLBUTRIN XL) 300 MG XL tablet Take 1 tablet by mouth daily 30 tablet 6    metoprolol tartrate (LOPRESSOR) 50 MG tablet Take 1 tablet by mouth 2 times daily 60 tablet 0     No current facility-administered medications for this visit. ROS  CONSTITUTIONAL: The patient denies fevers, chills, sweats and body ache. HEENT: Denies headache, blurry vision, eye pain, tinnitus, vertigo,  sore throat, neck or thyroid masses. RESPIRATORY: Denies cough, sputum, hemoptysis. CARDIAC: Denies chest pain, pressure, palpitations, Denies lower extremity edema. GASTROINTESTINAL: Denies current abdominal pain, constipation, diarrhea, bleeding in the stools,   GENITOURINARY: Denies dysuria, hematuria, nocturia or frequency, urinary incontinence.   NEUROLOGIC: Denies headaches, dizziness, syncope, weakness  MUSCULOSKELETAL: denies changes in range of motion, joint pain, stiffness. ENDOCRINOLOGY: Denies heat or cold intolerance. HEMATOLOGY: Denies easy bleeding or blood transfusion,anemia  DERMATOLOGY: Denies changes in moles or pigmentation changes. PSYCHIATRY: admits to a history of depression, agitation or anxiety.     Past Medical History:   Diagnosis Date    Asthma 2015    Bilateral renal cysts 2013    Depression     since age 35, was with Dr Sabiha Crain, now the [address] [employer] [place] Diverticulosis of sigmoid colon 2012    Dr Cynthia Sharp DJD of left shoulder     Environmental allergies     Fatty liver 2013    GERD (gastroesophageal reflux disease)     History of cardiac arrhythmia     \"due to stress, was placed on medication\"    Hydatidiform mole     age 26     Hyperlipidemia     Hypothyroidism 2011    IFG (impaired fasting glucose) 2013    Melanoma of eye ([address]     age 30, R eye prosthesis, Dr Orlando Royal    Obesity     Prediabetes     PTSD (post-traumatic stress disorder)     age 35, [facility]    S/P colonoscopy 04/19/2012    Dr. Alethea Herrera S/P hysterectomy with oophorectomy 2012    Dr Opal Moreland Vitamin D deficiency         Past Surgical History:   Procedure Laterality Date    DILATION AND CURETTAGE OF UTERUS      ENDOMETRIAL ABLATION  6/2012    EYE REMOVAL      OD for melanoma, age 30    FOOT OSTEOTOMY Left 09/23/2016    Clinton Goldman DPM      LINDSAY AND BSO  2012    Dr Sammie Eddy        Family History   Problem Relation Age of Onset    Heart Failure Mother         dec 76    Diabetes Mother     Diabetes Father     Stroke Father         dec age 80    Cancer Father         Sarcoma    Breast Cancer Sister     Stomach Cancer Other         Social History     Socioeconomic History    Marital status:      Spouse name: Not on file    Number of children: 3    Years of education: 15    Highest education level: High school graduate   Occupational History    without lesions   Neck - supple, no significant adenopathy   Thyroid - thyroid is normal in size without nodules or tenderness    Chest - clear to auscultation, no wheezes, rales or rhonchi, symmetric air entry   Heart - normal rate, regular rhythm, normal S1, S2, no murmurs, rubs, clicks or gallops  Abdomen - soft, nontender, nondistended, no masses or organomegaly   Back exam - full range of motion, no tenderness, palpable spasm or pain on motion   Neurological - alert, oriented, normal speech, no focal findings or movement disorder noted   Musculoskeletal - no joint tenderness, deformity or swelling   Extremities - peripheral pulses normal, no pedal edema, no clubbing or cyanosis   Skin - normal coloration and turgor, no rashes, no suspicious skin lesions noted    Labs   No results found for: TSHREFLEX  TSH   Date Value Ref Range Status   02/07/2020 2.700 0.440 - 3.860 uIU/mL Final   06/14/2019 2.430 0.440 - 3.860 uIU/mL Final   06/11/2018 3.820 0.270 - 4.200 uIU/mL Final   06/07/2017 3.500 0.270 - 4.200 uIU/mL Final   01/27/2017 3.810 0.270 - 4.200 uIU/mL Final   03/04/2016 1.990 0.270 - 4.200 uIU/mL Final   11/20/2015 1.940 0.270 - 4.200 uIU/mL Final   08/28/2015 1.970 0.270 - 4.200 uIU/mL Final   10/25/2014 3.130 0.270 - 4.200 uIU/mL Final   12/11/2013 3.910 0.270 - 4.200 uIU/mL Final   04/04/2013 1.346 0.550 - 4.780 uIU/mL Final   10/16/2012 1.607 0.550 - 4.780 uIU/mL Final   05/14/2012 1.771 0.550 - 4.780 uIU/mL Final   03/22/2012 4.082 0.550 - 4.780 uIU/mL Final     Lab Results   Component Value Date     02/26/2021    K 4.1 02/26/2021     02/26/2021    CO2 23 02/26/2021    BUN 12 02/26/2021    CREATININE 0.82 02/26/2021    GLUCOSE 110 (H) 02/26/2021    CALCIUM 9.9 02/26/2021    PROT 6.8 02/26/2021    LABALBU 4.2 02/26/2021    BILITOT 0.5 02/26/2021    ALKPHOS 73 02/26/2021    AST 20 02/26/2021    ALT 23 02/26/2021    LABGLOM >60.0 02/26/2021    GFRAA >60.0 02/26/2021    GLOB 2.6 02/26/2021 Lab Results   Component Value Date    WBC 5.5 02/26/2021    HGB 14.2 02/26/2021    HCT 42.0 02/26/2021    MCV 91.9 02/26/2021     02/26/2021     Lab Results   Component Value Date    LABA1C 5.2 03/15/2021     No results found for: EAG      A/P: Amy Santana 62 y.o. female presenting for     1. Diarrhea, unspecified type  Resolved. Will change to monitor. 2. Depression, unspecified depression type  Patient denies any current suicidal homicidal ideations. Patient is in contact with the . We will continue to monitor. No changes in medication at this time. 3. Gastroesophageal reflux disease without esophagitis  Patient advised to avoid any acidic products. He can continue taking acid reflux medicine. 4. Atrial flutter, unspecified type [facility])  With cardiology. Stable at this time. 5. Mild persistent asthma without complication  No acute exacerbation at this time. 6. Hyperlipidemia, unspecified hyperlipidemia type  Continue with cholesterol medicine    7. Fatty liver disease, nonalcoholic  Also extensively about fatty liver and disease process. Will stop Metformin due to well-controlled sugar levels and the history of fatty liver. 8. PTSD (post-traumatic stress disorder)  Stable. 9. Prediabetes  Stop Metformin.  - POCT glycosylated hemoglobin (Hb A1C)          Please note, this report has been partially produced using speech recognition software  and may cause  and /or contain errors related to that system including grammar, punctuation and spelling as well as words and phrases that may seem inappropriate. If there are questions or concerns please feel free to contact me to clarify.

## 2021-03-15 NOTE — PATIENT INSTRUCTIONS
Patient Education        Nonalcoholic Steatohepatitis (PERALTA): Care Instructions  Your Care Instructions     Nonalcoholic steatohepatitis (PERALTA) is liver inflammation. It is caused by a buildup of fat in the liver. The fat buildup is not caused by drinking alcohol. Because of the inflammation, the liver does not work as well as it should. PERALTA is part of a group of liver diseases called nonalcoholic fatty liver disease. In these diseases, fat builds up in the liver and sometimes causes liver damage. This damage can get worse over time. Follow-up care is a key part of your treatment and safety. Be sure to make and go to all appointments, and call your doctor if you are having problems. It's also a good idea to know your test results and keep a list of the medicines you take. How can you care for yourself at home? · Stay at a healthy weight. Or if you need to, slowly get to a healthy weight. · Control your cholesterol. Talk to your doctor about ways to lower your cholesterol, if needed. You might try getting active, taking medicines, and making healthy changes to your diet. · Eat healthy foods. This includes fruits, vegetables, lean meats and dairy, and whole grains. · If you have diabetes, keep your blood sugar at your target level. · Get at least 30 minutes of exercise on most days of the week. Walking is a good choice. You also may want to do other activities, such as running, swimming, cycling, or playing tennis or team sports. · Limit alcohol, or do not drink. Alcohol can damage the liver and cause health problems. When should you call for help? Call 911 anytime you think you may need emergency care. For example, call if:    · You have trouble breathing.     · You vomit blood or what looks like coffee grounds.    Call your doctor now or seek immediate medical care if:    · You feel very sleepy or confused.     · You have new or worse belly pain.     · You have a fever.     · There is a new or increasing yellow tint to your skin or the whites of your eyes.     · You have any abnormal bleeding, such as:  ? Nosebleeds. ? Vaginal bleeding that is different (heavier, more frequent, at a different time of the month) than what you are used to.  ? Bloody or black stools, or rectal bleeding. ? Bloody or pink urine. Watch closely for changes in your health, and be sure to contact your doctor if:    · Your belly is getting bigger.     · You are gaining weight.     · You have any problems. Where can you learn more? Go to https://SoloPowerpeSustainable Food Developmenteb.StackBlaze. org and sign in to your SwingTime account. Enter V143 in the Mashed jobs box to learn more about \"Nonalcoholic Steatohepatitis (PERALTA): Care Instructions. \"     If you do not have an account, please click on the \"Sign Up Now\" link. Current as of: April 15, 2020               Content Version: 12.8  © 2006-2021 Setup. Care instructions adapted under license by [facility] ([facility]). If you have questions about a medical condition or this instruction, always ask your healthcare professional. [place] [address] any warranty or liability for your use of this information. Patient Education        Gastroesophageal Reflux Disease (GERD): Care Instructions  Overview     Gastroesophageal reflux disease (GERD) is the backward flow of stomach acid into the esophagus. The esophagus is the tube that leads from your throat to your stomach. A one-way valve prevents the stomach acid from backing up into this tube. But when you have GERD, this valve does not close tightly enough. This can also cause pain and swelling in your esophagus. (This is called esophagitis.)  If you have mild GERD symptoms including heartburn, you may be able to control the problem with antacids or over-the-counter medicine. You can also make lifestyle changes to help reduce your symptoms.  These include changing your diet and eating habits, such as not eating late at night and losing weight. Follow-up care is a key part of your treatment and safety. Be sure to make and go to all appointments, and call your doctor if you are having problems. It's also a good idea to know your test results and keep a list of the medicines you take. How can you care for yourself at home? · Take your medicines exactly as prescribed. Call your doctor if you think you are having a problem with your medicine. · Your doctor may recommend over-the-counter medicine. For mild or occasional indigestion, antacids, such as Tums, Gaviscon, Mylanta, or Maalox, may help. Your doctor also may recommend over-the-counter acid reducers, such as famotidine (Pepcid AC), cimetidine (Tagamet HB), or omeprazole (Prilosec). Read and follow all instructions on the label. If you use these medicines often, talk with your doctor. · Change your eating habits. ? It's best to eat several small meals instead of two or three large meals. ? After you eat, wait 2 to 3 hours before you lie down. ? Chocolate, mint, and alcohol can make GERD worse. ? Spicy foods, foods that have a lot of acid (like tomatoes and oranges), and coffee can make GERD symptoms worse in some people. If your symptoms are worse after you eat a certain food, you may want to stop eating that food to see if your symptoms get better. · Do not smoke or chew tobacco. Smoking can make GERD worse. If you need help quitting, talk to your doctor about stop-smoking programs and medicines. These can increase your chances of quitting for good. · If you have GERD symptoms at night, raise the head of your bed 6 to 8 inches by putting the frame on blocks or placing a foam wedge under the head of your mattress. (Adding extra pillows does not work.)  · Do not wear tight clothing around your middle. · Lose weight if you need to. Losing just 5 to 10 pounds can help. When should you call for help?    Call your doctor now or seek immediate medical care if:    · You have new or different belly pain.     · Your stools are black and tarlike or have streaks of blood. Watch closely for changes in your health, and be sure to contact your doctor if:    · Your symptoms have not improved after 2 days.     · Food seems to catch in your throat or chest.   Where can you learn more? Go to https://chpeyaneeweb.Granite Horizon. org and sign in to your Collibra account. Enter M249 in the YouScience box to learn more about \"Gastroesophageal Reflux Disease (GERD): Care Instructions. \"     If you do not have an account, please click on the \"Sign Up Now\" link. Current as of: April 15, 2020               Content Version: 12.8  © 2006-2021 Healthwise, Incorporated. Care instructions adapted under license by [facility] ([facility]). If you have questions about a medical condition or this instruction, always ask your healthcare professional. [address] any warranty or liability for your use of this information.

## 2021-03-16 ENCOUNTER — CARE COORDINATION (OUTPATIENT)
Dept: CARE COORDINATION | Age: 61
End: 2021-03-16

## 2021-03-16 NOTE — CARE COORDINATION
Ambulatory Care Coordination Note  3/16/2021  CM Risk Score: 6  Charlson 10 Year Mortality Risk Score: 79%     ACC: Chanel Guerrero, RN                      I SPOKE WITH ZAY TODAY AND SHE REQUESTS THAT I CALL HER BACK ON MONDAY, SHE IS BUSY THIS WEEK AT \"GATHERING HOUSE\" AND DOES NOT GET HOME UNTIL LATER IN THE DAY, SHE DID REQUEST I CHECK WITH [facility] AND MAKE SURE HER MEDS ARE BEING DELIVERED TODAY,I  SPOKE WITH [facility] AND VERIFIED THAT THEY WILL DELIVER TODAY, MESSAGE LEFT AT ZAY'S CELL NUMBER WITH THIS INFORMATION PER HER REQUEST. KODIMARY TO COMPLETE ASSESSMENT TODAY, STATES \"ALL IS WELL\"   Care Coordination Plan of Care: This nurse Care Coordinator will follow on ongoing needs   - outcome of follow up with Dr. Ana Carl- uti, back pain, ct abdomen nausea. vomiting,   follow up scheduled on 3/15/21  -outcome of concerns with [facility]- were meds changed to charak from [address] [place] [address] Street? ?  -cardiology visit  -mental status? ?  -review hgbaic results                Care Coordination Interventions    Program Enrollment: Rising Risk  Referral from Primary Care Provider: No  Suggested Interventions and [address]:  (Comment: Currently followed by [facility])  Palliative Care:  (Comment: 10/13/20 Discussed services)  Pharmacist: Completed (Comment: 10/13/20 Clinical Rx Referral)  Registered Dietician: Completed (Comment: 10/13/2020 Declines; 11/3/2020 [employer] Dietician referral - patient agreed.)  Social Work: In Process (Comment: 01/22: referring to [employer]  for community resources)  Other Services: Completed (Comment: 10/13/20 ACP Referral)  Transportation Support: Declined  Zone Management Tools: Completed (Comment: 10/13/2020 Asthma Action Plan (Zones). )  Other Services or Interventions: 10/13/2020 Instructed on same day, next day, and Walk-In Care. Goals Addressed    None         Prior to Admission medications    Medication Sig Start Date End Date Taking? Authorizing Provider   ondansetron (ZOFRAN-ODT) 4 MG disintegrating tablet Take 1 tablet by mouth every 12 hours as needed for Nausea or Vomiting 3/1/21   Sharon Edmondson MD   ciclopirox (PENLAC) 8 % solution APPLY TO ADJACENT SKIN AND AFFECTED NAILS DAILY.  REMOVE WITH ALCOHOL EVERY 7 DAYS. 2/21/21   Sharon Edmondson MD   metoprolol tartrate (LOPRESSOR) 50 MG tablet Take 1 tablet by mouth 2 times daily 2/1/21   Mina Augustin MD   famotidine (PEPCID) 20 MG tablet TAKE 1 TABLET BY MOUTH 2 TIMES DAILY (AM,PM) 1/14/21   Sharon Edmondson MD   vitamin D (D3-1000) 25 MCG (1000 UT) CAPS Take 1 capsule by mouth Daily with supper 11/24/20   Sharon Edmondson MD   aspirin EC 81 MG EC tablet Take 1 tablet by mouth daily 11/24/20   Trace Lomeli MD   Multiple Vitamin (DAILY-MANDI) TABS TAKE 1 TABLET BY MOUTH EVERY DAY 11/13/20   ARASH Frazier CNP   levothyroxine (SYNTHROID) 50 MCG tablet Take 1 tablet by mouth every morning 11/13/20   ARASH Patrick CNP   Biotin 300 MCG TABS Take 1 tablet by mouth daily 11/13/20   ARASH Patrick CNP   psyllium 0.52 g capsule Take 1 capsule by mouth daily 10/30/20   Sharon Edmondson MD   citalopram (CELEXA) 40 MG tablet Take 40 mg by mouth daily    Historical Provider, MD   benztropine (COGENTIN) 1 MG tablet Take 1 mg by mouth 3 times daily 10/15/20   Historical Provider, MD   fluticasone (FLONASE) 50 MCG/ACT nasal spray USE 1 SPRAY INTO EACH NOSTRIL DAILY 10/13/20   Sharon Edmondson MD   vitamin B-2 (RIBOFLAVIN) 100 MG TABS tablet Take 1 tablet by mouth daily 5/25/20   Jackie Rodrigez MD   budesonide-formoterol (SYMBICORT) 160-4.5 MCG/ACT AERO Inhale 2 puffs into the lungs 2 times daily 3/10/20   Tammy Miranda MD   cyanocobalamin (CVS VITAMIN B12) 1000 MCG tablet Take 1 tablet by mouth daily 2/21/20   Tammy Miranda MD   traZODone (DESYREL) 50 MG tablet Take by mouth nightly 1-2 tablets nightly    Historical Provider, MD pravastatin (PRAVACHOL) 40 MG tablet TAKE 1 TABLET BY MOUTH EVERY DAY IN THE EVENING 9/3/19   Merrick Macario MD   albuterol sulfate  (90 Base) MCG/ACT inhaler USE 2 PUFFS EVERY 4 HRS AS NEEDED FOR WHEEZING/SOB-SPACE OUT TO EVERY 6 HR AS SYMPTOMS IMPROVE 7/19/19   Merrick Macario MD   cetirizine (ZYRTEC) 10 MG tablet TAKE 1 TABLET BY MOUTH EVERY DAY AS NEEDED FOR ALLERGIES 5/8/19   Arnulfo Ruelas MD   EPINEPHrine (EPIPEN 2-PRAVEENA) 0.3 MG/0.3ML SOAJ injection Use as directed for allergic reaction 6/11/18   Merrick Macario MD   ARIPiprazole (ABILIFY) 15 MG tablet TAKE 1 TABLET BY MOUTH EVERY DAY 1/17/17   Historical Provider, MD   Multiple Vitamins-Minerals (THERAPEUTIC MULTIVITAMIN-MINERALS) tablet Take 1 tablet by mouth daily 12/29/15 6/12/16  Merrick Macario MD   buPROPion (WELLBUTRIN XL) 300 MG XL tablet Take 1 tablet by mouth daily 8/28/15   Merrick Macario MD   albuterol (PROVENTIL;VENTOLIN) 90 MCG/ACT inhaler Inhale 2 puffs into the lungs every 6 hours as needed.  12/21/12 11/5/19  Merrick Macario MD       Future Appointments   Date Time Provider Jay Lini   4/12/2021  9:00 AM Lei Byrd, [address] [place] [address]   6/15/2021 10:00 AM Jacey Lyons MD [id number] [facility]

## 2021-03-23 ENCOUNTER — CARE COORDINATION (OUTPATIENT)
Dept: CARE COORDINATION | Age: 61
End: 2021-03-23

## 2021-03-23 NOTE — CARE COORDINATION
Ambulatory Care Coordination Note  3/23/2021  CM Risk Score: 6  Charlson 10 Year Mortality Risk Score: 79%     ACC: Avery Nayak RN    Summary Note: ACM called patient, she states she is on the phone with case deysi and requests call at a later date, will follow next week for ongoing care coordination needs. -review follow up with Dr. Isabelle Samuel, back pain, ct abdomen nausea. vomiting,  - outcome of concerns with Charak- were meds changed to charak from [address]? ?  -cardiology visit  -mental status? ?  -review hgbaic results      Care Coordination Interventions    Program Enrollment: Rising Risk  Referral from Primary Care Provider: No  Suggested Interventions and [address]:  (Comment: Currently followed by [facility])  Palliative Care:  (Comment: 10/13/20 Discussed services)  Pharmacist: Completed (Comment: 10/13/20 Clinical Rx Referral)  Registered Dietician: Completed (Comment: 10/13/2020 Declines; 11/3/2020 [facility] referral - patient agreed.)  Social Work: In Process (Comment: 01/22: referring to [facility]  for community resources)  Other Services: Completed (Comment: 10/13/20 ACP Referral)  Transportation Support: Declined  Zone Management Tools: Completed (Comment: 10/13/2020 Asthma Action Plan (Zones). )  Other Services or Interventions: 10/13/2020 Instructed on same day, next day, and Walk-In Care. Goals Addressed    None         Prior to Admission medications    Medication Sig Start Date End Date Taking? Authorizing Provider   ondansetron (ZOFRAN-ODT) 4 MG disintegrating tablet Take 1 tablet by mouth every 12 hours as needed for Nausea or Vomiting 3/1/21   Wei Dela MD   ciclopirox (PENLAC) 8 % solution APPLY TO ADJACENT SKIN AND AFFECTED NAILS DAILY.  REMOVE WITH ALCOHOL EVERY 7 DAYS. 2/21/21   Fatou Lomeli MD   metoprolol tartrate (LOPRESSOR) 50 MG tablet Take 1 tablet by mouth 2 times daily 2/1/21   Janelle Crenshaw MD famotidine (PEPCID) 20 MG tablet TAKE 1 TABLET BY MOUTH 2 TIMES DAILY (AM,PM) 1/14/21   Trace Lomeli MD   vitamin D (D3-1000) 25 MCG (1000 UT) CAPS Take 1 capsule by mouth Daily with supper 11/24/20   Naima Rodriguez MD   aspirin EC 81 MG EC tablet Take 1 tablet by mouth daily 11/24/20   Trace Lomeli MD   Multiple Vitamin (DAILY-MANDI) TABS TAKE 1 TABLET BY MOUTH EVERY DAY 11/13/20   ARASH Alvarez CNP   levothyroxine (SYNTHROID) 50 MCG tablet Take 1 tablet by mouth every morning 11/13/20   ARASH Peña CNP   Biotin 300 MCG TABS Take 1 tablet by mouth daily 11/13/20   ARASH Peña CNP   psyllium 0.52 g capsule Take 1 capsule by mouth daily 10/30/20   Naima Rodriguez MD   citalopram (CELEXA) 40 MG tablet Take 40 mg by mouth daily    Historical Provider, MD   benztropine (COGENTIN) 1 MG tablet Take 1 mg by mouth 3 times daily 10/15/20   Historical Provider, MD   fluticasone (FLONASE) 50 MCG/ACT nasal spray USE 1 SPRAY INTO EACH NOSTRIL DAILY 10/13/20   Naima Rodriguez MD   vitamin B-2 (RIBOFLAVIN) 100 MG TABS tablet Take 1 tablet by mouth daily 5/25/20   Kenan Bateman MD   budesonide-formoterol (SYMBICORT) 160-4.5 MCG/ACT AERO Inhale 2 puffs into the lungs 2 times daily 3/10/20   Hubert Lin MD   cyanocobalamin ([employer]S VITAMIN B12) 1000 MCG tablet Take 1 tablet by mouth daily 2/21/20   Hubert Lin MD   traZODone (DESYREL) 50 MG tablet Take by mouth nightly 1-2 tablets nightly    Historical Provider, MD   pravastatin (PRAVACHOL) 40 MG tablet TAKE 1 TABLET BY MOUTH EVERY DAY IN THE EVENING 9/3/19   Hubert Lin MD   albuterol sulfate  (90 Base) MCG/ACT inhaler USE 2 PUFFS EVERY 4 HRS AS NEEDED FOR WHEEZING/SOB-SPACE OUT TO EVERY 6 HR AS SYMPTOMS IMPROVE 7/19/19   Hubert Lin MD   cetirizine (ZYRTEC) 10 MG tablet TAKE 1 TABLET BY MOUTH EVERY DAY AS NEEDED FOR ALLERGIES 5/8/19   Morales Nice MD   EPINEPHrine (EPIPEN 2-PRAVEENA) 0.3 MG/0.3ML SOAJ injection Use as directed for allergic reaction 6/11/18   Eugenie Liu MD   ARIPiprazole (ABILIFY) 15 MG tablet TAKE 1 TABLET BY MOUTH EVERY DAY 1/17/17   Historical Provider, MD   Multiple Vitamins-Minerals (THERAPEUTIC MULTIVITAMIN-MINERALS) tablet Take 1 tablet by mouth daily 12/29/15 6/12/16  Eugenie Liu MD   buPROPion (WELLBUTRIN XL) 300 MG XL tablet Take 1 tablet by mouth daily 8/28/15   Eugenie Liu MD   albuterol (PROVENTIL;VENTOLIN) 90 MCG/ACT inhaler Inhale 2 puffs into the lungs every 6 hours as needed.  12/21/12 11/5/19  Eugenie Liu MD       Future Appointments   Date Time Provider Jay Lini   4/12/2021  9:00 AM [place] [id number]   6/15/2021 10:00 AM Jurgen Infante MD [address] [facility]

## 2021-03-29 DIAGNOSIS — E03.9 ACQUIRED HYPOTHYROIDISM: ICD-10-CM

## 2021-03-29 DIAGNOSIS — G25.0 ESSENTIAL TREMOR: Primary | ICD-10-CM

## 2021-03-30 ENCOUNTER — CARE COORDINATION (OUTPATIENT)
Dept: CARE COORDINATION | Age: 61
End: 2021-03-30

## 2021-03-30 RX ORDER — LEVOTHYROXINE SODIUM 0.05 MG/1
TABLET ORAL
Qty: 90 TABLET | Refills: 0 | Status: SHIPPED | OUTPATIENT
Start: 2021-03-30 | End: 2021-11-10 | Stop reason: SDUPTHER
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
## 2021-04-05 ENCOUNTER — OFFICE VISIT (OUTPATIENT)
Dept: FAMILY MEDICINE CLINIC | Age: 61
End: 2021-04-05
Payer: COMMERCIAL

## 2021-04-05 VITALS
DIASTOLIC BLOOD PRESSURE: 76 MMHG | BODY MASS INDEX: 36.8 KG/M2 | OXYGEN SATURATION: 97 % | TEMPERATURE: 97.8 F | SYSTOLIC BLOOD PRESSURE: 126 MMHG | HEART RATE: 101 BPM | HEIGHT: 66 IN | WEIGHT: 229 LBS

## 2021-04-05 DIAGNOSIS — M25.551 RIGHT HIP PAIN: Primary | ICD-10-CM
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
## 2021-04-05 DIAGNOSIS — M25.511 ACUTE PAIN OF BOTH SHOULDERS: ICD-10-CM

## 2021-04-05 DIAGNOSIS — M25.512 ACUTE PAIN OF BOTH SHOULDERS: ICD-10-CM

## 2021-04-05 PROCEDURE — G8417 CALC BMI ABV UP PARAM F/U: HCPCS | Performed by: PHYSICIAN ASSISTANT

## 2021-04-05 PROCEDURE — 1036F TOBACCO NON-USER: CPT | Performed by: PHYSICIAN ASSISTANT

## 2021-04-05 PROCEDURE — 99213 OFFICE O/P EST LOW 20 MIN: CPT | Performed by: PHYSICIAN ASSISTANT

## 2021-04-05 PROCEDURE — G8427 DOCREV CUR MEDS BY ELIG CLIN: HCPCS | Performed by: PHYSICIAN ASSISTANT

## 2021-04-05 PROCEDURE — 3017F COLORECTAL CA SCREEN DOC REV: CPT | Performed by: PHYSICIAN ASSISTANT

## 2021-04-05 RX ORDER — CYCLOBENZAPRINE HCL 10 MG
10 TABLET ORAL 3 TIMES DAILY PRN
Qty: 30 TABLET | Refills: 0 | Status: SHIPPED | OUTPATIENT
Start: 2021-04-05 | End: 2021-04-15

## 2021-04-05 RX ORDER — CYCLOBENZAPRINE HCL 10 MG
10 TABLET ORAL 3 TIMES DAILY PRN
Qty: 30 TABLET | Refills: 0 | Status: SHIPPED | OUTPATIENT
Start: 2021-04-05 | End: 2021-04-05 | Stop reason: CLARIF

## 2021-04-05 NOTE — PROGRESS NOTES
Subjective:      Patient ID: Maren Alex is a 62 y.o. female who presents today for:  Chief Complaint   Patient presents with    Shoulder Pain     Patient here with pain from shoulder down througth hands bilateral, right hip pain and buttox, , fell 4 days ago,  3 days started hurts        HPI  62year old female who woke up and fell 5 days ago. She complains of bilateral shoulder pain and right hip pain. She states that she is having a hard time sleeping due to pain. She currently rates the pain at 0/10. When she lays down she rates it at 10/10.  She took OTC tylenol to relieve the pain    Past Medical History:   Diagnosis Date    Asthma 2015    Bilateral renal cysts 2013    Depression     since age 35, was with Dr Tashi Baker, now the [facility]    Diverticulosis of sigmoid colon 2012    Dr Aggie VILLANUEVA of left shoulder     Environmental allergies     Fatty liver 2013    GERD (gastroesophageal reflux disease)     History of cardiac arrhythmia     \"due to stress, was placed on medication\"    Hydatidiform mole     age 26     Hyperlipidemia     Hypothyroidism 2011    IFG (impaired fasting glucose) 2013    Melanoma of eye ([address]     age 30, R eye prosthesis, Dr Dimitrios Vang    Obesity     Prediabetes     PTSD (post-traumatic stress disorder)     age 35, [facility]    S/P colonoscopy 04/19/2012    Dr. Vee Saliva S/P hysterectomy with oophorectomy 2012    Dr Thang Champion Vitamin D deficiency      Past Surgical History:   Procedure Laterality Date    DILATION AND CURETTAGE OF UTERUS      ENDOMETRIAL ABLATION  6/2012    EYE REMOVAL      OD for melanoma, age 30    FOOT OSTEOTOMY Left 09/23/2016    Erna [place] DPM      LINDSAY AND BSO  2012    Dr Radha Kapadia     Social History     Socioeconomic History    Marital status:      Spouse name: Not on file    Number of children: 3    Years of education: 15    Highest education level: High school graduate   Occupational History    tablet 0    ondansetron (ZOFRAN-ODT) 4 MG disintegrating tablet Take 1 tablet by mouth every 12 hours as needed for Nausea or Vomiting 21 tablet 0    ciclopirox (PENLAC) 8 % solution APPLY TO ADJACENT SKIN AND AFFECTED NAILS DAILY. REMOVE WITH ALCOHOL EVERY 7 DAYS.  6.6 mL 3    metoprolol tartrate (LOPRESSOR) 50 MG tablet Take 1 tablet by mouth 2 times daily 60 tablet 0    famotidine (PEPCID) 20 MG tablet TAKE 1 TABLET BY MOUTH 2 TIMES DAILY (AM,PM) 60 tablet 2    vitamin D (D3-1000) 25 MCG (1000 UT) CAPS Take 1 capsule by mouth Daily with supper 90 capsule 1    aspirin EC 81 MG EC tablet Take 1 tablet by mouth daily 30 tablet 5    Multiple Vitamin (DAILY-MANDI) TABS TAKE 1 TABLET BY MOUTH EVERY DAY 30 tablet 5    Biotin 300 MCG TABS Take 1 tablet by mouth daily 30 tablet 3    psyllium 0.52 g capsule Take 1 capsule by mouth daily 30 capsule 5    citalopram (CELEXA) 40 MG tablet Take 40 mg by mouth daily      benztropine (COGENTIN) 1 MG tablet Take 1 mg by mouth 3 times daily      fluticasone (FLONASE) 50 MCG/ACT nasal spray USE 1 SPRAY INTO EACH NOSTRIL DAILY 1 Bottle 0    vitamin B-2 (RIBOFLAVIN) 100 MG TABS tablet Take 1 tablet by mouth daily 90 tablet 0    budesonide-formoterol (SYMBICORT) 160-4.5 MCG/ACT AERO Inhale 2 puffs into the lungs 2 times daily 1 Inhaler 3    cyanocobalamin (CVS VITAMIN B12) 1000 MCG tablet Take 1 tablet by mouth daily 30 tablet 5    traZODone (DESYREL) 50 MG tablet Take by mouth nightly 1-2 tablets nightly      pravastatin (PRAVACHOL) 40 MG tablet TAKE 1 TABLET BY MOUTH EVERY DAY IN THE EVENING 90 tablet 1    albuterol sulfate  (90 Base) MCG/ACT inhaler USE 2 PUFFS EVERY 4 HRS AS NEEDED FOR WHEEZING/SOB-SPACE OUT TO EVERY 6 HR AS SYMPTOMS IMPROVE 1 Inhaler 3    cetirizine (ZYRTEC) 10 MG tablet TAKE 1 TABLET BY MOUTH EVERY DAY AS NEEDED FOR ALLERGIES 30 tablet 5    EPINEPHrine (EPIPEN 2-PRAVEENA) 0.3 MG/0.3ML SOAJ injection Use as directed for allergic reaction 2 each 0    ARIPiprazole (ABILIFY) 15 MG tablet TAKE 1 TABLET BY MOUTH EVERY DAY  2    buPROPion (WELLBUTRIN XL) 300 MG XL tablet Take 1 tablet by mouth daily 30 tablet 6     No current facility-administered medications for this visit. Review of Systems    Objective:   /76 (Site: Right Upper Arm, Position: Sitting, Cuff Size: Large Adult)   Pulse 101   Temp 97.8 °F (36.6 °C) (Temporal)   Ht 5' 6\" (1.676 m)   Wt 229 lb (103.9 kg)   SpO2 97%   BMI 36.96 kg/m²     Physical Exam    Assessment:       Diagnosis Orders   1. Right hip pain  XR PELVIS (MIN 3 VIEWS)    DISCONTINUED: cyclobenzaprine (FLEXERIL) 10 MG tablet   2. Acute pain of both shoulders  XR SHOULDER LEFT (MIN 2 VIEWS)    XR SHOULDER RIGHT (MIN 2 VIEWS)    DISCONTINUED: cyclobenzaprine (FLEXERIL) 10 MG tablet   3. Fall, initial encounter       No results found for this visit on 04/05/21. Plan:     Assessment & Plan   Mark Kumar was seen today for shoulder pain. Diagnoses and all orders for this visit:    Right hip pain  -     XR PELVIS (MIN 3 VIEWS); Future  -     Discontinue: cyclobenzaprine (FLEXERIL) 10 MG tablet; Take 1 tablet by mouth 3 times daily as needed for Muscle spasms    Acute pain of both shoulders  -     XR SHOULDER LEFT (MIN 2 VIEWS); Future  -     XR SHOULDER RIGHT (MIN 2 VIEWS); Future  -     Discontinue: cyclobenzaprine (FLEXERIL) 10 MG tablet; Take 1 tablet by mouth 3 times daily as needed for Muscle spasms    Fall, initial encounter    Other orders  -     cyclobenzaprine (FLEXERIL) 10 MG tablet;  Take 1 tablet by mouth 3 times daily as needed for Muscle spasms      Orders Placed This Encounter   Procedures    XR PELVIS (MIN 3 VIEWS)     Standing Status:   Future     Standing Expiration Date:   4/5/2022     Order Specific Question:   Reason for exam:     Answer:   r/o fracture    XR SHOULDER LEFT (MIN 2 VIEWS)     Standing Status:   Future     Standing Expiration Date:   4/5/2022     Order Specific Question:   Reason for exam:     Answer:   r/o dislocation or fracture    XR SHOULDER RIGHT (MIN 2 VIEWS)     Standing Status:   Future     Standing Expiration Date:   4/5/2022     Order Specific Question:   Reason for exam:     Answer:   r/o fracture or dislocation     Orders Placed This Encounter   Medications    DISCONTD: cyclobenzaprine (FLEXERIL) 10 MG tablet     Sig: Take 1 tablet by mouth 3 times daily as needed for Muscle spasms     Dispense:  30 tablet     Refill:  0    cyclobenzaprine (FLEXERIL) 10 MG tablet     Sig: Take 1 tablet by mouth 3 times daily as needed for Muscle spasms     Dispense:  30 tablet     Refill:  0     Medications Discontinued During This Encounter   Medication Reason    cyclobenzaprine (FLEXERIL) 10 MG tablet ERROR     Return if symptoms worsen or fail to improve. Reviewed with the patient/family: current clinical status & medications. Side effects of the medication prescribed today, as well as treatment plan/rationale and result expectations have been discussed with the patient/family who expresses understanding. Patient will be discharged home in stable condition. Follow up with PCP to evaluate treatment results or return if symptoms worsen or fail to improve. Discussed signs and symptoms which require immediate follow-up in ED/call to 911. Understanding verbalized. I have reviewed the patient's medical history in detail and updated the computerized patient record.     XU Saldaña

## 2021-04-05 NOTE — PATIENT INSTRUCTIONS
Patient Education        Shoulder Pain: Care Instructions  Your Care Instructions     You can hurt your shoulder by using it too much during an activity, such as fishing or baseball. It can also happen as part of the everyday wear and tear of getting older. Shoulder injuries can be slow to heal, but your shoulder should get better with time. Your doctor may recommend a sling to rest your shoulder. If you have injured your shoulder, you may need testing and treatment. Follow-up care is a key part of your treatment and safety. Be sure to make and go to all appointments, and call your doctor if you are having problems. It's also a good idea to know your test results and keep a list of the medicines you take. How can you care for yourself at home? · Take pain medicines exactly as directed. ? If the doctor gave you a prescription medicine for pain, take it as prescribed. ? If you are not taking a prescription pain medicine, ask your doctor if you can take an over-the-counter medicine. ? Do not take two or more pain medicines at the same time unless the doctor told you to. Many pain medicines contain acetaminophen, which is Tylenol. Too much acetaminophen (Tylenol) can be harmful. · If your doctor recommends that you wear a sling, use it as directed. Do not take it off before your doctor tells you to. · Put ice or a cold pack on the sore area for 10 to 20 minutes at a time. Put a thin cloth between the ice and your skin. · If there is no swelling, you can put moist heat, a heating pad, or a warm cloth on your shoulder. Some doctors suggest alternating between hot and cold. · Rest your shoulder for a few days. If your doctor recommends it, you can then begin gentle exercise of the shoulder, but do not lift anything heavy. When should you call for help? Call 911 anytime you think you may need emergency care. For example, call if:    · You have chest pain or pressure. This may occur with:  ? Sweating.   ? Shortness of breath. ? Nausea or vomiting. ? Pain that spreads from the chest to the neck, jaw, or one or both shoulders or arms. ? Dizziness or lightheadedness. ? A fast or uneven pulse. After calling 911, chew 1 adult-strength aspirin. Wait for an ambulance. Do not try to drive yourself.     · Your arm or hand is cool or pale or changes color. Call your doctor now or seek immediate medical care if:    · You have signs of infection, such as:  ? Increased pain, swelling, warmth, or redness in your shoulder. ? Red streaks leading from a place on your shoulder. ? Pus draining from an area of your shoulder. ? Swollen lymph nodes in your neck, armpits, or groin. ? A fever. Watch closely for changes in your health, and be sure to contact your doctor if:    · You cannot use your shoulder.     · Your shoulder does not get better as expected. Where can you learn more? Go to [email or web address] org and sign in to your my[employer] account. Enter D284 in the [employer] box to learn more about \"Shoulder Pain: Care Instructions. \"     If you do not have an account, please click on the \"Sign Up Now\" link. Current as of: November 16, 2020               Content Version: 12.8  © 2006-2021 [employer]wise, Inc[employer]. Care instructions adapted under license by [facility] ([facility]). If you have questions about a medical condition or this instruction, always ask your healthcare professional. [place] [address] any warranty or liability for your use of this information.

## 2021-04-06 ENCOUNTER — CARE COORDINATION (OUTPATIENT)
Dept: CARE COORDINATION | Age: 61
End: 2021-04-06

## 2021-04-06 NOTE — CARE COORDINATION
Ambulatory Care Coordination Note  4/6/2021  CM Risk Score: 6  Charlson 10 Year Mortality Risk Score: 79%     ACC: Major Adams RN    Summary Note: Contacted patient for CC follow-up. Reconciled home medications. Patient reports compliance with medications. Patient seen in [facility] for back pain that developed post fall. Discussed PT for balance and fall prevention. Patient declines. Reinforced education on home safety. Discussed discharge from [address] [place] [address] at this time. Patient is able to follow-up with providers. Patient is contacting PCP or [facility] for non-emergent medical concerns. Patient receiving medications by home delivery by [facility]. Patient reports compliance. Patient informed that I will no longer be reaching out to her on a regular basis. Informed patient that she may re-enroll in program should she have future needs. Patient verbalizes agreement. Lexii Ballesteros MD notified of CC discharge. General Assessment    Do you have any symptoms that are causing concern?: Yes  Progression since Onset: Gradually Improving  Reported Symptoms: Pain (Comment: Back pain from fall.)           Care Coordination Interventions    Program Enrollment: Rising Risk  Referral from Primary Care Provider: No  Suggested Interventions and [address] [place] [address] [place]:  (Comment: Currently followed by [facility])  Fall Risk Prevention: In Process (Comment: 3/30/2021)  Palliative Care:  (Comment: 10/13/20 Discussed services)  Pharmacist: Completed (Comment: 10/13/20 Clinical Rx Referral)  Registered Dietician: Completed (Comment: 10/13/2020 Declines; 11/3/2020 [facility] Dietician referral - patient agreed.)  Social Work: Completed (Comment: 01/22: referring to [facility]  for community resources)  Other Services: Completed (Comment: 10/13/20 ACP Referral)  Transportation Support: Declined  Zone Management Tools: Completed (Comment: 10/13/2020 Asthma Action Plan (Zones). )  Other Services or Interventions: 10/13/2020 Instructed on same day, next day, and Walk-In Care. Goals Addressed                 This Visit's Progress     COMPLETED: Conditions and Symptoms   On track     I will schedule office visits, as directed by my provider. I will keep my appointment or reschedule if I have to cancel. I will notify my provider of any barriers to my plan of care. I will follow my Zone Management tool to seek urgent or emergent care. I will notify my provider of any symptoms that indicate a worsening of my condition. 3/2/21 following closely with PCP and stephanie    Barriers: lack of education  Plan for overcoming my barriers: Care Coordination  Confidence: 9/10  Anticipated Goal Completion Date: 3/13/2021         COMPLETED: Medication Management   On track     I will take my medication as directed. I will notify my provider of any problems with medications, like adverse effects or side effects. I will notify my provider for advice before I stop taking any of my medication. 3/2/21 taking medications as ordered, follows medication regimen closely    Barriers: overwhelmed by complexity of regimen and lack of education  Plan for overcoming my barriers: Care Coordination, Clinical Rx    Confidence: 9/10  Anticipated Goal Completion Date: 3/13/2021       COMPLETED: Reduce Falls    Improving     I will reduce my risk of falls by the following: Follow recommendations for falls prevention and home safety. Barriers: lack of education  Plan for overcoming my barriers: Care Coordination  Confidence: 8/10  Anticipated Goal Completion Date: 4/30/2021            Prior to Admission medications    Medication Sig Start Date End Date Taking?  Authorizing Provider   cyclobenzaprine (FLEXERIL) 10 MG tablet Take 1 tablet by mouth 3 times daily as needed for Muscle spasms 4/5/21 4/15/21 Yes XU Saldaña   levothyroxine (SYNTHROID) 50 MCG tablet TAKE 1 TABLET BY MOUTH EVERY MORNING (OWN BLSTER) 3/30/21  Yes Klarissa Nguyen MD   ondansetron (ZOFRAN-ODT) 4 MG disintegrating tablet Take 1 tablet by mouth every 12 hours as needed for Nausea or Vomiting 3/1/21  Yes Klarissa Nguyen MD   ciclopirox (PENLAC) 8 % solution APPLY TO ADJACENT SKIN AND AFFECTED NAILS DAILY.  REMOVE WITH ALCOHOL EVERY 7 DAYS. 2/21/21  Yes Klarissa Nguyen MD   metoprolol tartrate (LOPRESSOR) 50 MG tablet Take 1 tablet by mouth 2 times daily 2/1/21  Yes Beverley Paulino MD   famotidine (PEPCID) 20 MG tablet TAKE 1 TABLET BY MOUTH 2 TIMES DAILY (AM,PM) 1/14/21  Yes Klarissa Nguyen MD   vitamin D (D3-1000) 25 MCG (1000 UT) CAPS Take 1 capsule by mouth Daily with supper 11/24/20  Yes Klarissa Nguyen MD   aspirin EC 81 MG EC tablet Take 1 tablet by mouth daily 11/24/20  Yes Klarissa Nguyen MD   Multiple Vitamin (DAILY-MANDI) TABS TAKE 1 TABLET BY MOUTH EVERY DAY 11/13/20  Yes ARASH Mcneil CNP   Biotin 300 MCG TABS Take 1 tablet by mouth daily 11/13/20  Yes ARASH Mcneil CNP   citalopram (CELEXA) 40 MG tablet Take 40 mg by mouth daily   Yes Historical Provider, MD   benztropine (COGENTIN) 1 MG tablet Take 1 mg by mouth 3 times daily 10/15/20  Yes Historical Provider, MD   fluticasone (FLONASE) 50 MCG/ACT nasal spray USE 1 SPRAY INTO EACH NOSTRIL DAILY 10/13/20  Yes Klarissa Nguyen MD   vitamin B-2 (RIBOFLAVIN) 100 MG TABS tablet Take 1 tablet by mouth daily 5/25/20  Yes Corey Finch MD   budesonide-formoterol (SYMBICORT) 160-4.5 MCG/ACT AERO Inhale 2 puffs into the lungs 2 times daily 3/10/20  Yes Walt Barrett MD   cyanocobalamin ([employer] VITAMIN B12) 1000 MCG tablet Take 1 tablet by mouth daily 2/21/20  Yes Walt Barrett MD   traZODone (DESYREL) 50 MG tablet Take by mouth nightly 1-2 tablets nightly   Yes Historical Provider, MD   pravastatin (PRAVACHOL) 40 MG tablet TAKE 1 TABLET BY MOUTH EVERY DAY IN THE EVENING 9/3/19  Yes Walt Barrett MD   albuterol sulfate  (90 Base) MCG/ACT inhaler USE 2 PUFFS EVERY 4 HRS AS NEEDED FOR WHEEZING/SOB-SPACE OUT TO EVERY 6 HR AS SYMPTOMS IMPROVE 7/19/19  Yes Agatha Santana MD   cetirizine (ZYRTEC) 10 MG tablet TAKE 1 TABLET BY MOUTH EVERY DAY AS NEEDED FOR ALLERGIES 5/8/19  Yes Lotus Carvalho MD   EPINEPHrine (EPIPEN 2-PRAVEENA) 0.3 MG/0.3ML SOAJ injection Use as directed for allergic reaction 6/11/18  Yes Agatha Santana MD   ARIPiprazole (ABILIFY) 15 MG tablet TAKE 1 TABLET BY MOUTH EVERY DAY 1/17/17  Yes Historical Provider, MD   psyllium 0.52 g capsule Take 1 capsule by mouth daily 10/30/20   Clovis Villeda MD   Multiple Vitamins-Minerals (THERAPEUTIC MULTIVITAMIN-MINERALS) tablet Take 1 tablet by mouth daily 12/29/15 6/12/16  Agatha Santana MD   buPROPion (WELLBUTRIN XL) 300 MG XL tablet Take 1 tablet by mouth daily 8/28/15   Agatha Santana MD   albuterol (PROVENTIL;VENTOLIN) 90 MCG/ACT inhaler Inhale 2 puffs into the lungs every 6 hours as needed.  12/21/12 11/5/19  Agatha Santana MD       Future Appointments   Date Time Provider Jay Wong   4/12/2021  9:00 AM Ceci Saldivar  [facility]   5/5/2021  9:45 AM Mario Huber, APRN - [facility]   6/15/2021 10:00 AM Trace Jin MD [id number]

## 2021-04-12 ENCOUNTER — VIRTUAL VISIT (OUTPATIENT)
Dept: CARDIOLOGY CLINIC | Age: 61
End: 2021-04-12
Payer: COMMERCIAL

## 2021-04-12 ENCOUNTER — TELEPHONE (OUTPATIENT)
Dept: FAMILY MEDICINE CLINIC | Age: 61
End: 2021-04-12

## 2021-04-12 DIAGNOSIS — R07.9 CHEST PAIN, UNSPECIFIED TYPE: ICD-10-CM

## 2021-04-12 DIAGNOSIS — R94.31 ABNORMAL EKG: ICD-10-CM

## 2021-04-12 DIAGNOSIS — E78.5 HYPERLIPIDEMIA, UNSPECIFIED HYPERLIPIDEMIA TYPE: ICD-10-CM

## 2021-04-12 DIAGNOSIS — R05.9 COUGH: Primary | ICD-10-CM

## 2021-04-12 DIAGNOSIS — I48.92 ATRIAL FLUTTER, UNSPECIFIED TYPE (HCC): Primary | ICD-10-CM

## 2021-04-12 PROCEDURE — 99443 PR PHYS/QHP TELEPHONE EVALUATION 21-30 MIN: CPT | Performed by: INTERNAL MEDICINE

## 2021-04-12 RX ORDER — BENZONATATE 100 MG/1
100-200 CAPSULE ORAL 3 TIMES DAILY PRN
Qty: 42 CAPSULE | Refills: 0 | Status: SHIPPED | OUTPATIENT
Start: 2021-04-12 | End: 2021-04-19 | Stop reason: ALTCHOICE

## 2021-04-12 NOTE — TELEPHONE ENCOUNTER
Patient is calling in to confirm that xray was entered.  Let her know that it is in the system and she can come to have it done anytime during our business hours ]

## 2021-04-12 NOTE — PROGRESS NOTES
Kristen Mcadams is a 62 y.o. female evaluated via telephone on 4/12/2021. Consent:  She and/or health care decision maker is aware that that she may receive a bill for this telephone service, depending on her insurance coverage, and has provided verbal consent to proceed: Yes      Documentation:  4/12/21 VIRTUAL:Patient was contacted via telephone for follow-up of palpitation. She is also diabetic. Lopressor is helping. No chest pain. Stress test was negative. Low risk CHADS2 score. Non-smoker. No ankle edema no claudication no syncope dizziness. No vomiting. See me in 3 months        2/8/21: Patient presents today for follow-up of palpitation. She is better after Lopressor was started 50 twice a day. She is diabetic. Also on multiple psych medication which does not help while on Symbicort. When she does not get any she is short of breath while climbing stairs as she used to do before Lopressor was started. No syncope no dizziness. No chest pain. She has a history of depression PTSD and reactive airway disease the symptoms are controlled. We have never documented flutter. I think current psych medication she was tachycardic and also because of anxiety. That has been controlled for now.   See me in 2 months        2/1/21: Patient presents today for complaints of palpitation.  This comes up especially when she climbs stairs.  Her heart rate goes too fast has not had syncope.  In the past she has seen Dr. Cale De Santiago and there was mention of atrial flutter but never documented. Sonya Harman is on multiple psych medication. Sonya Harman denies smoking denies alcohol abuse denies illicit drug abuse.  When she has a fast heartbeat and she will get some chest pain.  No radiation.  No nausea no vomiting no syncope no fever or chills.  Last EKG from October 28 shows sinus rhythm incomplete right bundle branch block and is the same findings on today's EKG except she has sinus tachycardia 120 pm.  We will start her on

## 2021-04-13 ENCOUNTER — TELEPHONE (OUTPATIENT)
Dept: FAMILY MEDICINE CLINIC | Age: 61
End: 2021-04-13

## 2021-04-13 NOTE — TELEPHONE ENCOUNTER
Patient states that her insurance needs to be called to us state the reason why her x-ray is going to be done. 8 PM please call her insurance and let them know that she has been having this persistent cough and a chest x-ray is being done to rule out any signs of infection.

## 2021-04-13 NOTE — TELEPHONE ENCOUNTER
Patient stopped by the desk and stated that Rashmi Nayak is saying that they are not going to cover the x-ray that was ordered by Dr. Lloyd Read.  Patient asking for the doctor to contact [address] [place] [address], that she doesn't understand why they will not cover it, when they have done it before in the past.    Patient's Ph# [phone]

## 2021-04-13 NOTE — TELEPHONE ENCOUNTER
This same message is in another encounter. Forwarded to Russel Duncan to see if she could call [facility] about xray.

## 2021-04-13 NOTE — TELEPHONE ENCOUNTER
Patient called about the chest xray. She is going to come to the office to have it done. She is asking if [facility] can be contacted and given the reason she needs this xray so they will pay for it. Patient states that has requested this before so the pelvic xray would be covered. Please advise, thank you.

## 2021-04-14 ENCOUNTER — TELEPHONE (OUTPATIENT)
Dept: FAMILY MEDICINE CLINIC | Age: 61
End: 2021-04-14

## 2021-04-14 DIAGNOSIS — R05.9 COUGH: Primary | ICD-10-CM

## 2021-04-14 RX ORDER — AZITHROMYCIN 250 MG/1
250 TABLET, FILM COATED ORAL SEE ADMIN INSTRUCTIONS
Qty: 6 TABLET | Refills: 0 | Status: SHIPPED | OUTPATIENT
Start: 2021-04-14 | End: 2021-04-19 | Stop reason: ALTCHOICE

## 2021-04-14 NOTE — TELEPHONE ENCOUNTER
Liseth left arm and wrist are swollen this morning. Please advise. Not sure if this has to do with the xray results.

## 2021-04-15 ENCOUNTER — OFFICE VISIT (OUTPATIENT)
Dept: FAMILY MEDICINE CLINIC | Age: 61
End: 2021-04-15
Payer: COMMERCIAL

## 2021-04-15 VITALS
HEART RATE: 96 BPM | WEIGHT: 207 LBS | DIASTOLIC BLOOD PRESSURE: 74 MMHG | OXYGEN SATURATION: 96 % | BODY MASS INDEX: 33.27 KG/M2 | HEIGHT: 66 IN | SYSTOLIC BLOOD PRESSURE: 124 MMHG | TEMPERATURE: 98.2 F

## 2021-04-15 DIAGNOSIS — R91.8 LUNG INFILTRATE: ICD-10-CM

## 2021-04-15 DIAGNOSIS — M79.602 LEFT ARM PAIN: ICD-10-CM

## 2021-04-15 DIAGNOSIS — M79.642 LEFT HAND PAIN: Primary | ICD-10-CM

## 2021-04-15 DIAGNOSIS — M25.522 LEFT ELBOW PAIN: ICD-10-CM

## 2021-04-15 LAB
ANION GAP SERPL CALCULATED.3IONS-SCNC: 20 MEQ/L (ref 9–15)
BASOPHILS ABSOLUTE: 0 K/UL (ref 0–0.2)
BASOPHILS RELATIVE PERCENT: 0.5 %
BUN BLDV-MCNC: 15 MG/DL (ref 8–23)
CALCIUM SERPL-MCNC: 10.2 MG/DL (ref 8.5–9.9)
CHLORIDE BLD-SCNC: 109 MEQ/L (ref 95–107)
CO2: 20 MEQ/L (ref 20–31)
CREAT SERPL-MCNC: 0.78 MG/DL (ref 0.5–0.9)
EOSINOPHILS ABSOLUTE: 0.3 K/UL (ref 0–0.7)
EOSINOPHILS RELATIVE PERCENT: 4.7 %
GFR AFRICAN AMERICAN: >60
GFR NON-AFRICAN AMERICAN: >60
GLUCOSE BLD-MCNC: 82 MG/DL (ref 70–99)
HCT VFR BLD CALC: 42.2 % (ref 37–47)
HEMOGLOBIN: 14.5 G/DL (ref 12–16)
LYMPHOCYTES ABSOLUTE: 2.2 K/UL (ref 1–4.8)
LYMPHOCYTES RELATIVE PERCENT: 36.6 %
MCH RBC QN AUTO: 31.2 PG (ref 27–31.3)
MCHC RBC AUTO-ENTMCNC: 34.4 % (ref 33–37)
MCV RBC AUTO: 90.8 FL (ref 82–100)
MONOCYTES ABSOLUTE: 0.3 K/UL (ref 0.2–0.8)
MONOCYTES RELATIVE PERCENT: 5.5 %
NEUTROPHILS ABSOLUTE: 3.1 K/UL (ref 1.4–6.5)
NEUTROPHILS RELATIVE PERCENT: 52.7 %
PDW BLD-RTO: 13.8 % (ref 11.5–14.5)
PLATELET # BLD: 288 K/UL (ref 130–400)
POTASSIUM SERPL-SCNC: 4.2 MEQ/L (ref 3.4–4.9)
RBC # BLD: 4.65 M/UL (ref 4.2–5.4)
SODIUM BLD-SCNC: 149 MEQ/L (ref 135–144)
WBC # BLD: 5.9 K/UL (ref 4.8–10.8)

## 2021-04-15 PROCEDURE — 99213 OFFICE O/P EST LOW 20 MIN: CPT | Performed by: PHYSICIAN ASSISTANT

## 2021-04-15 PROCEDURE — G8417 CALC BMI ABV UP PARAM F/U: HCPCS | Performed by: PHYSICIAN ASSISTANT

## 2021-04-15 PROCEDURE — 1036F TOBACCO NON-USER: CPT | Performed by: PHYSICIAN ASSISTANT

## 2021-04-15 PROCEDURE — G8427 DOCREV CUR MEDS BY ELIG CLIN: HCPCS | Performed by: PHYSICIAN ASSISTANT

## 2021-04-15 PROCEDURE — 3017F COLORECTAL CA SCREEN DOC REV: CPT | Performed by: PHYSICIAN ASSISTANT

## 2021-04-15 RX ORDER — CEPHALEXIN 500 MG/1
500 CAPSULE ORAL 2 TIMES DAILY
Qty: 20 CAPSULE | Refills: 0 | Status: SHIPPED | OUTPATIENT
Start: 2021-04-15 | End: 2021-04-25

## 2021-04-15 ASSESSMENT — ENCOUNTER SYMPTOMS
CHEST TIGHTNESS: 0
SHORTNESS OF BREATH: 0
SINUS PRESSURE: 0
VOMITING: 0
TROUBLE SWALLOWING: 0
COUGH: 1
DIARRHEA: 0
BACK PAIN: 0
ABDOMINAL PAIN: 0

## 2021-04-15 ASSESSMENT — PATIENT HEALTH QUESTIONNAIRE - PHQ9
SUM OF ALL RESPONSES TO PHQ QUESTIONS 1-9: 0
SUM OF ALL RESPONSES TO PHQ QUESTIONS 1-9: 0
2. FEELING DOWN, DEPRESSED OR HOPELESS: 0
SUM OF ALL RESPONSES TO PHQ QUESTIONS 1-9: 0

## 2021-04-15 NOTE — PATIENT INSTRUCTIONS
Patient Education        Musculoskeletal Pain: Care Instructions  Your Care Instructions     Different problems with the bones, muscles, nerves, ligaments, and tendons in the body can cause pain. One or more areas of your body may ache or burn. Or they may feel tired, stiff, or sore. The medical term for this type of pain is musculoskeletal pain. It can have many different causes. Sometimes the pain is caused by an injury such as a strain or sprain. Or you might have pain from using one part of your body in the same way over and over again. This is called overuse. In some cases, the cause of the pain is another health problem such as arthritis or fibromyalgia. The doctor will examine you and ask you questions about your health to help find the cause of your pain. Blood tests or imaging tests like an X-ray may also be helpful. But sometimes doctors can't find a cause of the pain. Treatment depends on your symptoms and the cause of the pain, if known. The doctor has checked you carefully, but problems can develop later. If you notice any problems or new symptoms, get medical treatment right away. Follow-up care is a key part of your treatment and safety. Be sure to make and go to all appointments, and call your doctor if you are having problems. It's also a good idea to know your test results and keep a list of the medicines you take. How can you care for yourself at home? · Rest until you feel better. · Do not do anything that makes the pain worse. Return to exercise gradually if you feel better and your doctor says it's okay. · Be safe with medicines. Read and follow all instructions on the label. ? If the doctor gave you a prescription medicine for pain, take it as prescribed. ? If you are not taking a prescription pain medicine, ask your doctor if you can take an over-the-counter medicine. · Put ice or a cold pack on the area for 10 to 20 minutes at a time to ease pain.  Put a thin cloth between the

## 2021-04-15 NOTE — PROGRESS NOTES
Subjective:      Patient ID: Danielle Santana is a 62 y.o. female who presents today for:  Chief Complaint   Patient presents with    Joint Swelling     woke up yesterday monning with swollen hand        HPI  62year old female who complains of a swollen left hand and elbow. She also complains of having a cough. She was diagnosed with a patchy infiltrate and started on azithromycin today.  She complains of 4/10 pain to the right wrist.     Past Medical History:   Diagnosis Date    Asthma 2015    Bilateral renal cysts 2013    Depression     since age 35, was with Dr Parth Chicas, now the [facility]    Diverticulosis of sigmoid colon 2012    Dr Keshia Reyez DJD of left shoulder     Environmental allergies     Fatty liver 2013    GERD (gastroesophageal reflux disease)     History of cardiac arrhythmia     \"due to stress, was placed on medication\"    Hydatidiform mole     age 26     Hyperlipidemia     Hypothyroidism 2011    IFG (impaired fasting glucose) 2013    Melanoma of eye ([address]     age 30, R eye prosthesis, Dr Cruz Molina    Obesity     Prediabetes     PTSD (post-traumatic stress disorder)     age 35, 1970 [facility] [place]    S/P colonoscopy 04/19/2012    Dr. Albert Andrade S/P hysterectomy with oophorectomy 2012    Dr Zachariah Le Tremor     Dr Carmela Johnson Vitamin D deficiency      Past Surgical History:   Procedure Laterality Date    DILATION AND CURETTAGE OF UTERUS      ENDOMETRIAL ABLATION  6/2012    EYE REMOVAL      OD for melanoma, age 30    FOOT OSTEOTOMY Left 09/23/2016    Lynn Cuevas DPM      LINDSAY AND BSO  2012    Dr Anel Moore     Social History     Socioeconomic History    Marital status:      Spouse name: Not on file    Number of children: 3    Years of education: 15    Highest education level: High school graduate   Occupational History    Occupation: SSI   Social Needs    Financial resource strain: Not very hard    Food insecurity     Worry: Sometimes true     Inability: Sometimes true   [employer] needs     Medical: No     Non-medical: No   Tobacco Use    Smoking status: Never Smoker    Smokeless tobacco: Never Used   Substance and Sexual Activity    Alcohol use: No    Drug use: No    Sexual activity: Not Currently     Birth control/protection: Surgical   Lifestyle    Physical activity     Days per week: 0 days     Minutes per session: 0 min    Stress: Rather much   Relationships    Social connections     Talks on phone: Twice a week     Gets together: Never     Attends Orthodox service: Never     Active member of club or organization: No     Attends meetings of clubs or organizations: Never     Relationship status:     Intimate partner violence     Fear of current or ex partner: Not on file     Emotionally abused: Not on file     Physically abused: Not on file     Forced sexual activity: Not on file   Other Topics Concern    Not on file   Social History Narrative    Born in [place], one of 4 children, one sister disappeared at age 30, never found    Moved to [place] at age 40        Lives in an apartment in [facility] [place], alone    , 3 children, all grown, in [place]     2 granddaughters     Anthony Dumas exercising, birds    Attends [employer], has volunteered at [employer]     Family History   Problem Relation Age of Onset    Heart Failure Mother         dec 76    Diabetes Mother     Diabetes Father     Stroke Father         dec age 80    Cancer Father         Sarcoma    Breast Cancer Sister     Stomach Cancer Other      No Known Allergies  Current Outpatient Medications   Medication Sig Dispense Refill    cephALEXin (KEFLEX) 500 MG capsule Take 1 capsule by mouth 2 times daily for 10 days 20 capsule 0    azithromycin (ZITHROMAX) 250 MG tablet Take 1 tablet by mouth See Admin Instructions for 5 days 500mg on day 1 followed by 250mg on days 2 - 5 6 tablet 0    benzonatate (TESSALON) 100 MG capsule Take 1-2 capsules by mouth 3 times daily as needed for Cough 42 capsule 0  cyclobenzaprine (FLEXERIL) 10 MG tablet Take 1 tablet by mouth 3 times daily as needed for Muscle spasms 30 tablet 0    levothyroxine (SYNTHROID) 50 MCG tablet TAKE 1 TABLET BY MOUTH EVERY MORNING (OWN BLSTER) 90 tablet 0    ondansetron (ZOFRAN-ODT) 4 MG disintegrating tablet Take 1 tablet by mouth every 12 hours as needed for Nausea or Vomiting 21 tablet 0    ciclopirox (PENLAC) 8 % solution APPLY TO ADJACENT SKIN AND AFFECTED NAILS DAILY. REMOVE WITH ALCOHOL EVERY 7 DAYS.  6.6 mL 3    metoprolol tartrate (LOPRESSOR) 50 MG tablet Take 1 tablet by mouth 2 times daily 60 tablet 0    famotidine (PEPCID) 20 MG tablet TAKE 1 TABLET BY MOUTH 2 TIMES DAILY (AM,PM) 60 tablet 2    aspirin EC 81 MG EC tablet Take 1 tablet by mouth daily 30 tablet 5    Multiple Vitamin (DAILY-MANDI) TABS TAKE 1 TABLET BY MOUTH EVERY DAY 30 tablet 5    psyllium 0.52 g capsule Take 1 capsule by mouth daily 30 capsule 5    citalopram (CELEXA) 40 MG tablet Take 40 mg by mouth daily      benztropine (COGENTIN) 1 MG tablet Take 1 mg by mouth 3 times daily      fluticasone (FLONASE) 50 MCG/ACT nasal spray USE 1 SPRAY INTO EACH NOSTRIL DAILY 1 Bottle 0    budesonide-formoterol (SYMBICORT) 160-4.5 MCG/ACT AERO Inhale 2 puffs into the lungs 2 times daily 1 Inhaler 3    cyanocobalamin ([employer]S VITAMIN B12) 1000 MCG tablet Take 1 tablet by mouth daily 30 tablet 5    traZODone (DESYREL) 50 MG tablet Take by mouth nightly 1-2 tablets nightly      pravastatin (PRAVACHOL) 40 MG tablet TAKE 1 TABLET BY MOUTH EVERY DAY IN THE EVENING 90 tablet 1    albuterol sulfate  (90 Base) MCG/ACT inhaler USE 2 PUFFS EVERY 4 HRS AS NEEDED FOR WHEEZING/SOB-SPACE OUT TO EVERY 6 HR AS SYMPTOMS IMPROVE 1 Inhaler 3    cetirizine (ZYRTEC) 10 MG tablet TAKE 1 TABLET BY MOUTH EVERY DAY AS NEEDED FOR ALLERGIES 30 tablet 5    EPINEPHrine (EPIPEN 2-PRAVEENA) 0.3 MG/0.3ML SOAJ injection Use as directed for allergic reaction 2 each 0    ARIPiprazole (ABILIFY) 15 MG tablet TAKE 1 TABLET BY MOUTH EVERY DAY  2    buPROPion (WELLBUTRIN XL) 300 MG XL tablet Take 1 tablet by mouth daily 30 tablet 6    vitamin D (D3-1000) 25 MCG (1000 UT) CAPS Take 1 capsule by mouth Daily with supper (Patient not taking: Reported on 4/15/2021) 90 capsule 1    Biotin 300 MCG TABS Take 1 tablet by mouth daily (Patient not taking: Reported on 4/15/2021) 30 tablet 3    vitamin B-2 (RIBOFLAVIN) 100 MG TABS tablet Take 1 tablet by mouth daily (Patient not taking: Reported on 4/15/2021) 90 tablet 0     No current facility-administered medications for this visit. Review of Systems   Constitutional: Negative for activity change, appetite change, chills, fever and unexpected weight change. HENT: Negative for drooling, ear pain, nosebleeds, sinus pressure and trouble swallowing. Respiratory: Positive for cough. Negative for chest tightness and shortness of breath. Cardiovascular: Negative for chest pain and leg swelling. Gastrointestinal: Negative for abdominal pain, diarrhea and vomiting. Endocrine: Negative for polydipsia and polyphagia. Genitourinary: Negative for dysuria, flank pain and frequency. Musculoskeletal: Positive for arthralgias (left arm). Negative for back pain and myalgias. Skin: Negative for pallor and rash. Neurological: Negative for syncope, weakness and headaches. Hematological: Does not bruise/bleed easily. Psychiatric/Behavioral: Negative for agitation, behavioral problems and confusion. All other systems reviewed and are negative. Objective:   /74 (Site: Right Upper Arm, Position: Sitting, Cuff Size: Large Adult)   Pulse 96   Temp 98.2 °F (36.8 °C)   Ht 5' 6\" (1.676 m)   Wt 207 lb (93.9 kg)   SpO2 96%   BMI 33.41 kg/m²     Physical Exam  Vitals signs and nursing note reviewed. Constitutional:       General: She is awake. She is not in acute distress. Appearance: Normal appearance. She is well-developed and normal weight.  She is not ill-appearing, toxic-appearing or diaphoretic. Comments: No photophobia. No phonophobia. HENT:      Head: Normocephalic and atraumatic. No Duncan's sign. Right Ear: External ear normal.      Left Ear: External ear normal.      Nose: Nose normal. No congestion or rhinorrhea. Mouth/Throat:      Mouth: Mucous membranes are moist.      Pharynx: Oropharynx is clear. No oropharyngeal exudate or posterior oropharyngeal erythema. Eyes:      General: No scleral icterus. Right eye: No foreign body or discharge. Left eye: No discharge. Extraocular Movements: Extraocular movements intact. Conjunctiva/sclera: Conjunctivae normal.      Left eye: No exudate. Pupils: Pupils are equal, round, and reactive to light. Neck:      Musculoskeletal: Normal range of motion and neck supple. No neck rigidity. Vascular: No JVD. Trachea: No tracheal deviation. Comments: No meningismus. Cardiovascular:      Rate and Rhythm: Normal rate and regular rhythm. Heart sounds: Normal heart sounds. Heart sounds not distant. No murmur. No friction rub. No gallop. Pulmonary:      Effort: Pulmonary effort is normal. No respiratory distress. Breath sounds: Normal breath sounds. No stridor. No wheezing, rhonchi or rales. Chest:      Chest wall: No tenderness. Abdominal:      General: Abdomen is flat. Bowel sounds are normal. There is no distension. Palpations: Abdomen is soft. There is no mass. Tenderness: There is no abdominal tenderness. There is no right CVA tenderness, left CVA tenderness, guarding or rebound. Hernia: No hernia is present. Musculoskeletal: Normal range of motion. General: No swelling, tenderness, deformity or signs of injury. Lymphadenopathy:      Head:      Right side of head: No submental adenopathy. Left side of head: No submental adenopathy. Skin:     General: Skin is warm and dry.       Capillary Refill: Capillary refill takes less than 2 seconds. Coloration: Skin is not jaundiced or pale. Findings: No bruising, erythema, lesion or rash. Neurological:      General: No focal deficit present. Mental Status: She is alert and oriented to person, place, and time. Mental status is at baseline. Cranial Nerves: No cranial nerve deficit. Sensory: No sensory deficit. Motor: No weakness. Coordination: Coordination normal.      Deep Tendon Reflexes: Reflexes are normal and symmetric. Psychiatric:         Mood and Affect: Mood normal.         Behavior: Behavior normal. Behavior is cooperative. Thought Content: Thought content normal.         Judgment: Judgment normal.         Assessment:       Diagnosis Orders   1. Left hand pain  XR HAND LEFT (MIN 3 VIEWS)   2. Left arm pain  XR RADIUS ULNA LEFT (2 VIEWS)   3. Left elbow pain  XR ELBOW LEFT (2 VIEWS)   4. Lung infiltrate  Basic Metabolic Panel    CBC With Auto Differential    cephALEXin (KEFLEX) 500 MG capsule     No results found for this visit on 04/15/21. Plan:     Assessment & Plan   Mark Kumar was seen today for joint swelling. Diagnoses and all orders for this visit:    Left hand pain  -     XR HAND LEFT (MIN 3 VIEWS); Future    Left arm pain  -     XR RADIUS ULNA LEFT (2 VIEWS); Future    Left elbow pain  -     XR ELBOW LEFT (2 VIEWS); Future    Lung infiltrate  -     Basic Metabolic Panel; Future  -     CBC With Auto Differential; Future  -     cephALEXin (KEFLEX) 500 MG capsule;  Take 1 capsule by mouth 2 times daily for 10 days      Orders Placed This Encounter   Procedures    XR HAND LEFT (MIN 3 VIEWS)     Standing Status:   Future     Standing Expiration Date:   4/15/2022     Order Specific Question:   Reason for exam:     Answer:   r/o fx    XR RADIUS ULNA LEFT (2 VIEWS)     Standing Status:   Future     Standing Expiration Date:   4/15/2022     Order Specific Question:   Reason for exam:     Answer:   r/o fx    XR ELBOW LEFT (2 VIEWS)     Standing Status:   Future     Standing Expiration Date:   4/15/2022     Order Specific Question:   Reason for exam:     Answer:   r/o fx    Basic Metabolic Panel     Standing Status:   Future     Standing Expiration Date:   4/15/2022    CBC With Auto Differential     Standing Status:   Future     Standing Expiration Date:   4/15/2022     Orders Placed This Encounter   Medications    cephALEXin (KEFLEX) 500 MG capsule     Sig: Take 1 capsule by mouth 2 times daily for 10 days     Dispense:  20 capsule     Refill:  0     There are no discontinued medications. Return if symptoms worsen or fail to improve. Reviewed with the patient/family: current clinical status & medications. Side effects of the medication prescribed today, as well as treatment plan/rationale and result expectations have been discussed with the patient/family who expresses understanding. Patient will be discharged home in stable condition. Follow up with PCP to evaluate treatment results or return if symptoms worsen or fail to improve. Discussed signs and symptoms which require immediate follow-up in ED/call to 911. Understanding verbalized. I have reviewed the patient's medical history in detail and updated the computerized patient record.     XU Edmond

## 2021-04-19 ENCOUNTER — OFFICE VISIT (OUTPATIENT)
Dept: FAMILY MEDICINE CLINIC | Age: 61
End: 2021-04-19
Payer: COMMERCIAL

## 2021-04-19 VITALS
TEMPERATURE: 98.2 F | WEIGHT: 226.8 LBS | BODY MASS INDEX: 36.45 KG/M2 | HEIGHT: 66 IN | DIASTOLIC BLOOD PRESSURE: 70 MMHG | HEART RATE: 109 BPM | SYSTOLIC BLOOD PRESSURE: 118 MMHG | OXYGEN SATURATION: 97 %

## 2021-04-19 DIAGNOSIS — Z87.892 HISTORY OF ANAPHYLAXIS: ICD-10-CM

## 2021-04-19 DIAGNOSIS — I48.92 ATRIAL FLUTTER, UNSPECIFIED TYPE (HCC): Primary | ICD-10-CM

## 2021-04-19 DIAGNOSIS — E53.8 VITAMIN B12 DEFICIENCY: ICD-10-CM

## 2021-04-19 DIAGNOSIS — E53.9 VITAMIN B DEFICIENCY: ICD-10-CM

## 2021-04-19 DIAGNOSIS — R42 VERTIGO: ICD-10-CM

## 2021-04-19 DIAGNOSIS — K21.9 GASTROESOPHAGEAL REFLUX DISEASE WITHOUT ESOPHAGITIS: ICD-10-CM

## 2021-04-19 PROCEDURE — G8417 CALC BMI ABV UP PARAM F/U: HCPCS | Performed by: FAMILY MEDICINE

## 2021-04-19 PROCEDURE — 3017F COLORECTAL CA SCREEN DOC REV: CPT | Performed by: FAMILY MEDICINE

## 2021-04-19 PROCEDURE — 99214 OFFICE O/P EST MOD 30 MIN: CPT | Performed by: FAMILY MEDICINE

## 2021-04-19 PROCEDURE — G8427 DOCREV CUR MEDS BY ELIG CLIN: HCPCS | Performed by: FAMILY MEDICINE

## 2021-04-19 PROCEDURE — 1036F TOBACCO NON-USER: CPT | Performed by: FAMILY MEDICINE

## 2021-04-19 RX ORDER — LANOLIN ALCOHOL/MO/W.PET/CERES
1 CREAM (GRAM) TOPICAL DAILY
Qty: 30 TABLET | Refills: 3 | Status: SHIPPED | OUTPATIENT
Start: 2021-04-19 | End: 2021-11-10 | Stop reason: SDUPTHER

## 2021-04-19 RX ORDER — FAMOTIDINE 20 MG/1
TABLET, FILM COATED ORAL
Qty: 60 TABLET | Refills: 2 | Status: SHIPPED | OUTPATIENT
Start: 2021-04-19 | End: 2021-04-26

## 2021-04-19 RX ORDER — EPINEPHRINE 0.3 MG/.3ML
INJECTION SUBCUTANEOUS
Qty: 2 EACH | Refills: 2 | Status: SHIPPED | OUTPATIENT
Start: 2021-04-19

## 2021-04-19 RX ORDER — METOPROLOL TARTRATE 50 MG/1
50 TABLET, FILM COATED ORAL 2 TIMES DAILY
Qty: 60 TABLET | Refills: 3 | Status: SHIPPED | OUTPATIENT
Start: 2021-04-19 | End: 2021-08-06 | Stop reason: SDUPTHER

## 2021-04-19 RX ORDER — MECLIZINE HCL 12.5 MG/1
12.5 TABLET ORAL 2 TIMES DAILY PRN
Qty: 15 TABLET | Refills: 0 | Status: SHIPPED | OUTPATIENT
Start: 2021-04-19 | End: 2021-04-29

## 2021-04-19 NOTE — PROGRESS NOTES
5    EPINEPHrine (EPIPEN 2-PRAVEENA) 0.3 MG/0.3ML SOAJ injection Use as directed for allergic reaction 2 each 2    famotidine (PEPCID) 20 MG tablet TAKE 1 TABLET BY MOUTH 2 TIMES DAILY (AM,PM) 60 tablet 2    metoprolol tartrate (LOPRESSOR) 50 MG tablet Take 1 tablet by mouth 2 times daily 60 tablet 3    meclizine (ANTIVERT) 12.5 MG tablet Take 1 tablet by mouth 2 times daily as needed for Dizziness 15 tablet 0    cephALEXin (KEFLEX) 500 MG capsule Take 1 capsule by mouth 2 times daily for 10 days 20 capsule 0    levothyroxine (SYNTHROID) 50 MCG tablet TAKE 1 TABLET BY MOUTH EVERY MORNING (OWN BLSTER) 90 tablet 0    vitamin D (D3-1000) 25 MCG (1000 UT) CAPS Take 1 capsule by mouth Daily with supper 90 capsule 1    aspirin EC 81 MG EC tablet Take 1 tablet by mouth daily 30 tablet 5    Multiple Vitamin (DAILY-MANDI) TABS TAKE 1 TABLET BY MOUTH EVERY DAY 30 tablet 5    psyllium 0.52 g capsule Take 1 capsule by mouth daily 30 capsule 5    citalopram (CELEXA) 40 MG tablet Take 40 mg by mouth daily      benztropine (COGENTIN) 1 MG tablet Take 1 mg by mouth 3 times daily      fluticasone (FLONASE) 50 MCG/ACT nasal spray USE 1 SPRAY INTO EACH NOSTRIL DAILY 1 Bottle 0    vitamin B-2 (RIBOFLAVIN) 100 MG TABS tablet Take 1 tablet by mouth daily 90 tablet 0    budesonide-formoterol (SYMBICORT) 160-4.5 MCG/ACT AERO Inhale 2 puffs into the lungs 2 times daily 1 Inhaler 3    traZODone (DESYREL) 50 MG tablet Take by mouth nightly 1-2 tablets nightly      pravastatin (PRAVACHOL) 40 MG tablet TAKE 1 TABLET BY MOUTH EVERY DAY IN THE EVENING 90 tablet 1    albuterol sulfate  (90 Base) MCG/ACT inhaler USE 2 PUFFS EVERY 4 HRS AS NEEDED FOR WHEEZING/SOB-SPACE OUT TO EVERY 6 HR AS SYMPTOMS IMPROVE 1 Inhaler 3    cetirizine (ZYRTEC) 10 MG tablet TAKE 1 TABLET BY MOUTH EVERY DAY AS NEEDED FOR ALLERGIES 30 tablet 5    ARIPiprazole (ABILIFY) 15 MG tablet TAKE 1 TABLET BY MOUTH EVERY DAY  2     No current facility-administered medications for this visit. ROS  CONSTITUTIONAL: The patient denies fevers, chills, sweats and body ache. HEENT: Denies headache, blurry vision, eye pain, tinnitus, vertigo, admits to sore throat, denies neck or thyroid masses. Admits to cracked lips. RESPIRATORY: Denies cough, sputum, hemoptysis. CARDIAC: Denies chest pain, pressure, palpitations, Denies lower extremity edema. GASTROINTESTINAL: Denies abdominal pain, constipation, diarrhea, bleeding in the stools,   GENITOURINARY: Denies dysuria, hematuria, nocturia or frequency, urinary incontinence. NEUROLOGIC: Denies headaches, dizziness, syncope, weakness  MUSCULOSKELETAL: denies changes in range of motion, joint pain, stiffness. ENDOCRINOLOGY: Denies heat or cold intolerance. HEMATOLOGY: Denies easy bleeding or blood transfusion,anemia  DERMATOLOGY: On the left gluteal complains of a painful rash. PSYCHIATRY: Denies depression, agitation or anxiety.     Past Medical History:   Diagnosis Date    Asthma 2015    Bilateral renal cysts 2013    Depression     since age 35, was with Dr Mahamed Hernandez, now the [facility]    Diverticulosis of sigmoid colon 2012    Dr David Adams DJD of left shoulder     Environmental allergies     Fatty liver 2013    GERD (gastroesophageal reflux disease)     History of cardiac arrhythmia     \"due to stress, was placed on medication\"    Hydatidiform mole     age 26     Hyperlipidemia     Hypothyroidism 2011    IFG (impaired fasting glucose) 2013    Melanoma of eye ([address]     age 30, R eye prosthesis, Dr Sameera Cardenas    Obesity     Prediabetes     PTSD (post-traumatic stress disorder)     age 35, 1970 [facility]    S/P colonoscopy 04/19/2012    Dr. Rommel Lyn S/P hysterectomy with oophorectomy 2012    Dr Jeny Diaz Tremor     Dr Trixie Luciano D deficiency         Past Surgical History:   Procedure Laterality Date    DILATION AND CURETTAGE OF UTERUS      ENDOMETRIAL ABLATION  6/2012    EYE REMOVAL      OD for melanoma, age 30    FOOT OSTEOTOMY Left 09/23/2016    DEJA EDMONDSM      LINDSAY AND BSO  2012    Dr Sunny Valdez        Family History   Problem Relation Age of Onset    Heart Failure Mother         dec 76    Diabetes Mother     Diabetes Father     Stroke Father         dec age 80    Cancer Father         Sarcoma    Breast Cancer Sister     Stomach Cancer Other         Social History     Socioeconomic History    Marital status:      Spouse name: Not on file    Number of children: 3    Years of education: 15    Highest education level: High school graduate   Occupational History    Occupation: SSI   Social Needs    Financial resource strain: Not very hard    Food insecurity     Worry: Sometimes true     Inability: Sometimes true    Transportation needs     Medical: No     Non-medical: No   Tobacco Use    Smoking status: Never Smoker    Smokeless tobacco: Never Used   Substance and Sexual Activity    Alcohol use: No    Drug use: No    Sexual activity: Not Currently     Birth control/protection: Surgical   Lifestyle    Physical activity     Days per week: 0 days     Minutes per session: 0 min    Stress: Rather much   Relationships    Social connections     Talks on phone: Twice a week     Gets together: Never     Attends Mormon service: Never     Active member of club or organization: No     Attends meetings of clubs or organizations: Never     Relationship status:     Intimate partner violence     Fear of current or ex partner: Not on file     Emotionally abused: Not on file     Physically abused: Not on file     Forced sexual activity: Not on file   Other Topics Concern    Not on file   Social History Narrative    Born in [place], one of 4 children, one sister disappeared at age 30, never found    Moved to [place] at age 40        Lives in an apartment in [facility], alone    , 3 children, all grown, in [place]     2 granddaughters     Hobbies exercising, birds Attends [employer], has volunteered at [employer]        /70   Pulse 109   Temp 98.2 °F (36.8 °C)   Ht 5' 6\" (1.676 m)   Wt 226 lb 12.8 oz (102.9 kg)   SpO2 97%   BMI 36.61 kg/m²        Physical Exam:    General appearance - alert, well appearing, and in no distress  Mental Status - alert, oriented to person, place, and time  Eyes - pupils equal and reactive, extraocular eye movements intact   Ears - bilateral TM's and external ear canals normal   Nose - normal and patent, no erythema, discharge or polyps   Sinuses - Normal paranasal sinuses without tenderness   Throat - mucous membranes moist, pharynx normal without lesions   Neck - supple, no significant adenopathy   Thyroid - thyroid is normal in size without nodules or tenderness    Chest - clear to auscultation, no wheezes, rales or rhonchi, symmetric air entry   Heart - normal rate, regular rhythm, normal S1, S2, no murmurs, rubs, clicks or gallops  Abdomen - soft, nontender, nondistended, no masses or organomegaly   Back exam - full range of motion, no tenderness, palpable spasm or pain on motion   Neurological - alert, oriented, normal speech, no focal findings or movement disorder noted   Musculoskeletal -trace of patient on the left gluteal fold. There is a circular erythematous indurated lesion that is slightly scaly.   No signs of discharge on exam.  Extremities - peripheral pulses normal, no pedal edema, no clubbing or cyanosis   Skin - normal coloration and turgor, no rashes, no suspicious skin lesions noted    Labs   No results found for: TSHREFLEX  TSH   Date Value Ref Range Status   02/07/2020 2.700 0.440 - 3.860 uIU/mL Final   06/14/2019 2.430 0.440 - 3.860 uIU/mL Final   06/11/2018 3.820 0.270 - 4.200 uIU/mL Final   06/07/2017 3.500 0.270 - 4.200 uIU/mL Final   01/27/2017 3.810 0.270 - 4.200 uIU/mL Final   03/04/2016 1.990 0.270 - 4.200 uIU/mL Final   11/20/2015 1.940 0.270 - 4.200 uIU/mL Final   08/28/2015 1.970 0.270 - 4.200 uIU/mL Final   10/25/2014 3.130 0.270 - 4.200 uIU/mL Final   12/11/2013 3.910 0.270 - 4.200 uIU/mL Final   04/04/2013 1.346 0.550 - 4.780 uIU/mL Final   10/16/2012 1.607 0.550 - 4.780 uIU/mL Final   05/14/2012 1.771 0.550 - 4.780 uIU/mL Final   03/22/2012 4.082 0.550 - 4.780 uIU/mL Final     Lab Results   Component Value Date     (H) 04/15/2021    K 4.2 04/15/2021     (H) 04/15/2021    CO2 20 04/15/2021    BUN 15 04/15/2021    CREATININE 0.78 04/15/2021    GLUCOSE 82 04/15/2021    CALCIUM 10.2 (H) 04/15/2021    PROT 6.8 02/26/2021    LABALBU 4.2 02/26/2021    BILITOT 0.5 02/26/2021    ALKPHOS 73 02/26/2021    AST 20 02/26/2021    ALT 23 02/26/2021    LABGLOM >60.0 04/15/2021    GFRAA >60.0 04/15/2021    GLOB 2.6 02/26/2021       Lab Results   Component Value Date    WBC 5.9 04/15/2021    HGB 14.5 04/15/2021    HCT 42.2 04/15/2021    MCV 90.8 04/15/2021     04/15/2021     Lab Results   Component Value Date    LABA1C 5.2 03/15/2021     No results found for: EAG          A/P: Jewelene Fee 62 y.o. female presenting for     1. Atrial flutter, unspecified type ([address]  Heart rate regular in the office. Patient has not been taking her metoprolol. Will refill for patient. - metoprolol tartrate (LOPRESSOR) 50 MG tablet; Take 1 tablet by mouth 2 times daily  Dispense: 60 tablet; Refill: 3    2. History of anaphylaxis    - EPINEPHrine (EPIPEN 2-PRAVEENA) 0.3 MG/0.3ML SOAJ injection; Use as directed for allergic reaction  Dispense: 2 each; Refill: 2    3. Vitamin B deficiency    - Biotin 300 MCG TABS; Take 1 tablet by mouth daily  Dispense: 30 tablet; Refill: 3    4. Vitamin B12 deficiency    - cyanocobalamin ([employer]S VITAMIN B12) 1000 MCG tablet; Take 1 tablet by mouth daily  Dispense: 30 tablet; Refill: 5    5. Gastroesophageal reflux disease without esophagitis    - famotidine (PEPCID) 20 MG tablet; TAKE 1 TABLET BY MOUTH 2 TIMES DAILY (AM,PM)  Dispense: 60 tablet; Refill: 2    6.  Vertigo  We will do a trial of meclizine. Patient to use as needed for the dizziness. - meclizine (ANTIVERT) 12.5 MG tablet; Take 1 tablet by mouth 2 times daily as needed for Dizziness  Dispense: 15 tablet; Refill: 0      7. Swelling in upper extremity  No erythema. No warmth. No tenderness upon palpation. May be a strain. Can apply compression to the area to help with the swelling. X-rays negative. Please note, this report has been partially produced using speech recognition software  and may cause  and /or contain errors related to that system including grammar, punctuation and spelling as well as words and phrases that may seem inappropriate. If there are questions or concerns please feel free to contact me to clarify.

## 2021-05-05 ENCOUNTER — OFFICE VISIT (OUTPATIENT)
Dept: NEUROLOGY | Age: 61
End: 2021-05-05
Payer: COMMERCIAL

## 2021-05-05 VITALS
SYSTOLIC BLOOD PRESSURE: 104 MMHG | DIASTOLIC BLOOD PRESSURE: 73 MMHG | WEIGHT: 228.4 LBS | BODY MASS INDEX: 36.86 KG/M2 | HEART RATE: 66 BPM

## 2021-05-05 DIAGNOSIS — G25.0 ESSENTIAL TREMOR: ICD-10-CM

## 2021-05-05 DIAGNOSIS — Z76.89 ENCOUNTER TO ESTABLISH CARE: ICD-10-CM

## 2021-05-05 DIAGNOSIS — Z85.820 HISTORY OF MELANOMA: ICD-10-CM

## 2021-05-05 DIAGNOSIS — I95.1 ORTHOSTATIC HYPOTENSION: ICD-10-CM

## 2021-05-05 DIAGNOSIS — R55 SYNCOPE, UNSPECIFIED SYNCOPE TYPE: ICD-10-CM

## 2021-05-05 DIAGNOSIS — G25.0 ESSENTIAL TREMOR: Primary | ICD-10-CM
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
## 2021-05-05 LAB
ALBUMIN SERPL-MCNC: 4.3 G/DL (ref 3.5–4.6)
ALP BLD-CCNC: 100 U/L (ref 40–130)
ALT SERPL-CCNC: 23 U/L (ref 0–33)
ANION GAP SERPL CALCULATED.3IONS-SCNC: 11 MEQ/L (ref 9–15)
AST SERPL-CCNC: 18 U/L (ref 0–35)
BILIRUB SERPL-MCNC: 0.3 MG/DL (ref 0.2–0.7)
BUN BLDV-MCNC: 11 MG/DL (ref 8–23)
CALCIUM SERPL-MCNC: 10.2 MG/DL (ref 8.5–9.9)
CHLORIDE BLD-SCNC: 105 MEQ/L (ref 95–107)
CO2: 25 MEQ/L (ref 20–31)
CREAT SERPL-MCNC: 0.97 MG/DL (ref 0.5–0.9)
GFR AFRICAN AMERICAN: >60
GFR NON-AFRICAN AMERICAN: 58.6
GLOBULIN: 2.8 G/DL (ref 2.3–3.5)
GLUCOSE BLD-MCNC: 109 MG/DL (ref 70–99)
POTASSIUM SERPL-SCNC: 5.2 MEQ/L (ref 3.4–4.9)
SODIUM BLD-SCNC: 141 MEQ/L (ref 135–144)
T4 FREE: 1.01 NG/DL (ref 0.84–1.68)
TOTAL PROTEIN: 7.1 G/DL (ref 6.3–8)
TSH REFLEX: 4.42 UIU/ML (ref 0.44–3.86)

## 2021-05-05 PROCEDURE — 99214 OFFICE O/P EST MOD 30 MIN: CPT | Performed by: NURSE PRACTITIONER

## 2021-05-05 PROCEDURE — 3017F COLORECTAL CA SCREEN DOC REV: CPT | Performed by: NURSE PRACTITIONER

## 2021-05-05 PROCEDURE — G8427 DOCREV CUR MEDS BY ELIG CLIN: HCPCS | Performed by: NURSE PRACTITIONER

## 2021-05-05 PROCEDURE — G8417 CALC BMI ABV UP PARAM F/U: HCPCS | Performed by: NURSE PRACTITIONER

## 2021-05-05 PROCEDURE — 1036F TOBACCO NON-USER: CPT | Performed by: NURSE PRACTITIONER

## 2021-05-05 ASSESSMENT — ENCOUNTER SYMPTOMS
WHEEZING: 0
SHORTNESS OF BREATH: 0
TROUBLE SWALLOWING: 0
DIARRHEA: 0
CHEST TIGHTNESS: 0
ABDOMINAL DISTENTION: 0
ABDOMINAL PAIN: 0
COUGH: 0
VOMITING: 0
NAUSEA: 0
CONSTIPATION: 0
COLOR CHANGE: 0

## 2021-05-05 NOTE — PROGRESS NOTES
Subjective:      Patient ID: Rosangela Goldman is a 62 y.o. female who presents today for:  Chief Complaint   Patient presents with    New Patient     PT is being seen for a tremor today. Pt states that she has also had dizziness and has passed out 3 times about a month ago. SHe says she told her doctor about it and they did nothing and had no idea why it was happening, she says she has still had the dizziness going on. Onset of the tremor was about a year ago, in both hands it is constant. She says she has problmes being able to hold and grasp things. HPI  Pt seen and examined in the office to establish cares for tremor and syncope. 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 Patient presents today with reports of tremors that have been ongoing for approximately 1 year. She reports tremors are present in her bilateral hands. She states they are present at rest but get worse with activity especially while trying to write. She reports her handwriting is gotten significantly worse. She denies any tremor of the head, chin or lower extremities. She denies any cognitive impairment. No hallucination or behavioral disturbances. She does have somewhat shuffling gait. No rigidity noted. She is on Cogentin 1 mg 3 times daily when asked what she is taking this for she did not know and also did not know how long she has been on it. Patient reports that she has had 3 syncopal episodes recently. She states she gets lightheaded when going from sitting to standing. She denied any preceding chest pain pressure or palpitations. No focal neuro deficits. No seizure activity.   Past Medical History:   Diagnosis Date    Asthma 2015    Bilateral renal cysts 2013    Depression     since age 35, was with  Christina Hammond, now the [facility]    Diverticulosis of sigmoid colon 2012    Dr Blake Franco DJD of left shoulder     Environmental allergies     Fatty liver 2013    GERD (gastroesophageal reflux disease)     History of cardiac arrhythmia     \"due to stress, was placed on medication\"    Hydatidiform mole     age 26     Hyperlipidemia     Hypothyroidism 2011    IFG (impaired fasting glucose) 2013    Melanoma of eye ([address]     age 30, R eye prosthesis, Dr Vahe Bowser    Obesity     Prediabetes     PTSD (post-traumatic stress disorder)     age 35, [facility]    S/P colonoscopy 04/19/2012    Dr. Kasandra Chi S/P hysterectomy with oophorectomy 2012    Dr Mota Speaker     Dr Yazan Esipnosa D deficiency      Past Surgical History:   Procedure Laterality Date    DILATION AND CURETTAGE OF UTERUS      ENDOMETRIAL ABLATION  6/2012    EYE REMOVAL      OD for melanoma, age 30    FOOT OSTEOTOMY Left 09/23/2016    Meeta Veronica DPM      LINDSAY AND BSO  2012    Dr Juan Cortez     Social History     Socioeconomic History    Marital status:      Spouse name: Not on file    Number of children: 3    Years of education: 15    Highest education level: High school graduate   Occupational History    Occupation: SSI   Social Needs    Financial resource strain: Not very hard    Food insecurity     Worry: Sometimes true     Inability: Sometimes true    Transportation needs     Medical: No     Non-medical: No   Tobacco Use    Smoking status: Never Smoker    Smokeless tobacco: Never Used   Substance and Sexual Activity    Alcohol use: No    Drug use: No    Sexual activity: Not Currently     Birth control/protection: Surgical   Lifestyle    Physical activity     Days per week: 0 days     Minutes per session: 0 min    Stress: Rather much   Relationships    Social connections     Talks on phone: Twice a week     Gets together: Never     Attends Buddhist service: Never     Active member of club or organization: No Attends meetings of clubs or organizations: Never     Relationship status:     Intimate partner violence     Fear of current or ex partner: Not on file     Emotionally abused: Not on file     Physically abused: Not on file     Forced sexual activity: Not on file   Other Topics Concern    Not on file   Social History Narrative    Born in [address] [place] [address], one of 4 children, one sister disappeared at age 30, never found    Moved to [place] at age 40        Lives in an apartment in [facility], alone    , 3 children, all grown, in [place]     2 granddaughters     Chandler Rash exercising, birds    Attends [employer], has volunteered at [employer]     Family History   Problem Relation Age of Onset    Heart Failure Mother         dec 76    Diabetes Mother     Diabetes Father     Stroke Father         dec age 80    Cancer Father         Sarcoma    Breast Cancer Sister     Stomach Cancer Other      Allergies   Allergen Reactions    Bee Venom      Current Outpatient Medications on File Prior to Visit   Medication Sig Dispense Refill    ASPIRIN LOW DOSE 81 MG EC tablet TAKE 1 TABLET BY MOUTH ONCE A DAY (AM) 30 tablet 5    famotidine (PEPCID) 20 MG tablet TAKE 1 TABLET BY MOUTH 2 TIMES DAILY (AM,PM) 60 tablet 5    Multiple Vitamin (TAB-A-MANDI/BETA CAROTENE) TABS TAKE 1 TABLET BY MOUTH EVERY DAY (AM) 30 tablet 5    Biotin 300 MCG TABS Take 1 tablet by mouth daily 30 tablet 3    cyanocobalamin (CVS VITAMIN B12) 1000 MCG tablet Take 1 tablet by mouth daily 30 tablet 5    EPINEPHrine (EPIPEN 2-PRAVEENA) 0.3 MG/0.3ML SOAJ injection Use as directed for allergic reaction 2 each 2    metoprolol tartrate (LOPRESSOR) 50 MG tablet Take 1 tablet by mouth 2 times daily 60 tablet 3    levothyroxine (SYNTHROID) 50 MCG tablet TAKE 1 TABLET BY MOUTH EVERY MORNING (OWN BLSTER) 90 tablet 0    vitamin D (D3-1000) 25 MCG (1000 UT) CAPS Take 1 capsule by mouth Daily with supper 90 capsule 1    citalopram (CELEXA) 40 MG tablet Take 40 mg by mouth daily      benztropine (COGENTIN) 1 MG tablet Take 1 mg by mouth 3 times daily      fluticasone (FLONASE) 50 MCG/ACT nasal spray USE 1 SPRAY INTO EACH NOSTRIL DAILY 1 Bottle 0    vitamin B-2 (RIBOFLAVIN) 100 MG TABS tablet Take 1 tablet by mouth daily 90 tablet 0    budesonide-formoterol (SYMBICORT) 160-4.5 MCG/ACT AERO Inhale 2 puffs into the lungs 2 times daily 1 Inhaler 3    traZODone (DESYREL) 50 MG tablet Take by mouth nightly 1-2 tablets nightly      pravastatin (PRAVACHOL) 40 MG tablet TAKE 1 TABLET BY MOUTH EVERY DAY IN THE EVENING 90 tablet 1    albuterol sulfate  (90 Base) MCG/ACT inhaler USE 2 PUFFS EVERY 4 HRS AS NEEDED FOR WHEEZING/SOB-SPACE OUT TO EVERY 6 HR AS SYMPTOMS IMPROVE 1 Inhaler 3    cetirizine (ZYRTEC) 10 MG tablet TAKE 1 TABLET BY MOUTH EVERY DAY AS NEEDED FOR ALLERGIES 30 tablet 5    ARIPiprazole (ABILIFY) 15 MG tablet TAKE 1 TABLET BY MOUTH EVERY DAY  2    [DISCONTINUED] Multiple Vitamins-Minerals (THERAPEUTIC MULTIVITAMIN-MINERALS) tablet Take 1 tablet by mouth daily 30 tablet 5    [DISCONTINUED] albuterol (PROVENTIL;VENTOLIN) 90 MCG/ACT inhaler Inhale 2 puffs into the lungs every 6 hours as needed. 1 Inhaler 6     No current facility-administered medications on file prior to visit. Review of Systems   Constitutional: Negative for appetite change, chills, fatigue, fever and unexpected weight change. HENT: Negative for hearing loss and trouble swallowing. Eyes: Negative for visual disturbance. Respiratory: Negative for cough, chest tightness, shortness of breath and wheezing. Cardiovascular: Negative for chest pain, palpitations and leg swelling. Gastrointestinal: Negative for abdominal distention, abdominal pain, constipation, diarrhea, nausea and vomiting. Genitourinary: Negative for difficulty urinating. Musculoskeletal: Positive for gait problem. Skin: Negative for color change and rash.    Neurological: Positive for tremors, syncope and light-headedness. Negative for dizziness, seizures, facial asymmetry, speech difficulty, weakness, numbness and headaches. Psychiatric/Behavioral: Negative for agitation, confusion and hallucinations. The patient is not nervous/anxious. Objective:   /73 (Site: Left Upper Arm, Position: Standing, Cuff Size: Large Adult)   Pulse 66   Wt 228 lb 6.4 oz (103.6 kg)   BMI 36.86 kg/m²     Physical Exam  Vitals signs reviewed. Constitutional:       General: She is not in acute distress. Appearance: She is obese. She is not ill-appearing or diaphoretic. HENT:      Head: Normocephalic and atraumatic. Eyes:      Pupils: Pupils are equal, round, and reactive to light. Cardiovascular:      Rate and Rhythm: Normal rate and regular rhythm. Pulmonary:      Effort: Pulmonary effort is normal. No respiratory distress. Breath sounds: Normal breath sounds. Abdominal:      General: Bowel sounds are normal.      Palpations: Abdomen is soft. Skin:     General: Skin is warm and dry. Neurological:      Mental Status: She is alert and oriented to person, place, and time. Cranial Nerves: No dysarthria or facial asymmetry. Motor: Tremor (minimal) present. No weakness, atrophy, abnormal muscle tone, seizure activity or pronator drift. Coordination: Romberg sign negative. Finger-Nose-Finger Test normal.      Gait: Gait abnormal.         Ct Abdomen Pelvis W Iv Contrast Additional Contrast? Radiologist Recommendation    Result Date: 3/10/2021  Examination: CT ABDOMEN PELVIS W IV CONTRAST Indication:  K52.9 Chronic diarrhea ICD10 Technique: Multiple serial axial images was performed through the abdomen and pelvis utilizing 100cc of Isovue 300 and the oral administration of 4 50 mL of contrast.   Images were reconstructed in the axial and coronal and sagittal planes. Comparison: October 11, 2020 Findings: The visualized basal lungs show no focal parenchymal abnormalities.  The liver, shows diffuse decrease in attenuation. This a nonspecific finding can be seen with fatty infiltration. No focal parenchymal adenitis or intrahepatic dilatation. The Gallbladder, spleen, pancreas, adrenals, are unremarkable. The right kidney shows no significant perinephric stranding. Again note is made of partially exophytic cystic area superior pole the right kidney measuring 3.5 cm. A punctate eccentric calcification approximately 1 mm again noted. No nephrolithiasis. No hydronephrosis. The left kidney again shows a partially exophytic cystic area at the mid to upper pole measuring 2.6 cm. No bladder calculi Large and small bowel show no sign of obstruction. The appendix is visualized. No periappendiceal stranding. No diverticulitis. Small umbilical hernia containing mesenteric fat Uterus is surgically absent. No free air. No free fluid. The visualized abdominal aorta is of normal size and caliber. No significant retroperitoneal adenopathy. Visualized osseous structures are grossly unremarkable. 1. HEPATIC STEATOSIS. 2. PROBABLE BILATERAL RENAL CYS All CT scans at this facility use dose modulation, iterative reconstruction, and/or weight based dosing when appropriate to reduce radiation dose to as low as reasonably achievable.       Lab Results   Component Value Date    WBC 5.9 04/15/2021    RBC 4.65 04/15/2021    RBC 4.57 05/14/2012    HGB 14.5 04/15/2021    HCT 42.2 04/15/2021    MCV 90.8 04/15/2021    MCH 31.2 04/15/2021    MCHC 34.4 04/15/2021    RDW 13.8 04/15/2021     04/15/2021    MPV 9.0 08/16/2015     Lab Results   Component Value Date     04/15/2021    K 4.2 04/15/2021     04/15/2021    CO2 20 04/15/2021    BUN 15 04/15/2021    CREATININE 0.78 04/15/2021    GFRAA >60.0 04/15/2021    LABGLOM >60.0 04/15/2021    GLUCOSE 82 04/15/2021    GLUCOSE 92 05/14/2012    PROT 6.8 02/26/2021    LABALBU 4.2 02/26/2021    LABALBU 3.8 03/22/2012    CALCIUM 10.2 04/15/2021    BILITOT 0.5 02/26/2021

## 2021-05-06 ENCOUNTER — OFFICE VISIT (OUTPATIENT)
Dept: FAMILY MEDICINE CLINIC | Age: 61
End: 2021-05-06
Payer: COMMERCIAL

## 2021-05-06 VITALS
OXYGEN SATURATION: 97 % | HEIGHT: 66 IN | SYSTOLIC BLOOD PRESSURE: 116 MMHG | WEIGHT: 230.6 LBS | TEMPERATURE: 97.5 F | BODY MASS INDEX: 37.06 KG/M2 | HEART RATE: 75 BPM | DIASTOLIC BLOOD PRESSURE: 76 MMHG

## 2021-05-06 DIAGNOSIS — F43.10 PTSD (POST-TRAUMATIC STRESS DISORDER): ICD-10-CM

## 2021-05-06 DIAGNOSIS — I48.92 ATRIAL FLUTTER, UNSPECIFIED TYPE (HCC): ICD-10-CM

## 2021-05-06 DIAGNOSIS — E66.9 OBESITY (BMI 30-39.9): ICD-10-CM

## 2021-05-06 DIAGNOSIS — F32.A DEPRESSION, UNSPECIFIED DEPRESSION TYPE: ICD-10-CM

## 2021-05-06 DIAGNOSIS — F41.9 ANXIETY: ICD-10-CM

## 2021-05-06 DIAGNOSIS — M79.632 PAIN OF LEFT FOREARM: Primary | ICD-10-CM

## 2021-05-06 PROCEDURE — G8417 CALC BMI ABV UP PARAM F/U: HCPCS | Performed by: FAMILY MEDICINE

## 2021-05-06 PROCEDURE — 99214 OFFICE O/P EST MOD 30 MIN: CPT | Performed by: FAMILY MEDICINE

## 2021-05-06 PROCEDURE — G8427 DOCREV CUR MEDS BY ELIG CLIN: HCPCS | Performed by: FAMILY MEDICINE

## 2021-05-06 PROCEDURE — 1036F TOBACCO NON-USER: CPT | Performed by: FAMILY MEDICINE

## 2021-05-06 PROCEDURE — 3017F COLORECTAL CA SCREEN DOC REV: CPT | Performed by: FAMILY MEDICINE

## 2021-05-06 RX ORDER — BUPROPION HYDROCHLORIDE 300 MG/1
300 TABLET ORAL DAILY
COMMUNITY
Start: 2021-04-26 | End: 2021-10-25

## 2021-05-06 NOTE — PROGRESS NOTES
Chief Complaint   Patient presents with    2 Week Follow-Up    Cough     Is no longer having cough.  Dizziness     No longer having dizziness.  Swelling     Is no longer having swelling in left arm.  Palpitations     States she does not know if she is having palpitations still. States she didn't feel them the last time she was told she was having them.  Other     States she is still having leg weakness. HPI: Shivam Garcia 62 y.o. female presenting for     Vertigo  Patinet had a fall despite trying to move slowly from the cough  Patient reports she felt an episode of vertigo  Denies any tinnitus    F/u  Denies any more vertigo   Stable       Left arm swelling   Chronic swelling for the last 4 days   Denies any trauma   Small amount of pain in the area but denies any other issues. xray obtained and were normal.     Follow-up  X-rays negative. Admits to some pain in the forearm especially with certain movements. Unsure of any trauma. Patient has tried compression with no noticeable improvement.        Hpyothyroidism   Stable. Takes medication. Lab Results   Component Value Date     TSH 2.700 02/07/2020         HLD   Stable. On pravastatin        Lab Results   Component Value Date     CHOL 194 11/05/2019     CHOL 232 (H) 06/11/2018     CHOL 234 (H) 07/12/2017            Lab Results   Component Value Date     TRIG 127 11/05/2019     TRIG 157 06/11/2018     TRIG 286 (H) 07/12/2017            Lab Results   Component Value Date     HDL 53 11/05/2019     HDL 55 06/11/2018     HDL 46 07/12/2017            Lab Results   Component Value Date     LDLCALC 116 11/05/2019     LDLCALC 146 (H) 06/11/2018     LDLCALC 131 (H) 07/12/2017      No results found for: LABVLDL, VLDL        Lab Results   Component Value Date     CHOLHDLRATIO 6.3 03/22/2012         PTSD/Depression   Patient is going to [facility]. Plans on going 4 days a go    Follows with psych     GERD   Stable.    Trying to avoid food sthat make it worse        Current Outpatient Medications   Medication Sig Dispense Refill    buPROPion (WELLBUTRIN XL) 300 MG extended release tablet Take 300 mg by mouth daily      ASPIRIN LOW DOSE 81 MG EC tablet TAKE 1 TABLET BY MOUTH ONCE A DAY (AM) 30 tablet 5    famotidine (PEPCID) 20 MG tablet TAKE 1 TABLET BY MOUTH 2 TIMES DAILY (AM,PM) 60 tablet 5    Multiple Vitamin (TAB-A-MANDI/BETA CAROTENE) TABS TAKE 1 TABLET BY MOUTH EVERY DAY (AM) 30 tablet 5    Biotin 300 MCG TABS Take 1 tablet by mouth daily 30 tablet 3    cyanocobalamin (CVS VITAMIN B12) 1000 MCG tablet Take 1 tablet by mouth daily 30 tablet 5    EPINEPHrine (EPIPEN 2-PRAVEENA) 0.3 MG/0.3ML SOAJ injection Use as directed for allergic reaction 2 each 2    levothyroxine (SYNTHROID) 50 MCG tablet TAKE 1 TABLET BY MOUTH EVERY MORNING (OWN BLSTER) 90 tablet 0    vitamin D (D3-1000) 25 MCG (1000 UT) CAPS Take 1 capsule by mouth Daily with supper 90 capsule 1    citalopram (CELEXA) 40 MG tablet Take 40 mg by mouth daily      benztropine (COGENTIN) 1 MG tablet Take 1 mg by mouth 3 times daily      vitamin B-2 (RIBOFLAVIN) 100 MG TABS tablet Take 1 tablet by mouth daily 90 tablet 0    budesonide-formoterol (SYMBICORT) 160-4.5 MCG/ACT AERO Inhale 2 puffs into the lungs 2 times daily 1 Inhaler 3    traZODone (DESYREL) 50 MG tablet Take by mouth nightly 1-2 tablets nightly      pravastatin (PRAVACHOL) 40 MG tablet TAKE 1 TABLET BY MOUTH EVERY DAY IN THE EVENING 90 tablet 1    albuterol sulfate  (90 Base) MCG/ACT inhaler USE 2 PUFFS EVERY 4 HRS AS NEEDED FOR WHEEZING/SOB-SPACE OUT TO EVERY 6 HR AS SYMPTOMS IMPROVE 1 Inhaler 3    cetirizine (ZYRTEC) 10 MG tablet TAKE 1 TABLET BY MOUTH EVERY DAY AS NEEDED FOR ALLERGIES 30 tablet 5    ARIPiprazole (ABILIFY) 15 MG tablet TAKE 1 TABLET BY MOUTH EVERY DAY  2    metFORMIN (GLUCOPHAGE) 500 MG tablet Take 500 mg by mouth      metoprolol tartrate (LOPRESSOR) 50 MG tablet Take 1 tablet by mouth 2 times daily 60 tablet 3     No current facility-administered medications for this visit. ROS  CONSTITUTIONAL: The patient denies fevers, chills, sweats and body ache. HEENT: Denies headache, blurry vision, eye pain, tinnitus, vertigo, admits to sore throat, denies neck or thyroid masses. Admits to cracked lips. RESPIRATORY: Denies cough, sputum, hemoptysis. CARDIAC: Denies chest pain, pressure, palpitations, Denies lower extremity edema. GASTROINTESTINAL: Denies abdominal pain, constipation, diarrhea, bleeding in the stools,   GENITOURINARY: Denies dysuria, hematuria, nocturia or frequency, urinary incontinence. NEUROLOGIC: Denies headaches, dizziness, syncope, weakness  MUSCULOSKELETAL: denies changes in range of motion, joint pain, stiffness. ENDOCRINOLOGY: Denies heat or cold intolerance. HEMATOLOGY: Denies easy bleeding or blood transfusion,anemia  DERMATOLOGY: On the left gluteal complains of a painful rash. PSYCHIATRY: Denies depression, agitation or anxiety.     Past Medical History:   Diagnosis Date    Asthma 2015    Bilateral renal cysts 2013    Depression     since age 35, was with Dr Shawn Reinoso, now the [facility] Diverticulosis of sigmoid colon 2012    Dr Maribell Montes DJD of left shoulder     Environmental allergies     Fatty liver 2013    GERD (gastroesophageal reflux disease)     History of cardiac arrhythmia     \"due to stress, was placed on medication\"    Hydatidiform mole     age 26     Hyperlipidemia     Hypothyroidism 2011    IFG (impaired fasting glucose) 2013    Melanoma of eye ([address]     age 30, R eye prosthesis, Dr Brandt Reid    Obesity     Prediabetes     PTSD (post-traumatic stress disorder)     age 35, [facility]    S/P colonoscopy 04/19/2012    Dr. Jewel Narvaez S/P hysterectomy with oophorectomy 2012    Dr Wale Moore Tremor     Dr Zoraida Runner Vitamin D deficiency         Past Surgical History:   Procedure Laterality Date    DILATION AND CURETTAGE OF UTERUS      ENDOMETRIAL ABLATION  6/2012    EYE REMOVAL      OD for melanoma, age 30    FOOT OSTEOTOMY Left 09/23/2016    DEJA JACKMAN DPM      LINDSAY AND BSO  2012    Dr Janell Lane        Family History   Problem Relation Age of Onset    Heart Failure Mother         dec 76    Diabetes Mother     Diabetes Father     Stroke Father         dec age 80    Cancer Father         Sarcoma    Breast Cancer Sister     Stomach Cancer Other         Social History     Socioeconomic History    Marital status:      Spouse name: Not on file    Number of children: 3    Years of education: 15    Highest education level: High school graduate   Occupational History    Occupation: SSI   Social Needs    Financial resource strain: Not very hard    Food insecurity     Worry: Sometimes true     Inability: Sometimes true    Transportation needs     Medical: No     Non-medical: No   Tobacco Use    Smoking status: Never Smoker    Smokeless tobacco: Never Used   Substance and Sexual Activity    Alcohol use: No    Drug use: No    Sexual activity: Not Currently     Birth control/protection: Surgical   Lifestyle    Physical activity     Days per week: 0 days     Minutes per session: 0 min    Stress: Rather much   Relationships    Social connections     Talks on phone: Twice a week     Gets together: Never     Attends [employer] service: Never     Active member of club or organization: No     Attends meetings of clubs or organizations: Never     Relationship status:     Intimate partner violence     Fear of current or ex partner: Not on file     Emotionally abused: Not on file     Physically abused: Not on file     Forced sexual activity: Not on file   Other Topics Concern    Not on file   Social History Narrative    Born in [place], one of 4 children, one sister disappeared at age 29, never found    Moved to [place] at age 40        Lives in an apartment in [facility] [place], alone    , 3 children, all grown, in [place] 2 granddaughters     Hobbies exercising, birds    Attends [employer], has volunteered at [employer] [facility]        /76   Pulse 75   Temp 97.5 °F (36.4 °C)   Ht 5' 6\" (1.676 m)   Wt 230 lb 9.6 oz (104.6 kg)   SpO2 97%   BMI 37.22 kg/m²        Physical Exam:    General appearance - alert, well appearing, and in no distress  Mental Status - alert, oriented to person, place, and time  Eyes - pupils equal and reactive, extraocular eye movements intact   Ears - bilateral TM's and external ear canals normal   Nose - normal and patent, no erythema, discharge or polyps   Sinuses - Normal paranasal sinuses without tenderness   Throat - mucous membranes moist, pharynx normal without lesions   Neck - supple, no significant adenopathy   Thyroid - thyroid is normal in size without nodules or tenderness    Chest - clear to auscultation, no wheezes, rales or rhonchi, symmetric air entry   Heart - normal rate, regular rhythm, normal S1, S2, no murmurs, rubs, clicks or gallops  Abdomen - soft, nontender, nondistended, no masses or organomegaly   Back exam - full range of motion, no tenderness, palpable spasm or pain on motion   Neurological - alert, oriented, normal speech, no focal findings or movement disorder noted   Musculoskeletal -tenderness to palpation of the left forearm. Extremities - non pitting swelling noted in the left arm. No erythema or drainage noted from the arm.      Skin - normal coloration and turgor, no rashes, no suspicious skin lesions noted    Labs   TSH   Date Value Ref Range Status   05/05/2021 4.420 (H) 0.440 - 3.860 uIU/mL Final     TSH   Date Value Ref Range Status   02/07/2020 2.700 0.440 - 3.860 uIU/mL Final   06/14/2019 2.430 0.440 - 3.860 uIU/mL Final   06/11/2018 3.820 0.270 - 4.200 uIU/mL Final   06/07/2017 3.500 0.270 - 4.200 uIU/mL Final   01/27/2017 3.810 0.270 - 4.200 uIU/mL Final   03/04/2016 1.990 0.270 - 4.200 uIU/mL Final   11/20/2015 1.940 0.270 - 4.200 uIU/mL Final

## 2021-05-07 ENCOUNTER — TELEPHONE (OUTPATIENT)
Dept: NEUROLOGY | Age: 61
End: 2021-05-07

## 2021-05-10 ENCOUNTER — OFFICE VISIT (OUTPATIENT)
Dept: CARDIOLOGY CLINIC | Age: 61
End: 2021-05-10
Payer: COMMERCIAL

## 2021-05-10 VITALS
WEIGHT: 233 LBS | OXYGEN SATURATION: 98 % | BODY MASS INDEX: 37.61 KG/M2 | DIASTOLIC BLOOD PRESSURE: 80 MMHG | HEART RATE: 75 BPM | SYSTOLIC BLOOD PRESSURE: 120 MMHG

## 2021-05-10 DIAGNOSIS — E78.5 HYPERLIPIDEMIA, UNSPECIFIED HYPERLIPIDEMIA TYPE: ICD-10-CM

## 2021-05-10 DIAGNOSIS — I48.92 ATRIAL FLUTTER, UNSPECIFIED TYPE (HCC): Primary | ICD-10-CM

## 2021-05-10 DIAGNOSIS — R42 DIZZY SPELLS: ICD-10-CM

## 2021-05-10 PROCEDURE — 3017F COLORECTAL CA SCREEN DOC REV: CPT | Performed by: INTERNAL MEDICINE

## 2021-05-10 PROCEDURE — G8427 DOCREV CUR MEDS BY ELIG CLIN: HCPCS | Performed by: INTERNAL MEDICINE

## 2021-05-10 PROCEDURE — G8417 CALC BMI ABV UP PARAM F/U: HCPCS | Performed by: INTERNAL MEDICINE

## 2021-05-10 PROCEDURE — 1036F TOBACCO NON-USER: CPT | Performed by: INTERNAL MEDICINE

## 2021-05-10 PROCEDURE — 99214 OFFICE O/P EST MOD 30 MIN: CPT | Performed by: INTERNAL MEDICINE

## 2021-05-10 RX ORDER — MIDODRINE HYDROCHLORIDE 10 MG/1
10 TABLET ORAL 2 TIMES DAILY
Qty: 60 TABLET | Refills: 1 | Status: SHIPPED | OUTPATIENT
Start: 2021-05-10 | End: 2021-08-02

## 2021-05-10 ASSESSMENT — ENCOUNTER SYMPTOMS
APNEA: 0
CHEST TIGHTNESS: 0
SHORTNESS OF BREATH: 0

## 2021-05-10 NOTE — PROGRESS NOTES
[facility] CARDIOLOGY OFFICE FOLLOW-UP      Patient: Dorothy See  YOB: 1960  MRN: [id number]    Chief Complaint:  Chief Complaint   Patient presents with   Sherry Castro [facility] Cardiologist     SELENA LONG REFERRING    Dizziness         Subjective/HPI:  5/10/21: Patient presents today for evaluation of lightheadedness and syncope. 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 Patient states her blood pressure dropped significantly. Blood pressure drop is more prominent during postural changes. She denies any preceding chest pain. No palpitation. Was seen by Jeffrey Shafer in Neuro and we were consulted. She denies any premonitory symptoms. Chief atrial flutter. EKG we have shows sinus rhythm. We will start her on low-dose midodrine 10 mg p.o. twice daily. See me in 4 weeks.        Past Medical History:   Diagnosis Date    Asthma 2015    Bilateral renal cysts 2013    Depression     since age 35, was with Dr Wilburn Daily, now the [facility]    Diverticulosis of sigmoid colon 2012    Dr Ruba Parkinson DJD of left shoulder     Environmental allergies     Fatty liver 2013    GERD (gastroesophageal reflux disease)     History of cardiac arrhythmia     \"due to stress, was placed on medication\"    Hydatidiform mole     age 26     Hyperlipidemia     Hypothyroidism 2011    IFG (impaired fasting glucose) 2013    Melanoma of eye ([address]     age 30, R eye prosthesis, Dr Cari Beltran    Obesity     Prediabetes     PTSD (post-traumatic stress disorder)     age 35, [facility]    S/P colonoscopy 04/19/2012    Dr. David Zacarias S/P hysterectomy with oophorectomy 2012    Dr Elvira Malone Tremor     Dr Thomas Jean-Baptiste D deficiency        Past Surgical History:   Procedure Laterality Date    DILATION AND CURETTAGE OF UTERUS      ENDOMETRIAL ABLATION  6/2012    EYE REMOVAL      OD for melanoma, age 30    FOOT OSTEOTOMY Left 09/23/2016    DEJA JACKMAN DPM      LINDSAY AND BSO  2012    Dr Juan Cortez       Family History   Problem Relation Age of Onset    Heart Failure Mother         dec 76    Diabetes Mother     Diabetes Father     Stroke Father         dec age 80    Cancer Father         Sarcoma    Breast Cancer Sister     Stomach Cancer Other        Social History     Socioeconomic History    Marital status:      Spouse name: Not on file    Number of children: 3    Years of education: 15    Highest education level: High school graduate   Occupational History    Occupation: SSI   Social Needs    Financial resource strain: Not very hard    Food insecurity     Worry: Sometimes true     Inability: Sometimes true    Transportation needs     Medical: No     Non-medical: No   Tobacco Use    Smoking status: Never Smoker    Smokeless tobacco: Never Used   Substance and Sexual Activity    Alcohol use: No    Drug use: No    Sexual activity: Not Currently     Birth control/protection: Surgical   Lifestyle    Physical activity     Days per week: 0 days     Minutes per session: 0 min    Stress: Rather much   Relationships    Social connections     Talks on phone: Twice a week     Gets together: Never     Attends Hindu service: Never     Active member of club or organization: No     Attends meetings of clubs or organizations: Never     Relationship status:     Intimate partner violence     Fear of current or ex partner: Not on file     Emotionally abused: Not on file     Physically abused: Not on file     Forced sexual activity: Not on file   Other Topics Concern    Not on file   Social History Narrative    Born in [place], one of 4 children, one sister disappeared at age 30, never found    Moved to [place] at age 40        Lives in an apartment in [facility], alone    , 3 children, all grown, in [place]     2 granddaughters     Hobbies exercising, birds    Attends [employer], has volunteered at [employer]       Allergies   Allergen Reactions    Bee Venom        Current Outpatient Medications   Medication Sig Dispense Refill    midodrine (PROAMATINE) 10 MG tablet Take 1 tablet by mouth 2 times daily 60 tablet 1    buPROPion (WELLBUTRIN XL) 300 MG extended release tablet Take 300 mg by mouth daily      metFORMIN (GLUCOPHAGE) 500 MG tablet Take 500 mg by mouth      ASPIRIN LOW DOSE 81 MG EC tablet TAKE 1 TABLET BY MOUTH ONCE A DAY (AM) 30 tablet 5    famotidine (PEPCID) 20 MG tablet TAKE 1 TABLET BY MOUTH 2 TIMES DAILY (AM,PM) 60 tablet 5    Multiple Vitamin (TAB-A-MANDI/BETA CAROTENE) TABS TAKE 1 TABLET BY MOUTH EVERY DAY (AM) 30 tablet 5    Biotin 300 MCG TABS Take 1 tablet by mouth daily 30 tablet 3    cyanocobalamin (CVS VITAMIN B12) 1000 MCG tablet Take 1 tablet by mouth daily 30 tablet 5    EPINEPHrine (EPIPEN 2-PRAVEENA) 0.3 MG/0.3ML SOAJ injection Use as directed for allergic reaction 2 each 2    metoprolol tartrate (LOPRESSOR) 50 MG tablet Take 1 tablet by mouth 2 times daily 60 tablet 3    levothyroxine (SYNTHROID) 50 MCG tablet TAKE 1 TABLET BY MOUTH EVERY MORNING (OWN BLSTER) 90 tablet 0    vitamin D (D3-1000) 25 MCG (1000 UT) CAPS Take 1 capsule by mouth Daily with supper 90 capsule 1    citalopram (CELEXA) 40 MG tablet Take 40 mg by mouth daily      benztropine (COGENTIN) 1 MG tablet Take 1 mg by mouth 3 times daily      vitamin B-2 (RIBOFLAVIN) 100 MG TABS tablet Take 1 tablet by mouth daily 90 tablet 0    budesonide-formoterol (SYMBICORT) 160-4.5 MCG/ACT AERO Inhale 2 puffs into the lungs 2 times daily 1 Inhaler 3    traZODone (DESYREL) 50 MG tablet Take by mouth nightly 1-2 tablets nightly      pravastatin (PRAVACHOL) 40 MG tablet TAKE 1 TABLET BY MOUTH EVERY DAY IN THE EVENING 90 tablet 1    albuterol sulfate  (90 Base) MCG/ACT inhaler USE 2 PUFFS EVERY 4 HRS AS NEEDED FOR WHEEZING/SOB-SPACE OUT TO EVERY 6 HR AS supple. Cardiovascular: Regular rhythm, S1 normal, S2 normal, normal heart sounds, intact distal pulses and normal pulses. PMI is not displaced. No murmur heard. Pulmonary/Chest: She has no wheezes. She has no rales. She exhibits no tenderness. Abdominal: Soft. Bowel sounds are normal. She exhibits no distension and no mass. There is no splenomegaly or hepatomegaly. There is no abdominal tenderness. No hernia. Neurological: She is alert and oriented to person, place, and time. She has normal motor skills. Gait normal.   Skin: Skin is warm and dry. No cyanosis. No jaundice. Nails show no clubbing. Patient Active Problem List   Diagnosis    PTSD (post-traumatic stress disorder)    Depression    Reactive airway disease    Environmental allergies    Hyperlipidemia    S/P colonoscopy    Fatty liver disease, nonalcoholic    Vitamin D deficiency    IFG (impaired fasting glucose)    Obesity (BMI 30-39. 9)    DJD of left shoulder    Asthma    Osteoarthritis of left knee    Cheilosis    Atrial flutter (HCC)    Anxiety    Diarrhea    Right hip pain    Acute pain of both shoulders    Left arm pain    Left elbow pain    Lung infiltrate           No orders of the defined types were placed in this encounter. Orders Placed This Encounter   Medications    midodrine (PROAMATINE) 10 MG tablet     Sig: Take 1 tablet by mouth 2 times daily     Dispense:  60 tablet     Refill:  1             Assessment/Orders:       ICD-10-CM    1. Atrial flutter, unspecified type ([facility]  I48.92    2. Hyperlipidemia, unspecified hyperlipidemia type  E78.5    3. Dizzy spells  R42        Orders Placed This Encounter   Medications    midodrine (PROAMATINE) 10 MG tablet     Sig: Take 1 tablet by mouth 2 times daily     Dispense:  60 tablet     Refill:  1       There are no discontinued medications. No orders of the defined types were placed in this encounter. Plan:  Prescribed Midodrine 10 mg.  Take one tablet twice a day    Stay on same medications.     See me in 4 weeks        Electronically signed by: Rachel Mejias MD  5/13/2021 8:14 AM

## 2021-05-11 ENCOUNTER — TELEPHONE (OUTPATIENT)
Dept: CARDIOLOGY CLINIC | Age: 61
End: 2021-05-11

## 2021-05-11 ASSESSMENT — ENCOUNTER SYMPTOMS
TROUBLE SWALLOWING: 0
COLOR CHANGE: 0
FACIAL SWELLING: 0
NAUSEA: 0
BLOOD IN STOOL: 0
VOICE CHANGE: 0
WHEEZING: 0
ABDOMINAL DISTENTION: 0
VOMITING: 0
ANAL BLEEDING: 0

## 2021-05-14 ENCOUNTER — TELEPHONE (OUTPATIENT)
Dept: NEUROLOGY | Age: 61
End: 2021-05-14

## 2021-05-14 DIAGNOSIS — E87.5 HYPERKALEMIA: Primary | ICD-10-CM

## 2021-05-14 NOTE — TELEPHONE ENCOUNTER
Reviewed results of thyroid studies and CMP. TSH slightly elevated with a normal free T4. Mild hyperkalemia noted at 5.2. Will repeat BMP next week. Patient called and notified.

## 2021-05-17 ENCOUNTER — TELEPHONE (OUTPATIENT)
Dept: FAMILY MEDICINE CLINIC | Age: 61
End: 2021-05-17

## 2021-05-18 DIAGNOSIS — Z12.31 ENCOUNTER FOR SCREENING MAMMOGRAM FOR MALIGNANT NEOPLASM OF BREAST: Primary | ICD-10-CM

## 2021-05-19 ENCOUNTER — TELEPHONE (OUTPATIENT)
Dept: NEUROLOGY | Age: 61
End: 2021-05-19

## 2021-05-19 DIAGNOSIS — R25.1 TREMOR: ICD-10-CM

## 2021-05-19 DIAGNOSIS — R73.01 IFG (IMPAIRED FASTING GLUCOSE): ICD-10-CM

## 2021-05-19 DIAGNOSIS — R55 SYNCOPE, UNSPECIFIED SYNCOPE TYPE: Primary | ICD-10-CM

## 2021-05-19 DIAGNOSIS — E87.5 HYPERKALEMIA: ICD-10-CM

## 2021-05-19 DIAGNOSIS — E78.5 HYPERLIPIDEMIA, UNSPECIFIED HYPERLIPIDEMIA TYPE: ICD-10-CM

## 2021-05-19 LAB
ANION GAP SERPL CALCULATED.3IONS-SCNC: 13 MEQ/L (ref 9–15)
BUN BLDV-MCNC: 12 MG/DL (ref 8–23)
CALCIUM SERPL-MCNC: 10.6 MG/DL (ref 8.5–9.9)
CHLORIDE BLD-SCNC: 102 MEQ/L (ref 95–107)
CHOLESTEROL, FASTING: 299 MG/DL (ref 0–199)
CO2: 25 MEQ/L (ref 20–31)
CREAT SERPL-MCNC: 0.95 MG/DL (ref 0.5–0.9)
GFR AFRICAN AMERICAN: >60
GFR NON-AFRICAN AMERICAN: 60
GLUCOSE BLD-MCNC: 106 MG/DL (ref 70–99)
HBA1C MFR BLD: 5.7 % (ref 4.8–5.9)
HDLC SERPL-MCNC: 41 MG/DL (ref 40–59)
LDL CHOLESTEROL CALCULATED: ABNORMAL MG/DL (ref 0–129)
POTASSIUM SERPL-SCNC: 4.8 MEQ/L (ref 3.4–4.9)
SODIUM BLD-SCNC: 140 MEQ/L (ref 135–144)
TRIGLYCERIDE, FASTING: 463 MG/DL (ref 0–150)

## 2021-05-20 ENCOUNTER — TELEPHONE (OUTPATIENT)
Dept: FAMILY MEDICINE CLINIC | Age: 61
End: 2021-05-20

## 2021-05-20 RX ORDER — PRAVASTATIN SODIUM 40 MG
TABLET ORAL
Qty: 90 TABLET | Refills: 1 | Status: SHIPPED | OUTPATIENT
Start: 2021-05-20 | End: 2021-05-21 | Stop reason: SDUPTHER

## 2021-05-20 NOTE — TELEPHONE ENCOUNTER
To lower cholesterol I would recommend increasing fruits and vegetables in the diet. And I would also recommend to decrease any processed food. I will send her pravastatin to her pharmacy.   Thank you

## 2021-05-20 NOTE — TELEPHONE ENCOUNTER
Patient is calling in for lab results. Patient is asking for advice on how to lower cholesterol. Please advise. I provided the lab results. Patient is asking for pravastatin to be sent to the pharmacy as she does not have any.

## 2021-05-21 RX ORDER — PRAVASTATIN SODIUM 40 MG
TABLET ORAL
Qty: 90 TABLET | Refills: 1 | Status: SHIPPED | OUTPATIENT
Start: 2021-05-21 | End: 2021-08-26 | Stop reason: DRUGHIGH

## 2021-05-24 ENCOUNTER — TELEPHONE (OUTPATIENT)
Dept: NEUROLOGY | Age: 61
End: 2021-05-24

## 2021-05-26 ENCOUNTER — OFFICE VISIT (OUTPATIENT)
Dept: ORTHOPEDIC SURGERY | Age: 61
End: 2021-05-26
Payer: COMMERCIAL

## 2021-05-26 DIAGNOSIS — M77.12 LATERAL EPICONDYLITIS OF LEFT ELBOW: Primary | ICD-10-CM

## 2021-05-26 PROCEDURE — G8427 DOCREV CUR MEDS BY ELIG CLIN: HCPCS | Performed by: ORTHOPAEDIC SURGERY

## 2021-05-26 PROCEDURE — 1036F TOBACCO NON-USER: CPT | Performed by: ORTHOPAEDIC SURGERY

## 2021-05-26 PROCEDURE — 3017F COLORECTAL CA SCREEN DOC REV: CPT | Performed by: ORTHOPAEDIC SURGERY

## 2021-05-26 PROCEDURE — G8417 CALC BMI ABV UP PARAM F/U: HCPCS | Performed by: ORTHOPAEDIC SURGERY

## 2021-05-26 PROCEDURE — 99204 OFFICE O/P NEW MOD 45 MIN: CPT | Performed by: ORTHOPAEDIC SURGERY

## 2021-05-26 NOTE — PROGRESS NOTES
Subjective:      Patient ID: Morenita Luis is a 62 y.o. female who presents today for:  Chief Complaint   Patient presents with    Arm Pain     left forearm pain. She passed out and injured her arm. xray done 04/15/21. Pain is a 6/10       HPI  Comes in for evaluation of left arm pain. Pain is primarily over the lateral aspect of the left elbow. Denies any numbness and tingling. Pain increases with activity in the wrist.  Denies any weakness. No difficulty with pain in the shoulder.     Past Medical History:   Diagnosis Date    Asthma 2015    Bilateral renal cysts 2013    Depression     since age 35, was with Dr Shawn Reinoso, now the [facility]    Diverticulosis of sigmoid colon 2012    Dr Maribell Montes DJD of left shoulder     Environmental allergies     Fatty liver 2013    GERD (gastroesophageal reflux disease)     History of cardiac arrhythmia     \"due to stress, was placed on medication\"    Hydatidiform mole     age 26     Hyperlipidemia     Hypothyroidism 2011    IFG (impaired fasting glucose) 2013    Melanoma of eye ([address]     age 30, R eye prosthesis, Dr Brandt Reid    Obesity     Prediabetes     PTSD (post-traumatic stress disorder)     age 35, [facility]    S/P colonoscopy 04/19/2012    Dr. Jewel Narvaez S/P hysterectomy with oophorectomy 2012    Dr Wale Moore Tremor     Dr Zoraida Runner Vitamin D deficiency       Past Surgical History:   Procedure Laterality Date    DILATION AND CURETTAGE OF UTERUS      ENDOMETRIAL ABLATION  6/2012    EYE REMOVAL      OD for melanoma, age 30    FOOT OSTEOTOMY Left 09/23/2016    Anat Dobbins DPM      LINDSAY AND BSO  2012    Dr Thanh North     Social History     Socioeconomic History    Marital status:      Spouse name: Not on file    Number of children: 3    Years of education: 15    Highest education level: High school graduate   Occupational History    Occupation: SSI   Tobacco Use    Smoking status: Never Smoker    Smokeless tobacco: Never Used   Vaping Use    Vaping Use: Never used   Substance and Sexual Activity    Alcohol use: No    Drug use: No    Sexual activity: Not Currently     Birth control/protection: Surgical   Other Topics Concern    Not on file   Social History Narrative    [place], one of 4 children, one sister disappeared at age 30, never found    Moved to [place] at age 40        Lives in an apartment in [facility], alone    , 3 children, all grown, in [place]     2 granddaughters     Hobbies exercising, birds    Attends [employer], has volunteered at [id number] Strain: Low Risk     Difficulty of Paying Living Expenses: Not very hard   Food Insecurity: Food Insecurity Present    Worried About Running Out of Food in the Last Year: Sometimes true    Anurag of Food in the Last Year: Sometimes true   Transportation Needs: No Transportation Needs    Lack of Transportation (Medical): No    Lack of Transportation (Non-Medical):  No   Physical Activity: Inactive    Days of Exercise per Week: 0 days    Minutes of Exercise per Session: 0 min   Stress: Stress Concern Present    Feeling of Stress : Rather much   Social Connections: Socially Isolated    Frequency of Communication with Friends and Family: Twice a week    Frequency of Social Gatherings with Friends and Family: Never    Attends Mormonism Services: Never    Active Member of Clubs or Organizations: No    Attends Club or Organization Meetings: Never    Marital Status:    Intimate Partner Violence:     Fear of Current or Ex-Partner:     Emotionally Abused:     Physically Abused:     Sexually Abused:      Family History   Problem Relation Age of Onset    Heart Failure Mother         dec 76    Diabetes Mother     Diabetes Father     Stroke Father         dec age 80    Cancer Father         Sarcoma    Breast Cancer Sister     Stomach Cancer Other      Allergies   Allergen Reactions    Bee Venom Current Outpatient Medications on File Prior to Visit   Medication Sig Dispense Refill    VITAMIN D HIGH POTENCY 25 MCG (1000 UT) CAPS TAKE 1 CAPSULE BY MOUTH DAILY WITH SUPPER (PM) 90 capsule 1    pravastatin (PRAVACHOL) 40 MG tablet TAKE 1 TABLET BY MOUTH EVERY DAY IN THE EVENING 90 tablet 1    midodrine (PROAMATINE) 10 MG tablet Take 1 tablet by mouth 2 times daily 60 tablet 1    buPROPion (WELLBUTRIN XL) 300 MG extended release tablet Take 300 mg by mouth daily      metFORMIN (GLUCOPHAGE) 500 MG tablet Take 500 mg by mouth      ASPIRIN LOW DOSE 81 MG EC tablet TAKE 1 TABLET BY MOUTH ONCE A DAY (AM) 30 tablet 5    famotidine (PEPCID) 20 MG tablet TAKE 1 TABLET BY MOUTH 2 TIMES DAILY (AM,PM) 60 tablet 5    Multiple Vitamin (TAB-A-MANDI/BETA CAROTENE) TABS TAKE 1 TABLET BY MOUTH EVERY DAY (AM) 30 tablet 5    Biotin 300 MCG TABS Take 1 tablet by mouth daily 30 tablet 3    cyanocobalamin (CVS VITAMIN B12) 1000 MCG tablet Take 1 tablet by mouth daily 30 tablet 5    EPINEPHrine (EPIPEN 2-PRAVEENA) 0.3 MG/0.3ML SOAJ injection Use as directed for allergic reaction 2 each 2    metoprolol tartrate (LOPRESSOR) 50 MG tablet Take 1 tablet by mouth 2 times daily 60 tablet 3    levothyroxine (SYNTHROID) 50 MCG tablet TAKE 1 TABLET BY MOUTH EVERY MORNING (OWN BLSTER) 90 tablet 0    citalopram (CELEXA) 40 MG tablet Take 40 mg by mouth daily      benztropine (COGENTIN) 1 MG tablet Take 1 mg by mouth 3 times daily      vitamin B-2 (RIBOFLAVIN) 100 MG TABS tablet Take 1 tablet by mouth daily 90 tablet 0    budesonide-formoterol (SYMBICORT) 160-4.5 MCG/ACT AERO Inhale 2 puffs into the lungs 2 times daily 1 Inhaler 3    traZODone (DESYREL) 50 MG tablet Take by mouth nightly 1-2 tablets nightly      albuterol sulfate  (90 Base) MCG/ACT inhaler USE 2 PUFFS EVERY 4 HRS AS NEEDED FOR WHEEZING/SOB-SPACE OUT TO EVERY 6 HR AS SYMPTOMS IMPROVE 1 Inhaler 3    cetirizine (ZYRTEC) 10 MG tablet TAKE 1 TABLET BY MOUTH EVERY DAY AS NEEDED FOR ALLERGIES 30 tablet 5    ARIPiprazole (ABILIFY) 15 MG tablet TAKE 1 TABLET BY MOUTH EVERY DAY  2    [DISCONTINUED] Multiple Vitamins-Minerals (THERAPEUTIC MULTIVITAMIN-MINERALS) tablet Take 1 tablet by mouth daily 30 tablet 5    [DISCONTINUED] albuterol (PROVENTIL;VENTOLIN) 90 MCG/ACT inhaler Inhale 2 puffs into the lungs every 6 hours as needed. 1 Inhaler 6     No current facility-administered medications on file prior to visit. Review of Systems  Pain in left elbow pain laterally. Denies any numbness and tingling. Denies any radiation of pain. Objective: There were no vitals taken for this visit. Ortho Exam  Pleasant oriented slightly overweight female. Full range of motion of the MCP, PIP, DIP of all digits left hand. Full flexion-extension of the wrist.  Full flexion-extension the elbow. No difficulties with range of motion left shoulder. No pain on the medial epicondyle. No pain of the olecranon. Pain on the lateral condyle. There is pain resistive extension the wrist over the lateral condyle. Pain to resistive supination over the lateral epicondyle. No pain resisted pronation or resisted flexion of the wrist.  +2 radial pulse. No pain remaining portion the upper extremity. Radiographs and Laboratory Studies:     Diagnostic Imaging Studies:    Trays consisting AP and lateral view of the radius and ulna along with AP and lateral view of the left elbow that we reviewed from the PACS system dated 4/15/2021 shows no fractures or dislocations. Laboratory Studies:   Lab Results   Component Value Date    WBC 5.9 04/15/2021    HGB 14.5 04/15/2021    HCT 42.2 04/15/2021    MCV 90.8 04/15/2021     04/15/2021     No results found for: SEDRATE  No results found for: CRP    Assessment:      Diagnosis Orders   1. Lateral epicondylitis of left elbow            Plan: At this time discussed the problem with the patient.   My recommendation would be to start her on sickle therapy. We will also place a lateral condyle splint. I will hold off on putting her in any anti-inflammatories due to the fact that she is on so many medications and there is cross-reactivity with some of the medicine she is taking. We will see her back in 6 weeks for recheck. We discussed the room with the patient. Handout concerning lateral condyle-itis was given to the patient. Ultimately the patient does not settle down she may require injection over the area or possible surgical treatment. The patient refused lateral condyle splint. No orders of the defined types were placed in this encounter. No orders of the defined types were placed in this encounter. No follow-ups on file.       Erik Poole MD

## 2021-05-26 NOTE — PATIENT INSTRUCTIONS
swing.     · If you work, consider asking your employer about new ways of doing your job if your elbow pain is caused by something you do at work. Medicines    · Be safe with medicines. Read and follow all instructions on the label. ? If the doctor gave you a prescription medicine for pain, take it as prescribed. ? If you are not taking a prescription pain medicine, ask your doctor if you can take an over-the-counter medicine. When should you call for help? Call your doctor now or seek immediate medical care if:    · Your pain is worse.     · You cannot bend your elbow normally.     · Your arm or hand is cool or pale or changes color.     · You have tingling, weakness, or numbness in your hand and fingers. Watch closely for changes in your health, and be sure to contact your doctor if:    · You have work problems caused by your elbow pain.     · Your pain is not better after 2 weeks. Where can you learn more? Go to https://LawBite.[employer]. org and sign in to your [employer] account. Enter 0699 [id number] in the SHEEX box to learn more about \"Tennis Elbow: Care Instructions. \"     If you do not have an account, please click on the \"Sign Up Now\" link. Current as of: November 16, 2020               Content Version: 12.8  © 2006-2021 Healthwise, Incorporated. Care instructions adapted under license by [facility]. If you have questions about a medical condition or this instruction, always ask your healthcare professional. [place] [address] any warranty or liability for your use of this information. Patient Education        Tennis Elbow: Care Instructions  Overview     Tennis elbow is soreness or pain on the outer part of the elbow. The pain occurs when the tendon is stretched and becomes irritated by repeated twisting of the hand, wrist, and forearm. A tendon is a tough tissue that connects muscle to bone. This injury is common in tennis players.  But you also can get it from many activities that work the same muscles. Examples include gardening, painting, and using tools. Tennis elbow usually heals with rest and treatment at home. Follow-up care is a key part of your treatment and safety. Be sure to make and go to all appointments, and call your doctor if you are having problems. It's also a good idea to know your test results and keep a list of the medicines you take. How can you care for yourself at home?    · Rest your fingers, wrist, and forearm. Try to stop or reduce any activity that causes elbow pain. You may have to rest your arm for weeks to months. Follow your doctor's directions for how long to rest.     · Put ice or a cold pack on your elbow for 10 to 20 minutes at a time. Try to do this every 1 to 2 hours for the next 3 days (when you are awake) or until the swelling goes down. Put a thin cloth between the ice and your skin. You can try heat, or alternating heat and ice, after the first 3 days.     · If your doctor gave you a brace or splint, use it as directed. A \"counterforce\" brace is a strap around your forearm, just below your elbow. It may ease the pressure on the tendon and spread force throughout your arm.     · Prop up your elbow on pillows to help reduce swelling.     · Follow your doctor's or physical therapist's directions for exercise.     · Return to your usual activities slowly.     · Try to prevent the problem. Learn the best techniques for your sport. For example, make sure the  on your tennis racquet is not too big for your hand. Try not to hit a tennis ball late in your swing.     · If you work, consider asking your employer about new ways of doing your job if your elbow pain is caused by something you do at work. Medicines    · Be safe with medicines. Read and follow all instructions on the label. ? If the doctor gave you a prescription medicine for pain, take it as prescribed.   ? If you are not taking a prescription pain medicine, ask your doctor if you can take an over-the-counter medicine. When should you call for help? Call your doctor now or seek immediate medical care if:    · Your pain is worse.     · You cannot bend your elbow normally.     · Your arm or hand is cool or pale or changes color.     · You have tingling, weakness, or numbness in your hand and fingers. Watch closely for changes in your health, and be sure to contact your doctor if:    · You have work problems caused by your elbow pain.     · Your pain is not better after 2 weeks. Where can you learn more? Go to https://ThirdSpaceLearning.[employer]. org and sign in to your [employer] account. Enter 0699 [id number] in the [employer] box to learn more about \"Tennis Elbow: Care Instructions. \"     If you do not have an account, please click on the \"Sign Up Now\" link. Current as of: November 16, 2020               Content Version: 12.8  © 2006-2021 Healthwise, Incorporated. Care instructions adapted under license by [facility] ([facility]). If you have questions about a medical condition or this instruction, always ask your healthcare professional. [place] [address] any warranty or liability for your use of this information.

## 2021-05-27 ENCOUNTER — HOSPITAL ENCOUNTER (OUTPATIENT)
Dept: CT IMAGING | Age: 61
Discharge: HOME | End: 2021-05-29
Payer: COMMERCIAL

## 2021-05-27 DIAGNOSIS — R55 SYNCOPE, UNSPECIFIED SYNCOPE TYPE: ICD-10-CM

## 2021-05-27 DIAGNOSIS — R25.1 TREMOR: ICD-10-CM

## 2021-05-27 PROCEDURE — 70450 CT HEAD/BRAIN W/O DYE: CPT

## 2021-06-01 ENCOUNTER — TELEPHONE (OUTPATIENT)
Dept: NEUROLOGY | Age: 61
End: 2021-06-01

## 2021-06-02 ENCOUNTER — TELEPHONE (OUTPATIENT)
Dept: NEUROLOGY | Age: 61
End: 2021-06-02

## 2021-06-07 NOTE — TELEPHONE ENCOUNTER
Yanna driscoll called again about this. Not sure if its done or if you got a chance to do it.     Thanks [employer]

## 2021-06-14 ENCOUNTER — HOSPITAL ENCOUNTER (OUTPATIENT)
Dept: WOMENS IMAGING | Age: 61
Discharge: HOME | End: 2021-06-16
Payer: COMMERCIAL

## 2021-06-14 ENCOUNTER — OFFICE VISIT (OUTPATIENT)
Dept: CARDIOLOGY CLINIC | Age: 61
End: 2021-06-14
Payer: COMMERCIAL

## 2021-06-14 VITALS
HEIGHT: 66 IN | OXYGEN SATURATION: 96 % | SYSTOLIC BLOOD PRESSURE: 134 MMHG | HEART RATE: 70 BPM | BODY MASS INDEX: 37.12 KG/M2 | DIASTOLIC BLOOD PRESSURE: 80 MMHG | WEIGHT: 231 LBS

## 2021-06-14 DIAGNOSIS — Z12.31 ENCOUNTER FOR SCREENING MAMMOGRAM FOR MALIGNANT NEOPLASM OF BREAST: ICD-10-CM

## 2021-06-14 DIAGNOSIS — R07.9 CHEST PAIN, UNSPECIFIED TYPE: ICD-10-CM

## 2021-06-14 DIAGNOSIS — I48.92 ATRIAL FLUTTER, UNSPECIFIED TYPE (HCC): Primary | ICD-10-CM

## 2021-06-14 PROCEDURE — G8428 CUR MEDS NOT DOCUMENT: HCPCS | Performed by: INTERNAL MEDICINE

## 2021-06-14 PROCEDURE — 77067 SCR MAMMO BI INCL CAD: CPT

## 2021-06-14 PROCEDURE — 1036F TOBACCO NON-USER: CPT | Performed by: INTERNAL MEDICINE

## 2021-06-14 PROCEDURE — 99214 OFFICE O/P EST MOD 30 MIN: CPT | Performed by: INTERNAL MEDICINE

## 2021-06-14 PROCEDURE — G8417 CALC BMI ABV UP PARAM F/U: HCPCS | Performed by: INTERNAL MEDICINE

## 2021-06-14 PROCEDURE — 3017F COLORECTAL CA SCREEN DOC REV: CPT | Performed by: INTERNAL MEDICINE

## 2021-06-14 ASSESSMENT — ENCOUNTER SYMPTOMS
CHEST TIGHTNESS: 0
TROUBLE SWALLOWING: 0
FACIAL SWELLING: 0
WHEEZING: 0
COLOR CHANGE: 0
NAUSEA: 0
SHORTNESS OF BREATH: 0
ABDOMINAL DISTENTION: 0
BLOOD IN STOOL: 0
ANAL BLEEDING: 0
VOICE CHANGE: 0
APNEA: 0
VOMITING: 0

## 2021-06-14 NOTE — PROGRESS NOTES
[facility] CARDIOLOGY OFFICE FOLLOW-UP      Patient: bK Ortega  YOB: 1960  MRN: [id number]    Chief Complaint:  Chief Complaint   Patient presents with    1 Month Follow-Up    Medication Check     midodrine    Atrial Flutter    Dizziness     room spinning    Loss of Consciousness     last month         Subjective/HPI:  6/14/21: Patient presents today for follow-up of dizziness. Midodrine did not make too much difference but she will continue with the same for now. Still dizzy. On metoprolol hypothyroidism. On multiple psych medication including Celexa, Cogentin trazodone and Wellbutrin. Am not sure if any of these medication making her dizzy. Has not had syncope. She did fall down 3 days ago and hurt her shoulder. It was more like a mechanical fall rather than syncope. He did get the Covid vaccine. Does not smoke. Continue with the midodrine. See me in 3 months    5/10/21: Patient presents today for evaluation of lightheadedness and syncope. 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 Patient states her blood pressure dropped significantly. Blood pressure drop is more prominent during postural changes. She denies any preceding chest pain. No palpitation. Was seen by Tony Austin in Neuro and we were consulted. She denies any premonitory symptoms. Chief atrial flutter. EKG we have shows sinus rhythm. We will start her on low-dose midodrine 10 mg p.o. twice daily. See me in 4 weeks. 2/8/21: Patient presents today for follow-up of palpitation. She is better after Lopressor was started 50 twice a day. She is diabetic. Also on multiple psych medication which does not help while on Symbicort. When she does not get any she is short of breath while climbing stairs as she used to do before Lopressor was started. Procedure Laterality Date    BREAST BIOPSY      DILATION AND CURETTAGE OF UTERUS      ENDOMETRIAL ABLATION  6/2012    EYE REMOVAL      OD for melanoma, age 30    FOOT OSTEOTOMY Left 09/23/2016    Mace Antes DPM      LINDSAY AND BSO  2012    Dr Efrain Landeros       Family History   Problem Relation Age of Onset    Heart Failure Mother         dec 76    Diabetes Mother     Diabetes Father     Stroke Father         dec age 80    Cancer Father         Sarcoma    Stomach Cancer Other     Breast Cancer Paternal Aunt     Breast Cancer Maternal Aunt        Social History     Socioeconomic History    Marital status:      Spouse name: Not on file    Number of children: 3    Years of education: 15    Highest education level: High school graduate   Occupational History    Occupation: [employer]   Tobacco Use    Smoking status: Never Smoker    Smokeless tobacco: Never Used   Vaping Use    Vaping Use: Never used   Substance and Sexual Activity    Alcohol use: No    Drug use: No    Sexual activity: Not Currently     Birth control/protection: Surgical   Other Topics Concern    Not on file   Social History Narrative    Born in [address] [place] [address], one of 4 children, one sister disappeared at age 29, never found    Moved to [place] at age 40        Lives in an apartment in [facility], alone    , 3 children, all grown, in [place]     2 granddaughters     Hobbies exercising, birds    Attends [employer], has volunteered at [id number] Strain: Low Risk     Difficulty of Paying Living Expenses: Not very hard   Food Insecurity: Food Insecurity Present    Worried About Running Out of Food in the Last Year: Sometimes true    Anurag of Food in the Last Year: Sometimes true   Transportation Needs: No Transportation Needs    Lack of Transportation (Medical): No    Lack of Transportation (Non-Medical):  No   Physical Activity: Inactive    Days of Exercise per Week: 0 days    Minutes of Exercise per Session: 0 min   Stress: Stress Concern Present    Feeling of Stress : Rather much   Social Connections: Socially Isolated    Frequency of Communication with Friends and Family: Twice a week    Frequency of Social Gatherings with Friends and Family: Never    Attends Muslim Services: Never    Active Member of Clubs or Organizations: No    Attends Club or Organization Meetings: Never    Marital Status:    Intimate Partner Violence:     Fear of Current or Ex-Partner:     Emotionally Abused:     Physically Abused:     Sexually Abused:         Allergies   Allergen Reactions    Bee Venom        Current Outpatient Medications   Medication Sig Dispense Refill    VITAMIN D HIGH POTENCY 25 MCG (1000 UT) CAPS TAKE 1 CAPSULE BY MOUTH DAILY WITH SUPPER (PM) 90 capsule 1    pravastatin (PRAVACHOL) 40 MG tablet TAKE 1 TABLET BY MOUTH EVERY DAY IN THE EVENING 90 tablet 1    midodrine (PROAMATINE) 10 MG tablet Take 1 tablet by mouth 2 times daily 60 tablet 1    buPROPion (WELLBUTRIN XL) 300 MG extended release tablet Take 300 mg by mouth daily      metFORMIN (GLUCOPHAGE) 500 MG tablet Take 500 mg by mouth      ASPIRIN LOW DOSE 81 MG EC tablet TAKE 1 TABLET BY MOUTH ONCE A DAY (AM) 30 tablet 5    famotidine (PEPCID) 20 MG tablet TAKE 1 TABLET BY MOUTH 2 TIMES DAILY (AM,PM) 60 tablet 5    Multiple Vitamin (TAB-A-MANDI/BETA CAROTENE) TABS TAKE 1 TABLET BY MOUTH EVERY DAY (AM) 30 tablet 5    Biotin 300 MCG TABS Take 1 tablet by mouth daily 30 tablet 3    cyanocobalamin ([employer] VITAMIN B12) 1000 MCG tablet Take 1 tablet by mouth daily 30 tablet 5    EPINEPHrine (EPIPEN 2-PRAVEENA) 0.3 MG/0.3ML SOAJ injection Use as directed for allergic reaction 2 each 2    metoprolol tartrate (LOPRESSOR) 50 MG tablet Take 1 tablet by mouth 2 times daily 60 tablet 3    levothyroxine (SYNTHROID) 50 MCG tablet TAKE 1 TABLET BY MOUTH EVERY MORNING (OWN BLSTER) 90 tablet 0    citalopram (CELEXA) 40 MG tablet Take 40 mg by mouth daily      benztropine (COGENTIN) 1 MG tablet Take 1 mg by mouth 3 times daily      vitamin B-2 (RIBOFLAVIN) 100 MG TABS tablet Take 1 tablet by mouth daily 90 tablet 0    budesonide-formoterol (SYMBICORT) 160-4.5 MCG/ACT AERO Inhale 2 puffs into the lungs 2 times daily 1 Inhaler 3    traZODone (DESYREL) 50 MG tablet Take by mouth nightly 1-2 tablets nightly      albuterol sulfate  (90 Base) MCG/ACT inhaler USE 2 PUFFS EVERY 4 HRS AS NEEDED FOR WHEEZING/SOB-SPACE OUT TO EVERY 6 HR AS SYMPTOMS IMPROVE 1 Inhaler 3    cetirizine (ZYRTEC) 10 MG tablet TAKE 1 TABLET BY MOUTH EVERY DAY AS NEEDED FOR ALLERGIES 30 tablet 5    ARIPiprazole (ABILIFY) 15 MG tablet TAKE 1 TABLET BY MOUTH EVERY DAY  2     No current facility-administered medications for this visit. Review of Systems:   Review of Systems   Constitutional: Negative for activity change, appetite change, diaphoresis, fatigue and unexpected weight change. HENT: Negative for facial swelling, nosebleeds, trouble swallowing and voice change. Respiratory: Negative for apnea, chest tightness, shortness of breath and wheezing. Cardiovascular: Positive for chest pain and leg swelling. Negative for palpitations. Gastrointestinal: Negative for abdominal distention, anal bleeding, blood in stool, nausea and vomiting. Genitourinary: Negative for decreased urine volume and dysuria. Musculoskeletal: Negative for gait problem and myalgias. Skin: Negative for color change, pallor, rash and wound. Neurological: Positive for dizziness, weakness and numbness. Negative for syncope, facial asymmetry, light-headedness and headaches. Hematological: Does not bruise/bleed easily. Psychiatric/Behavioral: Negative for agitation, behavioral problems, confusion, hallucinations and suicidal ideas. The patient is not nervous/anxious. All other systems reviewed and are negative.       Review of System is negative except for as mentioned above. Physical Examination:    /80 (Site: Right Lower Arm, Position: Sitting, Cuff Size: Medium Adult)   Pulse 70   Ht 5' 6\" (1.676 m)   Wt 231 lb (104.8 kg)   SpO2 96%   BMI 37.28 kg/m²    Physical Exam   Constitutional: She appears healthy. No distress. HENT:   Nose: Nose normal.   Mouth/Throat: Dentition is normal. Oropharynx is clear. Eyes: Pupils are equal, round, and reactive to light. Conjunctivae are normal.   Neck: Thyroid normal.   Cardiovascular: Regular rhythm, S1 normal, S2 normal, normal heart sounds, intact distal pulses and normal pulses. PMI is not displaced. No murmur heard. Pulmonary/Chest: She has no wheezes. She has no rales. She exhibits no tenderness. Abdominal: Soft. Bowel sounds are normal. She exhibits no distension and no mass. There is no splenomegaly or hepatomegaly. There is no abdominal tenderness. No hernia. Musculoskeletal:      Cervical back: Normal range of motion and neck supple. Neurological: She is alert and oriented to person, place, and time. She has normal motor skills. Gait normal.   Skin: Skin is warm and dry. No cyanosis. No jaundice. Nails show no clubbing. Patient Active Problem List   Diagnosis    PTSD (post-traumatic stress disorder)    Depression    Reactive airway disease    Environmental allergies    Hyperlipidemia    S/P colonoscopy    Fatty liver disease, nonalcoholic    Vitamin D deficiency    IFG (impaired fasting glucose)    Obesity (BMI 30-39. 9)    DJD of left shoulder    Asthma    Osteoarthritis of left knee    Cheilosis    Atrial flutter (HCC)    Anxiety    Diarrhea    Right hip pain    Acute pain of both shoulders    Left arm pain    Left elbow pain    Lung infiltrate           No orders of the defined types were placed in this encounter. No orders of the defined types were placed in this encounter. Assessment/Orders:       ICD-10-CM    1.  Atrial flutter, unspecified type ([facility]  I48.92    2. Chest pain, unspecified type  R07.9        No orders of the defined types were placed in this encounter. There are no discontinued medications. No orders of the defined types were placed in this encounter. Plan:    Stay on same medications.     See me in 3 months        Electronically signed by: Irven Claude, MD  6/14/2021 10:28 AM

## 2021-06-15 DIAGNOSIS — R92.8 ABNORMAL MAMMOGRAM: Primary | ICD-10-CM

## 2021-06-16 ENCOUNTER — TELEPHONE (OUTPATIENT)
Dept: FAMILY MEDICINE CLINIC | Age: 61
End: 2021-06-16

## 2021-06-16 NOTE — TELEPHONE ENCOUNTER
Called & spoke to patient. She is the one who called her ins. & told them she needed to have a 3D mamm done. This is not what was ordered. A Diagnostic mammogram was ordered. I told patient that she just needed to get her diagnostic mamm scheduled & if PA was needed it would be taken care of at the facility she is having the mamm done at. Gave her phone number to call & get scheduled.

## 2021-06-16 NOTE — TELEPHONE ENCOUNTER
Incoming Call    Caller:  Kip Booth      Communication (HIPPA):  HIPPA not applicable      Call Reason/Info:  Patient is calling requesting we do a Prior Authorization for her to have a 3D Mammogram.     Patient Insurance ([address] [place] [address]) Contact Info:  Phone: [phone]  Fax: [phone]      Please Advise:  If this is something that we can do a prior authorization on.

## 2021-06-22 ENCOUNTER — HOSPITAL ENCOUNTER (OUTPATIENT)
Dept: OCCUPATIONAL THERAPY | Age: 61
Setting detail: THERAPIES SERIES
Discharge: HOME | End: 2021-06-22
Payer: COMMERCIAL

## 2021-06-22 PROCEDURE — 97166 OT EVAL MOD COMPLEX 45 MIN: CPT

## 2021-06-22 NOTE — PROGRESS NOTES
[x] [address]:       [address] [place], [place] [address]Rachael Ramos  Ph: [phone]   Fax: [phone] [] [address]:  [address] [facility] 14[address] [facility], [address]   Ph: [phone]  Fax: [phone]       OCCUPATIONAL THERAPY EVALUATION     Evaluation Date:  6/22/2021    OT Individual Minutes  Time In: 1003  Time Out: 1055  Minutes: 52    OT Eval mod complexity 52 minutes for 1 unit, CPT 41242     Patient Shiloh Gomez   Gender: female   YOB: 1960         MRN: [id number]     Physician: Referring Practitioner: Dr. Xochitl Lewis MD  Diagnosis: Diagnosis: Lateral epicondylitis of left elbow   Treating diagnosis: Increased left UE pain, decreased left UE strength, decreased left coordination                 Referral Date:  5/26/2021              Onset Date:   Patient states she fell ~1 month ago, injuring left arm    PMH:  Patient Active Problem List   Diagnosis    PTSD (post-traumatic stress disorder)    Depression    Reactive airway disease    Environmental allergies    Hyperlipidemia    S/P colonoscopy    Fatty liver disease, nonalcoholic    Vitamin D deficiency    IFG (impaired fasting glucose)    Obesity (BMI 30-39. 9)    DJD of left shoulder    Asthma    Osteoarthritis of left knee    Cheilosis    Atrial flutter (HCC)    Anxiety    Diarrhea    Right hip pain    Acute pain of both shoulders    Left arm pain    Left elbow pain    Lung infiltrate     Past Medical History:   Diagnosis Date    Asthma 2015    Bilateral renal cysts 2013    Depression     since age 35, was with Dr Christina Hammond, now the [facility]    Diverticulosis of sigmoid colon 2012    Dr Blake Franco DJD of left shoulder     Environmental allergies     Fatty liver 2013    GERD (gastroesophageal reflux disease)     History of cardiac arrhythmia     \"due to stress, was placed on medication\"    Hydatidiform mole     age 24     Hyperlipidemia     Hypothyroidism 2011    IFG (impaired fasting glucose) 2013    Melanoma of eye [facility])     age 30, R eye prosthesis, Dr Brian River    Obesity     Prediabetes     PTSD (post-traumatic stress disorder)     age 35, [facility]    S/P colonoscopy 04/19/2012    Dr. Jay Mclaughlin S/P hysterectomy with oophorectomy 2012    Dr Itzel Reyes D deficiency      Past Surgical History:   Procedure Laterality Date    BREAST BIOPSY      DILATION AND CURETTAGE OF UTERUS      ENDOMETRIAL ABLATION  6/2012    EYE REMOVAL      OD for melanoma, age 30    FOOT OSTEOTOMY Left 09/23/2016    DEJA JACKMAN DPM      LINDSAY AND BSO  2012    Dr Unruly Rust     Allergies   Allergen Reactions    Bee Venom        Diagnostic imaging: Physician interpretation of imaging tests reviewed.     Medications:    Current Outpatient Medications:     VITAMIN D HIGH POTENCY 25 MCG (1000 UT) CAPS, TAKE 1 CAPSULE BY MOUTH DAILY WITH SUPPER (PM), Disp: 90 capsule, Rfl: 1    pravastatin (PRAVACHOL) 40 MG tablet, TAKE 1 TABLET BY MOUTH EVERY DAY IN THE EVENING, Disp: 90 tablet, Rfl: 1    midodrine (PROAMATINE) 10 MG tablet, Take 1 tablet by mouth 2 times daily, Disp: 60 tablet, Rfl: 1    buPROPion (WELLBUTRIN XL) 300 MG extended release tablet, Take 300 mg by mouth daily, Disp: , Rfl:     metFORMIN (GLUCOPHAGE) 500 MG tablet, Take 500 mg by mouth, Disp: , Rfl:     ASPIRIN LOW DOSE 81 MG EC tablet, TAKE 1 TABLET BY MOUTH ONCE A DAY (AM), Disp: 30 tablet, Rfl: 5    famotidine (PEPCID) 20 MG tablet, TAKE 1 TABLET BY MOUTH 2 TIMES DAILY (AM,PM), Disp: 60 tablet, Rfl: 5    Multiple Vitamin (TAB-A-MANDI/BETA CAROTENE) TABS, TAKE 1 TABLET BY MOUTH EVERY DAY (AM), Disp: 30 tablet, Rfl: 5    Biotin 300 MCG TABS, Take 1 tablet by mouth daily, Disp: 30 tablet, Rfl: 3    cyanocobalamin ([employer] VITAMIN B12) 1000 MCG tablet, Take 1 tablet by mouth daily, Disp: 30 tablet, Rfl: 5    EPINEPHrine (EPIPEN 2-PRAVEENA) 0.3 MG/0.3ML SOAJ injection, Use as directed for allergic reaction, Disp: 2 each, Rfl: 2    metoprolol tartrate (LOPRESSOR) 50 MG tablet, Take 1 tablet by mouth 2 times daily, Disp: 60 tablet, Rfl: 3    levothyroxine (SYNTHROID) 50 MCG tablet, TAKE 1 TABLET BY MOUTH EVERY MORNING (OWN BLSTER), Disp: 90 tablet, Rfl: 0    citalopram (CELEXA) 40 MG tablet, Take 40 mg by mouth daily, Disp: , Rfl:     benztropine (COGENTIN) 1 MG tablet, Take 1 mg by mouth 3 times daily, Disp: , Rfl:     vitamin B-2 (RIBOFLAVIN) 100 MG TABS tablet, Take 1 tablet by mouth daily, Disp: 90 tablet, Rfl: 0    budesonide-formoterol (SYMBICORT) 160-4.5 MCG/ACT AERO, Inhale 2 puffs into the lungs 2 times daily, Disp: 1 Inhaler, Rfl: 3    traZODone (DESYREL) 50 MG tablet, Take by mouth nightly 1-2 tablets nightly, Disp: , Rfl:     albuterol sulfate  (90 Base) MCG/ACT inhaler, USE 2 PUFFS EVERY 4 HRS AS NEEDED FOR WHEEZING/SOB-SPACE OUT TO EVERY 6 HR AS SYMPTOMS IMPROVE, Disp: 1 Inhaler, Rfl: 3    cetirizine (ZYRTEC) 10 MG tablet, TAKE 1 TABLET BY MOUTH EVERY DAY AS NEEDED FOR ALLERGIES, Disp: 30 tablet, Rfl: 5    ARIPiprazole (ABILIFY) 15 MG tablet, TAKE 1 TABLET BY MOUTH EVERY DAY, Disp: , Rfl: 2    Visits Allowed/Insurance/Certification Information:   OT Visit Information  OT Insurance Information: [facility]  Total # of Visits to Date: 1  Progress Note Due Date: 07/21/21  Progress Note Counter: Evaluation    Restrictions/Precautions None per patient report ; Patient with left lateral condyle splint- reports she has not worn for ~1 month    English primary language: Yes    Transfer of pt care required: No     SUBJECTIVE FINDINGS     Support contact: Patient states no support contact    Patient lives: Alone  Home:  [address] 3rd floor;  Laundry in basement of complex  Entrance: 1-2 flight of steps, 1 HR  Bathroom: Tub/shower  DME: Left lateral condyle splint    Pain:        Pain Location  Description Initial Rating  Current Rating  Improved by Patient reports performing independently  Toilet transfer: Patient reports difficulty getting on/off commode- not related to left arm  Tub/Shower transfer: Patient reports difficulty getting in/out of tub- uses towel bar- not related to left arm    Cooking: Patient reports performing independently  Cleaning: Patient reports performing independently  Laundry: Patient reports mild discomfort throughout left elbow while performing      Sleep: Patient reports no difficulty    Medication management: Patient states no difficulty- modified pill containers    Patient goal for therapy:  To increase my strength      OBSERVATION:   Symmetrical posture     OBJECTIVE FINDINGS    Hand Dominance: Right    Upper Extremity Strength and Range of Motion    Right  Left    MMT A P Norm  A P MMT   Shoulder          Flexion 4/5 WFL NT 0-180 ~120 NT 3+/5   Abduction 4/5 WFL NT 0-180 ~90 NT 3+/5   Internal Rotation 4/5 WFL NT 0-80 WFL NT 3+/5   External Rotation 4/5 WFL NT 0-60 WFL NT 3+/5   Elbow          Flexion 4/5 WFL NT 0-150 WFL NT 4/5   Extension 4/5 WFL -0 WFL NT 4/5   Pronation 4/5 WFL NT 0-80 WFL NT 3+/5   Supination 4/5 WFL NT 0-80 WFL NT 3+/5   Wrist          Flexion 4/5 WFL NT 0-70 WFL NT 3+/5   Extension  4/5 WFL NT 0-60 WFL NT 3+/5   Ulnar deviation NT WFL NT 0-30 WFL NT NT   Radial Deviation  NT WFL NT 0-20 WFL NT NT   Comments:     Hand Range of Motion   Right: WFL  Left: WFL    Opposition  Right Hand: WFL  Left Hand: WFL    Distance Thumb Tip from Head of 5th MC:   Right Hand: WFL  Left Hand: WFL    Edema  Circumferential measurements (inches)   Right Left   Wrist (across styloid) 7\" 8.25\"        & Pinch Strength  Average of 3 tries Right Norm Left Norm    (lb) 48.6 Female  age 63-62: 50 lbs 52 Female age 63-62: 45 lbs    Morris Pinch (lb) NT Female age 63-62: 10.5 lbs  NT Female age 63-62: 9.5 lbs    Lateral Pinch (lb) NT Female age 63-62: 11.5 lbs  NT Female age 63-62: 10.5 lbs    Comments: Pinch strength NT on evaluation secondary to time constraints    Coordination & Dexterity   Right Norm Left Norm   Nine Hole Peg Test  (seconds) 30.59 Female age 63-62: 24.2 s  40.80 Female age 63-62: 23.6 s    Box & Blocks  (# of blocks) NT Female age 63-62: 69.1 NT Female age 63-62: 76.11   Comments: Box and Blocks NT on evaluation secondary to time constraints    Skin Integrity  No issues noted on evaluation    Cognition:  Decreased short term memory; increased processing speed; decreased problem-solving    Sensation:   Patient reports numbness in left foot due to pin placement from previous injury    Tone:   [facility]     Joint Mobility  WFL    Palpation/Tenderness  Patient reports tenderness to touch at left lateral condyle and left forearm    Education/Barriers to learning:   Barriers: Cognition  Education on this date: OT role and POC     ASSESSMENT  Patient is a 62 y.o. female who presents to [facility] outpatient occupational therapy with recent diagnosis left lateral epicondylitis. Patient presents with increased pain to left UE, decreased strength, and decreased overall coordination. Patient would benefit from continued occupational therapy to address these deficits in order to improve overall quality of life. Problems:  [x] Decreased UE strength   [] Decreased UE ROM   [x] Decreased  strength   [] Decreased fine motor skills   [x] Increased pain    [x] Decreased ADL status   [] Decreased joint mobility   [x] Decreased coordination   [] Decreased sensation    [] Other:      Complexity:         [] Low Complexity:   ¨ History: Brief history including review of medical records relating to the problem  ¨ Exam: 1-3 performance Deficits  ¨ Assistance/Modification: No assistance or modifications required to perform tasks.  No comorbities affecting occupational performance  [x] Medium Complexity:   ¨ History: Expanded review of medical records and additional review of physical, cognitive, or psychosocial history related to current functional performance  ¨ Exam: 3-5 performance deficits  ¨ Assistance/Modification: Min/mod assistance or modifications required to perform tasks. May have comorbidities that affect occupational performance. [] High Complexity:   ¨ History: Extensive review of medical records and additional review of physical, cognitive, or psychosocial history related to current functional performance. ¨ Exam: 5 or more performance deficits  ¨ Assistance/Modification: Significant assistance or modifications required to perform tasks. Have comorbidities that affect occupational performance. Quick DASH  QuickDASH  Open a tight or new jar: Moderate Difficulty  Do heavy household chores (e.g., wash walls, floors): Mild Difficulty  Jess a shopping bag or briefcase: Mild Difficulty  Wash your back: Moderate Difficulty  Use a knife to cut food:  Moderate Difficulty  Recreational activities in which you take some force or impact through your arm, shoulder, or hand (e.g., golf, hammering, tennis, etc.): Moderate Difficulty  During the past week, to what extent has your arm, shoulder or hand problem interfered with your normal social activities with family, friends, neighbors or groups?: Slightly  During the past week, were you limited in your work or other regular daily activities as a result of your arm, shoulder or hand problem?: Slightly Limited  Arm, shoulder or hand pain: Mild  Tingling (pins and needles) in your arm, shoulder or hand: Mild  During the past week, how much difficulty have you had sleeping because of the pain in your arm, shoulder or hand?: No Difficulty  QuickDASH Total Score: 25  QuickDASH Disability/Symptom Score : 31.82 %  QUICKDASH Disability Index: 20-39%  QUICKDASH CMS Modifier: CJ      Rehabilitation Potential:    [] Excellent [x] Good  [] Fair  [] Poor      PLAN OF CARE     To see patient for 2 x/week for 4 weeks or 8 visits for the following treatment interventions:    [x] Evaluate & Treat [x] Neuromuscular Re-education:     [x] Re-evaluation [] Tissue (stress) Loading Program    [x] Pain Management  [x] PROM/Stretching/AAROM/AROM    [x] Edema Management  [x] Splinting    [] Wound Care/Scar Management  [] Desensitization    [x] ADL Training  [x] Strengthening/Graded Therapeutic Activity    [] Tendon Repair Program  [x] Coordination/Dexterity Training    [] Instruction/Application of energy [x] Manual Techniques        conservation, work simplification [x] Instruction in HEP        joint protection, body mechanics [] Aquatic Therapy    [x] Modalities [x]  Ultrasound           [x] Infrared [] Hot/Cold Pack:     [] Electrical Stimulation [] Fluidotherapy  [] Paraffin      [x] PÉREZ able to work with pt   [x] D/C plan: Will assess pt after established visits to determine need for continued therapy. GOALS:     Long term goals  Time Frame for Long term goals : 2 x/week for 4 weeks or 8 visits  Long term goal 1: Patient will be independent with all recommended strategies to decrease edema throughout left UE. Long term goal 2: Patient will engage in functional/therapeutic activities to decrease pain in left UE to 0-3/10 (now as high as 8/10) to increase participation in functional tasks. Long term goal 3: Patient will increase left UE strength by 1/2 MMT grade to increase efficiency with ADL/IADLs. Long term goal 4: Patient will increase left hand fine motor coordination by a 10 second improvement on 9-HPT for increase ease of ADL/IADL tasks. Long term goal 5: Patient will be independent with all recommended HEP for ROM, edema and pain management, strength, and coordination.        Signature: Electronically signed by MAYUR Prado on 6/22/2021 at 1:27 PM      Falls Risk Assessment     Age: 0-59 = 0          60-69= 1            > 70= 2 History of Falls:   0  Falls  last 6 mo = 0    1  fall  Last  6 mo = 1   1-3 falls last 6 mo = 2 Medical History:   Parkinsons, CVA,HTN, vertigo, >4 meds, use of assistive device (1pt.for each)  Mental Status:  A & O x 3 = 0  Disoriented to person, place, or time = 2     [x]  INITIAL ASSESSMENT:                                                      Date: 6/22/2021                                                  Age:   2                                                         Falls: 2                                                          PMH: 1                                                          Mental: 0                                                       Total:  4                                                        *Patient 4 or younger:   Vestibular:     Signature: MAYUR Mae                                                      High Risk for Falls: >8  Intermediate Risk for Falls: 4-8   Low Risk for Falls: <4    * All pediatric patients (age 3 or younger) and vestibular patients will be considered high risk for falls- scoring does not need to be completed - treat as high risk. Interventions     Low Risk: Monitor for changes, reassess every three months. Intermediate Risk: Supervision for most activities, direct contact with new activities or equipment, reassess monthly. High Risk: Stand by assitance at all times, reassess monthly.      Electronically signed by MAYUR Porter on 6/22/2021 at 1:27 PM

## 2021-06-29 ENCOUNTER — HOSPITAL ENCOUNTER (OUTPATIENT)
Dept: OCCUPATIONAL THERAPY | Age: 61
Setting detail: THERAPIES SERIES
Discharge: HOME | End: 2021-06-29
Payer: COMMERCIAL

## 2021-06-29 PROCEDURE — 97530 THERAPEUTIC ACTIVITIES: CPT

## 2021-06-29 NOTE — PROGRESS NOTES
Occupational Therapy  Daily Note     Name: Dion Rodrigez  : 1960  MRN: [id number]  Diagnosis: Lateral epicondylitis of left elbow    Visit Information:   OT Insurance Information: [facility]  Total # of Visits to Date: 2  Progress Note Due Date: 21  Progress Note Counter:     Date: 2021  OT Therapeutic activities 57 minutes for 4 unit(s), CPT 51784       OT Individual Minutes  Time In: 1000  Time Out: [place] 178  Minutes: 62    Referring Practitioner: Dr. Roseanne Lopes MD      Subjective:  \"I fell into the [employer] today (fell on hands) when I got into [id number]. The sun blinded me. I have dizziness today. It started about a month ago. I forgot to tell you I have tremors. \"        Pain rating:   Pre-treatment pain:    Pt denies pain. Pt stated had pain after fall, but has currently subsided. Action for pain:   No action necessary. Pain after treatment:      7/10 L shoulder          Focus of treatment was on the following:   edema management, education/training and fine motor/dexterity     Objective:    Treatment Activity:     Educated on and issued compression glove for L hand edema management. Provided Medium and small. Pt stated small felt better. Educated on use of grab bars and shower chair/ tub transfer bench to increase safety and decrease risk of falls. Provided print out of transfer bench. Discussed and wrote down on print out to call landlord about installing grab bars (not to use towel rack) and to call physician about recent falls and dizziness. FM activity with pennies and small beads. Pt placed 10 pennies in L hand then completed palm to finger translation. Pt with Mod difficulty completing. Pt with tremors, attempted to place pennies on top of container to , increased time and effort to place into container, and required small rest breaks during activity. Pt at times trying to  pennies with R hand and place into L palm.  Pt at times dropping pennies with L Pt presents to the ED today with c/o right hip pain that started about 3 days ago and has progressed. Pt states that she had a fall about 1 week ago no loc. hand. Pt asked is she had to \"do all of these (pennies). Informed about possible benefits of repetition to increase FM skills. Pt stated feeling \"popping\" in L shoulder. Reported pain 6/10 in shoulder. Discussed completing similar activity at home as HEP. Pt verbalized understanding. Wrote on above mentioned print out. Pt with Mod to Max difficulty securing small beads x 35 together, increased tremors, and effort to push in. Pt stated having pain 4/10 during activity when pushing beads together. 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 Pt reported having high pain tolerance since having cancer, but reported pain as high as 8/10 on eval. Pt reported having increased headaches this week, physician not aware per pt report. Discussed calling physician about all above mentioned deficits. Pt with only one more scheduled apt. Pt stated has appointment for cancer follow up on the first of the month and needs appointments after the first week of the month. Pt instructed to schedule more OT apts prior to leaving. Assessment:   Pt tolerated treatment fair. Inconsistent with report of pain. Pt with increased time and effort for FM activities and reported some pain, but able to continue in all activities with rest breaks. Pt informed to contact physician about falls, headaches, and dizziness and to discuss need for PT referral for balance issues. Plan:   Continue POC    Goals:     Long term goals  Time Frame for Long term goals : 2 x/week for 4 weeks or 8 visits  Long term goal 1: Patient will be independent with all recommended strategies to decrease edema throughout left UE. Long term goal 2: Patient will engage in functional/therapeutic activities to decrease pain in left UE to 0-3/10 (now as high as 8/10) to increase participation in functional tasks. Long term goal 3: Patient will increase left UE strength by 1/2 MMT grade to increase efficiency with ADL/IADLs.    Long term goal 4: Patient will increase left hand fine motor coordination by a 10 second improvement on 9-HPT for increase ease of ADL/IADL tasks. Long term goal 5: Patient will be independent with all recommended HEP for ROM, edema and pain management, strength, and coordination.      Julia Johnson OTR/L   6/29/2021  11:03 AM

## 2021-06-30 ENCOUNTER — OFFICE VISIT (OUTPATIENT)
Dept: ORTHOPEDIC SURGERY | Age: 61
End: 2021-06-30
Payer: COMMERCIAL

## 2021-06-30 ENCOUNTER — TELEPHONE (OUTPATIENT)
Dept: FAMILY MEDICINE CLINIC | Age: 61
End: 2021-06-30

## 2021-06-30 VITALS
BODY MASS INDEX: 37.12 KG/M2 | TEMPERATURE: 97.5 F | HEIGHT: 66 IN | WEIGHT: 231 LBS | HEART RATE: 62 BPM | OXYGEN SATURATION: 98 %

## 2021-06-30 DIAGNOSIS — M77.12 LATERAL EPICONDYLITIS OF LEFT ELBOW: ICD-10-CM

## 2021-06-30 DIAGNOSIS — M19.032 ARTHRITIS OF LEFT WRIST: Primary | ICD-10-CM

## 2021-06-30 DIAGNOSIS — M19.042 ARTHRITIS OF LEFT HAND: ICD-10-CM

## 2021-06-30 PROCEDURE — 1036F TOBACCO NON-USER: CPT | Performed by: ORTHOPAEDIC SURGERY

## 2021-06-30 PROCEDURE — G8427 DOCREV CUR MEDS BY ELIG CLIN: HCPCS | Performed by: ORTHOPAEDIC SURGERY

## 2021-06-30 PROCEDURE — 99214 OFFICE O/P EST MOD 30 MIN: CPT | Performed by: ORTHOPAEDIC SURGERY

## 2021-06-30 PROCEDURE — G8417 CALC BMI ABV UP PARAM F/U: HCPCS | Performed by: ORTHOPAEDIC SURGERY

## 2021-06-30 PROCEDURE — 3017F COLORECTAL CA SCREEN DOC REV: CPT | Performed by: ORTHOPAEDIC SURGERY

## 2021-06-30 NOTE — TELEPHONE ENCOUNTER
Patient was seen in the office for her left wrist but had questions and concerns about her falls and requests for a bath and shower transfer bench. She brought the attached document into the office. I let her know that I would send a message to her primary care provider in regards to this. She had also mentioned a recent fall getting into a provider ride 06/29/21. She said the sun was bothering her eyes. I told her that someone from that office should get in contact with her in regards to this.

## 2021-06-30 NOTE — TELEPHONE ENCOUNTER
Please let patient know I received the message about her needing a bath and transfer bench. Please have her schedule a virtual visit so that we can talk about it more. For her insurance they will need documentation of her requiring the items. Thank you.

## 2021-07-01 ENCOUNTER — HOSPITAL ENCOUNTER (OUTPATIENT)
Dept: OCCUPATIONAL THERAPY | Age: 61
Setting detail: THERAPIES SERIES
Discharge: HOME | End: 2021-07-01
Payer: COMMERCIAL

## 2021-07-01 PROCEDURE — 97530 THERAPEUTIC ACTIVITIES: CPT

## 2021-07-06 ENCOUNTER — HOSPITAL ENCOUNTER (OUTPATIENT)
Dept: WOMENS IMAGING | Age: 61
Discharge: HOME | End: 2021-07-08
Payer: COMMERCIAL

## 2021-07-06 ENCOUNTER — TELEPHONE (OUTPATIENT)
Dept: FAMILY MEDICINE CLINIC | Age: 61
End: 2021-07-06

## 2021-07-06 ENCOUNTER — APPOINTMENT (OUTPATIENT)
Dept: ULTRASOUND IMAGING | Age: 61
End: 2021-07-06
Payer: COMMERCIAL

## 2021-07-06 DIAGNOSIS — R92.8 ABNORMAL MAMMOGRAM: ICD-10-CM

## 2021-07-06 DIAGNOSIS — R92.8 ABNORMAL MAMMOGRAM: Primary | ICD-10-CM

## 2021-07-06 PROCEDURE — 77065 DX MAMMO INCL CAD UNI: CPT

## 2021-07-06 NOTE — TELEPHONE ENCOUNTER
Patient called because she wanted you to know that she has to have a biopsy because she had an abnormal mammogram.  She also has an appt scheduled with Dr. Baldwin Forward.   Thank you

## 2021-07-13 ENCOUNTER — CLINICAL DOCUMENTATION (OUTPATIENT)
Dept: OCCUPATIONAL THERAPY | Age: 61
End: 2021-07-13

## 2021-07-13 ENCOUNTER — TELEPHONE (OUTPATIENT)
Dept: FAMILY MEDICINE CLINIC | Age: 61
End: 2021-07-13

## 2021-07-13 NOTE — PROGRESS NOTES
Occupational Therapy  Daily Note     Name: Cullen Kahn  : 1960  MRN: [id number]       Date: 2021      Pt, on last OT treatment informed, needed more visits (recommnded 6 visits POC). No visits scheduled. Called pt on this date. Pt stated would like to schedule more appointments, but will have surgical procedure. Pt did not want to d/c from OT. Pt transferred to  to schedule more appointments. Pt informed will need resume therapy orders.       Aleyda Hsieh OTR/L   2021  5:24 PM

## 2021-07-15 DIAGNOSIS — M77.10 LATERAL EPICONDYLITIS, UNSPECIFIED LATERALITY: Primary | ICD-10-CM

## 2021-07-19 ENCOUNTER — HOSPITAL ENCOUNTER (OUTPATIENT)
Dept: WOMENS IMAGING | Age: 61
Discharge: HOME | End: 2021-07-21
Payer: COMMERCIAL

## 2021-07-19 VITALS — SYSTOLIC BLOOD PRESSURE: 153 MMHG | DIASTOLIC BLOOD PRESSURE: 106 MMHG | HEART RATE: 90 BPM | RESPIRATION RATE: 24 BRPM

## 2021-07-19 DIAGNOSIS — R92.8 ABNORMAL MAMMOGRAM: ICD-10-CM

## 2021-07-19 PROCEDURE — 76098 X-RAY EXAM SURGICAL SPECIMEN: CPT

## 2021-07-19 PROCEDURE — 2709999900 MAM STEREO BREAST BX W LOC DEVICE 1ST LESION RIGHT

## 2021-07-19 PROCEDURE — 77065 DX MAMMO INCL CAD UNI: CPT

## 2021-07-19 PROCEDURE — 2580000003 HC RX 258: Performed by: RADIOLOGY

## 2021-07-19 PROCEDURE — 2500000003 HC RX 250 WO HCPCS: Performed by: RADIOLOGY

## 2021-07-19 PROCEDURE — 88305 TISSUE EXAM BY PATHOLOGIST: CPT

## 2021-07-19 PROCEDURE — 88342 IMHCHEM/IMCYTCHM 1ST ANTB: CPT

## 2021-07-19 RX ORDER — LIDOCAINE HYDROCHLORIDE AND EPINEPHRINE 10; 10 MG/ML; UG/ML
20 INJECTION, SOLUTION INFILTRATION; PERINEURAL ONCE
Status: COMPLETED | OUTPATIENT
Start: 2021-07-19 | End: 2021-07-19

## 2021-07-19 RX ORDER — LIDOCAINE HYDROCHLORIDE 20 MG/ML
20 INJECTION, SOLUTION INFILTRATION; PERINEURAL ONCE
Status: COMPLETED | OUTPATIENT
Start: 2021-07-19 | End: 2021-07-19

## 2021-07-19 RX ORDER — MAGNESIUM HYDROXIDE 1200 MG/15ML
250 LIQUID ORAL CONTINUOUS
Status: DISCONTINUED | OUTPATIENT
Start: 2021-07-19 | End: 2021-07-22 | Stop reason: HOSPADM

## 2021-07-19 RX ADMIN — LIDOCAINE HYDROCHLORIDE 20 ML: 20 INJECTION, SOLUTION INFILTRATION; PERINEURAL at 13:36

## 2021-07-19 RX ADMIN — SODIUM CHLORIDE 250 ML: 900 IRRIGANT IRRIGATION at 13:36

## 2021-07-19 RX ADMIN — LIDOCAINE HYDROCHLORIDE AND EPINEPHRINE 20 ML: 10; 10 INJECTION, SOLUTION INFILTRATION; PERINEURAL at 13:33

## 2021-07-19 ASSESSMENT — PAIN SCALES - GENERAL
PAINLEVEL_OUTOF10: 0
PAINLEVEL_OUTOF10: 0

## 2021-07-19 NOTE — SEDATION DOCUMENTATION
1238 Patient arrived to [facility] with steady gait. 1250 Reviewed procedure with patient and patient verbalizes understanding. Reviewed history, medications,and allergies. VS taken. 1318 Assisted patient into mamm room and into stereo chair. [address] taking films. 1 Dr. Jaylene Nunez and Dr. Ambrose Montalvo arrived to [facility]. Dr. Jaylene Nunez talking with patient and looking at films. Area of concern located. Area cleaned with chlorprep x 3. Eviva [id number] lot [id number] exp 03- used during bx. Dr. Jaylene Nunez injected lido 2% into right breast too numb area and then lido with epi deeper into breast site to numb for bx. He then took samples of tissue using Eviva [id number] handpiece lot [id number] exp 02- . Patient tolerated well. Tissue out at   1336 and onto grid. Film of specimen taken to check for cals and cals were seen. Dr. Jaylene Nunez then inserted Costella Neas 2S-13 lot [id number] exp 03- into breast site. Compression held to site for 5 minutes. Steri strips, gauze, and tegaderm applied. Post bx mamm completed and patient assisted into consultation room. 1445 Discharge instructions reviewed with patient and patient verbalizes understanding. VS taken. Dressing clean, dry, and intact. Wrapped chest area with ace wrap and ice pack to bx site. 1455 Patient left [facility] with steady gait.

## 2021-07-20 ENCOUNTER — CLINICAL DOCUMENTATION (OUTPATIENT)
Dept: OCCUPATIONAL THERAPY | Age: 61
End: 2021-07-20

## 2021-07-20 NOTE — PROGRESS NOTES
OCCUPATIONAL THERAPY DISCHARGE SUMMARY     [x] [address]:     [facility]  Ph: [phone]   Fax: [phone] [] [address] [facility]:  [facility] 110 [address] [facility], [address]   Ph: [phone]   Fax: [phone]       Date: 2021    To:Referring Practitioner: Dr. Joey Lamar MD            From: Keshia Cantu OTR/L  Patient: Rowena Johnson   : 1960  MRN: [id number]  Diagnosis:Diagnosis: Lateral epicondylitis of left elbow   Date of eval: 2021    Visit Information:   OT Insurance Information: [employer]  Total # of Visits to Date: 3  Progress Note Due Date: 21  Progress Note Counter:                               Pt being unexpectedly d/c from OT: Pt last seen on 2021. Therapist called on 21. Pt stated did not want to d/c from OT and would like to continue after a surgical procedure. Informed pt will need resume orders if having surgery. Pt scheduled appointments on 2021, but next day called and left message that therapy was no longer needed and wanted to d/c scheduled appointments. Assessment:    Goals Current/Discharge status  Met   Long term goal 1: Patient will be independent with all recommended strategies to decrease edema throughout left UE. Unable to assess  [] Met  [] Partially Met  [] Not Met   Long term goal 2: Patient will engage in functional/therapeutic activities to decrease pain in left UE to 0-3/10 (now as high as 8/10) to increase participation in functional tasks. Unable to assess  [] Met  [] Partially Met  [] Not Met   Long term goal 3: Patient will increase left UE strength by 1/2 MMT grade to increase efficiency with ADL/IADLs. Unable to assess  [] Met  [] Partially Met  [] Not Met   Long term goal 4: Patient will increase left hand fine motor coordination by a 10 second improvement on 9-HPT for increase ease of ADL/IADL tasks. Unable to assess  [] Met  [] Partially Met  [] Not Met   Long term goal 5: Patient will be independent with all recommended HEP for ROM, edema and pain management, strength, and coordination. Unable to assess  [] Met  [] Partially Met  [] Not Met       Functional assessment used: Quick Disabilities of the Arm, Shoulder and Hand (DASH)  Score on eval:   QuickDASH Total Score: 25  QuickDASH Disability/Symptom Score : 31.82 %  QUICKDASH Disability Index: 20-39%  QUICKDASH CMS Modifier: CJ    Score at d/c: unable to assess     Plan: D/C from OT    Thank you for referral of this patient. Electronically signed by:   AURORA Villanueva/L 7/20/2021 3:05 PM

## 2021-07-21 ENCOUNTER — PATIENT MESSAGE (OUTPATIENT)
Dept: ORTHOPEDIC SURGERY | Age: 61
End: 2021-07-21

## 2021-07-22 ENCOUNTER — TELEPHONE (OUTPATIENT)
Dept: OBGYN CLINIC | Age: 61
End: 2021-07-22

## 2021-07-22 NOTE — TELEPHONE ENCOUNTER
I notified the patient of the importance of a CBE in the office with Dr. Monica Sargent related to her  abnormal right breast mammogram. The patient verbalized understanding. The Patient is aware of date/time/location of the appointment. The patient has no further questions at this time.

## 2021-07-23 ENCOUNTER — TELEPHONE (OUTPATIENT)
Dept: FAMILY MEDICINE CLINIC | Age: 61
End: 2021-07-23

## 2021-07-23 NOTE — TELEPHONE ENCOUNTER
Called to let patient know that she would have to contact [address] [place] [address] to get those test results. Patient did not answer and the voicemail is not set up to leave messages    Saw that patient reached out to Beth Echeverria in regards to this and was told the same information.

## 2021-07-23 NOTE — TELEPHONE ENCOUNTER
----- Message from Vielka Lebron sent at 7/23/2021  8:20 AM EDT -----  Subject: Results Request    QUESTIONS  Which lab or imaging result is the patient calling about? Surgical   Pathology  Which provider ordered the test? Benjamin Skinner   At what location was the test performed? Date the test was performed? 2021-07-19  Additional Information for Provider? Patient calling to get results. She   is feeling anxious and wants to know . She said she has had cancer 2 times   before and if this is what it is she wants to know as soon as possible. Please call patient to advise. Vanessa Ortez ---------------------------------------------------------------------------  --------------  Danie BURGESS  What is the best way for the office to contact you? OK to leave message on   voicemail  Preferred Call Back Phone Number?  [phone]

## 2021-07-26 ENCOUNTER — OFFICE VISIT (OUTPATIENT)
Dept: SURGERY | Age: 61
End: 2021-07-26
Payer: COMMERCIAL

## 2021-07-26 VITALS
DIASTOLIC BLOOD PRESSURE: 99 MMHG | HEART RATE: 78 BPM | RESPIRATION RATE: 18 BRPM | TEMPERATURE: 96.4 F | HEIGHT: 66 IN | WEIGHT: 227.6 LBS | BODY MASS INDEX: 36.58 KG/M2 | SYSTOLIC BLOOD PRESSURE: 135 MMHG

## 2021-07-26 DIAGNOSIS — Z85.820 HISTORY OF MELANOMA: ICD-10-CM

## 2021-07-26 DIAGNOSIS — N60.11 FIBROCYSTIC BREAST CHANGES OF BOTH BREASTS: Primary | ICD-10-CM

## 2021-07-26 DIAGNOSIS — N60.12 FIBROCYSTIC BREAST CHANGES OF BOTH BREASTS: Primary | ICD-10-CM

## 2021-07-26 DIAGNOSIS — E66.9 OBESITY, CLASS II, BMI 35-39.9: ICD-10-CM

## 2021-07-26 DIAGNOSIS — Z12.31 BREAST CANCER SCREENING BY MAMMOGRAM: ICD-10-CM

## 2021-07-26 DIAGNOSIS — R92.8 ABNORMAL MAMMOGRAM OF RIGHT BREAST: ICD-10-CM

## 2021-07-26 PROCEDURE — 99204 OFFICE O/P NEW MOD 45 MIN: CPT | Performed by: SURGERY

## 2021-07-26 PROCEDURE — G9899 SCRN MAM PERF RSLTS DOC: HCPCS | Performed by: SURGERY

## 2021-07-26 PROCEDURE — 1036F TOBACCO NON-USER: CPT | Performed by: SURGERY

## 2021-07-26 PROCEDURE — G8427 DOCREV CUR MEDS BY ELIG CLIN: HCPCS | Performed by: SURGERY

## 2021-07-26 PROCEDURE — 3017F COLORECTAL CA SCREEN DOC REV: CPT | Performed by: SURGERY

## 2021-07-26 PROCEDURE — G8417 CALC BMI ABV UP PARAM F/U: HCPCS | Performed by: SURGERY

## 2021-07-26 ASSESSMENT — ENCOUNTER SYMPTOMS
SORE THROAT: 0
COLOR CHANGE: 0
NAUSEA: 0
SHORTNESS OF BREATH: 0
VOMITING: 0
COUGH: 0
CHEST TIGHTNESS: 0
ABDOMINAL PAIN: 0

## 2021-07-26 NOTE — PROGRESS NOTES
NEW BREAST PATIENT         SERVICE DATE: 7/26/21  SERVICE TIME:  12:34 PM EDT    REFERRED BY:  Reena Reynaga MD  REASON FOR TODAY'S VISIT:    Chief Complaint   Patient presents with    New Patient    Abnormal Mammogram     Right Breast BIRADS 4, stereotactic Biopsy completed 7/19/2021- Benign      CHAPERONE WAS OFFERED, PATIENT RESPONDED: no    HISTORY AND CHIEF COMPLAINT:  Judie Crouch is a 62 y.o.  female who is here for a New Patient and Abnormal Mammogram (Right Breast BIRADS 4, stereotactic Biopsy completed 7/19/2021- Benign)  she had her stereotactic biopsy; she also had a few excisional biopsies previously and they were negative      BREAST HISTORY  Her past breast history (prior to this encounter) is as follows: Abnormal mammogram:   Yes, Right Breast BIRADS 4  Abnormal Breast US:  No  Breast biopsy:    Yes, Right Breast Stereotactic Biopsy 7/19/2021  Breast cysts:    No  Breast surgery:    No  Breast cancer              No  History of Breastfeeding: Yes   Currently Breastfeeding: No      RISK FACTORS FOR BREAST CANCER:  Family History of Breast Cancer: Yes, significant for Paternal [de-identified], Maternal Aunt age of DX unknown.   History of ovarian cancer: no  Ashkenazi Ancestry: yes - possibly on the Paternal Grandmother's side  Age at the birth of first child: 26  Age at the onset of menses: 9  Age at menopause: Hysterectomy age inknown   Hormonal therapy: no- does not think so  Postmenopausal obesity: yes, BMI 36.74    BRA SIZE: Unknown    Past Medical History:   Diagnosis Date    Asthma 2015    Bilateral renal cysts 2013    Depression     since age 35, was with Dr Marysol Vazquez, now the [facility]    Diverticulosis of sigmoid colon 2012    Dr Edward Zamora DJD of left shoulder     Environmental allergies     Fatty liver 2013    GERD (gastroesophageal reflux disease)     History of cardiac arrhythmia     \"due to stress, was placed on medication\"    Hydatidiform mole     age 24     Hyperlipidemia     Hypothyroidism 2011    IFG (impaired fasting glucose) 2013    Melanoma of eye [facility])     age [address], R eye prosthesis, Dr Elie Hauser    Obesity     Prediabetes     PTSD (post-traumatic stress disorder)     age 35, [facility]    S/P colonoscopy 04/19/2012    Dr. Jessica Daniels S/P hysterectomy with oophorectomy 2012    Dr Nino Mora D deficiency      Past Surgical History:   Procedure Laterality Date    BREAST BIOPSY      DILATION AND CURETTAGE OF UTERUS      ENDOMETRIAL ABLATION  6/2012    EYE REMOVAL      OD for melanoma, age [address]    FOOT OSTEOTOMY Left 09/23/2016    [facility] [address] DPM      BALDOMERO STEROTACTIC LOC BREAST BIOPSY RIGHT Right 7/19/2021    BALDOMERO STEROTACTIC LOC BREAST BIOPSY RIGHT 7/19/2021 [facility]    LINDSAY AND BSO  2012    Dr Sandy Urban     Family History   Problem Relation Age of Onset    Heart Failure Mother         dec 76    Diabetes Mother     Diabetes Father     Stroke Father         dec age 80    Cancer Father         Sarcoma    Stomach Cancer Other     Breast Cancer Paternal Aunt     Breast Cancer Maternal Aunt      Social History     Socioeconomic History    Marital status:      Spouse name: Not on file    Number of children: 3    Years of education: 15    Highest education level: High school graduate   Occupational History    Occupation: [employer]SI   Tobacco Use    Smoking status: Never Smoker    Smokeless tobacco: Never Used   Vaping Use    Vaping Use: Never used   Substance and Sexual Activity    Alcohol use: No    Drug use: No    Sexual activity: Not Currently     Birth control/protection: Surgical   Other Topics Concern    Not on file   Social History Narrative    Born in [address] [place] [address], one of 4 children, one sister disappeared at age 30, never found    Moved to [place] at age 40        Lives in an apartment in [facility], alone    , 3 children, all grown, in [place]     2 granddaughters     Hobbies exercising, birds    Attends [address], has volunteered at [address] [employer] Strain: Low Risk     Difficulty of Paying Living Expenses: Not very hard   Food Insecurity: Food Insecurity Present    Worried About Running Out of Food in the Last Year: Sometimes true    Anurag of Food in the Last Year: Sometimes true   Transportation Needs: No Transportation Needs    Lack of Transportation (Medical): No    Lack of Transportation (Non-Medical): No   Physical Activity: Inactive    Days of Exercise per Week: 0 days    Minutes of Exercise per Session: 0 min   Stress: Stress Concern Present    Feeling of Stress : Rather much   Social Connections: Socially Isolated    Frequency of Communication with Friends and Family: Twice a week    Frequency of Social Gatherings with Friends and Family: Never    Attends Bahai Services: Never    Active Member of Clubs or Organizations: No    Attends Club or Organization Meetings: Never    Marital Status:    Intimate Partner Violence:     Fear of Current or Ex-Partner:     Emotionally Abused:     Physically Abused:     Sexually Abused:        Review of Systems   Constitutional: Negative for activity change, appetite change, chills, diaphoresis, fatigue, fever and unexpected weight change. HENT: Negative for congestion, ear pain, hearing loss, mouth sores, nosebleeds and sore throat. Respiratory: Negative for cough, chest tightness and shortness of breath. Cardiovascular: Negative for chest pain, palpitations and leg swelling. Gastrointestinal: Negative for abdominal pain, nausea and vomiting. Endocrine: Negative for cold intolerance, heat intolerance, polydipsia, polyphagia and polyuria. Genitourinary: Negative for difficulty urinating, menstrual problem and vaginal bleeding. Musculoskeletal: Negative for neck pain and neck stiffness. Skin: Negative for color change, pallor, rash and wound.    Allergic/Immunologic: Negative for environmental allergies and immunocompromised state. Neurological: Negative for weakness. Hematological: Does not bruise/bleed easily. Psychiatric/Behavioral: Negative for agitation, confusion, sleep disturbance and suicidal ideas. The patient is not nervous/anxious. Have you ever tested positive for AIDS? no  Have you ever tested positive for Hepatitis? no    ANTICOAGULANT MEDICATIONS:  ASA    SOCIAL HISTORY   Marital status: Single  Occupation:  Disability  Tobacco use: Jazmine Rice  reports that she has never smoked. She has never used smokeless tobacco.  Alcohol use: Jazmine Rice  reports no history of alcohol use. Drug use: Jazmine Rice  reports no history of drug use. Caffeine intake: 2 cups of caffeinated diet pepsi per day(s)  Exercise:  moderately active    BP (!) 135/99   Pulse 78   Temp 96.4 °F (35.8 °C)   Resp 18   Ht 5' 6\" (1.676 m)   Wt 227 lb 9.6 oz (103.2 kg)   BMI 36.74 kg/m²     Physical Exam  Vitals reviewed. Constitutional:       Appearance: Normal appearance. She is well-developed. HENT:      Head: Normocephalic and atraumatic. Nose: Nose normal.   Eyes:      Conjunctiva/sclera: Conjunctivae normal.      Right eye: No hemorrhage. Left eye: No hemorrhage. Cardiovascular:      Rate and Rhythm: Normal rate. Pulmonary:      Effort: Pulmonary effort is normal. No respiratory distress. Chest:      Breasts: Breasts are symmetrical.         Right: No inverted nipple, mass, nipple discharge, skin change or tenderness. Left: No inverted nipple, mass, nipple discharge, skin change or tenderness. Musculoskeletal:         General: Normal range of motion. Cervical back: Normal range of motion and neck supple. Comments: Normal Range of motion in upper and lower extremities. Lymphadenopathy:      Cervical: No cervical adenopathy. Right cervical: No superficial, deep or posterior cervical adenopathy.      Left cervical: No superficial, deep or posterior cervical adenopathy. Upper Body:      Right upper body: No supraclavicular, axillary or pectoral adenopathy. Left upper body: No supraclavicular, axillary or pectoral adenopathy. Skin:     General: Skin is warm and dry. Findings: No abrasion, bruising, erythema or lesion. Neurological:      Mental Status: She is alert and oriented to person, place, and time. She is not disoriented. Psychiatric:         Speech: Speech normal.         Behavior: Behavior normal. Behavior is cooperative. Thought Content: Thought content normal.         Judgment: Judgment normal.       RADIOGRAPHIC FINDINGS:    [facility] BREAST SPECIMEN    Result Date: 7/19/2021  [facility] STEREO BREAST BX W LOC DEVICE 1ST LESION RIGHT, [facility] BREAST SPECIMEN, BALDOMERO DIGITAL DIAGNOSTIC W OR WO CAD RIGHT : 7/19/2021 CLINICAL HISTORY: R92.8 Abnormal mammogram ICD10. COMPARISON: 7/6/2021 PROCEDURE: Informed consent was obtained. Sterile technique, local lidocaine anesthesia and digital 3D tomosynthesis guidance was used to place a 9-gauge vacuum-assisted biopsy needle within the upper-outer quadrant of the right breast. Several samples were obtained in the usual fashion. A digital mammogram of the specimen demonstrated adequate sampling of the suspicious microcalcifications. A biopsy marking clip was then deployed. Full field CC and MLO digital 2D mammograms demonstrated the clip to be in expected position. The patient tolerated the procedure without evidence of immediate complication, and was discharged home in stable condition with the usual instructions. 3D TOMOSYNTHESIS-GUIDED RIGHT BREAST BIOPSY, SPECIMEN MAMMOGRAM, AND POST BIOPSY MAMMOGRAMS. BIOPSY RESULTS ARE PENDING. [facility] DIGITAL DIAGNOSTIC W OR WO CAD RIGHT    Result Date: 7/19/2021  [facility] STEREO BREAST BX W LOC DEVICE 1ST LESION RIGHT, [facility] BREAST SPECIMEN, BALDOMERO DIGITAL DIAGNOSTIC W OR WO CAD RIGHT : 7/19/2021 CLINICAL HISTORY: R92.8 Abnormal mammogram ICD10.  COMPARISON: 7/6/2021 1: NEGATIVE MAMMOGRAM. BI-RADS 2: BENIGN FINDINGS. BI-RADS 3: PROBABLY BENIGN--SHORT INTERVAL FOLLOW-UP SUGGESTED. BI-RADS 4: SUSPICIOUS FINDING--BIOPSY SHOULD BE CONSIDERED. BI-RADS 5: HIGHLY SUGGESTIVE OF MALIGNANCY--APPROPRIATE ACTION SHOULD BE TAKEN. BI-RADS 0: NEED ADDITIONAL IMAGING EVALUATION. Board Certified Radiologists. Accredited by the ACR and FDA. MAMMOGRAPHY IS VERY IMPORTANT TO YOUR HEALTH. THE AMERICAN CANCER SOCIETY GUIDELINES RECOMMEND THAT WOMEN 36YEARS OF AGE AND OLDER SHOULD HAVE A MAMMOGRAM EVERY YEAR. A REMINDER LETTER WILL BE SENT AT THE APPROPRIATE TIME.  THIS FACILITY UTILIZES A REMINDER SYSTEM TO ENSURE ALL PATIENTS RECEIVE REMINDER NOTIFICATIONS AT THE APPROPRIATE TIME BASED ON THE RECOMMENDATIONS OF THIS EXAM. THIS INCLUDES REMINDERS FOR ROUTINE  SCREENING MAMMOGRAMS, DIAGNOSTIC MAMMOGRAMS IN WHICH THE PATIENT IS ASKED TO RETURN FOR ADDITIONAL VIEWS, OR OTHER BREAST IMAGING INTERVENTIONS WHEN APPROPRIATE. THE PATIENT WILL BE PLACED IN THE APPROPRIATE REMINDER SYSTEM INCLUDING A REMINDER AT THE APPROPRIATE TIME FOR ANY PENDING ADDITIONAL VIEWS. [facility] STEREO BREAST BX W LOC DEVICE 1ST LESION RIGHT    Result Date: 7/19/2021  [facility] STEREO BREAST BX W LOC DEVICE 1ST LESION RIGHT, [facility] BREAST SPECIMEN, [facility] DIGITAL DIAGNOSTIC W OR WO CAD RIGHT : 7/19/2021 CLINICAL HISTORY: R92.8 Abnormal mammogram ICD10. COMPARISON: 7/6/2021 PROCEDURE: Informed consent was obtained. Sterile technique, local lidocaine anesthesia and digital 3D tomosynthesis guidance was used to place a 9-gauge vacuum-assisted biopsy needle within the upper-outer quadrant of the right breast. Several samples were obtained in the usual fashion. A digital mammogram of the specimen demonstrated adequate sampling of the suspicious microcalcifications. A biopsy marking clip was then deployed. Full field CC and MLO digital 2D mammograms demonstrated the clip to be in expected position.  The patient tolerated the procedure without evidence of immediate complication, and was discharged home in stable condition with the usual instructions. 3D TOMOSYNTHESIS-GUIDED RIGHT BREAST BIOPSY, SPECIMEN MAMMOGRAM, AND POST BIOPSY MAMMOGRAMS. BIOPSY RESULTS ARE PENDING. ASSESSMENT      Pathology    FINAL DIAGNOSIS:   RIGHT BREAST CORE BIOPSY:   FIBROADENOMATOID CHANGES AND SCLEROSING ADENOSIS WITH MICROCALCIFICATIONS   FOCAL FAT NECROSIS      IMPRESSION :      ICD-10-CM    1. Fibrocystic breast changes of both breasts  N60.11     N60.12    2. Abnormal mammogram of right breast  R92.8    3. Obesity, Class II, BMI 35-39.9  E66.9    4. History of melanoma  Z85.820 External Referral to Genetics   5. Breast cancer screening by mammogram  Z12.31 BALDOMERO DIGITAL SCREEN W OR WO CAD BILATERAL        PLAN:       Patient is doing well. Mammography in 1 year(s)  Genetics Referral  Recommend an active lifestyle, healthy diet, limited alcohol intake, achieve and maintain a healthy BMI to optimize breast cancer outcomes / decrease risk of breast cancer. Follow up with PCP    Orders Placed This Encounter   Procedures    BALDOMERO DIGITAL SCREEN W OR WO CAD BILATERAL     Standing Status:   Future     Standing Expiration Date:   9/26/2022    External Referral to Genetics     Referral Priority:   Routine     Referral Type:   Eval and Treat     Referral Reason:   Specialty Services Required     Requested Specialty:   Genetics     Number of Visits Requested:   1       Return if symptoms worsen or fail to improve. Total face to face time was 45 minutes with greater than 50% spent on counseling the patient or coordinating her care. Bhanu Figueroa MD    CC: Brianda Christianson MD    The chief complaint, extensive medical and family history, and review of systems were asked and reviewed with the patient by me. I also reviewed the note in detail.     This note was partially generated using Dragon voice recognition system, and there may be some incorrect words, spellings, punctuation that were not noticed in checking the note before saving.

## 2021-07-27 NOTE — TELEPHONE ENCOUNTER
From: Satish Campos  To:  Osman Tobin MD  Sent: 7/21/2021 6:44 PM EDT  Subject: Test Results Question    I am calling about my mammogram

## 2021-07-27 NOTE — TELEPHONE ENCOUNTER
Hi, You need to contact Dr Zack López office for results. This is orthopedics.    Thanks, Virginia Ames

## 2021-07-28 ENCOUNTER — HOSPITAL ENCOUNTER (OUTPATIENT)
Dept: OCCUPATIONAL THERAPY | Age: 61
Setting detail: THERAPIES SERIES
End: 2021-07-28
Payer: COMMERCIAL

## 2021-07-30 ENCOUNTER — APPOINTMENT (OUTPATIENT)
Dept: OCCUPATIONAL THERAPY | Age: 61
End: 2021-07-30
Payer: COMMERCIAL

## 2021-08-02 RX ORDER — MIDODRINE HYDROCHLORIDE 10 MG/1
TABLET ORAL
Qty: 56 TABLET | Refills: 2 | Status: SHIPPED | OUTPATIENT
Start: 2021-08-02 | End: 2021-10-25

## 2021-08-03 PROBLEM — I95.1 ORTHOSTATIC HYPOTENSION: Status: ACTIVE | Noted: 2021-08-03

## 2021-08-03 PROBLEM — Z85.820 HISTORY OF MELANOMA: Status: ACTIVE | Noted: 2021-08-03

## 2021-08-03 PROBLEM — G25.0 ESSENTIAL TREMOR: Status: ACTIVE | Noted: 2021-08-03

## 2021-08-03 PROBLEM — R55 SYNCOPE: Status: ACTIVE | Noted: 2021-08-03

## 2021-08-05 ENCOUNTER — OFFICE VISIT (OUTPATIENT)
Dept: NEUROLOGY | Age: 61
End: 2021-08-05
Payer: COMMERCIAL

## 2021-08-05 VITALS
BODY MASS INDEX: 37.28 KG/M2 | HEART RATE: 64 BPM | DIASTOLIC BLOOD PRESSURE: 78 MMHG | SYSTOLIC BLOOD PRESSURE: 112 MMHG | WEIGHT: 231 LBS

## 2021-08-05 DIAGNOSIS — I95.1 ORTHOSTATIC HYPOTENSION: ICD-10-CM

## 2021-08-05 DIAGNOSIS — G25.0 ESSENTIAL TREMOR: Primary | ICD-10-CM

## 2021-08-05 DIAGNOSIS — R55 SYNCOPE, UNSPECIFIED SYNCOPE TYPE: ICD-10-CM

## 2021-08-05 PROCEDURE — G8417 CALC BMI ABV UP PARAM F/U: HCPCS | Performed by: NURSE PRACTITIONER

## 2021-08-05 PROCEDURE — 1036F TOBACCO NON-USER: CPT | Performed by: NURSE PRACTITIONER

## 2021-08-05 PROCEDURE — 3017F COLORECTAL CA SCREEN DOC REV: CPT | Performed by: NURSE PRACTITIONER

## 2021-08-05 PROCEDURE — G9899 SCRN MAM PERF RSLTS DOC: HCPCS | Performed by: NURSE PRACTITIONER

## 2021-08-05 PROCEDURE — 99214 OFFICE O/P EST MOD 30 MIN: CPT | Performed by: NURSE PRACTITIONER

## 2021-08-05 PROCEDURE — G8427 DOCREV CUR MEDS BY ELIG CLIN: HCPCS | Performed by: NURSE PRACTITIONER

## 2021-08-05 RX ORDER — PRIMIDONE 50 MG/1
50 TABLET ORAL NIGHTLY
Qty: 30 TABLET | Refills: 3 | Status: SHIPPED | OUTPATIENT
Start: 2021-08-05 | End: 2022-05-04

## 2021-08-05 ASSESSMENT — ENCOUNTER SYMPTOMS
CHEST TIGHTNESS: 0
ABDOMINAL DISTENTION: 0
COLOR CHANGE: 0
TROUBLE SWALLOWING: 0
NAUSEA: 0
COUGH: 0
VOMITING: 0
SHORTNESS OF BREATH: 0
ABDOMINAL PAIN: 0
WHEEZING: 0

## 2021-08-05 NOTE — PROGRESS NOTES
Subjective:      Patient ID: Larisa Ny is a 62 y.o. female who presents today for:  Chief Complaint   Patient presents with    Follow-up     PT states that she is still having dizziness when she is getting up from sitting. She says that everything with the tremor is still the same. HPI  8/5/2021:  Patient is here for 3-month follow-up for essential tremor and syncope with orthostatic hypotension. Patient last seen on 5/5/2021. At that time she was referred to cardiology for syncope and orthostatic hypotension. Cardiology has initiated aided patient on midodrine 10 mg twice daily. Orthostatic blood pressures assessed today and are improved but still somewhat borderline. There is no compensatory tachycardia noted therefore underlying autonomic dysfunction possible. Patient denies any recurrent syncope since last seen. MRI of the brain was ordered but unable to be done due to ocular implant. CT of the head was done that was negative. Case was discussed and Dr. Jose Alarcon recommended discontinuing Cogentin which has not yet been done. Patient still with ongoing tremor of the arms mainly with activity. No tremor of the head or chin. No obvious rest tremor at this time. No hallucinations, cognitive decline or behavioral disturbances. 5/5/2021:  Pt seen and examined in the office to establish cares for tremor and syncope. 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 Patient presents today with reports of tremors that have been ongoing for approximately 1 year. She reports tremors are present in her bilateral hands. She states they are present at rest but get worse with activity especially while trying to write. She reports her handwriting is gotten significantly worse.   She denies any tremor of the head, chin or lower extremities. She denies any cognitive impairment. No hallucination or behavioral disturbances. She does have somewhat shuffling gait. No rigidity noted. She is on Cogentin 1 mg 3 times daily when asked what she is taking this for she did not know and also did not know how long she has been on it. Patient reports that she has had 3 syncopal episodes recently. She states she gets lightheaded when going from sitting to standing. She denied any preceding chest pain pressure or palpitations. No focal neuro deficits. No seizure activity.   Past Medical History:   Diagnosis Date    Asthma 2015    Bilateral renal cysts 2013    Depression     since age 35, was with Dr Eryn Natarajan, now the [facility]    Diverticulosis of sigmoid colon 2012    Dr Jonah Gillis DJD of left shoulder     Environmental allergies     Fatty liver 2013    GERD (gastroesophageal reflux disease)     History of cardiac arrhythmia     \"due to stress, was placed on medication\"    Hydatidiform mole     age 26     Hyperlipidemia     Hypothyroidism 2011    IFG (impaired fasting glucose) 2013    Melanoma of eye [facility])     age 30, R eye prosthesis, Dr Ashley Jacobs    Obesity     Prediabetes     PTSD (post-traumatic stress disorder)     age 35, [facility]    S/P colonoscopy 04/19/2012    Dr. Chris Crowe S/P hysterectomy with oophorectomy 2012    Dr Francisca Villegas Vitamin D deficiency      Past Surgical History:   Procedure Laterality Date    BREAST BIOPSY      DILATION AND CURETTAGE OF UTERUS      ENDOMETRIAL ABLATION  6/2012    EYE REMOVAL      OD for melanoma, age 30    FOOT OSTEOTOMY Left 09/23/2016    [id number] [facility] [address]      BALDOMERO STEROTACTIC LOC BREAST BIOPSY RIGHT Right 7/19/2021    BALDOMERO STEROTACTIC LOC BREAST BIOPSY RIGHT 7/19/2021 [facility]    LINDSAY AND BSO  2012    Dr Jessenia Busch     Social History     Socioeconomic History    Marital status:      Spouse name: Not on file  Number of children: 3    Years of education: 15    Highest education level: High school graduate   Occupational History    Occupation: [employer]SI   Tobacco Use    Smoking status: Never Smoker    Smokeless tobacco: Never Used   Vaping Use    Vaping Use: Never used   Substance and Sexual Activity    Alcohol use: No    Drug use: No    Sexual activity: Not Currently     Birth control/protection: Surgical   Other Topics Concern    Not on file   Social History Narrative    Born in [address] [place] [address], one of 4 children, one sister disappeared at age 30, never found    Moved to [place] at age 40        Lives in an apartment in [facility] [place], alone    , 3 children, all grown, in [place]     2 granddaughters     Hobbies exercising, birds    Attends [employer], has volunteered at [id number] Strain: Low Risk     Difficulty of Paying Living Expenses: Not very hard   Food Insecurity: Food Insecurity Present    Worried About Running Out of Food in the Last Year: Sometimes true    Anurag of Food in the Last Year: Sometimes true   Transportation Needs: No Transportation Needs    Lack of Transportation (Medical): No    Lack of Transportation (Non-Medical):  No   Physical Activity: Inactive    Days of Exercise per Week: 0 days    Minutes of Exercise per Session: 0 min   Stress: Stress Concern Present    Feeling of Stress : Rather much   Social Connections: Socially Isolated    Frequency of Communication with Friends and Family: Twice a week    Frequency of Social Gatherings with Friends and Family: Never    Attends Congregation Services: Never    Active Member of Clubs or Organizations: No    Attends Club or Organization Meetings: Never    Marital Status:    Intimate Partner Violence:     Fear of Current or Ex-Partner:     Emotionally Abused:     Physically Abused:     Sexually Abused:      Family History   Problem Relation Age of Onset    Heart Failure Mother         dec 76    Diabetes Mother     Diabetes Father     Stroke Father         dec age 80    Cancer Father         Sarcoma    Stomach Cancer Other     Breast Cancer Paternal Aunt     Breast Cancer Maternal Aunt      Allergies   Allergen Reactions    Bee Venom      Current Outpatient Medications on File Prior to Visit   Medication Sig Dispense Refill    midodrine (PROAMATINE) 10 MG tablet TAKE 1 TABLET BY MOUTH TWICE DAILY(AM,PM) 56 tablet 2    VITAMIN D HIGH POTENCY 25 MCG (1000 UT) CAPS TAKE 1 CAPSULE BY MOUTH DAILY WITH SUPPER (PM) 90 capsule 1    pravastatin (PRAVACHOL) 40 MG tablet TAKE 1 TABLET BY MOUTH EVERY DAY IN THE EVENING 90 tablet 1    buPROPion (WELLBUTRIN XL) 300 MG extended release tablet Take 300 mg by mouth daily      metFORMIN (GLUCOPHAGE) 500 MG tablet Take 500 mg by mouth      ASPIRIN LOW DOSE 81 MG EC tablet TAKE 1 TABLET BY MOUTH ONCE A DAY (AM) 30 tablet 5    famotidine (PEPCID) 20 MG tablet TAKE 1 TABLET BY MOUTH 2 TIMES DAILY (AM,PM) 60 tablet 5    Multiple Vitamin (TAB-A-MANDI/BETA CAROTENE) TABS TAKE 1 TABLET BY MOUTH EVERY DAY (AM) 30 tablet 5    Biotin 300 MCG TABS Take 1 tablet by mouth daily 30 tablet 3    cyanocobalamin ([employer] VITAMIN B12) 1000 MCG tablet Take 1 tablet by mouth daily 30 tablet 5    EPINEPHrine (EPIPEN 2-PRAVEENA) 0.3 MG/0.3ML SOAJ injection Use as directed for allergic reaction 2 each 2    metoprolol tartrate (LOPRESSOR) 50 MG tablet Take 1 tablet by mouth 2 times daily 60 tablet 3    levothyroxine (SYNTHROID) 50 MCG tablet TAKE 1 TABLET BY MOUTH EVERY MORNING (OWN BLSTER) 90 tablet 0    citalopram (CELEXA) 40 MG tablet Take 40 mg by mouth daily      vitamin B-2 (RIBOFLAVIN) 100 MG TABS tablet Take 1 tablet by mouth daily 90 tablet 0    budesonide-formoterol (SYMBICORT) 160-4.5 MCG/ACT AERO Inhale 2 puffs into the lungs 2 times daily 1 Inhaler 3    traZODone (DESYREL) 50 MG tablet Take by mouth nightly 1-2 tablets nightly      albuterol Movements: Extraocular movements intact. Pupils: Pupils are equal, round, and reactive to light. Cardiovascular:      Rate and Rhythm: Normal rate and regular rhythm. Pulmonary:      Effort: Pulmonary effort is normal. No respiratory distress. Breath sounds: Normal breath sounds. Abdominal:      General: Bowel sounds are normal.      Palpations: Abdomen is soft. Skin:     General: Skin is warm and dry. Neurological:      General: No focal deficit present. Mental Status: She is alert and oriented to person, place, and time. Cranial Nerves: No cranial nerve deficit. Motor: Tremor present. No weakness, atrophy, abnormal muscle tone, seizure activity or pronator drift. Coordination: Coordination normal. Finger-Nose-Finger Test normal.      Gait: Gait abnormal.         [facility] BREAST SPECIMEN    Result Date: 7/19/2021  [facility] Gift Card Combo BREAST BX W LOC DEVICE 1ST LESION RIGHT, [facility] BREAST SPECIMEN, [facility] DIGITAL DIAGNOSTIC W OR WO CAD RIGHT : 7/19/2021 CLINICAL HISTORY: R92.8 Abnormal mammogram ICD10. COMPARISON: 7/6/2021 PROCEDURE: Informed consent was obtained. Sterile technique, local lidocaine anesthesia and digital 3D tomosynthesis guidance was used to place a 9-gauge vacuum-assisted biopsy needle within the upper-outer quadrant of the right breast. Several samples were obtained in the usual fashion. A digital mammogram of the specimen demonstrated adequate sampling of the suspicious microcalcifications. A biopsy marking clip was then deployed. Full field CC and MLO digital 2D mammograms demonstrated the clip to be in expected position. The patient tolerated the procedure without evidence of immediate complication, and was discharged home in stable condition with the usual instructions. 3D TOMOSYNTHESIS-GUIDED RIGHT BREAST BIOPSY, SPECIMEN MAMMOGRAM, AND POST BIOPSY MAMMOGRAMS. BIOPSY RESULTS ARE PENDING.      [facility] DIGITAL DIAGNOSTIC W OR WO CAD RIGHT    Result Date: 7/19/2021  [facility] Gift Card Combo BREAST BX W LOC DEVICE 1ST LESION RIGHT, [facility]DOMERO BREAST SPECIMEN, [facility]DOMERO DIGITAL DIAGNOSTIC W OR WO CAD RIGHT : 7/19/2021 CLINICAL HISTORY: R92.8 Abnormal mammogram ICD10. COMPARISON: 7/6/2021 PROCEDURE: Informed consent was obtained. Sterile technique, local lidocaine anesthesia and digital 3D tomosynthesis guidance was used to place a 9-gauge vacuum-assisted biopsy needle within the upper-outer quadrant of the right breast. Several samples were obtained in the usual fashion. A digital mammogram of the specimen demonstrated adequate sampling of the suspicious microcalcifications. A biopsy marking clip was then deployed. Full field CC and MLO digital 2D mammograms demonstrated the clip to be in expected position. The patient tolerated the procedure without evidence of immediate complication, and was discharged home in stable condition with the usual instructions. 3D TOMOSYNTHESIS-GUIDED RIGHT BREAST BIOPSY, SPECIMEN MAMMOGRAM, AND POST BIOPSY MAMMOGRAMS. BIOPSY RESULTS ARE PENDING.     BALDOMERO STEREO BREAST BX W LOC DEVICE 1ST LESION RIGHT    Result Date: 7/19/2021  BALDOMERO STEREO BREAST BX W LOC DEVICE 1ST LESION RIGHT, [facility] BREAST SPECIMEN, [facility] DIGITAL DIAGNOSTIC W OR WO CAD RIGHT : 7/19/2021 CLINICAL HISTORY: R92.8 Abnormal mammogram ICD10. COMPARISON: 7/6/2021 PROCEDURE: Informed consent was obtained. Sterile technique, local lidocaine anesthesia and digital 3D tomosynthesis guidance was used to place a 9-gauge vacuum-assisted biopsy needle within the upper-outer quadrant of the right breast. Several samples were obtained in the usual fashion. A digital mammogram of the specimen demonstrated adequate sampling of the suspicious microcalcifications. A biopsy marking clip was then deployed. Full field CC and MLO digital 2D mammograms demonstrated the clip to be in expected position.  The patient tolerated the procedure without evidence of immediate complication, and was discharged home in stable condition with the usual instructions. 3D TOMOSYNTHESIS-GUIDED RIGHT BREAST BIOPSY, SPECIMEN MAMMOGRAM, AND POST BIOPSY MAMMOGRAMS. BIOPSY RESULTS ARE PENDING. Lab Results   Component Value Date    WBC 5.9 04/15/2021    RBC 4.65 04/15/2021    RBC 4.57 05/14/2012    HGB 14.5 04/15/2021    HCT 42.2 04/15/2021    MCV 90.8 04/15/2021    MCH 31.2 04/15/2021    MCHC 34.4 04/15/2021    RDW 13.8 04/15/2021     04/15/2021    MPV 9.0 08/16/2015     Lab Results   Component Value Date     05/19/2021    K 4.8 05/19/2021     05/19/2021    CO2 25 05/19/2021    BUN 12 05/19/2021    CREATININE 0.95 05/19/2021    GFRAA >60.0 05/19/2021    LABGLOM 60.0 05/19/2021    GLUCOSE 106 05/19/2021    GLUCOSE 92 05/14/2012    PROT 7.1 05/05/2021    LABALBU 4.3 05/05/2021    LABALBU 3.8 03/22/2012    CALCIUM 10.6 05/19/2021    BILITOT 0.3 05/05/2021    ALKPHOS 100 05/05/2021    AST 18 05/05/2021    ALT 23 05/05/2021     No results found for: PROTIME, INR  Lab Results   Component Value Date    TSH 2.700 02/07/2020    [id number] 1747 05/19/2020    FOLATE 18.1 02/07/2020     Lab Results   Component Value Date    TRIG 127 11/05/2019    HDL 41 05/19/2021    LDLCALC see below 05/19/2021     Lab Results   Component Value Date    LABAMPH Neg 06/07/2017    BARBSCNU Neg 06/07/2017    LABBENZ Neg 06/07/2017    OPIATESCREENURINE Neg 06/07/2017    PHENCYCLIDINESCREENURINE Neg 06/07/2017    ETOH <10 06/07/2017     No results found for: LITHIUM, DILFRTOT, VALPROATE    Assessment and Plan:      1. Essential tremor  -DC Cogentin  -Initiate primidone 50 mg nightly and titrate up as tolerated to good effect  -Patient with tremors and stooped posture and orthostatic hypotension. Will need to monitor closely for any further signs and symptoms of parkinsonism. - primidone (MYSOLINE) 50 MG tablet; Take 1 tablet by mouth nightly  Dispense: 30 tablet; Refill: 3    2. Orthostatic hypotension  -Continue midodrine 10 mg twice daily    3.  Syncope, unspecified syncope type  -No recurrent syncope      Return in about 3 months (around 11/5/2021), or if symptoms worsen or fail to improve.     ARASH Bowers - CNP     Collaborating Physician Dr Estefania Freeman

## 2021-08-06 ENCOUNTER — OFFICE VISIT (OUTPATIENT)
Dept: FAMILY MEDICINE CLINIC | Age: 61
End: 2021-08-06
Payer: COMMERCIAL

## 2021-08-06 VITALS
WEIGHT: 231.4 LBS | RESPIRATION RATE: 20 BRPM | DIASTOLIC BLOOD PRESSURE: 80 MMHG | OXYGEN SATURATION: 95 % | SYSTOLIC BLOOD PRESSURE: 116 MMHG | HEART RATE: 92 BPM | BODY MASS INDEX: 37.19 KG/M2 | TEMPERATURE: 96.4 F | HEIGHT: 66 IN

## 2021-08-06 DIAGNOSIS — I48.92 ATRIAL FLUTTER, UNSPECIFIED TYPE (HCC): ICD-10-CM

## 2021-08-06 DIAGNOSIS — E66.9 OBESITY (BMI 30-39.9): ICD-10-CM

## 2021-08-06 DIAGNOSIS — K21.9 GASTROESOPHAGEAL REFLUX DISEASE WITHOUT ESOPHAGITIS: ICD-10-CM

## 2021-08-06 DIAGNOSIS — R92.8 ABNORMAL MAMMOGRAM: ICD-10-CM

## 2021-08-06 DIAGNOSIS — B35.1 ONYCHOMYCOSIS: Primary | ICD-10-CM

## 2021-08-06 DIAGNOSIS — F43.10 PTSD (POST-TRAUMATIC STRESS DISORDER): ICD-10-CM

## 2021-08-06 DIAGNOSIS — J45.30 MILD PERSISTENT ASTHMA WITHOUT COMPLICATION: ICD-10-CM

## 2021-08-06 DIAGNOSIS — F41.9 ANXIETY: ICD-10-CM

## 2021-08-06 DIAGNOSIS — F32.A DEPRESSION, UNSPECIFIED DEPRESSION TYPE: ICD-10-CM

## 2021-08-06 DIAGNOSIS — E03.9 ACQUIRED HYPOTHYROIDISM: ICD-10-CM

## 2021-08-06 DIAGNOSIS — E78.5 HYPERLIPIDEMIA, UNSPECIFIED HYPERLIPIDEMIA TYPE: ICD-10-CM

## 2021-08-06 PROCEDURE — G8427 DOCREV CUR MEDS BY ELIG CLIN: HCPCS | Performed by: FAMILY MEDICINE

## 2021-08-06 PROCEDURE — G9899 SCRN MAM PERF RSLTS DOC: HCPCS | Performed by: FAMILY MEDICINE

## 2021-08-06 PROCEDURE — 3017F COLORECTAL CA SCREEN DOC REV: CPT | Performed by: FAMILY MEDICINE

## 2021-08-06 PROCEDURE — 99214 OFFICE O/P EST MOD 30 MIN: CPT | Performed by: FAMILY MEDICINE

## 2021-08-06 PROCEDURE — 1036F TOBACCO NON-USER: CPT | Performed by: FAMILY MEDICINE

## 2021-08-06 PROCEDURE — G8417 CALC BMI ABV UP PARAM F/U: HCPCS | Performed by: FAMILY MEDICINE

## 2021-08-06 RX ORDER — ALBUTEROL SULFATE 90 UG/1
AEROSOL, METERED RESPIRATORY (INHALATION)
Qty: 1 INHALER | Refills: 3 | Status: SHIPPED | OUTPATIENT
Start: 2021-08-06 | End: 2021-11-29

## 2021-08-06 RX ORDER — METOPROLOL TARTRATE 50 MG/1
50 TABLET, FILM COATED ORAL 2 TIMES DAILY
Qty: 60 TABLET | Refills: 3 | Status: SHIPPED | OUTPATIENT
Start: 2021-08-06 | End: 2021-11-10 | Stop reason: SDUPTHER

## 2021-08-06 NOTE — PROGRESS NOTES
Chief Complaint   Patient presents with    3 Month Follow-Up    Nail Problem     left foot pain, pin put in her big toe, hurts wearing shoes states toe nail is ingrown         HPI: Kristine Davis 62 y.o. female presenting for     Vertigo  Patinet had a fall despite trying to move slowly from the cough  Patient reports she felt an episode of vertigo  Denies any tinnitus    F/u  Denies any more vertigo   Worsens when she gets up too fast.        Onychomycosis   First toe in the left foot   Yellow and thick   Unable to cut the toenail   Denies any other issues. Left arm swelling   Chronic swelling for the last 4 days   Denies any trauma   Small amount of pain in the area but denies any other issues. xray obtained and were normal.     Follow-up  X-rays negative. Admits to some pain in the forearm especially with certain movements. Unsure of any trauma. Patient has tried compression with no noticeable improvement. F/u   Has improved with compression       Hpyothyroidism   Stable. Takes medication. Lab Results   Component Value Date    TSH 2.700 02/07/2020       HLD   Stable. On pravastatin        Lab Results   Component Value Date     CHOL 194 11/05/2019     CHOL 232 (H) 06/11/2018     CHOL 234 (H) 07/12/2017            Lab Results   Component Value Date     TRIG 127 11/05/2019     TRIG 157 06/11/2018     TRIG 286 (H) 07/12/2017            Lab Results   Component Value Date     HDL 53 11/05/2019     HDL 55 06/11/2018     HDL 46 07/12/2017            Lab Results   Component Value Date     LDLCALC 116 11/05/2019     LDLCALC 146 (H) 06/11/2018     LDLCALC 131 (H) 07/12/2017      No results found for: LABVLDL, VLDL        Lab Results   Component Value Date     CHOLHDLRATIO 6.3 03/22/2012         PTSD/Depression   Patient is going to [facility]. Plans on going 4 days a go    Follows with psych     GERD   Stable.    Trying to avoid food sthat make it worse        Current Outpatient Medications Medication Sig Dispense Refill    primidone (MYSOLINE) 50 MG tablet Take 1 tablet by mouth nightly 30 tablet 3    midodrine (PROAMATINE) 10 MG tablet TAKE 1 TABLET BY MOUTH TWICE DAILY(AM,PM) 56 tablet 2    VITAMIN D HIGH POTENCY 25 MCG (1000 UT) CAPS TAKE 1 CAPSULE BY MOUTH DAILY WITH SUPPER (PM) 90 capsule 1    pravastatin (PRAVACHOL) 40 MG tablet TAKE 1 TABLET BY MOUTH EVERY DAY IN THE EVENING 90 tablet 1    buPROPion (WELLBUTRIN XL) 300 MG extended release tablet Take 300 mg by mouth daily      ASPIRIN LOW DOSE 81 MG EC tablet TAKE 1 TABLET BY MOUTH ONCE A DAY (AM) 30 tablet 5    famotidine (PEPCID) 20 MG tablet TAKE 1 TABLET BY MOUTH 2 TIMES DAILY (AM,PM) 60 tablet 5    Multiple Vitamin (TAB-A-MANDI/BETA CAROTENE) TABS TAKE 1 TABLET BY MOUTH EVERY DAY (AM) 30 tablet 5    Biotin 300 MCG TABS Take 1 tablet by mouth daily 30 tablet 3    cyanocobalamin ([employer] VITAMIN B12) 1000 MCG tablet Take 1 tablet by mouth daily 30 tablet 5    EPINEPHrine (EPIPEN 2-PRAVEENA) 0.3 MG/0.3ML SOAJ injection Use as directed for allergic reaction 2 each 2    metoprolol tartrate (LOPRESSOR) 50 MG tablet Take 1 tablet by mouth 2 times daily 60 tablet 3    levothyroxine (SYNTHROID) 50 MCG tablet TAKE 1 TABLET BY MOUTH EVERY MORNING (OWN BLSTER) 90 tablet 0    citalopram (CELEXA) 40 MG tablet Take 40 mg by mouth daily      vitamin B-2 (RIBOFLAVIN) 100 MG TABS tablet Take 1 tablet by mouth daily 90 tablet 0    budesonide-formoterol (SYMBICORT) 160-4.5 MCG/ACT AERO Inhale 2 puffs into the lungs 2 times daily 1 Inhaler 3    albuterol sulfate  (90 Base) MCG/ACT inhaler USE 2 PUFFS EVERY 4 HRS AS NEEDED FOR WHEEZING/SOB-SPACE OUT TO EVERY 6 HR AS SYMPTOMS IMPROVE 1 Inhaler 3    cetirizine (ZYRTEC) 10 MG tablet TAKE 1 TABLET BY MOUTH EVERY DAY AS NEEDED FOR ALLERGIES 30 tablet 5    ARIPiprazole (ABILIFY) 15 MG tablet TAKE 1 TABLET BY MOUTH EVERY DAY  2    metFORMIN (GLUCOPHAGE) 500 MG tablet Take 500 mg by mouth (Patient not taking: Reported on 8/6/2021)      traZODone (DESYREL) 50 MG tablet Take by mouth nightly 1-2 tablets nightly (Patient not taking: Reported on 8/6/2021)       No current facility-administered medications for this visit. ROS  CONSTITUTIONAL: The patient denies fevers, chills, sweats and body ache. HEENT: Denies headache, blurry vision, eye pain, tinnitus, vertigo, admits to sore throat, denies neck or thyroid masses. Admits to cracked lips. RESPIRATORY: Denies cough, sputum, hemoptysis. CARDIAC: Denies chest pain, pressure, palpitations, Denies lower extremity edema. GASTROINTESTINAL: Denies abdominal pain, constipation, diarrhea, bleeding in the stools,   GENITOURINARY: Denies dysuria, hematuria, nocturia or frequency, urinary incontinence. NEUROLOGIC: Denies headaches, dizziness, syncope, weakness  MUSCULOSKELETAL: denies changes in range of motion, joint pain, stiffness. ENDOCRINOLOGY: Denies heat or cold intolerance. HEMATOLOGY: Denies easy bleeding or blood transfusion,anemia  DERMATOLOGY: On the left gluteal complains of a painful rash. PSYCHIATRY: Denies depression, agitation or anxiety.     Past Medical History:   Diagnosis Date    Asthma 2015    Bilateral renal cysts 2013    Depression     since age 35, was with Dr Massiel Campuzano, now the 2000 [facility] Diverticulosis of sigmoid colon 2012    Dr Fabián STOKESD of left shoulder     Environmental allergies     Fatty liver 2013    GERD (gastroesophageal reflux disease)     History of cardiac arrhythmia     \"due to stress, was placed on medication\"    Hydatidiform mole     age 26     Hyperlipidemia     Hypothyroidism 2011    IFG (impaired fasting glucose) 2013    Melanoma of eye ([address]     age 30, R eye prosthesis, Dr Becca Brody    Obesity     Prediabetes     PTSD (post-traumatic stress disorder)     age 35, [facility]    S/P colonoscopy 04/19/2012    Dr. Valery Deal S/P hysterectomy with oophorectomy 2012    Dr Jenna Garay    Tremor Dr Nika Lara Vitamin D deficiency         Past Surgical History:   Procedure Laterality Date    BREAST BIOPSY      DILATION AND CURETTAGE OF UTERUS      ENDOMETRIAL ABLATION  6/2012    EYE REMOVAL      OD for melanoma, age 30    FOOT OSTEOTOMY Left 09/23/2016    B [facility]      BALDOMERO STEROTACTIC LOC BREAST BIOPSY RIGHT Right 7/19/2021    BALDOMERO STEROTACTIC LOC BREAST BIOPSY RIGHT 7/19/2021 [facility] AND O  2012    Dr Kathlyn Riedel        Family History   Problem Relation Age of Onset    Heart Failure Mother         dec 76    Diabetes Mother     Diabetes Father     Stroke Father         dec age 80    Cancer Father         Sarcoma    Stomach Cancer Other     Breast Cancer Paternal Aunt     Breast Cancer Maternal Aunt         Social History     Socioeconomic History    Marital status:      Spouse name: Not on file    Number of children: 3    Years of education: 15    Highest education level: High school graduate   Occupational History    Occupation: SSI   Tobacco Use    Smoking status: Never Smoker    Smokeless tobacco: Never Used   Vaping Use    Vaping Use: Never used   Substance and Sexual Activity    Alcohol use: No    Drug use: No    Sexual activity: Not Currently     Birth control/protection: Surgical   Other Topics Concern    Not on file   Social History Narrative    Born in [place], one of 4 children, one sister disappeared at age 30, never found    Moved to [place] at age 40        Lives in an apartment in [place], alone    , 3 children, all grown, in [place]     2 granddaughters     Hobbies exercising, birds    Attends [employer], has volunteered at [id number] Strain: Low Risk     Difficulty of Paying Living Expenses: Not very hard   Food Insecurity: Food Insecurity Present    Worried About Running Out of Food in the Last Year: Sometimes true    Anurag of Food in the Last Year: Sometimes true Transportation Needs: No Transportation Needs    Lack of Transportation (Medical): No    Lack of Transportation (Non-Medical):  No   Physical Activity: Inactive    Days of Exercise per Week: 0 days    Minutes of Exercise per Session: 0 min   Stress: Stress Concern Present    Feeling of Stress : Rather much   Social Connections: Socially Isolated    Frequency of Communication with Friends and Family: Twice a week    Frequency of Social Gatherings with Friends and Family: Never    Attends Cheondoism Services: Never    Active Member of Clubs or Organizations: No    Attends Club or Organization Meetings: Never    Marital Status:    Intimate Partner Violence:     Fear of Current or Ex-Partner:     Emotionally Abused:     Physically Abused:     Sexually Abused:         /80   Pulse 92   Temp 96.4 °F (35.8 °C)   Resp 20   Ht 5' 6\" (1.676 m)   Wt 231 lb 6.4 oz (105 kg)   SpO2 95%   BMI 37.35 kg/m²        Physical Exam:    General appearance - alert, well appearing, and in no distress  Mental Status - alert, oriented to person, place, and time  Eyes - pupils equal and reactive, extraocular eye movements intact   Ears - bilateral TM's and external ear canals normal   Nose - normal and patent, no erythema, discharge or polyps   Sinuses - Normal paranasal sinuses without tenderness   Throat - mucous membranes moist, pharynx normal without lesions   Neck - supple, no significant adenopathy   Thyroid - thyroid is normal in size without nodules or tenderness    Chest - clear to auscultation, no wheezes, rales or rhonchi, symmetric air entry   Heart - normal rate, regular rhythm, normal S1, S2, no murmurs, rubs, clicks or gallops  Abdomen - soft, nontender, nondistended, no masses or organomegaly   Back exam - full range of motion, no tenderness, palpable spasm or pain on motion   Neurological - alert, oriented, normal speech, no focal findings or movement disorder noted   Musculoskeletal -tenderness to palpation of the left forearm. Extremities - non pitting swelling noted in the left arm. No erythema or drainage noted from the arm. Skin - normal coloration and turgor, no rashes, no suspicious skin lesions noted. On the left foot there is a hypertrophic toenail that is yellow discoloration. Labs   TSH   Date Value Ref Range Status   05/05/2021 4.420 (H) 0.440 - 3.860 uIU/mL Final     TSH   Date Value Ref Range Status   02/07/2020 2.700 0.440 - 3.860 uIU/mL Final   06/14/2019 2.430 0.440 - 3.860 uIU/mL Final   06/11/2018 3.820 0.270 - 4.200 uIU/mL Final   06/07/2017 3.500 0.270 - 4.200 uIU/mL Final   01/27/2017 3.810 0.270 - 4.200 uIU/mL Final   03/04/2016 1.990 0.270 - 4.200 uIU/mL Final   11/20/2015 1.940 0.270 - 4.200 uIU/mL Final   08/28/2015 1.970 0.270 - 4.200 uIU/mL Final   10/25/2014 3.130 0.270 - 4.200 uIU/mL Final   12/11/2013 3.910 0.270 - 4.200 uIU/mL Final   04/04/2013 1.346 0.550 - 4.780 uIU/mL Final   10/16/2012 1.607 0.550 - 4.780 uIU/mL Final   05/14/2012 1.771 0.550 - 4.780 uIU/mL Final   03/22/2012 4.082 0.550 - 4.780 uIU/mL Final     Lab Results   Component Value Date     05/19/2021    K 4.8 05/19/2021     05/19/2021    CO2 25 05/19/2021    BUN 12 05/19/2021    CREATININE 0.95 (H) 05/19/2021    GLUCOSE 106 (H) 05/19/2021    CALCIUM 10.6 (H) 05/19/2021    PROT 7.1 05/05/2021    LABALBU 4.3 05/05/2021    BILITOT 0.3 05/05/2021    ALKPHOS 100 05/05/2021    AST 18 05/05/2021    ALT 23 05/05/2021    LABGLOM 60.0 05/19/2021    GFRAA >60.0 05/19/2021    GLOB 2.8 05/05/2021       Lab Results   Component Value Date    WBC 5.9 04/15/2021    HGB 14.5 04/15/2021    HCT 42.2 04/15/2021    MCV 90.8 04/15/2021     04/15/2021     Lab Results   Component Value Date    LABA1C 5.7 05/19/2021     No results found for: EAG          A/P: Lacey Ortiz 62 y.o. female presenting for     1. Onychomycosis  Hypertrophic toenail.   Also yellow discoloration which looks like signs of onychomycosis. Before prescribing medication will need to get a liver function test for patient. Lab work ordered. - Comprehensive Metabolic Panel; Future    2. Atrial flutter, unspecified type [facility])  Patient was not sure if she is getting her metoprolol. Patient reports that she goes to [facility]. We will send the medication there. Patient needs to follow-up with her cardiologist.  - metoprolol tartrate (LOPRESSOR) 50 MG tablet; Take 1 tablet by mouth 2 times daily  Dispense: 60 tablet; Refill: 3    3. Abnormal mammogram  Had a biopsy which showed a fibroadenoma. Patient is to repeat mammogram in 1 year. 4. Anxiety  At baseline see psych    5. Obesity (BMI 30-39.9)      6. Depression, unspecified depression type      7. PTSD (post-traumatic stress disorder)      8. Gastroesophageal reflux disease without esophagitis  Stable    9. Hyperlipidemia, unspecified hyperlipidemia type    - Lipid, Fasting; Future    10. Acquired hypothyroidism    - TSH without Reflex; Future    11. Mild persistent asthma without complication    - albuterol sulfate  (90 Base) MCG/ACT inhaler; USE 2 PUFFS EVERY 4 HRS AS NEEDED FOR WHEEZING/SOB-SPACE OUT TO EVERY 6 HR AS SYMPTOMS IMPROVE  Dispense: 1 Inhaler; Refill: 3          Please note, this report has been partially produced using speech recognition software  and may cause  and /or contain errors related to that system including grammar, punctuation and spelling as well as words and phrases that may seem inappropriate. If there are questions or concerns please feel free to contact me to clarify.

## 2021-08-15 ENCOUNTER — PATIENT MESSAGE (OUTPATIENT)
Dept: NEUROLOGY | Age: 61
End: 2021-08-15

## 2021-08-16 NOTE — TELEPHONE ENCOUNTER
Called and spoke with patient. She would like a copy of her office notes to send to [employer]. I told her she will need to contact medical records. Can you please give her the information for that? Thank you!

## 2021-08-16 NOTE — TELEPHONE ENCOUNTER
From: [facility]  To:  ARASH Licea - CNP  Sent: 8/15/2021 3:48 PM EDT  Subject: Non-Urgent Medical Question    I need paper work how I am doing on my neurologist because I need to give my ssi to tell them what I have passed out for tremors I have had 5 tremors I have to give some to get the paper from the doctor can you call me

## 2021-08-23 ENCOUNTER — OFFICE VISIT (OUTPATIENT)
Dept: FAMILY MEDICINE CLINIC | Age: 61
End: 2021-08-23
Payer: COMMERCIAL

## 2021-08-23 ENCOUNTER — TELEPHONE (OUTPATIENT)
Dept: NEUROLOGY | Age: 61
End: 2021-08-23

## 2021-08-23 VITALS
HEIGHT: 66 IN | TEMPERATURE: 97.1 F | OXYGEN SATURATION: 98 % | DIASTOLIC BLOOD PRESSURE: 72 MMHG | SYSTOLIC BLOOD PRESSURE: 118 MMHG | WEIGHT: 226.2 LBS | BODY MASS INDEX: 36.35 KG/M2 | HEART RATE: 70 BPM

## 2021-08-23 DIAGNOSIS — I48.92 ATRIAL FLUTTER, UNSPECIFIED TYPE (HCC): ICD-10-CM

## 2021-08-23 DIAGNOSIS — B35.1 ONYCHOMYCOSIS: Primary | ICD-10-CM

## 2021-08-23 DIAGNOSIS — K21.9 GASTROESOPHAGEAL REFLUX DISEASE WITHOUT ESOPHAGITIS: ICD-10-CM

## 2021-08-23 DIAGNOSIS — E78.5 HYPERLIPIDEMIA, UNSPECIFIED HYPERLIPIDEMIA TYPE: ICD-10-CM

## 2021-08-23 DIAGNOSIS — J30.9 ALLERGIC RHINITIS, UNSPECIFIED SEASONALITY, UNSPECIFIED TRIGGER: ICD-10-CM

## 2021-08-23 DIAGNOSIS — R73.03 PREDIABETES: ICD-10-CM

## 2021-08-23 DIAGNOSIS — E03.9 ACQUIRED HYPOTHYROIDISM: ICD-10-CM

## 2021-08-23 PROCEDURE — G8427 DOCREV CUR MEDS BY ELIG CLIN: HCPCS | Performed by: FAMILY MEDICINE

## 2021-08-23 PROCEDURE — 3017F COLORECTAL CA SCREEN DOC REV: CPT | Performed by: FAMILY MEDICINE

## 2021-08-23 PROCEDURE — G9899 SCRN MAM PERF RSLTS DOC: HCPCS | Performed by: FAMILY MEDICINE

## 2021-08-23 PROCEDURE — 99214 OFFICE O/P EST MOD 30 MIN: CPT | Performed by: FAMILY MEDICINE

## 2021-08-23 PROCEDURE — G8417 CALC BMI ABV UP PARAM F/U: HCPCS | Performed by: FAMILY MEDICINE

## 2021-08-23 PROCEDURE — 1036F TOBACCO NON-USER: CPT | Performed by: FAMILY MEDICINE

## 2021-08-23 RX ORDER — CETIRIZINE HYDROCHLORIDE 10 MG/1
TABLET ORAL
Qty: 30 TABLET | Refills: 5 | Status: SHIPPED | OUTPATIENT
Start: 2021-08-23 | End: 2021-10-25

## 2021-08-23 RX ORDER — BUDESONIDE AND FORMOTEROL FUMARATE DIHYDRATE 160; 4.5 UG/1; UG/1
2 AEROSOL RESPIRATORY (INHALATION) 2 TIMES DAILY
Qty: 1 INHALER | Refills: 3 | Status: SHIPPED | OUTPATIENT
Start: 2021-08-23 | End: 2021-11-10 | Stop reason: SDUPTHER

## 2021-08-23 RX ORDER — FAMOTIDINE 20 MG/1
TABLET, FILM COATED ORAL
Qty: 60 TABLET | Refills: 3 | Status: SHIPPED | OUTPATIENT
Start: 2021-08-23 | End: 2021-08-30

## 2021-08-23 RX ORDER — ASPIRIN 81 MG/1
TABLET ORAL
Qty: 30 TABLET | Refills: 3 | Status: SHIPPED | OUTPATIENT
Start: 2021-08-23 | End: 2021-11-10 | Stop reason: SDUPTHER

## 2021-08-23 ASSESSMENT — PATIENT HEALTH QUESTIONNAIRE - PHQ9
SUM OF ALL RESPONSES TO PHQ QUESTIONS 1-9: 0
2. FEELING DOWN, DEPRESSED OR HOPELESS: 0
1. LITTLE INTEREST OR PLEASURE IN DOING THINGS: 0
SUM OF ALL RESPONSES TO PHQ QUESTIONS 1-9: 0
SUM OF ALL RESPONSES TO PHQ9 QUESTIONS 1 & 2: 0
SUM OF ALL RESPONSES TO PHQ QUESTIONS 1-9: 0

## 2021-08-23 NOTE — TELEPHONE ENCOUNTER
Patient called back in. She reports that she is trying to get disability paperwork and when she called and spoke with them they \"got smart with me\". She was asking me what she can do about this. I advised they do not work with the people at the disability office and I have no control over that but she can them call back and request to speak with that person's boss or someone in charge to report her complaint.   Patient voiced understanding

## 2021-08-23 NOTE — TELEPHONE ENCOUNTER
Pt states that she is requesting a call from you but would not tell me what it was in regards to.   Told me she wanted to speak with you before her appt on 08/30/2021

## 2021-08-23 NOTE — TELEPHONE ENCOUNTER
Attempted to call patient back. No answer.   Message left on her voicemail advising her to call the office to discuss her concerns or questions

## 2021-08-23 NOTE — PROGRESS NOTES
Chief Complaint   Patient presents with    Follow-up     from left foot pain and her big toe . Pt states that its has not gotten better its hurts when she goes to bed     Dizziness        HPI: Kristine Davis 62 y.o. female presenting for     Vertigo  Patinet had a fall despite trying to move slowly from the cough  Patient reports she felt an episode of vertigo  Denies any tinnitus    F/u  Reports that she has it some but it is improvement   Worsens when she gets up too fast.        Onychomycosis   First toe in the left foot   Yellow and thick   Unable to cut the toenail   Denies any other issues. F/u   Slight improvement with penlac   Has not had her CMP done   Would like to try oral medication.        Hpyothyroidism   Stable. Takes medication. Lab Results   Component Value Date    TSH 2.700 02/07/2020       HLD   Stable. On pravastatin        Lab Results   Component Value Date     CHOL 194 11/05/2019     CHOL 232 (H) 06/11/2018     CHOL 234 (H) 07/12/2017            Lab Results   Component Value Date     TRIG 127 11/05/2019     TRIG 157 06/11/2018     TRIG 286 (H) 07/12/2017            Lab Results   Component Value Date     HDL 53 11/05/2019     HDL 55 06/11/2018     HDL 46 07/12/2017            Lab Results   Component Value Date     LDLCALC 116 11/05/2019     LDLCALC 146 (H) 06/11/2018     LDLCALC 131 (H) 07/12/2017      No results found for: LABVLDL, VLDL        Lab Results   Component Value Date     CHOLHDLRATIO 6.3 03/22/2012         PTSD/Depression   Patient is going to [facility]. Plans on going 4 days a go    Follows with psych     GERD   Stable.    Trying to avoid food sthat make it worse        Current Outpatient Medications   Medication Sig Dispense Refill    metFORMIN (GLUCOPHAGE) 500 MG tablet TAKE 1 TABLET BY MOUTH TWICE A DAY WITH MEALS (AM,PM) 60 tablet 0    albuterol sulfate  (90 Base) MCG/ACT inhaler USE 2 PUFFS EVERY 4 HRS AS NEEDED FOR WHEEZING/SOB-SPACE OUT TO EVERY 6 HR AS SYMPTOMS IMPROVE 1 Inhaler 3    metoprolol tartrate (LOPRESSOR) 50 MG tablet Take 1 tablet by mouth 2 times daily 60 tablet 3    primidone (MYSOLINE) 50 MG tablet Take 1 tablet by mouth nightly 30 tablet 3    midodrine (PROAMATINE) 10 MG tablet TAKE 1 TABLET BY MOUTH TWICE DAILY(AM,PM) 56 tablet 2    VITAMIN D HIGH POTENCY 25 MCG (1000 UT) CAPS TAKE 1 CAPSULE BY MOUTH DAILY WITH SUPPER (PM) 90 capsule 1    pravastatin (PRAVACHOL) 40 MG tablet TAKE 1 TABLET BY MOUTH EVERY DAY IN THE EVENING 90 tablet 1    buPROPion (WELLBUTRIN XL) 300 MG extended release tablet Take 300 mg by mouth daily      ASPIRIN LOW DOSE 81 MG EC tablet TAKE 1 TABLET BY MOUTH ONCE A DAY (AM) 30 tablet 5    famotidine (PEPCID) 20 MG tablet TAKE 1 TABLET BY MOUTH 2 TIMES DAILY (AM,PM) 60 tablet 5    Multiple Vitamin (TAB-A-MANDI/BETA CAROTENE) TABS TAKE 1 TABLET BY MOUTH EVERY DAY (AM) 30 tablet 5    Biotin 300 MCG TABS Take 1 tablet by mouth daily 30 tablet 3    cyanocobalamin (CVS VITAMIN B12) 1000 MCG tablet Take 1 tablet by mouth daily 30 tablet 5    EPINEPHrine (EPIPEN 2-PRAVEENA) 0.3 MG/0.3ML SOAJ injection Use as directed for allergic reaction 2 each 2    levothyroxine (SYNTHROID) 50 MCG tablet TAKE 1 TABLET BY MOUTH EVERY MORNING (OWN BLSTER) 90 tablet 0    citalopram (CELEXA) 40 MG tablet Take 40 mg by mouth daily      vitamin B-2 (RIBOFLAVIN) 100 MG TABS tablet Take 1 tablet by mouth daily 90 tablet 0    budesonide-formoterol (SYMBICORT) 160-4.5 MCG/ACT AERO Inhale 2 puffs into the lungs 2 times daily 1 Inhaler 3    traZODone (DESYREL) 50 MG tablet Take by mouth nightly 1-2 tablets nightly       cetirizine (ZYRTEC) 10 MG tablet TAKE 1 TABLET BY MOUTH EVERY DAY AS NEEDED FOR ALLERGIES 30 tablet 5    ARIPiprazole (ABILIFY) 15 MG tablet TAKE 1 TABLET BY MOUTH EVERY DAY  2     No current facility-administered medications for this visit.         ROS  CONSTITUTIONAL: The patient denies fevers, chills, sweats and body ache.  HEENT: Denies headache, blurry vision, eye pain, tinnitus, vertigo, admits to sore throat, denies neck or thyroid masses. Admits to cracked lips. RESPIRATORY: Denies cough, sputum, hemoptysis. CARDIAC: Denies chest pain, pressure, palpitations, Denies lower extremity edema. GASTROINTESTINAL: Denies abdominal pain, constipation, diarrhea, bleeding in the stools,   GENITOURINARY: Denies dysuria, hematuria, nocturia or frequency, urinary incontinence. NEUROLOGIC: Denies headaches, dizziness, syncope, weakness  MUSCULOSKELETAL: denies changes in range of motion, joint pain, stiffness. ENDOCRINOLOGY: Denies heat or cold intolerance. HEMATOLOGY: Denies easy bleeding or blood transfusion,anemia  DERMATOLOGY: Admits to nail fungus on her toenails. PSYCHIATRY: Denies depression, agitation or anxiety.     Past Medical History:   Diagnosis Date    Asthma 2015    Bilateral renal cysts 2013    Depression     since age 35, was with Dr Aby Plaza, now the [facility]    Diverticulosis of sigmoid colon 2012    Dr Tim Bradley DJD of left shoulder     Environmental allergies     Fatty liver 2013    GERD (gastroesophageal reflux disease)     History of cardiac arrhythmia     \"due to stress, was placed on medication\"    Hydatidiform mole     age 26     Hyperlipidemia     Hypothyroidism 2011    IFG (impaired fasting glucose) 2013    Melanoma of eye ([address]     age 30, R eye prosthesis, Dr Arechiga Anis    Obesity     Prediabetes     PTSD (post-traumatic stress disorder)     age 35, [facility]    S/P colonoscopy 04/19/2012    Dr. Ines Mcnamara S/P hysterectomy with oophorectomy 2012    Dr Julia Wise Tremor     Dr Ania Curran Vitamin D deficiency         Past Surgical History:   Procedure Laterality Date    BREAST BIOPSY      DILATION AND CURETTAGE OF UTERUS      ENDOMETRIAL ABLATION  6/2012    EYE REMOVAL      OD for melanoma, age 30    FOOT OSTEOTOMY Left 09/23/2016    DEJA JACKMAN DPM      [facility] STEROTACT[facility] LOC BREAST BIOPSY RIGHT Right 7/19/2021    BALDOMERO STEROTACTIC LOC BREAST BIOPSY RIGHT 7/19/2021 [facility]    LINDSAY AND BSO  2012    Dr Rupinder Sultana        Family History   Problem Relation Age of Onset    Heart Failure Mother         dec 76    Diabetes Mother     Diabetes Father     Stroke Father         dec age 80    Cancer Father         Sarcoma    Stomach Cancer Other     Breast Cancer Paternal Aunt     Breast Cancer Maternal Aunt         Social History     Socioeconomic History    Marital status:      Spouse name: Not on file    Number of children: 3    Years of education: 15    Highest education level: High school graduate   Occupational History    Occupation: SSI   Tobacco Use    Smoking status: Never Smoker    Smokeless tobacco: Never Used   Vaping Use    Vaping Use: Never used   Substance and Sexual Activity    Alcohol use: No    Drug use: No    Sexual activity: Not Currently     Birth control/protection: Surgical   Other Topics Concern    Not on file   Social History Narrative    Born in [place], one of 4 children, one sister disappeared at age 30, never found    Moved to [place] at age 40        Lives in an apartment in [place] [place] [place], alone    , 3 children, all grown, in [place]     2 granddaughters     Hobbies exercising, birds    Attends [employer], has volunteered at [place] [id number] Strain: Low Risk     Difficulty of Paying Living Expenses: Not very hard   Food Insecurity: Food Insecurity Present    Worried About Running Out of Food in the Last Year: Sometimes true    Anurag of Food in the Last Year: Sometimes true   Transportation Needs: No Transportation Needs    Lack of Transportation (Medical): No    Lack of Transportation (Non-Medical):  No   Physical Activity: Inactive    Days of Exercise per Week: 0 days    Minutes of Exercise per Session: 0 min   Stress: Stress Concern Present    Feeling of Stress : Rather much Social Connections: Socially Isolated    Frequency of Communication with Friends and Family: Twice a week    Frequency of Social Gatherings with Friends and Family: Never    Attends Yazidi Services: Never    Active Member of Clubs or Organizations: No    Attends Club or Organization Meetings: Never    Marital Status:    Intimate Partner Violence:     Fear of Current or Ex-Partner:     Emotionally Abused:     Physically Abused:     Sexually Abused:         /72 (Site: Left Upper Arm, Position: Sitting, Cuff Size: Large Adult)   Pulse 70   Temp 97.1 °F (36.2 °C) (Temporal)   Ht 5' 6\" (1.676 m)   Wt 226 lb 3.2 oz (102.6 kg)   SpO2 98%   Breastfeeding No   BMI 36.51 kg/m²        Physical Exam:    General appearance - alert, well appearing, and in no distress  Mental Status - alert, oriented to person, place, and time  Eyes - pupils equal and reactive, extraocular eye movements intact   Ears - bilateral TM's and external ear canals normal   Nose - normal and patent, no erythema, discharge or polyps   Sinuses - Normal paranasal sinuses without tenderness   Throat - mucous membranes moist, pharynx normal without lesions   Neck - supple, no significant adenopathy   Thyroid - thyroid is normal in size without nodules or tenderness    Chest - clear to auscultation, no wheezes, rales or rhonchi, symmetric air entry   Heart - normal rate, regular rhythm, normal S1, S2, no murmurs, rubs, clicks or gallops  Abdomen - soft, nontender, nondistended, no masses or organomegaly   Back exam - full range of motion, no tenderness, palpable spasm or pain on motion   Neurological - alert, oriented, normal speech, no focal findings or movement disorder noted   Musculoskeletal -tenderness to palpation of the left forearm. Extremities - non pitting swelling noted in the left arm. No erythema or drainage noted from the arm.      Skin - normal coloration and turgor, no rashes, no suspicious skin lesions noted.  On the left foot there is a hypertrophic toenail that is yellow discoloration. Labs   TSH   Date Value Ref Range Status   05/05/2021 4.420 (H) 0.440 - 3.860 uIU/mL Final     TSH   Date Value Ref Range Status   02/07/2020 2.700 0.440 - 3.860 uIU/mL Final   06/14/2019 2.430 0.440 - 3.860 uIU/mL Final   06/11/2018 3.820 0.270 - 4.200 uIU/mL Final   06/07/2017 3.500 0.270 - 4.200 uIU/mL Final   01/27/2017 3.810 0.270 - 4.200 uIU/mL Final   03/04/2016 1.990 0.270 - 4.200 uIU/mL Final   11/20/2015 1.940 0.270 - 4.200 uIU/mL Final   08/28/2015 1.970 0.270 - 4.200 uIU/mL Final   10/25/2014 3.130 0.270 - 4.200 uIU/mL Final   12/11/2013 3.910 0.270 - 4.200 uIU/mL Final   04/04/2013 1.346 0.550 - 4.780 uIU/mL Final   10/16/2012 1.607 0.550 - 4.780 uIU/mL Final   05/14/2012 1.771 0.550 - 4.780 uIU/mL Final   03/22/2012 4.082 0.550 - 4.780 uIU/mL Final     Lab Results   Component Value Date     05/19/2021    K 4.8 05/19/2021     05/19/2021    CO2 25 05/19/2021    BUN 12 05/19/2021    CREATININE 0.95 (H) 05/19/2021    GLUCOSE 106 (H) 05/19/2021    CALCIUM 10.6 (H) 05/19/2021    PROT 7.1 05/05/2021    LABALBU 4.3 05/05/2021    BILITOT 0.3 05/05/2021    ALKPHOS 100 05/05/2021    AST 18 05/05/2021    ALT 23 05/05/2021    LABGLOM 60.0 05/19/2021    GFRAA >60.0 05/19/2021    GLOB 2.8 05/05/2021       Lab Results   Component Value Date    WBC 5.9 04/15/2021    HGB 14.5 04/15/2021    HCT 42.2 04/15/2021    MCV 90.8 04/15/2021     04/15/2021     Lab Results   Component Value Date    LABA1C 5.7 05/19/2021     No results found for: EAG          A/P: Aldo Branham 62 y.o. female presenting for     1. Hyperlipidemia, unspecified hyperlipidemia type    - aspirin (ASPIRIN LOW DOSE) 81 MG EC tablet; TAKE 1 TABLET BY MOUTH ONCE A DAY (AM)  Dispense: 30 tablet; Refill: 3    2.  Gastroesophageal reflux disease without esophagitis    - famotidine (PEPCID) 20 MG tablet; TAKE 1 TABLET BY MOUTH 2 TIMES DAILY (AM,PM) Dispense: 60 tablet; Refill: 3    3. Acquired hypothyroidism      4. Onychomycosis  Reviewed labs. Will start Lamisil   - terbinafine (LAMISIL) 250 MG tablet; Take 1 tablet by mouth daily  Dispense: 72 tablet; Refill: 0    5. Atrial flutter, unspecified type ([facility]  Follow up with cardiology     6. Prediabetes  Stable   Lab Results   Component Value Date    LABA1C 5.7 05/19/2021     No results found for: EAG  - metFORMIN (GLUCOPHAGE) 500 MG tablet; TAKE 1 TABLET BY MOUTH TWICE A DAY WITH MEALS (AM,PM)  Dispense: 60 tablet; Refill: 3    7. Allergic rhinitis, unspecified seasonality, unspecified trigger    - cetirizine (ZYRTEC) 10 MG tablet; TAKE 1 TABLET BY MOUTH EVERY DAY AS NEEDED FOR ALLERGIES  Dispense: 30 tablet; Refill: 5            Please note, this report has been partially produced using speech recognition software  and may cause  and /or contain errors related to that system including grammar, punctuation and spelling as well as words and phrases that may seem inappropriate. If there are questions or concerns please feel free to contact me to clarify.

## 2021-08-24 DIAGNOSIS — L21.9 SEBORRHEIC DERMATITIS: Primary | ICD-10-CM

## 2021-08-24 RX ORDER — KETOCONAZOLE 20 MG/ML
SHAMPOO TOPICAL
Qty: 120 ML | Refills: 2 | Status: SHIPPED | OUTPATIENT
Start: 2021-08-24 | End: 2021-10-19

## 2021-08-25 ENCOUNTER — OFFICE VISIT (OUTPATIENT)
Dept: ORTHOPEDIC SURGERY | Age: 61
End: 2021-08-25
Payer: COMMERCIAL

## 2021-08-25 VITALS
RESPIRATION RATE: 16 BRPM | BODY MASS INDEX: 36.32 KG/M2 | WEIGHT: 226 LBS | TEMPERATURE: 96.9 F | HEIGHT: 66 IN | HEART RATE: 64 BPM | OXYGEN SATURATION: 99 %

## 2021-08-25 DIAGNOSIS — M19.032 ARTHRITIS OF LEFT WRIST: Primary | ICD-10-CM

## 2021-08-25 PROCEDURE — 1036F TOBACCO NON-USER: CPT | Performed by: ORTHOPAEDIC SURGERY

## 2021-08-25 PROCEDURE — G9899 SCRN MAM PERF RSLTS DOC: HCPCS | Performed by: ORTHOPAEDIC SURGERY

## 2021-08-25 PROCEDURE — G8417 CALC BMI ABV UP PARAM F/U: HCPCS | Performed by: ORTHOPAEDIC SURGERY

## 2021-08-25 PROCEDURE — G8427 DOCREV CUR MEDS BY ELIG CLIN: HCPCS | Performed by: ORTHOPAEDIC SURGERY

## 2021-08-25 PROCEDURE — 3017F COLORECTAL CA SCREEN DOC REV: CPT | Performed by: ORTHOPAEDIC SURGERY

## 2021-08-25 PROCEDURE — 99213 OFFICE O/P EST LOW 20 MIN: CPT | Performed by: ORTHOPAEDIC SURGERY

## 2021-08-25 RX ORDER — BENZTROPINE MESYLATE 1 MG/1
TABLET ORAL
COMMUNITY
Start: 2021-08-23 | End: 2021-10-25

## 2021-08-25 NOTE — PROGRESS NOTES
Subjective:      Patient ID: Vargas Isaac is a 62 y.o. female who presents today for:  Chief Complaint   Patient presents with    Follow-up     6 weeks due to Arthritis of left wrist. Pt denies any pain, states wrist is still swollen. XR done 4/15/2021       HPI  Patient comes in for follow-up of left wrist arthritis. Improved at this point. Only taken an occasional Tylenol.     Past Medical History:   Diagnosis Date    Asthma 2015    Bilateral renal cysts 2013    Depression     since age 35, was with Dr Marvin Delatorre, now the [facility]    Diverticulosis of sigmoid colon 2012    Dr Rich Cordova DJD of left shoulder     Environmental allergies     Fatty liver 2013    GERD (gastroesophageal reflux disease)     History of cardiac arrhythmia     \"due to stress, was placed on medication\"    Hydatidiform mole     age 26     Hyperlipidemia     Hypothyroidism 2011    IFG (impaired fasting glucose) 2013    Melanoma of eye [facility])     age 30, R eye prosthesis, Dr Sariah Napoles    Obesity     Prediabetes     PTSD (post-traumatic stress disorder)     age 35, 1970 [facility]    S/P colonoscopy 04/19/2012    Dr. Arvizu Riding S/P hysterectomy with oophorectomy 2012    Dr Cindy Johns Tremor     Dr Rutherford Saliva Vitamin D deficiency       Past Surgical History:   Procedure Laterality Date    BREAST BIOPSY      DILATION AND CURETTAGE OF UTERUS      ENDOMETRIAL ABLATION  6/2012    EYE REMOVAL      OD for melanoma, age 30    FOOT OSTEOTOMY Left 09/23/2016    DEJA JACKMAN DPM      BALDOMERO STEROTACTIC LOC BREAST BIOPSY RIGHT Right 7/19/2021    BALDOMERO STEROTACTIC LOC BREAST BIOPSY RIGHT 7/19/2021 [facility]    LINDSAY AND BSO  2012    Dr Jimenez Reveal     Social History     Socioeconomic History    Marital status:      Spouse name: Not on file    Number of children: 3    Years of education: 15    Highest education level: High school graduate   Occupational History    Occupation: [employer]SI   Tobacco Use    Smoking status: Never Smoker    Smokeless tobacco: Never Used   Vaping Use    Vaping Use: Never used   Substance and Sexual Activity    Alcohol use: No    Drug use: No    Sexual activity: Not Currently     Birth control/protection: Surgical   Other Topics Concern    Not on file   Social History Narrative    [place], one [place] 4 children, one sister disappeared at age 30, never found    Moved to [place] at age 40        Lives in an apartment in [facility] [place], alone    , 3 children, all grown, in [place]     2 granddaughters     Hobbies exercising, birds    Attends [employer], has volunteered at [id number] [employer] Strain: Low Risk     Difficulty of Paying Living Expenses: Not very hard   Food Insecurity: Food Insecurity Present    Worried About Running Out of Food in the Last Year: Sometimes true    Anurag of Food in the Last Year: Sometimes true   Transportation Needs: No Transportation Needs    Lack of Transportation (Medical): No    Lack of Transportation (Non-Medical):  No   Physical Activity: Inactive    Days of Exercise per Week: 0 days    Minutes of Exercise per Session: 0 min   Stress: Stress Concern Present    Feeling of Stress : Rather much   Social Connections: Socially Isolated    Frequency of Communication with Friends and Family: Twice a week    Frequency of Social Gatherings with Friends and Family: Never    Attends Yarsanism Services: Never    Active Member of Clubs or Organizations: No    Attends Club or Organization Meetings: Never    Marital Status:    Intimate Partner Violence:     Fear of Current or Ex-Partner:     Emotionally Abused:     Physically Abused:     Sexually Abused:      Family History   Problem Relation Age of Onset    Heart Failure Mother         dec 76    Diabetes Mother     Diabetes Father     Stroke Father         dec age 80    Cancer Father         Sarcoma    Stomach Cancer Other     Breast Cancer Paternal Aunt     Breast Cancer Maternal Aunt      Allergies   Allergen Reactions    Bee Venom      Current Outpatient Medications on File Prior to Visit   Medication Sig Dispense Refill    ketoconazole (NIZORAL) 2 % shampoo Apply 5 to 10 mL to wet scalp, lather, leave on 3 to 5 minutes, and rinse; apply twice weekly for 2 to 4 weeks (treatment).  120 mL 2    metFORMIN (GLUCOPHAGE) 500 MG tablet TAKE 1 TABLET BY MOUTH TWICE A DAY WITH MEALS (AM,PM) 60 tablet 3    aspirin (ASPIRIN LOW DOSE) 81 MG EC tablet TAKE 1 TABLET BY MOUTH ONCE A DAY (AM) 30 tablet 3    famotidine (PEPCID) 20 MG tablet TAKE 1 TABLET BY MOUTH 2 TIMES DAILY (AM,PM) 60 tablet 3    budesonide-formoterol (SYMBICORT) 160-4.5 MCG/ACT AERO Inhale 2 puffs into the lungs 2 times daily 1 Inhaler 3    cetirizine (ZYRTEC) 10 MG tablet TAKE 1 TABLET BY MOUTH EVERY DAY AS NEEDED FOR ALLERGIES 30 tablet 5    ciclopirox (PENLAC) 8 % solution       albuterol sulfate  (90 Base) MCG/ACT inhaler USE 2 PUFFS EVERY 4 HRS AS NEEDED FOR WHEEZING/SOB-SPACE OUT TO EVERY 6 HR AS SYMPTOMS IMPROVE 1 Inhaler 3    metoprolol tartrate (LOPRESSOR) 50 MG tablet Take 1 tablet by mouth 2 times daily 60 tablet 3    primidone (MYSOLINE) 50 MG tablet Take 1 tablet by mouth nightly 30 tablet 3    midodrine (PROAMATINE) 10 MG tablet TAKE 1 TABLET BY MOUTH TWICE DAILY(AM,PM) 56 tablet 2    VITAMIN D HIGH POTENCY 25 MCG (1000 UT) CAPS TAKE 1 CAPSULE BY MOUTH DAILY WITH SUPPER (PM) 90 capsule 1    pravastatin (PRAVACHOL) 40 MG tablet TAKE 1 TABLET BY MOUTH EVERY DAY IN THE EVENING 90 tablet 1    buPROPion (WELLBUTRIN XL) 300 MG extended release tablet Take 300 mg by mouth daily      Multiple Vitamin (TAB-A-MANDI/BETA CAROTENE) TABS TAKE 1 TABLET BY MOUTH EVERY DAY (AM) 30 tablet 5    Biotin 300 MCG TABS Take 1 tablet by mouth daily 30 tablet 3    cyanocobalamin ([employer]S VITAMIN B12) 1000 MCG tablet Take 1 tablet by mouth daily 30 tablet 5    EPINEPHrine (EPIPEN 2-PRAVEENA) 0.3 MG/0.3ML SOAJ taking Tylenol as needed. We will see her back in a as needed basis    No orders of the defined types were placed in this encounter. No orders of the defined types were placed in this encounter. No follow-ups on file.       Tracey Morales MD

## 2021-08-26 DIAGNOSIS — E78.5 HYPERLIPIDEMIA, UNSPECIFIED HYPERLIPIDEMIA TYPE: Primary | ICD-10-CM

## 2021-08-26 RX ORDER — PRAVASTATIN SODIUM 80 MG/1
80 TABLET ORAL DAILY
Qty: 90 TABLET | Refills: 1 | Status: ON HOLD | OUTPATIENT
Start: 2021-08-26 | End: 2021-09-16 | Stop reason: HOSPADM

## 2021-08-26 RX ORDER — TERBINAFINE HYDROCHLORIDE 250 MG/1
250 TABLET ORAL DAILY
Qty: 72 TABLET | Refills: 0 | Status: SHIPPED | OUTPATIENT
Start: 2021-08-26 | End: 2021-10-25 | Stop reason: ALTCHOICE

## 2021-08-28 DIAGNOSIS — K21.9 GASTROESOPHAGEAL REFLUX DISEASE WITHOUT ESOPHAGITIS: ICD-10-CM

## 2021-08-30 ENCOUNTER — TELEPHONE (OUTPATIENT)
Dept: NEUROLOGY | Age: 61
End: 2021-08-30

## 2021-08-30 ENCOUNTER — OFFICE VISIT (OUTPATIENT)
Dept: NEUROLOGY | Age: 61
End: 2021-08-30
Payer: COMMERCIAL

## 2021-08-30 VITALS
WEIGHT: 227 LBS | HEART RATE: 70 BPM | DIASTOLIC BLOOD PRESSURE: 76 MMHG | SYSTOLIC BLOOD PRESSURE: 112 MMHG | BODY MASS INDEX: 36.64 KG/M2

## 2021-08-30 DIAGNOSIS — R29.6 FALLS FREQUENTLY: ICD-10-CM

## 2021-08-30 DIAGNOSIS — G25.0 ESSENTIAL TREMOR: Primary | ICD-10-CM

## 2021-08-30 DIAGNOSIS — I95.1 ORTHOSTATIC HYPOTENSION: ICD-10-CM

## 2021-08-30 PROCEDURE — G8417 CALC BMI ABV UP PARAM F/U: HCPCS | Performed by: NURSE PRACTITIONER

## 2021-08-30 PROCEDURE — 1036F TOBACCO NON-USER: CPT | Performed by: NURSE PRACTITIONER

## 2021-08-30 PROCEDURE — G8427 DOCREV CUR MEDS BY ELIG CLIN: HCPCS | Performed by: NURSE PRACTITIONER

## 2021-08-30 PROCEDURE — 99214 OFFICE O/P EST MOD 30 MIN: CPT | Performed by: NURSE PRACTITIONER

## 2021-08-30 PROCEDURE — G9899 SCRN MAM PERF RSLTS DOC: HCPCS | Performed by: NURSE PRACTITIONER

## 2021-08-30 PROCEDURE — 3017F COLORECTAL CA SCREEN DOC REV: CPT | Performed by: NURSE PRACTITIONER

## 2021-08-30 RX ORDER — FAMOTIDINE 20 MG/1
TABLET, FILM COATED ORAL
Qty: 56 TABLET | Refills: 2 | Status: SHIPPED | OUTPATIENT
Start: 2021-08-30 | End: 2021-11-10 | Stop reason: SDUPTHER

## 2021-08-30 ASSESSMENT — ENCOUNTER SYMPTOMS
NAUSEA: 0
ABDOMINAL PAIN: 0
VOMITING: 0
SHORTNESS OF BREATH: 0
CHEST TIGHTNESS: 0
WHEEZING: 0
COUGH: 0
COLOR CHANGE: 0
BACK PAIN: 0
TROUBLE SWALLOWING: 0
ABDOMINAL DISTENTION: 0

## 2021-08-30 NOTE — PROGRESS NOTES
Cogentin. 8/5/2021:  Patient is here for 3-month follow-up for essential tremor and syncope with orthostatic hypotension. Patient last seen on 5/5/2021. At that time she was referred to cardiology for syncope and orthostatic hypotension. Cardiology has initiated aided patient on midodrine 10 mg twice daily. Orthostatic blood pressures assessed today and are improved but still somewhat borderline. There is no compensatory tachycardia noted therefore underlying autonomic dysfunction possible. Patient denies any recurrent syncope since last seen. MRI of the brain was ordered but unable to be done due to ocular implant. CT of the head was done that was negative. Case was discussed and Dr. Rayne Castellanos recommended discontinuing Cogentin which has not yet been done. Patient still with ongoing tremor of the arms mainly with activity. No tremor of the head or chin. No obvious rest tremor at this time. No hallucinations, cognitive decline or behavioral disturbances. She denies back pain or weakness of the lower extremities.   No bowel or bladder dysfunction.        5/5/2021:  Pt seen and examined in the office to establish cares for tremor and syncope. 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  Patient presents today with reports of tremors that have been ongoing for approximately 1 year.  She reports tremors are present in her bilateral hands.  She states they are present at rest but get worse with activity especially while trying to write. Karin Simon reports her handwriting is gotten significantly worse.  She denies any tremor of the head, chin or lower extremities.  She denies any cognitive impairment.  No hallucination or behavioral disturbances.  She does have somewhat shuffling gait.  No rigidity noted. Karin Simon Smokeless tobacco: Never Used   Vaping Use    Vaping Use: Never used   Substance and Sexual Activity    Alcohol use: No    Drug use: No    Sexual activity: Not Currently     Birth control/protection: Surgical   Other Topics Concern    Not on file   Social History Narrative    [place] one of 4 children, one sister disappeared at age 30, never found    Moved to [place] at age 40        Lives in an apartment in [place] [place], alone    , 3 children, all grown, in [place]     2 granddaughters     Hobbies exercising, birds    Attends [employer], has volunteered at [id number] Strain: Low Risk     Difficulty of Paying Living Expenses: Not very hard   Food Insecurity: Food Insecurity Present    Worried About Running Out of Food in the Last Year: Sometimes true    Anurag of Food in the Last Year: Sometimes true   Transportation Needs: No Transportation Needs    Lack of Transportation (Medical): No    Lack of Transportation (Non-Medical):  No   Physical Activity: Inactive    Days of Exercise per Week: 0 days    Minutes of Exercise per Session: 0 min   Stress: Stress Concern Present    Feeling of Stress : Rather much   Social Connections: Socially Isolated    Frequency of Communication with Friends and Family: Twice a week    Frequency of Social Gatherings with Friends and Family: Never    Attends Sabianism Services: Never    Active Member of Clubs or Organizations: No    Attends Club or Organization Meetings: Never    Marital Status:    Intimate Partner Violence:     Fear of Current or Ex-Partner:     Emotionally Abused:     Physically Abused:     Sexually Abused:      Family History   Problem Relation Age of Onset    Heart Failure Mother         dec 76    Diabetes Mother     Diabetes Father     Stroke Father         dec age 80    Cancer Father         Sarcoma    Stomach Cancer Other     Breast Cancer Paternal Aunt     Breast Cancer Maternal Aunt      Allergies   Allergen Reactions    Bee Venom      Current Outpatient Medications on File Prior to Visit   Medication Sig Dispense Refill    terbinafine (LAMISIL) 250 MG tablet Take 1 tablet by mouth daily 72 tablet 0    pravastatin (PRAVACHOL) 80 MG tablet Take 1 tablet by mouth daily 90 tablet 1    benztropine (COGENTIN) 1 MG tablet       ketoconazole (NIZORAL) 2 % shampoo Apply 5 to 10 mL to wet scalp, lather, leave on 3 to 5 minutes, and rinse; apply twice weekly for 2 to 4 weeks (treatment).  120 mL 2    metFORMIN (GLUCOPHAGE) 500 MG tablet TAKE 1 TABLET BY MOUTH TWICE A DAY WITH MEALS (AM,PM) 60 tablet 3    aspirin (ASPIRIN LOW DOSE) 81 MG EC tablet TAKE 1 TABLET BY MOUTH ONCE A DAY (AM) 30 tablet 3    famotidine (PEPCID) 20 MG tablet TAKE 1 TABLET BY MOUTH 2 TIMES DAILY (AM,PM) 60 tablet 3    budesonide-formoterol (SYMBICORT) 160-4.5 MCG/ACT AERO Inhale 2 puffs into the lungs 2 times daily 1 Inhaler 3    cetirizine (ZYRTEC) 10 MG tablet TAKE 1 TABLET BY MOUTH EVERY DAY AS NEEDED FOR ALLERGIES 30 tablet 5    albuterol sulfate  (90 Base) MCG/ACT inhaler USE 2 PUFFS EVERY 4 HRS AS NEEDED FOR WHEEZING/SOB-SPACE OUT TO EVERY 6 HR AS SYMPTOMS IMPROVE 1 Inhaler 3    metoprolol tartrate (LOPRESSOR) 50 MG tablet Take 1 tablet by mouth 2 times daily 60 tablet 3    primidone (MYSOLINE) 50 MG tablet Take 1 tablet by mouth nightly 30 tablet 3    midodrine (PROAMATINE) 10 MG tablet TAKE 1 TABLET BY MOUTH TWICE DAILY(AM,PM) 56 tablet 2    VITAMIN D HIGH POTENCY 25 MCG (1000 UT) CAPS TAKE 1 CAPSULE BY MOUTH DAILY WITH SUPPER (PM) 90 capsule 1    buPROPion (WELLBUTRIN XL) 300 MG extended release tablet Take 300 mg by mouth daily      Multiple Vitamin (TAB-A-MANDI/BETA CAROTENE) TABS TAKE 1 TABLET BY MOUTH EVERY DAY (AM) 30 tablet 5    Biotin 300 MCG TABS Take 1 tablet by mouth daily 30 tablet 3    cyanocobalamin ([employer] VITAMIN B12) 1000 MCG tablet Take 1 tablet by mouth daily 30 tablet 5    EPINEPHrine (EPIPEN 2-PRAVEENA) 0.3 MG/0.3ML SOAJ injection Use as directed for allergic reaction 2 each 2    levothyroxine (SYNTHROID) 50 MCG tablet TAKE 1 TABLET BY MOUTH EVERY MORNING (OWN BLSTER) 90 tablet 0    citalopram (CELEXA) 40 MG tablet Take 40 mg by mouth daily      vitamin B-2 (RIBOFLAVIN) 100 MG TABS tablet Take 1 tablet by mouth daily 90 tablet 0    traZODone (DESYREL) 50 MG tablet Take by mouth nightly 1-2 tablets nightly       ARIPiprazole (ABILIFY) 15 MG tablet TAKE 1 TABLET BY MOUTH EVERY DAY  2    [DISCONTINUED] Multiple Vitamins-Minerals (THERAPEUTIC MULTIVITAMIN-MINERALS) tablet Take 1 tablet by mouth daily 30 tablet 5    [DISCONTINUED] albuterol (PROVENTIL;VENTOLIN) 90 MCG/ACT inhaler Inhale 2 puffs into the lungs every 6 hours as needed. 1 Inhaler 6     No current facility-administered medications on file prior to visit. Review of Systems   Constitutional: Negative for appetite change, chills, fatigue and fever. HENT: Negative for hearing loss, tinnitus and trouble swallowing. Eyes: Negative for visual disturbance. Respiratory: Negative for cough, chest tightness, shortness of breath and wheezing. Cardiovascular: Negative for chest pain, palpitations and leg swelling. Gastrointestinal: Negative for abdominal distention, abdominal pain, nausea and vomiting. Genitourinary: Negative for difficulty urinating. Musculoskeletal: Positive for gait problem. Negative for back pain, neck pain and neck stiffness. Skin: Negative for color change and rash. Neurological: Positive for light-headedness. Negative for dizziness, tremors, seizures, syncope, facial asymmetry, speech difficulty, weakness, numbness and headaches. Psychiatric/Behavioral: Positive for behavioral problems and confusion. Negative for agitation and hallucinations. The patient is not nervous/anxious.         Objective:   /76 (Site: Left Upper Arm, Position: Standing, Cuff Size: Large Adult)   Pulse 70   Wt 227 lb (103 kg)   BMI 36.64 kg/m²     Physical Exam  Vitals reviewed. Constitutional:       General: She is not in acute distress. Appearance: She is obese. She is not ill-appearing or diaphoretic. HENT:      Head: Normocephalic and atraumatic. Eyes:      General: Visual field deficit (eye implant) present. Extraocular Movements: Extraocular movements intact. Pupils: Pupils are equal, round, and reactive to light. Cardiovascular:      Rate and Rhythm: Normal rate and regular rhythm. Pulmonary:      Effort: Pulmonary effort is normal. No respiratory distress. Breath sounds: Normal breath sounds. Abdominal:      General: Bowel sounds are normal. There is no distension. Palpations: Abdomen is soft. Tenderness: There is no abdominal tenderness. Skin:     General: Skin is warm and dry. Neurological:      Mental Status: She is alert and oriented to person, place, and time. Cranial Nerves: No cranial nerve deficit, dysarthria or facial asymmetry. Sensory: No sensory deficit. Motor: Tremor present. No weakness, atrophy, abnormal muscle tone, seizure activity or pronator drift. Coordination: Coordination normal. Finger-Nose-Finger Test normal.      Gait: Gait abnormal.      Deep Tendon Reflexes: Reflexes normal.      Reflex Scores:       Patellar reflexes are 2+ on the right side and 2+ on the left side. [facility] BREAST SPECIMEN    Result Date: 7/19/2021  [facility] STEREO BREAST BX W LOC DEVICE 1ST LESION RIGHT, [facility] BREAST SPECIMEN, [facility] DIGITAL DIAGNOSTIC W OR WO CAD RIGHT : 7/19/2021 CLINICAL HISTORY: R92.8 Abnormal mammogram ICD10. COMPARISON: 7/6/2021 PROCEDURE: Informed consent was obtained.  Sterile technique, local lidocaine anesthesia and digital 3D tomosynthesis guidance was used to place a 9-gauge vacuum-assisted biopsy needle within the upper-outer quadrant of the right breast. Several samples were obtained in the usual fashion. A digital mammogram of the specimen demonstrated adequate sampling of the suspicious microcalcifications. A biopsy marking clip was then deployed. Full field CC and MLO digital 2D mammograms demonstrated the clip to be in expected position. The patient tolerated the procedure without evidence of immediate complication, and was discharged home in stable condition with the usual instructions. 3D TOMOSYNTHESIS-GUIDED RIGHT BREAST BIOPSY, SPECIMEN MAMMOGRAM, AND POST BIOPSY MAMMOGRAMS. BIOPSY RESULTS ARE PENDING. [facility] DIGITAL DIAGNOSTIC W OR WO CAD RIGHT    Result Date: 7/19/2021  [facility] STEREO BREAST BX W LOC DEVICE 1ST LESION RIGHT, [facility] BREAST SPECIMEN, [facility] DIGITAL DIAGNOSTIC W OR WO CAD RIGHT : 7/19/2021 CLINICAL HISTORY: R92.8 Abnormal mammogram ICD10. COMPARISON: 7/6/2021 PROCEDURE: Informed consent was obtained. Sterile technique, local lidocaine anesthesia and digital 3D tomosynthesis guidance was used to place a 9-gauge vacuum-assisted biopsy needle within the upper-outer quadrant of the right breast. Several samples were obtained in the usual fashion. A digital mammogram of the specimen demonstrated adequate sampling of the suspicious microcalcifications. A biopsy marking clip was then deployed. Full field CC and MLO digital 2D mammograms demonstrated the clip to be in expected position. The patient tolerated the procedure without evidence of immediate complication, and was discharged home in stable condition with the usual instructions. 3D TOMOSYNTHESIS-GUIDED RIGHT BREAST BIOPSY, SPECIMEN MAMMOGRAM, AND POST BIOPSY MAMMOGRAMS. BIOPSY RESULTS ARE PENDING.     [facility] STEREO BREAST BX W LOC DEVICE 1ST LESION RIGHT    Result Date: 7/19/2021  [facility] STEREO BREAST BX W LOC DEVICE 1ST LESION RIGHT, [facility] BREAST SPECIMEN, [facility] DIGITAL DIAGNOSTIC W OR WO CAD RIGHT : 7/19/2021 CLINICAL HISTORY: R92.8 Abnormal mammogram ICD10. COMPARISON: 7/6/2021 PROCEDURE: Informed consent was obtained.  Sterile technique, PHENCYCLIDINESCREENURINE Neg 06/07/2017    ETOH <10 06/07/2017     No results found for: LITHIUM, DILFRTOT, VALPROATE    Assessment and Plan:      1. Essential tremor  2. Orthostatic hypotension  3. Falls frequently  -Patient requested urgent visit today due to recurrent falls. She reports that she is getting lightheaded when getting up in the middle of the night. She does have history of orthostatic hypotension and taking midodrine 10 mg twice daily. Orthostatic blood pressures are negative today. Heart rate normal without postural tachycardia. She denies any associated cardiac symptoms, neuro deficits. There is no neck pain, back pain, or weakness of extremities. Reflexes are intact. Romberg negative. No bowel or bladder dysfunction. At this time I advised her on safety measures when getting up in the middle of the night and to sit at the side of the bed for several minutes before walking. She was advised to stop Cogentin at last appointment and has yet to do this. Reiterated this with her today. Patient with significant polypharmacy and addressing that also imperative. She is unaware of what medication she is taking as they are prepackaged for her. She was advised to bring in the prepackaged meds at her next appointment. She continues on midodrine for essential tremor. Tremor is very minimal but patient was requesting medication for treatment. There are no other signs and symptoms of parkinsonism at this time. MRI of the brain was ordered in the past but unable to be done due to eye implant. Patient is followed by psychiatry and I will reach out to them to get records for review as she has called our office on multiple occasions for things not related to neurology and requires frequent redirection. Would like to have a better understanding of her underlying psychiatric diagnoses. She has follow-up scheduled with us in November 2021 and she was advised to keep this appointment.       Return if symptoms worsen or fail to improve.     ARASH Leon - CNP     Collaborating Physician Dr Chuy Loyola

## 2021-08-30 NOTE — TELEPHONE ENCOUNTER
FYI patient has called multiple times since her appointment with you this morning. She just called again stating that her PCP, Dr. Pool Baer wants to talk to you. I asked her for a number for you to contact her and she states she does not have one.

## 2021-09-01 ENCOUNTER — PATIENT MESSAGE (OUTPATIENT)
Dept: NEUROLOGY | Age: 61
End: 2021-09-01

## 2021-09-01 ENCOUNTER — TELEPHONE (OUTPATIENT)
Dept: FAMILY MEDICINE CLINIC | Age: 61
End: 2021-09-01
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
## 2021-09-01 NOTE — TELEPHONE ENCOUNTER
From: Sarath Nunn  To:  Mane Kidney, APRN - CNP  Sent: 9/1/2021 10:07 AM EDT  Subject: Non-Urgent Medical Question    I am going to stop coming to you I am going to another neurologist

## 2021-09-01 NOTE — TELEPHONE ENCOUNTER
doesnt want to go to the [facility] because they did not want ot stand up for her. She is upset because her counselor and case Clejackelyn Ramos did not believe her.        she was to find a new counselor via [employer] . She has an appointment today. Ebonie also will be in touch with a new , psychologist, and psychiatrist.      Will continue to monitor. Patient states her next appt with neurology is in November.

## 2021-09-01 NOTE — TELEPHONE ENCOUNTER
She has hand tremors. She was seeking help at the [facility]. She is unhappy with the counselor. He told her she was just trying to get disability. She is asking you to call her.   She may need a new referral.
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
## 2021-09-02 DIAGNOSIS — E78.5 HYPERLIPIDEMIA, UNSPECIFIED HYPERLIPIDEMIA TYPE: Primary | ICD-10-CM

## 2021-09-02 DIAGNOSIS — R25.1 TREMORS OF NERVOUS SYSTEM: Primary | ICD-10-CM

## 2021-09-05 ENCOUNTER — PATIENT MESSAGE (OUTPATIENT)
Dept: NEUROLOGY | Age: 61
End: 2021-09-05

## 2021-09-08 DIAGNOSIS — R25.1 TREMORS OF NERVOUS SYSTEM: Primary | ICD-10-CM

## 2021-09-09 ENCOUNTER — TELEPHONE (OUTPATIENT)
Dept: FAMILY MEDICINE CLINIC | Age: 61
End: 2021-09-09

## 2021-09-09 NOTE — TELEPHONE ENCOUNTER
Referral was placed in for patient several days ago. Please fax for patient and let her know that it was faxed.

## 2021-09-15 ENCOUNTER — NURSE TRIAGE (OUTPATIENT)
Dept: OTHER | Facility: CLINIC | Age: 61
End: 2021-09-15

## 2021-09-15 ENCOUNTER — APPOINTMENT (OUTPATIENT)
Dept: GENERAL RADIOLOGY | Age: 61
DRG: 134 | End: 2021-09-15
Payer: COMMERCIAL

## 2021-09-15 ENCOUNTER — OFFICE VISIT (OUTPATIENT)
Dept: FAMILY MEDICINE CLINIC | Age: 61
End: 2021-09-15
Payer: COMMERCIAL

## 2021-09-15 ENCOUNTER — HOSPITAL ENCOUNTER (INPATIENT)
Age: 61
LOS: 1 days | Discharge: HOME | DRG: 134 | End: 2021-09-16
Attending: STUDENT IN AN ORGANIZED HEALTH CARE EDUCATION/TRAINING PROGRAM | Admitting: INTERNAL MEDICINE
Payer: COMMERCIAL

## 2021-09-15 ENCOUNTER — APPOINTMENT (OUTPATIENT)
Dept: CT IMAGING | Age: 61
DRG: 134 | End: 2021-09-15
Payer: COMMERCIAL

## 2021-09-15 VITALS
HEIGHT: 66 IN | WEIGHT: 200 LBS | RESPIRATION RATE: 16 BRPM | OXYGEN SATURATION: 98 % | TEMPERATURE: 97.7 F | SYSTOLIC BLOOD PRESSURE: 126 MMHG | HEART RATE: 130 BPM | BODY MASS INDEX: 32.14 KG/M2 | DIASTOLIC BLOOD PRESSURE: 84 MMHG

## 2021-09-15 DIAGNOSIS — R00.0 TACHYCARDIA: ICD-10-CM

## 2021-09-15 DIAGNOSIS — R42 DIZZINESS: Primary | ICD-10-CM

## 2021-09-15 DIAGNOSIS — R51.9 ACUTE INTRACTABLE HEADACHE, UNSPECIFIED HEADACHE TYPE: ICD-10-CM

## 2021-09-15 DIAGNOSIS — I26.99 ACUTE PULMONARY EMBOLISM WITHOUT ACUTE COR PULMONALE, UNSPECIFIED PULMONARY EMBOLISM TYPE (HCC): Primary | ICD-10-CM

## 2021-09-15 DIAGNOSIS — I48.92 ATRIAL FLUTTER, UNSPECIFIED TYPE (HCC): ICD-10-CM

## 2021-09-15 DIAGNOSIS — B35.1 ONYCHOMYCOSIS: ICD-10-CM

## 2021-09-15 DIAGNOSIS — E78.5 HYPERLIPIDEMIA, UNSPECIFIED HYPERLIPIDEMIA TYPE: ICD-10-CM

## 2021-09-15 PROBLEM — I26.09 ACUTE PULMONARY EMBOLISM WITH ACUTE COR PULMONALE (HCC): Status: ACTIVE | Noted: 2021-09-15

## 2021-09-15 LAB
ABO/RH: NORMAL
ALBUMIN SERPL-MCNC: 4.7 G/DL (ref 3.5–4.6)
ALP BLD-CCNC: 85 U/L (ref 40–130)
ALT SERPL-CCNC: 46 U/L (ref 0–33)
ANION GAP SERPL CALCULATED.3IONS-SCNC: 15 MEQ/L (ref 9–15)
ANTIBODY SCREEN: NORMAL
APTT: 23.3 SEC (ref 24.4–36.8)
AST SERPL-CCNC: 27 U/L (ref 0–35)
BILIRUB SERPL-MCNC: 0.4 MG/DL (ref 0.2–0.7)
BUN BLDV-MCNC: 15 MG/DL (ref 8–23)
CALCIUM SERPL-MCNC: 10.3 MG/DL (ref 8.5–9.9)
CHLORIDE BLD-SCNC: 98 MEQ/L (ref 95–107)
CO2: 24 MEQ/L (ref 20–31)
CREAT SERPL-MCNC: 0.9 MG/DL (ref 0.5–0.9)
D DIMER: 2.28 MG/L FEU (ref 0–0.5)
ETHANOL PERCENT: NORMAL G/DL
ETHANOL: <10 MG/DL (ref 0–0.08)
GFR AFRICAN AMERICAN: >60
GFR NON-AFRICAN AMERICAN: >60
GLOBULIN: 2.3 G/DL (ref 2.3–3.5)
GLUCOSE BLD-MCNC: 104 MG/DL (ref 70–99)
HCT VFR BLD CALC: 43.7 % (ref 37–47)
HEMOGLOBIN: 15.2 G/DL (ref 12–16)
INR BLD: 1
MAGNESIUM: 2.1 MG/DL (ref 1.7–2.4)
MCH RBC QN AUTO: 31.3 PG (ref 27–31.3)
MCHC RBC AUTO-ENTMCNC: 34.8 % (ref 33–37)
MCV RBC AUTO: 90.1 FL (ref 82–100)
PDW BLD-RTO: 13.4 % (ref 11.5–14.5)
PLATELET # BLD: 303 K/UL (ref 130–400)
POTASSIUM SERPL-SCNC: 4.4 MEQ/L (ref 3.4–4.9)
PRO-BNP: 83 PG/ML
PROTHROMBIN TIME: 13.3 SEC (ref 12.3–14.9)
RBC # BLD: 4.85 M/UL (ref 4.2–5.4)
SODIUM BLD-SCNC: 137 MEQ/L (ref 135–144)
TOTAL PROTEIN: 7 G/DL (ref 6.3–8)
TROPONIN: <0.01 NG/ML (ref 0–0.01)
TSH REFLEX: 1.9 UIU/ML (ref 0.44–3.86)
WBC # BLD: 7.6 K/UL (ref 4.8–10.8)

## 2021-09-15 PROCEDURE — 83880 ASSAY OF NATRIURETIC PEPTIDE: CPT

## 2021-09-15 PROCEDURE — 6370000000 HC RX 637 (ALT 250 FOR IP): Performed by: PHYSICIAN ASSISTANT

## 2021-09-15 PROCEDURE — G8417 CALC BMI ABV UP PARAM F/U: HCPCS | Performed by: FAMILY MEDICINE

## 2021-09-15 PROCEDURE — 99284 EMERGENCY DEPT VISIT MOD MDM: CPT

## 2021-09-15 PROCEDURE — 71045 X-RAY EXAM CHEST 1 VIEW: CPT

## 2021-09-15 PROCEDURE — 6360000004 HC RX CONTRAST MEDICATION: Performed by: PHYSICIAN ASSISTANT

## 2021-09-15 PROCEDURE — 93005 ELECTROCARDIOGRAM TRACING: CPT

## 2021-09-15 PROCEDURE — G9899 SCRN MAM PERF RSLTS DOC: HCPCS | Performed by: FAMILY MEDICINE

## 2021-09-15 PROCEDURE — 84484 ASSAY OF TROPONIN QUANT: CPT

## 2021-09-15 PROCEDURE — 1036F TOBACCO NON-USER: CPT | Performed by: FAMILY MEDICINE

## 2021-09-15 PROCEDURE — 99223 1ST HOSP IP/OBS HIGH 75: CPT | Performed by: INTERNAL MEDICINE

## 2021-09-15 PROCEDURE — 84443 ASSAY THYROID STIM HORMONE: CPT

## 2021-09-15 PROCEDURE — 82077 ASSAY SPEC XCP UR&BREATH IA: CPT

## 2021-09-15 PROCEDURE — 93000 ELECTROCARDIOGRAM COMPLETE: CPT | Performed by: FAMILY MEDICINE

## 2021-09-15 PROCEDURE — 94761 N-INVAS EAR/PLS OXIMETRY MLT: CPT

## 2021-09-15 PROCEDURE — 86850 RBC ANTIBODY SCREEN: CPT

## 2021-09-15 PROCEDURE — 86900 BLOOD TYPING SEROLOGIC ABO: CPT

## 2021-09-15 PROCEDURE — 2500000003 HC RX 250 WO HCPCS: Performed by: PHYSICIAN ASSISTANT

## 2021-09-15 PROCEDURE — 94640 AIRWAY INHALATION TREATMENT: CPT

## 2021-09-15 PROCEDURE — 2580000003 HC RX 258: Performed by: PHYSICIAN ASSISTANT

## 2021-09-15 PROCEDURE — 85027 COMPLETE CBC AUTOMATED: CPT

## 2021-09-15 PROCEDURE — 96374 THER/PROPH/DIAG INJ IV PUSH: CPT

## 2021-09-15 PROCEDURE — G8427 DOCREV CUR MEDS BY ELIG CLIN: HCPCS | Performed by: FAMILY MEDICINE

## 2021-09-15 PROCEDURE — 6360000002 HC RX W HCPCS: Performed by: PHYSICIAN ASSISTANT

## 2021-09-15 PROCEDURE — 80053 COMPREHEN METABOLIC PANEL: CPT

## 2021-09-15 PROCEDURE — 71275 CT ANGIOGRAPHY CHEST: CPT

## 2021-09-15 PROCEDURE — 83735 ASSAY OF MAGNESIUM: CPT

## 2021-09-15 PROCEDURE — 86901 BLOOD TYPING SEROLOGIC RH(D): CPT

## 2021-09-15 PROCEDURE — 85610 PROTHROMBIN TIME: CPT

## 2021-09-15 PROCEDURE — 85379 FIBRIN DEGRADATION QUANT: CPT

## 2021-09-15 PROCEDURE — 85730 THROMBOPLASTIN TIME PARTIAL: CPT

## 2021-09-15 PROCEDURE — 2060000000 HC ICU INTERMEDIATE R&B

## 2021-09-15 PROCEDURE — 3017F COLORECTAL CA SCREEN DOC REV: CPT | Performed by: FAMILY MEDICINE

## 2021-09-15 PROCEDURE — 99214 OFFICE O/P EST MOD 30 MIN: CPT | Performed by: FAMILY MEDICINE

## 2021-09-15 PROCEDURE — 36415 COLL VENOUS BLD VENIPUNCTURE: CPT

## 2021-09-15 RX ORDER — TERBINAFINE HYDROCHLORIDE 250 MG/1
250 TABLET ORAL DAILY
Status: DISCONTINUED | OUTPATIENT
Start: 2021-09-15 | End: 2021-09-16 | Stop reason: HOSPADM

## 2021-09-15 RX ORDER — MIDODRINE HYDROCHLORIDE 2.5 MG/1
10 TABLET ORAL 2 TIMES DAILY
Status: DISCONTINUED | OUTPATIENT
Start: 2021-09-15 | End: 2021-09-16

## 2021-09-15 RX ORDER — BUPROPION HYDROCHLORIDE 300 MG/1
300 TABLET ORAL DAILY
Status: DISCONTINUED | OUTPATIENT
Start: 2021-09-15 | End: 2021-09-16 | Stop reason: HOSPADM

## 2021-09-15 RX ORDER — FAMOTIDINE 20 MG/1
40 TABLET, FILM COATED ORAL DAILY
Status: DISCONTINUED | OUTPATIENT
Start: 2021-09-15 | End: 2021-09-16 | Stop reason: HOSPADM

## 2021-09-15 RX ORDER — ASPIRIN 81 MG/1
81 TABLET ORAL DAILY
Status: DISCONTINUED | OUTPATIENT
Start: 2021-09-15 | End: 2021-09-16

## 2021-09-15 RX ORDER — BUDESONIDE AND FORMOTEROL FUMARATE DIHYDRATE 160; 4.5 UG/1; UG/1
2 AEROSOL RESPIRATORY (INHALATION) 2 TIMES DAILY
Status: DISCONTINUED | OUTPATIENT
Start: 2021-09-15 | End: 2021-09-16 | Stop reason: HOSPADM

## 2021-09-15 RX ORDER — ONDANSETRON 4 MG/1
4 TABLET, ORALLY DISINTEGRATING ORAL EVERY 8 HOURS PRN
Status: DISCONTINUED | OUTPATIENT
Start: 2021-09-15 | End: 2021-09-16 | Stop reason: HOSPADM

## 2021-09-15 RX ORDER — ACETAMINOPHEN 325 MG/1
650 TABLET ORAL EVERY 6 HOURS PRN
Status: DISCONTINUED | OUTPATIENT
Start: 2021-09-15 | End: 2021-09-16 | Stop reason: HOSPADM

## 2021-09-15 RX ORDER — SODIUM CHLORIDE 9 MG/ML
25 INJECTION, SOLUTION INTRAVENOUS PRN
Status: DISCONTINUED | OUTPATIENT
Start: 2021-09-15 | End: 2021-09-16 | Stop reason: HOSPADM

## 2021-09-15 RX ORDER — LANOLIN ALCOHOL/MO/W.PET/CERES
300 CREAM (GRAM) TOPICAL DAILY
Status: DISCONTINUED | OUTPATIENT
Start: 2021-09-15 | End: 2021-09-16 | Stop reason: HOSPADM

## 2021-09-15 RX ORDER — SODIUM CHLORIDE 0.9 % (FLUSH) 0.9 %
5-40 SYRINGE (ML) INJECTION PRN
Status: DISCONTINUED | OUTPATIENT
Start: 2021-09-15 | End: 2021-09-16 | Stop reason: HOSPADM

## 2021-09-15 RX ORDER — ATORVASTATIN CALCIUM 80 MG/1
80 TABLET, FILM COATED ORAL NIGHTLY
Status: DISCONTINUED | OUTPATIENT
Start: 2021-09-15 | End: 2021-09-15 | Stop reason: SDUPTHER

## 2021-09-15 RX ORDER — METOPROLOL TARTRATE 5 MG/5ML
5 INJECTION INTRAVENOUS ONCE
Status: COMPLETED | OUTPATIENT
Start: 2021-09-15 | End: 2021-09-15

## 2021-09-15 RX ORDER — POLYETHYLENE GLYCOL 3350 17 G/17G
17 POWDER, FOR SOLUTION ORAL DAILY PRN
Status: DISCONTINUED | OUTPATIENT
Start: 2021-09-15 | End: 2021-09-16 | Stop reason: HOSPADM

## 2021-09-15 RX ORDER — SODIUM CHLORIDE 0.9 % (FLUSH) 0.9 %
5-40 SYRINGE (ML) INJECTION EVERY 12 HOURS SCHEDULED
Status: DISCONTINUED | OUTPATIENT
Start: 2021-09-15 | End: 2021-09-16 | Stop reason: HOSPADM

## 2021-09-15 RX ORDER — 0.9 % SODIUM CHLORIDE 0.9 %
1000 INTRAVENOUS SOLUTION INTRAVENOUS ONCE
Status: COMPLETED | OUTPATIENT
Start: 2021-09-15 | End: 2021-09-15

## 2021-09-15 RX ORDER — ARIPIPRAZOLE 5 MG/1
15 TABLET ORAL DAILY
Status: DISCONTINUED | OUTPATIENT
Start: 2021-09-15 | End: 2021-09-16 | Stop reason: HOSPADM

## 2021-09-15 RX ORDER — PRAVASTATIN SODIUM 40 MG
80 TABLET ORAL NIGHTLY
Status: DISCONTINUED | OUTPATIENT
Start: 2021-09-15 | End: 2021-09-16

## 2021-09-15 RX ORDER — NITROGLYCERIN 0.4 MG/1
0.4 TABLET SUBLINGUAL EVERY 5 MIN PRN
Status: DISCONTINUED | OUTPATIENT
Start: 2021-09-15 | End: 2021-09-16 | Stop reason: HOSPADM

## 2021-09-15 RX ORDER — SODIUM CHLORIDE 0.9 % (FLUSH) 0.9 %
10 SYRINGE (ML) INJECTION ONCE
Status: DISCONTINUED | OUTPATIENT
Start: 2021-09-15 | End: 2021-09-16 | Stop reason: HOSPADM

## 2021-09-15 RX ORDER — ACETAMINOPHEN 500 MG
1000 TABLET ORAL ONCE
Status: COMPLETED | OUTPATIENT
Start: 2021-09-15 | End: 2021-09-15

## 2021-09-15 RX ORDER — BENZTROPINE MESYLATE 0.5 MG/1
1 TABLET ORAL DAILY
Status: DISCONTINUED | OUTPATIENT
Start: 2021-09-15 | End: 2021-09-16 | Stop reason: HOSPADM

## 2021-09-15 RX ORDER — ONDANSETRON 2 MG/ML
4 INJECTION INTRAMUSCULAR; INTRAVENOUS EVERY 6 HOURS PRN
Status: DISCONTINUED | OUTPATIENT
Start: 2021-09-15 | End: 2021-09-16 | Stop reason: HOSPADM

## 2021-09-15 RX ORDER — ACETAMINOPHEN 650 MG/1
650 SUPPOSITORY RECTAL EVERY 6 HOURS PRN
Status: DISCONTINUED | OUTPATIENT
Start: 2021-09-15 | End: 2021-09-16 | Stop reason: HOSPADM

## 2021-09-15 RX ORDER — PRIMIDONE 50 MG/1
50 TABLET ORAL NIGHTLY
Status: DISCONTINUED | OUTPATIENT
Start: 2021-09-15 | End: 2021-09-16 | Stop reason: HOSPADM

## 2021-09-15 RX ORDER — CITALOPRAM 20 MG/1
40 TABLET ORAL DAILY
Status: DISCONTINUED | OUTPATIENT
Start: 2021-09-15 | End: 2021-09-16 | Stop reason: HOSPADM

## 2021-09-15 RX ORDER — CHOLECALCIFEROL (VITAMIN D3) 125 MCG
1000 CAPSULE ORAL DAILY
Status: DISCONTINUED | OUTPATIENT
Start: 2021-09-15 | End: 2021-09-16 | Stop reason: HOSPADM

## 2021-09-15 RX ORDER — METOPROLOL TARTRATE 50 MG/1
50 TABLET, FILM COATED ORAL 2 TIMES DAILY
Status: DISCONTINUED | OUTPATIENT
Start: 2021-09-15 | End: 2021-09-16 | Stop reason: HOSPADM

## 2021-09-15 RX ORDER — LEVOTHYROXINE SODIUM 0.05 MG/1
50 TABLET ORAL DAILY
Status: DISCONTINUED | OUTPATIENT
Start: 2021-09-15 | End: 2021-09-16 | Stop reason: HOSPADM

## 2021-09-15 RX ADMIN — SODIUM CHLORIDE, PRESERVATIVE FREE 10 ML: 5 INJECTION INTRAVENOUS at 21:20

## 2021-09-15 RX ADMIN — METOPROLOL TARTRATE 50 MG: 50 TABLET, FILM COATED ORAL at 21:20

## 2021-09-15 RX ADMIN — METOPROLOL TARTRATE 5 MG: 5 INJECTION INTRAVENOUS at 13:28

## 2021-09-15 RX ADMIN — ACETAMINOPHEN 1000 MG: 500 TABLET ORAL at 14:55

## 2021-09-15 RX ADMIN — ENOXAPARIN SODIUM 90 MG: 100 INJECTION SUBCUTANEOUS at 15:39

## 2021-09-15 RX ADMIN — SODIUM CHLORIDE 1000 ML: 9 INJECTION, SOLUTION INTRAVENOUS at 14:55

## 2021-09-15 RX ADMIN — MIDODRINE HYDROCHLORIDE 10 MG: 2.5 TABLET ORAL at 21:20

## 2021-09-15 RX ADMIN — PRIMIDONE 50 MG: 50 TABLET ORAL at 21:20

## 2021-09-15 RX ADMIN — IOPAMIDOL 100 ML: 755 INJECTION, SOLUTION INTRAVENOUS at 14:37

## 2021-09-15 RX ADMIN — BUDESONIDE AND FORMOTEROL FUMARATE DIHYDRATE 2 PUFF: 160; 4.5 AEROSOL RESPIRATORY (INHALATION) at 19:26

## 2021-09-15 RX ADMIN — PRAVASTATIN SODIUM 80 MG: 40 TABLET ORAL at 21:20

## 2021-09-15 ASSESSMENT — ENCOUNTER SYMPTOMS
DIARRHEA: 0
VOMITING: 0
STRIDOR: 0
CONSTIPATION: 0
SORE THROAT: 0
SHORTNESS OF BREATH: 0
CHEST TIGHTNESS: 0
ABDOMINAL PAIN: 0
SINUS PRESSURE: 0
COUGH: 0
NAUSEA: 0
WHEEZING: 0
RHINORRHEA: 0
BLOOD IN STOOL: 0

## 2021-09-15 ASSESSMENT — PAIN DESCRIPTION - LOCATION: LOCATION: HEAD

## 2021-09-15 ASSESSMENT — PAIN DESCRIPTION - FREQUENCY: FREQUENCY: CONTINUOUS

## 2021-09-15 ASSESSMENT — PAIN SCALES - GENERAL
PAINLEVEL_OUTOF10: 5
PAINLEVEL_OUTOF10: 0
PAINLEVEL_OUTOF10: 0
PAINLEVEL_OUTOF10: 4

## 2021-09-15 ASSESSMENT — PAIN DESCRIPTION - DESCRIPTORS: DESCRIPTORS: ACHING

## 2021-09-15 ASSESSMENT — PAIN DESCRIPTION - PAIN TYPE: TYPE: ACUTE PAIN

## 2021-09-15 NOTE — TELEPHONE ENCOUNTER
Reason for Disposition   Patient wants to be seen    Answer Assessment - Initial Assessment Questions  1. DESCRIPTION: \"Describe your dizziness. \"      Feels dizzy with position changes    2. LIGHTHEADED: \"Do you feel lightheaded? \" (e.g., somewhat faint, woozy, weak upon standing)      Has several falls in the past for LOC where she has hit her head in the past    3. VERTIGO: \"Do you feel like either you or the room is spinning or tilting? \" (i.e. vertigo)      No    4. SEVERITY: \"How bad is it? \"  \"Do you feel like you are going to faint? \" \"Can you stand and walk? \"    - MILD - walking normally    - MODERATE - interferes with normal activities (e.g., work, school)     - SEVERE - unable to stand, requires support to walk, feels like passing out now. Mild    5. ONSET:  \"When did the dizziness begin? \"      Several weeks ago    6. AGGRAVATING FACTORS: \"Does anything make it worse? \" (e.g., standing, change in head position)      Changing positions    7. HEART RATE: \"Can you tell me your heart rate? \" \"How many beats in 15 seconds? \"  (Note: not all patients can do this)        She thinks her heart beats quicker    8. CAUSE: \"What do you think is causing the dizziness? \"      Unsure, but states she has cardiac arrythmias    9. RECURRENT SYMPTOM: \"Have you had dizziness before? \" If so, ask: \"When was the last time? \" \"What happened that time? \"      Yes, she has fallen before and had a CT scan    10. OTHER SYMPTOMS: \"Do you have any other symptoms? \" (e.g., fever, chest pain, vomiting, diarrhea, bleeding)        Headaches    11. PREGNANCY: \"Is there any chance you are pregnant? \" \"When was your last menstrual period? \"        No    Protocols used: DIZZINESS-ADULT-OH    Received call from [address] [facility] at [facility]  with Red Flag Complaint.     Brief description of triage: dizziness at times been long standing dizziness     Triage indicates for patient to be seen today in office    Care advice provided, patient verbalizes understanding; denies any other questions or concerns; instructed to call back for any new or worsening symptoms. Writer provided warm transfer to Isabel Hauser  at [facility]  for appointment scheduling. Attention Provider: Thank you for allowing me to participate in the care of your patient. The patient was connected to triage in response to information provided to the [facility]. Please do not respond through this encounter as the response is not directed to a shared pool.

## 2021-09-15 NOTE — ED PROVIDER NOTES
[address] [facility] ED  eMERGENCY dEPARTMENT eNCOUnter      Pt Name: Don Evangelista  MRN: [id number]  [place] 1960  Date of evaluation: 9/15/2021  Provider: XU Razo        HISTORY OF PRESENT ILLNESS    Don Evangelista is a 62 y.o. female per chart review has a h/o ASD, depression, hyperlipidemia, vitamin D deficiency, obesity, paroxysmal atrial flutter, presents to the emergency department with complaint of sudden onset dizziness associated with palpitations that began this morning after she became increasingly anxious about a situation involving her ex-boyfriend. Denies chest pain, shortness of breath, abdominal pain, nausea, vomiting. She denies history of heart attack in herself, however notes a myocardial infarction death in her mother and father. Of note, he states that she previously took metoprolol for heart rate control prescribed by her cardiologist however has been out of this for several days. She reports an upcoming cardiology appointment that is already scheduled Monday. Presented today to her primary care doctor and then to the emergency department at their advice. REVIEW OF SYSTEMS       Review of Systems   Constitutional: Negative for activity change, appetite change, chills, diaphoresis, fatigue and fever. HENT: Negative for congestion, postnasal drip, rhinorrhea, sinus pressure and sore throat. Respiratory: Negative for cough, chest tightness, shortness of breath and wheezing. Cardiovascular: Positive for palpitations. Negative for chest pain and leg swelling. Gastrointestinal: Negative for abdominal pain, constipation, nausea and vomiting. Genitourinary: Negative for dysuria, frequency and urgency. Musculoskeletal: Negative for arthralgias and myalgias. Neurological: Negative for dizziness, syncope, weakness, light-headedness, numbness and headaches. Psychiatric/Behavioral: The patient is nervous/anxious.     All other systems reviewed and are negative. Except as noted above the remainder of the review of systems was reviewed and negative.        PAST MEDICAL HISTORY     Past Medical History:   Diagnosis Date    Asthma 2015    Bilateral renal cysts 2013    Depression     since age 35, was with Dr Daniel Babcock, now the [facility] Diverticulosis of sigmoid colon 2012    Dr Latoya Mueller DJD of left shoulder     Environmental allergies     Fatty liver 2013    GERD (gastroesophageal reflux disease)     History of cardiac arrhythmia     \"due to stress, was placed on medication\"    Hydatidiform mole     age 26     Hyperlipidemia     Hypothyroidism 2011    IFG (impaired fasting glucose) 2013    Melanoma of eye ([address]     age 59 [facility], R eye prosthesis, Dr Elie Hauser    Obesity     Prediabetes     PTSD (post-traumatic stress disorder)     age 35, [facility]    S/P colonoscopy 04/19/2012    Dr. Jessica Daniels S/P hysterectomy with oophorectomy 2012    Dr Cherylene Manuel Tremor     Dr Porfirio Gonzales Vitamin D deficiency          SURGICAL HISTORY       Past Surgical History:   Procedure Laterality Date    BREAST BIOPSY      DILATION AND CURETTAGE OF UTERUS      ENDOMETRIAL ABLATION  6/2012    EYE REMOVAL      OD for melanoma, age 59 [facility]    FOOT OSTEOTOMY Left 09/23/2016    B [address] [place] [address] DPM      BALDOMERO STEROTACTIC LOC BREAST BIOPSY RIGHT Right 7/19/2021    BALDOMERO STEROTACTIC LOC BREAST BIOPSY RIGHT 7/19/2021 Sal Yancey BSO  2012    Dr Verba Canavan       Previous Medications    ALBUTEROL SULFATE  (90 BASE) MCG/ACT INHALER    USE 2 PUFFS EVERY 4 HRS AS NEEDED FOR WHEEZING/SOB-SPACE OUT TO EVERY 6 HR AS SYMPTOMS IMPROVE    ARIPIPRAZOLE (ABILIFY) 15 MG TABLET    TAKE 1 TABLET BY MOUTH EVERY DAY    ASPIRIN (ASPIRIN LOW DOSE) 81 MG EC TABLET    TAKE 1 TABLET BY MOUTH ONCE A DAY (AM)    BENZTROPINE (COGENTIN) 1 MG TABLET        BIOTIN 300 MCG TABS    Take 1 tablet by mouth daily    BUDESONIDE-FORMOTEROL (SYMBICORT) 160-4.5 MCG/ACT AERO Inhale 2 puffs into the lungs 2 times daily    BUPROPION (WELLBUTRIN XL) 300 MG EXTENDED RELEASE TABLET    Take 300 mg by mouth daily    CETIRIZINE (ZYRTEC) 10 MG TABLET    TAKE 1 TABLET BY MOUTH EVERY DAY AS NEEDED FOR ALLERGIES    CITALOPRAM (CELEXA) 40 MG TABLET    Take 40 mg by mouth daily    CYANOCOBALAMIN ([employer] VITAMIN B12) 1000 MCG TABLET    Take 1 tablet by mouth daily    EPINEPHRINE (EPIPEN 2-PRAVEENA) 0.3 MG/0.3ML SOAJ INJECTION    Use as directed for allergic reaction    FAMOTIDINE (PEPCID) 20 MG TABLET    TAKE 1 TABLET BY MOUTH TWICE DAILY (AM,PM)    KETOCONAZOLE (NIZORAL) 2 % SHAMPOO    Apply 5 to 10 mL to wet scalp, lather, leave on 3 to 5 minutes, and rinse; apply twice weekly for 2 to 4 weeks (treatment).     LEVOTHYROXINE (SYNTHROID) 50 MCG TABLET    TAKE 1 TABLET BY MOUTH EVERY MORNING (OWN BLSTER)    METFORMIN (GLUCOPHAGE) 500 MG TABLET    TAKE 1 TABLET BY MOUTH TWICE A DAY WITH MEALS (AM,PM)    METOPROLOL TARTRATE (LOPRESSOR) 50 MG TABLET    Take 1 tablet by mouth 2 times daily    MIDODRINE (PROAMATINE) 10 MG TABLET    TAKE 1 TABLET BY MOUTH TWICE DAILY(AM,PM)    MULTIPLE VITAMIN (TAB-A-MANDI/BETA CAROTENE) TABS    TAKE 1 TABLET BY MOUTH EVERY DAY (AM)    PRAVASTATIN (PRAVACHOL) 80 MG TABLET    Take 1 tablet by mouth daily    PRIMIDONE (MYSOLINE) 50 MG TABLET    Take 1 tablet by mouth nightly    TERBINAFINE (LAMISIL) 250 MG TABLET    Take 1 tablet by mouth daily    TRAZODONE (DESYREL) 50 MG TABLET    Take by mouth nightly 1-2 tablets nightly     VITAMIN B-2 (RIBOFLAVIN) 100 MG TABS TABLET    Take 1 tablet by mouth daily    VITAMIN D HIGH POTENCY 25 MCG (1000 UT) CAPS    TAKE 1 CAPSULE BY MOUTH DAILY WITH SUPPER (PM)       ALLERGIES     Bee venom    FAMILY HISTORY       Family History   Problem Relation Age of Onset    Heart Failure Mother         dec 76    Diabetes Mother     Diabetes Father     Stroke Father         dec age 80    Cancer Father         Sarcoma    Stomach Cancer Other     Breast Cancer Paternal Aunt     Breast Cancer Maternal Aunt           SOCIAL HISTORY       Social History     Socioeconomic History    Marital status:      Spouse name: None    Number of children: 3    Years of education: 15    Highest education level: High school graduate   Occupational History    Occupation: [employer]   Tobacco Use    Smoking status: Never Smoker    Smokeless tobacco: Never Used   Vaping Use    Vaping Use: Never used   Substance and Sexual Activity    Alcohol use: No    Drug use: No    Sexual activity: Not Currently     Birth control/protection: Surgical   Other Topics Concern    None   Social History Narrative    Born in [address] [place] [address], one of 4 children, one sister disappeared at age 30, never found    Moved to [place] at age 40        Lives in an apartment in [facility], alone    , 3 children, all grown, in [place]     2 granddaughters     Hobbies exercising, birds    Attends [employer], has volunteered at [id number] Strain: Low Risk     Difficulty of Paying Living Expenses: Not very hard   Food Insecurity: Food Insecurity Present    Worried About Running Out of Food in the Last Year: Sometimes true    Anurag of Food in the Last Year: Sometimes true   Transportation Needs: No Transportation Needs    Lack of Transportation (Medical): No    Lack of Transportation (Non-Medical):  No   Physical Activity: Inactive    Days of Exercise per Week: 0 days    Minutes of Exercise per Session: 0 min   Stress: Stress Concern Present    Feeling of Stress : Rather much   Social Connections: Socially Isolated    Frequency of Communication with Friends and Family: Twice a week    Frequency of Social Gatherings with Friends and Family: Never    Attends Synagogue Services: Never    Active Member of Clubs or Organizations: No    Attends Club or Organization Meetings: Never    Marital Status:    Intimate Partner Violence:     Fear of Current or Ex-Partner:     Emotionally Abused:     Physically Abused:     Sexually Abused:          PHYSICAL EXAM        ED Triage Vitals [09/15/21 1224]   BP Temp Temp Source Pulse Resp SpO2 Height Weight   (!) 162/94 97.6 °F (36.4 °C) Oral 128 20 97 % 5' 6\" (1.676 m) 200 lb (90.7 kg)       Physical Exam  Vitals and nursing note reviewed. Constitutional:       General: She is not in acute distress. Appearance: Normal appearance. She is normal weight. She is not ill-appearing, toxic-appearing or diaphoretic. HENT:      Head: Normocephalic and atraumatic. Right Ear: Tympanic membrane normal.      Left Ear: Tympanic membrane normal.      Nose: Nose normal. No congestion. Mouth/Throat:      Mouth: Mucous membranes are moist.      Pharynx: Oropharynx is clear. No oropharyngeal exudate. Eyes:      Extraocular Movements: Extraocular movements intact. Pupils: Pupils are equal, round, and reactive to light. Cardiovascular:      Rate and Rhythm: Regular rhythm. Tachycardia present. Pulses: Normal pulses. Heart sounds: Normal heart sounds. No murmur heard. Pulmonary:      Effort: Pulmonary effort is normal. No respiratory distress. Breath sounds: Normal breath sounds. No wheezing. Abdominal:      General: Abdomen is flat. Bowel sounds are normal. There is no distension. Palpations: Abdomen is soft. Tenderness: There is no abdominal tenderness. There is no guarding. Musculoskeletal:         General: No swelling, tenderness, deformity or signs of injury. Normal range of motion. Cervical back: Normal range of motion and neck supple. No rigidity. No muscular tenderness. Lymphadenopathy:      Cervical: No cervical adenopathy. Skin:     General: Skin is warm and dry. Capillary Refill: Capillary refill takes less than 2 seconds. Neurological:      General: No focal deficit present. Mental Status: She is alert and oriented to person, place, and time. Mental status is at baseline. Motor: No weakness. Gait: Gait normal.   Psychiatric:         Mood and Affect: Mood normal.         Behavior: Behavior normal.         Thought Content: Thought content normal.         Judgment: Judgment normal.           LABS:  Labs Reviewed   COMPREHENSIVE METABOLIC PANEL - Abnormal; Notable for the following components:       Result Value    Glucose 104 (*)     Calcium 10.3 (*)     Albumin 4.7 (*)     ALT 46 (*)     All other components within normal limits   D-DIMER, QUANTITATIVE - Abnormal; Notable for the following components:    D-Dimer, Quant 2.28 (*)     All other components within normal limits    Narrative:     Ashley Dennis tel. [phone],  DIMER results called to and read back by [facility], [facility], 09/15/2021 14:09,  by Rice [place]   ETHANOL   CBC   MAGNESIUM   TROPONIN   TSH WITH REFLEX   APTT   PROTIME-INR   TYPE AND SCREEN         MDM:   Vitals:    Vitals:    09/15/21 1224 09/15/21 1339 09/15/21 1400   BP: (!) 162/94  (!) 129/91   Pulse: 128 81 86   Resp: 20 21 22   Temp: 97.6 °F (36.4 °C)     TempSrc: Oral     SpO2: 97%     Weight: 200 lb (90.7 kg)     Height: 5' 6\" (1.676 m)         62year old female presents to the ED with c/o rapid heart rate. Triage records were reviewed. Medical records were reviewed. Nursing notes were reviewed and incorporated. Patient afebrile, hemodynamically stable though tachycardic, non-toxic in appearance upon initial evaluation. 1L NS bolus and 5mg metop given for HR control, with return of HR (which was sustained) to low 80s. Labs/Imaging:   EKG shows sinus tachycardia with , normal axis, normal intervals, no ST changes. Labs reviewed, significant for elevated Ddimer, 2.28, otherwise unremarkable. CXR completed and unchanged from prior, however given Ddimer elevation, decision to proceed with CTA PE, demonstrating RLL PE without evidence of heart strain. Coags and T&S added on.  Case discussed with patient's cardiologist, Dr. Wu Lynch, who accepts admission, asks for Lovenox to be administered in place of Hep gtt. Patient updated, and in agreement with plan. Dr. Tamara Dunn personally evaluated patient and updated patient on results. CRITICAL CARE TIME   Total CriticalCare time was 35 minutes, excluding separately reportable procedures. There was a high probability of clinically significant/life threatening deterioration in the patient's condition which required my urgent intervention. PROCEDURES:  Unlessotherwise noted below, none      Procedures      FINAL IMPRESSION      1.  Acute pulmonary embolism without acute cor pulmonale, unspecified pulmonary embolism type [facility])          DISPOSITION/PLAN   DISPOSITION Decision To Admit 09/15/2021 03:05:49 PM          XU Jacob (electronically signed)  Attending Emergency Physician          XU Jacob  09/15/21 [address], [place]  09/15/21 [id number]

## 2021-09-15 NOTE — ED NOTES
2.28 d-dimer called from lab, reported to Mike ABRAHAM and [facility] Gwen Denise RN  09/15/21 5902

## 2021-09-15 NOTE — PROGRESS NOTES
Chief Complaint   Patient presents with    Headache     Pt. is here with c/o headache and dizziness. Pt. c/o feeling dizzy right now. Pt. states she hasn't checked her BP at all.  Nail Problem     Pt. c/o a nail on each foot being infected and turning colors.  Memory Loss     Pt. c/o being forgetful of recent events. Pt. states she can remember long term. HPI: Cyndee May 62 y.o. female presenting for       [facility] as not been feeling well for tPalpitations/Dizziness/headacheshe last two days   Heart rate in the clinic is 120s to 130s. Admits to a frontal headache that is bilateral  Denies any fever, chills, nausea, vomiting, abdominal pain, changes in urination or changes in stools. Onychomycosis   First toe in the left foot   Yellow and thick   Unable to cut the toenail   Denies any other issues. F/u   Slight improvement with penlac   Has not had her CMP done   Would like to try oral medication.        Hpyothyroidism   Stable. Takes medication. Lab Results   Component Value Date    TSH 2.180 08/23/2021         Current Outpatient Medications   Medication Sig Dispense Refill    famotidine (PEPCID) 20 MG tablet TAKE 1 TABLET BY MOUTH TWICE DAILY (AM,PM) 56 tablet 2    terbinafine (LAMISIL) 250 MG tablet Take 1 tablet by mouth daily 72 tablet 0    pravastatin (PRAVACHOL) 80 MG tablet Take 1 tablet by mouth daily 90 tablet 1    benztropine (COGENTIN) 1 MG tablet       ketoconazole (NIZORAL) 2 % shampoo Apply 5 to 10 mL to wet scalp, lather, leave on 3 to 5 minutes, and rinse; apply twice weekly for 2 to 4 weeks (treatment).  120 mL 2    metFORMIN (GLUCOPHAGE) 500 MG tablet TAKE 1 TABLET BY MOUTH TWICE A DAY WITH MEALS (AM,PM) 60 tablet 3    aspirin (ASPIRIN LOW DOSE) 81 MG EC tablet TAKE 1 TABLET BY MOUTH ONCE A DAY (AM) 30 tablet 3    budesonide-formoterol (SYMBICORT) 160-4.5 MCG/ACT AERO Inhale 2 puffs into the lungs 2 times daily 1 Inhaler 3    cetirizine (ZYRTEC) 10 MG tablet TAKE 1 TABLET BY MOUTH EVERY DAY AS NEEDED FOR ALLERGIES 30 tablet 5    albuterol sulfate  (90 Base) MCG/ACT inhaler USE 2 PUFFS EVERY 4 HRS AS NEEDED FOR WHEEZING/SOB-SPACE OUT TO EVERY 6 HR AS SYMPTOMS IMPROVE 1 Inhaler 3    metoprolol tartrate (LOPRESSOR) 50 MG tablet Take 1 tablet by mouth 2 times daily 60 tablet 3    primidone (MYSOLINE) 50 MG tablet Take 1 tablet by mouth nightly 30 tablet 3    midodrine (PROAMATINE) 10 MG tablet TAKE 1 TABLET BY MOUTH TWICE DAILY(AM,PM) 56 tablet 2    VITAMIN D HIGH POTENCY 25 MCG (1000 UT) CAPS TAKE 1 CAPSULE BY MOUTH DAILY WITH SUPPER (PM) 90 capsule 1    buPROPion (WELLBUTRIN XL) 300 MG extended release tablet Take 300 mg by mouth daily      Multiple Vitamin (TAB-A-MANDI/BETA CAROTENE) TABS TAKE 1 TABLET BY MOUTH EVERY DAY (AM) 30 tablet 5    Biotin 300 MCG TABS Take 1 tablet by mouth daily 30 tablet 3    cyanocobalamin ([employer] VITAMIN B12) 1000 MCG tablet Take 1 tablet by mouth daily 30 tablet 5    EPINEPHrine (EPIPEN 2-PRAVEENA) 0.3 MG/0.3ML SOAJ injection Use as directed for allergic reaction 2 each 2    levothyroxine (SYNTHROID) 50 MCG tablet TAKE 1 TABLET BY MOUTH EVERY MORNING (OWN BLSTER) 90 tablet 0    citalopram (CELEXA) 40 MG tablet Take 40 mg by mouth daily      vitamin B-2 (RIBOFLAVIN) 100 MG TABS tablet Take 1 tablet by mouth daily 90 tablet 0    traZODone (DESYREL) 50 MG tablet Take by mouth nightly 1-2 tablets nightly       ARIPiprazole (ABILIFY) 15 MG tablet TAKE 1 TABLET BY MOUTH EVERY DAY  2     No current facility-administered medications for this visit. ROS  CONSTITUTIONAL: The patient denies fevers, chills, sweats and body ache. HEENT: Denies headache, blurry vision, eye pain, tinnitus, vertigo, admits to sore throat, denies neck or thyroid masses. Admits to cracked lips. RESPIRATORY: Denies cough, sputum, hemoptysis. CARDIAC: Denies chest pain, pressure, palpitations, Denies lower extremity edema.   GASTROINTESTINAL: Denies abdominal pain, constipation, diarrhea, bleeding in the stools,   GENITOURINARY: Denies dysuria, hematuria, nocturia or frequency, urinary incontinence. NEUROLOGIC: Denies headaches, admits to dizziness, denies syncope, weakness  MUSCULOSKELETAL: denies changes in range of motion, joint pain, stiffness. ENDOCRINOLOGY: Denies heat or cold intolerance. HEMATOLOGY: Denies easy bleeding or blood transfusion,anemia  DERMATOLOGY: Admits to nail fungus on her toenails. PSYCHIATRY: Denies depression, agitation or anxiety.     Past Medical History:   Diagnosis Date    Asthma 2015    Bilateral renal cysts 2013    Depression     since age 35, was with Dr Celia Wyman, now the [facility]    Diverticulosis of sigmoid colon 2012    Dr Jeremiah Garcia DJD of left shoulder     Environmental allergies     Fatty liver 2013    GERD (gastroesophageal reflux disease)     History of cardiac arrhythmia     \"due to stress, was placed on medication\"    Hydatidiform mole     age 26     Hyperlipidemia     Hypothyroidism 2011    IFG (impaired fasting glucose) 2013    Melanoma of eye [facility])     age 30, R eye prosthesis, Dr Kalia Boucher    Obesity     Prediabetes     PTSD (post-traumatic stress disorder)     age 35[facility]    S/P colonoscopy 04/19/2012    Dr. Toney Dent S/P hysterectomy with oophorectomy 2012    Dr Adis Shafer Tremor     Dr Cazares Needles Vitamin D deficiency         Past Surgical History:   Procedure Laterality Date    BREAST BIOPSY      DILATION AND CURETTAGE OF UTERUS      ENDOMETRIAL ABLATION  6/2012    EYE REMOVAL      OD for melanoma, age 30    FOOT OSTEOTOMY Left 09/23/2016    [facility] DPM      BALDOMERO STEROTACTIC LOC BREAST BIOPSY RIGHT Right 7/19/2021    [facility]OT[facility] BREAST BIOPSY RIGHT 7/19/2021 [facility]    [facility] AND BSO  2012    Dr Yoel Escobar        Family History   Problem Relation Age of Onset    Heart Failure Mother         dec 76    Diabetes Mother     Diabetes Father     Stroke Father         dec age 80    Cancer Father         Sarcoma    Stomach Cancer Other     Breast Cancer Paternal Aunt     Breast Cancer Maternal Aunt         Social History     Socioeconomic History    Marital status:      Spouse name: Not on file    Number of children: 3    Years of education: 15    Highest education level: High school graduate   Occupational History    Occupation: SSI   Tobacco Use    Smoking status: Never Smoker    Smokeless tobacco: Never Used   Vaping Use    Vaping Use: Never used   Substance and Sexual Activity    Alcohol use: No    Drug use: No    Sexual activity: Not Currently     Birth control/protection: Surgical   Other Topics Concern    Not on file   Social History Narrative    Born in [address] [place] [address], one of 4 children, one sister disappeared at age 30, never found    Moved to [place] at age 40        Lives in an apartment in [facility], alone    , 3 children, all grown, in [place]     2 granddaughters     Hobbies exercising, birds    Attends [employer], has volunteered at [employer] Strain: Low Risk     Difficulty of Paying Living Expenses: Not very hard   Food Insecurity: Food Insecurity Present    Worried About Running Out of Food in the Last Year: Sometimes true    Anurag of Food in the Last Year: Sometimes true   Transportation Needs: No Transportation Needs    Lack of Transportation (Medical): No    Lack of Transportation (Non-Medical):  No   Physical Activity: Inactive    Days of Exercise per Week: 0 days    Minutes of Exercise per Session: 0 min   Stress: Stress Concern Present    Feeling of Stress : Rather much   Social Connections: Socially Isolated    Frequency of Communication with Friends and Family: Twice a week    Frequency of Social Gatherings with Friends and Family: Never    Attends Hoahaoism Services: Never    Active Member of Clubs or Organizations: No    Attends Club or Organization Meetings: Never    Marital Status:    Intimate Partner Violence:     Fear of Current or Ex-Partner:     Emotionally Abused:     Physically Abused:     Sexually Abused:         /84 (Site: Right Upper Arm, Position: Sitting, Cuff Size: Large Adult)   Pulse 130   Temp 97.7 °F (36.5 °C) (Oral)   Resp 16   Ht 5' 6\" (1.676 m)   Wt 200 lb (90.7 kg)   SpO2 98%   Breastfeeding No   BMI 32.28 kg/m²        Physical Exam:    General appearance - alert, well appearing, and in no distress, tearful in the clinic  Mental Status - alert, oriented to person, place, and time  Eyes - pupils equal and reactive, extraocular eye movements intact   Ears - bilateral TM's and external ear canals normal   Nose - normal and patent, no erythema, discharge or polyps   Sinuses - Normal paranasal sinuses without tenderness   Throat - mucous membranes moist, pharynx normal without lesions   Neck - supple, no significant adenopathy   Thyroid - thyroid is normal in size without nodules or tenderness    Chest - clear to auscultation, no wheezes, rales or rhonchi, symmetric air entry   Heart - tacycardic  Abdomen - soft, nontender, nondistended, no masses or organomegaly   Back exam - full range of motion, no tenderness, palpable spasm or pain on motion   Neurological - upper extremity tremors  Musculoskeletal -slight swelling in the upper extremities bilaterally. Extremities - non pitting swelling noted in the left arm. No erythema or drainage noted from the arm. Skin - normal coloration and turgor, no rashes, no suspicious skin lesions noted. On the left foot there is a hypertrophic toenail that is yellow discoloration. Black discoloration in the first digit of the right toenail.         Labs       TSH   Date Value Ref Range Status   05/05/2021 4.420 (H) 0.440 - 3.860 uIU/mL Final     TSH   Date Value Ref Range Status   08/23/2021 2.180 0.440 - 3.860 uIU/mL Final   02/07/2020 2.700 0.440 - 3.860 uIU/mL Final   06/14/2019 2.430 0.440 - 3.860 uIU/mL Final   06/11/2018 3.820 0.270 - 4.200 uIU/mL Final   06/07/2017 3.500 0.270 - 4.200 uIU/mL Final   01/27/2017 3.810 0.270 - 4.200 uIU/mL Final   03/04/2016 1.990 0.270 - 4.200 uIU/mL Final   11/20/2015 1.940 0.270 - 4.200 uIU/mL Final   08/28/2015 1.970 0.270 - 4.200 uIU/mL Final   10/25/2014 3.130 0.270 - 4.200 uIU/mL Final   12/11/2013 3.910 0.270 - 4.200 uIU/mL Final   04/04/2013 1.346 0.550 - 4.780 uIU/mL Final   10/16/2012 1.607 0.550 - 4.780 uIU/mL Final   05/14/2012 1.771 0.550 - 4.780 uIU/mL Final   03/22/2012 4.082 0.550 - 4.780 uIU/mL Final     Lab Results   Component Value Date     08/23/2021    K 5.1 (H) 08/23/2021     08/23/2021    CO2 22 08/23/2021    BUN 17 08/23/2021    CREATININE 0.97 (H) 08/23/2021    GLUCOSE 90 08/23/2021    CALCIUM 10.1 (H) 08/23/2021    PROT 7.2 08/23/2021    LABALBU 4.5 08/23/2021    BILITOT 0.3 08/23/2021    ALKPHOS 73 08/23/2021    AST 23 08/23/2021    ALT 30 08/23/2021    LABGLOM 58.5 (L) 08/23/2021    GFRAA >60.0 08/23/2021    GLOB 2.7 08/23/2021       Lab Results   Component Value Date    WBC 5.9 04/15/2021    HGB 14.5 04/15/2021    HCT 42.2 04/15/2021    MCV 90.8 04/15/2021     04/15/2021     Lab Results   Component Value Date    LABA1C 5.7 05/19/2021     No results found for: EAG      A/P: Satish Beth 62 y.o. female presenting for       Sinus Tachycardia   associated with dizziness, headaches   Does have a history of atrial flutter and is in rhythm currently   Does have the metoprolol but patient is unsure if she has the medication currently. Would recommend further evaluation in the ED given symptoms (e.g. EKG, cards, repeat thyroid labs, etc).      History of atrial flutter   patient is in rhythm currently but has a history of atrial flutter   Does see cardiology on Monday     Onychomycosis   Taking the lamisil   No affects on cardio  Patient has tolerated the medication   Would like a referral to podiatry. Please note, this report has been partially produced using speech recognition software  and may cause  and /or contain errors related to that system including grammar, punctuation and spelling as well as words and phrases that may seem inappropriate. If there are questions or concerns please feel free to contact me to clarify.

## 2021-09-15 NOTE — ED TRIAGE NOTES
Pt was sent to the ER from her PCP office for a rapid heart rate, c/o dizziness, SOB, and a headache, Pt is A&OX3, calm, afebrile, breathes are equal and unlabored,

## 2021-09-15 NOTE — H&P
History and Physical  Patient: Foster Criss  Unit/Bed:W172/W172-01  YOB: 1960  MRN: [id number]  Acct: [de-identified]   Admitting Diagnosis: Pulmonary embolism on right [facility]) [I26.99]  Acute pulmonary embolism without acute cor pulmonale, unspecified pulmonary embolism type ([facility] [address] [I26.99]  Admit Date:  9/15/2021  Hospital Day: 0      Chief Complaint:   Shortness of breath    History of Present Illness:       Allergies   Allergen Reactions    Bee Venom        Current Facility-Administered Medications   Medication Dose Route Frequency Provider Last Rate Last Admin    sodium chloride flush 0.9 % injection 10 mL  10 mL IntraVENous Once [employer] [place]        Riboflavin TABS 100 mg  100 mg Oral Daily XU Sweeney        citalopram (CELEXA) tablet 40 mg  40 mg Oral Daily Sabiha Cody        Biotin TABS 300 mcg  300 mcg Oral Daily Sabiha Sweeney        vitamin B-12 (CYANOCOBALAMIN) tablet 1,000 mcg  1,000 mcg Oral Daily XU Sweeney        buPROPion (WELLBUTRIN XL) extended release tablet 300 mg  300 mg Oral Daily Sabiha Sweeney        primidone (MYSOLINE) tablet 50 mg  50 mg Oral Nightly Sabiha Sweeney        metoprolol tartrate (LOPRESSOR) tablet 50 mg  50 mg Oral BID XU Sweeney        terbinafine (LAMISIL) tablet 250 mg  250 mg Oral Daily Sabiha Sweeney        pravastatin (PRAVACHOL) tablet 80 mg  80 mg Oral Daily Aniyah Sweeney18 Isis Ennise        famotidine (PEPCID) tablet 40 mg  40 mg Oral Daily Sabiha Sweeney        ARIPiprazole (ABILIFY) tablet 15 mg  15 mg Oral Daily [facility]        aspirin EC tablet 81 mg  81 mg Oral Daily Sabiha Sweeneye        benztropine (COGENTIN) tablet 1 mg  1 mg Oral Daily XU Sweeney        budesonide-formoterol (SYMBICORT) 160-4.5 MCG/ACT inhaler 2 puff  2 puff Inhalation BID XU Sweeney        levothyroxine (SYNTHROID) tablet 50 mcg  50 mcg Oral Daily [employer] [address]        midodrine (PROAMATINE) tablet 10 mg  10 mg Oral BID [employer] [place]        sodium chloride flush 0.9 % injection 5-40 mL  5-40 mL IntraVENous 2 times per day [employer] [place]        sodium chloride flush 0.9 % injection 5-40 mL  5-40 mL IntraVENous PRN XU Sweeney        0.9 % sodium chloride infusion  25 mL IntraVENous PRN XU Sweeney        ondansetron (ZOFRAN-ODT) disintegrating tablet 4 mg  4 mg Oral Q8H PRN XU Sweeney        Or    ondansetron (ZOFRAN) injection 4 mg  4 mg IntraVENous Q6H PRN XU Sweeney        acetaminophen (TYLENOL) tablet 650 mg  650 mg Oral Q6H PRN XU Sweeney        Or    acetaminophen (TYLENOL) suppository 650 mg  650 mg Rectal Q6H PRN XU Sweeney        polyethylene glycol (GLYCOLAX) packet 17 g  17 g Oral Daily PRN XU Sweeney        atorvastatin (LIPITOR) tablet 80 mg  80 mg Oral Nightly XU Sweeney        nitroGLYCERIN (NITROSTAT) SL tablet 0.4 mg  0.4 mg SubLINGual Q5 Min PRN [employer] [place]        [START ON 9/16/2021] enoxaparin (LOVENOX) injection 90 mg  1 mg/kg SubCUTAneous BID [employer] [place]           PMHx:  Past Medical History:   Diagnosis Date    Asthma 2015    Bilateral renal cysts 2013    Depression     since age 35, was with Dr Bertha Ulrich, now the [facility] Diverticulosis of sigmoid colon 2012    Dr Wong Olivares DJD of left shoulder     Environmental allergies     Fatty liver 2013    GERD (gastroesophageal reflux disease)     History of cardiac arrhythmia     \"due to stress, was placed on medication\"    Hydatidiform mole     age 26     Hyperlipidemia     Hypothyroidism 2011    IFG (impaired fasting glucose) 2013    Melanoma of eye ([address]     age 30, R eye prosthesis, Dr Martinez Him    Obesity     Prediabetes     PTSD (post-traumatic stress disorder)     age 35, [facility]    S/P colonoscopy 04/19/2012    Dr. Barry Nielsen S/P hysterectomy with oophorectomy 2012     Felipe Rico Tremor     Dr Oswald Dougherty Vitamin D deficiency        PSHx:  Past Surgical History:   Procedure Laterality Date    BREAST BIOPSY      DILATION AND CURETTAGE OF UTERUS      ENDOMETRIAL ABLATION  6/2012    EYE REMOVAL      OD for melanoma, age 30    FOOT OSTEOTOMY Left 09/23/2016    [facility] DPM      BALDOMERO STEROTACTIC LOC BREAST BIOPSY RIGHT Right 7/19/2021    BALDOMERO STEROTACTIC LOC BREAST BIOPSY RIGHT 7/19/2021 [facility] AND BSO  2012    Dr Arnoldo Cavanaugh       Social Hx:  Social History     Socioeconomic History    Marital status:      Spouse name: None    Number of children: 3    Years of education: 15    Highest education level: High school graduate   Occupational History    Occupation: SSI   Tobacco Use    Smoking status: Never Smoker    Smokeless tobacco: Never Used   Vaping Use    Vaping Use: Never used   Substance and Sexual Activity    Alcohol use: No    Drug use: No    Sexual activity: Not Currently     Birth control/protection: Surgical   Other Topics Concern    None   Social History Narrative    Born in [address] [place] [address], one of 4 children, one sister disappeared at age 30, never found    Moved to [place] at age 40        Lives in an apartment in [facility], alone    , 3 children, all grown, in [place]     2 granddaughters     Hobbies exercising, birds    Attends [employer], has volunteered at [employer]     Social Determinants of Health     Financial Resource Strain: Low Risk     Difficulty of Paying Living Expenses: Not very hard   Food Insecurity: Food Insecurity Present    Worried About Running Out of Food in the Last Year: Sometimes true    Anurag of Food in the Last Year: Sometimes true   Transportation Needs: No Transportation Needs    Lack of Transportation (Medical): No    Lack of Transportation (Non-Medical):  No   Physical Activity: Inactive    Days of Exercise per Week: 0 days    Minutes of Exercise per Session: 0 min   Stress: Stress Concern Present  Feeling of Stress : Rather much   Social Connections: Socially Isolated    Frequency of Communication with Friends and Family: Twice a week    Frequency of Social Gatherings with Friends and Family: Never    Attends Congregational Services: Never    Active Member of Clubs or Organizations: No    Attends Club or Organization Meetings: Never    Marital Status:    Intimate Partner Violence:     Fear of Current or Ex-Partner:     Emotionally Abused:     Physically Abused:     Sexually Abused:        Family Hx:  Family History   Problem Relation Age of Onset    Heart Failure Mother         dec 76    Diabetes Mother     Diabetes Father     Stroke Father         dec age 80    Cancer Father         Sarcoma    Stomach Cancer Other     Breast Cancer Paternal Aunt     Breast Cancer Maternal Aunt        Review ofSystems:   Review of Systems   Constitutional: Negative for diaphoresis. HENT: Negative for nosebleeds. Respiratory: Negative for cough, shortness of breath, wheezing and stridor. Cardiovascular: Negative for chest pain, palpitations and leg swelling. Gastrointestinal: Negative for blood in stool, diarrhea, nausea and vomiting. Musculoskeletal: Negative for myalgias. Neurological: Negative for dizziness, seizures, weakness and headaches. Hematological: Does not bruise/bleed easily. Psychiatric/Behavioral: Negative for suicidal ideas. The patient is not nervous/anxious. All other systems reviewed and are negative. Physical Examination:    BP (!) 149/83   Pulse 81   Temp 97.5 °F (36.4 °C) (Oral)   Resp 20   Ht 5' 6\" (1.676 m)   Wt 235 lb (106.6 kg)   SpO2 100%   BMI 37.93 kg/m²    Physical Exam  Constitutional:       Appearance: She is well-developed. HENT:      Head: Normocephalic and atraumatic.       Right Ear: External ear normal.      Left Ear: External ear normal.   Eyes:      Conjunctiva/sclera: Conjunctivae normal.      Pupils: Pupils are equal, round,

## 2021-09-15 NOTE — PROGRESS NOTES
PHARMACY NOTE  Veronika Kayla was ordered vitamin B-2 (riboflavin) 100mg PO daily . Per the [address], this medication is non-formulary and not stocked by pharmacy. The medication can be reordered at discharge. While inpatient, medication has been changed to PATIENT supplied on the [facility] as per [facility] Formul[facility], there is NOT an approved substitution for vitamin B-2.      Kei Arroyo, PharmD   9/15/2021 4:38 PM

## 2021-09-16 VITALS
SYSTOLIC BLOOD PRESSURE: 102 MMHG | DIASTOLIC BLOOD PRESSURE: 51 MMHG | TEMPERATURE: 98.2 F | RESPIRATION RATE: 20 BRPM | BODY MASS INDEX: 37.77 KG/M2 | HEIGHT: 66 IN | WEIGHT: 235 LBS | OXYGEN SATURATION: 95 % | HEART RATE: 67 BPM

## 2021-09-16 LAB
CHOLESTEROL, TOTAL: 315 MG/DL (ref 0–199)
EKG ATRIAL RATE: 69 BPM
EKG P AXIS: 46 DEGREES
EKG P-R INTERVAL: 144 MS
EKG Q-T INTERVAL: 448 MS
EKG QRS DURATION: 90 MS
EKG QTC CALCULATION (BAZETT): 480 MS
EKG R AXIS: 11 DEGREES
EKG T AXIS: 34 DEGREES
EKG VENTRICULAR RATE: 69 BPM
HCT VFR BLD CALC: 40.6 % (ref 37–47)
HDLC SERPL-MCNC: 36 MG/DL (ref 40–59)
HEMOGLOBIN: 14 G/DL (ref 12–16)
LDL CHOLESTEROL CALCULATED: ABNORMAL MG/DL (ref 0–129)
MAGNESIUM: 2.4 MG/DL (ref 1.7–2.4)
MCH RBC QN AUTO: 31.7 PG (ref 27–31.3)
MCHC RBC AUTO-ENTMCNC: 34.4 % (ref 33–37)
MCV RBC AUTO: 92.1 FL (ref 82–100)
PDW BLD-RTO: 14 % (ref 11.5–14.5)
PLATELET # BLD: 275 K/UL (ref 130–400)
RBC # BLD: 4.41 M/UL (ref 4.2–5.4)
TRIGL SERPL-MCNC: 494 MG/DL (ref 0–150)
WBC # BLD: 5.5 K/UL (ref 4.8–10.8)

## 2021-09-16 PROCEDURE — 6370000000 HC RX 637 (ALT 250 FOR IP): Performed by: PHYSICIAN ASSISTANT

## 2021-09-16 PROCEDURE — 93005 ELECTROCARDIOGRAM TRACING: CPT | Performed by: PHYSICIAN ASSISTANT

## 2021-09-16 PROCEDURE — 83735 ASSAY OF MAGNESIUM: CPT

## 2021-09-16 PROCEDURE — 99239 HOSP IP/OBS DSCHRG MGMT >30: CPT | Performed by: INTERNAL MEDICINE

## 2021-09-16 PROCEDURE — 2580000003 HC RX 258: Performed by: PHYSICIAN ASSISTANT

## 2021-09-16 PROCEDURE — 93010 ELECTROCARDIOGRAM REPORT: CPT | Performed by: INTERNAL MEDICINE

## 2021-09-16 PROCEDURE — 36415 COLL VENOUS BLD VENIPUNCTURE: CPT

## 2021-09-16 PROCEDURE — 80061 LIPID PANEL: CPT

## 2021-09-16 PROCEDURE — 85027 COMPLETE CBC AUTOMATED: CPT

## 2021-09-16 PROCEDURE — 94640 AIRWAY INHALATION TREATMENT: CPT

## 2021-09-16 PROCEDURE — 94761 N-INVAS EAR/PLS OXIMETRY MLT: CPT

## 2021-09-16 PROCEDURE — 6370000000 HC RX 637 (ALT 250 FOR IP): Performed by: INTERNAL MEDICINE

## 2021-09-16 RX ORDER — ATORVASTATIN CALCIUM 20 MG/1
20 TABLET, FILM COATED ORAL DAILY
Qty: 30 TABLET | Refills: 5 | Status: SHIPPED | OUTPATIENT
Start: 2021-09-16 | End: 2022-05-04 | Stop reason: SDUPTHER

## 2021-09-16 RX ORDER — PRAVASTATIN SODIUM 20 MG
20 TABLET ORAL NIGHTLY
Status: DISCONTINUED | OUTPATIENT
Start: 2021-09-16 | End: 2021-09-16 | Stop reason: HOSPADM

## 2021-09-16 RX ORDER — ATORVASTATIN CALCIUM 20 MG/1
20 TABLET, FILM COATED ORAL DAILY
Qty: 30 TABLET | Refills: 5 | Status: SHIPPED | OUTPATIENT
Start: 2021-09-16 | End: 2021-09-16 | Stop reason: SDUPTHER

## 2021-09-16 RX ADMIN — ACETAMINOPHEN 650 MG: 325 TABLET ORAL at 09:55

## 2021-09-16 RX ADMIN — METOPROLOL TARTRATE 50 MG: 50 TABLET, FILM COATED ORAL at 09:55

## 2021-09-16 RX ADMIN — APIXABAN 10 MG: 5 TABLET, FILM COATED ORAL at 09:53

## 2021-09-16 RX ADMIN — BUPROPION HYDROCHLORIDE 300 MG: 300 TABLET, FILM COATED, EXTENDED RELEASE ORAL at 09:55

## 2021-09-16 RX ADMIN — LEVOTHYROXINE SODIUM 50 MCG: 0.05 TABLET ORAL at 06:00

## 2021-09-16 RX ADMIN — BUDESONIDE AND FORMOTEROL FUMARATE DIHYDRATE 2 PUFF: 160; 4.5 AEROSOL RESPIRATORY (INHALATION) at 07:51

## 2021-09-16 RX ADMIN — FAMOTIDINE 40 MG: 20 TABLET, FILM COATED ORAL at 09:55

## 2021-09-16 RX ADMIN — ARIPIPRAZOLE 15 MG: 5 TABLET ORAL at 09:55

## 2021-09-16 RX ADMIN — CYANOCOBALAMIN TAB 500 MCG 1000 MCG: 500 TAB at 09:55

## 2021-09-16 RX ADMIN — SODIUM CHLORIDE, PRESERVATIVE FREE 10 ML: 5 INJECTION INTRAVENOUS at 09:56

## 2021-09-16 RX ADMIN — CITALOPRAM HYDROBROMIDE 40 MG: 20 TABLET ORAL at 09:55

## 2021-09-16 RX ADMIN — BENZTROPINE MESYLATE 1 MG: 0.5 TABLET ORAL at 09:55

## 2021-09-16 ASSESSMENT — ENCOUNTER SYMPTOMS
VOMITING: 0
NAUSEA: 0
BLOOD IN STOOL: 0

## 2021-09-16 ASSESSMENT — PAIN SCALES - GENERAL: PAINLEVEL_OUTOF10: 7

## 2021-09-16 NOTE — DISCHARGE SUMMARY
Cardiology Discharge Summary      Patient Identification:  Arnaud Hardwick  : 1960  MRN: [id number]   Account: [de-identified]     Admit date: 9/15/2021  Discharge date: 21   Attending provider: Jigar Medina MD        Primary care provider: Darby Bowser MD     Admission Diagnoses:  Right lobe pulmonary embolus    Discharge Diagnoses: Active Hospital Problems    Diagnosis Date Noted    Pulmonary embolism on right [facility]) [I26.99] 09/15/2021    Acute pulmonary embolism with acute cor pulmonale ([address] [I26.09] 09/15/2021          Hospital Course:   Arnaud Hardwick is a61 y.o. female admitted to [facility] on 9/15/2021 for shortness of breath. CAT scan showed pulmonary embolism to right lower lobe. She was initially treated with full dose Lovenox and switched to Eliquis this morning she is doing well. She will be discharged home.           Procedures:        Consults:       Examination:  /73   Pulse 76   Temp 98.2 °F (36.8 °C)   Resp 20   Ht 5' 6\" (1.676 m)   Wt 235 lb (106.6 kg)   SpO2 97%   BMI 37.93 kg/m²    Physical Exam    Medications:  Current Discharge Medication List      CONTINUE these medications which have NOT CHANGED    Details   famotidine (PEPCID) 20 MG tablet TAKE 1 TABLET BY MOUTH TWICE DAILY (AM,PM)  Qty: 56 tablet, Refills: 2    Associated Diagnoses: Gastroesophageal reflux disease without esophagitis      terbinafine (LAMISIL) 250 MG tablet Take 1 tablet by mouth daily  Qty: 72 tablet, Refills: 0    Associated Diagnoses: Onychomycosis      pravastatin (PRAVACHOL) 80 MG tablet Take 1 tablet by mouth daily  Qty: 90 tablet, Refills: 1    Associated Diagnoses: Hyperlipidemia, unspecified hyperlipidemia type      benztropine (COGENTIN) 1 MG tablet       ketoconazole (NIZORAL) 2 % shampoo Apply 5 to 10 mL to wet scalp, lather, leave on 3 to 5 minutes, and rinse; apply twice weekly for 2 to 4 weeks (treatment). Qty: 120 mL, Refills: 2    Associated Diagnoses: Seborrheic dermatitis      metFORMIN (GLUCOPHAGE) 500 MG tablet TAKE 1 TABLET BY MOUTH TWICE A DAY WITH MEALS (AM,PM)  Qty: 60 tablet, Refills: 3    Associated Diagnoses: Prediabetes      aspirin (ASPIRIN LOW DOSE) 81 MG EC tablet TAKE 1 TABLET BY MOUTH ONCE A DAY (AM)  Qty: 30 tablet, Refills: 3    Associated Diagnoses: Hyperlipidemia, unspecified hyperlipidemia type      budesonide-formoterol (SYMBICORT) 160-4.5 MCG/ACT AERO Inhale 2 puffs into the lungs 2 times daily  Qty: 1 Inhaler, Refills: 3      cetirizine (ZYRTEC) 10 MG tablet TAKE 1 TABLET BY MOUTH EVERY DAY AS NEEDED FOR ALLERGIES  Qty: 30 tablet, Refills: 5    Associated Diagnoses:  Allergic rhinitis, unspecified seasonality, unspecified trigger      albuterol sulfate  (90 Base) MCG/ACT inhaler USE 2 PUFFS EVERY 4 HRS AS NEEDED FOR WHEEZING/SOB-SPACE OUT TO EVERY 6 HR AS SYMPTOMS IMPROVE  Qty: 1 Inhaler, Refills: 3    Associated Diagnoses: Mild persistent asthma without complication      metoprolol tartrate (LOPRESSOR) 50 MG tablet Take 1 tablet by mouth 2 times daily  Qty: 60 tablet, Refills: 3    Associated Diagnoses: Atrial flutter, unspecified type (HCC)      primidone (MYSOLINE) 50 MG tablet Take 1 tablet by mouth nightly  Qty: 30 tablet, Refills: 3    Associated Diagnoses: Essential tremor      midodrine (PROAMATINE) 10 MG tablet TAKE 1 TABLET BY MOUTH TWICE DAILY(AM,PM)  Qty: 56 tablet, Refills: 2      VITAMIN D HIGH POTENCY 25 MCG (1000 UT) CAPS TAKE 1 CAPSULE BY MOUTH DAILY WITH SUPPER (PM)  Qty: 90 capsule, Refills: 1    Associated Diagnoses: Vitamin D deficiency      buPROPion (WELLBUTRIN XL) 300 MG extended release tablet Take 300 mg by mouth daily      Multiple Vitamin (TAB-A-MANDI/BETA CAROTENE) TABS TAKE 1 TABLET BY MOUTH EVERY DAY (AM)  Qty: 30 tablet, Refills: 5      Biotin 300 MCG TABS Take 1 tablet by mouth daily  Qty: 30 tablet, Refills: 3    Associated Diagnoses: Vitamin B deficiency      cyanocobalamin ([employer] VITAMIN B12) 1000 MCG tablet Take 1 tablet by mouth daily  Qty: 30 tablet, Refills: 5    Associated Diagnoses: Vitamin B12 deficiency      EPINEPHrine (EPIPEN 2-PRAVEENA) 0.3 MG/0.3ML SOAJ injection Use as directed for allergic reaction  Qty: 2 each, Refills: 2    Associated Diagnoses: History of anaphylaxis      levothyroxine (SYNTHROID) 50 MCG tablet TAKE 1 TABLET BY MOUTH EVERY MORNING (OWN BLSTER)  Qty: 90 tablet, Refills: 0    Associated Diagnoses: Acquired hypothyroidism      citalopram (CELEXA) 40 MG tablet Take 40 mg by mouth daily      vitamin B-2 (RIBOFLAVIN) 100 MG TABS tablet Take 1 tablet by mouth daily  Qty: 90 tablet, Refills: 0    Associated Diagnoses: Cheilitis      traZODone (DESYREL) 50 MG tablet Take by mouth nightly 1-2 tablets nightly       ARIPiprazole (ABILIFY) 15 MG tablet TAKE 1 TABLET BY MOUTH EVERY DAY  Refills: 2             Significant Diagnostics:   Radiology: CTA Chest W WO  (PE study)    Result Date: 9/15/2021  EXAM:CTA CHEST W WO CONTRAST DATE9/15/2021 2:32 PM: REASON FOR EXAM:Acute severe anterior chest wall pain. elevated ddimer, r/o PE COMPARISON: none Technique: Helical CT was performed through the chest following the IV administration of100  cc of nonionic contrast. 3-D MIP reconstructions were performed on an independent workstation in the coronal plane with thick slab technique utilized in the interpretation. 3-D maximum intensity projection performed. All CT scans at this facility use dose modulation, iterative reconstruction, and/or weight based dosing when appropriate to reduce radiation dose to as low as reasonably achievable. CHEST CTA  FINDINGS: Mediastinum: Mediastinum and meka are normal. There are no pathologically enlarged lymph nodes. The chest wall and lower neck are normal Heart: The heart is within normal limits. There is no pericardial effusion. Vascular structures: There is no evidence for thoracic aortic aneurysm or dissection. No evidence of traumatic aortic injury. There is a persistent filling defect in a subsegmental artery to the right lower lobe. Lungs: There are no focal infiltrates or consolidations. There is no pneumothorax. Pleura: No pleural effusion or thickening. Upper abdomen: The visualized portions of the upper abdomen are unremarkable. Bones/axillae/soft tissues: Osseous structures and chest wall are normal.     There is no evidence of thoracic aneurysm or dissection. This study is positive for pulmonary embolus in a subsegmental artery to the right lower lobe. The findings were called to ER at 1501 hours. XR CHEST PORTABLE    Result Date: 9/15/2021  EXAMINATION: XR CHEST PORTABLE CLINICAL HISTORY: TACHYCARDIA. COMPARISONS: APRIL 13, 2021, FEBRUARY 26, 2021 FINDINGS: Osseous structures are intact. Cardiac pericardial silhouette is normal. Pulmonary vasculature is normal. Lungs are clear. NO ACUTE CARDIOPULMONARY DISEASE.        Labs:   Recent Results (from the past 72 hour(s))   Comprehensive Metabolic Panel    Collection Time: 09/15/21 12:45 PM   Result Value Ref Range    Sodium 137 135 - 144 mEq/L    Potassium 4.4 3.4 - 4.9 mEq/L    Chloride 98 95 - 107 mEq/L    CO2 24 20 - 31 mEq/L    Anion Gap 15 9 - 15 mEq/L    Glucose 104 (H) 70 - 99 mg/dL    BUN 15 8 - 23 mg/dL    CREATININE 0.90 0.50 - 0.90 mg/dL    GFR Non-African American >60.0 >60    GFR  >60.0 >60    Calcium 10.3 (H) 8.5 - 9.9 mg/dL    Total Protein 7.0 6.3 - 8.0 g/dL    Albumin 4.7 (H) 3.5 - 4.6 g/dL    Total Bilirubin 0.4 0.2 - 0.7 mg/dL    Alkaline Phosphatase 85 40 - 130 U/L    ALT 46 (H) 0 - 33 U/L    AST 27 0 - 35 U/L    Globulin 2.3 2.3 - 3.5 g/dL   Ethanol    Collection Time: 09/15/21 12:45 PM   Result Value Ref Range    Ethanol Lvl <10 mg/dL    Ethanol percent Not indicated G/dL   CBC    Collection Time: 09/15/21 12:45 PM   Result Value Ref Range    WBC 7.6 4.8 - 10.8 K/uL    RBC 4.85 4.20 - 5.40 M/uL    Hemoglobin 15.2 12.0 - 16.0 g/dL    Hematocrit 43.7 37.0 - 47.0 %    MCV 90.1 82.0 - 100.0 fL    MCH 31.3 27.0 - 31.3 pg    MCHC 34.8 33.0 - 37.0 %    RDW 13.4 11.5 - 14.5 %    Platelets 098 853 - 682 K/uL   Magnesium    Collection Time: 09/15/21 12:45 PM   Result Value Ref Range    Magnesium 2.1 1.7 - 2.4 mg/dL   Troponin    Collection Time: 09/15/21 12:45 PM   Result Value Ref Range    Troponin <0.010 0.000 - 0.010 ng/mL   D-Dimer, Quantitative    Collection Time: 09/15/21 12:45 PM   Result Value Ref Range    D-Dimer, Quant 2.28 (HH) 0.00 - 0.50 mg/L FEU   TSH with Reflex    Collection Time: 09/15/21 12:45 PM   Result Value Ref Range    TSH 1.900 0.440 - 3.860 uIU/mL   APTT    Collection Time: 09/15/21  3:28 PM   Result Value Ref Range    aPTT 23.3 (L) 24.4 - 36.8 sec   Protime-INR    Collection Time: 09/15/21  3:28 PM   Result Value Ref Range    Protime 13.3 12.3 - 14.9 sec    INR 1.0    TYPE AND SCREEN    Collection Time: 09/15/21  3:28 PM   Result Value Ref Range    ABO/Rh B POS     Antibody Screen NEG    Troponin    Collection Time: 09/15/21  4:47 PM   Result Value Ref Range    Troponin <0.010 0.000 - 0.010 ng/mL   Brain natriuretic peptide    Collection Time: 09/15/21  4:47 PM   Result Value Ref Range    Pro-BNP 83 pg/mL   Troponin    Collection Time: 09/15/21  7:58 PM   Result Value Ref Range    Troponin <0.010 0.000 - 0.010 ng/mL   EKG 12 lead    Collection Time: 09/16/21  1:32 AM   Result Value Ref Range    Ventricular Rate 69 BPM    Atrial Rate 69 BPM    P-R Interval 144 ms    QRS Duration 90 ms    Q-T Interval 448 ms    QTc Calculation (Bazett) 480 ms    P Axis 46 degrees    R Axis 11 degrees    T Axis 34 degrees   Magnesium    Collection Time: 09/16/21  6:07 AM   Result Value Ref Range    Magnesium 2.4 1.7 - 2.4 mg/dL   Lipid panel - fasting    Collection Time: 09/16/21  6:07 AM   Result Value Ref Range    Cholesterol, Total 315 (H) 0 - 199 mg/dL    Triglycerides 494 (H) 0 - 150 mg/dL    HDL 36 (L) 40 - 59 mg/dL LDL Calculated see below 0 - 129 mg/dL   CBC    Collection Time: 09/16/21  6:07 AM   Result Value Ref Range    WBC 5.5 4.8 - 10.8 K/uL    RBC 4.41 4.20 - 5.40 M/uL    Hemoglobin 14.0 12.0 - 16.0 g/dL    Hematocrit 40.6 37.0 - 47.0 %    MCV 92.1 82.0 - 100.0 fL    MCH 31.7 (H) 27.0 - 31.3 pg    MCHC 34.4 33.0 - 37.0 %    RDW 14.0 11.5 - 14.5 %    Platelets 550 353 - 642 K/uL           Follow-up visits:   No follow-up provider specified. Assessment:  Active Hospital Problems    Diagnosis Date Noted    Pulmonary embolism on right [facility]) [I26.99] 09/15/2021    Acute pulmonary embolism with acute cor pulmonale ([facility] [address] [I26.09] 09/15/2021         Plan:   1.   Discharge home        Electronically signed by Kandy Lyon, [facility] 9/16/2021 at 2:19 PM

## 2021-09-16 NOTE — FLOWSHEET NOTE
2115-HS assessment completed. Vitals stable on RA. NSR on tele. Pt denies CP/SOB. Plan of care discussed. Pt denies any other needs at this time. Call light in reach. Team to continue to monitor.  Electronically signed by Ronn Arellano RN on 9/16/2021 at 12:37 AM

## 2021-09-16 NOTE — PROGRESS NOTES
CLINICAL PHARMACY NOTE: MEDS TO BEDS    Total # of Prescriptions Filled: 3   The following medications were delivered to the patient:  · Eliquis Starter Pack  · Eliquis 5 mg Tab  · Atorvastatin 20 mg Tab    Additional Documentation:

## 2021-09-16 NOTE — CARE COORDINATION
[facility] [place] Case Management Initial Discharge Assessment    Met with Patient to discuss discharge plan. PCP: Leandra Newman MD                                Date of Last Visit: YESTERDAY    If no PCP, list provided? N/A    Discharge Planning    Living Arrangements: independently at home    Who do you live with? ALONE    Who helps you with your care:  self    If lives at home:     Do you have any barriers navigating in your home? no    Patient can perform ADL? Yes    Current Services (outpatient and in home) :  None    Dialysis: No    Is transportation available to get to your appointments? Yes- MENTAL HEALTH CM AND PROVIDE A RIDE    DME Equipment:  no    Respiratory equipment: None    Respiratory provider:  no     Pharmacy:  yes - [address] with Medication Assistance Program?  No      Patient agreeable to Sally ? Declined    Patient agreeable to SNF/Rehab? N/A    Other discharge needs identified? N/A    Initial Discharge Plan? (Note: please see concurrent daily documentation for any updates after initial note). HOME, DENIES NEEDS.  ELIQUIS CARD ON CHART    Readmission Risk              Risk of Unplanned Readmission:  14         Electronically signed by Dequan Garcia RN on 9/16/2021 at 12:53 PM

## 2021-09-16 NOTE — CARE COORDINATION
INTERDISCIPLINARY ROUNDING    September 16, 2021 at 9:28 AM EDT    Anticipated Discharge Date:  Expected Discharge Date: 09/17/21    Anticipated Discharge Disposition: HOME    Patient Mobility or PT/OT ordered: N/A    Readmission Risk              Risk of Unplanned Readmission:  15           Discussed patient goal for the day, patient clinical progression, and barriers to discharge.   The following Goal(s) of the Day/Commitment(s) have been identified:  OLEKSANDR Grover, RN  September 16, 2021

## 2021-09-16 NOTE — PROGRESS NOTES
Spiritual Care Services     Summary of Visit:  Initial visit. Patient resting in bed. Patient expressed some anxiety about Covid. She's been vaccinated but has shut herself in her home and not been any where except Dr's appointments. Patient mentioned that she has no family and lives alone. She has many health challenges. We discussed her AD situation. Patient stated that her Mental Health counselor recommended she have a HCPOA drawn up and name him as her primary decision maker. However, when this writer spoke with Ronal Rabago (her mental health counselor) he stated that he could be one of her emergency contacts, but he'd have to have the [address] [place] [address] approved by his superiors. Duke Mistry is to call back with an answer. Patient does have a friend, Claudetta Shaw, that she also has listed as an emergency contact and in her ACP note, is listed as her primary decision maker. However, the friend has not been consulted about this and we tried calling the friend from the patient's room and went to voice mail. Patient is to continue to try to call her friend. This  provided prayer and encouragement to patient. Spiritual Assessment/Intervention/Outcomes:    Encounter Summary  Services provided to[de-identified] Patient  Referral/Consult From[de-identified] Rounding  Support System: Friends/neighbors  Place of Confucianism: None  Continue Visiting: No  Complexity of Encounter:  Moderate  Length of Encounter: 30 minutes  Spiritual Assessment Completed: Yes  Routine  Type: Initial  Assessment: Approachable, Anxious, Coping  Intervention: Explored coping resources, Active listening, Explored feelings, thoughts, concerns, Nurtured hope, Prayer, Discussed illness/injury and it's impact, Discussed relationship with God (Discussed AD)  Outcome: Comfort, Expressed gratitude, Engaged in conversation, Receptive, Encouraged                          Values / Beliefs  Do you have any ethnic, cultural, sacramental, or spiritual Rastafarian needs you would like us to be aware of while you are in the hospital?: No    Care Plan:    No follow up unless patient requests. Spiritual Care Services   Electronically signed by Sha Beach on 9/16/21 at 11:02 AM EDT     To reach a  for emotional and spiritual support, place an [facility] consult request.   If a  is needed immediately, dial 0 and ask to page the on-call .

## 2021-09-16 NOTE — PROGRESS NOTES
Progress Note  Patient: Kristen León  Unit/Bed: R039/E580-50  YOB: 1960  MRN: [id number]  Acct: [de-identified]   Admitting Diagnosis: Pulmonary embolism on right [facility]) [I26.99]  Acute pulmonary embolism without acute cor pulmonale, unspecified pulmonary embolism type (HCC) [I26.99]  Acute pulmonary embolism with acute cor pulmonale, unspecified pulmonary embolism type ([address] [I26.09]  Date:  9/15/2021  Hospital Day: 1    Chief Complaint:  Shortness of breath    Subjective  Admitted with shortness of breath diagnosed with PE per CT. Started on Lovenox full dose per psych medications. Uneventful night vitals were stable on room air sinus rhythm. Denies any chest pain. No fever or chills    Review of Systems:   Review of Systems   Constitutional: Negative for diaphoresis. HENT: Negative for nosebleeds. Cardiovascular: Negative for chest pain, palpitations and leg swelling. Gastrointestinal: Negative for blood in stool, nausea and vomiting. Musculoskeletal: Negative for myalgias. Neurological: Negative for dizziness, seizures, weakness and headaches. Psychiatric/Behavioral: The patient is not nervous/anxious. Physical Examination:    /86   Pulse 69   Temp 98.2 °F (36.8 °C)   Resp 20   Ht 5' 6\" (1.676 m)   Wt 235 lb (106.6 kg)   SpO2 97%   BMI 37.93 kg/m²    Physical Exam  Constitutional:       Appearance: She is well-developed. Cardiovascular:      Rate and Rhythm: Normal rate and regular rhythm. Heart sounds: Normal heart sounds. Pulmonary:      Effort: Pulmonary effort is normal.      Breath sounds: Normal breath sounds. Musculoskeletal:         General: Normal range of motion. Skin:     General: Skin is warm and dry. Neurological:      Mental Status: She is alert and oriented to person, place, and time. Deep Tendon Reflexes: Reflexes are normal and symmetric. Psychiatric:         Behavior: Behavior normal.         Thought Content:  Thought content normal.         Judgment: Judgment normal.         LABS:  CBC:   Lab Results   Component Value Date    WBC 5.5 09/16/2021    RBC 4.41 09/16/2021    RBC 4.57 05/14/2012    HGB 14.0 09/16/2021    HCT 40.6 09/16/2021    MCV 92.1 09/16/2021    MCH 31.7 09/16/2021    MCHC 34.4 09/16/2021    RDW 14.0 09/16/2021     09/16/2021    MPV 9.0 08/16/2015     CBC with Differential:   Lab Results   Component Value Date    WBC 5.5 09/16/2021    RBC 4.41 09/16/2021    RBC 4.57 05/14/2012    HGB 14.0 09/16/2021    HCT 40.6 09/16/2021     09/16/2021    MCV 92.1 09/16/2021    MCH 31.7 09/16/2021    MCHC 34.4 09/16/2021    RDW 14.0 09/16/2021    LYMPHOPCT 36.6 04/15/2021    MONOPCT 5.5 04/15/2021    BASOPCT 0.5 04/15/2021    MONOSABS 0.3 04/15/2021    LYMPHSABS 2.2 04/15/2021    EOSABS 0.3 04/15/2021    BASOSABS 0.0 04/15/2021     CMP:    Lab Results   Component Value Date     09/15/2021    K 4.4 09/15/2021    CL 98 09/15/2021    CO2 24 09/15/2021    BUN 15 09/15/2021    CREATININE 0.90 09/15/2021    GFRAA >60.0 09/15/2021    LABGLOM >60.0 09/15/2021    GLUCOSE 104 09/15/2021    GLUCOSE 92 05/14/2012    PROT 7.0 09/15/2021    LABALBU 4.7 09/15/2021    LABALBU 3.8 03/22/2012    CALCIUM 10.3 09/15/2021    BILITOT 0.4 09/15/2021    ALKPHOS 85 09/15/2021    AST 27 09/15/2021    ALT 46 09/15/2021     BMP:    Lab Results   Component Value Date     09/15/2021    K 4.4 09/15/2021    CL 98 09/15/2021    CO2 24 09/15/2021    BUN 15 09/15/2021    LABALBU 4.7 09/15/2021    LABALBU 3.8 03/22/2012    CREATININE 0.90 09/15/2021    CALCIUM 10.3 09/15/2021    GFRAA >60.0 09/15/2021    LABGLOM >60.0 09/15/2021    GLUCOSE 104 09/15/2021    GLUCOSE 92 05/14/2012     Magnesium:    Lab Results   Component Value Date    MG 2.1 09/15/2021     Troponin:    Lab Results   Component Value Date    TROPONINI <0.010 09/15/2021       Radiology:  CTA Chest W WO  (PE study)    Result Date: 9/15/2021  EXAM:CTA CHEST W WO CONTRAST Jackson Samson 2:32 PM: REASON FOR EXAM:Acute severe anterior chest wall pain. elevated ddimer, r/o PE COMPARISON: none Technique: Helical CT was performed through the chest following the IV administration of100  cc of nonionic contrast. 3-D MIP reconstructions were performed on an independent workstation in the coronal plane with thick slab technique utilized in the interpretation. 3-D maximum intensity projection performed. All CT scans at this facility use dose modulation, iterative reconstruction, and/or weight based dosing when appropriate to reduce radiation dose to as low as reasonably achievable. CHEST CTA  FINDINGS: Mediastinum: Mediastinum and meka are normal. There are no pathologically enlarged lymph nodes. The chest wall and lower neck are normal Heart: The heart is within normal limits. There is no pericardial effusion. Vascular structures: There is no evidence for thoracic aortic aneurysm or dissection. No evidence of traumatic aortic injury. There is a persistent filling defect in a subsegmental artery to the right lower lobe. Lungs: There are no focal infiltrates or consolidations. There is no pneumothorax. Pleura: No pleural effusion or thickening. Upper abdomen: The visualized portions of the upper abdomen are unremarkable. Bones/axillae/soft tissues: Osseous structures and chest wall are normal.     There is no evidence of thoracic aneurysm or dissection. This study is positive for pulmonary embolus in a subsegmental artery to the right lower lobe. The findings were called to ER at 1501 hours. XR CHEST PORTABLE    Result Date: 9/15/2021  EXAMINATION: XR CHEST PORTABLE CLINICAL HISTORY: TACHYCARDIA. COMPARISONS: APRIL 13, 2021, FEBRUARY 26, 2021 FINDINGS: Osseous structures are intact. Cardiac pericardial silhouette is normal. Pulmonary vasculature is normal. Lungs are clear.      NO ACUTE CARDIOPULMONARY DISEASE.        EKG:  Assessment:    Active Hospital Problems    Diagnosis Date Noted    Pulmonary embolism on right [facility]) [I26.99] 09/15/2021    Acute pulmonary embolism with acute cor pulmonale ([facility] [address] [I26.09] 09/15/2021           Plan:  1. Discontinue full dose Lovenox  2. Discontinue Lovenox prophylactic  3. Eliquis DVT protocol  4. Reduce pravastatin to 20 a day  5.  Check echo  Electronically signed by Anjum Almanzar MD on 9/16/2021 at 8:36 AM

## 2021-09-19 ENCOUNTER — TELEPHONE (OUTPATIENT)
Dept: FAMILY MEDICINE CLINIC | Age: 61
End: 2021-09-19

## 2021-09-19 NOTE — TELEPHONE ENCOUNTER
----- Message from Heydi Pearce sent at 9/17/2021 11:43 AM EDT -----  Subject: Message to Provider    QUESTIONS  Information for Provider? Pt would like to thank Dr. Jose A Kyle and everyone at the practice for taking great care of her. Pt says she is doing well.  ---------------------------------------------------------------------------  --------------  CALL BACK INFO  What is the best way for the office to contact you? OK to leave message on   voicemail  Preferred Call Back Phone Number? [phone]  ---------------------------------------------------------------------------  --------------  SCRIPT ANSWERS  Relationship to Patient?  Self

## 2021-09-21 ENCOUNTER — PATIENT MESSAGE (OUTPATIENT)
Dept: NEUROLOGY | Age: 61
End: 2021-09-21

## 2021-09-21 LAB
EKG ATRIAL RATE: 117 BPM
EKG P AXIS: 21 DEGREES
EKG P-R INTERVAL: 146 MS
EKG Q-T INTERVAL: 332 MS
EKG QRS DURATION: 82 MS
EKG QTC CALCULATION (BAZETT): 463 MS
EKG R AXIS: -29 DEGREES
EKG T AXIS: 13 DEGREES
EKG VENTRICULAR RATE: 117 BPM

## 2021-09-21 NOTE — TELEPHONE ENCOUNTER
Aware of the blood clot (I sent patient from my clinic to the ED for rapid heart beat in the 130s). I called patient and she is doing better.

## 2021-09-21 NOTE — TELEPHONE ENCOUNTER
Please forward this to patient's primary care provider. She has opted to no longer follow-up with us here at our neurology practice.

## 2021-09-21 NOTE — TELEPHONE ENCOUNTER
From: Kristen León  To:  ARASH Rodriguez CNP  Sent: 9/21/2021 10:52 AM EDT  Subject: Non-Urgent Medical Question    I thought a blood clot in my lung

## 2021-09-22 ENCOUNTER — OFFICE VISIT (OUTPATIENT)
Dept: FAMILY MEDICINE CLINIC | Age: 61
End: 2021-09-22
Payer: COMMERCIAL

## 2021-09-22 VITALS
SYSTOLIC BLOOD PRESSURE: 130 MMHG | WEIGHT: 229 LBS | HEART RATE: 85 BPM | TEMPERATURE: 98 F | DIASTOLIC BLOOD PRESSURE: 76 MMHG | BODY MASS INDEX: 36.8 KG/M2 | OXYGEN SATURATION: 97 % | HEIGHT: 66 IN | RESPIRATION RATE: 14 BRPM

## 2021-09-22 DIAGNOSIS — Z09 HOSPITAL DISCHARGE FOLLOW-UP: ICD-10-CM

## 2021-09-22 DIAGNOSIS — R55 SYNCOPE, UNSPECIFIED SYNCOPE TYPE: ICD-10-CM

## 2021-09-22 DIAGNOSIS — I26.93 SINGLE SUBSEGMENTAL PULMONARY EMBOLISM WITHOUT ACUTE COR PULMONALE (HCC): Primary | ICD-10-CM

## 2021-09-22 DIAGNOSIS — G44.209 TENSION HEADACHE: ICD-10-CM

## 2021-09-22 DIAGNOSIS — M62.838 NECK MUSCLE SPASM: ICD-10-CM

## 2021-09-22 PROCEDURE — 99215 OFFICE O/P EST HI 40 MIN: CPT | Performed by: PHYSICIAN ASSISTANT

## 2021-09-22 PROCEDURE — 1111F DSCHRG MED/CURRENT MED MERGE: CPT | Performed by: PHYSICIAN ASSISTANT

## 2021-09-22 NOTE — PROGRESS NOTES
Subjective  Rollo Angel, 62 y.o. female presents today with:  Chief Complaint   Patient presents with    Follow-Up from Hospital     PE             Lab Results   Component Value Date    LABA1C 5.7 05/19/2021    LABA1C 5.2 03/15/2021    LABA1C 5.4 02/07/2020     Lab Results   Component Value Date    CREATININE 0.90 09/15/2021     Lab Results   Component Value Date    ALT 46 (H) 09/15/2021    AST 27 09/15/2021     Lab Results   Component Value Date    CHOL 315 (H) 09/16/2021    TRIG 494 (H) 09/16/2021    HDL 36 (L) 09/16/2021    LDLCALC see below 09/16/2021          HPI  Here for f.u to hospital for PE r LL 9/15/21-  Presently denies any symptoms of shortness of breath abnormal bleeding chest pain  Remote ho dvt related to oral contraceptive (30 yrs ago)  Reports  multiple syncopal episodes over the past 3 mos-missed follow-up appointment with Isaura Coates MD in cardiology  C.o today of global headache and neck stiffness. Tylenol provides temporary relief  Review of Systems   Musculoskeletal: Positive for neck pain and neck stiffness. Neurological: Positive for headaches. All other systems reviewed and are negative.         Past Medical History:   Diagnosis Date    Asthma 2015    Bilateral renal cysts 2013    Depression     since age 35, was with Dr Beronica Louis, now the [facility] Diverticulosis of sigmoid colon 2012    Dr Miguel Severance DJD of left shoulder     Environmental allergies     Fatty liver 2013    GERD (gastroesophageal reflux disease)     History of cardiac arrhythmia     \"due to stress, was placed on medication\"    Hydatidiform mole     age 26     Hyperlipidemia     Hypothyroidism 2011    IFG (impaired fasting glucose) 2013    Melanoma of eye ([facility] [address]     age 30, R eye prosthesis, Dr Engel Pac    Obesity     Prediabetes     PTSD (post-traumatic stress disorder)     age 35, [facility]    S/P colonoscopy 04/19/2012    Dr. Rui Quiñones S/P hysterectomy with oophorectomy 2012    Dr Emigdio Jones    Tremor Dr Sally Fam Vitamin D deficiency      Past Surgical History:   Procedure Laterality Date    BREAST BIOPSY      DILATION AND CURETTAGE OF UTERUS      ENDOMETRIAL ABLATION  6/2012    EYE REMOVAL      OD for melanoma, age 30    FOOT OSTEOTOMY Left 09/23/2016    B AUGUSTINA DPM      [facility] STEROTACTIC LOC BREAST BIOPSY RIGHT Right 7/19/2021    [facility] STEROTACTIC LOC BREAST BIOP[place] RIGHT [place] [facility] [place] [facility] [place]  [place]    Dr Obi Casas     Social History     Socioeconomic History    Marital status:      Spouse name: Not on file    Number of children: 3    Years of education: 15    Highest education level: High school graduate   Occupational History    Occupation: SSI   Tobacco Use    Smoking status: Never Smoker    Smokeless tobacco: Never Used   Vaping Use    Vaping Use: Never used   Substance and Sexual Activity    Alcohol use: No    Drug use: No    Sexual activity: Not Currently     Birth control/protection: Surgical   Other Topics Concern    Not on file   Social History Narrative    Born in [address] [place] [address], one of 4 children, one sister disappeared at age 30, never found    Moved to [place] at age 40        Lives in an apartment in [facility], alone    , 3 children, all grown, in [place]     2 granddaughters     Hobbies exercising, birds    Attends [employer], has volunteered at [id number] Strain: Low Risk     Difficulty of Paying Living Expenses: Not very hard   Food Insecurity: Food Insecurity Present    Worried About Running Out of Food in the Last Year: Sometimes true    Anurag of Food in the Last Year: Sometimes true   Transportation Needs: No Transportation Needs    Lack of Transportation (Medical): No    Lack of Transportation (Non-Medical):  No   Physical Activity: Inactive    Days of Exercise per Week: 0 days    Minutes of Exercise per Session: 0 min   Stress: Stress Concern Present    Feeling of Stress : Rather much   Social Connections: Socially Isolated    Frequency of Communication with Friends and Family: Twice a week    Frequency of Social Gatherings with Friends and Family: Never    Attends Yazidism Services: Never    Active Member of Clubs or Organizations: No    Attends Club or Organization Meetings: Never    Marital Status:    Intimate Partner Violence:     Fear of Current or Ex-Partner:     Emotionally Abused:     Physically Abused:     Sexually Abused:      Family History   Problem Relation Age of Onset    Heart Failure Mother         dec 76    Diabetes Mother     Diabetes Father     Stroke Father         dec age 80    Cancer Father         Sarcoma    Stomach Cancer Other     Breast Cancer Paternal Aunt     Breast Cancer Maternal Aunt      Allergies   Allergen Reactions    Bee Venom         Current Outpatient Medications   Medication Sig Dispense Refill    diclofenac sodium (VOLTAREN) 1 % GEL Apply 2 g topically 4 times daily 100 g 2    apixaban starter pack (ELIQUIS DVT/PE STARTER PACK) 5 MG TBPK tablet Take 1 tablet by mouth See Admin Instructions 74 tablet 0    atorvastatin (LIPITOR) 20 MG tablet Take 1 tablet by mouth daily 30 tablet 5    [START ON 10/14/2021] apixaban (ELIQUIS) 5 MG TABS tablet Take 1 tablet by mouth 2 times daily START when Eliquis Starter pack completed 60 tablet 3    famotidine (PEPCID) 20 MG tablet TAKE 1 TABLET BY MOUTH TWICE DAILY (AM,PM) 56 tablet 2    terbinafine (LAMISIL) 250 MG tablet Take 1 tablet by mouth daily 72 tablet 0    benztropine (COGENTIN) 1 MG tablet       ketoconazole (NIZORAL) 2 % shampoo Apply 5 to 10 mL to wet scalp, lather, leave on 3 to 5 minutes, and rinse; apply twice weekly for 2 to 4 weeks (treatment).  120 mL 2    metFORMIN (GLUCOPHAGE) 500 MG tablet TAKE 1 TABLET BY MOUTH TWICE A DAY WITH MEALS (AM,PM) 60 tablet 3    aspirin (ASPIRIN LOW DOSE) 81 MG EC tablet TAKE 1 TABLET BY MOUTH ONCE A DAY (AM) 30 tablet 3    distress. Appearance: She is obese. She is not ill-appearing. HENT:      Head: Normocephalic and atraumatic. Eyes:      Extraocular Movements: Extraocular movements intact. Conjunctiva/sclera: Conjunctivae normal.      Pupils: Pupils are equal, round, and reactive to light. Neck:      Thyroid: No thyromegaly. Cardiovascular:      Rate and Rhythm: Normal rate and regular rhythm. Heart sounds: Normal heart sounds. No murmur heard. Pulmonary:      Effort: Pulmonary effort is normal. No respiratory distress. Breath sounds: Normal breath sounds. No wheezing, rhonchi or rales. Abdominal:      General: Bowel sounds are normal.      Palpations: Abdomen is soft. There is no mass. Tenderness: There is no abdominal tenderness. There is no guarding. Musculoskeletal:         General: Normal range of motion. Cervical back: Normal range of motion and neck supple. Tenderness present. Muscular tenderness present. No spinous process tenderness. Normal range of motion. Lymphadenopathy:      Cervical: No cervical adenopathy. Skin:     General: Skin is warm and dry. Coloration: Skin is not jaundiced. Neurological:      General: No focal deficit present. Mental Status: She is alert and oriented to person, place, and time. Cranial Nerves: No cranial nerve deficit. Motor: No weakness. Coordination: Coordination normal.      Gait: Gait normal.   Psychiatric:         Mood and Affect: Mood normal.         Behavior: Behavior normal.         Thought Content: Thought content normal.         Judgment: Judgment normal.              Assessment & Plan    Diagnosis Orders   1. Single subsegmental pulmonary embolism without acute cor pulmonale (HCC)  MN DISCHARGE MEDS RECONCILED W/ CURRENT OUTPATIENT MED LIST    Continue current therapy   2. Hospital discharge follow-up  MN DISCHARGE MEDS RECONCILED W/ CURRENT OUTPATIENT MED LIST   3.  Neck muscle spasm  diclofenac sodium (VOLTAREN) 1 % GEL    DC DISCHARGE MEDS RECONCILED W/ CURRENT OUTPATIENT MED LIST   4. Tension headache  DC DISCHARGE MEDS RECONCILED W/ CURRENT OUTPATIENT MED LIST   5. Syncope, unspecified syncope type  DC DISCHARGE MEDS RECONCILED W/ CURRENT OUTPATIENT MED LIST    Reschedule follow-up with cardiology information to reschedule provided today         Orders Placed This Encounter   Procedures    DC DISCHARGE MEDS RECONCILED W/ CURRENT OUTPATIENT MED LIST     Orders Placed This Encounter   Medications    diclofenac sodium (VOLTAREN) 1 % GEL     Sig: Apply 2 g topically 4 times daily     Dispense:  100 g     Refill:  2     There are no discontinued medications. Return in 3 months (on 12/22/2021).     Callie Vickers PA-C

## 2021-09-27 ENCOUNTER — TELEPHONE (OUTPATIENT)
Dept: FAMILY MEDICINE CLINIC | Age: 61
End: 2021-09-27

## 2021-09-27 DIAGNOSIS — B35.1 ONYCHOMYCOSIS: Primary | ICD-10-CM

## 2021-09-27 NOTE — TELEPHONE ENCOUNTER
Patient called because she does not want to go to Dr. Vielka Jefferson. She said he told her not to come to his office anymore and she didn't even do anything. She is asking for a referral to a different podiatrist.  Please advise, thank you.

## 2021-09-28 ENCOUNTER — TELEPHONE (OUTPATIENT)
Dept: FAMILY MEDICINE CLINIC | Age: 61
End: 2021-09-28

## 2021-09-28 NOTE — TELEPHONE ENCOUNTER
----- Message from Quoc Pelletier sent at 9/23/2021  2:26 PM EDT -----  Subject: Medication Problem    QUESTIONS  Name of Medication? terbinafine (LAMISIL) 250 MG tablet  Patient-reported dosage and instructions? 1 tablet daily  What question or problem do you have with the medication? Patient is not   sure what this medication if for. Please contact patient to advise. Preferred Pharmacy? [address], [address]  Pharmacy phone number (if available)? [phone]  Additional Information for Provider?   ---------------------------------------------------------------------------  --------------  CALL BACK INFO  What is the best way for the office to contact you? OK to leave message on   voicemail  Preferred Call Back Phone Number? [phone]  ---------------------------------------------------------------------------  --------------  SCRIPT ANSWERS  Relationship to Patient?  Self

## 2021-09-28 NOTE — TELEPHONE ENCOUNTER
Patient states she knows what the medications are for.   In regards to gait, patient will follow up with her neurologist.

## 2021-09-28 NOTE — TELEPHONE ENCOUNTER
----- Message from Guillermina Cano sent at 9/27/2021  1:36 PM EDT -----  Subject: Message to Provider    QUESTIONS  Information for Provider? Pt called in to discuss symptoms she experienced   this morning. Pt stated when she got up this morning she felt like she   couldn't walk straight, that she was walking sideways and would like a   call back from a nurse. Please return call  ---------------------------------------------------------------------------  --------------  CALL BACK INFO  What is the best way for the office to contact you? OK to leave message on   voicemail  Preferred Call Back Phone Number? [phone]  ---------------------------------------------------------------------------  --------------  SCRIPT ANSWERS  Relationship to Patient?  Self

## 2021-09-29 ENCOUNTER — TELEPHONE (OUTPATIENT)
Dept: FAMILY MEDICINE CLINIC | Age: 61
End: 2021-09-29

## 2021-09-29 NOTE — TELEPHONE ENCOUNTER
Please see below about referral    ----- Message from [place], [place] sent at 9/29/2021  8:42 AM EDT -----  Subject: Message to Provider    QUESTIONS  Information for Provider? Pt called and stated that she found a   neurologist but she will need a referral sent. Neurologists name is Dunia Chavez , Phone number is [phone]. Please call pt back if any other   information is needed. ---------------------------------------------------------------------------  --------------  Rosa BURGESS  What is the best way for the office to contact you? OK to leave message on   voicemail  Preferred Call Back Phone Number? [phone]  ---------------------------------------------------------------------------  --------------  SCRIPT ANSWERS  Relationship to Patient?  Self

## 2021-10-04 ENCOUNTER — TELEPHONE (OUTPATIENT)
Dept: FAMILY MEDICINE CLINIC | Age: 61
End: 2021-10-04

## 2021-10-04 DIAGNOSIS — R25.1 TREMORS OF NERVOUS SYSTEM: Primary | ICD-10-CM

## 2021-10-05 NOTE — TELEPHONE ENCOUNTER
Faxed to Neuro   ----- Message from Hazel Evangelista sent at 10/4/2021 11:50 AM EDT -----  Subject: Message to Provider    QUESTIONS  Information for Provider? pt called about faxing the order to her   neurology office fax # [phone] Dr. Rolando Leslie. Please call pt back once   this is faxed   ---------------------------------------------------------------------------  --------------  [address]  What is the best way for the office to contact you? OK to leave message on   voicemail  Preferred Call Back Phone Number? [phone]  ---------------------------------------------------------------------------  --------------  SCRIPT ANSWERS  Relationship to Patient?  Self

## 2021-10-07 ENCOUNTER — TELEPHONE (OUTPATIENT)
Dept: FAMILY MEDICINE CLINIC | Age: 61
End: 2021-10-07

## 2021-10-07 ENCOUNTER — OFFICE VISIT (OUTPATIENT)
Dept: CARDIOLOGY CLINIC | Age: 61
End: 2021-10-07
Payer: COMMERCIAL

## 2021-10-07 VITALS
HEART RATE: 68 BPM | OXYGEN SATURATION: 95 % | DIASTOLIC BLOOD PRESSURE: 96 MMHG | BODY MASS INDEX: 37.32 KG/M2 | TEMPERATURE: 96.9 F | SYSTOLIC BLOOD PRESSURE: 138 MMHG | RESPIRATION RATE: 16 BRPM | WEIGHT: 231.2 LBS

## 2021-10-07 DIAGNOSIS — I26.09 OTHER ACUTE PULMONARY EMBOLISM WITH ACUTE COR PULMONALE (HCC): ICD-10-CM

## 2021-10-07 DIAGNOSIS — I48.92 ATRIAL FLUTTER, UNSPECIFIED TYPE (HCC): Primary | ICD-10-CM

## 2021-10-07 DIAGNOSIS — R55 SYNCOPE, UNSPECIFIED SYNCOPE TYPE: ICD-10-CM

## 2021-10-07 PROCEDURE — 99214 OFFICE O/P EST MOD 30 MIN: CPT | Performed by: INTERNAL MEDICINE

## 2021-10-07 PROCEDURE — 1036F TOBACCO NON-USER: CPT | Performed by: INTERNAL MEDICINE

## 2021-10-07 PROCEDURE — G9899 SCRN MAM PERF RSLTS DOC: HCPCS | Performed by: INTERNAL MEDICINE

## 2021-10-07 PROCEDURE — G8417 CALC BMI ABV UP PARAM F/U: HCPCS | Performed by: INTERNAL MEDICINE

## 2021-10-07 PROCEDURE — G8427 DOCREV CUR MEDS BY ELIG CLIN: HCPCS | Performed by: INTERNAL MEDICINE

## 2021-10-07 PROCEDURE — 3017F COLORECTAL CA SCREEN DOC REV: CPT | Performed by: INTERNAL MEDICINE

## 2021-10-07 PROCEDURE — 1111F DSCHRG MED/CURRENT MED MERGE: CPT | Performed by: INTERNAL MEDICINE

## 2021-10-07 PROCEDURE — G8484 FLU IMMUNIZE NO ADMIN: HCPCS | Performed by: INTERNAL MEDICINE

## 2021-10-07 RX ORDER — METOPROLOL TARTRATE 50 MG/1
50 TABLET, FILM COATED ORAL 2 TIMES DAILY
Qty: 180 TABLET | Refills: 1 | Status: SHIPPED | OUTPATIENT
Start: 2021-10-07 | End: 2021-10-25

## 2021-10-07 ASSESSMENT — ENCOUNTER SYMPTOMS
CHEST TIGHTNESS: 0
COLOR CHANGE: 0
SHORTNESS OF BREATH: 0
APNEA: 0

## 2021-10-07 NOTE — PROGRESS NOTES
[facility] CARDIOLOGY OFFICE FOLLOW-UP      Patient: Wandalee Nissen  YOB: 1960  MRN: [id number]    Chief Complaint:  Chief Complaint   Patient presents with    Follow-Up from [facility] 9/15/2021    Irregular Heart Beat         Subjective/HPI:  10/7/2021: Patient presents today for follow-up of recent hospitalization for atrial fib and PE. Was discharged home on Eliquis DVT protocol she is also on Lopressor 50 twice daily. Gets dizzy lightheaded. She is on midodrine for recently and not clear to me. She has a history of orthostasis. Does not smoke. She is not . Does not drive. Her friend brought her here. She sees Dr. Neville Casas. No chest pain. Get short of breath. On mild-to-moderate exertion. No ankle edema.        Past Medical History:   Diagnosis Date    Asthma 2015    Bilateral renal cysts 2013    Depression     since age 35, was with Dr Mukund Lewis, now the [facility]    Diverticulosis of sigmoid colon 2012    Dr Kristi Ryan DJD of left shoulder     Environmental allergies     Fatty liver 2013    GERD (gastroesophageal reflux disease)     History of cardiac arrhythmia     \"due to stress, was placed on medication\"    Hydatidiform mole     age 26     Hyperlipidemia     Hypothyroidism 2011    IFG (impaired fasting glucose) 2013    Melanoma of eye ([address]     age 30, R eye prosthesis, Dr Brie Roach    Obesity     Prediabetes     PTSD (post-traumatic stress disorder)     age 35, [facility]    S/P colonoscopy 04/19/2012    Dr. Prieto Weinberg S/P hysterectomy with oophorectomy 2012    Dr Dejah Zafar Tremor     Dr Anel Wang Vitamin D deficiency        Past Surgical History:   Procedure Laterality Date    BREAST BIOPSY      DILATION AND CURETTAGE OF UTERUS      ENDOMETRIAL ABLATION  6/2012    EYE REMOVAL      OD for melanoma, age 30    FOOT OSTEOTOMY Left 09/23/2016    [facility] BRE[facility] BI[facility] RIGHT Right 7/19/2021    [facility] STEROTACTIC LOC BREAST BIOPSY RIGHT 7/19/2021 [facility]    LINDSAY AND BSO  2012    Dr Quinton Duarte       Family History   Problem Relation Age of Onset    Heart Failure Mother         dec 76    Diabetes Mother     Diabetes Father     Stroke Father         dec age 80    Cancer Father         Sarcoma    Stomach Cancer Other     Breast Cancer Paternal Aunt     Breast Cancer Maternal Aunt        Social History     Socioeconomic History    Marital status:      Spouse name: Not on file    Number of children: 3    Years of education: 15    Highest education level: High school graduate   Occupational History    Occupation: SSI   Tobacco Use    Smoking status: Never Smoker    Smokeless tobacco: Never Used   Vaping Use    Vaping Use: Never used   Substance and Sexual Activity    Alcohol use: No    Drug use: No    Sexual activity: Not Currently     Birth control/protection: Surgical   Other Topics Concern    Not on file   Social History Narrative    Born in [address] [place] [address], one of 4 children, one sister disappeared at age 30, never found    Moved to [place] at age 40        Lives in an apartment in [facility], alone    , 3 children, all grown, in [place]     2 granddaughters     Hobbies exercising, birds    Attends "[employer] has volunteered at [address] [employer] Strain: Low Risk     Difficulty of Paying Living Expenses: Not very hard   Food Insecurity: Food Insecurity Present    Worried About Running Out of Food in the Last Year: Sometimes true    Anurag of Food in the Last Year: Sometimes true   Transportation Needs: No Transportation Needs    Lack of Transportation (Medical): No    Lack of Transportation (Non-Medical):  No   Physical Activity: Inactive    Days of Exercise per Week: 0 days    Minutes of Exercise per Session: 0 min   Stress: Stress Concern Present    Feeling of Stress : Rather much   Social Connections: Socially Isolated    Frequency of Communication with Friends and Family: Twice a week    Frequency of Social Gatherings with Friends and Family: Never    Attends Advent Services: Never    Active Member of Clubs or Organizations: No    Attends Club or Organization Meetings: Never    Marital Status:    Intimate Partner Violence:     Fear of Current or Ex-Partner:     Emotionally Abused:     Physically Abused:     Sexually Abused: Allergies   Allergen Reactions    Bee Venom        Current Outpatient Medications   Medication Sig Dispense Refill    diclofenac sodium (VOLTAREN) 1 % GEL Apply 2 g topically 4 times daily 100 g 2    apixaban starter pack (ELIQUIS DVT/PE STARTER PACK) 5 MG TBPK tablet Take 1 tablet by mouth See Admin Instructions 74 tablet 0    atorvastatin (LIPITOR) 20 MG tablet Take 1 tablet by mouth daily 30 tablet 5    [START ON 10/14/2021] apixaban (ELIQUIS) 5 MG TABS tablet Take 1 tablet by mouth 2 times daily START when Eliquis Starter pack completed 60 tablet 3    famotidine (PEPCID) 20 MG tablet TAKE 1 TABLET BY MOUTH TWICE DAILY (AM,PM) 56 tablet 2    terbinafine (LAMISIL) 250 MG tablet Take 1 tablet by mouth daily 72 tablet 0    benztropine (COGENTIN) 1 MG tablet       ketoconazole (NIZORAL) 2 % shampoo Apply 5 to 10 mL to wet scalp, lather, leave on 3 to 5 minutes, and rinse; apply twice weekly for 2 to 4 weeks (treatment).  120 mL 2    metFORMIN (GLUCOPHAGE) 500 MG tablet TAKE 1 TABLET BY MOUTH TWICE A DAY WITH MEALS (AM,PM) 60 tablet 3    aspirin (ASPIRIN LOW DOSE) 81 MG EC tablet TAKE 1 TABLET BY MOUTH ONCE A DAY (AM) 30 tablet 3    budesonide-formoterol (SYMBICORT) 160-4.5 MCG/ACT AERO Inhale 2 puffs into the lungs 2 times daily 1 Inhaler 3    cetirizine (ZYRTEC) 10 MG tablet TAKE 1 TABLET BY MOUTH EVERY DAY AS NEEDED FOR ALLERGIES 30 tablet 5    albuterol sulfate  (90 Base) MCG/ACT inhaler USE 2 PUFFS EVERY 4 HRS AS NEEDED FOR WHEEZING/SOB-SPACE OUT TO EVERY 6 HR AS SYMPTOMS IMPROVE 1 Inhaler 3    metoprolol tartrate (LOPRESSOR) 50 MG tablet Take 1 tablet by mouth 2 times daily 60 tablet 3    primidone (MYSOLINE) 50 MG tablet Take 1 tablet by mouth nightly 30 tablet 3    midodrine (PROAMATINE) 10 MG tablet TAKE 1 TABLET BY MOUTH TWICE DAILY(AM,PM) 56 tablet 2    VITAMIN D HIGH POTENCY 25 MCG (1000 UT) CAPS TAKE 1 CAPSULE BY MOUTH DAILY WITH SUPPER (PM) 90 capsule 1    buPROPion (WELLBUTRIN XL) 300 MG extended release tablet Take 300 mg by mouth daily      Multiple Vitamin (TAB-A-MANDI/BETA CAROTENE) TABS TAKE 1 TABLET BY MOUTH EVERY DAY (AM) 30 tablet 5    Biotin 300 MCG TABS Take 1 tablet by mouth daily 30 tablet 3    cyanocobalamin ([employer] VITAMIN B12) 1000 MCG tablet Take 1 tablet by mouth daily 30 tablet 5    EPINEPHrine (EPIPEN 2-PRAVEENA) 0.3 MG/0.3ML SOAJ injection Use as directed for allergic reaction 2 each 2    levothyroxine (SYNTHROID) 50 MCG tablet TAKE 1 TABLET BY MOUTH EVERY MORNING (OWN BLSTER) 90 tablet 0    citalopram (CELEXA) 40 MG tablet Take 40 mg by mouth daily      vitamin B-2 (RIBOFLAVIN) 100 MG TABS tablet Take 1 tablet by mouth daily 90 tablet 0    traZODone (DESYREL) 50 MG tablet Take by mouth nightly 1-2 tablets nightly       ARIPiprazole (ABILIFY) 15 MG tablet TAKE 1 TABLET BY MOUTH EVERY DAY  2     No current facility-administered medications for this visit. Review of Systems:   Review of Systems   Constitutional: Negative for appetite change, diaphoresis and fatigue. Respiratory: Negative for apnea, chest tightness and shortness of breath. Cardiovascular: Negative for chest pain, palpitations and leg swelling. Skin: Negative for color change, pallor, rash and wound. Neurological: Positive for dizziness and syncope. Negative for weakness, light-headedness and headaches. When moving to fast   Hematological: Does not bruise/bleed easily. Psychiatric/Behavioral: Negative for agitation, behavioral problems and confusion.  The patient is not nervous/anxious and is not hyperactive. All other systems reviewed and are negative. Review of System is negative except for as mentioned above. Physical Examination:    There were no vitals taken for this visit. Physical Exam   Constitutional: She appears healthy. HENT:   Nose: Nose normal.   Mouth/Throat: Dentition is normal. Oropharynx is clear. Eyes: Pupils are equal, round, and reactive to light. Cardiovascular: Normal rate, regular rhythm, S1 normal, S2 normal, normal heart sounds, intact distal pulses and normal pulses. No extrasystoles are present. Exam reveals no gallop. No murmur heard. Pulmonary/Chest: Effort normal and breath sounds normal. She has no wheezes. She has no rales. She exhibits no tenderness. Abdominal: Soft. Bowel sounds are normal. She exhibits no distension and no mass. There is no splenomegaly or hepatomegaly. There is no abdominal tenderness. Musculoskeletal:         General: No tenderness, deformity or edema. Normal range of motion. Cervical back: Normal range of motion. Neurological: She is alert and oriented to person, place, and time. She has normal motor skills and normal reflexes. Gait normal.   Skin: Skin is warm and dry. Patient Active Problem List   Diagnosis    PTSD (post-traumatic stress disorder)    Depression    Reactive airway disease    Environmental allergies    Hyperlipidemia    S/P colonoscopy    Fatty liver disease, nonalcoholic    Vitamin D deficiency    IFG (impaired fasting glucose)    Obesity (BMI 30-39. 9)    DJD of left shoulder    Asthma    Osteoarthritis of left knee    Cheilosis    Atrial flutter (HCC)    Anxiety    Diarrhea    Right hip pain    Acute pain of both shoulders    Left arm pain    Left elbow pain    Lung infiltrate    Essential tremor    Syncope    Orthostatic hypotension    History of melanoma    Pulmonary embolism on right (HCC)    Acute pulmonary embolism with acute cor pulmonale (HCC)                   Assessment/Orders:       ICD-10-CM    1. Atrial flutter, unspecified type ([facility]  I48.92    2. Other acute pulmonary embolism with acute cor pulmonale (HCC)  I26.09    3. Syncope, unspecified syncope type  R55    4       orthostatic hypotension  5       anticoagulated      Plan:  I have advised her to avoid changing position too abruptly. And to hold onto something when he she climbs up stairs. Oxygenation was okay on room air. She is moderately obese. Needs weight. Also has significant anxiety.   I will see her in 3 months at least continue Eliquis till then also Lopressor needs to be continued because of A. fib and because of postural hypotension and possibly autonomic insufficiency        See me in 3 months        Electronically signed by: Kristi Atwood MD  10/7/2021 10:19 AM

## 2021-10-09 ENCOUNTER — NURSE TRIAGE (OUTPATIENT)
Dept: OTHER | Facility: CLINIC | Age: 61
End: 2021-10-09

## 2021-10-09 NOTE — TELEPHONE ENCOUNTER
Received call from Donald Martins at [facility] with Red Flag Complaint. Brief description of triage:   Went to use the restroom and almost fell down about 30 minutes ago, states that when she gets up she is dizzy    Triage indicates for patient to go to the ED, patient encouraged to call 911 as she states that she has no ride, patient states that she does not wasn't to go to the ED, patient educated on why she should be seen in the ED, patient still refuses    Care advice provided, patient verbalizes understanding; denies any other questions or concerns; instructed to call back for any new or worsening symptoms. Attention Provider: Thank you for allowing me to participate in the care of your patient. The patient was connected to triage in response to information provided to the [facility]. Please do not respond through this encounter as the response is not directed to a shared pool. Reason for Disposition   SEVERE dizziness (vertigo) (e.g., unable to walk without assistance)    Answer Assessment - Initial Assessment Questions  1. DESCRIPTION: \"Describe your dizziness. \"      States that she feels like she is going to fall    2. VERTIGO: \"Do you feel like either you or the room is spinning or tilting? \"       Herself is spinning    3. LIGHTHEADED: \"Do you feel lightheaded? \" (e.g., somewhat faint, woozy, weak upon standing)      Lightheaded    4. SEVERITY: \"How bad is it? \"  \"Can you walk? \"    - MILD - Feels unsteady but walking normally. - MODERATE - Feels very unsteady when walking, but not falling; interferes with normal activities (e.g., school, work) . - SEVERE - Unable to walk without falling (requires assistance). Mild    5. ONSET:  \"When did the dizziness begin? \"      This morning    6. AGGRAVATING FACTORS: \"Does anything make it worse? \" (e.g., standing, change in head position)      Standing up and changing head position    7. CAUSE: \"What do you think is causing the dizziness? \" When she moves around    8. RECURRENT SYMPTOM: \"Have you had dizziness before? \" If so, ask: \"When was the last time? \" \"What happened that time? \"      Yes, 3 weeks ago, patient went to the ED    9. OTHER SYMPTOMS: \"Do you have any other symptoms? \" (e.g., headache, weakness, numbness, vomiting, earache)      Cardiac arrhythmia    10. PREGNANCY: \"Is there any chance you are pregnant? \" \"When was your last menstrual period? \"        n/a    Protocols used: DIZZINESS - VERTIGO-ADULT-

## 2021-10-14 ENCOUNTER — VIRTUAL VISIT (OUTPATIENT)
Dept: FAMILY MEDICINE CLINIC | Age: 61
End: 2021-10-14
Payer: COMMERCIAL

## 2021-10-14 ENCOUNTER — INITIAL CONSULT (OUTPATIENT)
Dept: PODIATRY | Age: 61
End: 2021-10-14
Payer: COMMERCIAL

## 2021-10-14 VITALS — TEMPERATURE: 98.1 F | HEIGHT: 66 IN | WEIGHT: 220 LBS | BODY MASS INDEX: 35.36 KG/M2

## 2021-10-14 DIAGNOSIS — M79.672 PAIN IN BOTH FEET: Primary | ICD-10-CM

## 2021-10-14 DIAGNOSIS — L84 CALLUS: ICD-10-CM

## 2021-10-14 DIAGNOSIS — L60.3 NAIL DYSTROPHY: ICD-10-CM

## 2021-10-14 DIAGNOSIS — M21.621 TAILOR'S BUNIONETTE, BILATERAL: ICD-10-CM

## 2021-10-14 DIAGNOSIS — M21.622 TAILOR'S BUNIONETTE, BILATERAL: ICD-10-CM

## 2021-10-14 DIAGNOSIS — M79.671 PAIN IN BOTH FEET: Primary | ICD-10-CM

## 2021-10-14 DIAGNOSIS — E11.42 DIABETIC POLYNEUROPATHY ASSOCIATED WITH TYPE 2 DIABETES MELLITUS (HCC): ICD-10-CM

## 2021-10-14 DIAGNOSIS — G44.201 ACUTE INTRACTABLE TENSION-TYPE HEADACHE: Primary | ICD-10-CM

## 2021-10-14 DIAGNOSIS — B35.1 DERMATOPHYTOSIS OF NAIL: ICD-10-CM

## 2021-10-14 DIAGNOSIS — M19.172 TRAUMATIC DEGENERATIVE JOINT DISEASE OF ANKLE, LEFT: ICD-10-CM

## 2021-10-14 PROCEDURE — 11056 PARNG/CUTG B9 HYPRKR LES 2-4: CPT | Performed by: PODIATRIST

## 2021-10-14 PROCEDURE — 1111F DSCHRG MED/CURRENT MED MERGE: CPT | Performed by: PODIATRIST

## 2021-10-14 PROCEDURE — G9899 SCRN MAM PERF RSLTS DOC: HCPCS | Performed by: PODIATRIST

## 2021-10-14 PROCEDURE — G8417 CALC BMI ABV UP PARAM F/U: HCPCS | Performed by: PODIATRIST

## 2021-10-14 PROCEDURE — 1036F TOBACCO NON-USER: CPT | Performed by: PODIATRIST

## 2021-10-14 PROCEDURE — 3044F HG A1C LEVEL LT 7.0%: CPT | Performed by: PODIATRIST

## 2021-10-14 PROCEDURE — 99203 OFFICE O/P NEW LOW 30 MIN: CPT | Performed by: PODIATRIST

## 2021-10-14 PROCEDURE — 2022F DILAT RTA XM EVC RTNOPTHY: CPT | Performed by: PODIATRIST

## 2021-10-14 PROCEDURE — 11721 DEBRIDE NAIL 6 OR MORE: CPT | Performed by: PODIATRIST

## 2021-10-14 PROCEDURE — G8427 DOCREV CUR MEDS BY ELIG CLIN: HCPCS | Performed by: PODIATRIST

## 2021-10-14 PROCEDURE — 99442 PR PHYS/QHP TELEPHONE EVALUATION 11-20 MIN: CPT | Performed by: FAMILY MEDICINE

## 2021-10-14 PROCEDURE — 3017F COLORECTAL CA SCREEN DOC REV: CPT | Performed by: PODIATRIST

## 2021-10-14 PROCEDURE — G8484 FLU IMMUNIZE NO ADMIN: HCPCS | Performed by: PODIATRIST

## 2021-10-14 RX ORDER — METHYLPREDNISOLONE 4 MG/1
TABLET ORAL
Qty: 1 KIT | Refills: 0 | Status: SHIPPED | OUTPATIENT
Start: 2021-10-14 | End: 2021-10-20

## 2021-10-14 SDOH — ECONOMIC STABILITY: FOOD INSECURITY: WITHIN THE PAST 12 MONTHS, THE FOOD YOU BOUGHT JUST DIDN'T LAST AND YOU DIDN'T HAVE MONEY TO GET MORE.: NEVER TRUE

## 2021-10-14 SDOH — ECONOMIC STABILITY: FOOD INSECURITY: WITHIN THE PAST 12 MONTHS, YOU WORRIED THAT YOUR FOOD WOULD RUN OUT BEFORE YOU GOT MONEY TO BUY MORE.: NEVER TRUE

## 2021-10-14 ASSESSMENT — ENCOUNTER SYMPTOMS
SHORTNESS OF BREATH: 0
NAUSEA: 0
EYE ITCHING: 0
CHEST TIGHTNESS: 0
EYE DISCHARGE: 0
COLOR CHANGE: 0
BACK PAIN: 0
VOMITING: 0
STRIDOR: 0
BLOOD IN STOOL: 0
SINUS PRESSURE: 0
CHOKING: 0

## 2021-10-14 ASSESSMENT — SOCIAL DETERMINANTS OF HEALTH (SDOH): HOW HARD IS IT FOR YOU TO PAY FOR THE VERY BASICS LIKE FOOD, HOUSING, MEDICAL CARE, AND HEATING?: NOT HARD AT ALL

## 2021-10-14 NOTE — PROGRESS NOTES
[facility] PODIATRY  [address] [address]  Dept: [phone]  Loc: [phone]       Eden Mercado  (1960)    10/14/21    Subjective     Eden Mercado is 62 y.o. female who complains today of:    Chief Complaint   Patient presents with    Foot Pain     both feet    Diabetes       Eden Mercado is seen in consultation at the request of Ketan Galan MD for evaluation of onychomycosis. HPI: With a complaint of a deformed left big toenail. Patient states that she is unable to trim the nail herself due to the thickness and deformity. Patient states that it often gets caught on her sock or sheets and causes discomfort. Patient also complains of thick and yellow discoloration of the right big toenail. Patient does report a history of diabetes she was diagnosed approximately 2 years ago. Patient states that she does not check her blood glucose on a basis. Patient complains of numbness, tingling, and burning sensations to the toes; she states that this is at its worse when she is trying to sleep at night. Patient denies history of diabetic foot ulceration but does report having calluses to both feet. Patient does not currently have diabetic shoes. Patient reports a history of bunion correction to the left foot, this was performed by              Dr. Ariel Urrutia at [facility] [place]. Patient also reports that she fractured her left ankle about 7 months ago, and was treated with a fracture boot. Patient complains of persistent pain to the feet that she describes as a stabbing sensation. She rates it at 5/10. Patient states that walking too much increases her symptoms, resting decreases her symptoms. Review of Systems   Constitutional: Negative for chills and fever. HENT: Negative for hearing loss. Respiratory: Negative for shortness of breath. Cardiovascular: Negative for chest pain.    Gastrointestinal: Negative for nausea and vomiting. Genitourinary: Negative for difficulty urinating. Musculoskeletal: Positive for gait problem. Negative for back pain. Skin: Negative for wound. Neurological: Positive for numbness. Hematological: Does not bruise/bleed easily. Psychiatric/Behavioral: Negative for sleep disturbance. The patient is a diabetic. PCP/ Endocrinologist: Sebastian Mtz MD   Date last seen: 10 September 15, 2021    Allergies:  Bee venom    Current Outpatient Medications on File Prior to Visit   Medication Sig Dispense Refill    metoprolol tartrate (LOPRESSOR) 50 MG tablet Take 1 tablet by mouth 2 times daily (Patient not taking: Reported on 10/14/2021) 180 tablet 1    diclofenac sodium (VOLTAREN) 1 % GEL Apply 2 g topically 4 times daily 100 g 2    apixaban starter pack (ELIQUIS DVT/PE STARTER PACK) 5 MG TBPK tablet Take 1 tablet by mouth See Admin Instructions (Patient not taking: Reported on 10/14/2021) 74 tablet 0    atorvastatin (LIPITOR) 20 MG tablet Take 1 tablet by mouth daily 30 tablet 5    apixaban (ELIQUIS) 5 MG TABS tablet Take 1 tablet by mouth 2 times daily START when Eliquis Starter pack completed 60 tablet 3    famotidine (PEPCID) 20 MG tablet TAKE 1 TABLET BY MOUTH TWICE DAILY (AM,PM) 56 tablet 2    terbinafine (LAMISIL) 250 MG tablet Take 1 tablet by mouth daily 72 tablet 0    benztropine (COGENTIN) 1 MG tablet       ketoconazole (NIZORAL) 2 % shampoo Apply 5 to 10 mL to wet scalp, lather, leave on 3 to 5 minutes, and rinse; apply twice weekly for 2 to 4 weeks (treatment).  120 mL 2    metFORMIN (GLUCOPHAGE) 500 MG tablet TAKE 1 TABLET BY MOUTH TWICE A DAY WITH MEALS (AM,PM) 60 tablet 3    aspirin (ASPIRIN LOW DOSE) 81 MG EC tablet TAKE 1 TABLET BY MOUTH ONCE A DAY (AM) 30 tablet 3    budesonide-formoterol (SYMBICORT) 160-4.5 MCG/ACT AERO Inhale 2 puffs into the lungs 2 times daily 1 Inhaler 3    cetirizine (ZYRTEC) 10 MG tablet TAKE 1 TABLET BY MOUTH EVERY DAY AS NEEDED FOR ALLERGIES 30 tablet 5    albuterol sulfate  (90 Base) MCG/ACT inhaler USE 2 PUFFS EVERY 4 HRS AS NEEDED FOR WHEEZING/SOB-SPACE OUT TO EVERY 6 HR AS SYMPTOMS IMPROVE 1 Inhaler 3    metoprolol tartrate (LOPRESSOR) 50 MG tablet Take 1 tablet by mouth 2 times daily 60 tablet 3    primidone (MYSOLINE) 50 MG tablet Take 1 tablet by mouth nightly 30 tablet 3    midodrine (PROAMATINE) 10 MG tablet TAKE 1 TABLET BY MOUTH TWICE DAILY(AM,PM) 56 tablet 2    VITAMIN D HIGH POTENCY 25 MCG (1000 UT) CAPS TAKE 1 CAPSULE BY MOUTH DAILY WITH SUPPER (PM) 90 capsule 1    buPROPion (WELLBUTRIN XL) 300 MG extended release tablet Take 300 mg by mouth daily      Multiple Vitamin (TAB-A-MANDI/BETA CAROTENE) TABS TAKE 1 TABLET BY MOUTH EVERY DAY (AM) 30 tablet 5    Biotin 300 MCG TABS Take 1 tablet by mouth daily 30 tablet 3    cyanocobalamin ([employer] VITAMIN B12) 1000 MCG tablet Take 1 tablet by mouth daily 30 tablet 5    EPINEPHrine (EPIPEN 2-PRAVEENA) 0.3 MG/0.3ML SOAJ injection Use as directed for allergic reaction 2 each 2    levothyroxine (SYNTHROID) 50 MCG tablet TAKE 1 TABLET BY MOUTH EVERY MORNING (OWN BLSTER) 90 tablet 0    citalopram (CELEXA) 40 MG tablet Take 40 mg by mouth daily      vitamin B-2 (RIBOFLAVIN) 100 MG TABS tablet Take 1 tablet by mouth daily 90 tablet 0    traZODone (DESYREL) 50 MG tablet Take by mouth nightly 1-2 tablets nightly       ARIPiprazole (ABILIFY) 15 MG tablet TAKE 1 TABLET BY MOUTH EVERY DAY  2    [DISCONTINUED] Multiple Vitamins-Minerals (THERAPEUTIC MULTIVITAMIN-MINERALS) tablet Take 1 tablet by mouth daily 30 tablet 5    [DISCONTINUED] albuterol (PROVENTIL;VENTOLIN) 90 MCG/ACT inhaler Inhale 2 puffs into the lungs every 6 hours as needed. 1 Inhaler 6     No current facility-administered medications on file prior to visit.        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 Vishal French S/P hysterectomy with oophorectomy 2012    Dr Fide Rome D deficiency      Past Surgical History:   Procedure Laterality Date    BREAST BIOPSY      DILATION AND CURETTAGE OF UTERUS      ENDOMETRIAL ABLATION  6/2012    EYE REMOVAL      OD for melanoma, age 30    FOOT OSTEOTOMY Left 09/23/2016    [facility] [address] DPM      BALDOMERO STEROTACTIC LOC BREAST BIOPSY RIGHT Right 7/19/2021    BALDOMERO STEROTACTIC LOC BREAST BIOPSY RIGHT 7/19/2021 Tod Ohms    LINDSAY AND BSO  2012    Dr Cecelia Fair     Social History     Socioeconomic History    Marital status:      Spouse name: Not on file    Number of children: 3    Years of education: 15    Highest education level: High school graduate   Occupational History    Occupation: [employer]SI   Tobacco Use    Smoking status: Never Smoker    Smokeless tobacco: Never Used   Vaping Use    Vaping Use: Never used   Substance and Sexual Activity    Alcohol use: No    Drug use: No    Sexual activity: Not Currently     Birth control/protection: Surgical   Other Topics Concern    Not on file   Social History Narrative    Born in [place], one of 4 children, one sister disappeared at age 29, never found    Moved to [place] at age 40        Lives in an apartment in [place], alone    , 3 children, all grown, in [place]     2 granddaughters     Hobbies exercising, birds Attends [address], has volunteered at [id number] Strain: Low Risk     Difficulty of Paying Living Expenses: Not hard at all   Food Insecurity: No Food Insecurity    Worried About [address] in the Last Year: Never true    [facility] in the Last Year: Never true   Transportation Needs:     Lack of Transportation (Medical):  Lack of Transportation (Non-Medical):    Physical Activity:     Days of Exercise per Week:     Minutes of Exercise per Session:    Stress:     Feeling of Stress :    Social Connections:     Frequency of Communication with Friends and Family:     Frequency of Social Gatherings with Friends and Family:     Attends Oriental orthodox Services:     Active Member of Clubs or Organizations:     Attends Club or Organization Meetings:     Marital Status:    Intimate Partner Violence:     Fear of Current or Ex-Partner:     Emotionally Abused:     Physically Abused:     Sexually Abused:      Family History   Problem Relation Age of Onset    Heart Failure Mother         dec 76    Diabetes Mother     Diabetes Father     Stroke Father         dec age 80    Cancer Father         Sarcoma    Stomach Cancer Other     Breast Cancer Paternal Aunt     Breast Cancer Maternal Aunt            Objective:   Vitals:  Temp 98.1 °F (36.7 °C)   Ht 5' 6\" (1.676 m)   Wt 220 lb (99.8 kg)   BMI 35.51 kg/m² Pain Score:   7      Physical Exam  Vitals reviewed. Constitutional:       Appearance: She is obese. Cardiovascular:      Pulses:           Dorsalis pedis pulses are 2+ on the right side and 2+ on the left side. Posterior tibial pulses are 2+ on the right side and 2+ on the left side. Pulmonary:      Effort: Pulmonary effort is normal.   Musculoskeletal:      Right lower leg: No edema. Left lower leg: No edema. Right foot: Deformity present. Left foot: Decreased range of motion. Deformity present. Feet:    Feet:      Right foot:      Protective Sensation: 10 sites tested. 10 sites sensed. Skin integrity: Callus present. Toenail Condition: Right toenails are abnormally thick, long and ingrown. Fungal disease present. Left foot:      Protective Sensation: 10 sites tested. 8 sites sensed. Skin integrity: Callus present. Toenail Condition: Left toenails are abnormally thick, long and ingrown. Fungal disease present. Comments: Onychogryphosis noted to the left hallux nail, is completely lysed from the nail bed. Focal hyperkeratotic lesion noted bilateral plantar foot at the fifth MPJ. Yellow discoloration, thickened nail plate, and subungual debris noted to the right hallux nail. Tailor's bunionette deformity with adductovarus rotation of the fifth toe noted bilaterally. Decreased range of motion noted to the left subtalar joint, there is no pain or crepitus noted. Stiffness and decreased range of motion noted with range of motion of the left ankle joint. Lymphadenopathy:      Comments: Popliteal lymph nodes are soft and nontender bilateral lower extremities. Skin:     General: Skin is warm and dry. Capillary Refill: Capillary refill takes less than 2 seconds. Neurological:      Mental Status: She is alert and oriented to person, place, and time. Deep Tendon Reflexes: Babinski sign absent on the left side. Reflex Scores:       Patellar reflexes are 0 on the right side and 0 on the left side. Achilles reflexes are 0 on the right side and 0 on the left side. Comments: No ankle clonus noted bilateral ankle. Vibratory sensation is diminished bilaterally. Altered light touch sensation noted to the bilateral toes. Hot versus cold discrimination is intact bilaterally. Sharp versus dull discrimination is intact bilaterally. Protective sensation is absent to the first and fifth toes left foot.      Psychiatric:         Mood and Affect: Mood normal. Behavior: Behavior normal.         Assessment:      Diagnosis Orders   1. Pain in both feet  TRIM BENIGN HYPERKERATOTIC SKIN LESION,2-4    95241 - MS DEBRIDEMENT OF NAILS, 6 OR MORE   2. Diabetic polyneuropathy associated with type 2 diabetes mellitus (HCC)  TRIM BENIGN HYPERKERATOTIC SKIN LESION,2-4    17326 - MS DEBRIDEMENT OF NAILS, 6 OR MORE   3. Dermatophytosis of nail  04966 - MS DEBRIDEMENT OF NAILS, 6 OR MORE   4. Nail dystrophy  26598 - MS DEBRIDEMENT OF NAILS, 6 OR MORE   5. Callus  TRIM BENIGN HYPERKERATOTIC SKIN LESION,2-4   6. Traumatic degenerative joint disease of ankle, left     7. Tailor's bunionette, bilateral         Plan:     Diabetes mellitus/ foot deformity/ arthritis: I discussed the \"do's and donts\" of diabetes mellitus and diabetic foot care. The patient should adhere to the random glucose monitoring schedule according to their internist/endocrinologist and report any significant fluctuations or problems to them immediately. I emphasized the importance of daily foot checks and applying a moisturizing cream to the feet daily, but not in between the toes. If any ulcerations or signs and symptoms of infection arise, the patient is to call and return immediately for evaluation. I have prescribed custom molded diabetic inserts and extra depth diabetic shoes. Calluses: The hyperkeratotic lesions located at bilateral plantar foot/fifth metatarsophalangeal joint were pared to the level of normal skin with a #15 blade scalpel without incident. The patient is instructed to monitor for signs of infection and call immediately if these arise. The patient is instructed to apply lotion to the callussed areas daily. Onychomycosis: Nail plates 15 bilateral foot were mechanically and electrically debrided to the level of normal underlying nail bed. The patient was instructed to attempt use of an emery board to maintain the length and thickness of their nails as best as possible if able. Call immediately should any problems arise. Orders Placed This Encounter   Procedures    TRIM BENIGN HYPERKERATOTIC SKIN LESION,2-4    24468 - GA DEBRIDEMENT OF NAILS, 6 OR MORE       All questions were answered to the patient's satisfaction. Thank you for allowing me to participate in the care of your patient. Follow up:  Return in about 9 weeks (around 12/16/2021). Darling Stafford DPM      Level of medical decision making: low. Please note that this report has been partially produced using speech recognition software which may cause errors including grammar, punctuation, and spelling or words and phrases that may seem inappropriate. If there are questions or concerns please feel free to contact me for clarification.

## 2021-10-14 NOTE — PROGRESS NOTES
10/14/2021    TELEHEALTH EVALUATION -- Audio/Visual (During TOHNY-31 public health emergency)    Due to COVID 19 outbreak, patient's office visit was converted to a virtual visit. Patient was contacted and agreed to proceed with a virtual visit via Telephone Visit  The risks and benefits of converting to a virtual visit were discussed in light of the current infectious disease epidemic. Patient also understood that insurance coverage and co-pays are up to their individual insurance plans. HPI:    Celine Suero (:  1960) has requested an audio/video evaluation for the following concern(s):    Headaches   Patient is complaining of headaches for the last 4 days. Patient reports is located on top of her head. Does admit to having more stress in her life lately. Not pounding in nature. Pain is an 8 out of 10 but goes down to 5 out of 10 with the help of extra strength Tylenol. Patient takes 1 tablet a day as needed for pain. Patient admits to a previous history of migraines. Denies any auras. Denies any fevers, chills, nausea, vomiting, chest pain, shortness of breath, abdominal pain, urination, change in stools. Patient did have a CT of her head back in May 27, 2021 which was unremarkable. Review of Systems   Constitutional: Negative for activity change, appetite change, fatigue and fever. HENT: Negative for congestion, dental problem, postnasal drip and sinus pressure. Eyes: Negative for discharge and itching. Respiratory: Negative for choking, chest tightness and stridor. Cardiovascular: Negative for chest pain and leg swelling. Gastrointestinal: Negative for blood in stool. Genitourinary: Negative for decreased urine volume, difficulty urinating, dyspareunia and frequency. Musculoskeletal: Negative for myalgias. Skin: Negative for color change, pallor and wound. Neurological: Positive for headaches. Hematological: Negative for adenopathy. Does not bruise/bleed easily. Psychiatric/Behavioral: Negative for confusion. The patient is not hyperactive. Prior to Visit Medications    Medication Sig Taking? Authorizing Provider   methylPREDNISolone (MEDROL DOSEPACK) 4 MG tablet Take by mouth. Yes Patricia Loyola MD   diclofenac sodium (VOLTAREN) 1 % GEL Apply 2 g topically 4 times daily Yes Callie Vickers PA-C   atorvastatin (LIPITOR) 20 MG tablet Take 1 tablet by mouth daily Yes XU Irvin   apixaban (ELIQUIS) 5 MG TABS tablet Take 1 tablet by mouth 2 times daily START when Eliquis Starter pack completed Yes XU Irvin   famotidine (PEPCID) 20 MG tablet TAKE 1 TABLET BY MOUTH TWICE DAILY (AM,PM) Yes Trace Lomeli MD   terbinafine (LAMISIL) 250 MG tablet Take 1 tablet by mouth daily Yes Patricia Loyola MD   benztropine (COGENTIN) 1 MG tablet  Yes Historical Provider, MD   ketoconazole (NIZORAL) 2 % shampoo Apply 5 to 10 mL to wet scalp, lather, leave on 3 to 5 minutes, and rinse; apply twice weekly for 2 to 4 weeks (treatment).  Yes Patricia Loyola MD   metFORMIN (GLUCOPHAGE) 500 MG tablet TAKE 1 TABLET BY MOUTH TWICE A DAY WITH MEALS (AM,PM) Yes Patricia Loyola MD   aspirin (ASPIRIN LOW DOSE) 81 MG EC tablet TAKE 1 TABLET BY MOUTH ONCE A DAY (AM) Yes Patricia Loyola MD   budesonide-formoterol (SYMBICORT) 160-4.5 MCG/ACT AERO Inhale 2 puffs into the lungs 2 times daily Yes Patricia Loyola MD   cetirizine (ZYRTEC) 10 MG tablet TAKE 1 TABLET BY MOUTH EVERY DAY AS NEEDED FOR ALLERGIES Yes Trace Lomeli MD   albuterol sulfate  (90 Base) MCG/ACT inhaler USE 2 PUFFS EVERY 4 HRS AS NEEDED FOR WHEEZING/SOB-SPACE OUT TO EVERY 6 HR AS SYMPTOMS IMPROVE Yes Trace Lomeli MD   metoprolol tartrate (LOPRESSOR) 50 MG tablet Take 1 tablet by mouth 2 times daily Yes Patricia Loyola MD   primidone (MYSOLINE) 50 MG tablet Take 1 tablet by mouth nightly Yes ARASH Watson - CNP   midodrine (PROAMATINE) 10 MG tablet TAKE 1 TABLET BY MOUTH TWICE DAILY(AM,PM) Yes Janeth Mcdowell MD   VITAMIN D HIGH POTENCY 25 MCG (1000 UT) CAPS TAKE 1 CAPSULE BY MOUTH DAILY WITH SUPPER (PM) Yes Cliff Siddiqui MD   buPROPion (WELLBUTRIN XL) 300 MG extended release tablet Take 300 mg by mouth daily Yes Historical Provider, MD   Multiple Vitamin (TAB-A-MANDI/BETA CAROTENE) TABS TAKE 1 TABLET BY MOUTH EVERY DAY (AM) Yes Cliff Siddiqui MD   Biotin 300 MCG TABS Take 1 tablet by mouth daily Yes Cliff Siddiqui MD   cyanocobalamin ([employer] VITAMIN B12) 1000 MCG tablet Take 1 tablet by mouth daily Yes Cliff Siddiqui MD   EPINEPHrine (EPIPEN 2-PRAVEENA) 0.3 MG/0.3ML SOAJ injection Use as directed for allergic reaction Yes Cliff Siddiqui MD   levothyroxine (SYNTHROID) 50 MCG tablet TAKE 1 TABLET BY MOUTH EVERY MORNING (OWN BLSTER) Yes Cliff Siddiqui MD   citalopram (CELEXA) 40 MG tablet Take 40 mg by mouth daily Yes Historical Provider, MD   vitamin B-2 (RIBOFLAVIN) 100 MG TABS tablet Take 1 tablet by mouth daily Yes Danette Serrano MD   traZODone (DESYREL) 50 MG tablet Take by mouth nightly 1-2 tablets nightly  Yes Historical Provider, MD   ARIPiprazole (ABILIFY) 15 MG tablet TAKE 1 TABLET BY MOUTH EVERY DAY Yes Historical Provider, MD   metoprolol tartrate (LOPRESSOR) 50 MG tablet Take 1 tablet by mouth 2 times daily  Patient not taking: Reported on 10/14/2021  Janeth Mcdowell MD   apixaban starter pack (ELIQUIS DVT/PE STARTER PACK) 5 MG TBPK tablet Take 1 tablet by mouth See Admin Instructions  Patient not taking: Reported on 10/14/2021  XU Callahan   Multiple Vitamins-Minerals (THERAPEUTIC MULTIVITAMIN-MINERALS) tablet Take 1 tablet by mouth daily  Jerzy Garcia MD   albuterol (PROVENTIL;VENTOLIN) 90 MCG/ACT inhaler Inhale 2 puffs into the lungs every 6 hours as needed.   eJrzy Garcia MD       Social History     Tobacco Use    Smoking status: Never Smoker    Smokeless tobacco: Never Used   Vaping Use    Vaping Use: Never used   Substance Use Topics    Alcohol use: No    Drug use: No        Allergies   Allergen Reactions    Bee Venom    ,   Past Medical History:   Diagnosis Date    Asthma 2015    Bilateral renal cysts 2013    Depression     since age 35, was with Dr Laisha Stone, now the [address] [place] Diverticulosis of sigmoid colon 2012    Dr Cordelia Gutierrez DJD of left shoulder     Environmental allergies     Fatty liver 2013    GERD (gastroesophageal reflux disease)     History of cardiac arrhythmia     \"due to stress, was placed on medication\"    Hydatidiform mole     age 26     Hyperlipidemia     Hypothyroidism 2011    IFG (impaired fasting glucose) 2013    Melanoma of eye ([address]     age 30, R eye prosthesis, Dr Sean Swan    Obesity     Prediabetes     PTSD (post-traumatic stress disorder)     age 35, [facility]    S/P colonoscopy 04/19/2012    Dr. Roberto Jackman S/P hysterectomy with oophorectomy 2012    Dr Mukund Babcock Tremor     Dr Fredrick Martin D deficiency    ,   Past Surgical History:   Procedure Laterality Date    BREAST BIOPSY      DILATION AND CURETTAGE OF UTERUS      ENDOMETRIAL ABLATION  6/2012    EYE REMOVAL      OD for melanoma, age 30    FOOT OSTEOTOMY Left 09/23/2016    DEJA JACKMAN DPM      BALDOMERO STEROTACTIC LOC BREAST BIOPSY RIGHT Right 7/19/2021    BALDOMERO STEROTACTIC LOC BREAST BIOPSY RIGHT 7/19/2021 Mihir Pat    LINDSAY AND BSO  2012    Dr Janie Underwood   ,   Social History     Tobacco Use    Smoking status: Never Smoker    Smokeless tobacco: Never Used   Vaping Use    Vaping Use: Never used   Substance Use Topics    Alcohol use: No    Drug use: No   ,   Family History   Problem Relation Age of Onset    Heart Failure Mother         dec 76    Diabetes Mother     Diabetes Father     Stroke Father         dec age 80    Cancer Father         Sarcoma    Stomach Cancer Other     Breast Cancer Paternal Aunt     Breast Cancer Maternal Aunt    ,   Immunization History with a Patient who has not had a related appointment within my department in the past 7 days or scheduled within the next 24 hours. Patient identification was verified at the start of the visit: Yes    Total Time: minutes: 11-20 minutes    The visit was conducted pursuant to the emergency declaration under the [address] [place] [address], [address] [place] [address] and the [employer] and [employer] Act. Patient identification was verified, and a caregiver was present when appropriate. The patient was located in a state where the provider was credentialed to provide care.     Note: not billable if this call serves to triage the patient into an appointment for the relevant concern      Babak Matos MD

## 2021-10-14 NOTE — LETTER
Neurosurgery and Pain Management [address] [facility] [address]  P: [phone]    F: [phone]    Your patient has been prescribed diabetic shoes and inserts by Alicia Barry DPM. Please complete this information and fax back to our office. Statement of Certifying Physician    Patient: Elvis Roberts  (1960)    1) This patient has diabetes mellitus:  [x] Type II  [] Type I       2)  Qualifying Conditions:  I have diagnosed this patient as having on or more of the following:     [] Poor circulation            [] History of previous foot ulceration         [] History of pre-ulcerative callus               [] Peripheral neuropathy with evidence   of callus formation         [] Foot deformity                                        [] History of partial or complete amputation   of the foot           3) I am treating this patient under a comprehensive plan for care of his/her diabetes. 4)  This patient needs special shoes (extra depth or custom molded) because of his/her diabetes. 5)  This patient needs shoe inserts (heat molded or custom fabricated) because of his/her diabetes.       Provider Signature: _____________________________________________________    Provider Name: ________________________________________________________    NPI:________________________________ Date:_______________________

## 2021-10-19 DIAGNOSIS — L21.9 SEBORRHEIC DERMATITIS: ICD-10-CM

## 2021-10-19 RX ORDER — KETOCONAZOLE 20 MG/ML
SHAMPOO TOPICAL
Qty: 120 ML | Refills: 2 | Status: SHIPPED | OUTPATIENT
Start: 2021-10-19 | End: 2021-11-12 | Stop reason: SDUPTHER

## 2021-10-25 ENCOUNTER — APPOINTMENT (OUTPATIENT)
Dept: CT IMAGING | Age: 61
End: 2021-10-25
Payer: COMMERCIAL

## 2021-10-25 ENCOUNTER — HOSPITAL ENCOUNTER (EMERGENCY)
Age: 61
Discharge: HOME | End: 2021-10-25
Attending: STUDENT IN AN ORGANIZED HEALTH CARE EDUCATION/TRAINING PROGRAM
Payer: COMMERCIAL

## 2021-10-25 ENCOUNTER — NURSE TRIAGE (OUTPATIENT)
Dept: OTHER | Facility: CLINIC | Age: 61
End: 2021-10-25

## 2021-10-25 VITALS
HEART RATE: 59 BPM | SYSTOLIC BLOOD PRESSURE: 100 MMHG | HEIGHT: 66 IN | TEMPERATURE: 98.9 F | OXYGEN SATURATION: 94 % | RESPIRATION RATE: 20 BRPM | WEIGHT: 221 LBS | BODY MASS INDEX: 35.52 KG/M2 | DIASTOLIC BLOOD PRESSURE: 60 MMHG

## 2021-10-25 DIAGNOSIS — G54.0 THORACIC OUTLET SYNDROME OF RIGHT THORACIC OUTLET: ICD-10-CM

## 2021-10-25 DIAGNOSIS — R07.89 ANTERIOR CHEST WALL PAIN: Primary | ICD-10-CM

## 2021-10-25 DIAGNOSIS — Z79.01 CHRONIC ANTICOAGULATION: ICD-10-CM

## 2021-10-25 LAB
ALBUMIN SERPL-MCNC: 4.3 G/DL (ref 3.5–4.6)
ALP BLD-CCNC: 80 U/L (ref 40–130)
ALT SERPL-CCNC: 43 U/L (ref 0–33)
ANION GAP SERPL CALCULATED.3IONS-SCNC: 18 MEQ/L (ref 9–15)
APTT: 30.6 SEC (ref 24.4–36.8)
AST SERPL-CCNC: 49 U/L (ref 0–35)
BASOPHILS ABSOLUTE: 0.1 K/UL (ref 0–0.2)
BASOPHILS RELATIVE PERCENT: 0.8 %
BILIRUB SERPL-MCNC: 0.4 MG/DL (ref 0.2–0.7)
BUN BLDV-MCNC: 11 MG/DL (ref 8–23)
CALCIUM SERPL-MCNC: 9.5 MG/DL (ref 8.5–9.9)
CHLORIDE BLD-SCNC: 99 MEQ/L (ref 95–107)
CO2: 19 MEQ/L (ref 20–31)
CREAT SERPL-MCNC: 0.92 MG/DL (ref 0.5–0.9)
EOSINOPHILS ABSOLUTE: 0.4 K/UL (ref 0–0.7)
EOSINOPHILS RELATIVE PERCENT: 3.6 %
GFR AFRICAN AMERICAN: >60
GFR NON-AFRICAN AMERICAN: >60
GLOBULIN: 2.4 G/DL (ref 2.3–3.5)
GLUCOSE BLD-MCNC: 157 MG/DL (ref 70–99)
HCT VFR BLD CALC: 44.7 % (ref 37–47)
HEMOGLOBIN: 15.4 G/DL (ref 12–16)
INR BLD: 1.4
LYMPHOCYTES ABSOLUTE: 4 K/UL (ref 1–4.8)
LYMPHOCYTES RELATIVE PERCENT: 38 %
MAGNESIUM: 2 MG/DL (ref 1.7–2.4)
MCH RBC QN AUTO: 31.9 PG (ref 27–31.3)
MCHC RBC AUTO-ENTMCNC: 34.4 % (ref 33–37)
MCV RBC AUTO: 92.9 FL (ref 82–100)
MONOCYTES ABSOLUTE: 0.5 K/UL (ref 0.2–0.8)
MONOCYTES RELATIVE PERCENT: 4.6 %
NEUTROPHILS ABSOLUTE: 5.5 K/UL (ref 1.4–6.5)
NEUTROPHILS RELATIVE PERCENT: 53 %
PDW BLD-RTO: 14 % (ref 11.5–14.5)
PLATELET # BLD: 344 K/UL (ref 130–400)
POTASSIUM SERPL-SCNC: 4.9 MEQ/L (ref 3.4–4.9)
PRO-BNP: 89 PG/ML
PROTHROMBIN TIME: 16.9 SEC (ref 12.3–14.9)
RBC # BLD: 4.81 M/UL (ref 4.2–5.4)
SODIUM BLD-SCNC: 136 MEQ/L (ref 135–144)
TOTAL CK: 81 U/L (ref 0–170)
TOTAL PROTEIN: 6.7 G/DL (ref 6.3–8)
TROPONIN: <0.01 NG/ML (ref 0–0.01)
TSH SERPL DL<=0.05 MIU/L-ACNC: 1.67 UIU/ML (ref 0.44–3.86)
WBC # BLD: 10.4 K/UL (ref 4.8–10.8)

## 2021-10-25 PROCEDURE — 85730 THROMBOPLASTIN TIME PARTIAL: CPT

## 2021-10-25 PROCEDURE — 84484 ASSAY OF TROPONIN QUANT: CPT

## 2021-10-25 PROCEDURE — 85025 COMPLETE CBC W/AUTO DIFF WBC: CPT

## 2021-10-25 PROCEDURE — 84443 ASSAY THYROID STIM HORMONE: CPT

## 2021-10-25 PROCEDURE — 85610 PROTHROMBIN TIME: CPT

## 2021-10-25 PROCEDURE — 36415 COLL VENOUS BLD VENIPUNCTURE: CPT

## 2021-10-25 PROCEDURE — 99285 EMERGENCY DEPT VISIT HI MDM: CPT

## 2021-10-25 PROCEDURE — 93005 ELECTROCARDIOGRAM TRACING: CPT | Performed by: STUDENT IN AN ORGANIZED HEALTH CARE EDUCATION/TRAINING PROGRAM

## 2021-10-25 PROCEDURE — 6360000004 HC RX CONTRAST MEDICATION: Performed by: STUDENT IN AN ORGANIZED HEALTH CARE EDUCATION/TRAINING PROGRAM

## 2021-10-25 PROCEDURE — 71275 CT ANGIOGRAPHY CHEST: CPT

## 2021-10-25 PROCEDURE — 80053 COMPREHEN METABOLIC PANEL: CPT

## 2021-10-25 PROCEDURE — 82550 ASSAY OF CK (CPK): CPT

## 2021-10-25 PROCEDURE — 83880 ASSAY OF NATRIURETIC PEPTIDE: CPT

## 2021-10-25 PROCEDURE — 83735 ASSAY OF MAGNESIUM: CPT

## 2021-10-25 RX ORDER — SODIUM CHLORIDE 0.9 % (FLUSH) 0.9 %
10 SYRINGE (ML) INJECTION
Status: DISCONTINUED | OUTPATIENT
Start: 2021-10-25 | End: 2021-10-25 | Stop reason: HOSPADM

## 2021-10-25 RX ORDER — CYCLOBENZAPRINE HCL 10 MG
10 TABLET ORAL 3 TIMES DAILY PRN
Qty: 9 TABLET | Refills: 0 | Status: SHIPPED | OUTPATIENT
Start: 2021-10-25 | End: 2021-10-28

## 2021-10-25 RX ORDER — MIDODRINE HYDROCHLORIDE 10 MG/1
TABLET ORAL
Qty: 56 TABLET | Refills: 2 | Status: SHIPPED | OUTPATIENT
Start: 2021-10-25 | End: 2021-11-10 | Stop reason: SDUPTHER

## 2021-10-25 RX ADMIN — IOPAMIDOL 100 ML: 755 INJECTION, SOLUTION INTRAVENOUS at 12:30

## 2021-10-25 ASSESSMENT — ENCOUNTER SYMPTOMS
SHORTNESS OF BREATH: 0
BACK PAIN: 0
ABDOMINAL PAIN: 0
CHEST TIGHTNESS: 0
TROUBLE SWALLOWING: 0
DIARRHEA: 0
VOMITING: 0
SINUS PRESSURE: 0
COUGH: 0

## 2021-10-25 ASSESSMENT — PAIN DESCRIPTION - PAIN TYPE: TYPE: ACUTE PAIN

## 2021-10-25 ASSESSMENT — PAIN DESCRIPTION - ORIENTATION: ORIENTATION: MID

## 2021-10-25 ASSESSMENT — PAIN DESCRIPTION - LOCATION: LOCATION: CHEST

## 2021-10-25 ASSESSMENT — PAIN SCALES - GENERAL: PAINLEVEL_OUTOF10: 4

## 2021-10-25 NOTE — TELEPHONE ENCOUNTER
Provider: routing as [place] that patient called EMS for chest pain. Received call from Niraj Hanley at [facility] with [employer]. Brief description of triage: Chest pain this morning, eased up after drinking water but still present. Reports history of PE and arrythmia, on Eliquis. Later in call states pain is \"just a little, and I think it's causing me to get cold. \"    Triage indicates for patient to call 911. Patient asked if triage nurse would call for her. States she has a life alert and will push the button now. Stayed on the phone while patient spoke with the representative who is sending EMS. Care advice provided, patient verbalizes understanding; denies any other questions or concerns; instructed to call back for any new or worsening symptoms. Attention Provider: Thank you for allowing me to participate in the care of your patient. The patient was connected to triage in response to information provided to the ECC/PSC. Please do not respond through this encounter as the response is not directed to a shared pool. Reason for Disposition   Chest pain lasting longer than 5 minutes and ANY of the following:* Over 39years old* Over 27years old and at least one cardiac risk factor (e.g., diabetes, high blood pressure, high cholesterol, smoker, or strong family history of heart disease)* History of heart disease (i.e., angina, heart attack, heart failure, bypass surgery, takes nitroglycerin)* Pain is crushing, pressure-like, or heavy    Answer Assessment - Initial Assessment Questions  1. LOCATION: \"Where does it hurt? \"        Middle of sternum    2. RADIATION: \"Does the pain go anywhere else? \" (e.g., into neck, jaw, arms, back)      No    3. ONSET: \"When did the chest pain begin? \" (Minutes, hours or days)       This morning    4. PATTERN \"Does the pain come and go, or has it been constant since it started? \"  \"Does it get worse with exertion? \"       Constant    5.  DURATION: \"How long does it last\" (e.g., seconds, minutes, hours)      N/A    6. SEVERITY: \"How bad is the pain? \"  (e.g., Scale 1-10; mild, moderate, or severe)     - MILD (1-3): doesn't interfere with normal activities      - MODERATE (4-7): interferes with normal activities or awakens from sleep     - SEVERE (8-10): excruciating pain, unable to do any normal activities        No pain now    7. CARDIAC RISK FACTORS: \"Do you have any history of heart problems or risk factors for heart disease? \" (e.g., angina, prior heart attack; diabetes, high blood pressure, high cholesterol, smoker, or strong family history of heart disease)      Diagnosed by cardioogist with cardiac arrythmias and blood clot in left lung    8. PULMONARY RISK FACTORS: \"Do you have any history of lung disease? \"  (e.g., blood clots in lung, asthma, emphysema, birth control pills)      Left lung blood clot    9. CAUSE: \"What do you think is causing the chest pain? \"      Unsure    10. OTHER SYMPTOMS: \"Do you have any other symptoms? \" (e.g., dizziness, nausea, vomiting, sweating, fever, difficulty breathing, cough)        No    11. PREGNANCY: \"Is there any chance you are pregnant? \" \"When was your last menstrual period? \"        N/A    Protocols used: CHEST PAIN-ADULT-[place]

## 2021-10-25 NOTE — TELEPHONE ENCOUNTER
requesting medication refill.  Please approve or deny this request.    Rx requested:  Requested Prescriptions     Pending Prescriptions Disp Refills    midodrine (PROAMATINE) 10 MG tablet [Pharmacy Med Name: MIDODRINE HCL 10 MG TABS 10 Tablet] 56 tablet 2     Sig: TAKE 1 TABLET BY MOUTH TWICE DAILY(AM,PM)         Last Office Visit:   10/7/2021      Next Visit Date:  Future Appointments   Date Time Provider Jay Wong   10/25/2021  1:30 PM NADIA Da SilvaFF POD [facility] EMERGENCY [facility] AT [place]   11/2/2021  3:15 PM Dutch Lamb MD [facility] [place]   11/15/2021  5:15 PM Trace Gao MD [facility] [place]   12/16/2021  2:00 PM NADIA Da Silva OP POD [facility] [place]   1/13/2022 11:00 AM Fernando Ortega MD [facility] [place]

## 2021-10-25 NOTE — ED PROVIDER NOTES
[facility] ED  eMERGENCY dEPARTMENT eNCOUnter      Pt Name: Rafi Johnson  MRN: [id number]  [place] 1960  Date of evaluation: 10/25/2021  Provider: Koreen Skiff, [address] [place] [address]       Chief Complaint   Patient presents with    Chest Pain     on for a month (pt had a PE). Pt states when she sat up it hurt worse mid sternal (this am). Hurts when she takes a deep breath in          HISTORY OF PRESENT ILLNESS   (Location/Symptom, Timing/Onset,Context/Setting, Quality, Duration, Modifying Factors, Severity)  Note limiting factors. Rafi Johnson is a 62 y.o. female who presents to the emergency department with c/o CP, sharp in quality. Dx with PE 1 month ago. Patient taking her anticoagulant and denies missing any doses of her medicine. On physical exam adduction of the right shoulder across her chest reproduces symptoms. Patient has tenderness to palpation of the chest the second time it was done not the first.  She also has a depressed first rib on the right-hand side which may be contributing to symptoms. Patient denies any fever, chills, cough, nausea, vomiting or diarrhea. She denies any sweatiness. Patient states she was concerned that she has another pulmonary embolism because it felt similar to what she was experiencing today. The history is provided by the patient. NursingNotes were reviewed. REVIEW OF SYSTEMS    (2-9 systems for level 4, 10 or more for level 5)     Review of Systems   Constitutional: Negative for activity change, appetite change, chills, fever and unexpected weight change. HENT: Negative for drooling, ear pain, nosebleeds, sinus pressure and trouble swallowing. Respiratory: Negative for cough, chest tightness and shortness of breath. Cardiovascular: Positive for chest pain. Negative for leg swelling. Gastrointestinal: Negative for abdominal pain, diarrhea and vomiting. Endocrine: Negative for polydipsia and polyphagia. Genitourinary: Negative for dysuria, flank pain and frequency. Musculoskeletal: Negative for back pain and myalgias. Skin: Negative for pallor and rash. Neurological: Negative for syncope, weakness and headaches. Hematological: Does not bruise/bleed easily. All other systems reviewed and are negative. Except as noted above the remainder of the review of systems was reviewed and negative.        PAST MEDICAL HISTORY     Past Medical History:   Diagnosis Date    Asthma 2015    Bilateral renal cysts 2013    Depression     since age 35, was with Dr Maira Morales, now the [facility]    Diverticulosis of sigmoid colon 2012    Dr Portia To DJD of left shoulder     Environmental allergies     Fatty liver 2013    GERD (gastroesophageal reflux disease)     History of cardiac arrhythmia     \"due to stress, was placed on medication\"    Hydatidiform mole     age 26     Hyperlipidemia     Hypothyroidism 2011    IFG (impaired fasting glucose) 2013    Melanoma of eye [facility])     age 30, R eye prosthesis, Dr Neris Rose    Obesity     Prediabetes     PTSD (post-traumatic stress disorder)     age 35, [facility]    S/P colonoscopy 04/19/2012    Dr. Morales Olsen S/P hysterectomy with oophorectomy 2012    Dr Blank Service Tremor     Dr John Rutherford D deficiency          SURGICALHISTORY       Past Surgical History:   Procedure Laterality Date    BREAST BIOPSY      DILATION AND CURETTAGE OF UTERUS      ENDOMETRIAL ABLATION  6/2012    EYE REMOVAL      OD for melanoma, age 30    FOOT OSTEOTOMY Left 09/23/2016    [facility] DPM      [facility] STEROTACTIC LOC BREAST BIOPSY RIGHT Right 7/19/2021    [facility] STEROTACTIC LOC BREAST BIOPSY RIGHT 7/19/2021 Celestino Augustine BSO  2012    Dr Jasmine Tompkins         CURRENT MEDICATIONS       Previous Medications    ALBUTEROL SULFATE  (90 BASE) MCG/ACT INHALER    USE 2 PUFFS EVERY 4 HRS AS NEEDED FOR WHEEZING/SOB-SPACE OUT TO EVERY 6 HR AS SYMPTOMS IMPROVE    APIXABAN (ELIQUIS) 5 MG TABS TABLET    Take 1 tablet by mouth 2 times daily START when Eliquis Starter pack completed    APIXABAN STARTER PACK (ELIQUIS DVT/PE STARTER PACK) 5 MG TBPK TABLET    Take 1 tablet by mouth See Admin Instructions    ASPIRIN (ASPIRIN LOW DOSE) 81 MG EC TABLET    TAKE 1 TABLET BY MOUTH ONCE A DAY (AM)    ATORVASTATIN (LIPITOR) 20 MG TABLET    Take 1 tablet by mouth daily    BIOTIN 300 MCG TABS    Take 1 tablet by mouth daily    BUDESONIDE-FORMOTEROL (SYMBICORT) 160-4.5 MCG/ACT AERO    Inhale 2 puffs into the lungs 2 times daily    CYANOCOBALAMIN ([employer] VITAMIN B12) 1000 MCG TABLET    Take 1 tablet by mouth daily    DICLOFENAC SODIUM (VOLTAREN) 1 % GEL    Apply 2 g topically 4 times daily    EPINEPHRINE (EPIPEN 2-PRAVEENA) 0.3 MG/0.3ML SOAJ INJECTION    Use as directed for allergic reaction    FAMOTIDINE (PEPCID) 20 MG TABLET    TAKE 1 TABLET BY MOUTH TWICE DAILY (AM,PM)    KETOCONAZOLE (NIZORAL) 2 % SHAMPOO    APPLY 5 TO 10 ML TO WET SCALP, LATHER, LEAVE ON 3 TO 5 MINUTES, AND RINSE; APPLY TWICE WEEKLY FOR 2 TO 4 WEEKS (TREATMENT).     LEVOTHYROXINE (SYNTHROID) 50 MCG TABLET    TAKE 1 TABLET BY MOUTH EVERY MORNING (OWN BLSTER)    METFORMIN (GLUCOPHAGE) 500 MG TABLET    Take 500 mg by mouth 2 times daily (with meals)    METOPROLOL TARTRATE (LOPRESSOR) 50 MG TABLET    Take 1 tablet by mouth 2 times daily    MIDODRINE (PROAMATINE) 10 MG TABLET    TAKE 1 TABLET BY MOUTH TWICE DAILY(AM,PM)    MULTIPLE VITAMIN (TAB-A-MANDI/BETA CAROTENE) TABS    TAKE 1 TABLET BY MOUTH EVERY DAY (AM)    PRIMIDONE (MYSOLINE) 50 MG TABLET    Take 1 tablet by mouth nightly    VITAMIN D HIGH POTENCY 25 MCG (1000 UT) CAPS    TAKE 1 CAPSULE BY MOUTH DAILY WITH SUPPER (PM)       ALLERGIES     Bee venom    FAMILY HISTORY       Family History   Problem Relation Age of Onset    Heart Failure Mother         dec 76    Diabetes Mother     Diabetes Father     Stroke Father         dec age 80    Cancer Father         Sarcoma    Stomach Cancer Other     Breast Cancer Paternal Aunt     Breast Cancer Maternal Aunt           SOCIAL HISTORY       Social History     Socioeconomic History    Marital status:      Spouse name: None    Number of children: 3    Years of education: 15    Highest education level: High school graduate   Occupational History    Occupation: SSI   Tobacco Use    Smoking status: Never Smoker    Smokeless tobacco: Never Used   Vaping Use    Vaping Use: Never used   Substance and Sexual Activity    Alcohol use: No    Drug use: No    Sexual activity: Not Currently     Birth control/protection: Surgical   Other Topics Concern    None   Social History Narrative    Born in [address] [place] [address], one of 4 children, one sister disappeared at age 30, never found    Moved to [place] at age 40        Lives in an apartment in [facility] [place] [facility] [place], alone    , 3 children, all grown, in [place]     2 granddaughters     Hobbies exercising, birds    Attends [employer], has volunteered at [id number] Strain: Low Risk     Difficulty of Paying Living Expenses: Not hard at all   Food Insecurity: No Food Insecurity    Worried About [address] in the Last Year: Never true    [address]  in the Last Year: Never true   Transportation Needs:     Lack of Transportation (Medical):      Lack of Transportation (Non-Medical):    Physical Activity:     Days of Exercise per Week:     Minutes of Exercise per Session:    Stress:     Feeling of Stress :    Social Connections:     Frequency of Communication with Friends and Family:     Frequency of Social Gatherings with Friends and Family:     Attends [employer] Services:     Active Member of Clubs or Organizations:     Attends Club or Organization Meetings:     Marital Status:    Intimate Partner Violence:     Fear of Current or Ex-Partner:     Emotionally Abused:     Physically Abused:     Sexually Abused:        SCREENINGS    [place] Coma Scale  Eye Opening: Spontaneous  Best Verbal Response: Oriented  Best Motor Response: Obeys commands  [place] Coma Scale Score: 15 @FLOW([id number])@      PHYSICAL EXAM    (up to 7 for level 4, 8 or more for level 5)     ED Triage Vitals [10/25/21 1139]   BP Temp Temp Source Pulse Resp SpO2 Height Weight   (!) 131/56 98.9 °F (37.2 °C) Oral 63 19 95 % 5' 6\" (1.676 m) 221 lb (100.2 kg)       Physical Exam  Vitals and nursing note reviewed. Exam conducted with a chaperone present. Constitutional:       General: She is awake. She is in acute distress. Appearance: Normal appearance. She is well-developed and normal weight. She is not ill-appearing, toxic-appearing or diaphoretic. Comments: No photophobia. No phonophobia. HENT:      Head: Normocephalic and atraumatic. No Duncan's sign. Right Ear: External ear normal.      Left Ear: External ear normal.      Nose: Nose normal. No congestion or rhinorrhea. Mouth/Throat:      Mouth: Mucous membranes are moist.      Pharynx: Oropharynx is clear. No oropharyngeal exudate or posterior oropharyngeal erythema. Eyes:      General: No scleral icterus. Right eye: No foreign body or discharge. Left eye: No discharge. Extraocular Movements: Extraocular movements intact. Conjunctiva/sclera: Conjunctivae normal.      Left eye: No exudate. Pupils: Pupils are equal, round, and reactive to light. Neck:      Vascular: No carotid bruit or JVD. Trachea: No tracheal deviation. Comments: No meningismus. Cardiovascular:      Rate and Rhythm: Normal rate and regular rhythm. Pulses: Normal pulses. Heart sounds: Normal heart sounds. Heart sounds not distant. No murmur heard. No friction rub. No gallop. Pulmonary:      Effort: Pulmonary effort is normal. No respiratory distress. Breath sounds: Normal breath sounds. No stridor. No wheezing, rhonchi or rales. Chest:      Chest wall: Tenderness present. Abdominal:      General: Abdomen is flat. Bowel sounds are normal. There is no distension. Palpations: Abdomen is soft. There is no mass. Tenderness: There is no abdominal tenderness. There is no right CVA tenderness, left CVA tenderness, guarding or rebound. Hernia: No hernia is present. Musculoskeletal:         General: No swelling, tenderness, deformity or signs of injury. Normal range of motion. Cervical back: Normal range of motion and neck supple. No rigidity. Lymphadenopathy:      Head:      Right side of head: No submental adenopathy. Left side of head: No submental adenopathy. Skin:     General: Skin is warm and dry. Capillary Refill: Capillary refill takes less than 2 seconds. Coloration: Skin is not jaundiced or pale. Findings: No bruising, erythema, lesion or rash. Neurological:      General: No focal deficit present. Mental Status: She is alert and oriented to person, place, and time. Mental status is at baseline. Cranial Nerves: No cranial nerve deficit. Sensory: No sensory deficit. Motor: No weakness. Coordination: Coordination normal.      Deep Tendon Reflexes: Reflexes are normal and symmetric. Psychiatric:         Mood and Affect: Mood normal.         Behavior: Behavior normal. Behavior is cooperative. Thought Content: Thought content normal.         Judgment: Judgment normal.         DIAGNOSTIC RESULTS     EKG: All EKG's are interpreted by the Emergency Department Physician who either signs or Co-signsthis chart in the absence of a cardiologist.    EKG: Normal sinus rhythm at 62 bpm.  Normal axis. Low voltage. QT intervals 424 ms. No PVCs.     RADIOLOGY:   Non-plain filmimages such as CT, Ultrasound and MRI are read by the radiologist. Plain radiographic images are visualized and preliminarily interpreted by the emergency physician with the below findings:        Interpretation per the Radiologist below, if available at the time ofthis note:    CTA CHEST W WO CONTRAST   Final Result      No CT evidence pulmonary embolism. Stable cardiomegaly. Partially imaged right renal cyst containing short segment rim calcification. If clinical concern warrants, nonemergent CT urogram may be obtained for further evaluation. All CT scans at this facility use dose modulation, iterative reconstruction, and/or weight based dosing when appropriate to reduce radiation dose to as low as reasonably achievable. ED BEDSIDE ULTRASOUND:   Performed by ED Physician - none    LABS:  Labs Reviewed   CBC WITH AUTO DIFFERENTIAL - Abnormal; Notable for the following components:       Result Value    MCH 31.9 (*)     All other components within normal limits   COMPREHENSIVE METABOLIC PANEL - Abnormal; Notable for the following components:    CO2 19 (*)     Anion Gap 18 (*)     Glucose 157 (*)     CREATININE 0.92 (*)     ALT 43 (*)     AST 49 (*)     All other components within normal limits   PROTIME-INR - Abnormal; Notable for the following components:    Protime 16.9 (*)     All other components within normal limits   APTT   BRAIN NATRIURETIC PEPTIDE   CK   MAGNESIUM   TROPONIN   TSH WITHOUT REFLEX       All other labs were within normal range or not returned as of this dictation. EMERGENCY DEPARTMENT COURSE and DIFFERENTIAL DIAGNOSIS/MDM:   Vitals:    Vitals:    10/25/21 1139 10/25/21 1334 10/25/21 1418   BP: (!) 131/56 (!) 141/65 100/77   Pulse: 63 54 82   Resp: 19 20 20   Temp: 98.9 °F (37.2 °C)     TempSrc: Oral     SpO2: 95% 95% 95%   Weight: 221 lb (100.2 kg)     Height: 5' 6\" (1.676 m)             MDM  EKG shows no acute injury pattern. CAT scan of the chest shows no pulmonary embolism but a right renal cyst.  Patient denies any flank pain. Patient's cardiac markers are within normal limits. Patient has a thoracic outlet syndrome with easily reproducible symptoms.   CAT scan of the chest was done because

## 2021-10-25 NOTE — ED NOTES
Bed: 30  Expected date: 10/25/21  Expected time:   Means of arrival:   Comments:  61F CP this morning, no CP now  Hx: depression, CA  98, 129/87, 98%RA     Anam Kinsey, JARAD  10/25/21 6096

## 2021-10-26 LAB
EKG ATRIAL RATE: 62 BPM
EKG P AXIS: 47 DEGREES
EKG P-R INTERVAL: 140 MS
EKG Q-T INTERVAL: 424 MS
EKG QRS DURATION: 92 MS
EKG QTC CALCULATION (BAZETT): 430 MS
EKG R AXIS: -13 DEGREES
EKG T AXIS: 29 DEGREES
EKG VENTRICULAR RATE: 62 BPM

## 2021-10-26 PROCEDURE — 93010 ELECTROCARDIOGRAM REPORT: CPT | Performed by: INTERNAL MEDICINE

## 2021-10-29 ENCOUNTER — TELEPHONE (OUTPATIENT)
Dept: PODIATRY | Age: 61
End: 2021-10-29

## 2021-10-29 NOTE — TELEPHONE ENCOUNTER
[facility] [place]ifying letter from Dr Hyacinth Cuevas and Dr Wyatt Slice office note dated 10/14/2021.

## 2021-10-31 NOTE — TELEPHONE ENCOUNTER
Rx requested:  Requested Prescriptions     Pending Prescriptions Disp Refills    Multiple Vitamin (TAB-A-MANDI/BETA CAROTENE) TABS [Pharmacy Med Name: TAB-A-MANDI TABLET Tablet] 28 tablet 5     Sig: TAKE 1 TABLET BY MOUTH EVERY DAY (AM)       Last Office Visit:   10/14/2021        Next Visit Date:  Future Appointments   Date Time Provider Jay Wong   11/2/2021  3:15 PM Saida Alvarado MD [facility]   11/8/2021 11:00 AM NADIA Tang POD [facility] EMERGENCY [facility] CENTER AT [place]   11/15/2021  5:15 PM Renard Lomeli MD [facility]   12/16/2021  2:00 PM NADIA Tang OP POD [facility]   1/13/2022 11:00 AM Maximus Leslie MD [facility] Julio Saldana

## 2021-11-01 RX ORDER — DOXEPIN HYDROCHLORIDE 50 MG/1
CAPSULE ORAL
Qty: 28 TABLET | Refills: 5 | Status: SHIPPED | OUTPATIENT
Start: 2021-11-01 | End: 2021-11-10 | Stop reason: SDUPTHER

## 2021-11-02 ENCOUNTER — OFFICE VISIT (OUTPATIENT)
Dept: FAMILY MEDICINE CLINIC | Age: 61
End: 2021-11-02
Payer: COMMERCIAL

## 2021-11-02 ENCOUNTER — IMMUNIZATION (OUTPATIENT)
Dept: FAMILY MEDICINE CLINIC | Age: 61
End: 2021-11-02
Payer: COMMERCIAL

## 2021-11-02 VITALS
TEMPERATURE: 97.5 F | WEIGHT: 236.6 LBS | HEART RATE: 84 BPM | BODY MASS INDEX: 38.02 KG/M2 | SYSTOLIC BLOOD PRESSURE: 118 MMHG | DIASTOLIC BLOOD PRESSURE: 68 MMHG | OXYGEN SATURATION: 97 % | HEIGHT: 66 IN

## 2021-11-02 DIAGNOSIS — I26.93 SINGLE SUBSEGMENTAL PULMONARY EMBOLISM WITHOUT ACUTE COR PULMONALE (HCC): ICD-10-CM

## 2021-11-02 DIAGNOSIS — Z23 NEED FOR COVID-19 VACCINE: Primary | ICD-10-CM

## 2021-11-02 DIAGNOSIS — E66.9 OBESITY (BMI 30-39.9): ICD-10-CM

## 2021-11-02 DIAGNOSIS — R00.0 TACHYCARDIA: ICD-10-CM

## 2021-11-02 DIAGNOSIS — E03.9 ACQUIRED HYPOTHYROIDISM: ICD-10-CM

## 2021-11-02 DIAGNOSIS — I48.92 ATRIAL FLUTTER, UNSPECIFIED TYPE (HCC): ICD-10-CM

## 2021-11-02 DIAGNOSIS — B35.1 ONYCHOMYCOSIS: Primary | ICD-10-CM

## 2021-11-02 PROCEDURE — 91306 COVID-19, MODERNA BOOSTER VACCINE 0.25ML DOSE: CPT | Performed by: FAMILY MEDICINE

## 2021-11-02 PROCEDURE — G8427 DOCREV CUR MEDS BY ELIG CLIN: HCPCS | Performed by: FAMILY MEDICINE

## 2021-11-02 PROCEDURE — 0064A COVID-19, MODERNA BOOSTER VACCINE 0.25ML DOSE: CPT | Performed by: FAMILY MEDICINE

## 2021-11-02 PROCEDURE — 99213 OFFICE O/P EST LOW 20 MIN: CPT | Performed by: FAMILY MEDICINE

## 2021-11-02 PROCEDURE — 3017F COLORECTAL CA SCREEN DOC REV: CPT | Performed by: FAMILY MEDICINE

## 2021-11-02 PROCEDURE — G8482 FLU IMMUNIZE ORDER/ADMIN: HCPCS | Performed by: FAMILY MEDICINE

## 2021-11-02 PROCEDURE — 1036F TOBACCO NON-USER: CPT | Performed by: FAMILY MEDICINE

## 2021-11-02 PROCEDURE — G8417 CALC BMI ABV UP PARAM F/U: HCPCS | Performed by: FAMILY MEDICINE

## 2021-11-02 PROCEDURE — G9899 SCRN MAM PERF RSLTS DOC: HCPCS | Performed by: FAMILY MEDICINE

## 2021-11-02 RX ORDER — CYCLOBENZAPRINE HCL 10 MG
10 TABLET ORAL DAILY
COMMUNITY
Start: 2021-10-30 | End: 2022-05-04

## 2021-11-02 NOTE — PROGRESS NOTES
Chief Complaint   Patient presents with    Follow-up     2 mo    Headache     Pt states still she has head aches    Dizziness     Dizziness only when taking her grocieries to the 3rd floor & getting off from bed/couch.  Nail Problem     This is slowly getting better.  Memory Loss     Pt states she is experiencing this less. HPI: Edwin Gay 62 y.o. female presenting for     Pulmonary embolism   Currently seeing the cardiologist and on blood thinners   Stable   No issues or concerns  admits to dyspnea on exertion when pushing up her grocery cart      Headaches   Patient is complaining of headaches for the last 4 days. Patient reports is located on top of her head. Does admit to having more stress in her life lately. Not pounding in nature. Pain is an 8 out of 10 but goes down to 5 out of 10 with the help of extra strength Tylenol. Patient takes 1 tablet a day as needed for pain. Patient admits to a previous history of migraines. Denies any auras. Denies any fevers, chills, nausea, vomiting, chest pain, shortness of breath, abdominal pain, urination, change in stools. Patient did have a CT of her head back in May 27, 2021 which was unremarkable.       F/u   Intermittent in nature   Is going to see a neurologist       Onychomycosis   First toe in the left foot   Yellow and thick   Unable to cut the toenail   Denies any other issues. F/u   Slight improvement with penlac   Has not had her CMP done   Would like to try oral medication    F/u  Has improved   Seeing the podiatrist   No issues or concerns         Hpyothyroidism   Stable. Takes medication.    Lab Results   Component Value Date    TSH 1.670 10/25/2021         Current Outpatient Medications   Medication Sig Dispense Refill    cyclobenzaprine (FLEXERIL) 10 MG tablet Take 10 mg by mouth daily      Multiple Vitamin (TAB-A-MANDI/BETA CAROTENE) TABS TAKE 1 TABLET BY MOUTH EVERY DAY (AM) 28 tablet 5    midodrine (PROAMATINE) 10 MG tablet TAKE 1 TABLET BY MOUTH TWICE DAILY(AM,PM) 56 tablet 2    metFORMIN (GLUCOPHAGE) 500 MG tablet Take 500 mg by mouth 2 times daily (with meals)      ketoconazole (NIZORAL) 2 % shampoo APPLY 5 TO 10 ML TO WET SCALP, LATHER, LEAVE ON 3 TO 5 MINUTES, AND RINSE; APPLY TWICE WEEKLY FOR 2 TO 4 WEEKS (TREATMENT).  120 mL 2    diclofenac sodium (VOLTAREN) 1 % GEL Apply 2 g topically 4 times daily 100 g 2    apixaban (ELIQUIS) 5 MG TABS tablet Take 1 tablet by mouth 2 times daily START when Eliquis Starter pack completed 60 tablet 3    famotidine (PEPCID) 20 MG tablet TAKE 1 TABLET BY MOUTH TWICE DAILY (AM,PM) 56 tablet 2    aspirin (ASPIRIN LOW DOSE) 81 MG EC tablet TAKE 1 TABLET BY MOUTH ONCE A DAY (AM) 30 tablet 3    budesonide-formoterol (SYMBICORT) 160-4.5 MCG/ACT AERO Inhale 2 puffs into the lungs 2 times daily 1 Inhaler 3    albuterol sulfate  (90 Base) MCG/ACT inhaler USE 2 PUFFS EVERY 4 HRS AS NEEDED FOR WHEEZING/SOB-SPACE OUT TO EVERY 6 HR AS SYMPTOMS IMPROVE 1 Inhaler 3    metoprolol tartrate (LOPRESSOR) 50 MG tablet Take 1 tablet by mouth 2 times daily 60 tablet 3    VITAMIN D HIGH POTENCY 25 MCG (1000 UT) CAPS TAKE 1 CAPSULE BY MOUTH DAILY WITH SUPPER (PM) 90 capsule 1    Biotin 300 MCG TABS Take 1 tablet by mouth daily 30 tablet 3    cyanocobalamin ([employer] VITAMIN B12) 1000 MCG tablet Take 1 tablet by mouth daily 30 tablet 5    EPINEPHrine (EPIPEN 2-PRAVEENA) 0.3 MG/0.3ML SOAJ injection Use as directed for allergic reaction 2 each 2    levothyroxine (SYNTHROID) 50 MCG tablet TAKE 1 TABLET BY MOUTH EVERY MORNING (OWN BLSTER) 90 tablet 0    apixaban starter pack (ELIQUIS DVT/PE STARTER PACK) 5 MG TBPK tablet Take 1 tablet by mouth See Admin Instructions (Patient not taking: Reported on 11/2/2021) 74 tablet 0    atorvastatin (LIPITOR) 20 MG tablet Take 1 tablet by mouth daily (Patient not taking: Reported on 11/2/2021) 30 tablet 5    primidone (MYSOLINE) 50 MG tablet Take 1 tablet by mouth nightly (Patient not taking: Reported on 11/2/2021) 30 tablet 3     No current facility-administered medications for this visit. ROS  CONSTITUTIONAL: The patient denies fevers, chills, sweats and body ache. HEENT: Denies headache, blurry vision, eye pain, tinnitus, vertigo, admits to sore throat, denies neck or thyroid masses. Admits to cracked lips. RESPIRATORY: admits to shortness of breath on exertion (specifically when pushing up the cart)  CARDIAC: Denies chest pain, pressure, palpitations, Denies lower extremity edema. GASTROINTESTINAL: Denies abdominal pain, constipation, diarrhea, bleeding in the stools,   GENITOURINARY: Denies dysuria, hematuria, nocturia or frequency, urinary incontinence. NEUROLOGIC: Denies headaches, admits to dizziness, denies syncope, weakness  MUSCULOSKELETAL: denies changes in range of motion, joint pain, stiffness. ENDOCRINOLOGY: Denies heat or cold intolerance. HEMATOLOGY: Denies easy bleeding or blood transfusion,anemia  DERMATOLOGY: Admits to nail fungus on her toenails. PSYCHIATRY: Denies depression, agitation or anxiety.     Past Medical History:   Diagnosis Date    Asthma 2015    Bilateral renal cysts 2013    Depression     since age 35, [facility] Dr Laisha Stone, [facility] Diverticulosis of sigmoid colon 2012    Dr Cordelia STOKESD of left shoulder     Environmental allergies     Fatty liver 2013    GERD (gastroesophageal reflux disease)     History of cardiac arrhythmia     \"due to stress, was placed on medication\"    Hydatidiform mole     age 26     Hyperlipidemia     Hypothyroidism 2011    IFG (impaired fasting glucose) 2013    Melanoma of eye ([address]     age 30, R eye prosthesis, Dr Sean Swan    Obesity     Prediabetes     PTSD (post-traumatic stress disorder)     age 35, [facility]    S/P colonoscopy 04/19/2012    Dr. Roberto Jackman S/P hysterectomy with oophorectomy 2012    Dr Mukund Babcock Tremor     Dr Patience Hirsch Vitamin D deficiency Past Surgical History:   Procedure Laterality Date    BREAST BIOPSY      DILATION AND CURETTAGE OF UTERUS      ENDOMETRIAL ABLATION  6/2012    EYE REMOVAL      OD for melanoma, age 30    FOOT OSTEOTOMY Left 09/23/2016    B AUGUSTINA DPM      BALDOMERO STEROTACTIC LOC BREAST BIOPSY RIGHT Right 7/19/2021    BALDOMERO STEROTACTIC LOC BREAST BIOPSY RIGHT 7/19/2021 [facility]    LINDSAY AND BSO  2012    Dr Francesco Nova        Family History   Problem Relation Age of Onset    Heart Failure Mother         dec 76    Diabetes Mother     Diabetes Father     Stroke Father         dec age 80    Cancer Father         Sarcoma    Stomach Cancer Other     Breast Cancer Paternal Aunt     Breast Cancer Maternal Aunt         Social History     Socioeconomic History    Marital status:      Spouse name: Not on file    Number of children: 3    Years of education: 15    Highest education level: High school graduate   Occupational History    Occupation: SSI   Tobacco Use    Smoking status: Never Smoker    Smokeless tobacco: Never Used   Vaping Use    Vaping Use: Never used   Substance and Sexual Activity    Alcohol use: No    Drug use: No    Sexual activity: Not Currently     Birth control/protection: Surgical   Other Topics Concern    Not on file   Social History Narrative    Born in [address] [place] [address], one of 4 children, one sister disappeared at age 30, never found    Moved to [place] at age 40        Lives in an apartment in [place], alone    , 3 children, all grown, in [place]     2 granddaughters     Hobbies exercising, birds    Attends [employer], has volunteered at [address] [employer] Strain: Low Risk     Difficulty of Paying Living Expenses: Not hard at all   Food Insecurity: No Food Insecurity    Worried About [address] in the Last Year: Never true    [address] in the Last Year: Never true   Transportation Needs:     Lack of Transportation (Medical):  Lack of Transportation (Non-Medical):    Physical Activity:     Days of Exercise per Week:     Minutes of Exercise per Session:    Stress:     Feeling of Stress :    Social Connections:     Frequency of Communication with Friends and Family:     Frequency of Social Gatherings with Friends and Family:     Attends Rastafarian Services:     Active Member of Clubs or Organizations:     Attends Club or Organization Meetings:     Marital Status:    Intimate Partner Violence:     Fear of Current or Ex-Partner:     Emotionally Abused:     Physically Abused:     Sexually Abused:         /68   Pulse 84   Temp 97.5 °F (36.4 °C) (Temporal)   Ht 5' 6\" (1.676 m)   Wt 236 lb 9.6 oz (107.3 kg)   SpO2 97%   BMI 38.19 kg/m²        Physical Exam:    General appearance - alert, well appearing, and in no distress, obese  Mental Status - alert, oriented to person, place, and time  Eyes - pupils equal and reactive, extraocular eye movements intact   Ears - bilateral TM's and external ear canals normal   Nose - normal and patent, no erythema, discharge or polyps   Sinuses - Normal paranasal sinuses without tenderness   Throat - mucous membranes moist, pharynx normal without lesions   Neck - supple, no significant adenopathy   Thyroid - thyroid is normal in size without nodules or tenderness    Chest - clear to auscultation, no wheezes, rales or rhonchi, symmetric air entry   Heart - tacycardic  Abdomen - soft, nontender, nondistended, no masses or organomegaly   Back exam - full range of motion, no tenderness, palpable spasm or pain on motion   Neurological - upper extremity tremors  Musculoskeletal -slight swelling in the upper extremities bilaterally. Extremities - non pitting swelling noted in the left arm. No erythema or drainage noted from the arm. Skin - normal coloration and turgor, no rashes, no suspicious skin lesions noted.   On the left foot there is a hypertrophic toenail that is yellow discoloration. Black discoloration in the first digit of the right toenail. Labs       TSH   Date Value Ref Range Status   09/15/2021 1.900 0.440 - 3.860 uIU/mL Final   05/05/2021 4.420 (H) 0.440 - 3.860 uIU/mL Final     TSH   Date Value Ref Range Status   10/25/2021 1.670 0.440 - 3.860 uIU/mL Final   08/23/2021 2.180 0.440 - 3.860 uIU/mL Final   02/07/2020 2.700 0.440 - 3.860 uIU/mL Final   06/14/2019 2.430 0.440 - 3.860 uIU/mL Final   06/11/2018 3.820 0.270 - 4.200 uIU/mL Final   06/07/2017 3.500 0.270 - 4.200 uIU/mL Final   01/27/2017 3.810 0.270 - 4.200 uIU/mL Final   03/04/2016 1.990 0.270 - 4.200 uIU/mL Final   11/20/2015 1.940 0.270 - 4.200 uIU/mL Final   08/28/2015 1.970 0.270 - 4.200 uIU/mL Final   10/25/2014 3.130 0.270 - 4.200 uIU/mL Final   12/11/2013 3.910 0.270 - 4.200 uIU/mL Final   04/04/2013 1.346 0.550 - 4.780 uIU/mL Final   10/16/2012 1.607 0.550 - 4.780 uIU/mL Final   05/14/2012 1.771 0.550 - 4.780 uIU/mL Final   03/22/2012 4.082 0.550 - 4.780 uIU/mL Final     Lab Results   Component Value Date     10/25/2021    K 4.9 10/25/2021    CL 99 10/25/2021    CO2 19 (L) 10/25/2021    BUN 11 10/25/2021    CREATININE 0.92 (H) 10/25/2021    GLUCOSE 157 (H) 10/25/2021    CALCIUM 9.5 10/25/2021    PROT 6.7 10/25/2021    LABALBU 4.3 10/25/2021    BILITOT 0.4 10/25/2021    ALKPHOS 80 10/25/2021    AST 49 (H) 10/25/2021    ALT 43 (H) 10/25/2021    LABGLOM >60.0 10/25/2021    GFRAA >60.0 10/25/2021    GLOB 2.4 10/25/2021       Lab Results   Component Value Date    WBC 10.4 10/25/2021    HGB 15.4 10/25/2021    HCT 44.7 10/25/2021    MCV 92.9 10/25/2021     10/25/2021     Lab Results   Component Value Date    LABA1C 5.7 05/19/2021     No results found for: EAG      A/P: Mia Galan 62 y.o. female presenting for     1. Onychomycosis  Continue to follow-up with podiatry. Stable at this time. 2. Tachycardia  Heart rate has improved.   Continue with medications given by cardiology. 3. Atrial flutter, unspecified type ([address]  Continue with Eliquis given the atrial flutter as well as some subsegmental pulmonary embolism. 4. Acquired hypothyroidism  Lab Results   Component Value Date    TSH 1.670 10/25/2021         5. Obesity (BMI 30-39.9)      6. Single subsegmental pulmonary embolism without acute cor pulmonale (HCC)  Continue with Eliquis. Please note, this report has been partially produced using speech recognition software  and may cause  and /or contain errors related to that system including grammar, punctuation and spelling as well as words and phrases that may seem inappropriate. If there are questions or concerns please feel free to contact me to clarify.

## 2021-11-08 ENCOUNTER — TELEPHONE (OUTPATIENT)
Dept: CARDIOLOGY CLINIC | Age: 61
End: 2021-11-08

## 2021-11-08 RX ORDER — MEDICAL SUPPLY, MISCELLANEOUS
1 EACH MISCELLANEOUS ONCE
Qty: 1 EACH | Refills: 0 | Status: SHIPPED | OUTPATIENT
Start: 2021-11-08 | End: 2021-11-10 | Stop reason: SDUPTHER

## 2021-11-08 RX ORDER — PRAVASTATIN SODIUM 40 MG
TABLET ORAL
Qty: 90 TABLET | Refills: 1 | OUTPATIENT
Start: 2021-11-08

## 2021-11-08 RX ORDER — MECLIZINE HCL 12.5 MG/1
12.5 TABLET ORAL NIGHTLY
Qty: 15 TABLET | Refills: 0 | Status: SHIPPED | OUTPATIENT
Start: 2021-11-08 | End: 2021-11-10 | Stop reason: SDUPTHER

## 2021-11-08 NOTE — TELEPHONE ENCOUNTER
Patient stating over the last couple days her dizziness has increased in frequency and severity. Mostly in the mornings. She does take her midodrine for hypotension. She does not check home blood pressures (no machine) to know if it is related to BP. She takes no medication for dizziness. She has been taking her movements slowly as previously advised. Please advise.

## 2021-11-10 ENCOUNTER — TELEPHONE (OUTPATIENT)
Dept: FAMILY MEDICINE CLINIC | Age: 61
End: 2021-11-10

## 2021-11-10 DIAGNOSIS — K21.9 GASTROESOPHAGEAL REFLUX DISEASE WITHOUT ESOPHAGITIS: ICD-10-CM

## 2021-11-10 DIAGNOSIS — E03.9 ACQUIRED HYPOTHYROIDISM: ICD-10-CM

## 2021-11-10 DIAGNOSIS — E53.9 VITAMIN B DEFICIENCY: ICD-10-CM

## 2021-11-10 DIAGNOSIS — E78.5 HYPERLIPIDEMIA, UNSPECIFIED HYPERLIPIDEMIA TYPE: ICD-10-CM

## 2021-11-10 DIAGNOSIS — E53.8 VITAMIN B12 DEFICIENCY: ICD-10-CM

## 2021-11-10 DIAGNOSIS — I95.1 ORTHOSTATIC HYPOTENSION: Primary | ICD-10-CM

## 2021-11-10 DIAGNOSIS — M62.838 NECK MUSCLE SPASM: ICD-10-CM

## 2021-11-10 DIAGNOSIS — I48.92 ATRIAL FLUTTER, UNSPECIFIED TYPE (HCC): ICD-10-CM

## 2021-11-10 DIAGNOSIS — E55.9 VITAMIN D DEFICIENCY: ICD-10-CM

## 2021-11-10 RX ORDER — LEVOTHYROXINE SODIUM 0.05 MG/1
50 TABLET ORAL DAILY
Qty: 90 TABLET | Refills: 0 | Status: SHIPPED | OUTPATIENT
Start: 2021-11-10 | End: 2021-12-14 | Stop reason: SDUPTHER

## 2021-11-10 RX ORDER — MECLIZINE HCL 12.5 MG/1
12.5 TABLET ORAL NIGHTLY
Qty: 15 TABLET | Refills: 0 | OUTPATIENT
Start: 2021-11-10 | End: 2021-11-20

## 2021-11-10 RX ORDER — MIDODRINE HYDROCHLORIDE 10 MG/1
TABLET ORAL
Qty: 56 TABLET | Refills: 2 | OUTPATIENT
Start: 2021-11-10

## 2021-11-10 NOTE — TELEPHONE ENCOUNTER
Please approve or deny this refill request. The order is pended. Thank you.     LOV 10/7/2021    Next Visit Date:  Future Appointments   Date Time Provider Jay Wong   11/17/2021  9:30 AM Saint Proctor, MD [address]   12/16/2021  2:00 PM Ranjan Lopez DPM [facility] OP POD [facility] EMERGENCY [facility] AT [place]   2/2/2022 10:45 AM Karina Pierre MD [address] [place], [address]

## 2021-11-10 NOTE — TELEPHONE ENCOUNTER
Please approve or deny this refill request. The order is pended. Thank you.     LOV 10/7/2021    Next Visit Date:  Future Appointments   Date Time Provider Jay Judith   11/17/2021  9:30 AM Fatou Felder MD [address]   12/16/2021  2:00 PM Nikole Scott DPM MLOX OP POD [facility] EMERGENCY [facility] AT [place]   2/2/2022 10:45 AM Trevor Nissen,  [place], [place] [address]

## 2021-11-10 NOTE — TELEPHONE ENCOUNTER
Patient called to report that she had her appointment with Dr. Taylor Mendosa.  She is scheduled to see Dr. Faustino Wilkerson on 11/17. Patient is waiting to get her diabetic shoes. She states that [employer] told her they are waiting on a form from her doctor. Thank you.

## 2021-11-10 NOTE — TELEPHONE ENCOUNTER
Requesting medication refill.  Please approve or deny this request.    Rx requested:  Requested Prescriptions     Pending Prescriptions Disp Refills    levothyroxine (SYNTHROID) 50 MCG tablet 90 tablet 0     Sig: Take 1 tablet by mouth Daily       Last Office Visit:   11/02/2021    Last Filled:      Last Labs:      Next Visit Date:  Future Appointments   Date Time Provider Jay Wong   11/17/2021  9:30 AM Danish Goldsmith MD [facility]   12/16/2021  2:00 PM Leslie Vanegas DPM MLOX OP POD [facility] EMERGENCY [facility] AT [place]   2/2/2022 10:45 AM Nate Borjas  [place], [place]

## 2021-11-11 RX ORDER — CHOLECALCIFEROL (VITAMIN D3) 25 MCG
CAPSULE ORAL
Qty: 30 CAPSULE | Refills: 5 | Status: SHIPPED | OUTPATIENT
Start: 2021-11-11 | End: 2021-12-14 | Stop reason: SDUPTHER

## 2021-11-11 RX ORDER — MECLIZINE HCL 12.5 MG/1
12.5 TABLET ORAL NIGHTLY
Qty: 10 TABLET | Refills: 0 | Status: SHIPPED | OUTPATIENT
Start: 2021-11-11 | End: 2021-11-19

## 2021-11-11 RX ORDER — ASPIRIN 81 MG/1
TABLET ORAL
Qty: 30 TABLET | Refills: 3 | Status: SHIPPED | OUTPATIENT
Start: 2021-11-11

## 2021-11-11 RX ORDER — MEDICAL SUPPLY, MISCELLANEOUS
EACH MISCELLANEOUS
Qty: 1 EACH | Refills: 0 | Status: SHIPPED | OUTPATIENT
Start: 2021-11-11

## 2021-11-11 RX ORDER — MIDODRINE HYDROCHLORIDE 10 MG/1
TABLET ORAL
Qty: 60 TABLET | Refills: 5 | Status: SHIPPED | OUTPATIENT
Start: 2021-11-11 | End: 2022-02-28

## 2021-11-11 RX ORDER — METOPROLOL TARTRATE 50 MG/1
50 TABLET, FILM COATED ORAL 2 TIMES DAILY
Qty: 60 TABLET | Refills: 3 | Status: SHIPPED | OUTPATIENT
Start: 2021-11-11 | End: 2021-12-14 | Stop reason: SDUPTHER

## 2021-11-11 RX ORDER — LANOLIN ALCOHOL/MO/W.PET/CERES
1 CREAM (GRAM) TOPICAL DAILY
Qty: 30 TABLET | Refills: 3 | Status: SHIPPED | OUTPATIENT
Start: 2021-11-11 | End: 2022-05-04 | Stop reason: SDUPTHER

## 2021-11-11 RX ORDER — DOXEPIN HYDROCHLORIDE 50 MG/1
CAPSULE ORAL
Qty: 28 TABLET | Refills: 5 | Status: SHIPPED | OUTPATIENT
Start: 2021-11-11 | End: 2022-05-04 | Stop reason: SDUPTHER

## 2021-11-11 RX ORDER — BUDESONIDE AND FORMOTEROL FUMARATE DIHYDRATE 160; 4.5 UG/1; UG/1
2 AEROSOL RESPIRATORY (INHALATION) 2 TIMES DAILY
Qty: 1 EACH | Refills: 3 | Status: SHIPPED | OUTPATIENT
Start: 2021-11-11 | End: 2021-11-17

## 2021-11-11 RX ORDER — FAMOTIDINE 20 MG/1
TABLET, FILM COATED ORAL
Qty: 56 TABLET | Refills: 2 | Status: SHIPPED | OUTPATIENT
Start: 2021-11-11 | End: 2021-12-14 | Stop reason: SDUPTHER

## 2021-11-12 ENCOUNTER — TELEPHONE (OUTPATIENT)
Dept: FAMILY MEDICINE CLINIC | Age: 61
End: 2021-11-12

## 2021-11-12 DIAGNOSIS — L21.9 SEBORRHEIC DERMATITIS: ICD-10-CM

## 2021-11-12 RX ORDER — KETOCONAZOLE 20 MG/ML
SHAMPOO TOPICAL
Qty: 120 ML | Refills: 2 | Status: SHIPPED | OUTPATIENT
Start: 2021-11-12 | End: 2022-01-12

## 2021-11-12 NOTE — TELEPHONE ENCOUNTER
----- Message from Hanna Miguel sent at 87/71/5858 10:30 AM EST -----  Subject: Message to Provider    QUESTIONS  Information for Provider? PT WILL BE NEEDING A ORDER FOR DIABETIC SHOES   [facility] [address] [facility] [address]   PHONE [phone]   ---------------------------------------------------------------------------  --------------  Tyrone BURGESS  What is the best way for the office to contact you? OK to leave message on   voicemail  Preferred Call Back Phone Number? [phone]  ---------------------------------------------------------------------------  --------------  SCRIPT ANSWERS  Relationship to Patient?  Self

## 2021-11-12 NOTE — TELEPHONE ENCOUNTER
Please let patient know that we reviewed the paperwork. It looks like the paperwork is for [facility] and not [facility]ovany Szymanski. Hence, the paperwork was faxed to [facility]. If jory has different paperwork that says [facility]zym[facility], please have patient bring the paperwork to us. Thank you.

## 2021-11-15 ENCOUNTER — TELEPHONE (OUTPATIENT)
Dept: FAMILY MEDICINE CLINIC | Age: 61
End: 2021-11-15

## 2021-11-15 NOTE — TELEPHONE ENCOUNTER
Patient calling that she would like to be seen but does not have a ride at the moment to go to the walk in   [facility] reports that she is stable, has constipation, and noticed some blood. [facility]  is going to try and call again to get a ride.

## 2021-11-15 NOTE — TELEPHONE ENCOUNTER
----- Message from Gina Harrell sent at 11/15/2021 11:38 AM EST -----  Subject: Message to Provider    QUESTIONS  Information for Provider? Patient wanted to speak with Dr. Dori Bence , I let   her know she was busy and she would like for Dr. Dori Bence to call her back   please.  ---------------------------------------------------------------------------  --------------  [address]  What is the best way for the office to contact you? OK to leave message on   voicemail  Preferred Call Back Phone Number? [phone]  ---------------------------------------------------------------------------  --------------  SCRIPT ANSWERS  Relationship to Patient?  Self

## 2021-11-17 ENCOUNTER — OFFICE VISIT (OUTPATIENT)
Dept: CARDIOLOGY CLINIC | Age: 61
End: 2021-11-17
Payer: COMMERCIAL

## 2021-11-17 VITALS
OXYGEN SATURATION: 98 % | WEIGHT: 231 LBS | BODY MASS INDEX: 37.12 KG/M2 | HEART RATE: 89 BPM | SYSTOLIC BLOOD PRESSURE: 138 MMHG | DIASTOLIC BLOOD PRESSURE: 72 MMHG | HEIGHT: 66 IN

## 2021-11-17 DIAGNOSIS — J45.909 REACTIVE AIRWAY DISEASE WITHOUT COMPLICATION, UNSPECIFIED ASTHMA SEVERITY, UNSPECIFIED WHETHER PERSISTENT: Primary | ICD-10-CM

## 2021-11-17 DIAGNOSIS — M17.12 OSTEOARTHRITIS OF LEFT KNEE, UNSPECIFIED OSTEOARTHRITIS TYPE: ICD-10-CM

## 2021-11-17 DIAGNOSIS — J45.30 MILD PERSISTENT ASTHMA WITHOUT COMPLICATION: ICD-10-CM

## 2021-11-17 DIAGNOSIS — R55 SYNCOPE, UNSPECIFIED SYNCOPE TYPE: ICD-10-CM

## 2021-11-17 DIAGNOSIS — M25.551 RIGHT HIP PAIN: ICD-10-CM

## 2021-11-17 PROCEDURE — G8417 CALC BMI ABV UP PARAM F/U: HCPCS | Performed by: INTERNAL MEDICINE

## 2021-11-17 PROCEDURE — 99214 OFFICE O/P EST MOD 30 MIN: CPT | Performed by: INTERNAL MEDICINE

## 2021-11-17 PROCEDURE — G8482 FLU IMMUNIZE ORDER/ADMIN: HCPCS | Performed by: INTERNAL MEDICINE

## 2021-11-17 PROCEDURE — 3017F COLORECTAL CA SCREEN DOC REV: CPT | Performed by: INTERNAL MEDICINE

## 2021-11-17 PROCEDURE — G9899 SCRN MAM PERF RSLTS DOC: HCPCS | Performed by: INTERNAL MEDICINE

## 2021-11-17 PROCEDURE — G8427 DOCREV CUR MEDS BY ELIG CLIN: HCPCS | Performed by: INTERNAL MEDICINE

## 2021-11-17 PROCEDURE — 1036F TOBACCO NON-USER: CPT | Performed by: INTERNAL MEDICINE

## 2021-11-17 RX ORDER — BENZTROPINE MESYLATE 1 MG/1
TABLET ORAL
COMMUNITY
Start: 2021-11-15 | End: 2021-12-14 | Stop reason: SDUPTHER

## 2021-11-17 RX ORDER — CALCIUM CARBONATE 160(400)MG
1 TABLET,CHEWABLE ORAL ONCE
Qty: 1 EACH | Refills: 0 | Status: SHIPPED | OUTPATIENT
Start: 2021-11-17 | End: 2021-11-17

## 2021-11-17 RX ORDER — ARIPIPRAZOLE 15 MG/1
TABLET ORAL
COMMUNITY
Start: 2021-11-13 | End: 2021-12-14 | Stop reason: SDUPTHER

## 2021-11-17 RX ORDER — BUDESONIDE AND FORMOTEROL FUMARATE DIHYDRATE 160; 4.5 UG/1; UG/1
AEROSOL RESPIRATORY (INHALATION)
Qty: 10.2 G | Refills: 3 | Status: SHIPPED | OUTPATIENT
Start: 2021-11-17 | End: 2021-12-14

## 2021-11-17 ASSESSMENT — ENCOUNTER SYMPTOMS
TROUBLE SWALLOWING: 0
APNEA: 0
VOICE CHANGE: 0
WHEEZING: 0
SHORTNESS OF BREATH: 1
NAUSEA: 0
ABDOMINAL DISTENTION: 0
VOMITING: 0
BLOOD IN STOOL: 0
COLOR CHANGE: 0
FACIAL SWELLING: 0
ANAL BLEEDING: 0
CHEST TIGHTNESS: 0

## 2021-11-17 NOTE — PROGRESS NOTES
[facility] CARDIOLOGY OFFICE FOLLOW-UP      Patient: Brittnee Sellers  YOB: 1960  MRN: [id number]    Chief Complaint:  Chief Complaint   Patient presents with    1 Month Follow-Up    Atrial Fibrillation    Chest Pain    Shortness of Breath         Subjective/HPI:  11/17/21: Patient presents today for follow-up of atrial fibrillation and PE. She was hospitalized few months ago. Discharged home on Eliquis. She was initiated on the DVT protocol. Very pleasant. Very mobile. Will give her a walker to see if that will encourage ambulation. She lives on the 3rd floor. She was brought to the office by transport service. Denies smoking denies alcohol abuse no bleeding. She had a small pulmonary embolism on the right. Reportedly had acute cor pulmonale. She is moderately obese. Has PTSD. No significant ankle edema. Mildly tender. Again not much ambulation      10/7/2021: Patient presents today for follow-up of recent hospitalization for atrial fib and PE. Was discharged home on Eliquis DVT protocol she is also on Lopressor 50 twice daily. Gets dizzy lightheaded. She is on midodrine for recently and not clear to me. She has a history of orthostasis. Does not smoke. She is not . Does not drive. Her friend brought her here. She sees Dr. Juan Deal. No chest pain. Get short of breath. On mild-to-moderate exertion. No ankle edema. 6/14/21: Patient presents today for follow-up of dizziness. Midodrine did not make too much difference but she will continue with the same for now. Still dizzy. On metoprolol hypothyroidism. On multiple psych medication including Celexa, Cogentin trazodone and Wellbutrin. Am not sure if any of these medication making her dizzy. Has not had syncope. She did fall down 3 days ago and hurt her shoulder. It was more like a mechanical fall rather than syncope. He did get the Covid vaccine. Does not smoke. Continue with the midodrine.   See me in 3  Occupation: [employer]   Tobacco Use    Smoking status: Never Smoker    Smokeless tobacco: Never Used   Vaping Use    Vaping Use: Never used   Substance and Sexual Activity    Alcohol use: No    Drug use: No    Sexual activity: Not Currently     Birth control/protection: Surgical   Other Topics Concern    None   Social History Narrative    Born in [address] [place] [address], one of 4 children, one sister disappeared at age 30, never found    Moved to [address] [place] [address] Dr at age 40        Lives in an apartment in [facility], alone    , 3 children, all grown, in [place]     2 granddaughters     Hobbies exercising, birds    Attends [employer], has volunteered at [id number] Strain: Low Risk     Difficulty of Paying Living Expenses: Not hard at all   Food Insecurity: No Food Insecurity    Worried About [facility] in the Last Year: Never true    Anurag of Food in the Last Year: Never true   Transportation Needs:     Lack of Transportation (Medical): Not on file    Lack of Transportation (Non-Medical):  Not on file   Physical Activity:     Days of Exercise per Week: Not on file    Minutes of Exercise per Session: Not on file   Stress:     Feeling of Stress : Not on file   Social Connections:     Frequency of Communication with Friends and Family: Not on file    Frequency of Social Gatherings with Friends and Family: Not on file    Attends Christianity Services: Not on file    Active Member of [address] [place] [address] or Organizations: Not on file    Attends Club or Organization Meetings: Not on file    Marital Status: Not on file   Intimate Partner Violence:     Fear of Current or Ex-Partner: Not on file    Emotionally Abused: Not on file    Physically Abused: Not on file    Sexually Abused: Not on file   Housing Stability:     Unable to Pay for Housing in the Last Year: Not on file    Number of [place] in the Last Year: Not on file    Unstable Housing in the Last Year: Not on file       Allergies   Allergen Reactions    Bee Venom        Current Outpatient Medications   Medication Sig Dispense Refill    ARIPiprazole (ABILIFY) 15 MG tablet       benztropine (COGENTIN) 1 MG tablet       Misc. Devices (ROLLATOR ULTRA-LIGHT) MISC 1 each by Does not apply route once for 1 dose 1 each 0    ketoconazole (NIZORAL) 2 % shampoo Apply topically daily as needed.  120 mL 2    Biotin 300 MCG TABS Take 1 tablet by mouth daily 30 tablet 3    cyanocobalamin ([employer] VITAMIN B12) 1000 MCG tablet Take 1 tablet by mouth daily 30 tablet 5    metoprolol tartrate (LOPRESSOR) 50 MG tablet Take 1 tablet by mouth 2 times daily 60 tablet 3    aspirin (ASPIRIN LOW DOSE) 81 MG EC tablet TAKE 1 TABLET BY MOUTH ONCE A DAY (AM) (Patient not taking: Reported on 11/17/2021) 30 tablet 3    famotidine (PEPCID) 20 MG tablet TAKE 1 TABLET BY MOUTH TWICE DAILY (AM,PM) 56 tablet 2    Multiple Vitamin (TAB-A-MANDI/BETA CAROTENE) TABS TAKE 1 TABLET BY MOUTH EVERY DAY (AM) 28 tablet 5    vitamin D (VITAMIN D HIGH POTENCY) 25 MCG (1000 UT) CAPS TAKE 1 CAPSULE BY MOUTH DAILY WITH SUPPER (PM) 30 capsule 5    midodrine (PROAMATINE) 10 MG tablet TAKE 1 TABLET BY MOUTH TWICE DAILY(AM,PM) 60 tablet 5    Blood Pressure Monitoring (B-D ASSURE BPM/AUTO ARM CUFF) MISC Use as directed 1 each 0    meclizine (ANTIVERT) 12.5 MG tablet Take 1 tablet by mouth nightly for 10 days 10 tablet 0    levothyroxine (SYNTHROID) 50 MCG tablet Take 1 tablet by mouth Daily 90 tablet 0    metFORMIN (GLUCOPHAGE) 500 MG tablet Take 500 mg by mouth 2 times daily (with meals)      atorvastatin (LIPITOR) 20 MG tablet Take 1 tablet by mouth daily 30 tablet 5    apixaban (ELIQUIS) 5 MG TABS tablet Take 1 tablet by mouth 2 times daily START when Eliquis Starter pack completed 60 tablet 3    albuterol sulfate  (90 Base) MCG/ACT inhaler USE 2 PUFFS EVERY 4 HRS AS NEEDED FOR WHEEZING/SOB-SPACE OUT TO EVERY 6 HR AS SYMPTOMS IMPROVE 1 Inhaler 3    EPINEPHrine (EPIPEN 2-PRAVEENA) 0.3 MG/0.3ML SOAJ injection Use as directed for allergic reaction 2 each 2    SYMBICORT 160-4.5 MCG/ACT AERO INHALE 2 PUFFS INTO THE LUNGS 2 TIMES DAILY 10.2 g 3    diclofenac sodium (VOLTAREN) 1 % GEL Apply 2 g topically 4 times daily (Patient not taking: Reported on 11/17/2021) 100 g 2    cyclobenzaprine (FLEXERIL) 10 MG tablet Take 10 mg by mouth daily (Patient not taking: Reported on 11/17/2021)      primidone (MYSOLINE) 50 MG tablet Take 1 tablet by mouth nightly (Patient not taking: Reported on 11/2/2021) 30 tablet 3     No current facility-administered medications for this visit. Review of Systems:   Review of Systems   Constitutional: Negative for activity change, appetite change, diaphoresis, fatigue and unexpected weight change. HENT: Negative for facial swelling, nosebleeds, trouble swallowing and voice change. Respiratory: Positive for shortness of breath. Negative for apnea, chest tightness and wheezing. Cardiovascular: Positive for chest pain and leg swelling. Negative for palpitations. Gastrointestinal: Negative for abdominal distention, anal bleeding, blood in stool, nausea and vomiting. Genitourinary: Negative for decreased urine volume and dysuria. Musculoskeletal: Negative for gait problem and myalgias. Skin: Negative for color change, pallor, rash and wound. Neurological: Positive for dizziness, weakness and light-headedness. Negative for syncope, facial asymmetry, numbness and headaches. Hematological: Does not bruise/bleed easily. Psychiatric/Behavioral: Negative for agitation, behavioral problems, confusion, hallucinations and suicidal ideas. The patient is not nervous/anxious. All other systems reviewed and are negative. Review of System is negative except for as mentioned above.       Physical Examination:    /72 (Site: Left Upper Arm, Position: Sitting, Cuff Size: Large Adult)   Pulse 89   Ht 5' 6\" (1.676 m)   Wt 231 lb (104.8 kg)   SpO2 98%   BMI 37.28 kg/m²    Physical Exam   Constitutional: She appears healthy. No distress. HENT:   Nose: Nose normal.   Mouth/Throat: Dentition is normal. Oropharynx is clear. Eyes: Pupils are equal, round, and reactive to light. Conjunctivae are normal.   Neck: Thyroid normal.   Cardiovascular: Regular rhythm, S1 normal, S2 normal, normal heart sounds, intact distal pulses and normal pulses. PMI is not displaced. No murmur heard. Pulmonary/Chest: She has no wheezes. She has no rales. She exhibits no tenderness. Abdominal: Soft. Bowel sounds are normal. She exhibits no distension and no mass. There is no splenomegaly or hepatomegaly. There is no abdominal tenderness. No hernia. Musculoskeletal:      Cervical back: Normal range of motion and neck supple. Neurological: She is alert and oriented to person, place, and time. She has normal motor skills. Gait normal.   Skin: Skin is warm and dry. No cyanosis. No jaundice. Nails show no clubbing. Patient Active Problem List   Diagnosis    PTSD (post-traumatic stress disorder)    Depression    Reactive airway disease    Environmental allergies    Hyperlipidemia    S/P colonoscopy    Fatty liver disease, nonalcoholic    Vitamin D deficiency    IFG (impaired fasting glucose)    Obesity (BMI 30-39. 9)    DJD of left shoulder    Asthma    Osteoarthritis of left knee    Cheilosis    Atrial flutter (HCC)    Anxiety    Diarrhea    Right hip pain    Acute pain of both shoulders    Left arm pain    Left elbow pain    Lung infiltrate    Essential tremor    Syncope    Orthostatic hypotension    History of melanoma    Pulmonary embolism on right (HCC)    Acute pulmonary embolism with acute cor pulmonale (HCC)    Choroidal nevus of left eye    Combined forms of age-related cataract of left eye               Assessment/Orders:   Pulmonary embolism  DVT  A. fib  Anticoagulated  PTSD  Generally compliant with medications  Hypotension on midodrine  Diabetes with neuropathy  Nonalcoholic fatty liver disease      Plan:  She needs to be anticoagulated for now. I'll revisit that option in 3 months when she sees me. We will probably get a venous Doppler both lower extremity at that time. She did get the Covid vaccine. Needs to walk more. Will give her walker so that can help her in exercising a little bit more  Be compliant with medications.   Avoid significant salt intake        See me in 3 months        Electronically signed by: Ирина Lu MD  11/19/2021 7:52 AM

## 2021-11-19 DIAGNOSIS — I95.1 ORTHOSTATIC HYPOTENSION: ICD-10-CM

## 2021-11-19 PROBLEM — H25.812 COMBINED FORMS OF AGE-RELATED CATARACT OF LEFT EYE: Status: ACTIVE | Noted: 2021-10-04

## 2021-11-19 PROBLEM — D31.32 CHOROIDAL NEVUS OF LEFT EYE: Status: ACTIVE | Noted: 2021-10-04

## 2021-11-19 RX ORDER — MECLIZINE HCL 12.5 MG/1
12.5 TABLET ORAL NIGHTLY PRN
Qty: 30 TABLET | Refills: 3 | Status: SHIPPED | OUTPATIENT
Start: 2021-11-19 | End: 2022-05-04 | Stop reason: SDUPTHER

## 2021-11-19 NOTE — TELEPHONE ENCOUNTER
Please approve or deny this refill request. The order is pended. Thank you.     LOV 11/17/2021    Next Visit Date:  Future Appointments   Date Time Provider Jay Wong   12/16/2021  2:00 PM Hilario Arnold [facility] OP POD [facility] EM[facility] [place]   2/2/2022 10:45 AM Jf Claudio MD [facility]   2/28/2022 12:30 PM Madan Lockett MD [facility]

## 2021-11-23 ENCOUNTER — TELEPHONE (OUTPATIENT)
Dept: FAMILY MEDICINE CLINIC | Age: 61
End: 2021-11-23

## 2021-11-23 NOTE — TELEPHONE ENCOUNTER
Jocelyn Reynaga called office asking if Dr Samanta Thrasher for help her with her advance directives paperwork. Verified with Jocelyn Reynaga he future scheduled appt 1/3/2021. At time of call, soonest available was 1/4/2021 and Jocelyn Juhi had no other requests than to update her advance directives paperwork.

## 2021-11-24 ENCOUNTER — TELEPHONE (OUTPATIENT)
Dept: FAMILY MEDICINE CLINIC | Age: 61
End: 2021-11-24

## 2021-11-24 ENCOUNTER — CLINICAL DOCUMENTATION (OUTPATIENT)
Dept: SPIRITUAL SERVICES | Age: 61
End: 2021-11-24

## 2021-11-24 DIAGNOSIS — Z78.9 NO ADVANCE DIRECTIVE ON FILE: Primary | ICD-10-CM

## 2021-11-24 NOTE — TELEPHONE ENCOUNTER
----- Message from Denzel Fair sent at 11/22/2021 10:43 AM EST -----  Subject: Message to Provider    QUESTIONS  Information for Provider? PATIENT HAS CONCERNS ABOUT HER COUNSELOR   \"FORCING HER\" TO PUT HER SISTERS NAME AND NUMBER ON HER ADVANCED   DIRECTIVES, SHE WOULD LIKE DR GALLEGOS HELP WITH THIS.   ---------------------------------------------------------------------------  --------------  CALL BACK INFO  What is the best way for the office to contact you? OK to leave message on   voicemail  Preferred Call Back Phone Number? [phone]  ---------------------------------------------------------------------------  --------------  SCRIPT ANSWERS  Relationship to Patient?  Self

## 2021-11-24 NOTE — TELEPHONE ENCOUNTER
Placed in referral for ACP to reach out to patient and assist her with the documents. Please let her know.

## 2021-11-24 NOTE — PROGRESS NOTES
Advance Care Planning    Ambulatory ACP Specialist Patient Outreach    Date:  11/24/2021  ACP Specialist:  TITO Biggs    Outreach call to patient in follow-up to ACP Specialist referral from: Steven Houser MD    [x] PCP  [] Provider   [] Ambulatory Care Management [] Other for Reason:        [] Early ACP Decision-Making   [x] Advance Directive Assistance   [] Discuss Goals of Care   [] Code Status Discussion   [] Completion of Portable DNR order   [] Complete POST/MOST   [] Other (Specify)    Date Referral Received: 11/24/21    Today's Outreach:  [x] First   [] Second  [] Third                               Third outreach made by []  phone  [] email []   [employer]     Intervention:  [] Spoke with Patient  [] Left VM requesting return call      Outcome: ACP Specialist spoke to patient and she is interested in an ACP conversation in person on 12/8 between 1:00-3:00 PM. Staff informed patient that a  would call her next week to coordinate place to meet and time of meeting. Next Step:   [] ACP scheduled conversation  [x] Outreach again in one week               [] Email / Mail ACP Info Sheets  [] Email / Mail Advance Directive            [] Close Referral. Routing closure to referring provider/staff and to ACP Specialist .      Thank you for this referral.

## 2021-11-27 DIAGNOSIS — J45.30 MILD PERSISTENT ASTHMA WITHOUT COMPLICATION: ICD-10-CM

## 2021-11-29 RX ORDER — ALBUTEROL SULFATE 90 UG/1
AEROSOL, METERED RESPIRATORY (INHALATION)
Qty: 18 G | Refills: 3 | Status: SHIPPED | OUTPATIENT
Start: 2021-11-29 | End: 2022-03-21

## 2021-11-29 NOTE — TELEPHONE ENCOUNTER
Future Appointments    Encounter Information    Provider Department Appt Notes   12/16/2021 Jane Carbajal [facility] Podiatry 9 week nail trim   1/3/2022 MD RENETTA Drew AT [facility] Appt Reason: Routine Existing Condition Follow Up   V.V. [phone] FOLLOW UP VISIT, NEEDS HELP WITH ADVANCE DIRECTIVES.  PT SCREENED GREEN   Booking Code: [id number]   2/2/2022 MD RENETTA Drew AT [facility] 3 Month follow up   2/28/2022 Joselyn Abad MD [facility] 3 months       Recent Visits    11/17/2021 Reactive airway disease without complication, unspecified asthma severity, unspecified whether persistent   [facility] [place] Joselyn Abad MD   11/02/2021 Onychomycosis   [facility] Shayne MD

## 2021-11-30 ENCOUNTER — VIRTUAL VISIT (OUTPATIENT)
Dept: FAMILY MEDICINE CLINIC | Age: 61
End: 2021-11-30
Payer: COMMERCIAL

## 2021-11-30 ENCOUNTER — TELEPHONE (OUTPATIENT)
Dept: FAMILY MEDICINE CLINIC | Age: 61
End: 2021-11-30

## 2021-11-30 DIAGNOSIS — R51.9 ACUTE NONINTRACTABLE HEADACHE, UNSPECIFIED HEADACHE TYPE: Primary | ICD-10-CM

## 2021-11-30 PROCEDURE — G8428 CUR MEDS NOT DOCUMENT: HCPCS | Performed by: NURSE PRACTITIONER

## 2021-11-30 PROCEDURE — G8417 CALC BMI ABV UP PARAM F/U: HCPCS | Performed by: NURSE PRACTITIONER

## 2021-11-30 PROCEDURE — 99213 OFFICE O/P EST LOW 20 MIN: CPT | Performed by: NURSE PRACTITIONER

## 2021-11-30 PROCEDURE — 1036F TOBACCO NON-USER: CPT | Performed by: NURSE PRACTITIONER

## 2021-11-30 PROCEDURE — G8482 FLU IMMUNIZE ORDER/ADMIN: HCPCS | Performed by: NURSE PRACTITIONER

## 2021-11-30 PROCEDURE — 3017F COLORECTAL CA SCREEN DOC REV: CPT | Performed by: NURSE PRACTITIONER

## 2021-11-30 PROCEDURE — G9899 SCRN MAM PERF RSLTS DOC: HCPCS | Performed by: NURSE PRACTITIONER

## 2021-11-30 RX ORDER — METHYLPREDNISOLONE 4 MG/1
TABLET ORAL
Qty: 1 KIT | Refills: 0 | Status: SHIPPED | OUTPATIENT
Start: 2021-11-30 | End: 2021-12-06

## 2021-11-30 ASSESSMENT — ENCOUNTER SYMPTOMS
BACK PAIN: 0
VOMITING: 0
SCALP TENDERNESS: 0
PHOTOPHOBIA: 0
EYE REDNESS: 0
EYE WATERING: 0
BLURRED VISION: 0
VISUAL CHANGE: 0
COUGH: 0
NAUSEA: 1

## 2021-11-30 NOTE — PROGRESS NOTES
Subjective:      Patient ID: Sandy Mansfield is a 62 y.o. female who presents today for:     Chief Complaint   Patient presents with    Headache       Headache   This is a new problem. The current episode started yesterday. The problem occurs constantly. The problem has been unchanged. The pain is located in the frontal region. The pain does not radiate. The pain quality is similar to prior headaches. The quality of the pain is described as shooting and aching. The pain is at a severity of 10/10. Associated symptoms include nausea. Pertinent negatives include no back pain, blurred vision, coughing, dizziness, ear pain, eye redness, eye watering, fever, phonophobia, photophobia, scalp tenderness, seizures, tingling, visual change, vomiting or weakness. She has tried acetaminophen for the symptoms. The treatment provided no relief. Her past medical history is significant for migraine headaches (years ago). There is no history of recent head traumas or sinus disease. was previously given medrol pack with good relief.      Past Medical History:   Diagnosis Date    Asthma 2015    Bilateral renal cysts 2013    Depression     since age 35, was with Dr Ashley Guzman, now the [facility] Diverticulosis of sigmoid colon 2012    Dr Karen Ruff DJD of left shoulder     Environmental allergies     Fatty liver 2013    GERD (gastroesophageal reflux disease)     History of cardiac arrhythmia     \"due to stress, was placed on medication\"    Hydatidiform mole     age 26     Hyperlipidemia     Hypothyroidism 2011    IFG (impaired fasting glucose) 2013    Melanoma of eye ([address]     age 30, R eye prosthesis, Dr Terra Lares    Obesity     Prediabetes     PTSD (post-traumatic stress disorder)     age 35, [facility]    S/P colonoscopy 04/19/2012    Dr. Evangelist Arita S/P hysterectomy with oophorectomy 2012    Dr Bakari Santiago    Tremor     Dr Mario Sims Vitamin D deficiency      Past Surgical History:   Procedure Laterality Date    BREAST BIOPSY      DILATION AND CURETTAGE OF UTERUS      ENDOMETRIAL ABLATION  6/2012    EYE REMOVAL      OD for melanoma, age 30    FOOT OSTEOTOMY Left 09/23/2016    B AUGUSTINA DPM      BALDOMERO STEROTACTIC LOC BREAST BIOPSY RIGHT Right 7/19/2021    BALDOMERO STEROTACTIC LOC BREAST BIOPSY RIGHT 7[facility]19/2021 [facility]    LINDSAY AND BSO  2012    Dr Neelima Reilly     Family History   Problem Relation Age of Onset    Heart Failure Mother         dec 76    Diabetes Mother     Diabetes Father     Stroke Father         dec age 80    Cancer Father         Sarcoma    Stomach Cancer Other     Breast Cancer Paternal Aunt     Breast Cancer Maternal Aunt      Social History     Socioeconomic History    Marital status:      Spouse name: Not on file    Number of children: 3    Years of education: 15    Highest education level: High school graduate   Occupational History    Occupation: [employer]SI   Tobacco Use    Smoking status: Never Smoker    Smokeless tobacco: Never Used   Vaping Use    Vaping Use: Never used   Substance and Sexual Activity    Alcohol use: No    Drug use: No    Sexual activity: Not Currently     Birth control/protection: Surgical   Other Topics Concern    Not on file   Social History Narrative    Born in [address] [place] [address], one of 4 children, one sister disappeared at age 30, never found    Moved to [place] at age 40        Lives in an apartment in [facility], alone    , 3 children, all grown, in [place]     2 granddaughters     Hobbies exercising, birds    Attends [employer], has volunteered at [address] Strain: Low Risk     Difficulty of Paying Living Expenses: Not hard at all   Food Insecurity: No Food Insecurity    Worried About [address] in the Last Year: Never true    [address] in the Last Year: Never true   Transportation Needs:     Lack of Transportation (Medical): Not on file    Lack of Transportation (Non-Medical):  Not on file   Physical Activity:     Days of Exercise per Week: Not on file    Minutes of Exercise per Session: Not on file   Stress:     Feeling of Stress : Not on file   Social Connections:     Frequency of Communication with Friends and Family: Not on file    Frequency of Social Gatherings with Friends and Family: Not on file    Attends Synagogue Services: Not on file    Active Member of Clubs or Organizations: Not on file    Attends Club or Organization Meetings: Not on file    Marital Status: Not on file   Intimate Partner Violence:     Fear of Current or Ex-Partner: Not on file    Emotionally Abused: Not on file    Physically Abused: Not on file    Sexually Abused: Not on file   Housing Stability:     Unable to Pay for Housing in the Last Year: Not on file    Number of Places Lived in the Last Year: Not on file    Unstable Housing in the Last Year: Not on file     Current Outpatient Medications on File Prior to Visit   Medication Sig Dispense Refill    aspirin (ASPIRIN LOW DOSE) 81 MG EC tablet TAKE 1 TABLET BY MOUTH ONCE A DAY (AM) (Patient not taking: Reported on 11/17/2021) 30 tablet 3    albuterol sulfate  (90 Base) MCG/ACT inhaler USE 2 PUFFS EVERY 4 HRS AS NEEDED FOR WHEEZING/SOB-SPACE OUT TO EVERY 6 HR AS SYMPTOMS IMPROVE 18 g 3    Cyanocobalamin (B-12) 1000 MCG TABS TAKE 1 TABLET BY MOUTH ONCE A DAY (NOON) 28 tablet 0    meclizine (ANTIVERT) 12.5 MG tablet Take 1 tablet by mouth nightly as needed for Dizziness 30 tablet 3    ARIPiprazole (ABILIFY) 15 MG tablet       benztropine (COGENTIN) 1 MG tablet       Misc. Devices (ROLLATOR ULTRA-LIGHT) MISC 1 each by Does not apply route once for 1 dose 1 each 0    SYMBICORT 160-4.5 MCG/ACT AERO INHALE 2 PUFFS INTO THE LUNGS 2 TIMES DAILY 10.2 g 3    ketoconazole (NIZORAL) 2 % shampoo Apply topically daily as needed.  120 mL 2    Biotin 300 MCG TABS Take 1 tablet by mouth daily 30 tablet 3    metoprolol tartrate (LOPRESSOR) 50 MG tablet Take 1 tablet by mouth 2 times daily 60 tablet 3    famotidine (PEPCID) 20 MG tablet TAKE 1 TABLET BY MOUTH TWICE DAILY (AM,PM) 56 tablet 2    Multiple Vitamin (TAB-A-MANDI/BETA CAROTENE) TABS TAKE 1 TABLET BY MOUTH EVERY DAY (AM) 28 tablet 5    vitamin D (VITAMIN D HIGH POTENCY) 25 MCG (1000 UT) CAPS TAKE 1 CAPSULE BY MOUTH DAILY WITH SUPPER (PM) 30 capsule 5    midodrine (PROAMATINE) 10 MG tablet TAKE 1 TABLET BY MOUTH TWICE DAILY(AM,PM) 60 tablet 5    Blood Pressure Monitoring (B-D ASSURE BPM/AUTO ARM CUFF) MISC Use as directed 1 each 0    diclofenac sodium (VOLTAREN) 1 % GEL Apply 2 g topically 4 times daily (Patient not taking: Reported on 11/17/2021) 100 g 2    levothyroxine (SYNTHROID) 50 MCG tablet Take 1 tablet by mouth Daily 90 tablet 0    cyclobenzaprine (FLEXERIL) 10 MG tablet Take 10 mg by mouth daily (Patient not taking: Reported on 11/17/2021)      metFORMIN (GLUCOPHAGE) 500 MG tablet Take 500 mg by mouth 2 times daily (with meals)      atorvastatin (LIPITOR) 20 MG tablet Take 1 tablet by mouth daily 30 tablet 5    apixaban (ELIQUIS) 5 MG TABS tablet Take 1 tablet by mouth 2 times daily START when Eliquis Starter pack completed 60 tablet 3    primidone (MYSOLINE) 50 MG tablet Take 1 tablet by mouth nightly (Patient not taking: Reported on 11/2/2021) 30 tablet 3    EPINEPHrine (EPIPEN 2-PRAVEENA) 0.3 MG/0.3ML SOAJ injection Use as directed for allergic reaction 2 each 2    [DISCONTINUED] Multiple Vitamins-Minerals (THERAPEUTIC MULTIVITAMIN-MINERALS) tablet Take 1 tablet by mouth daily 30 tablet 5    [DISCONTINUED] albuterol (PROVENTIL;VENTOLIN) 90 MCG/ACT inhaler Inhale 2 puffs into the lungs every 6 hours as needed. 1 Inhaler 6     No current facility-administered medications on file prior to visit. Allergies:  Bee venom    Review of Systems   Constitutional: Negative for chills, fatigue and fever. HENT: Negative for congestion and ear pain.     Eyes: Negative for blurred vision, photophobia and redness. Respiratory: Negative for cough. Gastrointestinal: Positive for nausea. Negative for vomiting. Musculoskeletal: Negative for back pain. Neurological: Positive for headaches. Negative for dizziness, tingling, seizures and weakness. Objective: There were no vitals taken for this visit. Physical Exam  Constitutional:       General: She is awake. She is not in acute distress. Appearance: Normal appearance. She is not ill-appearing or diaphoretic. HENT:      Head: Normocephalic. Right Ear: External ear normal.      Left Ear: External ear normal.      Nose: Nose normal.      Mouth/Throat:      Lips: Pink. Mouth: Mucous membranes are moist.   Pulmonary:      Effort: Pulmonary effort is normal. No respiratory distress. Skin:     Coloration: Skin is not ashen, cyanotic, jaundiced, mottled, pale or sallow. Neurological:      Mental Status: She is alert and oriented to person, place, and time. Psychiatric:         Mood and Affect: Mood normal.         Speech: Speech normal.         Behavior: Behavior normal. Behavior is cooperative. Assessment:          Diagnosis Orders   1. Acute nonintractable headache, unspecified headache type  methylPREDNISolone (MEDROL DOSEPACK) 4 MG tablet       Plan:      No orders of the defined types were placed in this encounter. Orders Placed This Encounter   Medications    methylPREDNISolone (MEDROL DOSEPACK) 4 MG tablet     Sig: Take by mouth. Dispense:  1 kit     Refill:  0       Return in about 2 weeks (around 12/14/2021) for evaluation with PCP. Medication as prescribed. F/u sooner if not resolving or if severe go to ER. Reviewed with the patient: current clinicalstatus, medications, activities and diet.      Side effects, adverse effects of the medication prescribedtoday, as well as treatment plan/ rationale and result expectations have been discussedwith the patient who expresses understanding and desires to proceed. Close follow upto evaluate treatment results and for coordination of care. I have reviewedthe patient's medical history in detail and updated the computerized patient record. TELEHEALTH EVALUATION -- Audio/Visual (During QFGQQ-21 public health emergency)    -   Emilee Abreu is a 62 y.o. female being evaluated by a Virtual Visit (video visit) encounter to address concerns as mentioned above. A caregiver was present when appropriate. Due to this being a TeleHealth encounter (During HWBQY-19 public health emergency), evaluation of the following organ systems was limited: Vitals/Constitutional/EENT/Resp/CV/GI//MS/Neuro/Skin/Heme-Lymph-Imm. Pursuant to the emergency declaration under the [address] [place] [address] authority and the [employer] Act, this Virtual Visit was conducted with patient's (and/or legal guardian's) consent, to reduce the patient's risk of exposure to COVID-19 and provide necessary medical care. The patient (and/or legal guardian) has also been advised to contact this office for worsening conditions or problems, and seek emergency medical treatment and/or call 911 if deemed necessary. Services were provided through a video synchronous discussion virtually to substitute for in-person clinic visit. Type of encounter was _X_ Doxy __ MyChart ___Facetime    Patient was located at their home. Provider was located at their ___ home or        __X__ office. --ARASH Del Angel - CNP on 11/30/2021 at 10:48 AM    An electronic signature was used to authenticate this note.

## 2021-11-30 NOTE — TELEPHONE ENCOUNTER
Patient called and is requesting a referral for psych and needs it to be as close to [place]. Said she had a referral and it is too far out. Please call patient and advise [phone]. Please talk loud enough as she states she is hard of hearing.

## 2021-11-30 NOTE — PATIENT INSTRUCTIONS
Patient Education        Tension Headache: Care Instructions  Overview  Most headaches are tension headaches. Some people get them often, especially if they have a lot of stress in their lives. This kind of headache may cause pain or a feeling of pressure all over your head. Sometimes it's hard to know where the center of the pain is. If you get a lot of these kind of headaches, the best way to reduce them is to find out what's causing them. Then you can make changes in those areas. Follow-up care is a key part of your treatment and safety. Be sure to make and go to all appointments, and call your doctor if you are having problems. It's also a good idea to know your test results and keep a list of the medicines you take. How can you care for yourself at home? · Rest in a quiet, dark room. Put a cool cloth on your forehead. Close your eyes, and try to relax or go to sleep. Do not watch TV, read, or use the computer. · Use a warm, moist towel or a heating pad set on low to relax tight shoulder and neck muscles. · Have someone gently massage your neck and shoulders. · Be safe with medicines. Read and follow all instructions on the label. ? If the doctor gave you a prescription medicine for pain, take it as prescribed. ? If you are not taking a prescription pain medicine, ask your doctor if you can take an over-the-counter medicine. · Be careful not to take more pain medicine than the instructions say. This is because you may get worse or more frequent headaches when the medicine wears off. · If you get a headache, stop what you are doing and sit quietly for a moment. Close your eyes and breathe slowly. Try to relax your head and neck muscles. · Pay attention to any new symptoms you have when you have a headache. These include a fever, weakness or numbness, vision changes, or confusion. They may be signs of a more serious problem. To help prevent headaches  · Keep a headache diary.  This can help you and your doctor figure out what triggers your headaches. If you avoid your triggers, you may be able to prevent headaches. · It's good to include several things in your headache diary. Write down when a headache begins and how long it lasts. Try to describe what the pain was like (throbbing, aching, stabbing, or dull). Then add anything you think may have triggered the headache. This could include stress, anxiety, or depression. It could also include hunger, anger, or fatigue. Sometimes, bad posture and muscle strain are triggers for people. · Find healthy ways to deal with stress. Headaches are most common during or right after stressful times. Take time to relax before and after you do something that caused a headache in the past.  · Get plenty of exercise every day. Go for a walk or jog, ride a bike, or find other ways to be active. This can help with stress and muscle tension. · Get regular sleep. · Eat regularly and well. If you wait too long to eat, it can trigger a headache. · If you have the time and money, you may want to try massage. Some people find that regular massages really help relieve tension. · Try to use good posture and keep the muscles of your jaw, face, neck, and shoulders relaxed. If you sit at a desk, change positions often. Try to stretch for 30 seconds every hour. · If you use a computer a lot, you can do things to make your eyes less tired. Try blinking more and sometimes looking away from the screen. Be sure to use glasses or contacts if you need them. And check that your monitor is about an arm's distance away from you. When should you call for help? Call 911 anytime you think you may need emergency care. For example, call if:    · You have signs of a stroke. These may include:  ? Sudden numbness, paralysis, or weakness in your face, arm, or leg, especially on only one side of your body. ? Sudden vision changes. ? Sudden trouble speaking.   ? Sudden confusion or trouble understanding simple statements. ? Sudden problems with walking or balance. ? A sudden, severe headache that is different from past headaches. Call your doctor now or seek immediate medical care if:    · You have new or worse nausea and vomiting.     · You have a new or higher fever.     · Your headache gets much worse. Watch closely for changes in your health, and be sure to contact your doctor if:    · You are not getting better after 2 days (48 hours). Where can you learn more? Go to https://[employer].Trigger.io. org and sign in to your [employer] account. Enter 84 17 17 in the [employer] box to learn more about \"Tension Headache: Care Instructions. \"     If you do not have an account, please click on the \"Sign Up Now\" link. Current as of: April 8, 2021               Content Version: 13.0  © 4847-6077 [employer]wise, Incorporated. Care instructions adapted under license by [facility] ([facility]). If you have questions about a medical condition or this instruction, always ask your healthcare professional. [place] [address] any warranty or liability for your use of this information.

## 2021-12-01 ENCOUNTER — CLINICAL DOCUMENTATION (OUTPATIENT)
Dept: SPIRITUAL SERVICES | Age: 61
End: 2021-12-01

## 2021-12-01 NOTE — TELEPHONE ENCOUNTER
Talked to Desean Ospina and let her know to check with her insurance and then schedule the appointment. No referral needed.

## 2021-12-02 ENCOUNTER — TELEPHONE (OUTPATIENT)
Dept: FAMILY MEDICINE CLINIC | Age: 61
End: 2021-12-02

## 2021-12-02 NOTE — TELEPHONE ENCOUNTER
----- Message from Aniyarazia Gonzalez sent at 12/2/2021 11:18 AM EST -----  Subject: Message to Provider    QUESTIONS  Information for Provider? Patient is concerned about her counselor. She   feels they don't think she is taking care of herself and telling her what   to do. Wants to talk and see if should see someone else.  ---------------------------------------------------------------------------  --------------  Ingo Money[address]  What is the best way for the office to contact you? OK to leave message on   voicemail  Preferred Call Back Phone Number? [phone]  ---------------------------------------------------------------------------  --------------  SCRIPT ANSWERS  Relationship to Patient?  Self

## 2021-12-02 NOTE — TELEPHONE ENCOUNTER
In order for me to put in referral for counselor, patient will need to ask her insurance what is covered under her insurance. Once she sees who is covered I will put in referral thank you.

## 2021-12-08 ENCOUNTER — CLINICAL DOCUMENTATION (OUTPATIENT)
Dept: SPIRITUAL SERVICES | Age: 61
End: 2021-12-08

## 2021-12-08 NOTE — PROGRESS NOTES
Advance Care Planning   Ambulatory ACP Specialist Patient Outreach    Date:  12/8/2021  ACP Specialist:  Jose Scott    Outreach call to patient in follow-up to ACP Specialist referral from: Johnathon Loyola MD    [] PCP  [] Provider   [] Ambulatory Care Management [x] Other for Reason:    [x] Advance Directive Assistance  [] Code Status Discussion  [] Complete Portable DNR Order  [] Discuss Goals of Care  [] Complete POST/MOST  [] Early ACP Decision-Making  [] Other    Date Referral Received:11/24/21    Today's Outreach:  [] First   [x] Second  [] Third                               Third outreach made by []  phone  [] email []   [employer]     Intervention:  [] Spoke with Patient  [x] Left VM requesting return call      Ty Crenshaw a message      Next Step:   [] ACP scheduled conversation  [] Outreach again in one week               [] Email / Mail [address]  [] Email / Mail Advance Directive            [] Close Referral. Routing closure to referring provider/staff and to ACP Specialist .      Thank you for this referral.

## 2021-12-14 RX ORDER — BUDESONIDE AND FORMOTEROL FUMARATE DIHYDRATE 160; 4.5 UG/1; UG/1
AEROSOL RESPIRATORY (INHALATION)
Qty: 10.2 G | Refills: 3 | Status: SHIPPED | OUTPATIENT
Start: 2021-12-14 | End: 2022-05-04 | Stop reason: SDUPTHER

## 2021-12-16 ENCOUNTER — OFFICE VISIT (OUTPATIENT)
Dept: PODIATRY | Age: 61
End: 2021-12-16
Payer: COMMERCIAL

## 2021-12-16 VITALS — BODY MASS INDEX: 35.2 KG/M2 | WEIGHT: 219 LBS | HEIGHT: 66 IN | TEMPERATURE: 97.1 F

## 2021-12-16 DIAGNOSIS — L60.3 NAIL DYSTROPHY: ICD-10-CM

## 2021-12-16 DIAGNOSIS — E11.42 DIABETIC POLYNEUROPATHY ASSOCIATED WITH TYPE 2 DIABETES MELLITUS (HCC): ICD-10-CM

## 2021-12-16 DIAGNOSIS — M62.838 NECK MUSCLE SPASM: ICD-10-CM

## 2021-12-16 DIAGNOSIS — M79.672 PAIN IN BOTH FEET: Primary | ICD-10-CM

## 2021-12-16 DIAGNOSIS — B35.1 DERMATOPHYTOSIS OF NAIL: ICD-10-CM

## 2021-12-16 DIAGNOSIS — M79.671 PAIN IN BOTH FEET: Primary | ICD-10-CM

## 2021-12-16 DIAGNOSIS — L84 CALLUS: ICD-10-CM

## 2021-12-16 PROCEDURE — 11721 DEBRIDE NAIL 6 OR MORE: CPT | Performed by: PODIATRIST

## 2021-12-16 PROCEDURE — 11056 PARNG/CUTG B9 HYPRKR LES 2-4: CPT | Performed by: PODIATRIST

## 2021-12-16 ASSESSMENT — ENCOUNTER SYMPTOMS
VOMITING: 0
NAUSEA: 0
BACK PAIN: 1
SHORTNESS OF BREATH: 1

## 2021-12-17 NOTE — PROGRESS NOTES
[facility]  [address] [facility] [address]  Dept: [phone]  Loc: [phone]       Brittnee Sellers  (1960)    12/16/21    Subjective     Brittnee Sellers is 62 y.o. female who complains today of:    Chief Complaint   Patient presents with    Nail Problem     both feet    Diabetes       HPI: Patient presents for diabetic foot care. Patient reports having painful toenails and calluses to both feet. Patient states that she is unable to perform self-care due to the location of the calluses and the thickness and deformity of the nail plates. Patient states that she has not yet obtained the previously prescribed diabetic shoes and insoles. Review of Systems   Constitutional: Negative for chills and fever. HENT: Negative for hearing loss. Respiratory: Positive for shortness of breath. Cardiovascular: Negative for chest pain. Gastrointestinal: Negative for nausea and vomiting. Genitourinary: Negative for difficulty urinating. Musculoskeletal: Positive for back pain and gait problem. Skin: Negative for wound. Neurological: Positive for numbness. Hematological: Does not bruise/bleed easily. Psychiatric/Behavioral: Negative for sleep disturbance. The patient is a diabetic.    PCP/ Endocrinologist: Shaun Moseley MD   Date last seen: 11/2/2021    Allergies:  Bee venom    Current Outpatient Medications on File Prior to Visit   Medication Sig Dispense Refill    aspirin (ASPIRIN LOW DOSE) 81 MG EC tablet TAKE 1 TABLET BY MOUTH ONCE A DAY (AM) (Patient not taking: Reported on 11/17/2021) 30 tablet 3    SYMBICORT 160-4.5 MCG/ACT AERO INHALE 2 PUFFS INTO THE LUNGS 2 TIMES DAILY 10.2 g 3    ARIPiprazole (ABILIFY) 15 MG tablet Take 1 tablet by mouth daily 30 tablet 5    benztropine (COGENTIN) 1 MG tablet Take 1 tablet by mouth daily 30 tablet 5    metoprolol tartrate (LOPRESSOR) 50 MG tablet Take 1 tablet by mouth 2 times daily 60 tablet 5    levothyroxine (SYNTHROID) 50 MCG tablet Take 1 tablet by mouth Daily 30 tablet 5    vitamin D (VITAMIN D HIGH POTENCY) 25 MCG (1000 UT) CAPS TAKE 1 CAPSULE BY MOUTH DAILY WITH SUPPER (PM) 30 capsule 5    metFORMIN (GLUCOPHAGE) 500 MG tablet Take 1 tablet by mouth 2 times daily (with meals) 60 tablet 5    famotidine (PEPCID) 20 MG tablet TAKE 1 TABLET BY MOUTH TWICE DAILY (AM,PM) 60 tablet 5    Cyanocobalamin (B-12) 1000 MCG TABS TAKE 1 TABLET BY MOUTH ONCE A DAY (NOON) 30 tablet 5    albuterol sulfate  (90 Base) MCG/ACT inhaler USE 2 PUFFS EVERY 4 HRS AS NEEDED FOR WHEEZING/SOB-SPACE OUT TO EVERY 6 HR AS SYMPTOMS IMPROVE 18 g 3    meclizine (ANTIVERT) 12.5 MG tablet Take 1 tablet by mouth nightly as needed for Dizziness 30 tablet 3    Misc. Devices (ROLLATOR ULTRA-LIGHT) MISC 1 each by Does not apply route once for 1 dose 1 each 0    ketoconazole (NIZORAL) 2 % shampoo Apply topically daily as needed.  120 mL 2    Biotin 300 MCG TABS Take 1 tablet by mouth daily 30 tablet 3    Multiple Vitamin (TAB-A-MANDI/BETA CAROTENE) TABS TAKE 1 TABLET BY MOUTH EVERY DAY (AM) 28 tablet 5    midodrine (PROAMATINE) 10 MG tablet TAKE 1 TABLET BY MOUTH TWICE DAILY(AM,PM) 60 tablet 5    Blood Pressure Monitoring (B-D ASSURE BPM/AUTO ARM CUFF) MISC Use as directed 1 each 0    cyclobenzaprine (FLEXERIL) 10 MG tablet Take 10 mg by mouth daily (Patient not taking: Reported on 11/17/2021)      atorvastatin (LIPITOR) 20 MG tablet Take 1 tablet by mouth daily 30 tablet 5    apixaban (ELIQUIS) 5 MG TABS tablet Take 1 tablet by mouth 2 times daily START when Eliquis Starter pack completed 60 tablet 3    primidone (MYSOLINE) 50 MG tablet Take 1 tablet by mouth nightly (Patient not taking: Reported on 11/2/2021) 30 tablet 3    EPINEPHrine (EPIPEN 2-PRAVEENA) 0.3 MG/0.3ML SOAJ injection Use as directed for allergic reaction 2 each 2    [DISCONTINUED] Multiple Vitamins-Minerals (THERAPEUTIC MULTIVITAMIN-MINERALS) tablet Take 1 tablet by mouth daily 30 tablet 5    [DISCONTINUED] albuterol (PROVENTIL;VENTOLIN) 90 MCG/ACT inhaler Inhale 2 puffs into the lungs every 6 hours as needed. 1 Inhaler 6     No current facility-administered medications on file prior to visit.        Past Medical History:   Diagnosis Date    Asthma 2015    Bilateral renal cysts 2013    Depression     since age 35, was with Dr Williams Easley, now the [address] [facility] [place] Diverticulosis of sigmoid colon 2012    Dr Bren Otero DJD of left shoulder     Environmental allergies     Fatty liver 2013    GERD (gastroesophageal reflux disease)     History of cardiac arrhythmia     \"due to stress, was placed on medication\"    Hydatidiform mole     age 26     Hyperlipidemia     Hypothyroidism 2011    IFG (impaired fasting glucose) 2013    Melanoma of eye [facility])     age 30, R eye prosthesis, Dr Vivian Carmichael    Obesity     Prediabetes     PTSD (post-traumatic stress disorder)     age 35, [facility]    S/P colonoscopy 04/19/2012    Dr. Connie Felder S/P hysterectomy with oophorectomy 2012    Dr Jasmeet Cutler Vitamin D deficiency      Past Surgical History:   Procedure Laterality Date    BREAST BIOPSY      DILATION AND CURETTAGE OF UTERUS      ENDOMETRIAL ABLATION  6/2012    EYE REMOVAL      OD for melanoma, age 30    FOOT OSTEOTOMY Left 09/23/2016    DEJA JACKMAN DPM      BALDOMERO STEROTACTIC LOC BREAST BIOPSY RIGHT Right 7/19/2021    BALDOMERO STEROTACTIC LOC BREAST BIOPSY RIGHT 7/19/2021 Junaid MONTOYA AND BSO  2012    Dr Indio Myles     Social History     Socioeconomic History    Marital status:      Spouse name: Not on file    Number of children: 3    Years of education: 15    Highest education level: High school graduate   Occupational History    Occupation: SSI   Tobacco Use    Smoking status: Never Smoker    Smokeless tobacco: Never Used   Vaping Use    Vaping Use: Never used   Substance and Sexual Activity    Alcohol use: No    Drug use: No    Sexual activity: Not Currently     Birth control/protection: Surgical   Other Topics Concern    Not on file   Social History Narrative    [place] one of 4 children, one sister disappeared at age 30, never found    Moved to [place] at age 40        Lives in an apartment in [facility], alone    , 3 children, all grown, in [place]     2 granddaughters     Hobbies exercising, birds    Attends [employer], has volunteered at [address] Strain: Low Risk     Difficulty of Paying Living Expenses: Not hard at all   Food Insecurity: No Food Insecurity    Worried About [address] in the Last Year: Never true    Anurag of Food in the Last Year: Never true   Transportation Needs:     Lack of Transportation (Medical): Not on file    Lack of Transportation (Non-Medical):  Not on file   Physical Activity:     Days of Exercise per Week: Not on file    Minutes of Exercise per Session: Not on file   Stress:     Feeling of Stress : Not on file   Social Connections:     Frequency of Communication with Friends and Family: Not on file    Frequency of Social Gatherings with Friends and Family: Not on file    Attends Judaism Services: Not on file    Active Member of [address] [place] [address] [employer] or Organizations: Not on file    Attends Club or Organization Meetings: Not on file    Marital Status: Not on file   Intimate Partner Violence:     Fear of Current or Ex-Partner: Not on file    Emotionally Abused: Not on file    Physically Abused: Not on file    Sexually Abused: Not on file   Housing Stability:     Unable to Pay for Housing in the Last Year: Not on file    Number of [place] in the Last Year: Not on file    Unstable Housing in the Last Year: Not on file     Family History   Problem Relation Age of Onset    Heart Failure Mother         dec 76    Diabetes Mother     Diabetes Father     Stroke Father dec age 80    Cancer Father         Sarcoma    Stomach Cancer Other     Breast Cancer Paternal Aunt     Breast Cancer Maternal Aunt            Objective:   Vitals:  Temp 97.1 °F (36.2 °C)   Ht 5' 6\" (1.676 m)   Wt 219 lb (99.3 kg)   BMI 35.35 kg/m²        Physical Exam  Vitals reviewed. Constitutional:       Appearance: She is obese. Cardiovascular:      Pulses:           Dorsalis pedis pulses are 2+ on the right side and 2+ on the left side. Posterior tibial pulses are 2+ on the right side and 2+ on the left side. Musculoskeletal:      Right lower leg: No edema. Left lower leg: No edema. Right foot: Deformity present. Left foot: Decreased range of motion. Deformity present. Feet:    Feet:      Right foot:      Protective Sensation: 10 sites tested. 10 sites sensed. Skin integrity: Callus present. Toenail Condition: Right toenails are abnormally thick, long and ingrown. Fungal disease present. Left foot:      Protective Sensation: 10 sites tested. 8 sites sensed. Skin integrity: Callus present. Toenail Condition: Left toenails are abnormally thick, long and ingrown. Fungal disease present. Comments: Onychogryphosis noted to the left hallux nail, the nail plate is completely lysed from the nail bed. The nail plate is yellow in color and there is subungual debris. Focal hyperkeratotic lesion noted bilateral plantar foot at the fifth MPJ. Yellow discoloration, thickened nail plate, and subungual debris noted to the right hallux nail. The remaining nails are thickened and incurvated, there is no discoloration or subungual debris. Tailor's bunionette deformity with adductovarus rotation of the fifth toe noted bilaterally. Decreased range of motion noted to the left subtalar joint, there is no pain or crepitus noted. Stiffness and decreased range of motion noted with range of motion of the left ankle joint.   Lymphadenopathy: Comments: Popliteal lymph nodes are soft and nontender bilateral lower extremities. Skin:     General: Skin is warm and dry. Capillary Refill: Capillary refill takes less than 2 seconds. Neurological:      Mental Status: She is alert. Deep Tendon Reflexes: Babinski sign absent on the left side. Reflex Scores:       Patellar reflexes are 0 on the right side and 0 on the left side. Achilles reflexes are 0 on the right side and 0 on the left side. Comments: No ankle clonus noted bilateral ankle. Vibratory sensation is diminished bilaterally. Altered light touch sensation noted to the bilateral toes. Hot versus cold discrimination is intact bilaterally. Sharp versus dull discrimination is intact bilaterally. Protective sensation is absent to the first and fifth toes left foot. Assessment:      Diagnosis Orders   1. Pain in both feet  TRIM BENIGN HYPERKERATOTIC SKIN LESION,2-4    78455 - NE DEBRIDEMENT OF NAILS, 6 OR MORE   2. Diabetic polyneuropathy associated with type 2 diabetes mellitus (HCC)  TRIM BENIGN HYPERKERATOTIC SKIN LESION,2-4    06441 - NE DEBRIDEMENT OF NAILS, 6 OR MORE   3. Callus  TRIM BENIGN HYPERKERATOTIC SKIN LESION,2-4   4. Dermatophytosis of nail  43054 - NE DEBRIDEMENT OF NAILS, 6 OR MORE   5. Nail dystrophy  57328 - NE DEBRIDEMENT OF NAILS, 6 OR MORE       Plan:     Calluses: The hyperkeratotic lesions located at the 15 bilateral foot bilateral fifth MPJ at the plantar foot were pared to the level of normal skin with a #15 blade scalpel without incident. The patient is instructed to monitor for signs of infection and call immediately if these arise. The patient is instructed to apply lotion to the callussed areas daily. Onychomycosis/dystrophic toenails: Nail plates were mechanically and electrically debrided in thickness and length to the level of normal underlying nail bed.   The patient was instructed to attempt use of an emery board to maintain the length and thickness of their nails as best as possible if able. Call immediately should any problems arise. Orders Placed This Encounter   Procedures    TRIM BENIGN HYPERKERATOTIC SKIN LESION,2-4    44869 - NM DEBRIDEMENT OF NAILS, 6 OR MORE           Follow up:  Return in about 9 weeks (around 2/17/2022). Rishi Hayden DPM        Please note that this report has been partially produced using speech recognition software which may cause errors including grammar, punctuation, and spelling or words and phrases that may seem inappropriate. If there are questions or concerns please feel free to contact me for clarification.

## 2021-12-20 ENCOUNTER — VIRTUAL VISIT (OUTPATIENT)
Dept: FAMILY MEDICINE CLINIC | Age: 61
End: 2021-12-20
Payer: COMMERCIAL

## 2021-12-20 ENCOUNTER — NURSE TRIAGE (OUTPATIENT)
Dept: OTHER | Facility: CLINIC | Age: 61
End: 2021-12-20

## 2021-12-20 DIAGNOSIS — R20.0 NUMBNESS AND TINGLING OF BOTH FEET: ICD-10-CM

## 2021-12-20 DIAGNOSIS — R39.9 UTI SYMPTOMS: Primary | ICD-10-CM

## 2021-12-20 DIAGNOSIS — I48.92 ATRIAL FLUTTER, UNSPECIFIED TYPE (HCC): ICD-10-CM

## 2021-12-20 DIAGNOSIS — E03.9 ACQUIRED HYPOTHYROIDISM: ICD-10-CM

## 2021-12-20 DIAGNOSIS — K21.9 GASTROESOPHAGEAL REFLUX DISEASE WITHOUT ESOPHAGITIS: ICD-10-CM

## 2021-12-20 DIAGNOSIS — R20.2 NUMBNESS AND TINGLING OF BOTH FEET: ICD-10-CM

## 2021-12-20 DIAGNOSIS — E55.9 VITAMIN D DEFICIENCY: ICD-10-CM

## 2021-12-20 PROCEDURE — 99213 OFFICE O/P EST LOW 20 MIN: CPT | Performed by: NURSE PRACTITIONER

## 2021-12-20 PROCEDURE — 3017F COLORECTAL CA SCREEN DOC REV: CPT | Performed by: NURSE PRACTITIONER

## 2021-12-20 PROCEDURE — G8417 CALC BMI ABV UP PARAM F/U: HCPCS | Performed by: NURSE PRACTITIONER

## 2021-12-20 PROCEDURE — G8482 FLU IMMUNIZE ORDER/ADMIN: HCPCS | Performed by: NURSE PRACTITIONER

## 2021-12-20 PROCEDURE — 1036F TOBACCO NON-USER: CPT | Performed by: NURSE PRACTITIONER

## 2021-12-20 PROCEDURE — G9899 SCRN MAM PERF RSLTS DOC: HCPCS | Performed by: NURSE PRACTITIONER

## 2021-12-20 PROCEDURE — G8427 DOCREV CUR MEDS BY ELIG CLIN: HCPCS | Performed by: NURSE PRACTITIONER

## 2021-12-20 RX ORDER — CHOLECALCIFEROL (VITAMIN D3) 25 MCG
CAPSULE ORAL
Qty: 30 CAPSULE | Refills: 5 | Status: SHIPPED | OUTPATIENT
Start: 2021-12-20 | End: 2022-01-11

## 2021-12-20 RX ORDER — PHENAZOPYRIDINE HYDROCHLORIDE 100 MG/1
100 TABLET, FILM COATED ORAL 3 TIMES DAILY PRN
Qty: 6 TABLET | Refills: 0 | Status: SHIPPED | OUTPATIENT
Start: 2021-12-20 | End: 2022-02-02

## 2021-12-20 RX ORDER — FAMOTIDINE 20 MG/1
TABLET, FILM COATED ORAL
Qty: 60 TABLET | Refills: 5 | Status: SHIPPED | OUTPATIENT
Start: 2021-12-20 | End: 2022-05-04 | Stop reason: SDUPTHER

## 2021-12-20 RX ORDER — LEVOTHYROXINE SODIUM 0.05 MG/1
50 TABLET ORAL DAILY
Qty: 30 TABLET | Refills: 5 | Status: SHIPPED | OUTPATIENT
Start: 2021-12-20 | End: 2022-05-04 | Stop reason: SDUPTHER

## 2021-12-20 RX ORDER — CIPROFLOXACIN 500 MG/1
500 TABLET, FILM COATED ORAL 2 TIMES DAILY
Qty: 14 TABLET | Refills: 0 | Status: SHIPPED | OUTPATIENT
Start: 2021-12-20 | End: 2021-12-27

## 2021-12-20 RX ORDER — METOPROLOL TARTRATE 50 MG/1
50 TABLET, FILM COATED ORAL 2 TIMES DAILY
Qty: 60 TABLET | Refills: 5 | Status: SHIPPED | OUTPATIENT
Start: 2021-12-20 | End: 2022-07-28 | Stop reason: SDUPTHER

## 2021-12-20 ASSESSMENT — ENCOUNTER SYMPTOMS
NAUSEA: 0
VOMITING: 0

## 2021-12-20 NOTE — PROGRESS NOTES
hysterectomy with oophorectomy 2012    Dr Gogo Matthews Vitamin D deficiency      Past Surgical History:   Procedure Laterality Date    BREAST BIOPSY      DILATION AND CURETTAGE OF UTERUS      ENDOMETRIAL ABLATION  6/2012    EYE REMOVAL      OD for melanoma, age 30    FOOT OSTEOTOMY Left 09/23/2016    B AUGUSTINA DPM      BALDOMERO STEROTACTIC LOC BREAST BIOPSY RIGHT Right 7/19/2021    BALDOMERO STEROTACTIC LOC BREAST BIOPSY RIGHT 7/19/2021 [facility]    LINDSAY AND BSO  2012    Dr Carmen Cobb     Family History   Problem Relation Age of Onset    Heart Failure Mother         dec 76    Diabetes Mother     Diabetes Father     Stroke Father         dec age 80    Cancer Father         Sarcoma    Stomach Cancer Other     Breast Cancer Paternal Aunt     Breast Cancer Maternal Aunt      Social History     Socioeconomic History    Marital status:      Spouse name: Not on file    Number of children: 3    Years of education: 15    Highest education level: High school graduate   Occupational History    Occupation: SSI   Tobacco Use    Smoking status: Never Smoker    Smokeless tobacco: Never Used   Vaping Use    Vaping Use: Never used   Substance and Sexual Activity    Alcohol use: No    Drug use: No    Sexual activity: Not Currently     Birth control/protection: Surgical   Other Topics Concern    Not on file   Social History Narrative    Born in [address] [place] [address], one of 4 children, one sister disappeared at age 30, never found    Moved to [place] at age 40        Lives in an apartment in [facility], alone    , 3 children, all grown, in [place]     2 granddaughters     Hobbies exercising, birds    Attends [employer], has volunteered at [address] Strain: Low Risk     Difficulty of Paying Living Expenses: Not hard at all   Food Insecurity: No Food Insecurity    Worried About [facility] in the Last Year: Never true   [employer] Out of Food in the Last Year: Never true   Transportation Needs:     Lack of Transportation (Medical): Not on file    Lack of Transportation (Non-Medical): Not on file   Physical Activity:     Days of Exercise per Week: Not on file    Minutes of Exercise per Session: Not on file   Stress:     Feeling of Stress : Not on file   Social Connections:     Frequency of Communication with Friends and Family: Not on file    Frequency of Social Gatherings with Friends and Family: Not on file    Attends Mandaen Services: Not on file    Active Member of [address] [place] [address] or Organizations: Not on file    Attends Club or Organization Meetings: Not on file    Marital Status: Not on file   Intimate Partner Violence:     Fear of Current or Ex-Partner: Not on file    Emotionally Abused: Not on file    Physically Abused: Not on file    Sexually Abused: Not on file   Housing Stability:     Unable to Pay for Housing in the Last Year: Not on file    Number of [place] in the Last Year: Not on file    Unstable Housing in the Last Year: Not on file     Current Outpatient Medications on File Prior to Visit   Medication Sig Dispense Refill    SYMBICORT 160-4.5 MCG/ACT AERO INHALE 2 PUFFS INTO THE LUNGS 2 TIMES DAILY 10.2 g 3    ARIPiprazole (ABILIFY) 15 MG tablet Take 1 tablet by mouth daily 30 tablet 5    albuterol sulfate  (90 Base) MCG/ACT inhaler USE 2 PUFFS EVERY 4 HRS AS NEEDED FOR WHEEZING/SOB-SPACE OUT TO EVERY 6 HR AS SYMPTOMS IMPROVE 18 g 3    ketoconazole (NIZORAL) 2 % shampoo Apply topically daily as needed.  120 mL 2    aspirin (ASPIRIN LOW DOSE) 81 MG EC tablet TAKE 1 TABLET BY MOUTH ONCE A DAY (AM) (Patient not taking: Reported on 11/17/2021) 30 tablet 3    apixaban (ELIQUIS) 5 MG TABS tablet Take 1 tablet by mouth 2 times daily START when Eliquis Starter pack completed 60 tablet 3    diclofenac sodium (VOLTAREN) 1 % GEL APPLY 2 G TOPICALLY 4 TIMES DAILY (Patient not taking: Reported on 12/20/2021) 100 g 2    benztropine (COGENTIN) 1 MG tablet Take 1 tablet by mouth daily 30 tablet 5    Cyanocobalamin (B-12) 1000 MCG TABS TAKE 1 TABLET BY MOUTH ONCE A DAY (NOON) (Patient not taking: Reported on 12/20/2021) 30 tablet 5    meclizine (ANTIVERT) 12.5 MG tablet Take 1 tablet by mouth nightly as needed for Dizziness (Patient not taking: Reported on 12/20/2021) 30 tablet 3    Misc. Devices (ROLLATOR ULTRA-LIGHT) MISC 1 each by Does not apply route once for 1 dose 1 each 0    Biotin 300 MCG TABS Take 1 tablet by mouth daily (Patient not taking: Reported on 12/20/2021) 30 tablet 3    Multiple Vitamin (TAB-A-MANDI/BETA CAROTENE) TABS TAKE 1 TABLET BY MOUTH EVERY DAY (AM) (Patient not taking: Reported on 12/20/2021) 28 tablet 5    midodrine (PROAMATINE) 10 MG tablet TAKE 1 TABLET BY MOUTH TWICE DAILY(AM,PM) (Patient not taking: Reported on 12/20/2021) 60 tablet 5    Blood Pressure Monitoring (B-D ASSURE BPM/AUTO ARM CUFF) MISC Use as directed 1 each 0    cyclobenzaprine (FLEXERIL) 10 MG tablet Take 10 mg by mouth daily (Patient not taking: Reported on 11/17/2021)      atorvastatin (LIPITOR) 20 MG tablet Take 1 tablet by mouth daily (Patient not taking: Reported on 12/20/2021) 30 tablet 5    primidone (MYSOLINE) 50 MG tablet Take 1 tablet by mouth nightly (Patient not taking: Reported on 11/2/2021) 30 tablet 3    EPINEPHrine (EPIPEN 2-PRAVEENA) 0.3 MG/0.3ML SOAJ injection Use as directed for allergic reaction (Patient not taking: Reported on 12/20/2021) 2 each 2    [DISCONTINUED] Multiple Vitamins-Minerals (THERAPEUTIC MULTIVITAMIN-MINERALS) tablet Take 1 tablet by mouth daily 30 tablet 5    [DISCONTINUED] albuterol (PROVENTIL;VENTOLIN) 90 MCG/ACT inhaler Inhale 2 puffs into the lungs every 6 hours as needed. 1 Inhaler 6     No current facility-administered medications on file prior to visit. Allergies:  Bee venom    Review of Systems   Constitutional: Positive for fever (felt warm). Negative for chills. Dispense:  14 tablet     Refill:  0    phenazopyridine (PYRIDIUM) 100 MG tablet     Sig: Take 1 tablet by mouth 3 times daily as needed for Pain     Dispense:  6 tablet     Refill:  0    metFORMIN (GLUCOPHAGE) 500 MG tablet     Sig: Take 1 tablet by mouth 2 times daily (with meals)     Dispense:  60 tablet     Refill:  5    levothyroxine (SYNTHROID) 50 MCG tablet     Sig: Take 1 tablet by mouth Daily     Dispense:  30 tablet     Refill:  5    metoprolol tartrate (LOPRESSOR) 50 MG tablet     Sig: Take 1 tablet by mouth 2 times daily     Dispense:  60 tablet     Refill:  5    vitamin D (VITAMIN D HIGH POTENCY) 25 MCG (1000 UT) CAPS     Sig: TAKE 1 CAPSULE BY MOUTH DAILY WITH SUPPER (PM)     Dispense:  30 capsule     Refill:  5    famotidine (PEPCID) 20 MG tablet     Sig: TAKE 1 TABLET BY MOUTH TWICE DAILY (AM,PM)     Dispense:  60 tablet     Refill:  5       Return in about 3 days (around 12/23/2021) for evaluation with PCP. UTI symptoms- Start treatment as prescribed. Stop cranberry juice. Drink water only. Numbness and tingling- discussed concern for elevated BG due to not taking medication and drinking juice. Will stop drinking juice and start back on medication. F/u in office in the next couple of days as this is a limited assessment, but feet appear normal via video. She was agreeable. Any severe symptoms pt to go to ER for stat evaluation. Reviewed with the patient: current clinicalstatus, medications, activities and diet. Side effects, adverse effects of the medication prescribedtoday, as well as treatment plan/ rationale and result expectations have been discussedwith the patient who expresses understanding and desires to proceed. Close follow upto evaluate treatment results and for coordination of care. I have reviewedthe patient's medical history in detail and updated the computerized patient record.         TELEHEALTH EVALUATION -- Audio/Visual (During [facility]-47 public health emergency)    -   Brittnee Sellers is a 62 y.o. female being evaluated by a Virtual Visit (video visit) encounter to address concerns as mentioned above. A caregiver was present when appropriate. Due to this being a TeleHealth encounter (During [facility] public health emergency), evaluation of the following organ systems was limited: Vitals/Constitutional/EENT/Resp/CV/GI//MS/Neuro/Skin/Heme-Lymph-Imm. Pursuant to the emergency declaration under the [address] [place] [address], [address] [place] [address] and the [employer] Act, this Virtual Visit was conducted with patient's (and/or legal guardian's) consent, to reduce the patient's risk of exposure to COVID-19 and provide necessary medical care. The patient (and/or legal guardian) has also been advised to contact this office for worsening conditions or problems, and seek emergency medical treatment and/or call 911 if deemed necessary. Services were provided through a video synchronous discussion virtually to substitute for in-person clinic visit. Type of encounter was X__ Doxy __ MyChart ___Facetime    Patient was located at their home. Provider was located at their ___ home or        __X__ office. --ARASH Baxter - CNP on 12/20/2021 at 2:57 PM    An electronic signature was used to authenticate this note.

## 2021-12-20 NOTE — PATIENT INSTRUCTIONS

## 2021-12-20 NOTE — TELEPHONE ENCOUNTER
Received call from April at [facility], caller not on line. Complaint: She saw her podiatrist Thursday and they cut her toe nails. Started last night she stated she can't feel her feet. Market: [address] [facility] [place][note 2] Name: [facility] telephone number verified as [phone]    Connected with caller via phone, please see below triage      Received call from April at [facility] with Red Flag Complaint. Subjective: Caller states \"I went to my podiatrist last week and they asked a lot of questions like numbness/tingling in feet. I wasn't then but a couple days later I woke up in the middle of the night to go to the bathroom and both my feet were numb and tingly. I also want to see the doctor for a UTI. I have been drinking cranberry juice and it isn't going away. I can't get a ride to the office. I could do a virtual appointment. I will need the office to call 'provider ride' to get me a ride. \"     Current Symptoms: bilateral feet numb and tingling, getting worse. -- burning with urination,urinary frequency,  denies back pain, denies abdominal pain pain. Onset: Feet: 1 day ago; sudden - started last night, Urinary symptoms: 3 days ago      Associated Symptoms:     Pain Severity: 8/10; aching; constant    Temperature: None     What has been tried: cranberry juice     LMP: NA Pregnant: Unknown    Recommended disposition: to be seen today. Care advice provided, patient verbalizes understanding; denies any other questions or concerns; instructed to call back for any new or worsening symptoms. Writer provided warm transfer to Diamond at [facility] for appointment scheduling     Attention Provider: Thank you for allowing me to participate in the care of your patient. The patient was connected to triage in response to information provided to the ECC/PSC. Please do not respond through this encounter as the response is not directed to a shared pool.       Reason for Disposition   Urinating more frequently than usual (i.e., frequency)    Protocols used: URINARY SYMPTOMS-ADULT-[place]

## 2021-12-23 ENCOUNTER — TELEPHONE (OUTPATIENT)
Dept: FAMILY MEDICINE CLINIC | Age: 61
End: 2021-12-23

## 2021-12-23 ENCOUNTER — OFFICE VISIT (OUTPATIENT)
Dept: FAMILY MEDICINE CLINIC | Age: 61
End: 2021-12-23
Payer: COMMERCIAL

## 2021-12-23 VITALS
HEART RATE: 132 BPM | DIASTOLIC BLOOD PRESSURE: 98 MMHG | WEIGHT: 233 LBS | HEIGHT: 66 IN | TEMPERATURE: 97.4 F | OXYGEN SATURATION: 96 % | BODY MASS INDEX: 37.45 KG/M2 | SYSTOLIC BLOOD PRESSURE: 144 MMHG

## 2021-12-23 DIAGNOSIS — M47.26 OSTEOARTHRITIS OF SPINE WITH RADICULOPATHY, LUMBAR REGION: ICD-10-CM

## 2021-12-23 DIAGNOSIS — R20.2 PARESTHESIA OF BOTH FEET: Primary | ICD-10-CM

## 2021-12-23 DIAGNOSIS — I10 PRIMARY HYPERTENSION: ICD-10-CM

## 2021-12-23 PROCEDURE — G8427 DOCREV CUR MEDS BY ELIG CLIN: HCPCS | Performed by: PHYSICIAN ASSISTANT

## 2021-12-23 PROCEDURE — 99213 OFFICE O/P EST LOW 20 MIN: CPT | Performed by: PHYSICIAN ASSISTANT

## 2021-12-23 PROCEDURE — G9899 SCRN MAM PERF RSLTS DOC: HCPCS | Performed by: PHYSICIAN ASSISTANT

## 2021-12-23 PROCEDURE — 1036F TOBACCO NON-USER: CPT | Performed by: PHYSICIAN ASSISTANT

## 2021-12-23 PROCEDURE — G8417 CALC BMI ABV UP PARAM F/U: HCPCS | Performed by: PHYSICIAN ASSISTANT

## 2021-12-23 PROCEDURE — 3017F COLORECTAL CA SCREEN DOC REV: CPT | Performed by: PHYSICIAN ASSISTANT

## 2021-12-23 PROCEDURE — G8482 FLU IMMUNIZE ORDER/ADMIN: HCPCS | Performed by: PHYSICIAN ASSISTANT

## 2021-12-23 ASSESSMENT — ENCOUNTER SYMPTOMS
SHORTNESS OF BREATH: 0
WHEEZING: 0
COUGH: 0
VOMITING: 0
EYE PAIN: 0
SINUS PRESSURE: 0
SORE THROAT: 0
DIARRHEA: 0
CHEST TIGHTNESS: 0
BACK PAIN: 0
SINUS PAIN: 0
ALLERGIC/IMMUNOLOGIC NEGATIVE: 1
NAUSEA: 0
EYE DISCHARGE: 0
CONSTIPATION: 0
ABDOMINAL PAIN: 0

## 2021-12-23 NOTE — TELEPHONE ENCOUNTER
Does not have diabetes. Has prediabetes which would be a different code. Spoke to patient and she is aware. Will call [facility] so they are aware.

## 2021-12-23 NOTE — PROGRESS NOTES
Legs subjective  Stacy Inch, 62 y.o. female presents today with:  Chief Complaint   Patient presents with    Foot Swelling     She complains of having swelling in both feet.  Numbness     She complains of having numbness and tingling in her feet. Treatment Adherence:   Medication compliance:  compliant all of the time  Diet compliance:  compliant most of the time  Weight trend: increasing  Current exercise: walks 3 time(s) per week      Diabetes Mellitus Type 2: Current symptoms/problems include none. Home blood sugar records:  patient does not test  Any episodes of hypoglycemia? no  Eye exam current (within one year): yes  Tobacco history: She  reports that she has never smoked. She has never used smokeless tobacco.   Daily Aspirin? No:   Known diabetic complications: none        Lab Results   Component Value Date    LABA1C 5.7 05/19/2021    LABA1C 5.2 03/15/2021    LABA1C 5.4 02/07/2020     Lab Results   Component Value Date    CREATININE 0.92 (H) 10/25/2021     Lab Results   Component Value Date    ALT 43 (H) 10/25/2021    AST 49 (H) 10/25/2021     Lab Results   Component Value Date    CHOL 315 (H) 09/16/2021    TRIG 494 (H) 09/16/2021    HDL 36 (L) 09/16/2021    LDLCALC see below 09/16/2021          HPI  Patient is here being seen acutely for complaint of bilateral foot pain numbness for the past week - patient does a lot of walking denies injury   has known degenerative changes of the lumbar spine has intermittent low back pain but nothing consistent of late-  she is awaiting diabetic shoes states she needs order resubmitted  Had foot exam week prior to start of numbness with Crow dpm - toenail trimmed no imaging   reports bilat foot deformities since childhood has had several corrective surgeries  Scheduled for vasuclar studies thru   Review of Systems   Constitutional: Negative for chills, fatigue and fever. HENT: Positive for congestion.  Negative for ear discharge, ear pain, sinus pressure, sinus pain and sore throat. Eyes: Negative for pain and discharge. Respiratory: Negative for cough, chest tightness, shortness of breath and wheezing. Cardiovascular: Negative for chest pain and leg swelling. Gastrointestinal: Negative for abdominal pain, constipation, diarrhea, nausea and vomiting. Endocrine: Negative. Genitourinary: Positive for frequency and urgency. Negative for difficulty urinating and dysuria. Musculoskeletal: Negative for back pain and neck pain. Skin: Negative for rash. Allergic/Immunologic: Negative. Neurological: Negative for dizziness and headaches. Hematological: Negative. Psychiatric/Behavioral: Negative.           Past Medical History:   Diagnosis Date    Asthma 2015    Bilateral renal cysts 2013    Depression     since age 35, was with Dr James Middleton, now the [facility]    Diverticulosis of sigmoid colon 2012    Dr Radha Puckett DJD of left shoulder     Environmental allergies     Fatty liver 2013    GERD (gastroesophageal reflux disease)     History of cardiac arrhythmia     \"due to stress, was placed on medication\"    Hydatidiform mole     age 26     Hyperlipidemia     Hypothyroidism 2011    IFG (impaired fasting glucose) 2013    Melanoma of eye [facility])     age 30, R eye prosthesis, Dr Tiffanie Jang    Obesity     Prediabetes     PTSD (post-traumatic stress disorder)     age 35, [facility]    S/P colonoscopy 04/19/2012    Dr. Lenore Skinner S/P hysterectomy with oophorectomy 2012    Dr Myra Saenz Vitamin D deficiency      Past Surgical History:   Procedure Laterality Date    BREAST BIOPSY      DILATION AND CURETTAGE OF UTERUS      ENDOMETRIAL ABLATION  6/2012    EYE REMOVAL      OD for melanoma, age 30    FOOT OSTEOTOMY Left 09/23/2016    [facility] DPM      [facility] STEROTACTIC LOC BREAST BIOPSY RIGHT Right 7/19/2021    [facility] STEROTACTIC LOC BREAST BIOPSY RIGHT 7/19/2021 Adolph Boyle BSO  2012     Physically Abused: Not on file    Sexually Abused: Not on file   Housing Stability:     Unable to Pay for Housing in the Last Year: Not on file    Number of Places Lived in the Last Year: Not on file    Unstable Housing in the Last Year: Not on file     Family History   Problem Relation Age of Onset    Heart Failure Mother         dec 76    Diabetes Mother     Diabetes Father     Stroke Father         dec age 80    Cancer Father         Sarcoma    Stomach Cancer Other     Breast Cancer Paternal Aunt     Breast Cancer Maternal Aunt      Allergies   Allergen Reactions    Bee Venom         Current Outpatient Medications   Medication Sig Dispense Refill    ciprofloxacin (CIPRO) 500 MG tablet Take 1 tablet by mouth 2 times daily for 7 days 14 tablet 0    phenazopyridine (PYRIDIUM) 100 MG tablet Take 1 tablet by mouth 3 times daily as needed for Pain 6 tablet 0    levothyroxine (SYNTHROID) 50 MCG tablet Take 1 tablet by mouth Daily 30 tablet 5    vitamin D (VITAMIN D HIGH POTENCY) 25 MCG (1000 UT) CAPS TAKE 1 CAPSULE BY MOUTH DAILY WITH SUPPER (PM) 30 capsule 5    famotidine (PEPCID) 20 MG tablet TAKE 1 TABLET BY MOUTH TWICE DAILY (AM,PM) 60 tablet 5    diclofenac sodium (VOLTAREN) 1 % GEL APPLY 2 G TOPICALLY 4 TIMES DAILY 100 g 2    SYMBICORT 160-4.5 MCG/ACT AERO INHALE 2 PUFFS INTO THE LUNGS 2 TIMES DAILY 10.2 g 3    ARIPiprazole (ABILIFY) 15 MG tablet Take 1 tablet by mouth daily 30 tablet 5    benztropine (COGENTIN) 1 MG tablet Take 1 tablet by mouth daily 30 tablet 5    Cyanocobalamin (B-12) 1000 MCG TABS TAKE 1 TABLET BY MOUTH ONCE A DAY (NOON) 30 tablet 5    albuterol sulfate  (90 Base) MCG/ACT inhaler USE 2 PUFFS EVERY 4 HRS AS NEEDED FOR WHEEZING/SOB-SPACE OUT TO EVERY 6 HR AS SYMPTOMS IMPROVE 18 g 3    meclizine (ANTIVERT) 12.5 MG tablet Take 1 tablet by mouth nightly as needed for Dizziness 30 tablet 3    ketoconazole (NIZORAL) 2 % shampoo Apply topically daily as needed.  120 mL 2    Biotin 300 MCG TABS Take 1 tablet by mouth daily 30 tablet 3    aspirin (ASPIRIN LOW DOSE) 81 MG EC tablet TAKE 1 TABLET BY MOUTH ONCE A DAY (AM) 30 tablet 3    midodrine (PROAMATINE) 10 MG tablet TAKE 1 TABLET BY MOUTH TWICE DAILY(AM,PM) 60 tablet 5    Blood Pressure Monitoring (B-D ASSURE BPM/AUTO ARM CUFF) MISC Use as directed 1 each 0    cyclobenzaprine (FLEXERIL) 10 MG tablet Take 10 mg by mouth daily       atorvastatin (LIPITOR) 20 MG tablet Take 1 tablet by mouth daily 30 tablet 5    apixaban (ELIQUIS) 5 MG TABS tablet Take 1 tablet by mouth 2 times daily START when Eliquis Starter pack completed 60 tablet 3    EPINEPHrine (EPIPEN 2-PRAVEENA) 0.3 MG/0.3ML SOAJ injection Use as directed for allergic reaction 2 each 2    metFORMIN (GLUCOPHAGE) 500 MG tablet Take 1 tablet by mouth 2 times daily (with meals) (Patient not taking: Reported on 12/23/2021) 60 tablet 5    metoprolol tartrate (LOPRESSOR) 50 MG tablet Take 1 tablet by mouth 2 times daily (Patient not taking: Reported on 12/23/2021) 60 tablet 5    Misc. Devices (ROLLATOR ULTRA-LIGHT) MISC 1 each by Does not apply route once for 1 dose 1 each 0    Multiple Vitamin (TAB-A-MANDI/BETA CAROTENE) TABS TAKE 1 TABLET BY MOUTH EVERY DAY (AM) (Patient not taking: Reported on 12/20/2021) 28 tablet 5    primidone (MYSOLINE) 50 MG tablet Take 1 tablet by mouth nightly (Patient not taking: Reported on 11/2/2021) 30 tablet 3     No current facility-administered medications for this visit. Objective    Vitals:    12/23/21 0828 12/23/21 0837   BP: (!) 148/102 (!) 144/98   Site: Right Upper Arm Right Upper Arm   Position: Sitting Sitting   Cuff Size: Large Adult Large Adult   Pulse: 132    Temp: 97.4 °F (36.3 °C)    SpO2: 96%    Weight: 233 lb (105.7 kg)    Height: 5' 6\" (1.676 m)      Physical Exam  Constitutional:       Appearance: She is obese. HENT:      Head: Normocephalic. Eyes:      Extraocular Movements: Extraocular movements intact. Conjunctiva/sclera: Conjunctivae normal.      Pupils: Pupils are equal, round, and reactive to light. Pulmonary:      Effort: Pulmonary effort is normal. No respiratory distress. Musculoskeletal:      Thoracic back: Tenderness and bony tenderness present. Lumbar back: Tenderness and bony tenderness present. Positive right straight leg raise test.      Right foot: Normal range of motion. Deformity and tenderness present. No bony tenderness. Normal pulse. Left foot: Normal range of motion. Deformity and tenderness present. No bony tenderness. Normal pulse. Comments: Deformity great toe bilat  Deformity left great toe nail  Tenderness to palpation insole of both feet   Neurological:      Mental Status: She is alert. Coordination: Coordination normal.      Gait: Gait normal.              Assessment & Plan    Diagnosis Orders   1. Paresthesia of both feet  XR LUMBAR SPINE (MIN 4 VIEWS)   2. Osteoarthritis of spine with radiculopathy, lumbar region     3. Primary hypertension      did not take bp meds this morning         Orders Placed This Encounter   Procedures    XR LUMBAR SPINE (MIN 4 VIEWS)     Standing Status:   Future     Standing Expiration Date:   12/23/2022     Order Specific Question:   Reason for exam:     Answer:   pain     No orders of the defined types were placed in this encounter. There are no discontinued medications. Return for follow up with your PCP as directed, call for results.     Callie Vickers PA-C

## 2021-12-27 NOTE — TELEPHONE ENCOUNTER
Judie Corcoran [facility] Clinical Staff  Subject: Refill Request     QUESTIONS   Name of Medication? metFORMIN (GLUCOPHAGE) 500 MG tablet   Patient-reported dosage and instructions? 500mg   How many days do you have left? 0   Preferred Pharmacy? [id number]   Pharmacy phone number (if available)? [phone]   ---------------------------------------------------------------------------   --------------   Valentina BURGESS   What is the best way for the office to contact you? OK to leave message on   voicemail   Preferred Call Back Phone Number?  [phone]

## 2021-12-30 RX ORDER — PRAVASTATIN SODIUM 40 MG
TABLET ORAL
Qty: 90 TABLET | Refills: 1 | OUTPATIENT
Start: 2021-12-30

## 2022-01-03 ENCOUNTER — VIRTUAL VISIT (OUTPATIENT)
Dept: FAMILY MEDICINE CLINIC | Age: 62
End: 2022-01-03
Payer: COMMERCIAL

## 2022-01-03 DIAGNOSIS — Z71.89 COUNSELING REGARDING ADVANCE DIRECTIVES AND GOALS OF CARE: ICD-10-CM

## 2022-01-03 DIAGNOSIS — I26.09 OTHER ACUTE PULMONARY EMBOLISM WITH ACUTE COR PULMONALE (HCC): Primary | ICD-10-CM

## 2022-01-03 DIAGNOSIS — J45.30 MILD PERSISTENT ASTHMA WITHOUT COMPLICATION: ICD-10-CM

## 2022-01-03 DIAGNOSIS — I48.92 ATRIAL FLUTTER, UNSPECIFIED TYPE (HCC): ICD-10-CM

## 2022-01-03 DIAGNOSIS — G62.9 NEUROPATHY: ICD-10-CM

## 2022-01-03 PROBLEM — E11.42 DIABETIC POLYNEUROPATHY ASSOCIATED WITH TYPE 2 DIABETES MELLITUS (HCC): Status: RESOLVED | Noted: 2022-01-03 | Resolved: 2022-01-03

## 2022-01-03 PROBLEM — E11.42 DIABETIC POLYNEUROPATHY ASSOCIATED WITH TYPE 2 DIABETES MELLITUS (HCC): Status: ACTIVE | Noted: 2022-01-03

## 2022-01-03 PROCEDURE — 99441 PR PHYS/QHP TELEPHONE EVALUATION 5-10 MIN: CPT | Performed by: FAMILY MEDICINE

## 2022-01-03 ASSESSMENT — ENCOUNTER SYMPTOMS
EYE ITCHING: 0
SINUS PRESSURE: 0
EYE DISCHARGE: 0
CHOKING: 0
COLOR CHANGE: 0
CHEST TIGHTNESS: 0
STRIDOR: 0
BLOOD IN STOOL: 0

## 2022-01-03 NOTE — PROGRESS NOTES
1/3/2022    TELEHEALTH EVALUATION -- Audio/Visual (During SAUOP-36 public health emergency)    Due to COVID 19 outbreak, patient's office visit was converted to a virtual visit. Patient was contacted and agreed to proceed with a virtual visit via Telephone Visit  The risks and benefits of converting to a virtual visit were discussed in light of the current infectious disease epidemic. Patient also understood that insurance coverage and co-pays are up to their individual insurance plans. HPI:    Amanda Kelly (:  1960) has requested an audio/video evaluation for the following concern(s): Foot parasthesia   Was seen by another provider for parasthesias   Tingling has improved but admits to numbness in the area  [employer]ard[employer] reports that she has a history of ankle fracture 2 years (fell into a pot hole)  Admits that the symptoms are improving.        Pulmonary embolism   Currently seeing the cardiologist and on blood thinners   Stable   No issues or concerns  admits to dyspnea on exertion when pushing up her grocery cart     F/u   Stable no issues      Headaches   Patient is complaining of headaches for the last 4 days.  Patient reports is located on top of her head.  Does admit to having more stress in her life lately.  Not pounding in nature. Charmel Frees is an 8 out of 10 but goes down to 5 out of 10 with the help of extra strength Tylenol.  Patient takes 1 tablet a day as needed for pain.  Patient admits to a previous history of migraines.  Denies any auras.  Denies any fevers, chills, nausea, vomiting, chest pain, shortness of breath, abdominal pain, urination, change in stools.  Patient did have a CT of her head back in May 27, 2021 which was unremarkable.       F/u   Intermittent in nature   Is going to see a neurologist         Onychomycosis   First toe in the left foot   Yellow and thick   Unable to cut the toenail   Denies any other issues.       F/u   Slight improvement with penlac   Has not had her CMP done   Would like to try oral medication     F/u  Has improved   Seeing the podiatrist   No issues or concerns           Hpyothyroidism   Stable. Takes medication. Lab Results   Component Value Date    TSH 1.670 10/25/2021          Review of Systems   Constitutional: Negative for activity change, appetite change, fatigue and fever. HENT: Negative for congestion, dental problem, postnasal drip and sinus pressure. Eyes: Negative for discharge and itching. Respiratory: Negative for choking, chest tightness and stridor. Cardiovascular: Negative for chest pain and leg swelling. Gastrointestinal: Negative for blood in stool. Genitourinary: Negative for decreased urine volume, difficulty urinating, dyspareunia and frequency. Musculoskeletal: Negative for myalgias. Skin: Negative for color change, pallor and wound. Neurological: Positive for numbness. Negative for headaches. Hematological: Negative for adenopathy. Does not bruise/bleed easily. Psychiatric/Behavioral: Negative for confusion. The patient is not hyperactive. Prior to Visit Medications    Medication Sig Taking?  Authorizing Provider   phenazopyridine (PYRIDIUM) 100 MG tablet Take 1 tablet by mouth 3 times daily as needed for Pain Yes ARASH Cedillo CNP   levothyroxine (SYNTHROID) 50 MCG tablet Take 1 tablet by mouth Daily Yes ARASH Cedillo CNP   vitamin D (VITAMIN D HIGH POTENCY) 25 MCG (1000 UT) CAPS TAKE 1 CAPSULE BY MOUTH DAILY WITH SUPPER (PM) Yes ARASH Cedillo CNP   famotidine (PEPCID) 20 MG tablet TAKE 1 TABLET BY MOUTH TWICE DAILY (AM,PM) Yes ARASH Cedillo CNP   diclofenac sodium (VOLTAREN) 1 % GEL APPLY 2 G TOPICALLY 4 TIMES DAILY Yes Shannan Barth MD   SYMBICORT 160-4.5 MCG/ACT AERO INHALE 2 PUFFS INTO THE LUNGS 2 TIMES DAILY Yes Shannan Barth MD   ARIPiprazole (ABILIFY) 15 MG tablet Take 1 tablet by mouth daily Yes Shannan Barth MD   benztropine (COGENTIN) 1 MG tablet Take 1 tablet by mouth daily Yes Anuja Mercado MD   Cyanocobalamin (B-12) 1000 MCG TABS TAKE 1 TABLET BY MOUTH ONCE A DAY (NOON) Yes Trace Lomeli MD   albuterol sulfate  (90 Base) MCG/ACT inhaler USE 2 PUFFS EVERY 4 HRS AS NEEDED FOR WHEEZING/SOB-SPACE OUT TO EVERY 6 HR AS SYMPTOMS IMPROVE Yes Trace Lomeli MD   meclizine (ANTIVERT) 12.5 MG tablet Take 1 tablet by mouth nightly as needed for Dizziness Yes Michael Pickering MD   ketoconazole (NIZORAL) 2 % shampoo Apply topically daily as needed. Yes Anuja Mercado MD   Biotin 300 MCG TABS Take 1 tablet by mouth daily Yes Anuja Mercado MD   aspirin (ASPIRIN LOW DOSE) 81 MG EC tablet TAKE 1 TABLET BY MOUTH ONCE A DAY (AM) Yes Trace Lomeli MD   midodrine (PROAMATINE) 10 MG tablet TAKE 1 TABLET BY MOUTH TWICE DAILY(AM,PM) Yes Michael Pickering MD   Blood Pressure Monitoring (B-D ASSURE BPM/AUTO ARM CUFF) MISC Use as directed Yes Michael Pickering MD   cyclobenzaprine (FLEXERIL) 10 MG tablet Take 10 mg by mouth daily  Yes Historical Provider, MD   atorvastatin (LIPITOR) 20 MG tablet Take 1 tablet by mouth daily Yes XU Jurado   apixaban (ELIQUIS) 5 MG TABS tablet Take 1 tablet by mouth 2 times daily START when Eliquis Starter pack completed Yes XU Jurado   EPINEPHrine (EPIPEN 2-PRAVEENA) 0.3 MG/0.3ML SOAJ injection Use as directed for allergic reaction Yes Anuja Mercado MD   metFORMIN (GLUCOPHAGE) 500 MG tablet Take 1 tablet by mouth 2 times daily (with meals)  Patient not taking: Reported on 12/23/2021  ARASH Garzon CNP   metoprolol tartrate (LOPRESSOR) 50 MG tablet Take 1 tablet by mouth 2 times daily  Patient not taking: Reported on 12/23/2021  ARASH Garzon CNP   Misc.  Devices (ROLLATOR ULTRA-LIGHT) MISC 1 each by Does not apply route once for 1 dose  Michael Pickering MD   Multiple Vitamin (TAB-A-MANDI/BETA CAROTENE) TABS TAKE 1 TABLET BY MOUTH EVERY DAY (AM)  Patient not taking: Reported on 12/20/2021  Carline Lesch, MD   primidone (MYSOLINE) 50 MG tablet Take 1 tablet by mouth nightly  Patient not taking: Reported on 11/2/2021  ARASH Degroot - CNP   Multiple Vitamins-Minerals (THERAPEUTIC MULTIVITAMIN-MINERALS) tablet Take 1 tablet by mouth daily  Monika Donis MD   albuterol (PROVENTIL;VENTOLIN) 90 MCG/ACT inhaler Inhale 2 puffs into the lungs every 6 hours as needed.   Monika Donis MD       Social History     Tobacco Use    Smoking status: Never Smoker    Smokeless tobacco: Never Used   Vaping Use    Vaping Use: Never used   Substance Use Topics    Alcohol use: No    Drug use: No        Allergies   Allergen Reactions    Bee Venom    ,   Past Medical History:   Diagnosis Date    Asthma 2015    Bilateral renal cysts 2013    Depression     since age 35, was with Dr Letha Arnold, now the [address] [place] Diverticulosis of sigmoid colon 2012    Dr Jamie Reyes DJD of left shoulder     Environmental allergies     Fatty liver 2013    GERD (gastroesophageal reflux disease)     History of cardiac arrhythmia     \"due to stress, was placed on medication\"    Hydatidiform mole     age 26     Hyperlipidemia     Hypothyroidism 2011    IFG (impaired fasting glucose) 2013    Melanoma of eye ([address]     age 30, R eye prosthesis, Dr Megan Crowder    Obesity     Prediabetes     PTSD (post-traumatic stress disorder)     age 35, [facility]    S/P colonoscopy 04/19/2012    Dr. Kp Abernathy S/P hysterectomy with oophorectomy 2012    Dr Amanda Grijalva    Tremor     Dr Emmy Ratliff Vitamin D deficiency    ,   Past Surgical History:   Procedure Laterality Date    BREAST BIOPSY      DILATION AND CURETTAGE OF UTERUS      ENDOMETRIAL ABLATION  6/2012    EYE REMOVAL      OD for melanoma, age 30    FOOT OSTEOTOMY Left 09/23/2016    DEJA JACKMAN DPM      [facility] STEROTACTIC LOC BREAST BIOPSY RIGHT Right 7/19/2021    [facility] STEROTACTIC LOC BREAST BIOPSY RIGHT 7/19/2021 [facility] Kisha Bueno BSO  2012    Dr Rodney Aguilar   ,   Social History     Tobacco Use    Smoking status: Never Smoker    Smokeless tobacco: Never Used   Vaping Use    Vaping Use: Never used   Substance Use Topics    Alcohol use: No    Drug use: No   ,   Family History   Problem Relation Age of Onset    Heart Failure Mother         dec 76    Diabetes Mother     Diabetes Father     Stroke Father         dec age 80    Cancer Father         Sarcoma    Stomach Cancer Other     Breast Cancer Paternal Aunt     Breast Cancer Maternal Aunt    ,   Immunization History   Administered Date(s) Administered    COVID-19, Moderna, Booster, PF, 50mcg/0.25ml 11/02/2021    COVID-19, Pfizer, PF, 30mcg/0.3mL 03/19/2021, 04/09/2021    Influenza Vaccine, unspecified formulation 09/10/2016    Influenza Virus Vaccine 09/19/2014, 09/07/2015, 09/12/2017, 09/13/2018, 09/13/2019, 08/27/2021    Influenza Whole 09/19/2014, 09/07/2015    Influenza, Quadv, IM, (6 mo and older Fluzone, Flulaval, Fluarix and 3 yrs and older Afluria) 09/12/2017, 09/13/2018    Influenza, Jestine Peeks, IM, PF (6 mo and older Fluzone, Flulaval, Fluarix, and 3 yrs and older Afluria) 09/13/2019, 08/20/2020, 08/27/2021    Influenza, Triv, 3 Years and older, IM (Afluria (5 yrs and older) 09/10/2016    Pneumococcal Polysaccharide ([id number]) 08/10/2018    Td, unspecified formulation 12/12/2017    Tdap (Boostrix, Adacel) 03/11/2016    Zoster Recombinant (Shingrix) 03/19/2019, 07/19/2019, 02/21/2020   ,   Health Maintenance   Topic Date Due    Diabetic foot exam  Never done    Diabetic microalbuminuria test  Never done    Diabetic retinal exam  Never done    Colon cancer screen colonoscopy  04/19/2022    A1C test (Diabetic or Prediabetic)  05/19/2022    Depression Monitoring  08/23/2022    Lipid screen  09/16/2022    Breast cancer screen  07/19/2023    Pneumococcal 0-64 years Vaccine (2 of 2 - PPSV23) 04/10/2026    DTaP/Tdap/Td vaccine (3 - Td or Tdap) 12/12/2027    Flu vaccine  Completed    Shingles Vaccine  Completed    COVID-19 Vaccine  Completed    Hepatitis C screen  Completed    HIV screen  Completed    Hepatitis A vaccine  Aged Out    Hib vaccine  Aged Out    Meningococcal (ACWY) vaccine  Aged Out       PHYSICAL EXAMINATION:  [ INSTRUCTIONS:  \"[x]\" Indicates a positive item  \"[]\" Indicates a negative item  -- DELETE ALL ITEMS NOT EXAMINED]  [x] Alert  [x] Oriented to person/place/time    [x] No apparent distress  [] Toxic appearing    [] Face flushed appearing [] Sclera clear  [] Lips are cyanotic      [x] Breathing appears normal  [] Appears tachypneic      [] Rash on visible skin    [] Cranial Nerves II-XII grossly intact    [] Motor grossly intact in visible upper extremities    [] Motor grossly intact in visible lower extremities    [x] Normal Mood  [] Anxious appearing    [] Depressed appearing  [] Confused appearing      [] Poor short term memory  [] Poor long term memory    [] OTHER:      Due to this being a TeleHealth encounter, evaluation of the following organ systems is limited: Vitals/Constitutional/EENT/Resp/CV/GI//MS/Neuro/Skin/Heme-Lymph-Imm. ASSESSMENT/PLAN:    1. Other acute pulmonary embolism with acute cor pulmonale (HCC)  Stable. Follow up with cardiology    2. Atrial flutter, unspecified type ([address]  Stable     3. Mild persistent asthma without complication      4. Neuropathy  F/u with podiatry    5. Counseling regarding advance directives and goals of care  Needs more assistance. will place in the referral.   - [facility] to [address] [place] [address] Specialist        Return in about 4 weeks (around 1/31/2022) for routine follow up. An  electronic signature was used to authenticate this note.     --Jerica Watson MD on 1/6/2022 at 12:54 PM        Pursuant to the emergency declaration under the [facility] waiver authority and the [employer] Appropriations Act, this Virtual  Visit was conducted, with patient's consent, to reduce the patient's risk of exposure to COVID-19 and provide continuity of care for an established patient. Services were provided through a video synchronous discussion virtually to substitute for in-person clinic visit. Gretchen Griggs is a 62 y.o. female evaluated via telephone on 1/3/2022. Consent:  She and/or health care decision maker is aware that that she may receive a bill for this telephone service, depending on her insurance coverage, and has provided verbal consent to proceed: Yes      Documentation:  I communicated with the patient and/or health care decision maker about headaches . Details of this discussion including any medical advice provided: yes       I affirm this is a Patient Initiated Episode with a Patient who has not had a related appointment within my department in the past 7 days or scheduled within the next 24 hours. Patient identification was verified at the start of the visit: Yes    Total Time: 5-10 minutes     The visit was conducted pursuant to the emergency declaration under the [facility], [address] [place] [address] authority and the [employer] and [employer] General Act. Patient identification was verified, and a caregiver was present when appropriate. The patient was located in a state where the provider was credentialed to provide care.     Note: not billable if this call serves to triage the patient into an appointment for the relevant concern      Katrin Correa MD (2) potential problem

## 2022-01-04 ENCOUNTER — CLINICAL DOCUMENTATION (OUTPATIENT)
Dept: SPIRITUAL SERVICES | Age: 62
End: 2022-01-04

## 2022-01-04 NOTE — PROGRESS NOTES
Advance Care Planning   Ambulatory ACP Specialist Patient Outreach    Date:  1/4/2022  ACP Specialist:  Mayela Menon    Outreach call to patient in follow-up to ACP Specialist referral from: Yue Freeman MD    [x] PCP  [] Provider   [] Ambulatory Care Management [] Other for Reason:    [x] Advance Directive Assistance  [] Code Status Discussion  [] Complete Portable DNR Order  [] Discuss Goals of Care  [] Complete POST/MOST  [] Early ACP Decision-Making  [] Other    Date Referral Received:1/3/21    Today's Outreach:  [x] First   [] Second  [] Third                               Third outreach made by [x]  phone  [] email []   [employer]     Intervention:  [x] Spoke with Patient  [] Left VM requesting return call      Outcome: Scheduled ACP Conversation call for Friday January 21st between 8am and 11am. Patient has received ACP docs from Physician. Next Step:   [x] ACP scheduled conversation  [] Outreach again in one week               [] Email / Mail ACP Info Sheets  [] Email / Mail Advance Directive            [] Close Referral. Routing closure to referring provider/staff and to ACP Specialist .      Thank you for this referral.

## 2022-01-06 ENCOUNTER — TELEPHONE (OUTPATIENT)
Dept: FAMILY MEDICINE CLINIC | Age: 62
End: 2022-01-06

## 2022-01-06 NOTE — TELEPHONE ENCOUNTER
Patient called and said she was having chest pain between her breast. Also states that her furnace went out and was wondering if that could be because she was cold. Please advise.  She also stated that the diabetic supply company called her and said they would be calling the doctor

## 2022-01-07 NOTE — TELEPHONE ENCOUNTER
NIKUNJ: Spoke with patient & she is no longer experiencing chest pain between breast. She thinks it was from being cold since her heater went down yesterday. She verbally confirmed she is feeling better & denied heart palpitations or heart racing. I notified her if she has chest pain to see ED but she stated she's doing good. Diabetic supply company contacted her & she took care of what they wanted.

## 2022-01-11 DIAGNOSIS — E55.9 VITAMIN D DEFICIENCY: ICD-10-CM

## 2022-01-11 RX ORDER — VITAMIN B COMPLEX
TABLET ORAL
Qty: 30 TABLET | Refills: 5 | Status: SHIPPED | OUTPATIENT
Start: 2022-01-11 | End: 2022-05-04

## 2022-01-11 NOTE — TELEPHONE ENCOUNTER
Future Appointments    Encounter Information    Provider Department Appt Notes   2/2/2022 MD RENETTA Oliver AT [facility] 3 Month follow up   2/17/2022 Jane Walsh [facility] Podiatry 9 week nail trim   2/28/2022 Kerwin Veliz MD [facility] Cardiology 3 months       Recent Visits    01/03/2022 Other acute pulmonary embolism with acute cor pulmonale [facility])   SOJOURN AT [facility] [address] MD

## 2022-01-21 ENCOUNTER — CLINICAL DOCUMENTATION (OUTPATIENT)
Dept: SPIRITUAL SERVICES | Age: 62
End: 2022-01-21

## 2022-01-21 NOTE — ACP (ADVANCE CARE PLANNING)
Advance Care Planning   Ambulatory ACP Specialist Patient Outreach    Date:  1/21/2022  ACP Specialist:  Stephen Rios    Outreach call to patient in follow-up to ACP Specialist referral from: Reza Villarreal MD    [x] PCP  [] Provider   [x] Ambulatory Care Management [] Other for Reason:    [x] Advance Directive Assistance  [x] Code Status Discussion  [] Complete Portable DNR Order  [x] Discuss Goals of Care  [] Complete POST/MOST  [] Early ACP Decision-Making  [] Other    Date Referral Received:01/04/22    Today's Outreach:  [] First   [x] Second  [] Third                               Third outreach made by []  phone  [] email []   [employer]     Intervention:  [x] Spoke with Patient  [] Left VM requesting return call      Outcome: We set up an appointment for Wed., January 26 at 10:00 am in the hospital to help her complete her AD and ACP note. Next Step:   [x] ACP scheduled conversation  [] Outreach again in one week               [] Email / Mail ACP Info Sheets  [] Email / Mail Advance Directive            [] Close Referral. Routing closure to referring provider/staff and to ACP Specialist .      Thank you for this referral.

## 2022-01-26 ENCOUNTER — CLINICAL DOCUMENTATION (OUTPATIENT)
Dept: SPIRITUAL SERVICES | Age: 62
End: 2022-01-26

## 2022-01-26 ENCOUNTER — OFFICE VISIT (OUTPATIENT)
Dept: FAMILY MEDICINE CLINIC | Age: 62
End: 2022-01-26
Payer: COMMERCIAL

## 2022-01-26 VITALS
HEART RATE: 62 BPM | TEMPERATURE: 96 F | HEIGHT: 66 IN | DIASTOLIC BLOOD PRESSURE: 84 MMHG | BODY MASS INDEX: 35.36 KG/M2 | WEIGHT: 220 LBS | SYSTOLIC BLOOD PRESSURE: 138 MMHG | OXYGEN SATURATION: 98 %

## 2022-01-26 DIAGNOSIS — K57.92 ACUTE DIVERTICULITIS: Primary | ICD-10-CM

## 2022-01-26 DIAGNOSIS — K57.92 ACUTE DIVERTICULITIS: ICD-10-CM

## 2022-01-26 LAB
BASOPHILS ABSOLUTE: 0 K/UL (ref 0–0.2)
BASOPHILS RELATIVE PERCENT: 0.7 %
C-REACTIVE PROTEIN: <3 MG/L (ref 0–5)
EOSINOPHILS ABSOLUTE: 0.1 K/UL (ref 0–0.7)
EOSINOPHILS RELATIVE PERCENT: 1.9 %
HCT VFR BLD CALC: 42.2 % (ref 37–47)
HEMOGLOBIN: 14.9 G/DL (ref 12–16)
LYMPHOCYTES ABSOLUTE: 2.8 K/UL (ref 1–4.8)
LYMPHOCYTES RELATIVE PERCENT: 41.4 %
MCH RBC QN AUTO: 32.3 PG (ref 27–31.3)
MCHC RBC AUTO-ENTMCNC: 35.4 % (ref 33–37)
MCV RBC AUTO: 91.2 FL (ref 82–100)
MONOCYTES ABSOLUTE: 0.5 K/UL (ref 0.2–0.8)
MONOCYTES RELATIVE PERCENT: 7 %
NEUTROPHILS ABSOLUTE: 3.3 K/UL (ref 1.4–6.5)
NEUTROPHILS RELATIVE PERCENT: 49 %
PDW BLD-RTO: 13.1 % (ref 11.5–14.5)
PLATELET # BLD: 315 K/UL (ref 130–400)
RBC # BLD: 4.63 M/UL (ref 4.2–5.4)
WBC # BLD: 6.7 K/UL (ref 4.8–10.8)

## 2022-01-26 PROCEDURE — 3017F COLORECTAL CA SCREEN DOC REV: CPT | Performed by: INTERNAL MEDICINE

## 2022-01-26 PROCEDURE — G8427 DOCREV CUR MEDS BY ELIG CLIN: HCPCS | Performed by: INTERNAL MEDICINE

## 2022-01-26 PROCEDURE — G9899 SCRN MAM PERF RSLTS DOC: HCPCS | Performed by: INTERNAL MEDICINE

## 2022-01-26 PROCEDURE — 99214 OFFICE O/P EST MOD 30 MIN: CPT | Performed by: INTERNAL MEDICINE

## 2022-01-26 PROCEDURE — G8482 FLU IMMUNIZE ORDER/ADMIN: HCPCS | Performed by: INTERNAL MEDICINE

## 2022-01-26 PROCEDURE — 1036F TOBACCO NON-USER: CPT | Performed by: INTERNAL MEDICINE

## 2022-01-26 PROCEDURE — G8417 CALC BMI ABV UP PARAM F/U: HCPCS | Performed by: INTERNAL MEDICINE

## 2022-01-26 RX ORDER — METRONIDAZOLE 500 MG/1
500 TABLET ORAL 3 TIMES DAILY
Qty: 30 TABLET | Refills: 0 | Status: SHIPPED | OUTPATIENT
Start: 2022-01-26 | End: 2022-02-05

## 2022-01-26 RX ORDER — CIPROFLOXACIN 500 MG/1
500 TABLET, FILM COATED ORAL 2 TIMES DAILY
Qty: 20 TABLET | Refills: 0 | Status: SHIPPED | OUTPATIENT
Start: 2022-01-26 | End: 2022-02-05

## 2022-01-26 ASSESSMENT — ENCOUNTER SYMPTOMS
CHEST TIGHTNESS: 0
VOMITING: 0
DIARRHEA: 1
NAUSEA: 0
SHORTNESS OF BREATH: 0
COUGH: 0
WHEEZING: 0
ABDOMINAL PAIN: 1

## 2022-01-26 NOTE — PROGRESS NOTES
Subjective:      Patient ID: [id number] Booth is a 62 y.o. female who presents today for:  Chief Complaint   Patient presents with    Diarrhea     x 2 weeks        HPI   Patient being seen for diarrhea for 2 weeks. Reports passage of loose, yellow stools; denies presence of blood. Endorses associated LLQ abdominal pain over the same period, rated as 7/10 in severity. Denies associated nausea, vomiting, fever or chills. History pertinent for Diverticulosis of sigmoid colon with previous episode of Diverticulitis. No recent treatment with antibiotics. Denies ill contacts.      Past Medical History:   Diagnosis Date    Asthma 2015    Bilateral renal cysts 2013    Depression     since age 35, was with Dr China Martel, now the [facility]    Diverticulosis of sigmoid colon 2012    Dr Radha Richmond DJD of left shoulder     Environmental allergies     Fatty liver 2013    GERD (gastroesophageal reflux disease)     History of cardiac arrhythmia     \"due to stress, was placed on medication\"    Hydatidiform mole     age 26     Hyperlipidemia     Hypothyroidism 2011    IFG (impaired fasting glucose) 2013    Melanoma of eye [facility])     age 30, R eye prosthesis, Dr Samanta Galo    Obesity     Prediabetes     PTSD (post-traumatic stress disorder)     age 35, [facility]    S/P colonoscopy 04/19/2012    Dr. Saúl Galo S/P hysterectomy with oophorectomy 2012    Dr Mia Odom Blinks Vitamin D deficiency      Past Surgical History:   Procedure Laterality Date    BREAST BIOPSY      DILATION AND CURETTAGE OF UTERUS      ENDOMETRIAL ABLATION  6/2012    EYE REMOVAL      OD for melanoma, age 30    FOOT OSTEOTOMY Left 09/23/2016    [facility] [address] [facility]      BALDOMERO STEROTACTIC LOC BREAST BIOPSY RIGHT Right 7/19/2021    BALDOMERO STEROTACTIC LOC BREAST BIOPSY RIGHT 7/19/2021 [facility]    LINDSAY AND BSO  2012    Dr Santhosh Wise     Family History   Problem Relation Age of Onset    Heart Failure Mother         dec 76    Diabetes Mother     Diabetes Father     Stroke Father         dec age 80    Cancer Father         Sarcoma    Stomach Cancer Other     Breast Cancer Paternal Aunt     Breast Cancer Maternal Aunt      Allergies   Allergen Reactions    Bee Venom      Current Outpatient Medications on File Prior to Visit   Medication Sig Dispense Refill    ketoconazole (NIZORAL) 2 % shampoo APPLY TOPICALLY DAILY AS NEEDED. 120 mL 2    Vitamin D (CHOLECALCIFEROL) 25 MCG (1000 UT) TABS tablet TAKE 1 CAPSULE BY MOUTH DAILY WITH SUPPER 30 tablet 5    phenazopyridine (PYRIDIUM) 100 MG tablet Take 1 tablet by mouth 3 times daily as needed for Pain 6 tablet 0    metFORMIN (GLUCOPHAGE) 500 MG tablet Take 1 tablet by mouth 2 times daily (with meals) 60 tablet 5    levothyroxine (SYNTHROID) 50 MCG tablet Take 1 tablet by mouth Daily 30 tablet 5    metoprolol tartrate (LOPRESSOR) 50 MG tablet Take 1 tablet by mouth 2 times daily 60 tablet 5    famotidine (PEPCID) 20 MG tablet TAKE 1 TABLET BY MOUTH TWICE DAILY (AM,PM) 60 tablet 5    diclofenac sodium (VOLTAREN) 1 % GEL APPLY 2 G TOPICALLY 4 TIMES DAILY 100 g 2    SYMBICORT 160-4.5 MCG/ACT AERO INHALE 2 PUFFS INTO THE LUNGS 2 TIMES DAILY 10.2 g 3    ARIPiprazole (ABILIFY) 15 MG tablet Take 1 tablet by mouth daily 30 tablet 5    benztropine (COGENTIN) 1 MG tablet Take 1 tablet by mouth daily 30 tablet 5    Cyanocobalamin (B-12) 1000 MCG TABS TAKE 1 TABLET BY MOUTH ONCE A DAY (NOON) 30 tablet 5    albuterol sulfate  (90 Base) MCG/ACT inhaler USE 2 PUFFS EVERY 4 HRS AS NEEDED FOR WHEEZING/SOB-SPACE OUT TO EVERY 6 HR AS SYMPTOMS IMPROVE 18 g 3    meclizine (ANTIVERT) 12.5 MG tablet Take 1 tablet by mouth nightly as needed for Dizziness 30 tablet 3    Biotin 300 MCG TABS Take 1 tablet by mouth daily 30 tablet 3    aspirin (ASPIRIN LOW DOSE) 81 MG EC tablet TAKE 1 TABLET BY MOUTH ONCE A DAY (AM) 30 tablet 3    Multiple Vitamin (TAB-A-MANDI/BETA CAROTENE) TABS TAKE 1 TABLET BY MOUTH EVERY DAY (AM) 28 tablet 5    midodrine (PROAMATINE) 10 MG tablet TAKE 1 TABLET BY MOUTH TWICE DAILY(AM,PM) 60 tablet 5    Blood Pressure Monitoring (B-D ASSURE BPM/AUTO ARM CUFF) MISC Use as directed 1 each 0    cyclobenzaprine (FLEXERIL) 10 MG tablet Take 10 mg by mouth daily       atorvastatin (LIPITOR) 20 MG tablet Take 1 tablet by mouth daily 30 tablet 5    apixaban (ELIQUIS) 5 MG TABS tablet Take 1 tablet by mouth 2 times daily START when Eliquis Starter pack completed 60 tablet 3    primidone (MYSOLINE) 50 MG tablet Take 1 tablet by mouth nightly 30 tablet 3    EPINEPHrine (EPIPEN 2-PRAVEENA) 0.3 MG/0.3ML SOAJ injection Use as directed for allergic reaction 2 each 2    Misc. Devices (ROLLATOR ULTRA-LIGHT) MISC 1 each by Does not apply route once for 1 dose 1 each 0    [DISCONTINUED] Multiple Vitamins-Minerals (THERAPEUTIC MULTIVITAMIN-MINERALS) tablet Take 1 tablet by mouth daily 30 tablet 5    [DISCONTINUED] albuterol (PROVENTIL;VENTOLIN) 90 MCG/ACT inhaler Inhale 2 puffs into the lungs every 6 hours as needed. 1 Inhaler 6     No current facility-administered medications on file prior to visit. Review of Systems   Constitutional: Negative for activity change, chills, diaphoresis, fatigue and fever. Respiratory: Negative for cough, chest tightness, shortness of breath and wheezing. Cardiovascular: Negative for chest pain, palpitations and leg swelling. Gastrointestinal: Positive for abdominal pain (left lower quadrant) and diarrhea. Negative for nausea and vomiting. Neurological: Negative for dizziness, syncope, light-headedness and headaches. Objective:   /84 (Site: Right Lower Arm, Position: Sitting, Cuff Size: Medium Adult)   Pulse 62   Temp 96 °F (35.6 °C) (Temporal)   Ht 5' 6\" (1.676 m)   Wt 220 lb (99.8 kg)   SpO2 98%   BMI 35.51 kg/m²     Physical Exam  Constitutional:       General: She is not in acute distress. Appearance: Normal appearance.  She is normal weight. She is not diaphoretic. HENT:      Head: Normocephalic and atraumatic. Eyes:      Conjunctiva/sclera: Conjunctivae normal.   Cardiovascular:      Rate and Rhythm: Normal rate and regular rhythm. Pulses: Normal pulses. Heart sounds: Normal heart sounds. No murmur heard. No friction rub. Pulmonary:      Effort: Pulmonary effort is normal. No respiratory distress. Breath sounds: Normal breath sounds. No wheezing or rhonchi. Chest:      Chest wall: No tenderness. Abdominal:      General: Bowel sounds are normal.      Palpations: Abdomen is soft. Tenderness: There is abdominal tenderness (left lower quadrant. ). There is no guarding or rebound. Musculoskeletal:         General: No tenderness. Normal range of motion. Right lower leg: No edema. Left lower leg: No edema. Neurological:      Mental Status: She is alert and oriented to person, place, and time. Mental status is at baseline. Assessment:      Lan Campa was seen today for diarrhea. Diagnoses and all orders for this visit:    Acute diverticulitis        -     Highly suggestive symptoms, findings. History pertinent for Diverticulosis of sigmoid colon. Plan:  -     CBC With Auto Differential; Future  -     C-Reactive Protein; Future  -     Gastrointestinal Panel, Molecular; Future  -     Will defer on imaging for now in view of non-severe presentation, absence of systemic symptoms.  -     Preemptive treatment with ciprofloxacin (CIPRO) 500 MG tablet; Take 1 tablet by mouth 2 times daily for 10 days  -     metroNIDAZOLE (FLAGYL) 500 MG tablet; Take 1 tablet by mouth 3 times daily for 10 days.  -  Monitor for progression.       Health Maintenance   Topic Date Due    Diabetic foot exam  Never done    Diabetic microalbuminuria test  Never done    Diabetic retinal exam  Never done    Colon cancer screen colonoscopy  04/19/2022    A1C test (Diabetic or Prediabetic)  05/19/2022    Depression Monitoring 08/23/2022    Lipid screen  09/16/2022    Breast cancer screen  07/19/2023    Pneumococcal 0-64 years Vaccine (2 of 2 - PPSV23) 04/10/2026    DTaP/Tdap/Td vaccine (3 - Td or Tdap) 12/12/2027    Flu vaccine  Completed    Shingles Vaccine  Completed    COVID-19 Vaccine  Completed    Hepatitis C screen  Completed    HIV screen  Completed    Hepatitis A vaccine  Aged Out    Hib vaccine  Aged Out    Meningococcal (ACWY) vaccine  Aged Out            Plan:    As above. Orders Placed This Encounter   Procedures    Gastrointestinal Panel, Molecular     Standing Status:   Future     Standing Expiration Date:   1/26/2023    CBC With Auto Differential     Standing Status:   Future     Standing Expiration Date:   1/26/2023    C-Reactive Protein     Standing Status:   Future     Standing Expiration Date:   1/26/2023     Orders Placed This Encounter   Medications    ciprofloxacin (CIPRO) 500 MG tablet     Sig: Take 1 tablet by mouth 2 times daily for 10 days     Dispense:  20 tablet     Refill:  0    metroNIDAZOLE (FLAGYL) 500 MG tablet     Sig: Take 1 tablet by mouth 3 times daily for 10 days     Dispense:  30 tablet     Refill:  0       No follow-ups on file.     Rosio Membreno MD

## 2022-01-26 NOTE — TELEPHONE ENCOUNTER
Requesting medication refill.  Please approve or deny this request.    Rx requested:  Requested Prescriptions     Pending Prescriptions Disp Refills    metFORMIN (GLUCOPHAGE) 500 MG tablet [Pharmacy Med Name: Leydi Varner  MG TABLET] 60 tablet 5     Sig: TAKE 1 TABLET BY MOUTH TWICE A DAY WITH MEALS       Last Office Visit:   01/03/2022    Last Filled:      Last Labs:      Next Visit Date:  Future Appointments   Date Time Provider Jay Wong   2/2/2022 10:45 AM Yue Thurston MD [facility] EMERGENCY [facility] [place]   2/17/2022  2:15 PM Janice Deluna DPM [facility] OP POD [facility]   2/28/2022 12:30 PM Gracie Jarvis MD [address] [place] [address]

## 2022-01-26 NOTE — ACP (ADVANCE CARE PLANNING)
Advance Care Planning   Advance Care Planning Note  Ambulatory Spiritual Care Services    Date:  1/26/2022    Received request from [facility] Provider and patient. Consultation conversation participants:   Patient who understands ACP conversation     Goals of ACP Conversation:  Discuss advance care planning documents  Facilitate a discussion related to patient's goals of care as they align with the patient's values and beliefs. Health Care Decision Makers:      Primary Decision Maker: Chepe Vogel - Jimy - [phone]     Summary:  Completed [facility]    Advance Care Planning Documents (Patient Wishes)  Currently on file:   Healthcare Power of /Advance Directive Appointment of Health Care Agent  Living Will/Advance Directive    Assessment:   Thank you for this referral. Your patient completed her AD and named her friend Lucian Alvarez as her primary decision maker. We also complete her ACP notes where patient expressed her goals of care. Interventions:  Provided education on documents for clarity and greater understanding  Assisted in the completion of documents according to patient's wishes at this time    Care Preferences Communicated:     Hospitalization:  If the patient's health worsens and it becomes clear that the chance of recovery is unlikely,     the patient prefers comfort-focused treatment without hospitalization. Ventilation:   If the patient, in their present state of health, suddenly became very ill and unable to breathe on their own,     the patient would NOT desire the use of a ventilator (breathing machine). If their health worsens and it becomes clear that the change of recovery is unlikely,     the patient would NOT desire the use of a ventilator (breathing machine).     Resuscitation:  In the event the patient's heart stopped as a result of an underlying serious health condition, the patient communicates a preference for      a

## 2022-02-02 ENCOUNTER — TELEPHONE (OUTPATIENT)
Dept: PHARMACY | Facility: CLINIC | Age: 62
End: 2022-02-02

## 2022-02-02 ENCOUNTER — OFFICE VISIT (OUTPATIENT)
Dept: FAMILY MEDICINE CLINIC | Age: 62
End: 2022-02-02
Payer: COMMERCIAL

## 2022-02-02 VITALS
OXYGEN SATURATION: 99 % | BODY MASS INDEX: 37.32 KG/M2 | SYSTOLIC BLOOD PRESSURE: 130 MMHG | DIASTOLIC BLOOD PRESSURE: 72 MMHG | HEIGHT: 66 IN | WEIGHT: 232.2 LBS | TEMPERATURE: 97.7 F | HEART RATE: 71 BPM

## 2022-02-02 DIAGNOSIS — J45.30 MILD PERSISTENT ASTHMA WITHOUT COMPLICATION: ICD-10-CM

## 2022-02-02 DIAGNOSIS — I48.92 ATRIAL FLUTTER, UNSPECIFIED TYPE (HCC): ICD-10-CM

## 2022-02-02 DIAGNOSIS — E03.9 HYPOTHYROIDISM, UNSPECIFIED TYPE: ICD-10-CM

## 2022-02-02 DIAGNOSIS — R73.03 PREDIABETES: ICD-10-CM

## 2022-02-02 DIAGNOSIS — I26.09 OTHER ACUTE PULMONARY EMBOLISM WITH ACUTE COR PULMONALE (HCC): Primary | ICD-10-CM

## 2022-02-02 DIAGNOSIS — F32.A DEPRESSION, UNSPECIFIED DEPRESSION TYPE: ICD-10-CM

## 2022-02-02 DIAGNOSIS — M47.26 OSTEOARTHRITIS OF SPINE WITH RADICULOPATHY, LUMBAR REGION: ICD-10-CM

## 2022-02-02 DIAGNOSIS — K76.0 FATTY LIVER DISEASE, NONALCOHOLIC: ICD-10-CM

## 2022-02-02 DIAGNOSIS — F43.10 PTSD (POST-TRAUMATIC STRESS DISORDER): ICD-10-CM

## 2022-02-02 LAB
HBA1C MFR BLD: 5.7 % (ref 4.8–5.9)
TSH SERPL DL<=0.05 MIU/L-ACNC: 1.84 UIU/ML (ref 0.44–3.86)

## 2022-02-02 PROCEDURE — G8427 DOCREV CUR MEDS BY ELIG CLIN: HCPCS | Performed by: FAMILY MEDICINE

## 2022-02-02 PROCEDURE — G9899 SCRN MAM PERF RSLTS DOC: HCPCS | Performed by: FAMILY MEDICINE

## 2022-02-02 PROCEDURE — 1036F TOBACCO NON-USER: CPT | Performed by: FAMILY MEDICINE

## 2022-02-02 PROCEDURE — 99214 OFFICE O/P EST MOD 30 MIN: CPT | Performed by: FAMILY MEDICINE

## 2022-02-02 PROCEDURE — G8482 FLU IMMUNIZE ORDER/ADMIN: HCPCS | Performed by: FAMILY MEDICINE

## 2022-02-02 PROCEDURE — 3017F COLORECTAL CA SCREEN DOC REV: CPT | Performed by: FAMILY MEDICINE

## 2022-02-02 PROCEDURE — G8417 CALC BMI ABV UP PARAM F/U: HCPCS | Performed by: FAMILY MEDICINE

## 2022-02-02 RX ORDER — PRAZOSIN HYDROCHLORIDE 1 MG/1
1 CAPSULE ORAL DAILY
COMMUNITY
Start: 2021-12-29

## 2022-02-02 RX ORDER — CARIPRAZINE 1.5 MG/1
1.5 CAPSULE, GELATIN COATED ORAL DAILY
COMMUNITY
Start: 2022-01-17

## 2022-02-02 NOTE — TELEPHONE ENCOUNTER
Received a referral:  from Care Coordinator to review patients medications. Patient gets confused with medications. Has a lot of medication on her charts but unsure which ones she is taking.  Patient would prefer to have someone call her to help her. Called patient to schedule a time to speak with a pharmacist over the telephone. No answer and mailbox full. Sent [employer] message      Sharlene Reyna CPhT.    [address] [facility] free: [phone]

## 2022-02-02 NOTE — PROGRESS NOTES
Chief Complaint   Patient presents with    3 Month Follow-Up    Diarrhea     Pt saw walk-in 1/26/22 due to diarrhea. Pt states she is not currently having this issues.  Dizziness     Pt still experiencing this with headaches.  Headache        HPI: Georgianne Ormond 62 y.o. female presenting for     Abdominal pain   Improving   Taking cipro and flagyl  Helping   Has a history of diverticulosis       Foot parasthesia   Was seen by another provider for parasthesias   Tingling has improved but admits to numbness in the area  patinet reports that she has a history of ankle fracture 2 years (fell into a pot hole)  Admits that the symptoms are improving. F/u   Stable   Following with podiatry     Pulmonary embolism   Currently seeing the cardiologist and on blood thinners   Stable   No issues or concerns  admits to dyspnea on exertion when pushing up her grocery cart      F/u   Stable no issues   Still on the eliquis   Has an appointment with cardiology.  Will inquire about the blood thinners.       Headaches   Patient is complaining of headaches for the last 4 days.  Patient reports is located on top of her head.  Does admit to having more stress in her life lately.  Not pounding in nature. Finas Love is an 8 out of 10 but goes down to 5 out of 10 with the help of extra strength Tylenol.  Patient takes 1 tablet a day as needed for pain.  Patient admits to a previous history of migraines.  Denies any auras.  Denies any fevers, chills, nausea, vomiting, chest pain, shortness of breath, abdominal pain, urination, change in stools.  Patient did have a CT of her head back in May 27, 2021 which was unremarkable.       F/u   Intermittent in nature   Has not found a new neurologist.         Onychomycosis   First toe in the left foot   Yellow and thick   Unable to cut the toenail   Denies any other issues.       F/u   Slight improvement with penlac   Has not had her CMP done   Would like to try oral medication     F/u  Has improved   Seeing the podiatrist   No issues or concerns           Hpyothyroidism   Stable. Takes medication.   Lab Results   Component Value Date    TSH 1.670 10/25/2021               Current Outpatient Medications   Medication Sig Dispense Refill    VRAYLAR 1.5 MG capsule Take 1.5 mg by mouth daily      prazosin (MINIPRESS) 1 MG capsule Take 1 mg by mouth daily      metFORMIN (GLUCOPHAGE) 500 MG tablet TAKE 1 TABLET BY MOUTH TWICE A DAY WITH MEALS 60 tablet 5    ciprofloxacin (CIPRO) 500 MG tablet Take 1 tablet by mouth 2 times daily for 10 days 20 tablet 0    metroNIDAZOLE (FLAGYL) 500 MG tablet Take 1 tablet by mouth 3 times daily for 10 days 30 tablet 0    ketoconazole (NIZORAL) 2 % shampoo APPLY TOPICALLY DAILY AS NEEDED. 120 mL 2    Vitamin D (CHOLECALCIFEROL) 25 MCG (1000 UT) TABS tablet TAKE 1 CAPSULE BY MOUTH DAILY WITH SUPPER 30 tablet 5    phenazopyridine (PYRIDIUM) 100 MG tablet Take 1 tablet by mouth 3 times daily as needed for Pain 6 tablet 0    levothyroxine (SYNTHROID) 50 MCG tablet Take 1 tablet by mouth Daily 30 tablet 5    metoprolol tartrate (LOPRESSOR) 50 MG tablet Take 1 tablet by mouth 2 times daily 60 tablet 5    famotidine (PEPCID) 20 MG tablet TAKE 1 TABLET BY MOUTH TWICE DAILY (AM,PM) 60 tablet 5    diclofenac sodium (VOLTAREN) 1 % GEL APPLY 2 G TOPICALLY 4 TIMES DAILY 100 g 2    SYMBICORT 160-4.5 MCG/ACT AERO INHALE 2 PUFFS INTO THE LUNGS 2 TIMES DAILY 10.2 g 3    ARIPiprazole (ABILIFY) 15 MG tablet Take 1 tablet by mouth daily 30 tablet 5    benztropine (COGENTIN) 1 MG tablet Take 1 tablet by mouth daily 30 tablet 5    Cyanocobalamin (B-12) 1000 MCG TABS TAKE 1 TABLET BY MOUTH ONCE A DAY (NOON) 30 tablet 5    albuterol sulfate  (90 Base) MCG/ACT inhaler USE 2 PUFFS EVERY 4 HRS AS NEEDED FOR WHEEZING/SOB-SPACE OUT TO EVERY 6 HR AS SYMPTOMS IMPROVE 18 g 3    meclizine (ANTIVERT) 12.5 MG tablet Take 1 tablet by mouth nightly as needed for Dizziness 30 tablet 3    Biotin 300 MCG TABS Take 1 tablet by mouth daily 30 tablet 3    aspirin (ASPIRIN LOW DOSE) 81 MG EC tablet TAKE 1 TABLET BY MOUTH ONCE A DAY (AM) 30 tablet 3    Multiple Vitamin (TAB-A-MANDI/BETA CAROTENE) TABS TAKE 1 TABLET BY MOUTH EVERY DAY (AM) 28 tablet 5    midodrine (PROAMATINE) 10 MG tablet TAKE 1 TABLET BY MOUTH TWICE DAILY(AM,PM) 60 tablet 5    Blood Pressure Monitoring (B-D ASSURE BPM/AUTO ARM CUFF) MISC Use as directed 1 each 0    cyclobenzaprine (FLEXERIL) 10 MG tablet Take 10 mg by mouth daily       atorvastatin (LIPITOR) 20 MG tablet Take 1 tablet by mouth daily 30 tablet 5    apixaban (ELIQUIS) 5 MG TABS tablet Take 1 tablet by mouth 2 times daily START when Eliquis Starter pack completed 60 tablet 3    primidone (MYSOLINE) 50 MG tablet Take 1 tablet by mouth nightly 30 tablet 3    EPINEPHrine (EPIPEN 2-PRAVEENA) 0.3 MG/0.3ML SOAJ injection Use as directed for allergic reaction 2 each 2    Misc. Devices (ROLLATOR ULTRA-LIGHT) MISC 1 each by Does not apply route once for 1 dose 1 each 0     No current facility-administered medications for this visit. ROS  CONSTITUTIONAL: The patient denies fevers, chills, sweats and body ache. HEENT: Denies headache, blurry vision, eye pain, tinnitus, vertigo, admits to sore throat, denies neck or thyroid masses. Admits to cracked lips. RESPIRATORY: admits to shortness of breath on exertion (specifically when pushing up the cart)  CARDIAC: Denies chest pain, pressure, palpitations, Denies lower extremity edema. GASTROINTESTINAL: Denies abdominal pain, constipation, diarrhea, bleeding in the stools,   GENITOURINARY: Denies dysuria, hematuria, nocturia or frequency, urinary incontinence. NEUROLOGIC: Denies headaches, admits to dizziness, denies syncope, weakness  MUSCULOSKELETAL: denies changes in range of motion, joint pain, stiffness. ENDOCRINOLOGY: Denies heat or cold intolerance.    HEMATOLOGY: Denies easy bleeding or blood transfusion,anemia  DERMATOLOGY: Admits to nail fungus on her toenails. PSYCHIATRY: Denies depression, agitation or anxiety.     Past Medical History:   Diagnosis Date    Asthma 2015    Bilateral renal cysts 2013    Depression     since age 35, was with Dr Chavo Cortés, now the [facility] Diverticulosis of sigmoid colon 2012    Dr Arlene Bertrand DJD of left shoulder     Environmental allergies     Fatty liver 2013    GERD (gastroesophageal reflux disease)     History of cardiac arrhythmia     \"due to stress, was placed on medication\"    Hydatidiform mole     age 26     Hyperlipidemia     Hypothyroidism 2011    IFG (impaired fasting glucose) 2013    Melanoma of eye [facility])     age 30, R eye prosthesis, Dr Diamnod Alonso    Obesity     Prediabetes     PTSD (post-traumatic stress disorder)     age 35, [facility]    S/P colonoscopy 04/19/2012    Dr. Glenny Samayoa S/P hysterectomy with oophorectomy 2012    Dr Merritt Enamorado Vitamin D deficiency         Past Surgical History:   Procedure Laterality Date    BREAST BIOPSY      DILATION AND CURETTAGE OF UTERUS      ENDOMETRIAL ABLATION  6/2012    EYE REMOVAL      OD for melanoma, age 30    FOOT OSTEOTOMY Left 09/23/2016    [facility] DPM      BALDOMERO STEROTACTIC LOC BREAST BIOPSY RIGHT Right 7/19/2021    BALDOMERO STEROTACTIC LOC BREAST BIOPSY RIGHT 7/19/2021 [address] [facility]""    LINDSAY AND BSO  2012    Dr Kalvin Cockayne        Family History   Problem Relation Age of Onset    Heart Failure Mother         dec 76    Diabetes Mother     Diabetes Father     Stroke Father         dec age 80    Cancer Father         Sarcoma    Stomach Cancer Other     Breast Cancer Paternal Aunt     Breast Cancer Maternal Aunt         Social History     Socioeconomic History    Marital status:      Spouse name: Not on file    Number of children: 3    Years of education: 15    Highest education level: High school graduate   Occupational History    Occupation: [employer] Tobacco Use    Smoking status: Never Smoker    Smokeless tobacco: Never Used   Vaping Use    Vaping Use: Never used   Substance and Sexual Activity    Alcohol use: No    Drug use: No    Sexual activity: Not Currently     Birth control/protection: Surgical   Other Topics Concern    Not on file   Social History Narrative    [place] one [place] 4 children, one sister disappeared at age 30, never found    Moved to [place] at age 40        Lives in an apartment in [place], alone    , 3 children, all grown, in [place]     2 granddaughters     Hobbies exercising, birds    Attends [employer], has volunteered at [employer]     Social Determinants of Health     Financial Resource Strain: Low Risk     Difficulty of Paying Living Expenses: Not hard at all   Food Insecurity: No Food Insecurity    Worried About [facility] in the Last Year: Never true    Anurag of Food in the Last Year: Never true   Transportation Needs:     Lack of Transportation (Medical): Not on file    Lack of Transportation (Non-Medical):  Not on file   Physical Activity:     Days of Exercise per Week: Not on file    Minutes of Exercise per Session: Not on file   Stress:     Feeling of Stress : Not on file   Social Connections:     Frequency of Communication with Friends and Family: Not on file    Frequency of Social Gatherings with Friends and Family: Not on file    Attends Mu-ism Services: Not on file    Active Member of [address] [place] [address] or Organizations: Not on file    Attends Club or Organization Meetings: Not on file    Marital Status: Not on file   Intimate Partner Violence:     Fear of Current or Ex-Partner: Not on file    Emotionally Abused: Not on file    Physically Abused: Not on file    Sexually Abused: Not on file   Housing Stability:     Unable to Pay for Housing in the Last Year: Not on file    Number of [place] in the Last Year: Not on file    Unstable Housing in the Last Year: Not on file        BP 130/72   Pulse 71   Temp 97.7 °F (36.5 °C) (Temporal)   Ht 5' 6\" (1.676 m)   Wt 232 lb 3.2 oz (105.3 kg)   SpO2 99%   BMI 37.48 kg/m²        Physical Exam:    General appearance - alert, well appearing, and in no distress, obese  Mental Status - alert, oriented to person, place, and time  Eyes - pupils equal and reactive, extraocular eye movements intact   Ears - bilateral TM's and external ear canals normal   Nose - normal and patent, no erythema, discharge or polyps   Sinuses - Normal paranasal sinuses without tenderness   Throat - mucous membranes moist, pharynx normal without lesions   Neck - supple, no significant adenopathy   Thyroid - thyroid is normal in size without nodules or tenderness    Chest - clear to auscultation, no wheezes, rales or rhonchi, symmetric air entry   Heart - tacycardic  Abdomen - soft, nontender, nondistended, no masses or organomegaly   Back exam - full range of motion, no tenderness, palpable spasm or pain on motion   Neurological - upper extremity tremors  Musculoskeletal -slight swelling in the upper extremities bilaterally. Extremities - non pitting swelling noted in the left arm. No erythema or drainage noted from the arm. Skin - stable.          Labs       TSH   Date Value Ref Range Status   09/15/2021 1.900 0.440 - 3.860 uIU/mL Final   05/05/2021 4.420 (H) 0.440 - 3.860 uIU/mL Final     TSH   Date Value Ref Range Status   10/25/2021 1.670 0.440 - 3.860 uIU/mL Final   08/23/2021 2.180 0.440 - 3.860 uIU/mL Final   02/07/2020 2.700 0.440 - 3.860 uIU/mL Final   06/14/2019 2.430 0.440 - 3.860 uIU/mL Final   06/11/2018 3.820 0.270 - 4.200 uIU/mL Final   06/07/2017 3.500 0.270 - 4.200 uIU/mL Final   01/27/2017 3.810 0.270 - 4.200 uIU/mL Final   03/04/2016 1.990 0.270 - 4.200 uIU/mL Final   11/20/2015 1.940 0.270 - 4.200 uIU/mL Final   08/28/2015 1.970 0.270 - 4.200 uIU/mL Final   10/25/2014 3.130 0.270 - 4.200 uIU/mL Final   12/11/2013 3.910 0.270 - 4.200 uIU/mL Final 04/04/2013 1.346 0.550 - 4.780 uIU/mL Final   10/16/2012 1.607 0.550 - 4.780 uIU/mL Final   05/14/2012 1.771 0.550 - 4.780 uIU/mL Final   03/22/2012 4.082 0.550 - 4.780 uIU/mL Final     Lab Results   Component Value Date     10/25/2021    K 4.9 10/25/2021    CL 99 10/25/2021    CO2 19 (L) 10/25/2021    BUN 11 10/25/2021    CREATININE 0.92 (H) 10/25/2021    GLUCOSE 157 (H) 10/25/2021    CALCIUM 9.5 10/25/2021    PROT 6.7 10/25/2021    LABALBU 4.3 10/25/2021    BILITOT 0.4 10/25/2021    ALKPHOS 80 10/25/2021    AST 49 (H) 10/25/2021    ALT 43 (H) 10/25/2021    LABGLOM >60.0 10/25/2021    GFRAA >60.0 10/25/2021    GLOB 2.4 10/25/2021       Lab Results   Component Value Date    WBC 6.7 01/26/2022    HGB 14.9 01/26/2022    HCT 42.2 01/26/2022    MCV 91.2 01/26/2022     01/26/2022     Lab Results   Component Value Date    LABA1C 5.7 05/19/2021     No results found for: EAG      A/P: Karen Allenbourne 62 y.o. female presenting for     1. Other acute pulmonary embolism with acute cor pulmonale ([facility] [address]  F/u with cardiology. Has a history of a flutter. Will likely need to be on the medication long term but will follow up with cardiology end of the month  - Referral to Clinical Pharmacist    2. Atrial flutter, unspecified type [facility])    - Referral to Clinical Pharmacist    3. Mild persistent asthma without complication    - Referral to Clinical Pharmacist    4. Osteoarthritis of spine with radiculopathy, lumbar region  Chronic issue stable   - Referral to Clinical Pharmacist    5. Fatty liver disease, nonalcoholic    - Referral to Clinical Pharmacist    6. PTSD (post-traumatic stress disorder)    - Referral to Clinical Pharmacist    7. Depression, unspecified depression type  F/u with psych   - Referral to Clinical Pharmacist    8. Hypothyroidism, unspecified type  Lab Results   Component Value Date    TSH 1.670 10/25/2021         - TSH without Reflex; Future    9.  Prediabetes  Lab Results   Component Value Date LABA1C 5.7 05/19/2021     No results found for: EAG    - Hemoglobin A1C; Future          Please note, this report has been partially produced using speech recognition software  and may cause  and /or contain errors related to that system including grammar, punctuation and spelling as well as words and phrases that may seem inappropriate. If there are questions or concerns please feel free to contact me to clarify.

## 2022-02-04 NOTE — TELEPHONE ENCOUNTER
2nd Attempt Documentation:  2nd attempt to contact this patient regarding the previous message  CLINICAL PHARMACY: REFERRAL  Patient unavailable at the time of call. Left following message on home TAD: please call back at toll-free [phone] to retrieve previous message. [place] message has been read.       For Lizandro Tang in place:  No   Recommendation Provided To: Patient/Caregiver: 1 via [address]?: No    Intervention Accepted By: Patient/Caregiver: 0   Time Spent (min): 15

## 2022-02-23 DIAGNOSIS — J45.30 MILD PERSISTENT ASTHMA WITHOUT COMPLICATION: ICD-10-CM

## 2022-02-23 RX ORDER — ALBUTEROL SULFATE 90 UG/1
2 AEROSOL, METERED RESPIRATORY (INHALATION) EVERY 6 HOURS PRN
Qty: 18 G | Refills: 3 | Status: SHIPPED | OUTPATIENT
Start: 2022-02-23

## 2022-02-26 DIAGNOSIS — I95.1 ORTHOSTATIC HYPOTENSION: ICD-10-CM

## 2022-02-26 DIAGNOSIS — L21.9 SEBORRHEIC DERMATITIS: ICD-10-CM

## 2022-02-28 RX ORDER — MIDODRINE HYDROCHLORIDE 10 MG/1
TABLET ORAL
Qty: 56 TABLET | Refills: 2 | Status: SHIPPED | OUTPATIENT
Start: 2022-02-28 | End: 2022-03-07

## 2022-02-28 RX ORDER — KETOCONAZOLE 20 MG/ML
SHAMPOO TOPICAL
Qty: 120 ML | Refills: 2 | Status: SHIPPED | OUTPATIENT
Start: 2022-02-28

## 2022-02-28 NOTE — TELEPHONE ENCOUNTER
Requesting medication refill. Please approve or deny this request.    Rx requested:  Requested Prescriptions     Pending Prescriptions Disp Refills    midodrine (PROAMATINE) 10 MG tablet [Pharmacy Med Name: MIDODRINE HCL 10 MG TABS 10 Tablet] 56 tablet 2     Sig: TAKE 1 TABLET BY MOUTH TWICE DAILY(AM,PM)         Last Office Visit:   11/17/2021      Next Visit Date:  Future Appointments   Date Time Provider Jay Lini   3/17/2022  2:15 PM Pennie Ortiz MD [facility]   5/4/2022  9:30 AM Diana Amado MD [address] [place] [address] 7               Last refill 11/11/21. Please approve or deny.

## 2022-03-05 DIAGNOSIS — I95.1 ORTHOSTATIC HYPOTENSION: ICD-10-CM

## 2022-03-05 DIAGNOSIS — L21.9 SEBORRHEIC DERMATITIS: ICD-10-CM

## 2022-03-06 RX ORDER — KETOCONAZOLE 20 MG/ML
SHAMPOO TOPICAL
Qty: 120 ML | Refills: 2 | OUTPATIENT
Start: 2022-03-06

## 2022-03-07 RX ORDER — MIDODRINE HYDROCHLORIDE 10 MG/1
TABLET ORAL
Qty: 56 TABLET | Refills: 2 | Status: SHIPPED | OUTPATIENT
Start: 2022-03-07 | End: 2022-05-05 | Stop reason: SDUPTHER

## 2022-03-07 NOTE — TELEPHONE ENCOUNTER
Please approve or deny this refill request. The order is pended. Thank you.     LOV 11/17/2021    Next Visit Date:  Future Appointments   Date Time Provider Jay Wong   3/17/2022  2:15 PM Lamin Elizondo MD [facility]   5/4/2022  9:30 AM Bobbi Laws  [place] 7

## 2022-03-12 DIAGNOSIS — M62.838 NECK MUSCLE SPASM: ICD-10-CM

## 2022-03-19 DIAGNOSIS — J45.30 MILD PERSISTENT ASTHMA WITHOUT COMPLICATION: ICD-10-CM

## 2022-03-19 NOTE — TELEPHONE ENCOUNTER
Future Appointments    Encounter Information    Provider Department Appt Notes   5/4/2022 [facility] 3 mo f/u //sxc       Past Visits    Date Provider Specialty Visit Type Primary Dx   02/02/2022 Ul. Lalo Infante MD Family Medicine Office Visit Other acute pulmonary embolism with acute cor pulmonale ([facility] [address]

## 2022-03-24 ENCOUNTER — TELEPHONE (OUTPATIENT)
Dept: FAMILY MEDICINE CLINIC | Age: 62
End: 2022-03-24

## 2022-03-24 DIAGNOSIS — Z12.31 ENCOUNTER FOR SCREENING MAMMOGRAM FOR MALIGNANT NEOPLASM OF BREAST: Primary | ICD-10-CM

## 2022-03-24 NOTE — TELEPHONE ENCOUNTER
----- Message from [facility] sent at 3/24/2022  7:51 AM EDT -----  Subject: Referral Request    QUESTIONS   Reason for referral request? Patient is requesting for Dr. Mayela Forde to   order her a mammogram so she can get it done at [facility].   Patient does not have an appointment yet but wanted to put the request in   for her mammogram. Please call pt when order is ready at [phone]. Patient is wanting to get her mammogram done in April. Has the physician seen you for this condition before? Yes  Select a date? 2021-04-01  Select the Provider the patient wants to be referred to, if known (PCP or   Specialist)? Trace Lomeli   Preferred Specialist (if applicable)? Do you already have an appointment scheduled? No  Additional Information for Provider?   ---------------------------------------------------------------------------  --------------  CALL BACK INFO  What is the best way for the office to contact you? OK to leave message on   voicemail, OK to respond with electronic message via [employer] portal (only   for patients who have registered [employer] account)  Preferred Call Back Phone Number?  [phone]

## 2022-03-24 NOTE — TELEPHONE ENCOUNTER
Please let patient know that there is an active order for her from Dr. Yuriy Lara that was placed in July 2021. The mammogram order should still be good.

## 2022-04-08 ENCOUNTER — OFFICE VISIT (OUTPATIENT)
Dept: PODIATRY | Age: 62
End: 2022-04-08
Payer: COMMERCIAL

## 2022-04-08 VITALS — BODY MASS INDEX: 37.28 KG/M2 | HEIGHT: 66 IN | WEIGHT: 232 LBS | TEMPERATURE: 97.1 F

## 2022-04-08 DIAGNOSIS — M79.671 PAIN IN BOTH FEET: ICD-10-CM

## 2022-04-08 DIAGNOSIS — L60.3 NAIL DYSTROPHY: ICD-10-CM

## 2022-04-08 DIAGNOSIS — L84 CALLUS: ICD-10-CM

## 2022-04-08 DIAGNOSIS — B35.1 DERMATOPHYTOSIS OF NAIL: ICD-10-CM

## 2022-04-08 DIAGNOSIS — G62.9 PERIPHERAL POLYNEUROPATHY: Primary | ICD-10-CM

## 2022-04-08 DIAGNOSIS — M79.672 PAIN IN BOTH FEET: ICD-10-CM

## 2022-04-08 PROCEDURE — 11721 DEBRIDE NAIL 6 OR MORE: CPT | Performed by: PODIATRIST

## 2022-04-08 PROCEDURE — 11056 PARNG/CUTG B9 HYPRKR LES 2-4: CPT | Performed by: PODIATRIST

## 2022-04-08 PROCEDURE — 99999 PR OFFICE/OUTPT VISIT,PROCEDURE ONLY: CPT | Performed by: PODIATRIST

## 2022-04-08 ASSESSMENT — ENCOUNTER SYMPTOMS
VOMITING: 0
SHORTNESS OF BREATH: 0
NAUSEA: 0
BACK PAIN: 0

## 2022-04-08 NOTE — PROGRESS NOTES
[facility]  [address] [place] [address]  Dept: [phone]  Loc: [phone]       Camilla Stone  (1960)    4/8/22    Subjective     Camilla Stone is 62 y.o. female who complains today of:    Chief Complaint   Patient presents with    Nail Problem     both feet    Callouses    Numbness       HPI: Patient presents for foot care, she complains of painful calluses to the ball of the foot to both feet and painful toenails. Patient states that she has difficulty performing self-care due to the thickness and deformity of the nail plates in the location of the calluses. Patient states she did not qualify for diabetic shoes because she is still considered prediabetic, she continues to have neuropathic pain to both feet. Review of Systems   Constitutional: Negative for chills and fever. HENT: Negative for hearing loss. Respiratory: Negative for shortness of breath. Cardiovascular: Negative for chest pain. Gastrointestinal: Negative for nausea and vomiting. Genitourinary: Negative for difficulty urinating. Musculoskeletal: Positive for gait problem. Negative for back pain. Skin: Negative for wound. Neurological: Positive for numbness. Hematological: Does not bruise/bleed easily. Psychiatric/Behavioral: Negative for sleep disturbance. The patient is pre-diabetic.    PCP: Kelly Bates MD   Date last seen: 2/2/22    Allergies:  Bee venom    Current Outpatient Medications on File Prior to Visit   Medication Sig Dispense Refill    VENTOLIN  (90 Base) MCG/ACT inhaler USE 2 PUFFS EVERY 4 HRS AS NEEDED FOR WHEEZING/SOB-SPACE OUT TO EVERY 6 HR AS SYMPTOMS IMPROVE 18 g 3    diclofenac sodium (VOLTAREN) 1 % GEL APPLY 2 G TOPICALLY 4 TIMES DAILY 100 g 2    midodrine (PROAMATINE) 10 MG tablet TAKE 1 TABLET BY MOUTH TWICE DAILY(AM,PM) 56 tablet 2    ketoconazole (NIZORAL) 2 % shampoo APPLY TOPICALLY DAILY AS NEEDED. 120 mL 2    albuterol sulfate HFA (VENTOLIN HFA) 108 (90 Base) MCG/ACT inhaler Inhale 2 puffs into the lungs every 6 hours as needed for Wheezing Please cover what is under insurance. 18 g 3    VRAYLAR 1.5 MG capsule Take 1.5 mg by mouth daily      prazosin (MINIPRESS) 1 MG capsule Take 1 mg by mouth daily      metFORMIN (GLUCOPHAGE) 500 MG tablet TAKE 1 TABLET BY MOUTH TWICE A DAY WITH MEALS 60 tablet 5    Vitamin D (CHOLECALCIFEROL) 25 MCG (1000 UT) TABS tablet TAKE 1 CAPSULE BY MOUTH DAILY WITH SUPPER 30 tablet 5    levothyroxine (SYNTHROID) 50 MCG tablet Take 1 tablet by mouth Daily 30 tablet 5    metoprolol tartrate (LOPRESSOR) 50 MG tablet Take 1 tablet by mouth 2 times daily 60 tablet 5    famotidine (PEPCID) 20 MG tablet TAKE 1 TABLET BY MOUTH TWICE DAILY (AM,PM) 60 tablet 5    SYMBICORT 160-4.5 MCG/ACT AERO INHALE 2 PUFFS INTO THE LUNGS 2 TIMES DAILY 10.2 g 3    ARIPiprazole (ABILIFY) 15 MG tablet Take 1 tablet by mouth daily 30 tablet 5    benztropine (COGENTIN) 1 MG tablet Take 1 tablet by mouth daily 30 tablet 5    Cyanocobalamin (B-12) 1000 MCG TABS TAKE 1 TABLET BY MOUTH ONCE A DAY (NOON) 30 tablet 5    meclizine (ANTIVERT) 12.5 MG tablet Take 1 tablet by mouth nightly as needed for Dizziness 30 tablet 3    Misc.  Devices (ROLLATOR ULTRA-LIGHT) MISC 1 each by Does not apply route once for 1 dose 1 each 0    Biotin 300 MCG TABS Take 1 tablet by mouth daily 30 tablet 3    aspirin (ASPIRIN LOW DOSE) 81 MG EC tablet TAKE 1 TABLET BY MOUTH ONCE A DAY (AM) 30 tablet 3    Multiple Vitamin (TAB-A-MANDI/BETA CAROTENE) TABS TAKE 1 TABLET BY MOUTH EVERY DAY (AM) 28 tablet 5    Blood Pressure Monitoring (B-D ASSURE BPM/AUTO ARM CUFF) MISC Use as directed 1 each 0    cyclobenzaprine (FLEXERIL) 10 MG tablet Take 10 mg by mouth daily       atorvastatin (LIPITOR) 20 MG tablet Take 1 tablet by mouth daily 30 tablet 5    apixaban (ELIQUIS) 5 MG TABS tablet Take 1 tablet by mouth 2 times daily START when Eliquis Starter pack completed 60 tablet 3    primidone (MYSOLINE) 50 MG tablet Take 1 tablet by mouth nightly 30 tablet 3    EPINEPHrine (EPIPEN 2-PRAVEENA) 0.3 MG/0.3ML SOAJ injection Use as directed for allergic reaction 2 each 2    [DISCONTINUED] Multiple Vitamins-Minerals (THERAPEUTIC MULTIVITAMIN-MINERALS) tablet Take 1 tablet by mouth daily 30 tablet 5    [DISCONTINUED] albuterol (PROVENTIL;VENTOLIN) 90 MCG/ACT inhaler Inhale 2 puffs into the lungs every 6 hours as needed. 1 Inhaler 6     No current facility-administered medications on file prior to visit.        Past Medical History:   Diagnosis Date    Asthma 2015    Bilateral renal cysts 2013    Depression     since age 35, was with Dr Davis Chaparro, now the [facility]    Diverticulosis of sigmoid colon 2012    Dr Jaron Garcia DJD of left shoulder     Environmental allergies     Fatty liver 2013    GERD (gastroesophageal reflux disease)     History of cardiac arrhythmia     \"due to stress, was placed on medication\"    Hydatidiform mole     age 26     Hyperlipidemia     Hypothyroidism 2011    IFG (impaired fasting glucose) 2013    Melanoma of eye [facility])     age 30, R eye prosthesis, Dr Sherly Carvajal    Obesity     Prediabetes     PTSD (post-traumatic stress disorder)     age 35, [facility]    S/P colonoscopy 04/19/2012    Dr. Jasmyne So S/P hysterectomy with oophorectomy 2012    Dr Suellyn Blizzard Dr Bettina Olszewski Vitamin D deficiency      Past Surgical History:   Procedure Laterality Date    BREAST BIOPSY      DILATION AND CURETTAGE OF UTERUS      ENDOMETRIAL ABLATION  6/2012    EYE REMOVAL      OD for melanoma, age 30    FOOT OSTEOTOMY Left 09/23/2016    [facility] DPM      BALDOMERO STEROTACTIC LOC BREAST BIOPSY RIGHT Right 7/19/2021    BALDOMERO STEROTACTIC LOC BREAST BIOPSY RIGHT 7[facility]19/2021 [facility]    LINDSAY AND BSO  2012    Dr Stanley Bone History     Socioeconomic History    Marital status:      Spouse name: Not on file    Number of children: 3    Years of education: 15    Highest education level: High school graduate   Occupational History    Occupation: [employer]   Tobacco Use    Smoking status: Never Smoker    Smokeless tobacco: Never Used   Vaping Use    Vaping Use: Never used   Substance and Sexual Activity    Alcohol use: No    Drug use: No    Sexual activity: Not Currently     Birth control/protection: Surgical   Other Topics Concern    Not on file   Social History Narrative    Born in [facility], one of 4 children, one sister disappeared at age 29, never found    Moved to [place] at age 40        Lives in an apartment in [place], alone    , 3 children, all grown, in [place]     2 granddaughters     Hobbies exercising, birds    Attends [employer], has volunteered at [employer] Strain: Low Risk     Difficulty of Paying Living Expenses: Not hard at all   Food Insecurity: No Food Insecurity    Worried About [address] in the Last Year: Never true    [address] in the Last Year: Never true   Transportation Needs:     Lack of Transportation (Medical): Not on file    Lack of Transportation (Non-Medical):  Not on file   Physical Activity:     Days of Exercise per Week: Not on file    Minutes of Exercise per Session: Not on file   Stress:     Feeling of Stress : Not on file   Social Connections:     Frequency of Communication with Friends and Family: Not on file    Frequency of Social Gatherings with Friends and Family: Not on file    Attends [employer] Services: Not on file    Active Member of Clubs or Organizations: Not on file    Attends Club or Organization Meetings: Not on file    Marital Status: Not on file   Intimate Partner Violence:     Fear of Current or Ex-Partner: Not on file    Emotionally Abused: Not on file    Physically Abused: Not on file    Sexually Abused: Not on file   Housing Stability:     Unable to Pay for Housing in the Last Year: Not on file    Number of Places Lived in the Last Year: Not on file    Unstable Housing in the Last Year: Not on file     Family History   Problem Relation Age of Onset    Heart Failure Mother         dec 76    Diabetes Mother     Diabetes Father     Stroke Father         dec age 80    Cancer Father         Sarcoma    Stomach Cancer Other     Breast Cancer Paternal Aunt     Breast Cancer Maternal Aunt            Objective:   Vitals:  Temp 97.1 °F (36.2 °C)   Ht 5' 6\" (1.676 m)   Wt 232 lb (105.2 kg)   BMI 37.45 kg/m² Pain Score:   7      Physical Exam  Vitals reviewed. Constitutional:       Appearance: She is obese. Cardiovascular:      Pulses:           Dorsalis pedis pulses are 2+ on the right side and 2+ on the left side. Posterior tibial pulses are 2+ on the right side and 2+ on the left side. Musculoskeletal:      Right lower leg: No edema. Left lower leg: No edema. Right foot: Deformity present. Left foot: Decreased range of motion. Deformity present. Feet:    Feet:      Right foot:      Protective Sensation: 10 sites tested. 10 sites sensed. Skin integrity: Callus present. Toenail Condition: Right toenails are abnormally thick, long and ingrown. Fungal disease present. Left foot:      Protective Sensation: 10 sites tested. 8 sites sensed. Skin integrity: Callus present. Toenail Condition: Left toenails are abnormally thick, long and ingrown. Fungal disease present. Comments: Tailor's bunionette deformity with adductovarus rotation of the fifth toe noted bilaterally. Decreased range of motion noted to the left subtalar joint, there is no pain or crepitus noted. Stiffness and decreased range of motion noted with range of motion of the left ankle joint.   Lymphadenopathy:      Comments: Popliteal lymph nodes are soft and nontender bilateral lower extremities. Skin:     General: Skin is warm and dry. Capillary Refill: Capillary refill takes less than 2 seconds. Comments: Xerotic skin texture noted bilaterally, focal hyperkeratotic lesion noted bilateral plantar foot at the fifth MPJ. Yellow discoloration, thickened nail plate, and subungual debris noted to the bilateral hallux nail. The remaining nails are thickened and incurvated, there is no discoloration or subungual debris. No open lesions noted. Normal skin turgor noted bilaterally. Neurological:      Mental Status: She is alert. Deep Tendon Reflexes: Babinski sign absent on the left side. Reflex Scores:       Patellar reflexes are 0 on the right side and 0 on the left side. Achilles reflexes are 0 on the right side and 0 on the left side. Comments: No ankle clonus noted bilateral ankle. Vibratory sensation is diminished bilaterally. Altered light touch sensation noted to the bilateral toes. Hot versus cold discrimination is intact bilaterally. Sharp versus dull discrimination is intact bilaterally. Protective sensation is absent to the first and fifth toes left foot. Assessment:      Diagnosis Orders   1. Peripheral polyneuropathy  TRIM BENIGN HYPERKERATOTIC SKIN LESION,2-4    33756 - [place] DEBRIDEMENT OF NAILS, 6 OR MORE   2. Callus  TRIM BENIGN HYPERKERATOTIC SKIN LESION,2-4   3. Dermatophytosis of nail  17287 - [place] DEBRIDEMENT OF NAILS, 6 OR MORE   4. Nail dystrophy  07184 - [place] DEBRIDEMENT OF NAILS, 6 OR MORE   5. Pain in both feet  TRIM BENIGN HYPERKERATOTIC SKIN LESION,2-4    49877 - [place] DEBRIDEMENT OF NAILS, 6 OR MORE       Plan:     Neuropathy/Calluses: The hyperkeratotic lesions located at bilateral plantar foot at the fifth metatarsophalangeal joint were pared to the level of normal skin with a #15 blade scalpel without incident. The patient is instructed to monitor for signs of infection and call immediately if these arise.  The patient is instructed to apply lotion to the callussed areas daily. Neuropathy/Onychomycosis: Nail plates 15 bilateral foot were mechanically and electrically debrided in thickness and length to the level of normal underlying nail bed. The patient was instructed to attempt use of an emery board to maintain the length and thickness of their nails as best as possible if able. Call immediately should any problems arise. Orders Placed This Encounter   Procedures    TRIM BENIGN HYPERKERATOTIC SKIN LESION,2-4    36762 - [place] DEBRIDEMENT OF NAILS, 6 OR MORE           Follow up:  Return in about 9 weeks (around 6/10/2022). Raymon Correa DPM        Please note that this report has been partially produced using speech recognition software which may cause errors including grammar, punctuation, and spelling or words and phrases that may seem inappropriate. If there are questions or concerns please feel free to contact me for clarification.

## 2022-04-09 DIAGNOSIS — M62.838 NECK MUSCLE SPASM: ICD-10-CM

## 2022-04-22 DIAGNOSIS — M62.838 NECK MUSCLE SPASM: ICD-10-CM

## 2022-04-23 DIAGNOSIS — E55.9 VITAMIN D DEFICIENCY: ICD-10-CM

## 2022-04-25 RX ORDER — CHOLECALCIFEROL (VITAMIN D3) 25 MCG
CAPSULE ORAL
Qty: 28 CAPSULE | Refills: 0 | Status: SHIPPED | OUTPATIENT
Start: 2022-04-25 | End: 2022-05-04

## 2022-05-04 ENCOUNTER — OFFICE VISIT (OUTPATIENT)
Dept: FAMILY MEDICINE CLINIC | Age: 62
End: 2022-05-04
Payer: COMMERCIAL

## 2022-05-04 VITALS
TEMPERATURE: 96.8 F | BODY MASS INDEX: 37.67 KG/M2 | WEIGHT: 234.4 LBS | DIASTOLIC BLOOD PRESSURE: 74 MMHG | HEART RATE: 95 BPM | HEIGHT: 66 IN | OXYGEN SATURATION: 97 % | SYSTOLIC BLOOD PRESSURE: 128 MMHG

## 2022-05-04 DIAGNOSIS — E53.9 VITAMIN B DEFICIENCY: ICD-10-CM

## 2022-05-04 DIAGNOSIS — E55.9 VITAMIN D DEFICIENCY: ICD-10-CM

## 2022-05-04 DIAGNOSIS — B88.8 INFESTATION BY BED BUG: ICD-10-CM

## 2022-05-04 DIAGNOSIS — R73.03 PREDIABETES: Primary | ICD-10-CM

## 2022-05-04 DIAGNOSIS — E03.9 ACQUIRED HYPOTHYROIDISM: ICD-10-CM

## 2022-05-04 DIAGNOSIS — E53.8 VITAMIN B12 DEFICIENCY: ICD-10-CM

## 2022-05-04 DIAGNOSIS — J45.30 MILD PERSISTENT ASTHMA WITHOUT COMPLICATION: ICD-10-CM

## 2022-05-04 DIAGNOSIS — F32.A DEPRESSION, UNSPECIFIED DEPRESSION TYPE: ICD-10-CM

## 2022-05-04 DIAGNOSIS — I48.92 ATRIAL FLUTTER, UNSPECIFIED TYPE (HCC): ICD-10-CM

## 2022-05-04 DIAGNOSIS — I26.99 PULMONARY EMBOLISM ON RIGHT (HCC): ICD-10-CM

## 2022-05-04 DIAGNOSIS — K21.9 GASTROESOPHAGEAL REFLUX DISEASE WITHOUT ESOPHAGITIS: ICD-10-CM

## 2022-05-04 DIAGNOSIS — I95.1 ORTHOSTATIC HYPOTENSION: ICD-10-CM

## 2022-05-04 LAB — HBA1C MFR BLD: 5.8 %

## 2022-05-04 PROCEDURE — 3017F COLORECTAL CA SCREEN DOC REV: CPT | Performed by: FAMILY MEDICINE

## 2022-05-04 PROCEDURE — G8427 DOCREV CUR MEDS BY ELIG CLIN: HCPCS | Performed by: FAMILY MEDICINE

## 2022-05-04 PROCEDURE — G9899 SCRN MAM PERF RSLTS DOC: HCPCS | Performed by: FAMILY MEDICINE

## 2022-05-04 PROCEDURE — 99214 OFFICE O/P EST MOD 30 MIN: CPT | Performed by: FAMILY MEDICINE

## 2022-05-04 PROCEDURE — 1036F TOBACCO NON-USER: CPT | Performed by: FAMILY MEDICINE

## 2022-05-04 PROCEDURE — G8417 CALC BMI ABV UP PARAM F/U: HCPCS | Performed by: FAMILY MEDICINE

## 2022-05-04 PROCEDURE — 83036 HEMOGLOBIN GLYCOSYLATED A1C: CPT | Performed by: FAMILY MEDICINE

## 2022-05-04 RX ORDER — FAMOTIDINE 20 MG/1
TABLET, FILM COATED ORAL
Qty: 60 TABLET | Refills: 5 | Status: SHIPPED | OUTPATIENT
Start: 2022-05-04

## 2022-05-04 RX ORDER — ATORVASTATIN CALCIUM 20 MG/1
20 TABLET, FILM COATED ORAL DAILY
Qty: 30 TABLET | Refills: 5 | Status: SHIPPED | OUTPATIENT
Start: 2022-05-04 | End: 2022-07-28 | Stop reason: SDUPTHER

## 2022-05-04 RX ORDER — BUDESONIDE AND FORMOTEROL FUMARATE DIHYDRATE 160; 4.5 UG/1; UG/1
AEROSOL RESPIRATORY (INHALATION)
Qty: 10.2 G | Refills: 5 | Status: SHIPPED | OUTPATIENT
Start: 2022-05-04 | End: 2022-07-28 | Stop reason: SDUPTHER

## 2022-05-04 RX ORDER — CALAMINE 8% AND ZINC OXIDE 8% 160 MG/ML
LOTION TOPICAL PRN
Qty: 177 ML | Refills: 3 | Status: SHIPPED | OUTPATIENT
Start: 2022-05-04 | End: 2022-11-04 | Stop reason: SDUPTHER

## 2022-05-04 RX ORDER — LEVOTHYROXINE SODIUM 0.05 MG/1
50 TABLET ORAL DAILY
Qty: 30 TABLET | Refills: 5 | Status: SHIPPED | OUTPATIENT
Start: 2022-05-04

## 2022-05-04 RX ORDER — BENZTROPINE MESYLATE 1 MG/1
1 TABLET ORAL DAILY
Qty: 30 TABLET | Refills: 5 | Status: SHIPPED | OUTPATIENT
Start: 2022-05-04

## 2022-05-04 RX ORDER — MECLIZINE HCL 12.5 MG/1
12.5 TABLET ORAL NIGHTLY PRN
Qty: 30 TABLET | Refills: 5 | Status: SHIPPED | OUTPATIENT
Start: 2022-05-04 | End: 2022-07-28 | Stop reason: SDUPTHER

## 2022-05-04 RX ORDER — CHOLECALCIFEROL (VITAMIN D3) 25 MCG
CAPSULE ORAL
Qty: 30 CAPSULE | Refills: 5 | Status: SHIPPED | OUTPATIENT
Start: 2022-05-04 | End: 2022-07-28 | Stop reason: SDUPTHER

## 2022-05-04 RX ORDER — LANOLIN ALCOHOL/MO/W.PET/CERES
1 CREAM (GRAM) TOPICAL DAILY
Qty: 30 TABLET | Refills: 5 | Status: SHIPPED | OUTPATIENT
Start: 2022-05-04

## 2022-05-04 RX ORDER — CYANOCOBALAMIN (VITAMIN B-12) 1000 MCG
TABLET ORAL
Qty: 30 TABLET | Refills: 5 | Status: SHIPPED | OUTPATIENT
Start: 2022-05-04

## 2022-05-04 RX ORDER — DOXEPIN HYDROCHLORIDE 50 MG/1
CAPSULE ORAL
Qty: 28 TABLET | Refills: 5 | Status: SHIPPED | OUTPATIENT
Start: 2022-05-04 | End: 2022-07-28 | Stop reason: SDUPTHER

## 2022-05-04 RX ORDER — ARIPIPRAZOLE 15 MG/1
15 TABLET ORAL DAILY
Qty: 30 TABLET | Refills: 5 | Status: SHIPPED | OUTPATIENT
Start: 2022-05-04

## 2022-05-04 NOTE — PROGRESS NOTES
Chief Complaint   Patient presents with    3 Month Follow-Up    Headache     Pt no longer experiencing headaches with dizziness.  Health Maintenance     Pt is ok with colonoscopy. HPI: Cyndee May 65 y.o. female presenting for     Pulmonary embolism   Currently seeing the cardiologist and on blood thinners   Stable   No issues or concerns  admits to dyspnea on exertion when pushing up her grocery cart      F/u   Stable no issues   Still on the eliquis   Has an appointment with cardiology. Will inquire about the blood thinners.       Headaches   Patient is complaining of headaches for the last 4 days.  Patient reports is located on top of her head.  Does admit to having more stress in her life lately.  Not pounding in nature. Ariel Deem is an 8 out of 10 but goes down to 5 out of 10 with the help of extra strength Tylenol.  Patient takes 1 tablet a day as needed for pain.  Patient admits to a previous history of migraines.  Denies any auras.  Denies any fevers, chills, nausea, vomiting, chest pain, shortness of breath, abdominal pain, urination, change in stools.  Patient did have a CT of her head back in May 27, 2021 which was unremarkable.       F/u   Intermittent in nature   Has not found a new neurologist.          Hpyothyroidism   Stable. Takes medication. Lab Results   Component Value Date    TSH 1.840 02/02/2022     Bed bug infestation   Patient reports a recent infestation of bedbugs. Patient has brought some supplies yesterday to help with the issue. Admits to recent bites on her wrist.  Reports that she has a rashes and bedbugs bites on her wrists her lower back her legs. Patient reports that her she does not want to call the fumigation company. Reports that her apartment complex will not hire someone to clean out the bedbugs infestation. Patient like something to help with itching.         Current Outpatient Medications   Medication Sig Dispense Refill    VITAMIN D HIGH POTENCY 25 MCG (1000 UT) CAPS TAKE 1 CAPSULE BY MOUTH DAILY WITH SUPPER (PM) 28 capsule 0    diclofenac sodium (VOLTAREN) 1 % GEL APPLY 2 G TOPICALLY 4 TIMES DAILY 100 g 2    VENTOLIN  (90 Base) MCG/ACT inhaler USE 2 PUFFS EVERY 4 HRS AS NEEDED FOR WHEEZING/SOB-SPACE OUT TO EVERY 6 HR AS SYMPTOMS IMPROVE 18 g 3    midodrine (PROAMATINE) 10 MG tablet TAKE 1 TABLET BY MOUTH TWICE DAILY(AM,PM) 56 tablet 2    ketoconazole (NIZORAL) 2 % shampoo APPLY TOPICALLY DAILY AS NEEDED. 120 mL 2    albuterol sulfate HFA (VENTOLIN HFA) 108 (90 Base) MCG/ACT inhaler Inhale 2 puffs into the lungs every 6 hours as needed for Wheezing Please cover what is under insurance.  18 g 3    VRAYLAR 1.5 MG capsule Take 1.5 mg by mouth daily      prazosin (MINIPRESS) 1 MG capsule Take 1 mg by mouth daily      metFORMIN (GLUCOPHAGE) 500 MG tablet TAKE 1 TABLET BY MOUTH TWICE A DAY WITH MEALS 60 tablet 5    Vitamin D (CHOLECALCIFEROL) 25 MCG (1000 UT) TABS tablet TAKE 1 CAPSULE BY MOUTH DAILY WITH SUPPER 30 tablet 5    levothyroxine (SYNTHROID) 50 MCG tablet Take 1 tablet by mouth Daily 30 tablet 5    metoprolol tartrate (LOPRESSOR) 50 MG tablet Take 1 tablet by mouth 2 times daily 60 tablet 5    famotidine (PEPCID) 20 MG tablet TAKE 1 TABLET BY MOUTH TWICE DAILY (AM,PM) 60 tablet 5    SYMBICORT 160-4.5 MCG/ACT AERO INHALE 2 PUFFS INTO THE LUNGS 2 TIMES DAILY 10.2 g 3    ARIPiprazole (ABILIFY) 15 MG tablet Take 1 tablet by mouth daily 30 tablet 5    benztropine (COGENTIN) 1 MG tablet Take 1 tablet by mouth daily 30 tablet 5    Cyanocobalamin (B-12) 1000 MCG TABS TAKE 1 TABLET BY MOUTH ONCE A DAY (NOON) 30 tablet 5    meclizine (ANTIVERT) 12.5 MG tablet Take 1 tablet by mouth nightly as needed for Dizziness 30 tablet 3    Biotin 300 MCG TABS Take 1 tablet by mouth daily 30 tablet 3    aspirin (ASPIRIN LOW DOSE) 81 MG EC tablet TAKE 1 TABLET BY MOUTH ONCE A DAY (AM) 30 tablet 3    Multiple Vitamin (TAB-A-MANDI/BETA CAROTENE) TABS TAKE 1 TABLET BY MOUTH EVERY DAY (AM) 28 tablet 5    Blood Pressure Monitoring (B-D ASSURE BPM/AUTO ARM CUFF) MISC Use as directed 1 each 0    cyclobenzaprine (FLEXERIL) 10 MG tablet Take 10 mg by mouth daily       atorvastatin (LIPITOR) 20 MG tablet Take 1 tablet by mouth daily 30 tablet 5    apixaban (ELIQUIS) 5 MG TABS tablet Take 1 tablet by mouth 2 times daily START when Eliquis Starter pack completed 60 tablet 3    primidone (MYSOLINE) 50 MG tablet Take 1 tablet by mouth nightly 30 tablet 3    EPINEPHrine (EPIPEN 2-PRAVEENA) 0.3 MG/0.3ML SOAJ injection Use as directed for allergic reaction 2 each 2    Misc. Devices (ROLLATOR ULTRA-LIGHT) MISC 1 each by Does not apply route once for 1 dose 1 each 0     No current facility-administered medications for this visit. ROS  CONSTITUTIONAL: The patient denies fevers, chills, sweats and body ache. HEENT: Denies headache, blurry vision, eye pain, tinnitus, vertigo, admits to sore throat, denies neck or thyroid masses. Admits to cracked lips. RESPIRATORY: admits to shortness of breath on exertion (specifically when pushing up the cart)  CARDIAC: Denies chest pain, pressure, palpitations, Denies lower extremity edema. GASTROINTESTINAL: Denies abdominal pain, constipation, diarrhea, bleeding in the stools,   GENITOURINARY: Denies dysuria, hematuria, nocturia or frequency, urinary incontinence. NEUROLOGIC: Denies headaches, admits to dizziness, denies syncope, weakness  MUSCULOSKELETAL: denies changes in range of motion, joint pain, stiffness. ENDOCRINOLOGY: Denies heat or cold intolerance. HEMATOLOGY: Denies easy bleeding or blood transfusion,anemia  DERMATOLOGY: Admits to nail fungus on her toenails. PSYCHIATRY: Denies depression, agitation or anxiety.     Past Medical History:   Diagnosis Date    Asthma 2015    Bilateral renal cysts 2013    Depression     since age 35, was with Dr Marvin Delatorre, now the [facility]    Diverticulosis of sigmoid colon 2012    Dr Rich Cordova DJD of left shoulder     Environmental allergies     Fatty liver 2013    GERD (gastroesophageal reflux disease)     History of cardiac arrhythmia     \"due to stress, was placed on medication\"    Hydatidiform mole     age 26     Hyperlipidemia     Hypothyroidism 2011    IFG (impaired fasting glucose) 2013    Melanoma of eye [facility])     age 30, R eye prosthesis, Dr Kellee Meade    Obesity     Prediabetes     PTSD (post-traumatic stress disorder)     age 35, [facility]    S/P colonoscopy 04/19/2012    Dr. Kristy Jarquin S/P hysterectomy with oophorectomy 2012    Dr Autumn Allen D deficiency         Past Surgical History:   Procedure Laterality Date    BREAST BIOPSY      DILATION AND CURETTAGE OF UTERUS      ENDOMETRIAL ABLATION  6/2012    EYE REMOVAL      OD for melanoma, age 30    FOOT OSTEOTOMY Left 09/23/2016    [facility]      BALDOMERO STEROTACTIC LOC BREAST BIOPSY RIGHT Right 7/19/2021    BALDOMERO STEROTACTIC LOC BREAST BIOPSY RIGHT 7/19/2021 [facility]    LINDSAY AND BSO  2012    Dr Renetta Young        Family History   Problem Relation Age of Onset    Heart Failure Mother         dec 76    Diabetes Mother     Diabetes Father     Stroke Father         dec age 80    Cancer Father         Sarcoma    Stomach Cancer Other     Breast Cancer Paternal Aunt     Breast Cancer Maternal Aunt         Social History     Socioeconomic History    Marital status:      Spouse name: Not on file    Number of children: 3    Years of education: 15    Highest education level: High school graduate   Occupational History    Occupation: SSI   Tobacco Use    Smoking status: Never Smoker    Smokeless tobacco: Never Used   Vaping Use    Vaping Use: Never used   Substance and Sexual Activity    Alcohol use: No    Drug use: No    Sexual activity: Not Currently     Birth control/protection: Surgical   Other Topics Concern    Not on file   Social History Narrative    Born in [address] [place] [address] of 4 children, one sister disappeared at age 30, never found    Moved to [place] at age 40        Lives in an apartment in [facility], alone    , 3 children, all grown, in [place]     2 granddaughters     Hobbies exercising, birds    Attends [employer], has volunteered at [id number] Strain: Low Risk     Difficulty of Paying Living Expenses: Not hard at all   Food Insecurity: No Food Insecurity    Worried About [facility] in the Last Year: Never true    Ran Out of Food in the Last Year: Never true   Transportation Needs:     Lack of Transportation (Medical): Not on file    Lack of Transportation (Non-Medical):  Not on file   Physical Activity:     Days of Exercise per Week: Not on file    Minutes of Exercise per Session: Not on file   Stress:     Feeling of Stress : Not on file   Social Connections:     Frequency of Communication with Friends and Family: Not on file    Frequency of Social Gatherings with Friends and Family: Not on file    Attends [employer] Services: Not on file    Active Member of [address] [place] [address] or Organizations: Not on file    Attends Club or Organization Meetings: Not on file    Marital Status: Not on file   Intimate Partner Violence:     Fear of Current or Ex-Partner: Not on file    Emotionally Abused: Not on file    Physically Abused: Not on file    Sexually Abused: Not on file   Housing Stability:     Unable to Pay for Housing in the Last Year: Not on file    Number of [place] in the Last Year: Not on file    Unstable Housing in the Last Year: Not on file        /74   Pulse 95   Temp 96.8 °F (36 °C) (Temporal)   Ht 5' 6\" (1.676 m)   Wt 234 lb 6.4 oz (106.3 kg)   SpO2 97%   BMI 37.83 kg/m²        Physical Exam:    General appearance - alert, well appearing, and in no distress, obese  Mental Status - alert, oriented to person, place, and time  Eyes - pupils equal and reactive, extraocular eye movements intact   Ears - bilateral TM's and external ear canals normal   Nose - normal and patent, no erythema, discharge or polyps   Sinuses - Normal paranasal sinuses without tenderness   Throat - mucous membranes moist, pharynx normal without lesions   Neck - supple, no significant adenopathy   Thyroid - thyroid is normal in size without nodules or tenderness    Chest - clear to auscultation, no wheezes, rales or rhonchi, symmetric air entry   Heart - tacycardic  Abdomen - soft, nontender, nondistended, no masses or organomegaly   Back exam - full range of motion, no tenderness, palpable spasm or pain on motion   Neurological - upper extremity tremors  Musculoskeletal -slight swelling in the upper extremities bilaterally. Extremities - non pitting swelling noted in the left arm. No erythema or drainage noted from the arm. Skin - stable.          Labs       TSH   Date Value Ref Range Status   09/15/2021 1.900 0.440 - 3.860 uIU/mL Final   05/05/2021 4.420 (H) 0.440 - 3.860 uIU/mL Final     TSH   Date Value Ref Range Status   02/02/2022 1.840 0.440 - 3.860 uIU/mL Final   10/25/2021 1.670 0.440 - 3.860 uIU/mL Final   08/23/2021 2.180 0.440 - 3.860 uIU/mL Final   02/07/2020 2.700 0.440 - 3.860 uIU/mL Final   06/14/2019 2.430 0.440 - 3.860 uIU/mL Final   06/11/2018 3.820 0.270 - 4.200 uIU/mL Final   06/07/2017 3.500 0.270 - 4.200 uIU/mL Final   01/27/2017 3.810 0.270 - 4.200 uIU/mL Final   03/04/2016 1.990 0.270 - 4.200 uIU/mL Final   11/20/2015 1.940 0.270 - 4.200 uIU/mL Final   08/28/2015 1.970 0.270 - 4.200 uIU/mL Final   10/25/2014 3.130 0.270 - 4.200 uIU/mL Final   12/11/2013 3.910 0.270 - 4.200 uIU/mL Final   04/04/2013 1.346 0.550 - 4.780 uIU/mL Final   10/16/2012 1.607 0.550 - 4.780 uIU/mL Final   05/14/2012 1.771 0.550 - 4.780 uIU/mL Final   03/22/2012 4.082 0.550 - 4.780 uIU/mL Final     Lab Results   Component Value Date     10/25/2021    K 4.9 10/25/2021    CL 99 10/25/2021    CO2 19 (L) 10/25/2021    BUN 11 10/25/2021    CREATININE 0.92 (H) 10/25/2021    GLUCOSE 157 (H) 10/25/2021    CALCIUM 9.5 10/25/2021    PROT 6.7 10/25/2021    LABALBU 4.3 10/25/2021    BILITOT 0.4 10/25/2021    ALKPHOS 80 10/25/2021    AST 49 (H) 10/25/2021    ALT 43 (H) 10/25/2021    LABGLOM >60.0 10/25/2021    GFRAA >60.0 10/25/2021    GLOB 2.4 10/25/2021       Lab Results   Component Value Date    WBC 6.7 01/26/2022    HGB 14.9 01/26/2022    HCT 42.2 01/26/2022    MCV 91.2 01/26/2022     01/26/2022     Lab Results   Component Value Date    LABA1C 5.7 02/02/2022     No results found for: EAG      A/P: Martin Leal 65 y.o. female presenting for     1. Prediabetes  Lab Results   Component Value Date    LABA1C 5.8 05/04/2022     No results found for: EAG    - POCT glycosylated hemoglobin (Hb A1C)    2. Atrial flutter, unspecified type ([facility]  Continue with medications as prescribed   HR controlled     3. Pulmonary embolism on right ([facility]      4. Mild persistent asthma without complication      5. Depression, unspecified depression type  Continue with counseling   Denies any SI or HI     6. Vitamin D deficiency    - vitamin D (VITAMIN D HIGH POTENCY) 25 MCG (1000 UT) CAPS; TAKE 1 CAPSULE BY MOUTH DAILY WITH SUPPER (PM)  Dispense: 30 capsule; Refill: 5    7. Acquired hypothyroidism    - levothyroxine (SYNTHROID) 50 MCG tablet; Take 1 tablet by mouth Daily  Dispense: 30 tablet; Refill: 5    8. Gastroesophageal reflux disease without esophagitis    - famotidine (PEPCID) 20 MG tablet; TAKE 1 TABLET BY MOUTH TWICE DAILY (AM,PM)  Dispense: 60 tablet; Refill: 5    9. Vitamin B12 deficiency    - Cyanocobalamin (B-12) 1000 MCG TABS; TAKE 1 TABLET BY MOUTH ONCE A DAY (NOON)  Dispense: 30 tablet; Refill: 5    10. Orthostatic hypotension    - meclizine (ANTIVERT) 12.5 MG tablet; Take 1 tablet by mouth nightly as needed for Dizziness  Dispense: 30 tablet; Refill: 5    11.  Vitamin B deficiency    - Biotin 300 MCG TABS; Take 1 tablet by mouth daily  Dispense: 30 tablet; Refill: 5    12. Infestation by bed bug    - calamine-zinc oxide 8-8 % LOTN lotion; Apply topically as needed (PRURITIS)  Dispense: 177 mL; Refill: 3        Please note, this report has been partially produced using speech recognition software  and may cause  and /or contain errors related to that system including grammar, punctuation and spelling as well as words and phrases that may seem inappropriate. If there are questions or concerns please feel free to contact me to clarify.

## 2022-05-04 NOTE — PATIENT INSTRUCTIONS
Patient Education        Bedbugs: Care Instructions  Overview     Bedbugs are tiny bugs that feed on blood from animals or people. They have thatname because they like to hide in bedding and mattresses. Bedbugs are not known to spread disease to people. But itching from the bites can be so bad that you may scratch hard enough to break the skin. That can leadto infection. The bites can also cause an allergic reaction in some people. The bugs can spread into cracks and crevices in the room and lay their eggs in anything that is in the room, including clothing and furniture. This makes themvery hard to kill. Getting rid of bedbugs  The best way to get rid of bedbugs is to call a professional pest control company. They use special pesticides and other equipment to kill the bugs andtheir eggs. If you decide to buy your own pesticide, check the label. Make sure it says that it is for bedbugs. Be sure to follow the directions carefully. You mayhave to use the pesticide more than once. Do other cleaning steps such as:   Vacuum often. Be sure to empty the vacuum after each use. If you use a vacuum bag, seal it and throw it out in an outdoor trash can. If you don't use a vacuum bag, empty the container and clean it with hot, soapy water.  Launder things that might hide bugs. Washing and then drying items in a dryer on a hot setting is adequate to kill bedbugs in clothing or linens. Turn the dryer to the hottest setting that the fabric can handle.  Use mattress, box spring, and pillow (encasement) sacks to trap bed bugs and help get rid of them. Be sure to follow the directions on the package. After your mattress has been cleared of bedbugs, you can buy a special mattresscover that is made to keep bedbugs out of your mattress. Follow-up care is a key part of your treatment and safety. Be sure to make and go to all appointments, and call your doctor if you are having problems.  It's also a good idea to know your test results and keep alist of the medicines you take. How can you care for yourself at home?  Wash the bites with soap to lower the chance of infection. Try not to scratch.  Use calamine lotion or an anti-itch cream to stop the itching. You can also hold an oatmeal-soaked washcloth on the itchy area for 15 minutes. You can buy an oatmeal powder, such as Aveeno Colloidal Oatmeal, in drugstores.  Use an ice pack to stop the swelling. When should you call for help? Call your doctor now or seek immediate medical care if:     You have signs of infection, such as:  ? Increased pain, swelling, warmth, or redness. ? Red streaks leading from a bite area. ? Pus draining from a bite area. ? A fever. Watch closely for changes in your health, and be sure to contact your doctor if:     Anyone else in your family has itching.      You do not get better as expected. Where can you learn more? Go to https://JAYS.[employer]. org and sign in to your [employer] account. Enter G246 in the [employer] box to learn more about \"Bedbugs: Care Instructions. \"     If you do not have an account, please click on the \"Sign Up Now\" link. Current as of: July 1, 2021               Content Version: 13.2  © 2006-2022 [employer]. Care instructions adapted under license by [facility] ([facility]). If you have questions about a medical condition or this instruction, always ask your healthcare professional. [place] [address] any warranty or liability for your use of this information.

## 2022-05-05 ENCOUNTER — OFFICE VISIT (OUTPATIENT)
Dept: CARDIOLOGY CLINIC | Age: 62
End: 2022-05-05
Payer: COMMERCIAL

## 2022-05-05 VITALS
BODY MASS INDEX: 37.96 KG/M2 | WEIGHT: 236.2 LBS | HEIGHT: 66 IN | RESPIRATION RATE: 18 BRPM | SYSTOLIC BLOOD PRESSURE: 134 MMHG | OXYGEN SATURATION: 98 % | HEART RATE: 107 BPM | DIASTOLIC BLOOD PRESSURE: 84 MMHG

## 2022-05-05 DIAGNOSIS — I48.92 ATRIAL FLUTTER, UNSPECIFIED TYPE (HCC): ICD-10-CM

## 2022-05-05 DIAGNOSIS — R06.09 DOE (DYSPNEA ON EXERTION): ICD-10-CM

## 2022-05-05 DIAGNOSIS — E78.5 HYPERLIPIDEMIA, UNSPECIFIED HYPERLIPIDEMIA TYPE: ICD-10-CM

## 2022-05-05 DIAGNOSIS — R07.9 CHEST PAIN, UNSPECIFIED TYPE: ICD-10-CM

## 2022-05-05 DIAGNOSIS — I95.1 ORTHOSTATIC HYPOTENSION: ICD-10-CM

## 2022-05-05 DIAGNOSIS — J45.909 REACTIVE AIRWAY DISEASE WITHOUT COMPLICATION, UNSPECIFIED ASTHMA SEVERITY, UNSPECIFIED WHETHER PERSISTENT: Primary | ICD-10-CM

## 2022-05-05 DIAGNOSIS — R55 SYNCOPE, UNSPECIFIED SYNCOPE TYPE: ICD-10-CM

## 2022-05-05 PROCEDURE — G8417 CALC BMI ABV UP PARAM F/U: HCPCS | Performed by: INTERNAL MEDICINE

## 2022-05-05 PROCEDURE — 99214 OFFICE O/P EST MOD 30 MIN: CPT | Performed by: INTERNAL MEDICINE

## 2022-05-05 PROCEDURE — G8428 CUR MEDS NOT DOCUMENT: HCPCS | Performed by: INTERNAL MEDICINE

## 2022-05-05 PROCEDURE — G9899 SCRN MAM PERF RSLTS DOC: HCPCS | Performed by: INTERNAL MEDICINE

## 2022-05-05 PROCEDURE — 3017F COLORECTAL CA SCREEN DOC REV: CPT | Performed by: INTERNAL MEDICINE

## 2022-05-05 PROCEDURE — 1036F TOBACCO NON-USER: CPT | Performed by: INTERNAL MEDICINE

## 2022-05-05 RX ORDER — MIDODRINE HYDROCHLORIDE 10 MG/1
TABLET ORAL
Qty: 180 TABLET | Refills: 3 | Status: SHIPPED | OUTPATIENT
Start: 2022-05-05

## 2022-05-05 ASSESSMENT — ENCOUNTER SYMPTOMS
EYES NEGATIVE: 1
BLOOD IN STOOL: 0
STRIDOR: 0
SHORTNESS OF BREATH: 1
CHEST TIGHTNESS: 0
COUGH: 0
GASTROINTESTINAL NEGATIVE: 1
NAUSEA: 0
WHEEZING: 0

## 2022-05-05 NOTE — PROGRESS NOTES
Subsequent Progress Note  Patient: Ophelia Mathis  YOB: 1960  MRN: [id number]    Chief Complaint: HTN AFL   Chief Complaint   Patient presents with    Atrial Flutter    Shortness of Breath     slight    Chest Pain     slight       CV Data:  6/20 Spect negative  2/20 Echo EF 60    Subjective/HPI: DR. Moran pt. More MORALES and CP. Substernal. No falls no bleed.       EKG:    Lives alone  Nonsmoker  No etoh  Retired- marketing  +FH    Past Medical History:   Diagnosis Date    Asthma 2015    Bilateral renal cysts 2013    Depression     since age 35, was with Dr Patrick Sanford, now the [facility]    Diverticulosis of sigmoid colon 2012    Dr Malina Singer DJD of left shoulder     Environmental allergies     Fatty liver 2013    GERD (gastroesophageal reflux disease)     History of cardiac arrhythmia     \"due to stress, was placed on medication\"    Hydatidiform mole     age 26     Hyperlipidemia     Hypothyroidism 2011    IFG (impaired fasting glucose) 2013    Melanoma of eye [facility])     age [address], R eye prosthesis, Dr Quinn Donaldson    Obesity     Prediabetes     PTSD (post-traumatic stress disorder)     age 35, 1970 [facility] center    S/P colonoscopy 04/19/2012    Dr. Ciara Scott S/P hysterectomy with oophorectomy 2012    Dr Aissatou Crews Tremor     Dr Sally Fam Vitamin D deficiency        Past Surgical History:   Procedure Laterality Date    BREAST BIOPSY      DILATION AND CURETTAGE OF UTERUS      ENDOMETRIAL ABLATION  6/2012    EYE REMOVAL      OD for melanoma, age [address]    FOOT OSTEOTOMY Left 09/23/2016    DEJA JACKMAN DPM      BALDOMERO STEROTACTIC LOC BREAST BIOPSY RIGHT Right 7/19/2021    BALDOMERO STEROTACTIC LOC BREAST BIOPSY RIGHT 7/19/2021 [facility]    LINDSAY AND BSO  2012    Dr Obi Casas       Family History   Problem Relation Age of Onset    Heart Failure Mother         dec 76    Diabetes Mother     Diabetes Father     Stroke Father         dec age 80    Cancer Father         Sarcoma    Stomach Cancer Other  Breast Cancer Paternal Aunt     Breast Cancer Maternal Aunt        Social History     Socioeconomic History    Marital status:      Spouse name: None    Number of children: 3    Years of education: 15    Highest education level: High school graduate   Occupational History    Occupation: [employer]SI   Tobacco Use    Smoking status: Never Smoker    Smokeless tobacco: Never Used   Vaping Use    Vaping Use: Never used   Substance and Sexual Activity    Alcohol use: No    Drug use: No    Sexual activity: Not Currently     Birth control/protection: Surgical   Other Topics Concern    None   Social History Narrative    Born in [address] [place] [address], one of 4 children, one sister disappeared at age 30, never found    Moved to [place] at age 40        Lives in an apartment in [facility], alone    , 3 children, all grown, in [place]     2 granddaughters     Hobbies exercising, birds    Attends [employer], has volunteered at [facility] Strain: Low Risk     Difficulty of Paying Living Expenses: Not hard at all   Food Insecurity: No Food Insecurity    Worried About [facility] in the Last Year: Never true    [address] in the Last Year: Never true   Transportation Needs:     Lack of Transportation (Medical): Not on file    Lack of Transportation (Non-Medical):  Not on file   Physical Activity:     Days of Exercise per Week: Not on file    Minutes of Exercise per Session: Not on file   Stress:     Feeling of Stress : Not on file   Social Connections:     Frequency of Communication with Friends and Family: Not on file    Frequency of Social Gatherings with Friends and Family: Not on file    Attends Church Services: Not on file    Active Member of Clubs or Organizations: Not on file    Attends Club or Organization Meetings: Not on file    Marital Status: Not on file   Intimate Partner Violence:     Fear of Current or Ex-Partner: Not on file  Emotionally Abused: Not on file    Physically Abused: Not on file    Sexually Abused: Not on file   Housing Stability:     Unable to Pay for Housing in the Last Year: Not on file    Number of Places Lived in the Last Year: Not on file    Unstable Housing in the Last Year: Not on file       Allergies   Allergen Reactions    Bee Venom        Current Outpatient Medications   Medication Sig Dispense Refill    midodrine (PROAMATINE) 10 MG tablet Take one tablet by mouth twice daily 180 tablet 3    ARIPiprazole (ABILIFY) 15 MG tablet Take 1 tablet by mouth daily 30 tablet 5    vitamin D (VITAMIN D HIGH POTENCY) 25 MCG (1000 UT) CAPS TAKE 1 CAPSULE BY MOUTH DAILY WITH SUPPER (PM) 30 capsule 5    levothyroxine (SYNTHROID) 50 MCG tablet Take 1 tablet by mouth Daily 30 tablet 5    famotidine (PEPCID) 20 MG tablet TAKE 1 TABLET BY MOUTH TWICE DAILY (AM,PM) 60 tablet 5    budesonide-formoterol (SYMBICORT) 160-4.5 MCG/ACT AERO INHALE 2 PUFFS INTO THE LUNGS 2 TIMES DAILY 10.2 g 5    benztropine (COGENTIN) 1 MG tablet Take 1 tablet by mouth daily 30 tablet 5    Cyanocobalamin (B-12) 1000 MCG TABS TAKE 1 TABLET BY MOUTH ONCE A DAY (NOON) 30 tablet 5    meclizine (ANTIVERT) 12.5 MG tablet Take 1 tablet by mouth nightly as needed for Dizziness 30 tablet 5    Biotin 300 MCG TABS Take 1 tablet by mouth daily 30 tablet 5    Multiple Vitamin (TAB-A-MANDI/BETA CAROTENE) TABS TAKE 1 TABLET BY MOUTH EVERY DAY (AM) 28 tablet 5    atorvastatin (LIPITOR) 20 MG tablet Take 1 tablet by mouth daily 30 tablet 5    calamine-zinc oxide 8-8 % LOTN lotion Apply topically as needed (PRURITIS) 177 mL 3    diclofenac sodium (VOLTAREN) 1 % GEL APPLY 2 G TOPICALLY 4 TIMES DAILY 100 g 2    VENTOLIN  (90 Base) MCG/ACT inhaler USE 2 PUFFS EVERY 4 HRS AS NEEDED FOR WHEEZING/SOB-SPACE OUT TO EVERY 6 HR AS SYMPTOMS IMPROVE 18 g 3    ketoconazole (NIZORAL) 2 % shampoo APPLY TOPICALLY DAILY AS NEEDED. 120 mL 2    albuterol sulfate HFA (VENTOLIN HFA) 108 (90 Base) MCG/ACT inhaler Inhale 2 puffs into the lungs every 6 hours as needed for Wheezing Please cover what is under insurance. 18 g 3    VRAYLAR 1.5 MG capsule Take 1.5 mg by mouth daily      prazosin (MINIPRESS) 1 MG capsule Take 1 mg by mouth daily      metFORMIN (GLUCOPHAGE) 500 MG tablet TAKE 1 TABLET BY MOUTH TWICE A DAY WITH MEALS 60 tablet 5    metoprolol tartrate (LOPRESSOR) 50 MG tablet Take 1 tablet by mouth 2 times daily 60 tablet 5    aspirin (ASPIRIN LOW DOSE) 81 MG EC tablet TAKE 1 TABLET BY MOUTH ONCE A DAY (AM) 30 tablet 3    Blood Pressure Monitoring (B-D ASSURE BPM/AUTO ARM CUFF) MISC Use as directed 1 each 0    apixaban (ELIQUIS) 5 MG TABS tablet Take 1 tablet by mouth 2 times daily START when Eliquis Starter pack completed 60 tablet 3    EPINEPHrine (EPIPEN 2-PRAVEENA) 0.3 MG/0.3ML SOAJ injection Use as directed for allergic reaction 2 each 2    Misc. Devices (ROLLATOR ULTRA-LIGHT) MISC 1 each by Does not apply route once for 1 dose 1 each 0     No current facility-administered medications for this visit. Review of Systems:   Review of Systems   Constitutional: Negative. Negative for diaphoresis and fatigue. HENT: Negative. Eyes: Negative. Respiratory: Positive for shortness of breath. Negative for cough, chest tightness, wheezing and stridor. Cardiovascular: Positive for chest pain. Negative for palpitations and leg swelling. Gastrointestinal: Negative. Negative for blood in stool and nausea. Genitourinary: Negative. Musculoskeletal: Negative. Skin: Negative. Neurological: Negative. Negative for dizziness, syncope, weakness and light-headedness. Hematological: Negative. Psychiatric/Behavioral: Negative.           Physical Examination:    /84 (Site: Left Upper Arm, Position: Sitting, Cuff Size: Medium Adult)   Pulse 107   Resp 18   Ht 5' 6\" (1.676 m)   Wt 236 lb 3.2 oz (107.1 kg)   SpO2 98%   BMI 38.12 kg/m² Physical Exam   Constitutional: She appears healthy. No distress. HENT:   Normal cephalic and Atraumatic   Eyes: Pupils are equal, round, and reactive to light. Neck: Thyroid normal. No JVD present. No neck adenopathy. No thyromegaly present. Cardiovascular: Normal rate, regular rhythm, normal heart sounds, intact distal pulses and normal pulses. Pulmonary/Chest: Effort normal and breath sounds normal. She has no wheezes. She has no rales. She exhibits no tenderness. Abdominal: Soft. Bowel sounds are normal. There is no abdominal tenderness. Musculoskeletal:         General: No tenderness or edema. Normal range of motion. Cervical back: Normal range of motion and neck supple. Neurological: She is alert and oriented to person, place, and time. Skin: Skin is warm. No cyanosis. Nails show no clubbing.        LABS:  CBC:   Lab Results   Component Value Date    WBC 6.7 01/26/2022    RBC 4.63 01/26/2022    RBC 4.57 05/14/2012    HGB 14.9 01/26/2022    HCT 42.2 01/26/2022    MCV 91.2 01/26/2022    MCH 32.3 01/26/2022    MCHC 35.4 01/26/2022    RDW 13.1 01/26/2022     01/26/2022    MPV 9.0 08/16/2015     Lipids:  Lab Results   Component Value Date    CHOL 315 (H) 09/16/2021    CHOL 194 11/05/2019    CHOL 232 (H) 06/11/2018     Lab Results   Component Value Date    TRIG 494 (H) 09/16/2021    TRIG 127 11/05/2019    TRIG 157 06/11/2018     Lab Results   Component Value Date    HDL 36 (L) 09/16/2021    HDL 42 08/23/2021    HDL 41 05/19/2021     Lab Results   Component Value Date    LDLCALC see below 09/16/2021    LDLCALC 204 (H) 08/23/2021    LDLCALC see below 05/19/2021     No results found for: LABVLDL, VLDL  Lab Results   Component Value Date    CHOLHDLRATIO 6.3 03/22/2012     CMP:    Lab Results   Component Value Date     10/25/2021    K 4.9 10/25/2021    CL 99 10/25/2021    CO2 19 10/25/2021    BUN 11 10/25/2021    CREATININE 0.92 10/25/2021    GFRAA >60.0 10/25/2021    LABGLOM >60.0 10/25/2021    GLUCOSE 157 10/25/2021    GLUCOSE 92 05/14/2012    PROT 6.7 10/25/2021    LABALBU 4.3 10/25/2021    LABALBU 3.8 03/22/2012    CALCIUM 9.5 10/25/2021    BILITOT 0.4 10/25/2021    ALKPHOS 80 10/25/2021    AST 49 10/25/2021    ALT 43 10/25/2021     BMP:    Lab Results   Component Value Date     10/25/2021    K 4.9 10/25/2021    CL 99 10/25/2021    CO2 19 10/25/2021    BUN 11 10/25/2021    LABALBU 4.3 10/25/2021    LABALBU 3.8 03/22/2012    CREATININE 0.92 10/25/2021    CALCIUM 9.5 10/25/2021    GFRAA >60.0 10/25/2021    LABGLOM >60.0 10/25/2021    GLUCOSE 157 10/25/2021    GLUCOSE 92 05/14/2012     Magnesium:    Lab Results   Component Value Date    MG 2.0 10/25/2021     TSH:  Lab Results   Component Value Date    TSH 1.840 02/02/2022       Patient Active Problem List   Diagnosis    PTSD (post-traumatic stress disorder)    Depression    Reactive airway disease    Environmental allergies    Hyperlipidemia    S/P colonoscopy    Fatty liver disease, nonalcoholic    Vitamin D deficiency    IFG (impaired fasting glucose)    Obesity (BMI 30-39. 9)    DJD of left shoulder    Asthma    Osteoarthritis of left knee    Cheilosis    Atrial flutter (HCC)    Anxiety    Diarrhea    Right hip pain    Acute pain of both shoulders    Left arm pain    Left elbow pain    Lung infiltrate    Essential tremor    Syncope    Orthostatic hypotension    History of melanoma    Pulmonary embolism on right (HCC)    Acute pulmonary embolism with acute cor pulmonale (HCC)    Choroidal nevus of left eye    Combined forms of age-related cataract of left eye       Medications Discontinued During This Encounter   Medication Reason    midodrine (PROAMATINE) 10 MG tablet REORDER       Modified Medications    Modified Medication Previous Medication    MIDODRINE (PROAMATINE) 10 MG TABLET midodrine (PROAMATINE) 10 MG tablet       Take one tablet by mouth twice daily    TAKE 1 TABLET BY MOUTH TWICE DAILY(AM,PM) Orders Placed This Encounter   Medications    midodrine (PROAMATINE) 10 MG tablet     Sig: Take one tablet by mouth twice daily     Dispense:  180 tablet     Refill:  3       Assessment/Plan:    1. Orthostatic hypotension     - midodrine (PROAMATINE) 10 MG tablet; Take one tablet by mouth twice daily  Dispense: 180 tablet; Refill: 3    2. Reactive airway disease without complication, unspecified asthma severity, unspecified whether persistent       3. Atrial flutter, unspecified type ([address]  DOAC and rate control     4. Hyperlipidemia, unspecified hyperlipidemia type   Statin to be continued. Low fat deit. F/u labs     5. Syncope, unspecified syncope type   none in years     6. MORALES (dyspnea on exertion)     - NM MYOCARDIAL SPECT REST EXERCISE OR RX; Future  - Echo 2D w doppler w color complete; Future    7. Chest pain, unspecified type     - NM MYOCARDIAL SPECT REST EXERCISE OR RX; Future       Counseling:  Heart Healthy Lifestyle, Improve BMI, Low Salt Diet, Take Precautions to Prevent Falls and Walk Daily    Return for AFTER TESTS.       Electronically signed by Kristen Hodgkin, MD on 5/5/2022 at 2:35 PM

## 2022-05-15 NOTE — TELEPHONE ENCOUNTER
[facility]enn[facility] [employer] called said that CT head needs a peer to peer since it was denied please call insurance at [phone] with case number of [id number]    Thanks Danisha Bryan No

## 2022-05-26 DIAGNOSIS — E53.8 VITAMIN B12 DEFICIENCY: ICD-10-CM

## 2022-05-26 RX ORDER — ATORVASTATIN CALCIUM 20 MG/1
TABLET, FILM COATED ORAL
Qty: 30 TABLET | Refills: 5 | OUTPATIENT
Start: 2022-05-26

## 2022-05-26 RX ORDER — BENZTROPINE MESYLATE 1 MG/1
TABLET ORAL
Qty: 30 TABLET | Refills: 5 | OUTPATIENT
Start: 2022-05-26

## 2022-05-26 RX ORDER — ARIPIPRAZOLE 15 MG/1
TABLET ORAL
Qty: 30 TABLET | Refills: 5 | OUTPATIENT
Start: 2022-05-26

## 2022-05-31 ENCOUNTER — TELEPHONE (OUTPATIENT)
Dept: GASTROENTEROLOGY | Age: 62
End: 2022-05-31

## 2022-06-06 ENCOUNTER — COMMUNITY OUTREACH (OUTPATIENT)
Dept: FAMILY MEDICINE CLINIC | Age: 62
End: 2022-06-06

## 2022-06-06 NOTE — PROGRESS NOTES
Patient's HM shows they are overdue for Colorectal Screening. [employer] and  files searched without success. No results to attach to order nor HM updated. Note added to upcoming appointment to discuss Care Gap. [employer] message sent.

## 2022-06-09 ENCOUNTER — OFFICE VISIT (OUTPATIENT)
Dept: FAMILY MEDICINE CLINIC | Age: 62
End: 2022-06-09
Payer: COMMERCIAL

## 2022-06-09 VITALS
HEIGHT: 66 IN | WEIGHT: 223 LBS | OXYGEN SATURATION: 96 % | SYSTOLIC BLOOD PRESSURE: 124 MMHG | BODY MASS INDEX: 35.84 KG/M2 | HEART RATE: 76 BPM | TEMPERATURE: 97.1 F | DIASTOLIC BLOOD PRESSURE: 82 MMHG

## 2022-06-09 DIAGNOSIS — R05.9 COUGH: Primary | ICD-10-CM

## 2022-06-09 DIAGNOSIS — Z86.16 HISTORY OF COVID-19: ICD-10-CM

## 2022-06-09 LAB
INFLUENZA A ANTIBODY: NORMAL
INFLUENZA B ANTIBODY: NORMAL
Lab: NORMAL
PERFORMING INSTRUMENT: NORMAL
QC PASS/FAIL: NORMAL
SARS-COV-2, POC: NORMAL

## 2022-06-09 PROCEDURE — G9899 SCRN MAM PERF RSLTS DOC: HCPCS | Performed by: PHYSICIAN ASSISTANT

## 2022-06-09 PROCEDURE — 1036F TOBACCO NON-USER: CPT | Performed by: PHYSICIAN ASSISTANT

## 2022-06-09 PROCEDURE — G8417 CALC BMI ABV UP PARAM F/U: HCPCS | Performed by: PHYSICIAN ASSISTANT

## 2022-06-09 PROCEDURE — 87804 INFLUENZA ASSAY W/OPTIC: CPT | Performed by: PHYSICIAN ASSISTANT

## 2022-06-09 PROCEDURE — 99213 OFFICE O/P EST LOW 20 MIN: CPT | Performed by: PHYSICIAN ASSISTANT

## 2022-06-09 PROCEDURE — G8427 DOCREV CUR MEDS BY ELIG CLIN: HCPCS | Performed by: PHYSICIAN ASSISTANT

## 2022-06-09 PROCEDURE — 3017F COLORECTAL CA SCREEN DOC REV: CPT | Performed by: PHYSICIAN ASSISTANT

## 2022-06-09 PROCEDURE — 87426 SARSCOV CORONAVIRUS AG IA: CPT | Performed by: PHYSICIAN ASSISTANT

## 2022-06-09 RX ORDER — DEXTROMETHORPHAN HYDROBROMIDE AND PROMETHAZINE HYDROCHLORIDE 15; 6.25 MG/5ML; MG/5ML
5 SYRUP ORAL 4 TIMES DAILY PRN
Qty: 150 ML | Refills: 0 | Status: SHIPPED | OUTPATIENT
Start: 2022-06-09 | End: 2022-06-16

## 2022-06-09 ASSESSMENT — PATIENT HEALTH QUESTIONNAIRE - PHQ9
7. TROUBLE CONCENTRATING ON THINGS, SUCH AS READING THE NEWSPAPER OR WATCHING TELEVISION: 0
4. FEELING TIRED OR HAVING LITTLE ENERGY: 0
9. THOUGHTS THAT YOU WOULD BE BETTER OFF DEAD, OR OF HURTING YOURSELF: 0
SUM OF ALL RESPONSES TO PHQ9 QUESTIONS 1 & 2: 0
2. FEELING DOWN, DEPRESSED OR HOPELESS: 0
8. MOVING OR SPEAKING SO SLOWLY THAT OTHER PEOPLE COULD HAVE NOTICED. OR THE OPPOSITE, BEING SO FIGETY OR RESTLESS THAT YOU HAVE BEEN MOVING AROUND A LOT MORE THAN USUAL: 0
3. TROUBLE FALLING OR STAYING ASLEEP: 0
10. IF YOU CHECKED OFF ANY PROBLEMS, HOW DIFFICULT HAVE THESE PROBLEMS MADE IT FOR YOU TO DO YOUR WORK, TAKE CARE OF THINGS AT HOME, OR GET ALONG WITH OTHER PEOPLE: 0
SUM OF ALL RESPONSES TO PHQ QUESTIONS 1-9: 0
5. POOR APPETITE OR OVEREATING: 0
SUM OF ALL RESPONSES TO PHQ QUESTIONS 1-9: 0
1. LITTLE INTEREST OR PLEASURE IN DOING THINGS: 0
6. FEELING BAD ABOUT YOURSELF - OR THAT YOU ARE A FAILURE OR HAVE LET YOURSELF OR YOUR FAMILY DOWN: 0

## 2022-06-09 ASSESSMENT — ENCOUNTER SYMPTOMS
SHORTNESS OF BREATH: 0
ABDOMINAL PAIN: 0
SINUS PRESSURE: 0
DIARRHEA: 0
COUGH: 1
CHEST TIGHTNESS: 0
BACK PAIN: 0
TROUBLE SWALLOWING: 0
VOMITING: 0

## 2022-06-09 NOTE — PROGRESS NOTES
Subjective:      Patient ID: Cullen Kahn is a 65 y.o. female who presents today for:  Chief Complaint   Patient presents with    Cough     Patient is here c/o cough. Pt states she has cough, heaviness in chest when coughing, and congestion. Pt states symptoms have been ongoing since being diagnosed with covid-19 about 2 weeks ago. HPI  65year old female who was diagnosed with COVID-19 approximately 2 weeks ago at [address] She is testing negative currently complaining of a non productive cough.  Denies fever and chills    Past Medical History:   Diagnosis Date    Asthma 2015    Bilateral renal cysts 2013    Depression     since age 35, [facility] Daniel Babcock[facility] Diverticulosis of sigmoid colon 2012    Dr Latoya Mueller DJD of left shoulder     Environmental allergies     Fatty liver 2013    GERD (gastroesophageal reflux disease)     History of cardiac arrhythmia     \"due to stress, was placed on medication\"    Hydatidiform mole     age 26     Hyperlipidemia     Hypothyroidism 2011    IFG (impaired fasting glucose) 2013    Melanoma of eye [facility])     age 30, R eye prosthesis, Dr Elie Hauser    Obesity     Prediabetes     PTSD (post-traumatic stress disorder)     age 35, [facility] [place] [facility]    S/P colonoscopy 04/19/2012    Dr. Jessica Daniels S/P hysterectomy with oophorectomy 2012    Dr Cherylene Manuel Tremor     Dr Porfirio Gonzales Vitamin D deficiency      Past Surgical History:   Procedure Laterality Date    BREAST BIOPSY      DILATION AND CURETTAGE OF UTERUS      ENDOMETRIAL ABLATION  6/2012    EYE REMOVAL      OD for melanoma, age 30    FOOT OSTEOTOMY Left 09/23/2016    [id number] DPM      BALDOMERO STEROTACTIC LOC BREAST BIOPSY RIGHT Right 7/19/2021    BALDOMERO STEROTACTIC LOC BREAST BIOPSY RIGHT 7/19/2021 [facility]    LINDSAY AND BSO (CERVIX REMOVED)  2012    Dr Sandy Urban     Social History     Socioeconomic History    Marital status:      Spouse name: Not on file    Number of children: 3    Years of education: 15    Highest education level: High school graduate   Occupational History    Occupation: [employer]   Tobacco Use    Smoking status: Never Smoker    Smokeless tobacco: Never Used   Vaping Use    Vaping Use: Never used   Substance and Sexual Activity    Alcohol use: No    Drug use: No    Sexual activity: Not Currently     Birth control/protection: Surgical   Other Topics Concern    Not on file   Social History Narrative    Born in [address] [place] [address], one of 4 children, one sister disappeared at age 30, never found    Moved to [place] at age 40        Lives in an apartment in [place] [facility], alone    , 3 children, all grown, in [place]     2 granddaughters     Hobbies exercising, birds    Attends [employer], has volunteered at [employer] Strain: Low Risk     Difficulty of Paying Living Expenses: Not hard at all   Food Insecurity: No Food Insecurity    Worried About [facility] in the Last Year: Never true    Anurag of Food in the Last Year: Never true   Transportation Needs:     Lack of Transportation (Medical): Not on file    Lack of Transportation (Non-Medical):  Not on file   Physical Activity:     Days of Exercise per Week: Not on file    Minutes of Exercise per Session: Not on file   Stress:     Feeling of Stress : Not on file   Social Connections:     Frequency of Communication with Friends and Family: Not on file    Frequency of Social Gatherings with Friends and Family: Not on file    Attends Caoda[employer] Services: Not on file    Active Member of Clubs or Organizations: Not on file    Attends Club or Organization Meetings: Not on file    Marital Status: Not on file   Intimate Partner Violence:     Fear of Current or Ex-Partner: Not on file    Emotionally Abused: Not on file    Physically Abused: Not on file    Sexually Abused: Not on file   Housing Stability:     Unable to Pay for Housing in the Last Year: Not on file    Number of Places Lived in the Last Year: Not on file    Unstable Housing in the Last Year: Not on file     Family History   Problem Relation Age of Onset    Heart Failure Mother         dec 76    Diabetes Mother     Diabetes Father     Stroke Father         dec age 80    Cancer Father         Sarcoma    Stomach Cancer Other     Breast Cancer Paternal Aunt     Breast Cancer Maternal Aunt      Allergies   Allergen Reactions    Bee Venom      Current Outpatient Medications   Medication Sig Dispense Refill    promethazine-dextromethorphan (PROMETHAZINE-DM) 6.25-15 MG/5ML syrup Take 5 mLs by mouth 4 times daily as needed for Cough 150 mL 0    midodrine (PROAMATINE) 10 MG tablet Take one tablet by mouth twice daily 180 tablet 3    ARIPiprazole (ABILIFY) 15 MG tablet Take 1 tablet by mouth daily 30 tablet 5    vitamin D (VITAMIN D HIGH POTENCY) 25 MCG (1000 UT) CAPS TAKE 1 CAPSULE BY MOUTH DAILY WITH SUPPER (PM) 30 capsule 5    levothyroxine (SYNTHROID) 50 MCG tablet Take 1 tablet by mouth Daily 30 tablet 5    famotidine (PEPCID) 20 MG tablet TAKE 1 TABLET BY MOUTH TWICE DAILY (AM,PM) 60 tablet 5    budesonide-formoterol (SYMBICORT) 160-4.5 MCG/ACT AERO INHALE 2 PUFFS INTO THE LUNGS 2 TIMES DAILY 10.2 g 5    benztropine (COGENTIN) 1 MG tablet Take 1 tablet by mouth daily 30 tablet 5    Cyanocobalamin (B-12) 1000 MCG TABS TAKE 1 TABLET BY MOUTH ONCE A DAY (NOON) 30 tablet 5    meclizine (ANTIVERT) 12.5 MG tablet Take 1 tablet by mouth nightly as needed for Dizziness 30 tablet 5    Biotin 300 MCG TABS Take 1 tablet by mouth daily 30 tablet 5    Multiple Vitamin (TAB-A-MANDI/BETA CAROTENE) TABS TAKE 1 TABLET BY MOUTH EVERY DAY (AM) 28 tablet 5    atorvastatin (LIPITOR) 20 MG tablet Take 1 tablet by mouth daily 30 tablet 5    calamine-zinc oxide 8-8 % LOTN lotion Apply topically as needed (PRURITIS) 177 mL 3    diclofenac sodium (VOLTAREN) 1 % GEL APPLY 2 G TOPICALLY 4 TIMES DAILY 100 g 2    VENTOLIN  (90 Base) MCG/ACT inhaler USE 2 PUFFS EVERY 4 HRS AS NEEDED FOR WHEEZING/SOB-SPACE OUT TO EVERY 6 HR AS SYMPTOMS IMPROVE 18 g 3    ketoconazole (NIZORAL) 2 % shampoo APPLY TOPICALLY DAILY AS NEEDED. 120 mL 2    albuterol sulfate HFA (VENTOLIN HFA) 108 (90 Base) MCG/ACT inhaler Inhale 2 puffs into the lungs every 6 hours as needed for Wheezing Please cover what is under insurance. 18 g 3    VRAYLAR 1.5 MG capsule Take 1.5 mg by mouth daily      prazosin (MINIPRESS) 1 MG capsule Take 1 mg by mouth daily      metFORMIN (GLUCOPHAGE) 500 MG tablet TAKE 1 TABLET BY MOUTH TWICE A DAY WITH MEALS 60 tablet 5    metoprolol tartrate (LOPRESSOR) 50 MG tablet Take 1 tablet by mouth 2 times daily 60 tablet 5    aspirin (ASPIRIN LOW DOSE) 81 MG EC tablet TAKE 1 TABLET BY MOUTH ONCE A DAY (AM) 30 tablet 3    Blood Pressure Monitoring (B-D ASSURE BPM/AUTO ARM CUFF) MISC Use as directed 1 each 0    apixaban (ELIQUIS) 5 MG TABS tablet Take 1 tablet by mouth 2 times daily START when Eliquis Starter pack completed 60 tablet 3    EPINEPHrine (EPIPEN 2-PRAVEENA) 0.3 MG/0.3ML SOAJ injection Use as directed for allergic reaction 2 each 2    Misc. Devices (ROLLATOR ULTRA-LIGHT) MISC 1 each by Does not apply route once for 1 dose 1 each 0     No current facility-administered medications for this visit. Review of Systems   Constitutional: Negative for activity change, appetite change, chills, fever and unexpected weight change. HENT: Negative for drooling, ear pain, nosebleeds, sinus pressure and trouble swallowing. Respiratory: Positive for cough. Negative for chest tightness and shortness of breath. Cardiovascular: Negative for chest pain and leg swelling. Gastrointestinal: Negative for abdominal pain, diarrhea and vomiting. Endocrine: Negative for polydipsia and polyphagia. Genitourinary: Negative for dysuria, flank pain and frequency. Musculoskeletal: Negative for back pain and myalgias. Abdominal:      General: Abdomen is flat. Bowel sounds are normal. There is no distension. Palpations: Abdomen is soft. There is no mass. Tenderness: There is no abdominal tenderness. There is no right CVA tenderness, left CVA tenderness, guarding or rebound. Hernia: No hernia is present. Musculoskeletal:         General: No swelling, tenderness, deformity or signs of injury. Normal range of motion. Cervical back: Normal range of motion and neck supple. No rigidity. Lymphadenopathy:      Head:      Right side of head: No submental adenopathy. Left side of head: No submental adenopathy. Skin:     General: Skin is warm and dry. Capillary Refill: Capillary refill takes less than 2 seconds. Coloration: Skin is not jaundiced or pale. Findings: No bruising, erythema, lesion or rash. Neurological:      General: No focal deficit present. Mental Status: She is alert and oriented to person, place, and time. Mental status is at baseline. Cranial Nerves: No cranial nerve deficit. Sensory: No sensory deficit. Motor: No weakness. Coordination: Coordination normal.      Deep Tendon Reflexes: Reflexes are normal and symmetric. Psychiatric:         Mood and Affect: Mood normal.         Behavior: Behavior normal. Behavior is cooperative. Thought Content: Thought content normal.         Judgment: Judgment normal.         Assessment:       Diagnosis Orders   1. Cough  POCT COVID-19, Antigen    POCT Influenza A/B    XR CHEST STANDARD (2 VW)    promethazine-dextromethorphan (PROMETHAZINE-DM) 6.25-15 MG/5ML syrup   2. History of COVID-19       Results for POC orders placed in visit on 06/09/22   POCT Influenza A/B   Result Value Ref Range    Influenza A Ab neg     Influenza B Ab neg       Plan:     Assessment & Plan   Steven Padilla was seen today for cough.     Diagnoses and all orders for this visit:    Cough  -     POCT COVID-19, Antigen  -     POCT Influenza A/B  -     XR CHEST STANDARD (2 VW); Future  -     promethazine-dextromethorphan (PROMETHAZINE-DM) 6.25-15 MG/5ML syrup; Take 5 mLs by mouth 4 times daily as needed for Cough    History of COVID-19      Orders Placed This Encounter   Procedures    XR CHEST STANDARD (2 VW)     Standing Status:   Future     Standing Expiration Date:   6/9/2023     Order Specific Question:   Reason for exam:     Answer:   r/o pna    POCT COVID-19, Antigen     Order Specific Question:   Is this test for diagnosis or screening? Answer:   Screening     Order Specific Question:   Symptomatic for COVID-19 as defined by CDC? Answer:   No     Order Specific Question:   Date of Symptom Onset     Answer:   N/A     Order Specific Question:   Hospitalized for COVID-19? Answer:   No     Order Specific Question:   Admitted to ICU for COVID-19? Answer:   No     Order Specific Question:   Employed in healthcare setting? Answer:   Unknown     Order Specific Question:   Resident in a congregate (group) care setting? Answer:   Unknown     Order Specific Question:   Pregnant? Answer:   No     Order Specific Question:   Previously tested for COVID-19? Answer:   Unknown    POCT Influenza A/B     Orders Placed This Encounter   Medications    promethazine-dextromethorphan (PROMETHAZINE-DM) 6.25-15 MG/5ML syrup     Sig: Take 5 mLs by mouth 4 times daily as needed for Cough     Dispense:  150 mL     Refill:  0     There are no discontinued medications. Return if symptoms worsen or fail to improve. Reviewed with the patient/family: current clinical status & medications. Side effects of the medication prescribed today, as well as treatment plan/rationale and result expectations have been discussed with the patient/family who expresses understanding. Patient will be discharged home in stable condition. Follow up with PCP to evaluate treatment results or return if symptoms worsen or fail to improve.  Discussed signs and symptoms which require immediate follow-up in ED/call to 911. Understanding verbalized. I have reviewed the patient's medical history in detail and updated the computerized patient record.     XU Thomas

## 2022-06-09 NOTE — PATIENT INSTRUCTIONS
Patient Education        Cough: Care Instructions  Overview     A cough is your body's response to something that bothers your throat or airways. Many things can cause a cough. You might cough because of a cold or the flu, bronchitis, or asthma. Smoking, postnasal drip, allergies, and stomachacid that backs up into your throat also can cause coughs. A cough is a symptom, not a disease. Most coughs stop when the cause, such as acold, goes away. You can take a few steps at home to cough less and feel better. Follow-up care is a key part of your treatment and safety. Be sure to make and go to all appointments, and call your doctor if you are having problems. It's also a good idea to know your test results and keep alist of the medicines you take. How can you care for yourself at home?  Drink lots of water and other fluids. This helps thin the mucus and soothes a dry or sore throat. Honey or lemon juice in hot water or tea may ease a dry cough.  Take cough medicine as directed by your doctor.  Prop up your head on pillows to help you breathe and ease a dry cough.  Try cough drops or hard candy to soothe a dry or sore throat.  Do not smoke. Avoid secondhand smoke. If you need help quitting, talk to your doctor about stop-smoking programs and medicines. These can increase your chances of quitting for good. When should you call for help? Call 911 anytime you think you may need emergency care. For example, call if:     You have severe trouble breathing. Call your doctor now or seek immediate medical care if:     You cough up blood.      You have new or worse trouble breathing.      You have a new or higher fever.      You have a new rash.    Watch closely for changes in your health, and be sure to contact your doctor if:     You cough more deeply or more often, especially if you notice more mucus or a change in the color of your mucus.      You have new symptoms, such as a sore throat, an earache, or sinus pain.      You do not get better as expected. Where can you learn more? Go to [email or web address] and sign in to your [employer] account. Enter D279 in the [facility] box to learn more about \"Cough: Care Instructions. \"     If you do not have an account, please click on the \"Sign Up Now\" link. Current as of: July 6, 2021               Content Version: 13.2  © 2006-2022 [employer] Inc[employer]. Care instructions adapted under license by [facility] ([facility]). If you have questions about a medical condition or this instruction, always ask your healthcare professional. [address] any warranty or liability for your use of this information.

## 2022-07-09 PROBLEM — R05.9 COUGH: Status: RESOLVED | Noted: 2022-06-09 | Resolved: 2022-07-09

## 2022-07-13 ENCOUNTER — TELEPHONE (OUTPATIENT)
Dept: FAMILY MEDICINE CLINIC | Age: 62
End: 2022-07-13

## 2022-07-13 NOTE — TELEPHONE ENCOUNTER
Called Carenbrenda Alex, scheduled an appt to discuss issue per pt her feet are getting cold and numb at night. Offered sooner appt with different provider, but Heavenly Johnson states she only wants her provider Dr Rudy Arce. Pt had no further questions or concerns to be addressed at time of call.

## 2022-07-13 NOTE — TELEPHONE ENCOUNTER
----- Message from Sotomayor sent at 7/13/2022 10:08 AM EDT -----  Subject: Message to Provider    QUESTIONS  Information for Provider? pt is requesting a callback from valencia simmons,   she is having problems and wants to know what she should do. at night when   she sleeps her feet get very cold, when she tries to get up she has to   move them around before she feels safe to walk.   ---------------------------------------------------------------------------  --------------  Smita Luciano INFO  [phone]; Do not leave any message, patient will call back for answer  ---------------------------------------------------------------------------  --------------  SCRIPT ANSWERS  Relationship to Patient?  Self

## 2022-07-28 DIAGNOSIS — I95.1 ORTHOSTATIC HYPOTENSION: ICD-10-CM

## 2022-07-28 DIAGNOSIS — E55.9 VITAMIN D DEFICIENCY: ICD-10-CM

## 2022-07-28 DIAGNOSIS — I48.92 ATRIAL FLUTTER, UNSPECIFIED TYPE (HCC): ICD-10-CM

## 2022-07-28 RX ORDER — ATORVASTATIN CALCIUM 20 MG/1
20 TABLET, FILM COATED ORAL DAILY
Qty: 30 TABLET | Refills: 5 | Status: SHIPPED | OUTPATIENT
Start: 2022-07-28 | End: 2022-08-04 | Stop reason: SDUPTHER

## 2022-07-28 RX ORDER — BUDESONIDE AND FORMOTEROL FUMARATE DIHYDRATE 160; 4.5 UG/1; UG/1
AEROSOL RESPIRATORY (INHALATION)
Qty: 10.2 G | Refills: 5 | Status: SHIPPED | OUTPATIENT
Start: 2022-07-28 | End: 2022-08-04 | Stop reason: SDUPTHER

## 2022-07-28 RX ORDER — METOPROLOL TARTRATE 50 MG/1
50 TABLET, FILM COATED ORAL 2 TIMES DAILY
Qty: 60 TABLET | Refills: 5 | Status: SHIPPED | OUTPATIENT
Start: 2022-07-28 | End: 2022-08-04 | Stop reason: SDUPTHER

## 2022-07-28 RX ORDER — CHOLECALCIFEROL (VITAMIN D3) 25 MCG
CAPSULE ORAL
Qty: 30 CAPSULE | Refills: 5 | Status: SHIPPED | OUTPATIENT
Start: 2022-07-28 | End: 2022-08-04 | Stop reason: SDUPTHER

## 2022-07-28 RX ORDER — MECLIZINE HCL 12.5 MG/1
12.5 TABLET ORAL NIGHTLY PRN
Qty: 30 TABLET | Refills: 5 | Status: SHIPPED | OUTPATIENT
Start: 2022-07-28 | End: 2022-08-04 | Stop reason: SDUPTHER

## 2022-07-28 RX ORDER — DOXEPIN HYDROCHLORIDE 50 MG/1
CAPSULE ORAL
Qty: 28 TABLET | Refills: 5 | Status: SHIPPED | OUTPATIENT
Start: 2022-07-28 | End: 2022-08-04 | Stop reason: SDUPTHER

## 2022-07-28 NOTE — TELEPHONE ENCOUNTER
[facility] called and requested these medications for patient. Patient is in crisis and has lost all of her meds. Please approve or deny this request.    Rx requested:  Requested Prescriptions     Pending Prescriptions Disp Refills    metoprolol tartrate (LOPRESSOR) 50 MG tablet 60 tablet 5     Sig: Take 1 tablet by mouth in the morning and 1 tablet before bedtime. budesonide-formoterol (SYMBICORT) 160-4.5 MCG/ACT AERO 10.2 g 5     Sig: INHALE 2 PUFFS INTO THE LUNGS 2 TIMES DAILY    meclizine (ANTIVERT) 12.5 MG tablet 30 tablet 5     Sig: Take 1 tablet by mouth nightly as needed for Dizziness    atorvastatin (LIPITOR) 20 MG tablet 30 tablet 5     Sig: Take 1 tablet by mouth in the morning.     vitamin D (VITAMIN D HIGH POTENCY) 25 MCG (1000 UT) CAPS 30 capsule 5     Sig: TAKE 1 CAPSULE BY MOUTH DAILY WITH SUPPER (PM)    Multiple Vitamin (TAB-A-MANDI/BETA CAROTENE) TABS 28 tablet 5     Sig: TAKE 1 TABLET BY MOUTH EVERY DAY (AM)         Last Office Visit:   5/4/2022      Next Visit Date:  Future Appointments   Date Time Provider Jay Wong   8/4/2022  9:30 AM Particia Loyola  [place], [place]

## 2022-08-04 ENCOUNTER — OFFICE VISIT (OUTPATIENT)
Dept: FAMILY MEDICINE CLINIC | Age: 62
End: 2022-08-04
Payer: COMMERCIAL

## 2022-08-04 VITALS
OXYGEN SATURATION: 97 % | HEART RATE: 80 BPM | BODY MASS INDEX: 38.51 KG/M2 | DIASTOLIC BLOOD PRESSURE: 68 MMHG | HEIGHT: 66 IN | SYSTOLIC BLOOD PRESSURE: 128 MMHG | WEIGHT: 239.6 LBS | TEMPERATURE: 97.3 F

## 2022-08-04 DIAGNOSIS — Z12.31 ENCOUNTER FOR SCREENING MAMMOGRAM FOR MALIGNANT NEOPLASM OF BREAST: ICD-10-CM

## 2022-08-04 DIAGNOSIS — Z12.11 COLON CANCER SCREENING: ICD-10-CM

## 2022-08-04 DIAGNOSIS — I48.92 ATRIAL FLUTTER, UNSPECIFIED TYPE (HCC): ICD-10-CM

## 2022-08-04 DIAGNOSIS — M62.838 MUSCLE SPASMS OF BOTH LOWER EXTREMITIES: ICD-10-CM

## 2022-08-04 DIAGNOSIS — E55.9 VITAMIN D DEFICIENCY: ICD-10-CM

## 2022-08-04 DIAGNOSIS — R25.1 TREMORS OF NERVOUS SYSTEM: ICD-10-CM

## 2022-08-04 DIAGNOSIS — L30.9 DERMATITIS: ICD-10-CM

## 2022-08-04 DIAGNOSIS — R21 SKIN RASH: ICD-10-CM

## 2022-08-04 DIAGNOSIS — Z59.00 HOMELESS: ICD-10-CM

## 2022-08-04 DIAGNOSIS — R73.03 PREDIABETES: Primary | ICD-10-CM

## 2022-08-04 DIAGNOSIS — I95.1 ORTHOSTATIC HYPOTENSION: ICD-10-CM

## 2022-08-04 LAB — HBA1C MFR BLD: 6 %

## 2022-08-04 PROCEDURE — 3017F COLORECTAL CA SCREEN DOC REV: CPT | Performed by: FAMILY MEDICINE

## 2022-08-04 PROCEDURE — 1036F TOBACCO NON-USER: CPT | Performed by: FAMILY MEDICINE

## 2022-08-04 PROCEDURE — G9899 SCRN MAM PERF RSLTS DOC: HCPCS | Performed by: FAMILY MEDICINE

## 2022-08-04 PROCEDURE — G8417 CALC BMI ABV UP PARAM F/U: HCPCS | Performed by: FAMILY MEDICINE

## 2022-08-04 PROCEDURE — 99214 OFFICE O/P EST MOD 30 MIN: CPT | Performed by: FAMILY MEDICINE

## 2022-08-04 PROCEDURE — 83036 HEMOGLOBIN GLYCOSYLATED A1C: CPT | Performed by: FAMILY MEDICINE

## 2022-08-04 PROCEDURE — G8427 DOCREV CUR MEDS BY ELIG CLIN: HCPCS | Performed by: FAMILY MEDICINE

## 2022-08-04 RX ORDER — METOPROLOL TARTRATE 50 MG/1
50 TABLET, FILM COATED ORAL 2 TIMES DAILY
Qty: 60 TABLET | Refills: 5 | Status: SHIPPED | OUTPATIENT
Start: 2022-08-04

## 2022-08-04 RX ORDER — MECLIZINE HCL 12.5 MG/1
12.5 TABLET ORAL NIGHTLY PRN
Qty: 30 TABLET | Refills: 5 | Status: SHIPPED | OUTPATIENT
Start: 2022-08-04

## 2022-08-04 RX ORDER — ATORVASTATIN CALCIUM 20 MG/1
20 TABLET, FILM COATED ORAL DAILY
Qty: 30 TABLET | Refills: 5 | Status: SHIPPED | OUTPATIENT
Start: 2022-08-04

## 2022-08-04 RX ORDER — BUDESONIDE AND FORMOTEROL FUMARATE DIHYDRATE 160; 4.5 UG/1; UG/1
AEROSOL RESPIRATORY (INHALATION)
Qty: 10.2 G | Refills: 5 | Status: SHIPPED | OUTPATIENT
Start: 2022-08-04

## 2022-08-04 RX ORDER — CHOLECALCIFEROL (VITAMIN D3) 25 MCG
CAPSULE ORAL
Qty: 30 CAPSULE | Refills: 5 | Status: SHIPPED | OUTPATIENT
Start: 2022-08-04

## 2022-08-04 RX ORDER — DOXEPIN HYDROCHLORIDE 50 MG/1
CAPSULE ORAL
Qty: 28 TABLET | Refills: 5 | Status: SHIPPED | OUTPATIENT
Start: 2022-08-04

## 2022-08-04 SDOH — ECONOMIC STABILITY - HOUSING INSECURITY: HOMELESSNESS UNSPECIFIED: Z59.00

## 2022-08-04 NOTE — PROGRESS NOTES
Chief Complaint   Patient presents with    3 Month Follow-Up    Tremors     Pt reports tremors are getting worse in hands & has discoloration of the hands. Leg Pain     Pt reports she still has leg cramping at night & cold feet. Admits to tingles and numbness in feet/legs. Health Maintenance     Yes to mammogram        HPI: Shannon Bueno 65 y.o. female presenting for       Carnella Rodney patch   Started on the right upper extremity   Not itchy   Not spreading   No history of vitiligo. Rash on the lower extremity   Itchy   Had bed bugs in the area   Has not tried any medication the help with it. Pulmonary embolism   Currently seeing the cardiologist and on blood thinners   Stable   No issues or concerns  admits to dyspnea on exertion when pushing up her grocery cart      F/u   Stable no issues   Still on the eliquis   Has an appointment with cardiology. Will inquire about the blood thinners. Headaches   Patient is complaining of headaches for the last 4 days. Patient reports is located on top of her head. Does admit to having more stress in her life lately. Not pounding in nature. Pain is an 8 out of 10 but goes down to 5 out of 10 with the help of extra strength Tylenol. Patient takes 1 tablet a day as needed for pain. Patient admits to a previous history of migraines. Denies any auras. Denies any fevers, chills, nausea, vomiting, chest pain, shortness of breath, abdominal pain, urination, change in stools. Patient did have a CT of her head back in May 27, 2021 which was unremarkable. F/u   Intermittent in nature   Has not found a new neurologist.          Hpyothyroidism   Stable. Takes medication. Lab Results   Component Value Date    TSH 1.840 02/02/2022         Bed bug infestation   Patient reports a recent infestation of bedbugs. Patient has brought some supplies yesterday to help with the issue.   Admits to recent bites on her wrist.  Reports that she has a rashes and bedbugs bites on her wrists her lower back her legs. Patient reports that her she does not want to call the fumigation company. Reports that her apartment complex will not hire someone to clean out the bedbugs infestation. Patient like something to help with itching. F/u   Was evicted from her apartment   Now homeless   Working with the [employer]        Current Outpatient Medications   Medication Sig Dispense Refill    metoprolol tartrate (LOPRESSOR) 50 MG tablet Take 1 tablet by mouth in the morning and 1 tablet before bedtime. 60 tablet 5    budesonide-formoterol (SYMBICORT) 160-4.5 MCG/ACT AERO INHALE 2 PUFFS INTO THE LUNGS 2 TIMES DAILY 10.2 g 5    meclizine (ANTIVERT) 12.5 MG tablet Take 1 tablet by mouth nightly as needed for Dizziness 30 tablet 5    atorvastatin (LIPITOR) 20 MG tablet Take 1 tablet by mouth in the morning.  30 tablet 5    vitamin D (VITAMIN D HIGH POTENCY) 25 MCG (1000 UT) CAPS TAKE 1 CAPSULE BY MOUTH DAILY WITH SUPPER (PM) 30 capsule 5    Multiple Vitamin (TAB-A-MANDI/BETA CAROTENE) TABS TAKE 1 TABLET BY MOUTH EVERY DAY (AM) 28 tablet 5    midodrine (PROAMATINE) 10 MG tablet Take one tablet by mouth twice daily 180 tablet 3    ARIPiprazole (ABILIFY) 15 MG tablet Take 1 tablet by mouth daily 30 tablet 5    levothyroxine (SYNTHROID) 50 MCG tablet Take 1 tablet by mouth Daily 30 tablet 5    famotidine (PEPCID) 20 MG tablet TAKE 1 TABLET BY MOUTH TWICE DAILY (AM,PM) 60 tablet 5    benztropine (COGENTIN) 1 MG tablet Take 1 tablet by mouth daily 30 tablet 5    Cyanocobalamin (B-12) 1000 MCG TABS TAKE 1 TABLET BY MOUTH ONCE A DAY (NOON) 30 tablet 5    Biotin 300 MCG TABS Take 1 tablet by mouth daily 30 tablet 5    calamine-zinc oxide 8-8 % LOTN lotion Apply topically as needed (PRURITIS) 177 mL 3    diclofenac sodium (VOLTAREN) 1 % GEL APPLY 2 G TOPICALLY 4 TIMES DAILY 100 g 2    VENTOLIN  (90 Base) MCG/ACT inhaler USE 2 PUFFS EVERY 4 HRS AS NEEDED FOR WHEEZING/SOB-SPACE OUT TO EVERY 6 HR AS SYMPTOMS IMPROVE 18 g 3    ketoconazole (NIZORAL) 2 % shampoo APPLY TOPICALLY DAILY AS NEEDED. 120 mL 2    albuterol sulfate HFA (VENTOLIN HFA) 108 (90 Base) MCG/ACT inhaler Inhale 2 puffs into the lungs every 6 hours as needed for Wheezing Please cover what is under insurance. 18 g 3    VRAYLAR 1.5 MG capsule Take 1.5 mg by mouth daily      prazosin (MINIPRESS) 1 MG capsule Take 1 mg by mouth daily      metFORMIN (GLUCOPHAGE) 500 MG tablet TAKE 1 TABLET BY MOUTH TWICE A DAY WITH MEALS 60 tablet 5    aspirin (ASPIRIN LOW DOSE) 81 MG EC tablet TAKE 1 TABLET BY MOUTH ONCE A DAY (AM) 30 tablet 3    Blood Pressure Monitoring (B-D ASSURE BPM/AUTO ARM CUFF) MISC Use as directed 1 each 0    apixaban (ELIQUIS) 5 MG TABS tablet Take 1 tablet by mouth 2 times daily START when Eliquis Starter pack completed 60 tablet 3    EPINEPHrine (EPIPEN 2-PRAVEENA) 0.3 MG/0.3ML SOAJ injection Use as directed for allergic reaction 2 each 2    Misc. Devices (ROLLATOR ULTRA-LIGHT) MISC 1 each by Does not apply route once for 1 dose 1 each 0     No current facility-administered medications for this visit. ROS  CONSTITUTIONAL: The patient denies fevers, chills, sweats and body ache. HEENT: Denies headache, blurry vision, eye pain, tinnitus, vertigo, admits to sore throat, denies neck or thyroid masses. Admits to cracked lips. RESPIRATORY: admits to shortness of breath on exertion (specifically when pushing up the cart)  CARDIAC: Denies chest pain, pressure, palpitations, Denies lower extremity edema. GASTROINTESTINAL: Denies abdominal pain, constipation, diarrhea, bleeding in the stools,   GENITOURINARY: Denies dysuria, hematuria, nocturia or frequency, urinary incontinence. NEUROLOGIC: Denies headaches, admits to dizziness, denies syncope, weakness  MUSCULOSKELETAL: denies changes in range of motion, joint pain, stiffness. ENDOCRINOLOGY: Denies heat or cold intolerance.    HEMATOLOGY: Denies easy bleeding or blood transfusion,anemia  DERMATOLOGY: negative. PSYCHIATRY: Denies depression, agitation or anxiety.     Past Medical History:   Diagnosis Date    Asthma 2015    Bilateral renal cysts 2013    Depression     since age 35, was with Dr Giovanny Najera, now the [facility]    Diverticulosis of sigmoid colon 2012    Dr Darling VILLANUEVA of left shoulder     Environmental allergies     Fatty liver 2013    GERD (gastroesophageal reflux disease)     History of cardiac arrhythmia     \"due to stress, was placed on medication\"    Hydatidiform mole     age 26     Hyperlipidemia     Hypothyroidism 2011    IFG (impaired fasting glucose) 2013    Melanoma of eye ([address]     age 30, R eye prosthesis, Dr Marian Allen    Obesity     Prediabetes     PTSD (post-traumatic stress disorder)     age 35, [facility]    S/P colonoscopy 04/19/2012    Dr. Fernie Singh    S/P hysterectomy with oophorectomy 2012    Dr Lan Carrillo    Tremor     Dr Ria Murphy    Vitamin D deficiency         Past Surgical History:   Procedure Laterality Date    BREAST BIOPSY      DILATION AND CURETTAGE OF UTERUS      ENDOMETRIAL ABLATION  6/2012    EYE REMOVAL      OD for melanoma, age 30    FOOT OSTEOTOMY Left 09/23/2016    DEJA JACKMAN DPM      BALDOMERO STEROTACTIC LOC BREAST BIOPSY RIGHT Right 7/19/2021    BALDOMERO STEROTACTIC LOC BREAST BIOPSY RIGHT 7/19/2021 [facility]    LINDSAY AND BSO (CERVIX REMOVED)  2012    Dr Lan Carrillo        Family History   Problem Relation Age of Onset    Heart Failure Mother         dec 76    Diabetes Mother     Diabetes Father     Stroke Father         dec age 80    Cancer Father         Sarcoma    Stomach Cancer Other     Breast Cancer Paternal Aunt     Breast Cancer Maternal Aunt         Social History     Socioeconomic History    Marital status:      Spouse name: Not on file    Number of children: 3    Years of education: 12    Highest education level: High school graduate   Occupational History    Occupation: [employer]   Tobacco Use    Smoking status: Never    Smokeless tobacco: Never   Vaping Use    Vaping Use: Never used   Substance and Sexual Activity    Alcohol use: No    Drug use: No    Sexual activity: Not Currently     Birth control/protection: Surgical   Other Topics Concern    Not on file   Social History Narrative    Born in [address] [place] [address], one of 4 children, one sister disappeared at age 29, never found    Moved to [place] at age 40        Lives in an apartment in [facility], alone    , 3 children, all grown, in [place]     2 granddaughters     Hobbies exercising, birds    Attends [employer], has volunteered at [id number] Strain: Low Risk     Difficulty of Paying Living Expenses: Not hard at all   Food Insecurity: No Food Insecurity    Worried About Running Out of Food in the Last Year: Never true    Ran Out of Food in the Last Year: Never true   Transportation Needs: Not on file   Physical Activity: Not on file   Stress: Not on file   Social Connections: Not on file   Intimate Partner Violence: Not on file   Housing Stability: Not on file        /68   Pulse 80   Temp 97.3 °F (36.3 °C) (Temporal)   Ht 5' 6\" (1.676 m)   Wt 239 lb 9.6 oz (108.7 kg)   SpO2 97%   BMI 38.67 kg/m²        Physical Exam:    General appearance - alert, well appearing, and in no distress, obese  Mental Status - alert, oriented to person, place, and time  Eyes - pupils equal and reactive, extraocular eye movements intact   Ears - bilateral TM's and external ear canals normal   Nose - normal and patent, no erythema, discharge or polyps   Sinuses - Normal paranasal sinuses without tenderness   Throat - mucous membranes moist, pharynx normal without lesions   Neck - supple, no significant adenopathy   Thyroid - thyroid is normal in size without nodules or tenderness    Chest - clear to auscultation, no wheezes, rales or rhonchi, symmetric air entry   Heart - tacycardic  Abdomen - soft, nontender, nondistended, no masses or organomegaly Back exam - full range of motion, no tenderness, palpable spasm or pain on motion   Neurological - upper extremity tremors  Musculoskeletal -slight swelling in the upper extremities bilaterally. Extremities - non pitting swelling noted in the left arm. No erythema or drainage noted from the arm. Skin - stable.          Labs       TSH   Date Value Ref Range Status   09/15/2021 1.900 0.440 - 3.860 uIU/mL Final   05/05/2021 4.420 (H) 0.440 - 3.860 uIU/mL Final     TSH   Date Value Ref Range Status   02/02/2022 1.840 0.440 - 3.860 uIU/mL Final   10/25/2021 1.670 0.440 - 3.860 uIU/mL Final   08/23/2021 2.180 0.440 - 3.860 uIU/mL Final   02/07/2020 2.700 0.440 - 3.860 uIU/mL Final   06/14/2019 2.430 0.440 - 3.860 uIU/mL Final   06/11/2018 3.820 0.270 - 4.200 uIU/mL Final   06/07/2017 3.500 0.270 - 4.200 uIU/mL Final   01/27/2017 3.810 0.270 - 4.200 uIU/mL Final   03/04/2016 1.990 0.270 - 4.200 uIU/mL Final   11/20/2015 1.940 0.270 - 4.200 uIU/mL Final   08/28/2015 1.970 0.270 - 4.200 uIU/mL Final   10/25/2014 3.130 0.270 - 4.200 uIU/mL Final   12/11/2013 3.910 0.270 - 4.200 uIU/mL Final   04/04/2013 1.346 0.550 - 4.780 uIU/mL Final   10/16/2012 1.607 0.550 - 4.780 uIU/mL Final   05/14/2012 1.771 0.550 - 4.780 uIU/mL Final   03/22/2012 4.082 0.550 - 4.780 uIU/mL Final     Lab Results   Component Value Date     10/25/2021    K 4.9 10/25/2021    CL 99 10/25/2021    CO2 19 (L) 10/25/2021    BUN 11 10/25/2021    CREATININE 0.92 (H) 10/25/2021    GLUCOSE 157 (H) 10/25/2021    CALCIUM 9.5 10/25/2021    PROT 6.7 10/25/2021    LABALBU 4.3 10/25/2021    BILITOT 0.4 10/25/2021    ALKPHOS 80 10/25/2021    AST 49 (H) 10/25/2021    ALT 43 (H) 10/25/2021    LABGLOM >60.0 10/25/2021    GFRAA >60.0 10/25/2021    GLOB 2.4 10/25/2021       Lab Results   Component Value Date    WBC 6.7 01/26/2022    HGB 14.9 01/26/2022    HCT 42.2 01/26/2022    MCV 91.2 01/26/2022     01/26/2022     Lab Results   Component Value Date

## 2022-08-05 ENCOUNTER — COMMUNITY OUTREACH (OUTPATIENT)
Dept: FAMILY MEDICINE CLINIC | Age: 62
End: 2022-08-05

## 2022-08-05 ENCOUNTER — TELEPHONE (OUTPATIENT)
Dept: FAMILY MEDICINE CLINIC | Age: 62
End: 2022-08-05

## 2022-08-05 NOTE — TELEPHONE ENCOUNTER
----- Message from [facility] AT [facility] sent at 8/5/2022 11:53 AM EDT -----  Subject: Message to Provider    QUESTIONS  Information for Provider? pt needs to get scheduled with a dermatologist   please contact pt back   ---------------------------------------------------------------------------  --------------  [id number] [employer]  [phone]; OK to leave message on voicemail  ---------------------------------------------------------------------------  --------------  SCRIPT ANSWERS  Relationship to Patient?  Self

## 2022-08-24 ENCOUNTER — HOSPITAL ENCOUNTER (OUTPATIENT)
Dept: WOMENS IMAGING | Age: 62
Discharge: HOME | End: 2022-08-26
Payer: COMMERCIAL

## 2022-08-24 VITALS — BODY MASS INDEX: 41 KG/M2 | HEIGHT: 66 IN

## 2022-08-24 DIAGNOSIS — Z12.31 ENCOUNTER FOR SCREENING MAMMOGRAM FOR MALIGNANT NEOPLASM OF BREAST: ICD-10-CM

## 2022-08-24 PROCEDURE — 77067 SCR MAMMO BI INCL CAD: CPT

## 2022-08-30 DIAGNOSIS — R92.8 ABNORMAL MAMMOGRAM: Primary | ICD-10-CM

## 2022-09-09 ENCOUNTER — HOSPITAL ENCOUNTER (OUTPATIENT)
Dept: WOMENS IMAGING | Age: 62
Discharge: HOME | End: 2022-09-11
Payer: COMMERCIAL

## 2022-09-09 ENCOUNTER — APPOINTMENT (OUTPATIENT)
Dept: ULTRASOUND IMAGING | Age: 62
End: 2022-09-09
Payer: COMMERCIAL

## 2022-09-09 DIAGNOSIS — R92.8 ABNORMAL MAMMOGRAM: ICD-10-CM

## 2022-09-09 PROCEDURE — G0279 TOMOSYNTHESIS, MAMMO: HCPCS

## 2022-11-02 ENCOUNTER — TELEPHONE (OUTPATIENT)
Dept: FAMILY MEDICINE CLINIC | Age: 62
End: 2022-11-02

## 2022-11-04 ENCOUNTER — OFFICE VISIT (OUTPATIENT)
Dept: FAMILY MEDICINE CLINIC | Age: 62
End: 2022-11-04
Payer: COMMERCIAL

## 2022-11-04 VITALS
TEMPERATURE: 97 F | DIASTOLIC BLOOD PRESSURE: 80 MMHG | WEIGHT: 256 LBS | SYSTOLIC BLOOD PRESSURE: 115 MMHG | HEIGHT: 66 IN | BODY MASS INDEX: 41.14 KG/M2 | HEART RATE: 85 BPM | OXYGEN SATURATION: 96 %

## 2022-11-04 DIAGNOSIS — G25.0 ESSENTIAL TREMOR: ICD-10-CM

## 2022-11-04 DIAGNOSIS — L30.9 DERMATITIS: ICD-10-CM

## 2022-11-04 DIAGNOSIS — I48.92 ATRIAL FLUTTER, UNSPECIFIED TYPE (HCC): Primary | ICD-10-CM

## 2022-11-04 DIAGNOSIS — Z23 NEED FOR INFLUENZA VACCINATION: ICD-10-CM

## 2022-11-04 DIAGNOSIS — I26.99 PULMONARY EMBOLISM ON RIGHT (HCC): ICD-10-CM

## 2022-11-04 DIAGNOSIS — E03.9 ACQUIRED HYPOTHYROIDISM: ICD-10-CM

## 2022-11-04 DIAGNOSIS — E78.5 HYPERLIPIDEMIA, UNSPECIFIED HYPERLIPIDEMIA TYPE: ICD-10-CM

## 2022-11-04 DIAGNOSIS — G62.9 NEUROPATHY: ICD-10-CM

## 2022-11-04 DIAGNOSIS — R73.03 PREDIABETES: ICD-10-CM

## 2022-11-04 DIAGNOSIS — Z12.11 COLON CANCER SCREENING: ICD-10-CM

## 2022-11-04 PROCEDURE — G8417 CALC BMI ABV UP PARAM F/U: HCPCS | Performed by: FAMILY MEDICINE

## 2022-11-04 PROCEDURE — 90674 CCIIV4 VAC NO PRSV 0.5 ML IM: CPT | Performed by: FAMILY MEDICINE

## 2022-11-04 PROCEDURE — 3017F COLORECTAL CA SCREEN DOC REV: CPT | Performed by: FAMILY MEDICINE

## 2022-11-04 PROCEDURE — G8482 FLU IMMUNIZE ORDER/ADMIN: HCPCS | Performed by: FAMILY MEDICINE

## 2022-11-04 PROCEDURE — G9899 SCRN MAM PERF RSLTS DOC: HCPCS | Performed by: FAMILY MEDICINE

## 2022-11-04 PROCEDURE — G8427 DOCREV CUR MEDS BY ELIG CLIN: HCPCS | Performed by: FAMILY MEDICINE

## 2022-11-04 PROCEDURE — 90471 IMMUNIZATION ADMIN: CPT | Performed by: FAMILY MEDICINE

## 2022-11-04 PROCEDURE — 99214 OFFICE O/P EST MOD 30 MIN: CPT | Performed by: FAMILY MEDICINE

## 2022-11-04 PROCEDURE — 1036F TOBACCO NON-USER: CPT | Performed by: FAMILY MEDICINE

## 2022-11-04 RX ORDER — GABAPENTIN 300 MG/1
300 CAPSULE ORAL NIGHTLY
Qty: 30 CAPSULE | Refills: 0 | Status: SHIPPED | OUTPATIENT
Start: 2022-11-04 | End: 2022-12-04

## 2022-11-04 RX ORDER — CALAMINE 8% AND ZINC OXIDE 8% 160 MG/ML
LOTION TOPICAL PRN
Qty: 177 ML | Refills: 0 | Status: SHIPPED | OUTPATIENT
Start: 2022-11-04

## 2022-11-04 SDOH — ECONOMIC STABILITY: FOOD INSECURITY: WITHIN THE PAST 12 MONTHS, THE FOOD YOU BOUGHT JUST DIDN'T LAST AND YOU DIDN'T HAVE MONEY TO GET MORE.: NEVER TRUE

## 2022-11-04 SDOH — ECONOMIC STABILITY: FOOD INSECURITY: WITHIN THE PAST 12 MONTHS, YOU WORRIED THAT YOUR FOOD WOULD RUN OUT BEFORE YOU GOT MONEY TO BUY MORE.: NEVER TRUE

## 2022-11-04 ASSESSMENT — SOCIAL DETERMINANTS OF HEALTH (SDOH): HOW HARD IS IT FOR YOU TO PAY FOR THE VERY BASICS LIKE FOOD, HOUSING, MEDICAL CARE, AND HEATING?: NOT HARD AT ALL

## 2022-11-04 NOTE — PROGRESS NOTES
Chief Complaint   Patient presents with    Follow-up     Pt is here for 3 month f/u. Pt states she was bite by a beg bug and the bite is not healing and is extremely itchy. Pt also has a cough that tickles. Flu Vaccine     Pt wants flu vacc. HPI: Alena Olmstead 65 y.o. female presenting for     Rash on the lower extremity   Itchy   Had bed bugs in the area   Has not tried any medication the help with it. F/u   Itchy in nature and picks at the area         Pulmonary embolism / afib  Currently seeing the cardiologist and on blood thinners   Stable   No issues or concerns  admits to dyspnea on exertion when pushing up her grocery cart      F/u   Stable no issues   Has not refilled her eliquis or seen the cardiologist recently (social factors including being homeless prevented her from seeing the cardiologist and taking her medications). Patient is on metoprolol     Vertigo   Takes meclizine   Stable     GERD   Patient is taking pepcid BID   Stable         Hpyothyroidism   Stable. Takes medication. Lab Results   Component Value Date    TSH 1.840 02/02/2022       Mood disorder   Sees psych     Neuropathy   In the feet bilaterally   Was on gabapentin in the past which did help   Also compalins of fine tremors. Current Outpatient Medications   Medication Sig Dispense Refill    meclizine (ANTIVERT) 12.5 MG tablet Take 1 tablet by mouth nightly as needed for Dizziness 30 tablet 5    atorvastatin (LIPITOR) 20 MG tablet Take 1 tablet by mouth in the morning. 30 tablet 5    vitamin D (VITAMIN D HIGH POTENCY) 25 MCG (1000 UT) CAPS TAKE 1 CAPSULE BY MOUTH DAILY WITH SUPPER (PM) 30 capsule 5    Multiple Vitamin (TAB-A-MANDI/BETA CAROTENE) TABS TAKE 1 TABLET BY MOUTH EVERY DAY (AM) 28 tablet 5    metoprolol tartrate (LOPRESSOR) 50 MG tablet Take 1 tablet by mouth in the morning and 1 tablet before bedtime.  60 tablet 5    budesonide-formoterol (SYMBICORT) 160-4.5 MCG/ACT AERO INHALE 2 PUFFS INTO THE LUNGS 2 TIMES DAILY 10.2 g 5    Magnesium 400 MG CAPS Take 1 capsule by mouth nightly as needed (muscle spasms) 30 capsule 3    midodrine (PROAMATINE) 10 MG tablet Take one tablet by mouth twice daily 180 tablet 3    ARIPiprazole (ABILIFY) 15 MG tablet Take 1 tablet by mouth daily 30 tablet 5    levothyroxine (SYNTHROID) 50 MCG tablet Take 1 tablet by mouth Daily 30 tablet 5    famotidine (PEPCID) 20 MG tablet TAKE 1 TABLET BY MOUTH TWICE DAILY (AM,PM) 60 tablet 5    benztropine (COGENTIN) 1 MG tablet Take 1 tablet by mouth daily 30 tablet 5    Cyanocobalamin (B-12) 1000 MCG TABS TAKE 1 TABLET BY MOUTH ONCE A DAY (NOON) 30 tablet 5    Biotin 300 MCG TABS Take 1 tablet by mouth daily 30 tablet 5    calamine-zinc oxide 8-8 % LOTN lotion Apply topically as needed (PRURITIS) 177 mL 3    diclofenac sodium (VOLTAREN) 1 % GEL APPLY 2 G TOPICALLY 4 TIMES DAILY 100 g 2    VENTOLIN  (90 Base) MCG/ACT inhaler USE 2 PUFFS EVERY 4 HRS AS NEEDED FOR WHEEZING/SOB-SPACE OUT TO EVERY 6 HR AS SYMPTOMS IMPROVE 18 g 3    ketoconazole (NIZORAL) 2 % shampoo APPLY TOPICALLY DAILY AS NEEDED. 120 mL 2    albuterol sulfate HFA (VENTOLIN HFA) 108 (90 Base) MCG/ACT inhaler Inhale 2 puffs into the lungs every 6 hours as needed for Wheezing Please cover what is under insurance. 18 g 3    VRAYLAR 1.5 MG capsule Take 1.5 mg by mouth daily      prazosin (MINIPRESS) 1 MG capsule Take 1 mg by mouth daily      metFORMIN (GLUCOPHAGE) 500 MG tablet TAKE 1 TABLET BY MOUTH TWICE A DAY WITH MEALS 60 tablet 5    aspirin (ASPIRIN LOW DOSE) 81 MG EC tablet TAKE 1 TABLET BY MOUTH ONCE A DAY (AM) 30 tablet 3    Blood Pressure Monitoring (B-D ASSURE BPM/AUTO ARM CUFF) MISC Use as directed 1 each 0    apixaban (ELIQUIS) 5 MG TABS tablet Take 1 tablet by mouth 2 times daily START when Eliquis Starter pack completed 60 tablet 3    EPINEPHrine (EPIPEN 2-PRAVEENA) 0.3 MG/0.3ML SOAJ injection Use as directed for allergic reaction 2 each 2    Misc.  Devices (ROLLATOR ULTRA-LIGHT) MISC 1 each by Does not apply route once for 1 dose 1 each 0     No current facility-administered medications for this visit. ROS  CONSTITUTIONAL: The patient denies fevers, chills, sweats and body ache. HEENT: Denies headache, blurry vision, eye pain, tinnitus, vertigo, admits to sore throat, denies neck or thyroid masses. Admits to cracked lips. RESPIRATORY: admits to shortness of breath on exertion (specifically when pushing up the cart)  CARDIAC: Denies chest pain, pressure, palpitations, Denies lower extremity edema. GASTROINTESTINAL: Denies abdominal pain, constipation, diarrhea, bleeding in the stools,   GENITOURINARY: Denies dysuria, hematuria, nocturia or frequency, urinary incontinence. NEUROLOGIC: Denies headaches, admits to dizziness, denies syncope, weakness  MUSCULOSKELETAL: denies changes in range of motion, joint pain, stiffness. ENDOCRINOLOGY: Denies heat or cold intolerance. HEMATOLOGY: Denies easy bleeding or blood transfusion,anemia  DERMATOLOGY: negative. PSYCHIATRY: Denies depression, agitation or anxiety.     Past Medical History:   Diagnosis Date    Asthma 2015    Bilateral renal cysts 2013    Depression     since age 35, was with Dr Koko Wang, now the [facility]    Diverticulosis of sigmoid colon 2012    Dr Galileo Del Toro    DJD of left shoulder     Environmental allergies     Fatty liver 2013    GERD (gastroesophageal reflux disease)     History of cardiac arrhythmia     \"due to stress, was placed on medication\"    Hydatidiform mole     age 26     Hyperlipidemia     Hypothyroidism 2011    IFG (impaired fasting glucose) 2013    Melanoma of eye [facility])     age 30, R eye prosthesis, Dr Herberth Monroe    Obesity     Prediabetes     PTSD (post-traumatic stress disorder)     age 35, [address] [facility]    S/P colonoscopy 04/19/2012    Dr. Flores Caballero    S/P hysterectomy with oophorectomy 2012    Dr Carmelina Conde    Tremor     Dr Alejandro Evans    Vitamin D deficiency         Past Surgical History:   Procedure Laterality Date    DILATION AND CURETTAGE OF UTERUS      ENDOMETRIAL ABLATION  06/2012    EYE REMOVAL      OD for melanoma, age 30    FOOT OSTEOTOMY Left 09/23/2016    B JACKMAN DPM      HYSTERECTOMY (CERVIX STATUS UNKNOWN)      BALDOMERO STEROTACTIC LOC BREAST BIOPSY RIGHT Right 07/19/2021    BALDOMERO STEROTACTIC LOC BREAST BIOPSY RIGHT 7/19/2021 [facility]    LINDSAY AND BSO (CERVIX REMOVED)  2012    Dr Emil Brooks        Family History   Problem Relation Age of Onset    Heart Failure Mother         dec 76    Diabetes Mother     Diabetes Father     Stroke Father         dec age 80    Cancer Father         Sarcoma    Stomach Cancer Other     Breast Cancer Paternal Aunt     Breast Cancer Maternal Aunt         Social History     Socioeconomic History    Marital status:      Spouse name: Not on file    Number of children: 3    Years of education: 12    Highest education level: High school graduate   Occupational History    Occupation: SSI   Tobacco Use    Smoking status: Never    Smokeless tobacco: Never   Vaping Use    Vaping Use: Never used   Substance and Sexual Activity    Alcohol use: No    Drug use: No    Sexual activity: Not Currently     Birth control/protection: Surgical   Other Topics Concern    Not on file   Social History Narrative    Born in [place], one of 4 children, one sister disappeared at age 29, never found    Moved to [place] at age 40        Lives in an apartment in [place], alone    , 3 children, all grown, in [place]     2 granddaughters     Hobbies exercising, birds    Attends [employer], has volunteered at [email or web address]     Social Determinants of Health     Financial Resource Strain: Low Risk     Difficulty of Paying Living Expenses: Not hard at all   Food Insecurity: No Food Insecurity    Worried About [address] in the Last Year: Never true    [address] in the Last Year: Never true   Transportation Needs: Not on file   Physical Activity: Not on file   Stress: Not on file Social Connections: Not on file   Intimate Partner Violence: Not on file   Housing Stability: Not on file        /80   Pulse 85   Temp 97 °F (36.1 °C) (Temporal)   Ht 5' 6\" (1.676 m)   Wt 256 lb (116.1 kg)   SpO2 96%   BMI 41.32 kg/m²        Physical Exam:    General appearance - alert, well appearing, and in no distress, obese  Mental Status - alert, oriented to person, place, and time  Eyes - pupils equal and reactive, extraocular eye movements intact   Ears - bilateral TM's and external ear canals normal   Nose - normal and patent, no erythema, discharge or polyps   Sinuses - Normal paranasal sinuses without tenderness   Throat - mucous membranes moist, pharynx normal without lesions   Neck - supple, no significant adenopathy   Thyroid - thyroid is normal in size without nodules or tenderness    Chest - clear to auscultation, no wheezes, rales or rhonchi, symmetric air entry   Heart - tacycardic  Abdomen - soft, nontender, nondistended, no masses or organomegaly   Back exam - full range of motion, no tenderness, palpable spasm or pain on motion   Neurological - upper extremity tremors  Musculoskeletal -slight swelling in the upper extremities bilaterally. Extremities - non pitting swelling noted in the left arm. No erythema or drainage noted from the arm. Skin - stable.          Labs       TSH   Date Value Ref Range Status   09/15/2021 1.900 0.440 - 3.860 uIU/mL Final   05/05/2021 4.420 (H) 0.440 - 3.860 uIU/mL Final     TSH   Date Value Ref Range Status   02/02/2022 1.840 0.440 - 3.860 uIU/mL Final   10/25/2021 1.670 0.440 - 3.860 uIU/mL Final   08/23/2021 2.180 0.440 - 3.860 uIU/mL Final   02/07/2020 2.700 0.440 - 3.860 uIU/mL Final   06/14/2019 2.430 0.440 - 3.860 uIU/mL Final   06/11/2018 3.820 0.270 - 4.200 uIU/mL Final   06/07/2017 3.500 0.270 - 4.200 uIU/mL Final   01/27/2017 3.810 0.270 - 4.200 uIU/mL Final   03/04/2016 1.990 0.270 - 4.200 uIU/mL Final   11/20/2015 1.940 0.270 - 4.200 uIU/mL Final   08/28/2015 1.970 0.270 - 4.200 uIU/mL Final   10/25/2014 3.130 0.270 - 4.200 uIU/mL Final   12/11/2013 3.910 0.270 - 4.200 uIU/mL Final   04/04/2013 1.346 0.550 - 4.780 uIU/mL Final   10/16/2012 1.607 0.550 - 4.780 uIU/mL Final   05/14/2012 1.771 0.550 - 4.780 uIU/mL Final   03/22/2012 4.082 0.550 - 4.780 uIU/mL Final     Lab Results   Component Value Date     10/25/2021    K 4.9 10/25/2021    CL 99 10/25/2021    CO2 19 (L) 10/25/2021    BUN 11 10/25/2021    CREATININE 0.92 (H) 10/25/2021    GLUCOSE 157 (H) 10/25/2021    CALCIUM 9.5 10/25/2021    PROT 6.7 10/25/2021    LABALBU 4.3 10/25/2021    BILITOT 0.4 10/25/2021    ALKPHOS 80 10/25/2021    AST 49 (H) 10/25/2021    ALT 43 (H) 10/25/2021    LABGLOM >60.0 10/25/2021    GFRAA >60.0 10/25/2021    GLOB 2.4 10/25/2021       Lab Results   Component Value Date    WBC 6.7 01/26/2022    HGB 14.9 01/26/2022    HCT 42.2 01/26/2022    MCV 91.2 01/26/2022     01/26/2022     Lab Results   Component Value Date    LABA1C 6.0 08/04/2022     No results found for: EAG      A/P: Martinez Stamp 65 y.o. female presenting for     1. Atrial flutter, unspecified type ([facility] [address]    - apixaban (ELIQUIS) 5 MG TABS tablet; Take 1 tablet by mouth 2 times daily START when Eliquis Starter pack completed  Dispense: 60 tablet; Refill: 3  - [place] Shyanne SOARES, Cardiology, [place]    2. Pulmonary embolism on right (HCC)    - apixaban (ELIQUIS) 5 MG TABS tablet; Take 1 tablet by mouth 2 times daily START when Eliquis Starter pack completed  Dispense: 60 tablet; Refill: 3  - Adam Kimble MD, Cardiology, [place]    3. Essential tremor    - gabapentin (NEURONTIN) 300 MG capsule; Take 1 capsule by mouth at bedtime for 30 days. Dispense: 30 capsule; Refill: 0    4. Hyperlipidemia, unspecified hyperlipidemia type    - Lipid, Fasting; Future    5. Prediabetes    - Hemoglobin A1C; Future    6. Acquired hypothyroidism    - TSH; Future    7.  Neuropathy    - gabapentin (NEURONTIN) 300 MG capsule; Take 1 capsule by mouth at bedtime for 30 days. Dispense: 30 capsule; Refill: 0    8. Colon cancer screening    - [facility]    9. Dermatitis    - calamine-zinc oxide 8-8 % LOTN lotion; Apply topically as needed (pruritis.)  Dispense: 177 mL; Refill: 0    10. Need for influenza vaccination    - Influenza, FLUCELVAX, (age 10 mo+), IM, Preservative Free, 0.5 mL            Please note, this report has been partially produced using speech recognition software  and may cause  and /or contain errors related to that system including grammar, punctuation and spelling as well as words and phrases that may seem inappropriate. If there are questions or concerns please feel free to contact me to clarify.

## 2022-11-04 NOTE — PROGRESS NOTES
Vaccine Information Sheet, \"Influenza - Inactivated\"  given to Yair Perez, or parent/legal guardian of  Yair Perez and verbalized understanding. Patient responses:    Have you ever had a reaction to a flu vaccine? No  Are you able to eat eggs without adverse effects? Yes  Do you have any current illness? No  Have you ever had Guillian Dysart Syndrome? No    Flu vaccine given per order. Please see immunization tab.

## 2022-12-03 ENCOUNTER — ANESTHESIA (OUTPATIENT)
Dept: ENDOSCOPY | Age: 62
End: 2022-12-03
Payer: COMMERCIAL

## 2022-12-03 ENCOUNTER — HOSPITAL ENCOUNTER (EMERGENCY)
Age: 62
Discharge: STILL ADMITTED OR ADMINISTRATIVE | DRG: 190 | End: 2022-12-03
Payer: COMMERCIAL

## 2022-12-03 ENCOUNTER — APPOINTMENT (OUTPATIENT)
Dept: CT IMAGING | Age: 62
DRG: 190 | End: 2022-12-03
Payer: COMMERCIAL

## 2022-12-03 ENCOUNTER — HOSPITAL ENCOUNTER (INPATIENT)
Age: 62
LOS: 4 days | Discharge: HOME | DRG: 190 | End: 2022-12-07
Attending: EMERGENCY MEDICINE
Payer: COMMERCIAL

## 2022-12-03 ENCOUNTER — ANESTHESIA EVENT (OUTPATIENT)
Dept: ENDOSCOPY | Age: 62
End: 2022-12-03
Payer: COMMERCIAL

## 2022-12-03 ENCOUNTER — APPOINTMENT (OUTPATIENT)
Dept: GENERAL RADIOLOGY | Age: 62
DRG: 190 | End: 2022-12-03
Payer: COMMERCIAL

## 2022-12-03 VITALS
TEMPERATURE: 98.8 F | WEIGHT: 256 LBS | DIASTOLIC BLOOD PRESSURE: 78 MMHG | OXYGEN SATURATION: 90 % | RESPIRATION RATE: 18 BRPM | HEIGHT: 66 IN | SYSTOLIC BLOOD PRESSURE: 164 MMHG | BODY MASS INDEX: 41.14 KG/M2 | HEART RATE: 110 BPM

## 2022-12-03 DIAGNOSIS — R13.19 OTHER DYSPHAGIA: Primary | ICD-10-CM

## 2022-12-03 DIAGNOSIS — R07.9 CHEST PAIN, UNSPECIFIED TYPE: Primary | ICD-10-CM

## 2022-12-03 DIAGNOSIS — T18.108A FOREIGN BODY IN ESOPHAGUS, INITIAL ENCOUNTER: Primary | ICD-10-CM

## 2022-12-03 DIAGNOSIS — E87.20 LACTIC ACIDOSIS: ICD-10-CM

## 2022-12-03 DIAGNOSIS — I21.4 NSTEMI (NON-ST ELEVATED MYOCARDIAL INFARCTION) (HCC): ICD-10-CM

## 2022-12-03 LAB
ALBUMIN SERPL-MCNC: 4 G/DL (ref 3.5–4.6)
ALBUMIN SERPL-MCNC: 4.3 G/DL (ref 3.5–4.6)
ALP BLD-CCNC: 86 U/L (ref 40–130)
ALP BLD-CCNC: 88 U/L (ref 40–130)
ALT SERPL-CCNC: 33 U/L (ref 0–33)
ALT SERPL-CCNC: 36 U/L (ref 0–33)
ANION GAP SERPL CALCULATED.3IONS-SCNC: 17 MEQ/L (ref 9–15)
ANION GAP SERPL CALCULATED.3IONS-SCNC: 18 MEQ/L (ref 9–15)
AST SERPL-CCNC: 28 U/L (ref 0–35)
AST SERPL-CCNC: 32 U/L (ref 0–35)
BASOPHILS ABSOLUTE: 0 K/UL (ref 0–0.2)
BASOPHILS ABSOLUTE: 0.1 K/UL (ref 0–0.2)
BASOPHILS RELATIVE PERCENT: 0.1 %
BASOPHILS RELATIVE PERCENT: 0.6 %
BILIRUB SERPL-MCNC: 0.4 MG/DL (ref 0.2–0.7)
BILIRUB SERPL-MCNC: 0.5 MG/DL (ref 0.2–0.7)
BUN BLDV-MCNC: 13 MG/DL (ref 8–23)
BUN BLDV-MCNC: 13 MG/DL (ref 8–23)
CALCIUM SERPL-MCNC: 9.1 MG/DL (ref 8.5–9.9)
CALCIUM SERPL-MCNC: 9.3 MG/DL (ref 8.5–9.9)
CHLORIDE BLD-SCNC: 103 MEQ/L (ref 95–107)
CHLORIDE BLD-SCNC: 105 MEQ/L (ref 95–107)
CO2: 17 MEQ/L (ref 20–31)
CO2: 20 MEQ/L (ref 20–31)
CREAT SERPL-MCNC: 0.79 MG/DL (ref 0.5–0.9)
CREAT SERPL-MCNC: 0.84 MG/DL (ref 0.5–0.9)
EOSINOPHILS ABSOLUTE: 0 K/UL (ref 0–0.7)
EOSINOPHILS ABSOLUTE: 0 K/UL (ref 0–0.7)
EOSINOPHILS RELATIVE PERCENT: 0 %
EOSINOPHILS RELATIVE PERCENT: 0.3 %
GFR SERPL CREATININE-BSD FRML MDRD: >60 ML/MIN/{1.73_M2}
GLOBULIN: 2.5 G/DL (ref 2.3–3.5)
GLOBULIN: 2.7 G/DL (ref 2.3–3.5)
GLUCOSE BLD-MCNC: 144 MG/DL (ref 70–99)
GLUCOSE BLD-MCNC: 150 MG/DL (ref 70–99)
GLUCOSE BLD-MCNC: 182 MG/DL (ref 70–99)
GLUCOSE BLD-MCNC: 187 MG/DL (ref 70–99)
HCT VFR BLD CALC: 40.7 % (ref 37–47)
HCT VFR BLD CALC: 45.3 % (ref 37–47)
HEMOGLOBIN: 13.4 G/DL (ref 12–16)
HEMOGLOBIN: 15 G/DL (ref 12–16)
LACTIC ACID: 2.3 MMOL/L (ref 0.5–2.2)
LACTIC ACID: 2.5 MMOL/L (ref 0.5–2.2)
LACTIC ACID: 4 MMOL/L (ref 0.5–2.2)
LACTIC ACID: 4.6 MMOL/L (ref 0.5–2.2)
LYMPHOCYTES ABSOLUTE: 0.8 K/UL (ref 1–4.8)
LYMPHOCYTES ABSOLUTE: 1.8 K/UL (ref 1–4.8)
LYMPHOCYTES RELATIVE PERCENT: 13.6 %
LYMPHOCYTES RELATIVE PERCENT: 6.5 %
MCH RBC QN AUTO: 30.8 PG (ref 27–31.3)
MCH RBC QN AUTO: 31 PG (ref 27–31.3)
MCHC RBC AUTO-ENTMCNC: 32.9 % (ref 33–37)
MCHC RBC AUTO-ENTMCNC: 33.1 % (ref 33–37)
MCV RBC AUTO: 93 FL (ref 79.4–94.8)
MCV RBC AUTO: 94.3 FL (ref 79.4–94.8)
MONOCYTES ABSOLUTE: 0.3 K/UL (ref 0.2–0.8)
MONOCYTES ABSOLUTE: 0.5 K/UL (ref 0.2–0.8)
MONOCYTES RELATIVE PERCENT: 2.1 %
MONOCYTES RELATIVE PERCENT: 4 %
NEUTROPHILS ABSOLUTE: 11.1 K/UL (ref 1.4–6.5)
NEUTROPHILS ABSOLUTE: 11.3 K/UL (ref 1.4–6.5)
NEUTROPHILS RELATIVE PERCENT: 81.5 %
NEUTROPHILS RELATIVE PERCENT: 91.3 %
PDW BLD-RTO: 13.6 % (ref 11.5–14.5)
PDW BLD-RTO: 13.7 % (ref 11.5–14.5)
PERFORMED ON: ABNORMAL
PERFORMED ON: ABNORMAL
PERFORMED ON: NORMAL
PLATELET # BLD: 283 K/UL (ref 130–400)
PLATELET # BLD: 292 K/UL (ref 130–400)
POC CREATININE WHOLE BLOOD: 0.7
POC CREATININE: 0.7 MG/DL (ref 0.6–1.2)
POC SAMPLE TYPE: NORMAL
POTASSIUM SERPL-SCNC: 3.7 MEQ/L (ref 3.4–4.9)
POTASSIUM SERPL-SCNC: 4 MEQ/L (ref 3.4–4.9)
PROCALCITONIN: 0.11 NG/ML (ref 0–0.15)
RBC # BLD: 4.31 M/UL (ref 4.2–5.4)
RBC # BLD: 4.87 M/UL (ref 4.2–5.4)
SODIUM BLD-SCNC: 139 MEQ/L (ref 135–144)
SODIUM BLD-SCNC: 141 MEQ/L (ref 135–144)
TOTAL PROTEIN: 6.5 G/DL (ref 6.3–8)
TOTAL PROTEIN: 7 G/DL (ref 6.3–8)
TROPONIN: 0.1 NG/ML (ref 0–0.01)
TROPONIN: 0.2 NG/ML (ref 0–0.01)
TROPONIN: 0.27 NG/ML (ref 0–0.01)
WBC # BLD: 12.4 K/UL (ref 4.8–10.8)
WBC # BLD: 13.6 K/UL (ref 4.8–10.8)

## 2022-12-03 PROCEDURE — 6360000002 HC RX W HCPCS: Performed by: INTERNAL MEDICINE

## 2022-12-03 PROCEDURE — 2709999900 HC NON-CHARGEABLE SUPPLY: Performed by: INTERNAL MEDICINE

## 2022-12-03 PROCEDURE — 6370000000 HC RX 637 (ALT 250 FOR IP): Performed by: EMERGENCY MEDICINE

## 2022-12-03 PROCEDURE — 96361 HYDRATE IV INFUSION ADD-ON: CPT

## 2022-12-03 PROCEDURE — 2580000003 HC RX 258: Performed by: INTERNAL MEDICINE

## 2022-12-03 PROCEDURE — 99285 EMERGENCY DEPT VISIT HI MDM: CPT

## 2022-12-03 PROCEDURE — 7100000011 HC PHASE II RECOVERY - ADDTL 15 MIN: Performed by: INTERNAL MEDICINE

## 2022-12-03 PROCEDURE — 6360000002 HC RX W HCPCS

## 2022-12-03 PROCEDURE — 2580000003 HC RX 258

## 2022-12-03 PROCEDURE — 3700000000 HC ANESTHESIA ATTENDED CARE: Performed by: INTERNAL MEDICINE

## 2022-12-03 PROCEDURE — 84484 ASSAY OF TROPONIN QUANT: CPT

## 2022-12-03 PROCEDURE — 43235 EGD DIAGNOSTIC BRUSH WASH: CPT | Performed by: INTERNAL MEDICINE

## 2022-12-03 PROCEDURE — 2060000000 HC ICU INTERMEDIATE R&B

## 2022-12-03 PROCEDURE — 0DJ08ZZ INSPECTION OF UPPER INTESTINAL TRACT, VIA NATURAL OR ARTIFICIAL OPENING ENDOSCOPIC: ICD-10-PCS | Performed by: INTERNAL MEDICINE

## 2022-12-03 PROCEDURE — 80053 COMPREHEN METABOLIC PANEL: CPT

## 2022-12-03 PROCEDURE — 2580000003 HC RX 258: Performed by: EMERGENCY MEDICINE

## 2022-12-03 PROCEDURE — 36415 COLL VENOUS BLD VENIPUNCTURE: CPT

## 2022-12-03 PROCEDURE — 96374 THER/PROPH/DIAG INJ IV PUSH: CPT

## 2022-12-03 PROCEDURE — 3609017100 HC EGD: Performed by: INTERNAL MEDICINE

## 2022-12-03 PROCEDURE — 93005 ELECTROCARDIOGRAM TRACING: CPT | Performed by: EMERGENCY MEDICINE

## 2022-12-03 PROCEDURE — 7100000010 HC PHASE II RECOVERY - FIRST 15 MIN: Performed by: INTERNAL MEDICINE

## 2022-12-03 PROCEDURE — 2500000003 HC RX 250 WO HCPCS

## 2022-12-03 PROCEDURE — 71045 X-RAY EXAM CHEST 1 VIEW: CPT

## 2022-12-03 PROCEDURE — 6370000000 HC RX 637 (ALT 250 FOR IP): Performed by: INTERNAL MEDICINE

## 2022-12-03 PROCEDURE — 6360000002 HC RX W HCPCS: Performed by: ANESTHESIOLOGY

## 2022-12-03 PROCEDURE — 83605 ASSAY OF LACTIC ACID: CPT

## 2022-12-03 PROCEDURE — 99223 1ST HOSP IP/OBS HIGH 75: CPT | Performed by: INTERNAL MEDICINE

## 2022-12-03 PROCEDURE — 6360000004 HC RX CONTRAST MEDICATION: Performed by: EMERGENCY MEDICINE

## 2022-12-03 PROCEDURE — 70491 CT SOFT TISSUE NECK W/DYE: CPT

## 2022-12-03 PROCEDURE — 6360000004 HC RX CONTRAST MEDICATION

## 2022-12-03 PROCEDURE — 71275 CT ANGIOGRAPHY CHEST: CPT

## 2022-12-03 PROCEDURE — 85025 COMPLETE CBC W/AUTO DIFF WBC: CPT

## 2022-12-03 PROCEDURE — 96375 TX/PRO/DX INJ NEW DRUG ADDON: CPT

## 2022-12-03 PROCEDURE — 93005 ELECTROCARDIOGRAM TRACING: CPT | Performed by: INTERNAL MEDICINE

## 2022-12-03 PROCEDURE — 6360000002 HC RX W HCPCS: Performed by: PHYSICIAN ASSISTANT

## 2022-12-03 PROCEDURE — 96376 TX/PRO/DX INJ SAME DRUG ADON: CPT

## 2022-12-03 PROCEDURE — 6360000002 HC RX W HCPCS: Performed by: EMERGENCY MEDICINE

## 2022-12-03 RX ORDER — ASPIRIN 81 MG/1
81 TABLET, CHEWABLE ORAL DAILY
Status: DISCONTINUED | OUTPATIENT
Start: 2022-12-04 | End: 2022-12-07 | Stop reason: HOSPADM

## 2022-12-03 RX ORDER — SODIUM CHLORIDE 0.9 % (FLUSH) 0.9 %
5-40 SYRINGE (ML) INJECTION EVERY 12 HOURS SCHEDULED
Status: DISCONTINUED | OUTPATIENT
Start: 2022-12-03 | End: 2022-12-03 | Stop reason: HOSPADM

## 2022-12-03 RX ORDER — ONDANSETRON 2 MG/ML
4 INJECTION INTRAMUSCULAR; INTRAVENOUS
Status: DISCONTINUED | OUTPATIENT
Start: 2022-12-03 | End: 2022-12-03 | Stop reason: HOSPADM

## 2022-12-03 RX ORDER — SODIUM CHLORIDE 9 MG/ML
25 INJECTION, SOLUTION INTRAVENOUS PRN
Status: DISCONTINUED | OUTPATIENT
Start: 2022-12-03 | End: 2022-12-03 | Stop reason: HOSPADM

## 2022-12-03 RX ORDER — FUROSEMIDE 10 MG/ML
40 INJECTION INTRAMUSCULAR; INTRAVENOUS ONCE
Status: COMPLETED | OUTPATIENT
Start: 2022-12-03 | End: 2022-12-03

## 2022-12-03 RX ORDER — SODIUM CHLORIDE 9 MG/ML
INJECTION, SOLUTION INTRAVENOUS PRN
Status: DISCONTINUED | OUTPATIENT
Start: 2022-12-03 | End: 2022-12-07 | Stop reason: HOSPADM

## 2022-12-03 RX ORDER — INSULIN LISPRO 100 [IU]/ML
0-4 INJECTION, SOLUTION INTRAVENOUS; SUBCUTANEOUS
Status: DISCONTINUED | OUTPATIENT
Start: 2022-12-03 | End: 2022-12-07 | Stop reason: HOSPADM

## 2022-12-03 RX ORDER — PROPOFOL 10 MG/ML
INJECTION, EMULSION INTRAVENOUS PRN
Status: DISCONTINUED | OUTPATIENT
Start: 2022-12-03 | End: 2022-12-03 | Stop reason: SDUPTHER

## 2022-12-03 RX ORDER — ENOXAPARIN SODIUM 100 MG/ML
40 INJECTION SUBCUTANEOUS DAILY
Status: CANCELLED | OUTPATIENT
Start: 2022-12-03

## 2022-12-03 RX ORDER — MEPERIDINE HYDROCHLORIDE 25 MG/ML
12.5 INJECTION INTRAMUSCULAR; INTRAVENOUS; SUBCUTANEOUS
Status: DISCONTINUED | OUTPATIENT
Start: 2022-12-03 | End: 2022-12-03 | Stop reason: HOSPADM

## 2022-12-03 RX ORDER — ONDANSETRON 2 MG/ML
4 INJECTION INTRAMUSCULAR; INTRAVENOUS ONCE
Status: COMPLETED | OUTPATIENT
Start: 2022-12-03 | End: 2022-12-03

## 2022-12-03 RX ORDER — SODIUM CHLORIDE 9 MG/ML
INJECTION, SOLUTION INTRAVENOUS
Status: COMPLETED
Start: 2022-12-03 | End: 2022-12-03

## 2022-12-03 RX ORDER — MAGNESIUM SULFATE IN WATER 40 MG/ML
2000 INJECTION, SOLUTION INTRAVENOUS PRN
Status: DISCONTINUED | OUTPATIENT
Start: 2022-12-03 | End: 2022-12-07 | Stop reason: HOSPADM

## 2022-12-03 RX ORDER — NITROGLYCERIN 0.4 MG/1
0.4 TABLET SUBLINGUAL EVERY 5 MIN PRN
Status: DISCONTINUED | OUTPATIENT
Start: 2022-12-03 | End: 2022-12-07 | Stop reason: HOSPADM

## 2022-12-03 RX ORDER — FENTANYL CITRATE 50 UG/ML
25 INJECTION, SOLUTION INTRAMUSCULAR; INTRAVENOUS ONCE
Status: COMPLETED | OUTPATIENT
Start: 2022-12-03 | End: 2022-12-03

## 2022-12-03 RX ORDER — SODIUM CHLORIDE 9 MG/ML
INJECTION, SOLUTION INTRAVENOUS CONTINUOUS
Status: DISCONTINUED | OUTPATIENT
Start: 2022-12-03 | End: 2022-12-03 | Stop reason: HOSPADM

## 2022-12-03 RX ORDER — ONDANSETRON 2 MG/ML
INJECTION INTRAMUSCULAR; INTRAVENOUS
Status: COMPLETED
Start: 2022-12-03 | End: 2022-12-03

## 2022-12-03 RX ORDER — METOCLOPRAMIDE HYDROCHLORIDE 5 MG/ML
10 INJECTION INTRAMUSCULAR; INTRAVENOUS
Status: DISCONTINUED | OUTPATIENT
Start: 2022-12-03 | End: 2022-12-03 | Stop reason: HOSPADM

## 2022-12-03 RX ORDER — KETOROLAC TROMETHAMINE 15 MG/ML
INJECTION, SOLUTION INTRAMUSCULAR; INTRAVENOUS
Status: COMPLETED
Start: 2022-12-03 | End: 2022-12-03

## 2022-12-03 RX ORDER — MORPHINE SULFATE 2 MG/ML
2 INJECTION, SOLUTION INTRAMUSCULAR; INTRAVENOUS EVERY 4 HOURS PRN
Status: DISCONTINUED | OUTPATIENT
Start: 2022-12-03 | End: 2022-12-03

## 2022-12-03 RX ORDER — METOPROLOL SUCCINATE 25 MG/1
25 TABLET, EXTENDED RELEASE ORAL ONCE
Status: COMPLETED | OUTPATIENT
Start: 2022-12-03 | End: 2022-12-03

## 2022-12-03 RX ORDER — INSULIN LISPRO 100 [IU]/ML
0-4 INJECTION, SOLUTION INTRAVENOUS; SUBCUTANEOUS NIGHTLY
Status: DISCONTINUED | OUTPATIENT
Start: 2022-12-03 | End: 2022-12-07 | Stop reason: HOSPADM

## 2022-12-03 RX ORDER — FENTANYL CITRATE 50 UG/ML
50 INJECTION, SOLUTION INTRAMUSCULAR; INTRAVENOUS EVERY 10 MIN PRN
Status: DISCONTINUED | OUTPATIENT
Start: 2022-12-03 | End: 2022-12-03 | Stop reason: HOSPADM

## 2022-12-03 RX ORDER — POLYETHYLENE GLYCOL 3350 17 G/17G
17 POWDER, FOR SOLUTION ORAL DAILY PRN
Status: DISCONTINUED | OUTPATIENT
Start: 2022-12-03 | End: 2022-12-07 | Stop reason: HOSPADM

## 2022-12-03 RX ORDER — ASPIRIN 81 MG/1
81 TABLET, CHEWABLE ORAL ONCE
Status: COMPLETED | OUTPATIENT
Start: 2022-12-03 | End: 2022-12-03

## 2022-12-03 RX ORDER — 0.9 % SODIUM CHLORIDE 0.9 %
1000 INTRAVENOUS SOLUTION INTRAVENOUS ONCE
Status: COMPLETED | OUTPATIENT
Start: 2022-12-03 | End: 2022-12-03

## 2022-12-03 RX ORDER — PANTOPRAZOLE SODIUM 40 MG/1
40 TABLET, DELAYED RELEASE ORAL DAILY
Qty: 90 TABLET | Refills: 0 | Status: ON HOLD | OUTPATIENT
Start: 2022-12-03 | End: 2023-03-03

## 2022-12-03 RX ORDER — POTASSIUM CHLORIDE 20 MEQ/1
40 TABLET, EXTENDED RELEASE ORAL PRN
Status: DISCONTINUED | OUTPATIENT
Start: 2022-12-03 | End: 2022-12-07 | Stop reason: HOSPADM

## 2022-12-03 RX ORDER — MORPHINE SULFATE 4 MG/ML
4 INJECTION, SOLUTION INTRAMUSCULAR; INTRAVENOUS
Status: COMPLETED | OUTPATIENT
Start: 2022-12-03 | End: 2022-12-03

## 2022-12-03 RX ORDER — SODIUM CHLORIDE 0.9 % (FLUSH) 0.9 %
5-40 SYRINGE (ML) INJECTION EVERY 12 HOURS SCHEDULED
Status: DISCONTINUED | OUTPATIENT
Start: 2022-12-03 | End: 2022-12-07 | Stop reason: HOSPADM

## 2022-12-03 RX ORDER — CLOPIDOGREL BISULFATE 75 MG/1
300 TABLET ORAL ONCE
Status: COMPLETED | OUTPATIENT
Start: 2022-12-03 | End: 2022-12-03

## 2022-12-03 RX ORDER — FAMOTIDINE 10 MG/ML
INJECTION, SOLUTION INTRAVENOUS
Status: COMPLETED
Start: 2022-12-03 | End: 2022-12-03

## 2022-12-03 RX ORDER — MORPHINE SULFATE 2 MG/ML
2 INJECTION, SOLUTION INTRAMUSCULAR; INTRAVENOUS ONCE
Status: COMPLETED | OUTPATIENT
Start: 2022-12-03 | End: 2022-12-03

## 2022-12-03 RX ORDER — MORPHINE SULFATE 2 MG/ML
1 INJECTION, SOLUTION INTRAMUSCULAR; INTRAVENOUS
Status: DISCONTINUED | OUTPATIENT
Start: 2022-12-03 | End: 2022-12-07 | Stop reason: HOSPADM

## 2022-12-03 RX ORDER — ENOXAPARIN SODIUM 150 MG/ML
1 INJECTION SUBCUTANEOUS 2 TIMES DAILY
Status: DISCONTINUED | OUTPATIENT
Start: 2022-12-03 | End: 2022-12-06

## 2022-12-03 RX ORDER — METOPROLOL SUCCINATE 25 MG/1
25 TABLET, EXTENDED RELEASE ORAL DAILY
Status: DISCONTINUED | OUTPATIENT
Start: 2022-12-03 | End: 2022-12-04

## 2022-12-03 RX ORDER — DEXAMETHASONE SODIUM PHOSPHATE 10 MG/ML
INJECTION INTRAMUSCULAR; INTRAVENOUS
Status: COMPLETED
Start: 2022-12-03 | End: 2022-12-03

## 2022-12-03 RX ORDER — OXYCODONE HYDROCHLORIDE 5 MG/1
10 TABLET ORAL PRN
Status: DISCONTINUED | OUTPATIENT
Start: 2022-12-03 | End: 2022-12-03 | Stop reason: HOSPADM

## 2022-12-03 RX ORDER — HYDRALAZINE HYDROCHLORIDE 20 MG/ML
10 INJECTION INTRAMUSCULAR; INTRAVENOUS ONCE
Status: COMPLETED | OUTPATIENT
Start: 2022-12-03 | End: 2022-12-03

## 2022-12-03 RX ORDER — OXYCODONE HYDROCHLORIDE 5 MG/1
5 TABLET ORAL PRN
Status: DISCONTINUED | OUTPATIENT
Start: 2022-12-03 | End: 2022-12-03 | Stop reason: HOSPADM

## 2022-12-03 RX ORDER — SIMETHICONE 20 MG/.3ML
EMULSION ORAL PRN
Status: DISCONTINUED | OUTPATIENT
Start: 2022-12-03 | End: 2022-12-03 | Stop reason: ALTCHOICE

## 2022-12-03 RX ORDER — DIPHENHYDRAMINE HYDROCHLORIDE 50 MG/ML
12.5 INJECTION INTRAMUSCULAR; INTRAVENOUS
Status: DISCONTINUED | OUTPATIENT
Start: 2022-12-03 | End: 2022-12-03 | Stop reason: HOSPADM

## 2022-12-03 RX ORDER — ACETAMINOPHEN 650 MG/1
650 SUPPOSITORY RECTAL EVERY 6 HOURS PRN
Status: DISCONTINUED | OUTPATIENT
Start: 2022-12-03 | End: 2022-12-07 | Stop reason: HOSPADM

## 2022-12-03 RX ORDER — ONDANSETRON 2 MG/ML
4 INJECTION INTRAMUSCULAR; INTRAVENOUS EVERY 6 HOURS PRN
Status: DISCONTINUED | OUTPATIENT
Start: 2022-12-03 | End: 2022-12-07 | Stop reason: HOSPADM

## 2022-12-03 RX ORDER — NITROGLYCERIN 0.4 MG/1
0.4 TABLET SUBLINGUAL
Status: COMPLETED | OUTPATIENT
Start: 2022-12-03 | End: 2022-12-03

## 2022-12-03 RX ORDER — ATORVASTATIN CALCIUM 40 MG/1
40 TABLET, FILM COATED ORAL NIGHTLY
Status: DISCONTINUED | OUTPATIENT
Start: 2022-12-03 | End: 2022-12-07 | Stop reason: HOSPADM

## 2022-12-03 RX ORDER — MAGNESIUM HYDROXIDE 1200 MG/15ML
LIQUID ORAL PRN
Status: DISCONTINUED | OUTPATIENT
Start: 2022-12-03 | End: 2022-12-03 | Stop reason: ALTCHOICE

## 2022-12-03 RX ORDER — PANTOPRAZOLE SODIUM 40 MG/1
40 TABLET, DELAYED RELEASE ORAL
Status: DISCONTINUED | OUTPATIENT
Start: 2022-12-04 | End: 2022-12-07 | Stop reason: HOSPADM

## 2022-12-03 RX ORDER — POTASSIUM CHLORIDE 7.45 MG/ML
10 INJECTION INTRAVENOUS PRN
Status: DISCONTINUED | OUTPATIENT
Start: 2022-12-03 | End: 2022-12-07 | Stop reason: HOSPADM

## 2022-12-03 RX ORDER — DEXTROSE MONOHYDRATE 100 MG/ML
INJECTION, SOLUTION INTRAVENOUS CONTINUOUS PRN
Status: DISCONTINUED | OUTPATIENT
Start: 2022-12-03 | End: 2022-12-07 | Stop reason: HOSPADM

## 2022-12-03 RX ORDER — SODIUM CHLORIDE 0.9 % (FLUSH) 0.9 %
5-40 SYRINGE (ML) INJECTION PRN
Status: DISCONTINUED | OUTPATIENT
Start: 2022-12-03 | End: 2022-12-07 | Stop reason: HOSPADM

## 2022-12-03 RX ORDER — SODIUM CHLORIDE 0.9 % (FLUSH) 0.9 %
5-40 SYRINGE (ML) INJECTION PRN
Status: DISCONTINUED | OUTPATIENT
Start: 2022-12-03 | End: 2022-12-03 | Stop reason: HOSPADM

## 2022-12-03 RX ORDER — ACETAMINOPHEN 325 MG/1
650 TABLET ORAL EVERY 6 HOURS PRN
Status: DISCONTINUED | OUTPATIENT
Start: 2022-12-03 | End: 2022-12-07 | Stop reason: HOSPADM

## 2022-12-03 RX ORDER — ONDANSETRON 4 MG/1
4 TABLET, ORALLY DISINTEGRATING ORAL EVERY 8 HOURS PRN
Status: DISCONTINUED | OUTPATIENT
Start: 2022-12-03 | End: 2022-12-07 | Stop reason: HOSPADM

## 2022-12-03 RX ADMIN — SODIUM CHLORIDE: 9 INJECTION, SOLUTION INTRAVENOUS at 03:31

## 2022-12-03 RX ADMIN — DEXAMETHASONE SODIUM PHOSPHATE: 10 INJECTION INTRAMUSCULAR; INTRAVENOUS at 02:35

## 2022-12-03 RX ADMIN — NITROGLYCERIN 0.5 INCH: 20 OINTMENT TOPICAL at 14:24

## 2022-12-03 RX ADMIN — MORPHINE SULFATE 2 MG: 2 INJECTION, SOLUTION INTRAMUSCULAR; INTRAVENOUS at 08:41

## 2022-12-03 RX ADMIN — FUROSEMIDE 40 MG: 10 INJECTION, SOLUTION INTRAMUSCULAR; INTRAVENOUS at 21:11

## 2022-12-03 RX ADMIN — FAMOTIDINE: 10 INJECTION, SOLUTION INTRAVENOUS at 02:35

## 2022-12-03 RX ADMIN — NITROGLYCERIN 0.4 MG: 0.4 TABLET SUBLINGUAL at 20:31

## 2022-12-03 RX ADMIN — CLOPIDOGREL BISULFATE 300 MG: 75 TABLET ORAL at 21:12

## 2022-12-03 RX ADMIN — ENOXAPARIN SODIUM 120 MG: 150 INJECTION SUBCUTANEOUS at 21:12

## 2022-12-03 RX ADMIN — FENTANYL CITRATE 25 MCG: 50 INJECTION, SOLUTION INTRAMUSCULAR; INTRAVENOUS at 05:01

## 2022-12-03 RX ADMIN — MORPHINE SULFATE 1 MG: 2 INJECTION, SOLUTION INTRAMUSCULAR; INTRAVENOUS at 20:34

## 2022-12-03 RX ADMIN — KETOROLAC TROMETHAMINE: 15 INJECTION, SOLUTION INTRAMUSCULAR; INTRAVENOUS at 03:30

## 2022-12-03 RX ADMIN — IOPAMIDOL 75 ML: 612 INJECTION, SOLUTION INTRAVENOUS at 03:19

## 2022-12-03 RX ADMIN — SODIUM CHLORIDE 1000 ML: 9 INJECTION, SOLUTION INTRAVENOUS at 03:56

## 2022-12-03 RX ADMIN — SODIUM CHLORIDE: 9 INJECTION, SOLUTION INTRAVENOUS at 09:40

## 2022-12-03 RX ADMIN — ASPIRIN 81 MG: 81 TABLET, CHEWABLE ORAL at 14:21

## 2022-12-03 RX ADMIN — NITROGLYCERIN 0.4 MG: 0.4 TABLET SUBLINGUAL at 13:26

## 2022-12-03 RX ADMIN — NITROGLYCERIN 0.5 INCH: 20 OINTMENT TOPICAL at 21:10

## 2022-12-03 RX ADMIN — ONDANSETRON: 2 INJECTION INTRAMUSCULAR; INTRAVENOUS at 02:35

## 2022-12-03 RX ADMIN — Medication 10 ML: at 20:34

## 2022-12-03 RX ADMIN — METOPROLOL SUCCINATE 25 MG: 25 TABLET, EXTENDED RELEASE ORAL at 17:12

## 2022-12-03 RX ADMIN — ATORVASTATIN CALCIUM 40 MG: 40 TABLET, FILM COATED ORAL at 21:13

## 2022-12-03 RX ADMIN — MORPHINE SULFATE 2 MG: 2 INJECTION, SOLUTION INTRAMUSCULAR; INTRAVENOUS at 06:57

## 2022-12-03 RX ADMIN — SODIUM CHLORIDE: 9 INJECTION, SOLUTION INTRAVENOUS at 09:44

## 2022-12-03 RX ADMIN — MORPHINE SULFATE 4 MG: 4 INJECTION, SOLUTION INTRAMUSCULAR; INTRAVENOUS at 14:22

## 2022-12-03 RX ADMIN — ONDANSETRON 4 MG: 2 INJECTION INTRAMUSCULAR; INTRAVENOUS at 12:31

## 2022-12-03 RX ADMIN — SODIUM CHLORIDE 1000 ML: 9 INJECTION, SOLUTION INTRAVENOUS at 12:06

## 2022-12-03 RX ADMIN — HYDRALAZINE HYDROCHLORIDE 10 MG: 20 INJECTION INTRAMUSCULAR; INTRAVENOUS at 13:28

## 2022-12-03 RX ADMIN — METOPROLOL SUCCINATE 25 MG: 25 TABLET, EXTENDED RELEASE ORAL at 21:12

## 2022-12-03 RX ADMIN — IOPAMIDOL 75 ML: 612 INJECTION, SOLUTION INTRAVENOUS at 13:46

## 2022-12-03 RX ADMIN — MORPHINE SULFATE 4 MG: 4 INJECTION, SOLUTION INTRAMUSCULAR; INTRAVENOUS at 12:31

## 2022-12-03 RX ADMIN — PROPOFOL 80 MG: 10 INJECTION, EMULSION INTRAVENOUS at 09:47

## 2022-12-03 RX ADMIN — ONDANSETRON 4 MG: 2 INJECTION INTRAMUSCULAR; INTRAVENOUS at 14:22

## 2022-12-03 RX ADMIN — GLUCAGON HYDROCHLORIDE 1 MG: KIT at 05:29

## 2022-12-03 ASSESSMENT — ENCOUNTER SYMPTOMS
STRIDOR: 0
FACIAL SWELLING: 0
ABDOMINAL PAIN: 0
PHOTOPHOBIA: 0
VOMITING: 1
TROUBLE SWALLOWING: 0
TROUBLE SWALLOWING: 1
ALLERGIC/IMMUNOLOGIC NEGATIVE: 1
ABDOMINAL PAIN: 0
VOMITING: 1
DIARRHEA: 0
NAUSEA: 1
RHINORRHEA: 0
NAUSEA: 0
SHORTNESS OF BREATH: 0
CONSTIPATION: 0
CHOKING: 0
WHEEZING: 0
EYES NEGATIVE: 1
COUGH: 0
SHORTNESS OF BREATH: 0
WHEEZING: 0

## 2022-12-03 ASSESSMENT — PAIN DESCRIPTION - ORIENTATION
ORIENTATION: ANTERIOR;MID
ORIENTATION: ANTERIOR
ORIENTATION: ANTERIOR;MID
ORIENTATION: UPPER
ORIENTATION: ANTERIOR;MID
ORIENTATION: ANTERIOR;MID

## 2022-12-03 ASSESSMENT — PAIN DESCRIPTION - LOCATION
LOCATION: CHEST
LOCATION: ABDOMEN
LOCATION: OTHER (COMMENT)
LOCATION: ABDOMEN;CHEST
LOCATION: ABDOMEN;CHEST
LOCATION: CHEST

## 2022-12-03 ASSESSMENT — PAIN DESCRIPTION - PAIN TYPE
TYPE: ACUTE PAIN

## 2022-12-03 ASSESSMENT — PAIN SCALES - GENERAL
PAINLEVEL_OUTOF10: 8
PAINLEVEL_OUTOF10: 9
PAINLEVEL_OUTOF10: 8
PAINLEVEL_OUTOF10: 8
PAINLEVEL_OUTOF10: 9
PAINLEVEL_OUTOF10: 9
PAINLEVEL_OUTOF10: 8
PAINLEVEL_OUTOF10: 7
PAINLEVEL_OUTOF10: 7
PAINLEVEL_OUTOF10: 10
PAINLEVEL_OUTOF10: 9
PAINLEVEL_OUTOF10: 8
PAINLEVEL_OUTOF10: 9

## 2022-12-03 ASSESSMENT — PAIN DESCRIPTION - DESCRIPTORS
DESCRIPTORS: SHARP;SHOOTING;HEAVINESS
DESCRIPTORS: BURNING
DESCRIPTORS: STABBING;SHARP
DESCRIPTORS: SHARP;STABBING
DESCRIPTORS: ACHING;BURNING
DESCRIPTORS: STABBING;SHARP;PRESSURE

## 2022-12-03 ASSESSMENT — PAIN DESCRIPTION - FREQUENCY
FREQUENCY: CONTINUOUS

## 2022-12-03 ASSESSMENT — PAIN DESCRIPTION - DIRECTION
RADIATING_TOWARDS: BILAT ARM
RADIATING_TOWARDS: BILAT ARMS

## 2022-12-03 ASSESSMENT — PAIN DESCRIPTION - ONSET
ONSET: ON-GOING

## 2022-12-03 ASSESSMENT — PAIN - FUNCTIONAL ASSESSMENT
PAIN_FUNCTIONAL_ASSESSMENT: 0-10
PAIN_FUNCTIONAL_ASSESSMENT: 0-10

## 2022-12-03 NOTE — ED NOTES
Patient to CT via cot, no S/S of distress noted at this time     Dileep Kovacs [facility]  12/03/22 [id number]

## 2022-12-03 NOTE — ED NOTES
Report was given to Nurse on [address] monitor was placed     Young Dunn RN  12/03/22 [id number]

## 2022-12-03 NOTE — ED PROVIDER NOTES
[address] [facility] ED  eMERGENCY dEPARTMENT eNCOUnter      Pt Name: Chuy Maria  MRN: [id number]  [place] 1960  Date of evaluation: 12/3/2022  Provider: Leann Flores MD    CHIEF COMPLAINT       Chief Complaint   Patient presents with    Chest Pain     Epigastric, burning         HISTORY OF PRESENT ILLNESS   (Location/Symptom, Timing/Onset,Context/Setting, Quality, Duration, Modifying Factors, Severity)  Note limiting factors. Chuy Maria is a 65 y.o. female who presents to the emergency department burning to her chest.  Patient was evaluated emergency departments prior evening, and slated to GI suite for endoscopic evaluation of esophagus. Did undergo EGD, which discovered no evidence of esophageal pathology. Patient was referred back to the emergency department for evaluation management. By nursing means of ongoing chest pressure and heaviness. Nursing also makes note of ongoing hypoxia Pulsoxymeter approximately 88%. This time patient also complains that she been having ongoing episodes of vomiting. Patient suspects that she might of vomited out what ever chicken debris she may have gotten stuck in her throat last night. HPI    NursingNotes were reviewed. REVIEW OF SYSTEMS    (2-9 systems for level 4, 10 or more for level 5)     Review of Systems   Constitutional:  Positive for activity change and appetite change. Negative for chills and fever. HENT:  Negative for congestion, ear pain, rhinorrhea and trouble swallowing. Eyes: Negative. Respiratory:  Negative for shortness of breath and wheezing. Cardiovascular:  Positive for chest pain. Negative for leg swelling. Gastrointestinal:  Positive for nausea and vomiting. Negative for abdominal pain. Endocrine: Negative. Genitourinary:  Negative for dysuria, frequency and hematuria. Musculoskeletal:  Positive for arthralgias and myalgias. Negative for gait problem and neck pain. Skin: Negative. Allergic/Immunologic: Negative. Neurological:  Negative for seizures, syncope, speech difficulty and light-headedness. Hematological: Negative. Psychiatric/Behavioral:  Negative for hallucinations and self-injury. Except as noted above the remainder of the review of systems was reviewed and negative. PAST MEDICAL HISTORY     Past Medical History:   Diagnosis Date    Asthma 2015    Bilateral renal cysts 2013    CAD (coronary artery disease)     Depression     since age 35, was with Dr Viri Nunez, now the [facility]    Diverticulosis of sigmoid colon 2012    Dr Delvis Leblanc    DJD of left shoulder     Environmental allergies     Fatty liver 2013    GERD (gastroesophageal reflux disease)     History of cardiac arrhythmia     \"due to stress, was placed on medication\"    Hydatidiform mole     age 26     Hyperlipidemia     Hypothyroidism 2011    IFG (impaired fasting glucose) 2013    Melanoma of eye [facility])     age 30, R eye prosthesis, Dr Pete Angel    Obesity     Prediabetes     PTSD (post-traumatic stress disorder)     age 35, [facility]    S/P colonoscopy 04/19/2012    Dr. Sadie Leal    S/P hysterectomy with oophorectomy 2012    Dr Tosha Cervantes    Tremor     Dr Marily Martinez    Vitamin D deficiency          SURGICALHISTORY       Past Surgical History:   Procedure Laterality Date    DILATION AND CURETTAGE OF UTERUS      ENDOMETRIAL ABLATION  06/2012    EYE REMOVAL      OD for melanoma, age 30    FOOT OSTEOTOMY Left 09/23/2016    B AUGUSTINA DPM      HYSTERECTOMY (CERVIX STATUS UNKNOWN)      BALDOMERO STEROTACTIC LOC BREAST BIOPSY RIGHT Right 07/19/2021    BALDOMERO STEROTACTIC LOC BREAST BIOPSY RIGHT 7/19/2021 [facility]    LINDSAY AND BSO (CERVIX REMOVED)  2012    Dr Summer Wiggins       Previous Medications    ALBUTEROL SULFATE HFA (VENTOLIN HFA) 108 (90 BASE) MCG/ACT INHALER    Inhale 2 puffs into the lungs every 6 hours as needed for Wheezing Please cover what is under insurance.     APIXABAN (ELIQUIS) 5 MG TABS TABLET    Take 1 tablet by mouth 2 times daily START when Eliquis Starter pack completed    ARIPIPRAZOLE (ABILIFY) 15 MG TABLET    Take 1 tablet by mouth daily    ASPIRIN (ASPIRIN LOW DOSE) 81 MG EC TABLET    TAKE 1 TABLET BY MOUTH ONCE A DAY (AM)    ATORVASTATIN (LIPITOR) 20 MG TABLET    Take 1 tablet by mouth in the morning. BENZTROPINE (COGENTIN) 1 MG TABLET    Take 1 tablet by mouth daily    BIOTIN 300 MCG TABS    Take 1 tablet by mouth daily    BLOOD PRESSURE MONITORING (B-D ASSURE BPM/AUTO ARM CUFF) MISC    Use as directed    BUDESONIDE-FORMOTEROL (SYMBICORT) 160-4.5 MCG/ACT AERO    INHALE 2 PUFFS INTO THE LUNGS 2 TIMES DAILY    CALAMINE-ZINC OXIDE 8-8 % LOTN LOTION    Apply topically as needed (pruritis.)    CYANOCOBALAMIN (B-12) 1000 MCG TABS    TAKE 1 TABLET BY MOUTH ONCE A DAY (NOON)    DICLOFENAC SODIUM (VOLTAREN) 1 % GEL    APPLY 2 G TOPICALLY 4 TIMES DAILY    EPINEPHRINE (EPIPEN 2-PRAVEENA) 0.3 MG/0.3ML SOAJ INJECTION    Use as directed for allergic reaction    FAMOTIDINE (PEPCID) 20 MG TABLET    TAKE 1 TABLET BY MOUTH TWICE DAILY (AM,PM)    GABAPENTIN (NEURONTIN) 300 MG CAPSULE    Take 1 capsule by mouth at bedtime for 30 days. KETOCONAZOLE (NIZORAL) 2 % SHAMPOO    APPLY TOPICALLY DAILY AS NEEDED. LEVOTHYROXINE (SYNTHROID) 50 MCG TABLET    Take 1 tablet by mouth Daily    MAGNESIUM 400 MG CAPS    Take 1 capsule by mouth nightly as needed (muscle spasms)    MECLIZINE (ANTIVERT) 12.5 MG TABLET    Take 1 tablet by mouth nightly as needed for Dizziness    METFORMIN (GLUCOPHAGE) 500 MG TABLET    TAKE 1 TABLET BY MOUTH TWICE A DAY WITH MEALS    METOPROLOL TARTRATE (LOPRESSOR) 50 MG TABLET    Take 1 tablet by mouth in the morning and 1 tablet before bedtime. MIDODRINE (PROAMATINE) 10 MG TABLET    Take one tablet by mouth twice daily    MISC.  DEVICES (ROLLATOR ULTRA-LIGHT) MISC    1 each by Does not apply route once for 1 dose    MULTIPLE VITAMIN (TAB-A-MANDI/BETA CAROTENE) TABS    TAKE 1 TABLET BY MOUTH EVERY DAY (AM)    PANTOPRAZOLE (PROTONIX) 40 MG TABLET    Take 1 tablet by mouth daily    PRAZOSIN (MINIPRESS) 1 MG CAPSULE    Take 1 mg by mouth daily    VENTOLIN  (90 BASE) MCG/ACT INHALER    USE 2 PUFFS EVERY 4 HRS AS NEEDED FOR WHEEZING/SOB-SPACE OUT TO EVERY 6 HR AS SYMPTOMS IMPROVE    VITAMIN D (VITAMIN D HIGH POTENCY) 25 MCG (1000 UT) CAPS    TAKE 1 CAPSULE BY MOUTH DAILY WITH SUPPER (PM)    VRAYLAR 1.5 MG CAPSULE    Take 1.5 mg by mouth daily       ALLERGIES     Bee venom    FAMILY HISTORY       Family History   Problem Relation Age of Onset    Heart Failure Mother         dec 76    Diabetes Mother     Diabetes Father     Stroke Father         dec age 80    Cancer Father         Sarcoma    Breast Cancer Maternal Aunt     Breast Cancer Paternal Aunt     Stomach Cancer Other     Colon Cancer Neg Hx           SOCIAL HISTORY       Social History     Socioeconomic History    Marital status:      Spouse name: None    Number of children: 3    Years of education: 12    Highest education level: High school graduate   Occupational History    Occupation: SSI   Tobacco Use    Smoking status: Never    Smokeless tobacco: Never   Vaping Use    Vaping Use: Never used   Substance and Sexual Activity    Alcohol use: No    Drug use: No    Sexual activity: Not Currently     Birth control/protection: Surgical   Social History Narrative    [place] one of 4 children, one sister disappeared at age 29, never found    Moved to [place] at age 40        Lives in an apartment in [facility], alone    , 3 children, all grown, in [place]     2 granddaughters     Hobbies exercising, birds    Attends [employer], has volunteered at [employer]     Social Determinants of Health     Financial Resource Strain: Low Risk     Difficulty of Paying Living Expenses: Not hard at all   Food Insecurity: No Food Insecurity    Worried About Running Out of Food in the Last Year: Never true    Ran Out of Food in the Last Year: Never true       SCREENINGS    Brock Coma Scale  Eye Opening: Spontaneous  Best Verbal Response: Oriented  Best Motor Response: Obeys commands  Brock Coma Scale Score: 15        PHYSICAL EXAM    (up to 7 for level 4, 8 or more for level 5)     ED Triage Vitals [12/03/22 1103]   BP Temp Temp Source Heart Rate Resp SpO2 Height Weight   (!) 161/118 98 °F (36.7 °C) Oral (!) 107 21 95 % -- --       Physical Exam  Vitals and nursing note reviewed. Constitutional:       Appearance: Normal appearance. She is well-developed. She is ill-appearing. Comments: Patient appears ill, and in some degree of distress. HENT:      Head: Normocephalic and atraumatic. Right Ear: External ear normal.      Left Ear: External ear normal.      Nose: Nose normal. No congestion. Mouth/Throat:      Pharynx: Oropharynx is clear. Eyes:      General:         Right eye: No discharge. Left eye: No discharge. Conjunctiva/sclera: Conjunctivae normal.      Pupils: Pupils are equal, round, and reactive to light. Neck:      Trachea: Trachea normal.   Cardiovascular:      Rate and Rhythm: Normal rate and regular rhythm. Pulses: Normal pulses. Heart sounds: Normal heart sounds. No gallop. Pulmonary:      Effort: Pulmonary effort is normal. No respiratory distress. Breath sounds: Normal breath sounds. No wheezing. Abdominal:      General: Bowel sounds are normal. There is no distension. Palpations: Abdomen is soft. Tenderness: There is no abdominal tenderness. Musculoskeletal:         General: No swelling. Normal range of motion. Cervical back: Full passive range of motion without pain, normal range of motion and neck supple. No rigidity. Skin:     General: Skin is warm and dry. Capillary Refill: Capillary refill takes less than 2 seconds. Coloration: Skin is not jaundiced or pale. Neurological:      General: No focal deficit present.       Mental Status: She is alert and oriented to person, place, and time. Cranial Nerves: No cranial nerve deficit. Psychiatric:         Mood and Affect: Mood is anxious. Speech: Speech normal.         Behavior: Behavior normal.         Thought Content: Thought content normal.         Judgment: Judgment normal.       DIAGNOSTIC RESULTS     EKG: All EKG's are interpreted by the Emergency Department Physician who either signs or Co-signsthis chart in the absence of a cardiologist.    He demonstrates sinus tachycardia. Rate is 106. There is nonspecific ST segments. Anteriorly especially predominate. However compared against September 15, 2021 this is largely unchanged. No evidence of ectopy. This is an abnormal EKG. EKG #2 demonstrates ongoing sinus tachycardia. Rate is 107. There persists with nonspecific ST segments. Right bundle branch block is noted. EKG appears to be largely unchanged from earlier today. RADIOLOGY:   Non-plain filmimages such as CT, Ultrasound and MRI are read by the radiologist. Plain radiographic images are visualized and preliminarily interpreted by the emergency physician with the below findings:    View chest x-ray demonstrates questionable infiltrative process, specially in the left base, however cannot exclude vascular congestion versus poor inspiratory effort. Please see radiologist report for full detail. Interpretation per the Radiologist below, if available at the time ofthis note:    XR CHEST PORTABLE   Final Result   Cardiomegaly with findings of mild vascular congestion      Suboptimal inspiration was obtained for the chest x-ray.          CTA CHEST W WO CONTRAST PE Eval    (Results Pending)         ED BEDSIDE ULTRASOUND:   Performed by ED Physician - none    LABS:  Labs Reviewed   COMPREHENSIVE METABOLIC PANEL - Abnormal; Notable for the following components:       Result Value    CO2 17 (*)     Anion Gap 17 (*)     Glucose 182 (*)     All other components within normal limits   CBC WITH AUTO DIFFERENTIAL - Abnormal; Notable for the following components:    WBC 12.4 (*)     MCHC 32.9 (*)     Neutrophils Absolute 11.3 (*)     Lymphocytes Absolute 0.8 (*)     All other components within normal limits   TROPONIN - Abnormal; Notable for the following components:    Troponin 0.099 (*)     All other components within normal limits    Narrative:     Blake Ring tel. [phone],  TROP results called to and read back by Wendy Montana, 12/03/2022 13:12, by  Earma Axe   LACTIC ACID - Abnormal; Notable for the following components:    Lactic Acid 4.0 (*)     All other components within normal limits    Narrative:     Blake Ring tel. [phone],  LACID results called to and read back by David Castellanos, 12/03/2022 12:44, by  Michelle Lazo       All other labs were within normal range or not returned as of this dictation. EMERGENCY DEPARTMENT COURSE and DIFFERENTIAL DIAGNOSIS/MDM:   Vitals:    Vitals:    12/03/22 1215 12/03/22 1230 12/03/22 1245 12/03/22 1326   BP: (!) 165/120 (!) 182/146 (!) 195/120 (!) 185/103   Pulse: (!) 111 (!) 111 (!) 106    Resp: 24 30 16    Temp:       TempSrc:       SpO2: 95% 95% 95%        Patient main stable emergency department. Chest pain is improving status post medications noted. Advised this time are most consistent with a non-STEMI. Consultation made to hospitalist.  Agree with admission for further evaluation as appropriate. Patient much more comfortable at this time. Vital signs improving. MDM      CRITICAL CARE TIME   Total Critical Care time was - minutes, excluding separately reportableprocedures. There was a high probability of clinicallysignificant/life threatening deterioration in the patient's condition which required my urgent intervention.  -    CONSULTS:  None    PROCEDURES:  Unless otherwise noted below, none     Procedures    FINAL IMPRESSION      1. Chest pain, unspecified type    2. NSTEMI (non-ST elevated myocardial infarction) ([facility] [address]    3.  Lactic acidosis        DISPOSITION/PLAN   DISPOSITION Decision To Admit 12/03/2022 02:12:33 PM      PATIENT REFERRED TO:  No follow-up provider specified.     DISCHARGE MEDICATIONS:  New Prescriptions    No medications on file          (Please note that portions of this note were completed with a voice recognitionprogram.  Efforts were made to edit the dictations but occasionally words are mis-transcribed.)    Stefanie Sanches MD (electronically signed)  Attending Emergency Physician          Stefanie Sanches MD  12/03/22 0761

## 2022-12-03 NOTE — CONSULTS
Consults    Patient Name: Amalia Gillis  Admit Date: 12/3/2022  2:58 AM  MR #: [id number]  : 1960    Attending Physician: No att. providers found  Reason for consult: esophageal FB    History of Presenting Illness:      Amalia Gillis is a 65 y.o. female on hospital day 0 with a history of hypothyroidism, A. fib on Eliquis, PE, GERD, vertigo. Past surgical history of hysterectomy, breast biopsy, eye removal.  Family history is negative for GI malignancies. Social history patient denies nicotine, EtOH or illicit drug use. History Obtained From:  patient, electronic medical record    GI consult for esophageal foreign body- presented to the ED with difficulty swallowing, patient reports 24-hour history of inability to swallow solids or liquids since eating breakfast yesterday morning after eating chicken. Has been vomiting since yesterday none since 3 am. No history of food impaction, no history of dysphagia. At baseline has GERD has not been on a PPI. Has a history of A. fib is on Eliquis has not had Eliquis in 48 hours. Denies chest pain, shortness of breath, no crepitus.     History:      Past Medical History:   Diagnosis Date    Asthma     Bilateral renal cysts     Depression     since age 35, was with Dr Yazmin Gil, now the [facility]    Diverticulosis of sigmoid colon     Dr Ekaterina Delgado    DJD of left shoulder     Environmental allergies     Fatty liver     GERD (gastroesophageal reflux disease)     History of cardiac arrhythmia     \"due to stress, was placed on medication\"    Hydatidiform mole     age 26     Hyperlipidemia     Hypothyroidism 2011    IFG (impaired fasting glucose) 2013    Melanoma of eye [facility])     age 30, R eye prosthesis, Dr Giovanna Orellana    Obesity     Prediabetes     PTSD (post-traumatic stress disorder)     age 35, [facility]    S/P colonoscopy 2012    Dr. New Quinteros    S/P hysterectomy with oophorectomy     Dr Mark Raines    Tremor     Dr Kelly Silva    Vitamin D deficiency Past Surgical History:   Procedure Laterality Date    DILATION AND CURETTAGE OF UTERUS      ENDOMETRIAL ABLATION  06/2012    EYE REMOVAL      OD for melanoma, age 30    FOOT OSTEOTOMY Left 09/23/2016    B AUGUSTINA DPM      HYSTERECTOMY (CERVIX STATUS UNKNOWN)      BALDOMERO STEROTACTIC LOC BREAST BIOPSY RIGHT Right 07/19/2021    BALDOMERO STEROTACTIC LOC BREAST BIOPSY RIGHT 7/19/2021 [facility]    LINSDAY AND BSO (CERVIX REMOVED)  2012    Dr Octavio Edwards     Family History  Family History   Problem Relation Age of Onset    Heart Failure Mother         dec 76    Diabetes Mother     Diabetes Father     Stroke Father         dec age 80    Cancer Father         Sarcoma    Stomach Cancer Other     Breast Cancer Paternal Aunt     Breast Cancer Maternal Aunt      [] Unable to obtain due to ventilated and/ or neurologic status  Social History     Socioeconomic History    Marital status:      Spouse name: Not on file    Number of children: 3    Years of education: 12    Highest education level: High school graduate   Occupational History    Occupation: SSI   Tobacco Use    Smoking status: Never    Smokeless tobacco: Never   Vaping Use    Vaping Use: Never used   Substance and Sexual Activity    Alcohol use: No    Drug use: No    Sexual activity: Not Currently     Birth control/protection: Surgical   Other Topics Concern    Not on file   Social History Narrative    Born in [place], one of 4 children, one sister disappeared at age 30, never found    Moved to [place] at age 40        Lives in an apartment in [facility], alone    , 3 children, all grown, in [place]     2 granddaughters     Hobbies exercising, birds    Attends [employer], has volunteered at [address] [facility] [employer] [address] Strain: Low Risk     Difficulty of Paying Living Expenses: Not hard at all   Food Insecurity: No Food Insecurity    Worried About [facility] [facility] in the Last Year: Never true    [address] in the Last Year: Never true   Transportation Needs: Not on file   Physical Activity: Not on file   Stress: Not on file   Social Connections: Not on file   Intimate Partner Violence: Not on file   Housing Stability: Not on file      [] Unable to obtain due to ventilated and/ or neurologic status    Home Medications:      Medications Prior to Admission: gabapentin (NEURONTIN) 300 MG capsule, Take 1 capsule by mouth at bedtime for 30 days. apixaban (ELIQUIS) 5 MG TABS tablet, Take 1 tablet by mouth 2 times daily START when Eliquis Starter pack completed  calamine-zinc oxide 8-8 % LOTN lotion, Apply topically as needed (pruritis.)  meclizine (ANTIVERT) 12.5 MG tablet, Take 1 tablet by mouth nightly as needed for Dizziness  atorvastatin (LIPITOR) 20 MG tablet, Take 1 tablet by mouth in the morning. vitamin D (VITAMIN D HIGH POTENCY) 25 MCG (1000 UT) CAPS, TAKE 1 CAPSULE BY MOUTH DAILY WITH SUPPER (PM)  Multiple Vitamin (TAB-A-MANDI/BETA CAROTENE) TABS, TAKE 1 TABLET BY MOUTH EVERY DAY (AM)  metoprolol tartrate (LOPRESSOR) 50 MG tablet, Take 1 tablet by mouth in the morning and 1 tablet before bedtime.   budesonide-formoterol (SYMBICORT) 160-4.5 MCG/ACT AERO, INHALE 2 PUFFS INTO THE LUNGS 2 TIMES DAILY  Magnesium 400 MG CAPS, Take 1 capsule by mouth nightly as needed (muscle spasms)  midodrine (PROAMATINE) 10 MG tablet, Take one tablet by mouth twice daily  ARIPiprazole (ABILIFY) 15 MG tablet, Take 1 tablet by mouth daily  levothyroxine (SYNTHROID) 50 MCG tablet, Take 1 tablet by mouth Daily  famotidine (PEPCID) 20 MG tablet, TAKE 1 TABLET BY MOUTH TWICE DAILY (AM,PM)  benztropine (COGENTIN) 1 MG tablet, Take 1 tablet by mouth daily  Cyanocobalamin (B-12) 1000 MCG TABS, TAKE 1 TABLET BY MOUTH ONCE A DAY (NOON)  Biotin 300 MCG TABS, Take 1 tablet by mouth daily  diclofenac sodium (VOLTAREN) 1 % GEL, APPLY 2 G TOPICALLY 4 TIMES DAILY  VENTOLIN  (90 Base) MCG/ACT inhaler, USE 2 PUFFS EVERY 4 HRS AS NEEDED FOR WHEEZING/SOB-SPACE OUT TO EVERY 6 HR AS SYMPTOMS IMPROVE  ketoconazole (NIZORAL) 2 % shampoo, APPLY TOPICALLY DAILY AS NEEDED. albuterol sulfate HFA (VENTOLIN HFA) 108 (90 Base) MCG/ACT inhaler, Inhale 2 puffs into the lungs every 6 hours as needed for Wheezing Please cover what is under insurance. VRAYLAR 1.5 MG capsule, Take 1.5 mg by mouth daily  prazosin (MINIPRESS) 1 MG capsule, Take 1 mg by mouth daily  metFORMIN (GLUCOPHAGE) 500 MG tablet, TAKE 1 TABLET BY MOUTH TWICE A DAY WITH MEALS  Misc. Devices (ROLLATOR ULTRA-LIGHT) MISC, 1 each by Does not apply route once for 1 dose  aspirin (ASPIRIN LOW DOSE) 81 MG EC tablet, TAKE 1 TABLET BY MOUTH ONCE A DAY (AM)  Blood Pressure Monitoring (B-D ASSURE BPM/AUTO ARM CUFF) MISC, Use as directed  EPINEPHrine (EPIPEN 2-PRAVEENA) 0.3 MG/0.3ML SOAJ injection, Use as directed for allergic reaction    Current Hospital Medications:   Scheduled Meds:  Continuous Infusions:  PRN Meds:. Allergies: Allergies   Allergen Reactions    Bee Venom       Review of Systems:       [x] CV, Resp, Neuro, , and all other systems reviewed and negative other than listed in HPI. Objective Findings:     Vitals:   Vitals:    12/03/22 0335 12/03/22 0500 12/03/22 0507 12/03/22 0730   BP:  (!) 140/96  (!) 142/89   Pulse:   96    Resp:       Temp:       TempSrc:       SpO2:  95%  96%   Weight: 256 lb (116.1 kg)      Height: 5' 6\" (1.676 m)           Physical Examination:  General: alert  HEENT: Normocephalic, no scleral icterus. Neck: No JVD. Heart: Regular, no murmur, no rub/gallop. No RV heave. Lungs: Clear to ascultation, no rales/wheezing/rhonchi. Good chest wall excursion. Abdomen: Appearance:, no Distension , Soft , no tenderness , no Scars , Bowel sounds normal  Extremities: No clubbing/cyanosis, no edema. Skin: Warm, dry, normal turgor, no rash, no bruise, no petichiae. Neuro: No myoclonus or tremor.    Psych: Normal affect    Results/ Medications reviewed 12/3/2022, 9:17 AM     Laboratory, Microbiology, Pathology, Radiology, Cardiology, Medications and Transcriptions reviewed  Scheduled Meds:  Continuous Infusions:    Recent Labs     12/03/22 0238   WBC 13.6*   HGB 15.0   HCT 45.3   MCV 93.0        Recent Labs     12/03/22 0238 12/03/22  0304     --    K 3.7  --      --    CO2 20  --    BUN 13  --    CREATININE 0.84 0.7     Recent Labs     12/03/22 0238   AST 32   ALT 36*   BILITOT 0.5   ALKPHOS 88     No results for input(s): LIPASE, AMYLASE in the last 72 hours. Recent Labs     12/03/22 0238   PROT 7.0     No results found. Endoscopic hx:  EGD Dr Kadi Crook 2012  Esophagitis and gastrtiis  Impression:   66 y/o female admitted with difficulty swallowing, patient reports 24-hour history of inability to swallow solids or liquids since eating breakfast yesterday morning after eating chicken. Has been vomiting since yesterday none since 3 am. No history of food impaction, no history of dysphagia. At baseline has GERD has not been on a PPI. Has a history of A. fib is on Eliquis has not had Eliquis in 48 hours. Denies chest pain, shortness of breath, no crepitus. Plan:   -continue course of care  -NPO for emergent EGD for possible FB  Comments: Thank you for allowing us to participate in the care of this patient. Will continue to follow. Please call if questions or concerns arise. Electronically signed by Sasha Douglas MD on 12/3/2022 at 9:17 AM    Please note this report has been partially produced using speech recognition software and may cause contain errors related to that system including grammar, punctuation and spelling as well as words and phrases that may seem inappropriate. If there are questions or concerns please feel free to contact me to clarify.

## 2022-12-03 NOTE — ANESTHESIA POSTPROCEDURE EVALUATION
Department of Anesthesiology  Postprocedure Note    Patient: Danilo Chan  MRN: [id number]  YOB: 1960  Date of evaluation: 12/3/2022      Procedure Summary     Date: 12/03/22 Room / Location: [address] [place] [address]    Anesthesia Start: 2021 Anesthesia Stop: 3846    Procedure: EGD DIAGNOSTIC ONLY Diagnosis:       Obstruction of esophagus      (Obstruction of esophagus [K22.2])    Surgeons: Dinah Zapien MD Responsible Provider: Antony White MD    Anesthesia Type: MAC ASA Status: 3 - Emergent          Anesthesia Type: No value filed.     Ml Phase I:      Ml Phase II: Ml Score: 9      Anesthesia Post Evaluation    Patient location during evaluation: PACU  Patient participation: complete - patient participated  Level of consciousness: awake and alert  Pain score: 0  Airway patency: patent  Nausea & Vomiting: no vomiting and no nausea  Complications: no  Cardiovascular status: hemodynamically stable  Respiratory status: nasal cannula  Hydration status: stable

## 2022-12-03 NOTE — ED PROVIDER NOTES
[address] [facility] ED  eMERGENCY dEPARTMENT eNCOUnter      Pt Name: Camden Gallego  MRN: [id number]  [place] 1960  Date of evaluation: 12/3/2022  Provider: XU Walker        HISTORY OF PRESENT ILLNESS    Camden Gallego is a 65 y.o. female per chart review has ah/o thyroidism, pulmonary embolus, atrial fibrillation, GERD, vertigo. Patient presents to the emergency department for globus sensation. It has been present for 1 day. Yesterday morning 12/2/2022 for breakfast around 0700 patient felt that she choked on a piece of chicken and the globus sensation has been there ever since. She has been vomiting today. Unable to really tolerate any oral intake. This has never happened before. Does have history of GERD taking Pepcid twice daily. No esophageal disorders. Acutely anxious but nondistressed. No stridor or increased respiratory effort. No dysarthria, facial asymmetry, dizziness, headache, weakness, numbness      REVIEW OF SYSTEMS       Review of Systems   Constitutional:  Positive for appetite change. Negative for chills and fever. HENT:  Positive for trouble swallowing. Negative for congestion and facial swelling. Eyes:  Negative for photophobia. Respiratory:  Negative for cough, choking, shortness of breath, wheezing and stridor. Cardiovascular:  Negative for chest pain. Gastrointestinal:  Positive for vomiting. Negative for abdominal pain, constipation, diarrhea and nausea. Genitourinary:  Negative for difficulty urinating. Musculoskeletal:  Negative for myalgias. Neurological:  Negative for headaches. Psychiatric/Behavioral:  Negative for confusion. Except as noted above the remainder of the review of systems was reviewed and negative.        PAST MEDICAL HISTORY     Past Medical History:   Diagnosis Date    Asthma 2015    Bilateral renal cysts 2013    Depression     since age 35, was with Dr Viri Nunez, now the [facility]    Diverticulosis of sigmoid colon 2012 Dr Cavazos Rm    DJD of left shoulder     Environmental allergies     Fatty liver 2013    GERD (gastroesophageal reflux disease)     History of cardiac arrhythmia     \"due to stress, was placed on medication\"    Hydatidiform mole     age 26     Hyperlipidemia     Hypothyroidism 2011    IFG (impaired fasting glucose) 2013    Melanoma of eye [facility])     age 30, R eye prosthesis, Dr Ike Gonzalez    Obesity     Prediabetes     PTSD (post-traumatic stress disorder)     age 35, [facility]    S/P colonoscopy 04/19/2012    Dr. Gisell Rm    S/P hysterectomy with oophorectomy 2012    Dr Ed Burnette    Tremor     Dr Thao Vyas    Vitamin D deficiency          SURGICAL HISTORY       Past Surgical History:   Procedure Laterality Date    DILATION AND CURETTAGE OF UTERUS      ENDOMETRIAL ABLATION  06/2012    EYE REMOVAL      OD for melanoma, age 30    FOOT OSTEOTOMY Left 09/23/2016    B AUGUSTINA DPM      HYSTERECTOMY (CERVIX STATUS UNKNOWN)      BALDOMERO STEROTACTIC LOC BREAST BIOPSY RIGHT Right 07/19/2021    BALDOMERO STEROTACTIC LOC BREAST BIOPSY RIGHT 7/19/2021 [facility]    LINDSAY AND BSO (CERVIX REMOVED)  2012    Dr Juan Presley       Previous Medications    ALBUTEROL SULFATE HFA (VENTOLIN HFA) 108 (90 BASE) MCG/ACT INHALER    Inhale 2 puffs into the lungs every 6 hours as needed for Wheezing Please cover what is under insurance. APIXABAN (ELIQUIS) 5 MG TABS TABLET    Take 1 tablet by mouth 2 times daily START when Eliquis Starter pack completed    ARIPIPRAZOLE (ABILIFY) 15 MG TABLET    Take 1 tablet by mouth daily    ASPIRIN (ASPIRIN LOW DOSE) 81 MG EC TABLET    TAKE 1 TABLET BY MOUTH ONCE A DAY (AM)    ATORVASTATIN (LIPITOR) 20 MG TABLET    Take 1 tablet by mouth in the morning.     BENZTROPINE (COGENTIN) 1 MG TABLET    Take 1 tablet by mouth daily    BIOTIN 300 MCG TABS    Take 1 tablet by mouth daily    BLOOD PRESSURE MONITORING (B-D ASSURE BPM/AUTO ARM CUFF) MISC    Use as directed    BUDESONIDE-FORMOTEROL (SYMBICORT) 160-4.5 MCG/ACT AERO    INHALE 2 PUFFS INTO THE LUNGS 2 TIMES DAILY    CALAMINE-ZINC OXIDE 8-8 % LOTN LOTION    Apply topically as needed (pruritis.)    CYANOCOBALAMIN (B-12) 1000 MCG TABS    TAKE 1 TABLET BY MOUTH ONCE A DAY (NOON)    DICLOFENAC SODIUM (VOLTAREN) 1 % GEL    APPLY 2 G TOPICALLY 4 TIMES DAILY    EPINEPHRINE (EPIPEN 2-PRAVEENA) 0.3 MG/0.3ML SOAJ INJECTION    Use as directed for allergic reaction    FAMOTIDINE (PEPCID) 20 MG TABLET    TAKE 1 TABLET BY MOUTH TWICE DAILY (AM,PM)    GABAPENTIN (NEURONTIN) 300 MG CAPSULE    Take 1 capsule by mouth at bedtime for 30 days. KETOCONAZOLE (NIZORAL) 2 % SHAMPOO    APPLY TOPICALLY DAILY AS NEEDED. LEVOTHYROXINE (SYNTHROID) 50 MCG TABLET    Take 1 tablet by mouth Daily    MAGNESIUM 400 MG CAPS    Take 1 capsule by mouth nightly as needed (muscle spasms)    MECLIZINE (ANTIVERT) 12.5 MG TABLET    Take 1 tablet by mouth nightly as needed for Dizziness    METFORMIN (GLUCOPHAGE) 500 MG TABLET    TAKE 1 TABLET BY MOUTH TWICE A DAY WITH MEALS    METOPROLOL TARTRATE (LOPRESSOR) 50 MG TABLET    Take 1 tablet by mouth in the morning and 1 tablet before bedtime. MIDODRINE (PROAMATINE) 10 MG TABLET    Take one tablet by mouth twice daily    MISC.  DEVICES (ROLLATOR ULTRA-LIGHT) MISC    1 each by Does not apply route once for 1 dose    MULTIPLE VITAMIN (TAB-A-MANDI/BETA CAROTENE) TABS    TAKE 1 TABLET BY MOUTH EVERY DAY (AM)    PRAZOSIN (MINIPRESS) 1 MG CAPSULE    Take 1 mg by mouth daily    VENTOLIN  (90 BASE) MCG/ACT INHALER    USE 2 PUFFS EVERY 4 HRS AS NEEDED FOR WHEEZING/SOB-SPACE OUT TO EVERY 6 HR AS SYMPTOMS IMPROVE    VITAMIN D (VITAMIN D HIGH POTENCY) 25 MCG (1000 UT) CAPS    TAKE 1 CAPSULE BY MOUTH DAILY WITH SUPPER (PM)    VRAYLAR 1.5 MG CAPSULE    Take 1.5 mg by mouth daily       ALLERGIES     Bee venom    FAMILY HISTORY       Family History   Problem Relation Age of Onset    Heart Failure Mother         dec 76    Diabetes Mother     Diabetes Father Stroke Father         dec age 80    Cancer Father         Sarcoma    Stomach Cancer Other     Breast Cancer Paternal Aunt     Breast Cancer Maternal Aunt           SOCIAL HISTORY       Social History     Socioeconomic History    Marital status:     Number of children: 3    Years of education: 12    Highest education level: High school graduate   Occupational History    Occupation: [employer]   Tobacco Use    Smoking status: Never    Smokeless tobacco: Never   Vaping Use    Vaping Use: Never used   Substance and Sexual Activity    Alcohol use: No    Drug use: No    Sexual activity: Not Currently     Birth control/protection: Surgical   Social History Narrative    Born in [address] [place] [address], one of 4 children, one sister disappeared at age 29, never found    Moved to [place] at age 40        Lives in an apartment in [facility] [place], alone    , 3 children, all grown, in [place]     2 granddaughters     Hobbies exercising, birds    Attends [employer]/, has volunteered at [id number] Strain: Low Risk     Difficulty of Paying Living Expenses: Not hard at all   Food Insecurity: No Food Insecurity    Worried About [employer] [employer] in the Last Year: Never true    [facility] in the Last Year: Never true         PHYSICAL EXAM        ED Triage Vitals   BP Temp Temp src Pulse Resp SpO2 Height Weight   -- -- -- -- -- -- -- --       Physical Exam  Constitutional:       General: She is not in acute distress. Appearance: Normal appearance. She is not toxic-appearing or diaphoretic. Comments: Anxious on arrival but easily calmed, no acute distress   HENT:      Head: Normocephalic and atraumatic. Right Ear: External ear normal.      Left Ear: External ear normal.      Nose: Nose normal.      Mouth/Throat:      Mouth: Mucous membranes are moist. No injury or oral lesions. Pharynx: Oropharynx is clear.  No pharyngeal swelling, oropharyngeal exudate, posterior oropharyngeal erythema or uvula swelling. Tonsils: 0 on the right. 0 on the left. Eyes:      Extraocular Movements: Extraocular movements intact. Cardiovascular:      Rate and Rhythm: Normal rate and regular rhythm. Pulmonary:      Effort: Pulmonary effort is normal. No tachypnea, accessory muscle usage or respiratory distress. Breath sounds: Normal breath sounds. No stridor. No wheezing, rhonchi or rales. Chest:      Chest wall: No tenderness. Abdominal:      General: Bowel sounds are normal.      Palpations: Abdomen is soft. Tenderness: There is no abdominal tenderness. Musculoskeletal:         General: Normal range of motion. Cervical back: Normal range of motion. Skin:     General: Skin is warm. Neurological:      Mental Status: She is alert and oriented to person, place, and time.    Psychiatric:         Mood and Affect: Mood normal.         Behavior: Behavior normal.         LABS:  Labs Reviewed   CBC WITH AUTO DIFFERENTIAL - Abnormal; Notable for the following components:       Result Value    WBC 13.6 (*)     Neutrophils Absolute 11.1 (*)     All other components within normal limits   COMPREHENSIVE METABOLIC PANEL - Abnormal; Notable for the following components:    Anion Gap 18 (*)     Glucose 187 (*)     ALT 36 (*)     All other components within normal limits   LACTIC ACID - Abnormal; Notable for the following components:    Lactic Acid 4.6 (*)     All other components within normal limits    Narrative:     Jamey Lalaserg tel. [id number],  [facility] results called to and read back by [facility], 12/03/2022  03:17, by [facility]   POCT CREATININE - Normal   PROCALCITONIN         MDM:   Vitals:    Vitals:    12/03/22 0334 12/03/22 0335 12/03/22 0500 12/03/22 0507   BP: (!) 108/97  (!) 140/96    Pulse: 91   96   Resp: 18      Temp: 97.6 °F (36.4 °C)      TempSrc: Oral      SpO2: 98%  95%    Weight:  256 lb (116.1 kg)     Height:  5' 6\" (1.676 m)         65year old female to ED for globus sensation x1 day. Thought to be a piece of chicken. Afebrile VSS, slightly anxious, not tolerating any water or other intake. Remains n.p.o. in the ED. No respiratory distress. Glucagon not helpful. Work-up remarkable for lactic acidosis at 4, slight leukocytosis at 13.6. CT soft tissue neck pending. Clinically presentation c/w esophageal foreign body. Discussed with GI Dr. Juan Mcrae will plan to do endoscopy approximately one hour from time of this dictation. Discussed patient will stay in ED and go directly to endoscopy suite when ready. Remains NPO. CT soft tissue neck still pending at time of sign out to [address] [place] [address]   Total CriticalCare time was 0 minutes, excluding separately reportable procedures. There was a high probability of clinically significant/life threatening deterioration in the patient's condition which required my urgent intervention. PROCEDURES:  Unlessotherwise noted below, none      Procedures      FINAL IMPRESSION      1.  Foreign body in esophagus, initial encounter          DISPOSITION/PLAN   DISPOSITION Ed Observation 12/03/2022 05:30:38 AM          XU Kohler (electronically signed)  Attending Emergency Physician          XU Kohler  12/07/22 0127

## 2022-12-03 NOTE — ANESTHESIA PRE PROCEDURE
Department of Anesthesiology  Preprocedure Note       Name:  Milton Wagner   Age:  65 y.o.  :  1960                                          MRN:  [id number]         Date:  12/3/2022      Surgeon: Williams Julien):  Mis Meneses MD    Procedure: Procedure(s):  EGD DIAGNOSTIC ONLY    Medications prior to admission:   Prior to Admission medications    Medication Sig Start Date End Date Taking? Authorizing Provider   gabapentin (NEURONTIN) 300 MG capsule Take 1 capsule by mouth at bedtime for 30 days. 22  Trace Lomeli MD   apixaban (ELIQUIS) 5 MG TABS tablet Take 1 tablet by mouth 2 times daily START when Eliquis Starter pack completed 22   Gladys Omer MD   calamine-zinc oxide 8-8 % LOTN lotion Apply topically as needed (pruritis.) 22   Trace Lomeli MD   meclizine (ANTIVERT) 12.5 MG tablet Take 1 tablet by mouth nightly as needed for Dizziness 22   Gladys Omer MD   atorvastatin (LIPITOR) 20 MG tablet Take 1 tablet by mouth in the morning. 22   Trace Lomeli MD   vitamin D (VITAMIN D HIGH POTENCY) 25 MCG (1000 UT) CAPS TAKE 1 CAPSULE BY MOUTH DAILY WITH SUPPER (PM) 22   Trace Lomeli MD   Multiple Vitamin (TAB-A-MANDI/BETA CAROTENE) TABS TAKE 1 TABLET BY MOUTH EVERY DAY (AM) 22   Trace Lomeli MD   metoprolol tartrate (LOPRESSOR) 50 MG tablet Take 1 tablet by mouth in the morning and 1 tablet before bedtime.  22   Gladys Omer MD   budesonide-formoterol (SYMBICORT) 160-4.5 MCG/ACT AERO INHALE 2 PUFFS INTO THE LUNGS 2 TIMES DAILY 22   Troy Lomeli MD   Magnesium 400 MG CAPS Take 1 capsule by mouth nightly as needed (muscle spasms) 22   Gladys Omer MD   midodrine (PROAMATINE) 10 MG tablet Take one tablet by mouth twice daily 22   Corina Navarro MD   ARIPiprazole (ABILIFY) 15 MG tablet Take 1 tablet by mouth daily 22   Trace Lomeli MD   levothyroxine (SYNTHROID) 50 MCG tablet Take 1 tablet by mouth Daily 5/4/22   Princess Vega MD   famotidine (PEPCID) 20 MG tablet TAKE 1 TABLET BY MOUTH TWICE DAILY (AM,PM) 5/4/22   Princess Vega MD   benztropine (COGENTIN) 1 MG tablet Take 1 tablet by mouth daily 5/4/22   Trace Lomeli MD   Cyanocobalamin (B-12) 1000 MCG TABS TAKE 1 TABLET BY MOUTH ONCE A DAY (NOON) 5/4/22   Princess Vega MD   Biotin 300 MCG TABS Take 1 tablet by mouth daily 5/4/22   Trace Lomeli MD   diclofenac sodium (VOLTAREN) 1 % GEL APPLY 2 G TOPICALLY 4 TIMES DAILY 4/11/22   Trace Lomeli MD   VENTOLIN  (90 Base) MCG/ACT inhaler USE 2 PUFFS EVERY 4 HRS AS NEEDED FOR WHEEZING/SOB-SPACE OUT TO EVERY 6 HR AS SYMPTOMS IMPROVE 3/21/22   Trace Lomeli MD   ketoconazole (NIZORAL) 2 % shampoo APPLY TOPICALLY DAILY AS NEEDED. 2/28/22   Trace Lomeli MD   albuterol sulfate HFA (VENTOLIN HFA) 108 (90 Base) MCG/ACT inhaler Inhale 2 puffs into the lungs every 6 hours as needed for Wheezing Please cover what is under insurance. 2/23/22   Princess Vega MD   VRAYLAR 1.5 MG capsule Take 1.5 mg by mouth daily 1/17/22   Historical Provider, MD   prazosin (MINIPRESS) 1 MG capsule Take 1 mg by mouth daily 12/29/21   Historical Provider, MD   metFORMIN (GLUCOPHAGE) 500 MG tablet TAKE 1 TABLET BY MOUTH TWICE A DAY WITH MEALS 1/27/22   Princess Vega MD   Misc.  Devices (ROLLATOR ULTRA-LIGHT) MISC 1 each by Does not apply route once for 1 dose 11/17/21 11/17/21  Helena Closs, MD   aspirin (ASPIRIN LOW DOSE) 81 MG EC tablet TAKE 1 TABLET BY MOUTH ONCE A DAY (AM) 11/11/21   Trace Lomeli MD   Blood Pressure Monitoring (B-D ASSURE BPM/AUTO ARM CUFF) MISC Use as directed 11/11/21   Helena Closs, MD   EPINEPHrine (EPIPEN 2-PRAVEENA) 0.3 MG/0.3ML SOAJ injection Use as directed for allergic reaction 4/19/21   Princess Vega MD   Multiple Vitamins-Minerals (THERAPEUTIC MULTIVITAMIN-MINERALS) tablet Take 1 tablet by mouth daily 12/29/15 8/30/21 Juan Ramon Bose MD   albuterol (PROVENTIL;VENTOLIN) 90 MCG/ACT inhaler Inhale 2 puffs into the lungs every 6 hours as needed. 12/21/12 8/30/21  Juan Ramon Bose MD       Current medications:    No current facility-administered medications for this encounter. Allergies: Allergies   Allergen Reactions    Bee Venom        Problem List:    Patient Active Problem List   Diagnosis Code    PTSD (post-traumatic stress disorder) F43.10    Depression F32. A    Reactive airway disease J45.909    Environmental allergies     Hyperlipidemia E78.5    S/P colonoscopy Z98.890    Fatty liver disease, nonalcoholic B33.9    Vitamin D deficiency E55.9    IFG (impaired fasting glucose) R73.01    Obesity (BMI 30-39. 9) E66.9    DJD of left shoulder M19.012    Asthma J45.909    Osteoarthritis of left knee M17.12    Cheilosis K13.0    Atrial flutter (HCC) I48.92    Anxiety F41.9    Diarrhea R19.7    Right hip pain M25.551    Acute pain of both shoulders M25.511, M25.512    Left arm pain M79.602    Left elbow pain M25.522    Lung infiltrate R91.8    Essential tremor G25.0    Syncope R55    Orthostatic hypotension I95.1    History of melanoma Z85.820    Pulmonary embolism on right (HCC) I26.99    Acute pulmonary embolism with acute cor pulmonale (HCC) I26.09    Choroidal nevus of left eye 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.32    Combined forms of age-related cataract of left eye H25.812    History of COVID-19 Z86.16       Past Medical History:        Diagnosis Date    Asthma 2015    Bilateral renal cysts 2013    CAD (coronary artery disease)     Depression     since age 35, was with Dr Carlene Payne, now the [facility]    Diverticulosis of sigmoid colon 2012    Dr Au Course DJD of left shoulder     Environmental allergies     Fatty liver 2013    GERD (gastroesophageal reflux disease)     History of cardiac arrhythmia     \"due to stress, was placed on medication\"    Hydatidiform mole     age 26     Hyperlipidemia     Hypothyroidism 2011    IFG (impaired fasting glucose) 2013    Melanoma [facility] eye [facility])     age 30, R eye prosthesis, Dr Giovanna Orellana    Obesity     Prediabetes     PTSD (post-traumatic stress disorder)     age 35, [address]    S/P colonoscopy 04/19/2012    Dr. Massiel Brewster S/P hysterectomy with oophorectomy 2012    Dr Phil Rader D deficiency        Past Surgical History:        Procedure Laterality Date    DILATION AND CURETTAGE OF UTERUS      ENDOMETRIAL ABLATION  06/2012    EYE REMOVAL      OD for melanoma, age 30    FOOT OSTEOTOMY Left 09/23/2016    B JACKMAN DPM      HYSTERECTOMY (CERVIX STATUS UNKNOWN)      BALDOMERO STEROTACTIC LOC BREAST BIOPSY RIGHT Right 07/19/2021    BALDOMERO STEROTACTIC LOC BREAST BIOPSY RIGHT 7/19/2021 [facility]    LINDSAY AND BSO (CERVIX REMOVED)  2012    Dr Mark Raines       Social History:    Social History     Tobacco Use    Smoking status: Never    Smokeless tobacco: Never   Substance Use Topics    Alcohol use: No                                Counseling given: Not Answered      Vital Signs (Current):   Vitals:    12/03/22 0500 12/03/22 0507 12/03/22 0730 12/03/22 0925   BP: (!) 140/96  (!) 142/89 (!) 140/82   Pulse:  96  (!) 114   Resp:    18   Temp:    98.8 °F (37.1 °C)   TempSrc:    Temporal   SpO2: 95%  96% 93%   Weight:    256 lb (116.1 kg)   Height:    5' 6\" (1.676 m)                                              BP Readings from Last 3 Encounters:   12/03/22 (!) 140/82   11/04/22 115/80   08/04/22 128/68       NPO Status:                                                                                 BMI:   Wt Readings from Last 3 Encounters:   12/03/22 256 lb (116.1 kg)   11/04/22 256 lb (116.1 kg)   08/15/22 254 lb (115.2 kg)     Body mass index is 41.32 kg/m².     CBC:   Lab Results   Component Value Date/Time    WBC 13.6 12/03/2022 02:38 AM    RBC 4.87 12/03/2022 02:38 AM    RBC 4.57 05/14/2012 02:03 PM    HGB 15.0 12/03/2022 02:38 AM    HCT 45.3 12/03/2022 02:38 AM    MCV 93.0 12/03/2022 02:38 AM    RDW 13.6 12/03/2022 02:38 AM     12/03/2022 02:38 AM       CMP:   Lab Results   Component Value Date/Time     12/03/2022 02:38 AM    K 3.7 12/03/2022 02:38 AM     12/03/2022 02:38 AM    CO2 20 12/03/2022 02:38 AM    BUN 13 12/03/2022 02:38 AM    CREATININE 0.7 12/03/2022 03:04 AM    CREATININE 0.84 12/03/2022 02:38 AM    GFRAA >60.0 10/25/2021 12:00 PM    LABGLOM >60 12/03/2022 03:04 AM    GLUCOSE 187 12/03/2022 02:38 AM    GLUCOSE 92 05/14/2012 02:03 PM    PROT 7.0 12/03/2022 02:38 AM    CALCIUM 9.3 12/03/2022 02:38 AM    BILITOT 0.5 12/03/2022 02:38 AM    ALKPHOS 88 12/03/2022 02:38 AM    AST 32 12/03/2022 02:38 AM    ALT 36 12/03/2022 02:38 AM       POC Tests: No results for input(s): POCGLU, POCNA, POCK, POCCL, POCBUN, POCHEMO, POCHCT in the last 72 hours.     Coags:   Lab Results   Component Value Date/Time    PROTIME 16.9 10/25/2021 12:00 PM    INR 1.4 10/25/2021 12:00 PM    APTT 30.6 10/25/2021 12:00 PM       HCG (If Applicable): No results found for: PREGTESTUR, PREGSERUM, HCG, HCGQUANT     ABGs:   Lab Results   Component Value Date/Time    PHART 7.437 02/13/2017 04:41 PM    PO2ART 74 02/13/2017 04:41 PM    BMA7GKZ 41 02/13/2017 04:41 PM    WVG9NWA 27.9 02/13/2017 04:41 PM    BEART 4 02/13/2017 04:41 PM    H2CCKDMA 95 02/13/2017 04:41 PM        Type & Screen (If Applicable):  No results found for: LABABO, LABRH    Drug/Infectious Status (If Applicable):  No results found for: HIV, HEPCAB    COVID-19 Screening (If Applicable):   Lab Results   Component Value Date/Time    COVID19 Not-Detected 06/09/2022 09:31 AM    COVID19 Not Detected 02/26/2021 09:55 AM           Anesthesia Evaluation    Airway: Mallampati: II  TM distance: >3 FB   Neck ROM: full  Mouth opening: > = 3 FB   Dental:    (+) upper dentures and lower dentures      Pulmonary: breath sounds clear to auscultation  (+) asthma: Cardiovascular:    (+) CAD:, dysrhythmias: atrial fibrillation, hyperlipidemia        Rhythm: irregular             Beta Blocker:  Dose within 24 Hrs         Neuro/Psych:   (+) psychiatric history:            GI/Hepatic/Renal:   (+) GERD:, morbid obesity          Endo/Other:    (+) DiabetesType II DM, , hypothyroidism, blood dyscrasia: anticoagulation therapy:., .                 Abdominal:             Vascular: negative vascular ROS. Other Findings:           Anesthesia Plan      MAC     ASA 3 - emergent       Induction: intravenous. MIPS: Prophylactic antiemetics administered. Anesthetic plan and risks discussed with patient.       Plan discussed with surgical team.    Attending anesthesiologist reviewed and agrees with Preprocedure content                Alvina Dougherty MD   12/3/2022

## 2022-12-03 NOTE — ED NOTES
Dr. Matthew Morton was advised pt would like something for pain.      Yesi Wilkerson RN  12/03/22 3506

## 2022-12-03 NOTE — ED NOTES
Attempted to get pt out of pants and ready for GI.  Pt stated that she didn't want to change out of her pants and that she wanted to keep them on      Ben Chapman RN  12/03/22 3918

## 2022-12-03 NOTE — H&P
History and Physical    Admit Date: 12/3/2022  PCP: Maggy La MD    CHIEF COMPLAINT:    Chief Complaint   Patient presents with    Chest Pain     Epigastric, burning        HISTORY OF PRESENT ILLNESS:    The patient is a 65 y.o. female with a past medical history of A. fib on Eliquis, asthma, HTN, hypothyroidism, HLD, obesity, prediabetes, PTSD, depression presents with chest pain. Patient initially came to the emergency room last night and felt that she had a foreign body stuck in her esophagus and she actually underwent EGD on an emergent basis to retrieve the foreign body. Patient also had nausea and emesis. No foreign body or food bolus was noted on her EGD. She continues to have chest pain so she was sent back to the emergency room. On my exam, patient reports that she been having chest pain for the last 2 weeks. Pain is left-sided and constant. Initially complained of burning epigastric pain. No diaphoresis. No fever or chills. Has dyspnea worse with exertion. Denies history of cardiac catheterization. Had cardiac stress test however in 2020 that showed no evidence of myocardial ischemia. In ED, her troponin noted to be elevated at 0.99. CTA chest shows no PE but reports pulmonary vascular congestion. She also has small hiatal hernia on CTA chest.  Lactate is 4.0.     Past Medical History:        Diagnosis Date    Asthma 2015    Bilateral renal cysts 2013    CAD (coronary artery disease)     Depression     since age 35, was with Dr Bea Hawley, now the [facility]    Diverticulosis of sigmoid colon 2012    Dr Lauren Quinones    DJD of left shoulder     Environmental allergies     Fatty liver 2013    GERD (gastroesophageal reflux disease)     History of cardiac arrhythmia     \"due to stress, was placed on medication\"    Hydatidiform mole     age 26     Hyperlipidemia     Hypothyroidism 2011    IFG (impaired fasting glucose) 2013    Melanoma of eye ([facility] [address]     age 30, R eye prosthesis, Dr Maureen Becerril Obesity     Prediabetes     PTSD (post-traumatic stress disorder)     [facility] 35[facility]    S/P colonoscopy 04/19/2012    Dr. Elif Lucas    S/P hysterectomy with oophorectomy 2012    Dr Octavio Edwards    Tremor     Dr Ruiz Pel    Vitamin D deficiency        Past Surgical History:        Procedure Laterality Date    DILATION AND CURETTAGE OF UTERUS      ENDOMETRIAL ABLATION  06/2012    EYE REMOVAL      OD for melanoma, age 30    FOOT OSTEOTOMY Left 09/23/2016    B JACKMAN DPM      HYSTERECTOMY (CERVIX STATUS UNKNOWN)      BALDOMERO STEROTACTIC LOC BREAST BIOPSY RIGHT Right 07/19/2021    BALDOMERO STEROTACTIC LOC BREAST BIOPSY RIGHT 7/19/2021 [facility]    LINDSAY AND BSO (CERVIX REMOVED)  2012    Dr Octavio Edwards       Social History:   Social History     Socioeconomic History    Marital status:      Spouse name: Not on file    Number of children: 3    Years of education: 12    Highest education level: High school graduate   Occupational History    Occupation: [employer]   Tobacco Use    Smoking status: Never    Smokeless tobacco: Never   Vaping Use    Vaping Use: Never used   Substance and Sexual Activity    Alcohol use: No    Drug use: No    Sexual activity: Not Currently     Birth control/protection: Surgical   Other Topics Concern    Not on file   Social History Narrative    Born in [place], one of 4 children, one sister disappeared at age 30, never found    Moved to [place] at age 40        Lives in an apartment in [facility], alone    , 3 children, all grown, in [place]     2 granddaughters     Hobbies exercising, birds    Attends [employer], has volunteered at [id number] [employer] Strain: Low Risk     Difficulty of Paying Living Expenses: Not hard at all   Food Insecurity: No Food Insecurity    Worried About [address] in the Last Year: Never true    [address] in the Last Year: Never true   Transportation Needs: Not on file   Physical Activity: Not on file   Stress: Not on file   Social Connections: Not on file   Intimate Partner Violence: Not on file   Housing Stability: Not on file       Family History:   Family History   Problem Relation Age of Onset    Heart Failure Mother         dec 76    Diabetes Mother     Diabetes Father     Stroke Father         dec age 80    Cancer Father         Sarcoma    Breast Cancer Maternal Aunt     Breast Cancer Paternal Aunt     Stomach Cancer Other     Colon Cancer Neg Hx        Medications Prior to Admission:    Prior to Admission medications    Medication Sig Start Date End Date Taking? Authorizing Provider   pantoprazole (PROTONIX) 40 MG tablet Take 1 tablet by mouth daily 12/3/22 3/3/23  Alison Hayden MD   gabapentin (NEURONTIN) 300 MG capsule Take 1 capsule by mouth at bedtime for 30 days. 11/4/22 12/4/22  Trace Lomeli MD   apixaban (ELIQUIS) 5 MG TABS tablet Take 1 tablet by mouth 2 times daily START when Eliquis Starter pack completed 11/4/22   Nasreen Rader MD   calamine-zinc oxide 8-8 % LOTN lotion Apply topically as needed (pruritis.) 11/4/22   Trace Lomeli MD   meclizine (ANTIVERT) 12.5 MG tablet Take 1 tablet by mouth nightly as needed for Dizziness 8/4/22   Nasreen Rader MD   atorvastatin (LIPITOR) 20 MG tablet Take 1 tablet by mouth in the morning. 8/4/22   Trace Lomeli MD   vitamin D (VITAMIN D HIGH POTENCY) 25 MCG (1000 UT) CAPS TAKE 1 CAPSULE BY MOUTH DAILY WITH SUPPER (PM) 8/4/22   Trace Lomeli MD   Multiple Vitamin (TAB-A-MANDI/BETA CAROTENE) TABS TAKE 1 TABLET BY MOUTH EVERY DAY (AM) 8/4/22   Trace Lomeli MD   metoprolol tartrate (LOPRESSOR) 50 MG tablet Take 1 tablet by mouth in the morning and 1 tablet before bedtime.  8/4/22   Nasreen Rader MD   budesonide-formoterol (SYMBICORT) 160-4.5 MCG/ACT AERO INHALE 2 PUFFS INTO THE LUNGS 2 TIMES DAILY 8/4/22   David Lomeli MD   Magnesium 400 MG CAPS Take 1 capsule by mouth nightly as needed (muscle spasms) 8/4/22 Gladys Omer MD   midodrine (PROAMATINE) 10 MG tablet Take one tablet by mouth twice daily 5/5/22   Corina Navarro MD   ARIPiprazole (ABILIFY) 15 MG tablet Take 1 tablet by mouth daily 5/4/22   Gladys Omer MD   levothyroxine (SYNTHROID) 50 MCG tablet Take 1 tablet by mouth Daily 5/4/22   Gladys Omer MD   famotidine (PEPCID) 20 MG tablet TAKE 1 TABLET BY MOUTH TWICE DAILY (AM,PM) 5/4/22   Gladys Omer MD   benztropine (COGENTIN) 1 MG tablet Take 1 tablet by mouth daily 5/4/22   Trace Lomeli MD   Cyanocobalamin (B-12) 1000 MCG TABS TAKE 1 TABLET BY MOUTH ONCE A DAY (NOON) 5/4/22   Gladys Omer MD   Biotin 300 MCG TABS Take 1 tablet by mouth daily 5/4/22   Trace Lomeli MD   diclofenac sodium (VOLTAREN) 1 % GEL APPLY 2 G TOPICALLY 4 TIMES DAILY 4/11/22   Trace Lomeli MD   VENTOLIN  (90 Base) MCG/ACT inhaler USE 2 PUFFS EVERY 4 HRS AS NEEDED FOR WHEEZING/SOB-SPACE OUT TO EVERY 6 HR AS SYMPTOMS IMPROVE 3/21/22   Trace Lomeli MD   ketoconazole (NIZORAL) 2 % shampoo APPLY TOPICALLY DAILY AS NEEDED. 2/28/22   Trace Lomeli MD   albuterol sulfate HFA (VENTOLIN HFA) 108 (90 Base) MCG/ACT inhaler Inhale 2 puffs into the lungs every 6 hours as needed for Wheezing Please cover what is under insurance. 2/23/22   Gladys Omer MD   VRAYLAR 1.5 MG capsule Take 1.5 mg by mouth daily 1/17/22   Historical Provider, MD   prazosin (MINIPRESS) 1 MG capsule Take 1 mg by mouth daily 12/29/21   Historical Provider, MD   metFORMIN (GLUCOPHAGE) 500 MG tablet TAKE 1 TABLET BY MOUTH TWICE A DAY WITH MEALS 1/27/22   Gladys Omer MD   Misc.  Devices (ROLLATOR ULTRA-LIGHT) MISC 1 each by Does not apply route once for 1 dose 11/17/21 11/17/21  Julio Cesar Ruiz MD   aspirin (ASPIRIN LOW DOSE) 81 MG EC tablet TAKE 1 TABLET BY MOUTH ONCE A DAY (AM) 11/11/21   Gladys Omer MD   Blood Pressure Monitoring (B-D ASSURE BPM/AUTO ARM CUFF) MISC Use as directed 21   Brianne Alaniz MD   EPINEPHrine (EPIPEN 2-PRAVEENA) 0.3 MG/0.3ML SOAJ injection Use as directed for allergic reaction 21   Ruben Barrios MD   Multiple Vitamins-Minerals (THERAPEUTIC MULTIVITAMIN-MINERALS) tablet Take 1 tablet by mouth daily 12/29/15 8/30/21  Qian Llanos MD   albuterol (PROVENTIL;VENTOLIN) 90 MCG/ACT inhaler Inhale 2 puffs into the lungs every 6 hours as needed. 12  Qian Llanos MD       Allergies:  Bee venom    REVIEW OF SYSTEMS:  All systems reviewed and negative except for what is in HPI. PHYSICAL EXAM:  Vitals:  BP (!) 150/106   Pulse (!) 111   Temp 98 °F (36.7 °C) (Oral)   Resp 22   SpO2 94%   BMI Classification: Morbidly Obese (>40.0)  Pulse Ox: SpO2  Av.9 %  Min: 90 %  Max: 98 %  Supplemental O2: O2 Flow Rate (L/min): 2 L/min    CONSTITUTIONAL:  awake, alert, cooperative, no apparent distress, and appears stated age  HEENT: Normocephalic, PERRLA  NECK: no JVD, no LAD  HEART: RRR, no murmurs, gallops, or rubs  LUNGS: clear to auscultation bilaterally, no wheezes, crackles, or rhonchi.   ABDOMEN: soft/NT/ND, positive BS  MUSCULOSKELETAL: negative for edema, +2 pulses  SKIN: intact without rash or jaundice  NEURO:  CN intact and no focal deficits    DATA:  Recent Results (from the past 24 hour(s))   POCT CREATININE    Collection Time: 22  2:36 AM   Result Value Ref Range    POC CREATININE WHOLE BLOOD 0.7    CBC with Auto Differential    Collection Time: 22  2:38 AM   Result Value Ref Range    WBC 13.6 (H) 4.8 - 10.8 K/uL    RBC 4.87 4.20 - 5.40 M/uL    Hemoglobin 15.0 12.0 - 16.0 g/dL    Hematocrit 45.3 37.0 - 47.0 %    MCV 93.0 79.4 - 94.8 fL    MCH 30.8 27.0 - 31.3 pg    MCHC 33.1 33.0 - 37.0 %    RDW 13.6 11.5 - 14.5 %    Platelets 931 772 - 709 K/uL    Neutrophils % 81.5 %    Lymphocytes % 13.6 %    Monocytes % 4.0 %    Eosinophils % 0.3 %    Basophils % 0.6 %    Neutrophils Absolute 11.1 (H) 1.4 - 6.5 K/uL    Lymphocytes Absolute 1.8 1.0 - 4.8 K/uL    Monocytes Absolute 0.5 0.2 - 0.8 K/uL    Eosinophils Absolute 0.0 0.0 - 0.7 K/uL    Basophils Absolute 0.1 0.0 - 0.2 K/uL   CMP    Collection Time: 12/03/22  2:38 AM   Result Value Ref Range    Sodium 141 135 - 144 mEq/L    Potassium 3.7 3.4 - 4.9 mEq/L    Chloride 103 95 - 107 mEq/L    CO2 20 20 - 31 mEq/L    Anion Gap 18 (H) 9 - 15 mEq/L    Glucose 187 (H) 70 - 99 mg/dL    BUN 13 8 - 23 mg/dL    Creatinine 0.84 0.50 - 0.90 mg/dL    Est, Glom Filt Rate >60.0 >60    Calcium 9.3 8.5 - 9.9 mg/dL    Total Protein 7.0 6.3 - 8.0 g/dL    Albumin 4.3 3.5 - 4.6 g/dL    Total Bilirubin 0.5 0.2 - 0.7 mg/dL    Alkaline Phosphatase 88 40 - 130 U/L    ALT 36 (H) 0 - 33 U/L    AST 32 0 - 35 U/L    Globulin 2.7 2.3 - 3.5 g/dL   Lactic Acid    Collection Time: 12/03/22  2:38 AM   Result Value Ref Range    Lactic Acid 4.6 (HH) 0.5 - 2.2 mmol/L   Procalcitonin    Collection Time: 12/03/22  2:38 AM   Result Value Ref Range    Procalcitonin 0.11 0.00 - 0.15 ng/mL   POCT Venous    Collection Time: 12/03/22  3:04 AM   Result Value Ref Range    POC Creatinine 0.7 0.6 - 1.2 mg/dL    Est, Glom Filt Rate >60 >60    Sample Type GUDELIA     Performed on SEE BELOW    CMP    Collection Time: 12/03/22 11:15 AM   Result Value Ref Range    Sodium 139 135 - 144 mEq/L    Potassium 4.0 3.4 - 4.9 mEq/L    Chloride 105 95 - 107 mEq/L    CO2 17 (L) 20 - 31 mEq/L    Anion Gap 17 (H) 9 - 15 mEq/L    Glucose 182 (H) 70 - 99 mg/dL    BUN 13 8 - 23 mg/dL    Creatinine 0.79 0.50 - 0.90 mg/dL    Est, Glom Filt Rate >60.0 >60    Calcium 9.1 8.5 - 9.9 mg/dL    Total Protein 6.5 6.3 - 8.0 g/dL    Albumin 4.0 3.5 - 4.6 g/dL    Total Bilirubin 0.4 0.2 - 0.7 mg/dL    Alkaline Phosphatase 86 40 - 130 U/L    ALT 33 0 - 33 U/L    AST 28 0 - 35 U/L    Globulin 2.5 2.3 - 3.5 g/dL   CBC with Auto Differential    Collection Time: 12/03/22 11:15 AM   Result Value Ref Range    WBC 12.4 (H) 4.8 - 10.8 K/uL    RBC 4.31 4.20 - 5.40 M/uL    Hemoglobin 13.4 12.0 - 16.0 g/dL    Hematocrit 40.7 37.0 - 47.0 %    MCV 94.3 79.4 - 94.8 fL    MCH 31.0 27.0 - 31.3 pg    MCHC 32.9 (L) 33.0 - 37.0 %    RDW 13.7 11.5 - 14.5 %    Platelets 199 400 - 805 K/uL    Neutrophils % 91.3 %    Lymphocytes % 6.5 %    Monocytes % 2.1 %    Eosinophils % 0.0 %    Basophils % 0.1 %    Neutrophils Absolute 11.3 (H) 1.4 - 6.5 K/uL    Lymphocytes Absolute 0.8 (L) 1.0 - 4.8 K/uL    Monocytes Absolute 0.3 0.2 - 0.8 K/uL    Eosinophils Absolute 0.0 0.0 - 0.7 K/uL    Basophils Absolute 0.0 0.0 - 0.2 K/uL   Troponin    Collection Time: 12/03/22 11:15 AM   Result Value Ref Range    Troponin 0.099 (HH) 0.000 - 0.010 ng/mL   Lactic Acid    Collection Time: 12/03/22 11:15 AM   Result Value Ref Range    Lactic Acid 4.0 (HH) 0.5 - 2.2 mmol/L           ASSESSMENT AND PLAN:    NSTEMI-chest pain-rule out ACS  Initial concern for food bolus stuck in esophagus-status post EGD, no polyps found  Lactic acidosis   Pulm vascular congestion- reported on CTA but clinically not overloaded   Obesity  HLD  A. fib on Eliquis    Plan  Will admit to cardiac floor with telemetry  Trend cardiac enzymes  Will restart home aspirin and start the patient on Lovenox 1mg/kg bid, hold eliquis  Home statin and BB  Cardiology consult   Repeat Echo  Start IVF, recheck lactate   PPI  Home meds will be ordered once reviewed by RN/pharmacy   DVT PPx  Discussed with pt          Code status: full     Electronically signed by Amna Gracia DO on 12/3/22 at 3:15 PM EST

## 2022-12-04 LAB
ANION GAP SERPL CALCULATED.3IONS-SCNC: 16 MEQ/L (ref 9–15)
BUN BLDV-MCNC: 13 MG/DL (ref 8–23)
CALCIUM SERPL-MCNC: 9.3 MG/DL (ref 8.5–9.9)
CHLORIDE BLD-SCNC: 100 MEQ/L (ref 95–107)
CO2: 23 MEQ/L (ref 20–31)
CREAT SERPL-MCNC: 0.73 MG/DL (ref 0.5–0.9)
GFR SERPL CREATININE-BSD FRML MDRD: >60 ML/MIN/{1.73_M2}
GLUCOSE BLD-MCNC: 138 MG/DL (ref 70–99)
GLUCOSE BLD-MCNC: 146 MG/DL (ref 70–99)
GLUCOSE BLD-MCNC: 154 MG/DL (ref 70–99)
GLUCOSE BLD-MCNC: 155 MG/DL (ref 70–99)
GLUCOSE BLD-MCNC: 190 MG/DL (ref 70–99)
HCT VFR BLD CALC: 40.9 % (ref 37–47)
HEMOGLOBIN: 13.8 G/DL (ref 12–16)
MCH RBC QN AUTO: 31.2 PG (ref 27–31.3)
MCHC RBC AUTO-ENTMCNC: 33.7 % (ref 33–37)
MCV RBC AUTO: 92.7 FL (ref 79.4–94.8)
PDW BLD-RTO: 13.9 % (ref 11.5–14.5)
PERFORMED ON: ABNORMAL
PLATELET # BLD: 305 K/UL (ref 130–400)
POTASSIUM REFLEX MAGNESIUM: 3.7 MEQ/L (ref 3.4–4.9)
PRO-BNP: 3857 PG/ML
RBC # BLD: 4.41 M/UL (ref 4.2–5.4)
SODIUM BLD-SCNC: 139 MEQ/L (ref 135–144)
WBC # BLD: 13.4 K/UL (ref 4.8–10.8)

## 2022-12-04 PROCEDURE — 99255 IP/OBS CONSLTJ NEW/EST HI 80: CPT | Performed by: INTERNAL MEDICINE

## 2022-12-04 PROCEDURE — 93005 ELECTROCARDIOGRAM TRACING: CPT | Performed by: INTERNAL MEDICINE

## 2022-12-04 PROCEDURE — 6360000002 HC RX W HCPCS: Performed by: INTERNAL MEDICINE

## 2022-12-04 PROCEDURE — 85027 COMPLETE CBC AUTOMATED: CPT

## 2022-12-04 PROCEDURE — 6370000000 HC RX 637 (ALT 250 FOR IP): Performed by: INTERNAL MEDICINE

## 2022-12-04 PROCEDURE — 2060000000 HC ICU INTERMEDIATE R&B

## 2022-12-04 PROCEDURE — 83880 ASSAY OF NATRIURETIC PEPTIDE: CPT

## 2022-12-04 PROCEDURE — 2580000003 HC RX 258: Performed by: INTERNAL MEDICINE

## 2022-12-04 PROCEDURE — 2700000000 HC OXYGEN THERAPY PER DAY

## 2022-12-04 PROCEDURE — 80048 BASIC METABOLIC PNL TOTAL CA: CPT

## 2022-12-04 PROCEDURE — 36415 COLL VENOUS BLD VENIPUNCTURE: CPT

## 2022-12-04 RX ORDER — METOPROLOL SUCCINATE 50 MG/1
50 TABLET, EXTENDED RELEASE ORAL DAILY
Status: DISCONTINUED | OUTPATIENT
Start: 2022-12-04 | End: 2022-12-07 | Stop reason: HOSPADM

## 2022-12-04 RX ORDER — POTASSIUM CHLORIDE 20 MEQ/1
20 TABLET, EXTENDED RELEASE ORAL 2 TIMES DAILY WITH MEALS
Status: DISCONTINUED | OUTPATIENT
Start: 2022-12-04 | End: 2022-12-07

## 2022-12-04 RX ORDER — CLOPIDOGREL BISULFATE 75 MG/1
75 TABLET ORAL DAILY
Status: DISCONTINUED | OUTPATIENT
Start: 2022-12-04 | End: 2022-12-07 | Stop reason: HOSPADM

## 2022-12-04 RX ORDER — FUROSEMIDE 10 MG/ML
40 INJECTION INTRAMUSCULAR; INTRAVENOUS 2 TIMES DAILY
Status: DISCONTINUED | OUTPATIENT
Start: 2022-12-04 | End: 2022-12-06

## 2022-12-04 RX ORDER — LOSARTAN POTASSIUM 50 MG/1
50 TABLET ORAL DAILY
Status: DISCONTINUED | OUTPATIENT
Start: 2022-12-04 | End: 2022-12-07

## 2022-12-04 RX ADMIN — ASPIRIN 81 MG CHEWABLE TABLET 81 MG: 81 TABLET CHEWABLE at 08:21

## 2022-12-04 RX ADMIN — MORPHINE SULFATE 1 MG: 2 INJECTION, SOLUTION INTRAMUSCULAR; INTRAVENOUS at 03:00

## 2022-12-04 RX ADMIN — POTASSIUM CHLORIDE 20 MEQ: 1500 TABLET, EXTENDED RELEASE ORAL at 17:37

## 2022-12-04 RX ADMIN — ENOXAPARIN SODIUM 120 MG: 150 INJECTION SUBCUTANEOUS at 08:21

## 2022-12-04 RX ADMIN — POTASSIUM CHLORIDE 20 MEQ: 1500 TABLET, EXTENDED RELEASE ORAL at 08:21

## 2022-12-04 RX ADMIN — CLOPIDOGREL BISULFATE 75 MG: 75 TABLET, FILM COATED ORAL at 08:21

## 2022-12-04 RX ADMIN — PANTOPRAZOLE SODIUM 40 MG: 40 TABLET, DELAYED RELEASE ORAL at 06:21

## 2022-12-04 RX ADMIN — NITROGLYCERIN 0.5 INCH: 20 OINTMENT TOPICAL at 06:21

## 2022-12-04 RX ADMIN — ENOXAPARIN SODIUM 120 MG: 150 INJECTION SUBCUTANEOUS at 20:30

## 2022-12-04 RX ADMIN — FUROSEMIDE 40 MG: 10 INJECTION, SOLUTION INTRAMUSCULAR; INTRAVENOUS at 17:37

## 2022-12-04 RX ADMIN — Medication 10 ML: at 08:21

## 2022-12-04 RX ADMIN — NITROGLYCERIN 0.5 INCH: 20 OINTMENT TOPICAL at 11:03

## 2022-12-04 RX ADMIN — ATORVASTATIN CALCIUM 40 MG: 40 TABLET, FILM COATED ORAL at 20:30

## 2022-12-04 RX ADMIN — LOSARTAN POTASSIUM 50 MG: 50 TABLET, FILM COATED ORAL at 08:21

## 2022-12-04 RX ADMIN — FUROSEMIDE 40 MG: 10 INJECTION, SOLUTION INTRAMUSCULAR; INTRAVENOUS at 08:22

## 2022-12-04 RX ADMIN — METOPROLOL SUCCINATE 50 MG: 50 TABLET, EXTENDED RELEASE ORAL at 08:21

## 2022-12-04 RX ADMIN — ONDANSETRON 4 MG: 2 INJECTION INTRAMUSCULAR; INTRAVENOUS at 03:00

## 2022-12-04 RX ADMIN — ACETAMINOPHEN 650 MG: 325 TABLET ORAL at 21:24

## 2022-12-04 RX ADMIN — Medication 10 ML: at 20:30

## 2022-12-04 RX ADMIN — NITROGLYCERIN 0.5 INCH: 20 OINTMENT TOPICAL at 17:37

## 2022-12-04 ASSESSMENT — PAIN DESCRIPTION - DESCRIPTORS
DESCRIPTORS: ACHING
DESCRIPTORS: ACHING;SHARP
DESCRIPTORS: HEAVINESS;PRESSURE

## 2022-12-04 ASSESSMENT — ENCOUNTER SYMPTOMS
CHEST TIGHTNESS: 1
EYES NEGATIVE: 1
COUGH: 0
SHORTNESS OF BREATH: 1
STRIDOR: 0
WHEEZING: 0
BLOOD IN STOOL: 0
GASTROINTESTINAL NEGATIVE: 1
NAUSEA: 0

## 2022-12-04 ASSESSMENT — PAIN DESCRIPTION - LOCATION
LOCATION: CHEST
LOCATION: HEAD

## 2022-12-04 ASSESSMENT — PAIN SCALES - GENERAL
PAINLEVEL_OUTOF10: 8
PAINLEVEL_OUTOF10: 6
PAINLEVEL_OUTOF10: 9
PAINLEVEL_OUTOF10: 0
PAINLEVEL_OUTOF10: 7

## 2022-12-04 ASSESSMENT — PAIN DESCRIPTION - ORIENTATION
ORIENTATION: ANTERIOR;MID
ORIENTATION: ANTERIOR;MID

## 2022-12-04 ASSESSMENT — PAIN DESCRIPTION - DIRECTION: RADIATING_TOWARDS: BILAT ARMS

## 2022-12-04 ASSESSMENT — PAIN DESCRIPTION - ONSET
ONSET: ON-GOING
ONSET: ON-GOING

## 2022-12-04 ASSESSMENT — PAIN DESCRIPTION - PAIN TYPE
TYPE: ACUTE PAIN
TYPE: ACUTE PAIN

## 2022-12-04 ASSESSMENT — PAIN DESCRIPTION - FREQUENCY
FREQUENCY: CONTINUOUS
FREQUENCY: CONTINUOUS

## 2022-12-04 NOTE — PROGRESS NOTES
1930: Assumed care of pt at RN shift change after receiving bedside report from Lynda Gray, no acute distress noted at this time, side rails up x 2, call light within reach, and white board updated. Pt with no immediate questions or concerns at this time      2016: Pt reporting 10/10 chest pain, mid-sternal, with associated radiating pain down bilateral arms. EKG obtained and Dr Ellis Santizo made aware, as well as Dr Asad Li. Elevated troponin results also provided to both providers. Pt had been administered SL Nitro x 2 w/o change in pain as well as morphine. 2040: Received call from consulting provider, Dr Asad Li, additional medications were added on for one time doses (see MAR). Per Dr Asad Li, ok to DC further troponin's at this time. Pt denied SOB, denied N/V. VSS    2220: Dr Ellis Santizo at bedside for pt evaluation, this RN accompanied. Pt woken up for exam, reports pain 7-8/10 and states she had only small change in chest pain from medication administration.  Pt has been ambulating to restroom with minimal assist, steady gait noted    0300: pt c/o 9/10 pain with associated nausea, pt medicated per PRN orders with Zofran and Morphine

## 2022-12-04 NOTE — CONSULTS
Consults    Patient Name: Alena Olmstead  Admit Date: 12/3/2022 10:54 AM  MR #: [id number]  : 1960    Attending Physician: Naldo Patino DO  Reason for consult: CP    History of Presenting Illness:      Alena Olmstead is a 65 y.o. female on hospital day 1 with a history of . History Obtained From:  patient, electronic medical record    Present to ER with Chest burning and discomfort. Initally felt to be related to a foreign object lodged in her Esophagus. She underwent urgent EGD which did not reveal any pathology. She was then sent back to ER. CTA chest negative for PE. Further eval noted Elevated Troponin. ECG SR with no acute changes of injury. I have seen her iin the office last year and recommended stress and Echo but she became homeless and did not follow through. In the past 1 month she has been in an apartment. Past 7 months she has not taken any meds. She has noted LE edema for a long time. Not sure duration.    History:      EKG:  Past Medical History:   Diagnosis Date    Asthma     Bilateral renal cysts     CAD (coronary artery disease)     Depression     since age 35, was with Dr Nona Sharp, now the [facility]    Diverticulosis of sigmoid colon     Dr Pascual Rodriguez    DJD of left shoulder     Environmental allergies     Fatty liver 2013    GERD (gastroesophageal reflux disease)     History of cardiac arrhythmia     \"due to stress, was placed on medication\"    Hydatidiform mole     age 26     Hyperlipidemia     Hypothyroidism     IFG (impaired fasting glucose) 2013    Melanoma of eye ([address]     age 30, R eye prosthesis, Dr Ilene Riedel    Obesity     Prediabetes     PTSD (post-traumatic stress disorder)     age 35, [facility]    S/P colonoscopy 2012    Dr. Adrien Parmar    S/P hysterectomy with oophorectomy     Dr Marly Sanchez    Tremor     Dr Mirta Brooks    Vitamin D deficiency      Past Surgical History:   Procedure Laterality Date    DILATION AND CURETTAGE OF UTERUS      ENDOMETRIAL ABLATION  06/2012    EYE REMOVAL      OD for melanoma, age 30    FOOT OSTEOTOMY Left 09/23/2016    B JACKMAN DPM      HYSTERECTOMY (CERVIX STATUS UNKNOWN)      BALDOMERO STEROTACTIC LOC BREAST BIOPSY RIGHT Right 07/19/2021    BALDOMERO STEROTACTIC LOC BREAST BIOPSY RIGHT 7/19/2021 [facility]    LINDSAY AND BSO (CERVIX REMOVED)  2012    Dr Karine Copeland       Family History  Family History   Problem Relation Age of Onset    Heart Failure Mother         dec 76    Diabetes Mother     Diabetes Father     Stroke Father         dec age 80    Cancer Father         Sarcoma    Breast Cancer Maternal Aunt     Breast Cancer Paternal Aunt     Stomach Cancer Other     Colon Cancer Neg Hx      [] Unable to obtain due to ventilated and/ or neurologic status    Social History     Socioeconomic History    Marital status:      Spouse name: Not on file    Number of children: 3    Years of education: 12    Highest education level: High school graduate   Occupational History    Occupation: [employer]   Tobacco Use    Smoking status: Never    Smokeless tobacco: Never   Vaping Use    Vaping Use: Never used   Substance and Sexual Activity    Alcohol use: No    Drug use: No    Sexual activity: Not Currently     Birth control/protection: Surgical   Other Topics Concern    Not on file   Social History Narrative    Born in [place], one of 4 children, one sister disappeared at age 30, never found    Moved to [place] at age 40        Lives in an apartment in [facility], alone    , 3 children, all grown, in [place]     2 granddaughters     Hobbies exercising, birds    Attends [employer], has volunteered at [id number] Strain: Low Risk     Difficulty of Paying Living Expenses: Not hard at all   Food Insecurity: No Food Insecurity    Worried About [address] in the Last Year: Never true    [address] in the Last Year: Never true   Transportation Needs: Not on file   Physical Activity: Not on file   Stress: Not on file   Social Connections: Not on file   Intimate Partner Violence: Not on file   Housing Stability: Not on file      [] Unable to obtain due to ventilated and/ or neurologic status      Home Medications:      Medications Prior to Admission: pantoprazole (PROTONIX) 40 MG tablet, Take 1 tablet by mouth daily  gabapentin (NEURONTIN) 300 MG capsule, Take 1 capsule by mouth at bedtime for 30 days. apixaban (ELIQUIS) 5 MG TABS tablet, Take 1 tablet by mouth 2 times daily START when Eliquis Starter pack completed  calamine-zinc oxide 8-8 % LOTN lotion, Apply topically as needed (pruritis.) (Patient not taking: Reported on 12/3/2022)  meclizine (ANTIVERT) 12.5 MG tablet, Take 1 tablet by mouth nightly as needed for Dizziness  atorvastatin (LIPITOR) 20 MG tablet, Take 1 tablet by mouth in the morning. vitamin D (VITAMIN D HIGH POTENCY) 25 MCG (1000 UT) CAPS, TAKE 1 CAPSULE BY MOUTH DAILY WITH SUPPER (PM) (Patient not taking: Reported on 12/3/2022)  Multiple Vitamin (TAB-A-MANDI/BETA CAROTENE) TABS, TAKE 1 TABLET BY MOUTH EVERY DAY (AM)  metoprolol tartrate (LOPRESSOR) 50 MG tablet, Take 1 tablet by mouth in the morning and 1 tablet before bedtime.   budesonide-formoterol (SYMBICORT) 160-4.5 MCG/ACT AERO, INHALE 2 PUFFS INTO THE LUNGS 2 TIMES DAILY  Magnesium 400 MG CAPS, Take 1 capsule by mouth nightly as needed (muscle spasms)  midodrine (PROAMATINE) 10 MG tablet, Take one tablet by mouth twice daily (Patient not taking: Reported on 12/3/2022)  ARIPiprazole (ABILIFY) 15 MG tablet, Take 1 tablet by mouth daily  levothyroxine (SYNTHROID) 50 MCG tablet, Take 1 tablet by mouth Daily  famotidine (PEPCID) 20 MG tablet, TAKE 1 TABLET BY MOUTH TWICE DAILY (AM,PM)  benztropine (COGENTIN) 1 MG tablet, Take 1 tablet by mouth daily  Cyanocobalamin (B-12) 1000 MCG TABS, TAKE 1 TABLET BY MOUTH ONCE A DAY (NOON)  Biotin 300 MCG TABS, Take 1 tablet by mouth daily (Patient not taking: Reported on 12/3/2022)  diclofenac sodium (VOLTAREN) 1 % GEL, APPLY 2 G TOPICALLY 4 TIMES DAILY (Patient not taking: Reported on 12/3/2022)  VENTOLIN  (90 Base) MCG/ACT inhaler, USE 2 PUFFS EVERY 4 HRS AS NEEDED FOR WHEEZING/SOB-SPACE OUT TO EVERY 6 HR AS SYMPTOMS IMPROVE  ketoconazole (NIZORAL) 2 % shampoo, APPLY TOPICALLY DAILY AS NEEDED. (Patient not taking: Reported on 12/3/2022)  albuterol sulfate HFA (VENTOLIN HFA) 108 (90 Base) MCG/ACT inhaler, Inhale 2 puffs into the lungs every 6 hours as needed for Wheezing Please cover what is under insurance. VRAYLAR 1.5 MG capsule, Take 1.5 mg by mouth daily  prazosin (MINIPRESS) 1 MG capsule, Take 1 mg by mouth daily  metFORMIN (GLUCOPHAGE) 500 MG tablet, TAKE 1 TABLET BY MOUTH TWICE A DAY WITH MEALS  Misc.  Devices (ROLLATOR ULTRA-LIGHT) MISC, 1 each by Does not apply route once for 1 dose  aspirin (ASPIRIN LOW DOSE) 81 MG EC tablet, TAKE 1 TABLET BY MOUTH ONCE A DAY (AM) (Patient taking differently: Take 81 mg by mouth daily TAKE 1 TABLET BY MOUTH ONCE A DAY (AM))  Blood Pressure Monitoring (B-D ASSURE BPM/AUTO ARM CUFF) MISC, Use as directed  EPINEPHrine (EPIPEN 2-PRAVEENA) 0.3 MG/0.3ML SOAJ injection, Use as directed for allergic reaction    Current Hospital Medications:     Scheduled Meds:   clopidogrel  75 mg Oral Daily    furosemide  40 mg IntraVENous BID    metoprolol succinate  50 mg Oral Daily    potassium chloride  20 mEq Oral BID     losartan  50 mg Oral Daily    sodium chloride flush  5-40 mL IntraVENous 2 times per day    aspirin  81 mg Oral Daily    atorvastatin  40 mg Oral Nightly    insulin lispro  0-4 Units SubCUTAneous TID WC    insulin lispro  0-4 Units SubCUTAneous Nightly    enoxaparin  1 mg/kg SubCUTAneous BID    pantoprazole  40 mg Oral QAM AC    nitroglycerin  0.5 inch Topical 4 times per day     Continuous Infusions:   sodium chloride      dextrose       PRN Meds:.sodium chloride flush, sodium chloride, ondansetron **OR** ondansetron, acetaminophen **OR** acetaminophen, polyethylene glycol, potassium chloride **OR** potassium alternative oral replacement **OR** potassium chloride, magnesium sulfate, glucose, dextrose bolus **OR** dextrose bolus, glucagon (rDNA), dextrose, nitroGLYCERIN, morphine  . sodium chloride      dextrose          Allergies: Allergies   Allergen Reactions    Bee Venom         Review of Systems:       Review of Systems   Constitutional: Negative. Negative for diaphoresis and fatigue. HENT: Negative. Eyes: Negative. Respiratory:  Positive for chest tightness and shortness of breath. Negative for cough, wheezing and stridor. Cardiovascular:  Positive for chest pain and leg swelling. Negative for palpitations. Gastrointestinal: Negative. Negative for blood in stool and nausea. Genitourinary: Negative. Musculoskeletal: Negative. Skin: Negative. Neurological: Negative. Negative for dizziness, syncope, weakness and light-headedness. Hematological: Negative. Psychiatric/Behavioral: Negative. Objective Findings:     Vitals:BP (!) 134/94   Pulse (!) 116   Temp 98.2 °F (36.8 °C) (Oral)   Resp 20   Ht 5' 6\" (1.676 m)   Wt 251 lb 1.6 oz (113.9 kg)   SpO2 91%   BMI 40.53 kg/m²      Physical Examination:    Physical Exam   Constitutional: She appears healthy. No distress. HENT:   Normal cephalic and Atraumatic   Eyes: Pupils are equal, round, and reactive to light. Neck: Thyroid normal. No JVD present. No neck adenopathy. No thyromegaly present. Cardiovascular: Normal rate, regular rhythm, normal heart sounds, intact distal pulses and normal pulses. Pulmonary/Chest: Effort normal and breath sounds normal. She has no wheezes. She has no rales. She exhibits no tenderness. Abdominal: Soft. Bowel sounds are normal. There is no abdominal tenderness. Musculoskeletal:         General: Edema present. No tenderness (mild). Normal range of motion.       Cervical back: Normal range of motion and neck supple. Neurological: She is alert and oriented to person, place, and time. Skin: Skin is warm. No cyanosis. Nails show no clubbing. Results/ Medications reviewed 12/4/2022, 7:16 AM     Laboratory, Microbiology, Pathology, Radiology, Cardiology, Medications and Transcriptions reviewed  Scheduled Meds:   clopidogrel  75 mg Oral Daily    furosemide  40 mg IntraVENous BID    metoprolol succinate  50 mg Oral Daily    potassium chloride  20 mEq Oral BID WC    losartan  50 mg Oral Daily    sodium chloride flush  5-40 mL IntraVENous 2 times per day    aspirin  81 mg Oral Daily    atorvastatin  40 mg Oral Nightly    insulin lispro  0-4 Units SubCUTAneous TID WC    insulin lispro  0-4 Units SubCUTAneous Nightly    enoxaparin  1 mg/kg SubCUTAneous BID    pantoprazole  40 mg Oral QAM AC    nitroglycerin  0.5 inch Topical 4 times per day     Continuous Infusions:   sodium chloride      dextrose         Recent Labs     12/03/22  0238 12/03/22  1115 12/04/22  0433   WBC 13.6* 12.4* 13.4*   HGB 15.0 13.4 13.8   HCT 45.3 40.7 40.9   MCV 93.0 94.3 92.7    292 305     Recent Labs     12/03/22  0238 12/03/22  0304 12/03/22  1115 12/04/22  0433     --  139 139   K 3.7  --  4.0 3.7     --  105 100   CO2 20  --  17* 23   BUN 13  --  13 13   CREATININE 0.84 0.7 0.79 0.73     Recent Labs     12/03/22  0238 12/03/22  1115   AST 32 28   ALT 36* 33   BILITOT 0.5 0.4   ALKPHOS 88 86     No results for input(s): LIPASE, AMYLASE in the last 72 hours. Recent Labs     12/03/22  0238 12/03/22  1115   PROT 7.0 6.5     CT SOFT TISSUE NECK W CONTRAST    Result Date: 12/3/2022  EXAMINATION: CT OF THE NECK SOFT TISSUE WITH CONTRAST  12/3/2022 TECHNIQUE: CT of the neck was performed with the administration of intravenous contrast. Multiplanar reformatted images are provided for review.  Automated exposure control, iterative reconstruction, and/or weight based adjustment of the mA/kV was utilized to reduce the radiation dose to as low as reasonably achievable. COMPARISON: None. HISTORY: ORDERING SYSTEM PROVIDED HISTORY: globus sensation x1 day; intolerant of PO intake TECHNOLOGIST PROVIDED HISTORY: Reason for exam:->globus sensation x1 day; intolerant of PO intake Decision Support Exception - unselect if not a suspected or confirmed emergency medical condition->Emergency Medical Condition (MA) What reading provider will be dictating this exam?->CRC FINDINGS: PHARYNX/LARYNX:  The palatine tonsils are normal in appearance. The tongue is normal in appearance. The valleculae, epiglottis, aryepiglottic folds and pyriform sinuses appear unremarkable. The true and false vocal cords are normal in appearance. No mass or abscess is seen. SALIVARY GLANDS/THYROID:  The parotid and submandibular glands appear unremarkable. The thyroid gland appears unremarkable. LYMPH NODES:  No cervical or supraclavicular lymphadenopathy is seen. SOFT TISSUES:  No appreciable soft tissue swelling or mass is seen. BRAIN/ORBITS/SINUSES:  The visualized portion of the intracranial contents appear unremarkable. 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 The visualized portion of the orbits, paranasal sinuses and mastoid air cells demonstrate no acute abnormality. LUNG APICES/SUPERIOR MEDIASTINUM:  No focal consolidation is seen within the visualized lung apices. No superior mediastinal lymphadenopathy or mass. The visualized portion of the trachea appears unremarkable. BONES:  No aggressive appearing lytic or blastic bony lesion. Minimal multilevel degenerative changes identified throughout the spine most pronounced at the C6/C7 level. No acute abnormality of the soft tissue structures of the neck.      CTA CHEST W WO CONTRAST PE Eval    Result Date: 12/3/2022  EXAMINATION: CTA OF THE CHEST WITH AND WITHOUT CONTRAST 12/3/2022 1:39 pm TECHNIQUE: CTA of the chest was performed before and after the administration of intravenous contrast.  Multiplanar reformatted images are provided for review. MIP images are provided for review. Automated exposure control, iterative reconstruction, and/or weight based adjustment of the mA/kV was utilized to reduce the radiation dose to as low as reasonably achievable. COMPARISON: The previous study performed 10/25/2021. HISTORY: ORDERING SYSTEM PROVIDED HISTORY: PE TECHNOLOGIST PROVIDED HISTORY: Reason for exam:->PE Decision Support Exception - unselect if not a suspected or confirmed emergency medical condition->Emergency Medical Condition (MA) What reading provider will be dictating this exam?->CRC FINDINGS: Pulmonary Arteries: Pulmonary arteries are adequately opacified for evaluation. No evidence of intraluminal filling defect to suggest pulmonary embolism. Main pulmonary artery is normal in caliber. Mediastinum: No evidence of mediastinal, axillary, or hilar lymphadenopathy. Cardiomegaly is noted. There is no acute abnormality of the thoracic aorta. The thyroid gland, esophagus, and trachea are unremarkable. Lungs/pleura: There is no evidence of acute consolidation or infiltrate. Mild, bilateral ground-glass opacities are noted and slightly more prominent in the lower lobes. There is prominence of the central pulmonary vasculature. Given these findings in the cardiomegaly, this indicates pulmonary vascular congestion. Bilateral lower lobe atelectasis is seen. No pulmonary parenchymal nodule or mass is identified. Upper Abdomen: There is fatty infiltration of the liver. A small hiatal hernia is noted. Soft Tissues/Bones: Osteo-degenerative changes are again noted involving the thoracic spine. 1.  No evidence of pulmonary embolus. 2.  Cardiomegaly. 3.  Pulmonary vascular congestion. 4.  Fatty liver. 5.  Small hiatal hernia. XR CHEST PORTABLE    Result Date: 12/3/2022  EXAMINATION: ONE XRAY VIEW OF THE CHEST 12/3/2022 11:27 am COMPARISON: None.  HISTORY: ORDERING SYSTEM PROVIDED HISTORY: sob, vomiting TECHNOLOGIST PROVIDED HISTORY: Reason for exam:->sob, vomiting What reading provider will be dictating this exam?->CRC FINDINGS: The cardiac silhouette is enlarged. There are findings of mild vascular congestion. Suboptimal inspiration was obtained for the chest x-ray. There is no consolidation to suggest pneumonia. Cardiomegaly with findings of mild vascular congestion Suboptimal inspiration was obtained for the chest x-ray. EGD    Result Date: 12/3/2022  Site: [address] [place] [address] Patient Name: Sera Mcadams Procedure Date: 12/3/2022 MRN: [id number] YOB: 1960 Gender: Female Attending MD: Ailyn Holloway Indications:        -  Dysphagia Medications:        -  Monitored Anesthesia Care Complications:        -  No immediate complications. Estimated Blood Loss:        -  Estimated blood loss: none. Procedure:        - The Gastroscope was introduced through the mouth and advanced to the second           part of the duodenum.        -  The patient tolerated the procedure well. Findings:        -  The examined esophagus was moderately tortuous. No Food Bolus noted        -  A 3 cm hiatal hernia was present. -  Esophagogastric landmarks were identified: the gastroesophageal junction           was found at 35 cm, the lower esophageal sphincter was found at 38 cm and the           upper extent of the gastric folds was found at 35 cm from the incisors. -  The entire examined stomach was normal.        -  The examined duodenum was normal.        -  A medium amount of food (residue) was found in the gastric body and in the           gastric antrum. Impression:        -  Tortuous esophagus. No  Food Bolus noted        -  A 3 cm hiatal hernia. -  Esophagogastric landmarks identified.        -  Normal stomach. -  Normal examined duodenum. -  A medium amount of food (residue) in the stomach limiting evaluation        -  No specimens collected.  Recommendation:        -  Return to GI clinic in 2 weeks. -  Repeat upper endoscopy in 6 weeks. -  Perform an esophagram at appointment to be scheduled. -  Use Protonix (pantoprazole) 40 mg PO daily.        -  Resume Eliquis (apixaban) at prior dose today. -  The patient will be observed post-procedure, until all discharge criteria           are met. -  Patient has a contact number available for emergencies. The signs and           symptoms of potential delayed complications were discussed with the patient. Return to normal activities tomorrow. Written discharge instructions were           provided to the patient. 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 All Rights Reserved. The CPT codes, CCI edits and ICD codes generated are intended as suggestions       and were generated based on input data. These codes are preliminary and upon        review may be revised to meet current compliance and payer requirements. The provider is responsible for the final determination of appropriate codes,       and modifiers. Signature Name: Demetris Armijo MD Signature Statement: This document has been electronically signed. Note Initiated On:12/3/2022 Signature Date:12/3/2022 10:06 AM Amended Signature: Demetris Armijo MD Signature Statement: This document has been electronically signed. Amended Date:12/3/2022 10:11 AM      Active Hospital Problems    Diagnosis Date Noted    Chest pain [R07.9] 12/03/2022     Priority: Medium         Impression/Plan:   NSTEMI - ASA [id number] given yesterday.  Continue Plavix. Nitropaste. Statin and BB. Added ARB also. Pt will need cath. RBA discussed with pt  Decompensated CHF likely comibned due both tho HTN and Myocardial ischemia- Peripherally pt is not much volume overloaded. responded well to iv Lasix last night with 1.4 L out. Will continue diureses. Add K. Follow labs. Need Echo  HTN uncontrolled- meds adjusted. Low salt diet. Fluid restrict. Strict I/Os  PAF- in SR. Continue Lovenox for now. HPL - statin low arnel diet. Thank you for allowing us to participate in the care of this patient. Will continue to follow. Please call if questions or concerns arise.     Electronically signed by Gris Alcaraz MD on 12/4/2022 at 7:16 AM

## 2022-12-04 NOTE — PROGRESS NOTES
Called by nursing for significantly elevated troponin up from 0.09 up to 0.267. I came to evaluate the patient she has \"sharp 9 out of 10 sharp midsternal chest pain\" she denies any radiation to me but to nursing staff reported radiation down bilateral upper extremities. She has no associated diaphoresis but does admit to dyspnea at rest pain is reproducible with light palpation in the midsternal region she denies ever having this pain before she denies any history of coronary disease stenting stress testing or CABG. Cardiology on-call was also contacted who ordered 40mg lasix, 300mg plavix and 1/2 inch nitro paste and 25mg metoprolol succinate. EKG was obtained showing anterior T wave inversions in lead V1 with no significant ST changes. There is no other EKG in the chart to compare from this hospitalization, however EKG from 2021 does show anterior T wave inversions as well. Patient was not able to tolerate the ordered morphine for chest pain secondary to associated nausea, she also complains of a headache after being administered nitroglycerin tablets and Nitropaste.   Patient remains on full dose Lovenox 1 mg/kg twice daily, dual antiplatelet therapy with aspirin and Plavix, high potency statin beta-blocker and PPI patient sleeping comfortably on my reassessment

## 2022-12-04 NOTE — CARE COORDINATION
[facility] EMERGENCY [facility] [place] Case Management Initial Discharge Assessment    Met with Patient to discuss discharge plan. PCP: Danisha Urena MD                                Date of Last Visit: 11/4/22    [facility] Patient: No        [facility] Notified: no    If no PCP, list provided? N/A    Discharge Planning    Living Arrangements: independently at home    Who do you live with? self    Who helps you with your care:  self    If lives at home:     Do you have any barriers navigating in your home? no    Patient can perform ADL? Yes    Current Services (outpatient and in home) :   life alert button    Dialysis: No    Is transportation available to get to your appointments? Yes    DME Equipment:  no    Respiratory equipment: None    Respiratory provider:  no     Pharmacy:  yes - [facility]    Consult with Medication Assistance Program?  No        Does Patient Have a High-Risk for Readmission Diagnosis (CHF, PN, MI, COPD)? No      Initial Discharge Plan? (Note: please see concurrent daily documentation for any updates after initial note). Pt states that she would like to get meals on wheels. I gave her a list of various senior services. Cm to assess for d/c needs and referrals.     Readmission Risk              Risk of Unplanned Readmission:  17         Electronically signed by Lucio Osman RN on 12/3/2022 at 9:19 PM

## 2022-12-04 NOTE — PROGRESS NOTES
Internal Medicine   Hospitalist   Progress Note    2022   11:45 AM    Name:  Amalia Gillis  MRN:    [id number]     IP Day: 1     Admit Date: 12/3/2022 10:54 AM  PCP: Georgina Workman MD    Code Status:  Full Code    Assessment and Plan: Active Problems/ diagnosis:     NSTEMI-chest pain-rule out ACS  Initial concern for food bolus stuck in esophagus-status post EGD, no polyps found  Lactic acidosis   Decompensated CHF   Pulm vascular congestion- reported on CTA but clinically not overloaded   Obesity  HLD  A. fib on Eliquis     Plan  Continue to monitor on cardiac floor with telemetry  Trend cardiac enzymes  Resume aspirin   Resume Lovenox 1mg/kg bid, hold eliquis  Home statin and BB  Cardiology consult - plan for [facility] tomorrow Mon  Repeat Echo  recheck lactate   PPI  Home meds will be ordered once reviewed by RN/pharmacy   DVT PPx  Discussed with pt      7 pm- 7 am, please contact on call Hospitalist for any needs     Subjective:      no new events. Physical Examination:      Vitals:  /73   Pulse (!) 102   Temp 97.3 °F (36.3 °C) (Oral)   Resp 20   Ht 5' 6\" (1.676 m)   Wt 251 lb 1.6 oz (113.9 kg)   SpO2 96%   BMI 40.53 kg/m²   Temp (24hrs), Av.8 °F (36.6 °C), Min:97.3 °F (36.3 °C), Max:98.2 °F (36.8 °C)      General appearance: alert, cooperative and no distress.  Obese   Mental Status: oriented to person, place and time and normal affect  Lungs: clear to auscultation bilaterally, normal effort  Heart: mildly tachycardic   Abdomen: soft, nontender, nondistended, bowel sounds present, no masses  Extremities: no edema, redness, tenderness in the calves  Skin: no gross lesions, rashes    Data:     Labs:  Recent Labs     22  1115 22  0433   WBC 12.4* 13.4*   HGB 13.4 13.8    305     Recent Labs     22  1115 22  0433    139   K 4.0 3.7    100   CO2 17* 23   BUN 13 13   CREATININE 0.79 0.73   GLUCOSE 182* 154*     Recent Labs     22  1808 12/03/22  1115   AST 32 28   ALT 36* 33   BILITOT 0.5 0.4   ALKPHOS 88 86       Current Facility-Administered Medications   Medication Dose Route Frequency Provider Last Rate Last Admin    clopidogrel (PLAVIX) tablet 75 mg  75 mg Oral Daily Pam Barnes MD   75 mg at 12/04/22 3814    furosemide (LASIX) injection 40 mg  40 mg IntraVENous BID Pam Barnes MD   40 mg at 12/04/22 8148    metoprolol succinate (TOPROL XL) extended release tablet 50 mg  50 mg Oral Daily Pam Barnes MD   50 mg at 12/04/22 1419    potassium chloride (KLOR-CON M) extended release tablet 20 mEq  20 mEq Oral BID WC Pam Barnes MD   20 mEq at 12/04/22 1533    losartan (COZAAR) tablet 50 mg  50 mg Oral Daily Pam Barnes MD   50 mg at 12/04/22 7332    sodium chloride flush 0.9 % injection 5-40 mL  5-40 mL IntraVENous 2 times per day Naveen Bailon, DO   10 mL at 12/04/22 7174    sodium chloride flush 0.9 % injection 5-40 mL  5-40 mL IntraVENous PRN Naveen Bailon, DO        0.9 % sodium chloride infusion   IntraVENous PRN Geovanni Neri, DO        ondansetron (ZOFRAN-ODT) disintegrating tablet 4 mg  4 mg Oral Q8H PRN Geovanni Neri, DO        Or    ondansetron [facility]) injection 4 mg  4 mg IntraVENous Q6H PRN Geovanni Neri, DO   4 mg at 12/04/22 0300    acetaminophen (TYLENOL) tablet 650 mg  650 mg Oral Q6H PRN aNveen Bailon, DO        Or    acetaminophen (TYLENOL) suppository 650 mg  650 mg Rectal Q6H PRN Geovanni Freirein, DO        polyethylene glycol (GLYCOLAX) packet 17 g  17 g Oral Daily PRN Naveen Bailon, DO        aspirin chewable tablet 81 mg  81 mg Oral Daily Naveen Bailon, DO   81 mg at 12/04/22 9992    atorvastatin (LIPITOR) tablet 40 mg  40 mg Oral Nightly Naveen Bailon, DO   40 mg at 12/03/22 2113    potassium chloride (KLOR-CON M) extended release tablet 40 mEq  40 mEq Oral PRN Sharrell Bailon, DO        Or    potassium bicarb-citric acid (EFFER-K) effervescent tablet 40 mEq  40 mEq Oral PRN Naveen Bailon DO Or    potassium chloride 10 mEq/100 mL IVPB (Peripheral Line)  10 mEq IntraVENous PRN Margarito Soda, DO        magnesium sulfate 2000 mg in 50 mL IVPB premix  2,000 mg IntraVENous PRN Margarito Soda, DO        glucose chewable tablet 16 g  4 tablet Oral PRN Remy Buzzard Daron, DO        dextrose bolus 10% 125 mL  125 mL IntraVENous PRN Margarito Soda, DO        Or    dextrose bolus 10% 250 mL  250 mL IntraVENous PRN Remy Buzzard Daron, DO        glucagon (rDNA) injection 1 mg  1 mg SubCUTAneous PRN Yazid R Daron, DO        dextrose 10 % infusion   IntraVENous Continuous PRN Yazid R Daron, DO        insulin lispro (HUMALOG) injection vial 0-4 Units  0-4 Units SubCUTAneous TID WC Yazid R Daron, DO        insulin lispro (HUMALOG) injection vial 0-4 Units  0-4 Units SubCUTAneous Nightly Yazid R Daron, DO        enoxaparin (LOVENOX) injection 120 mg  1 mg/kg SubCUTAneous BID Yazid R Daron, DO   120 mg at 12/04/22 4450    pantoprazole (PROTONIX) tablet 40 mg  40 mg Oral QAM AC Yazid R Daron, DO   40 mg at 12/04/22 0441    nitroGLYCERIN (NITROSTAT) SL tablet 0.4 mg  0.4 mg SubLINGual Q5 Min PRN Creta Lupis Sedar, DO   0.4 mg at 12/03/22 2031    morphine (PF) injection 1 mg  1 mg IntraVENous Q1H PRN Creta Lupis Sedar, DO   1 mg at 12/04/22 0300    nitroglycerin (NITRO-BID) 2 % ointment 0.5 inch  0.5 inch Topical 4 times per day Gerry Michel MD   0.5 inch at 12/04/22 1103       Additional work up or/and treatment plan may be added today or then after based on clinical progression. I am managing a portion of pt care. Some medical issues are handled by other specialists. Additional work up and treatment should be done in out pt setting by pt PCP and other out pt providers. In addition to examining and evaluating pt, I spent additional time explaining care, normaland abnormal findings, and treatment plan. All of pt questions were answered. Counseling, diet and education were provided.  Case will be discussed with nursing staff when appropriate. Family will be updated if and when appropriate.        Electronically signed by Liset Jessica DO on 12/4/2022 at 11:45 AM

## 2022-12-04 NOTE — PLAN OF CARE
Problem: Discharge Planning  Goal: Discharge to home or other facility with appropriate resources  12/3/2022 2239 by Lisa Benson RN  Outcome: Progressing  12/3/2022 1645 by Malcom Kehr, RN  Outcome: Progressing     Problem: Pain  Goal: Verbalizes/displays adequate comfort level or baseline comfort level  12/3/2022 2239 by Lisa Benson RN  Outcome: Progressing  12/3/2022 1645 by Malcom Kehr, RN  Outcome: Progressing     Problem: Skin/Tissue Integrity  Goal: Absence of new skin breakdown  Description: 1. Monitor for areas of redness and/or skin breakdown  2. Assess vascular access sites hourly  3. Every 4-6 hours minimum:  Change oxygen saturation probe site  4. Every 4-6 hours:  If on nasal continuous positive airway pressure, respiratory therapy assess nares and determine need for appliance change or resting period.   12/3/2022 2239 by Lisa Benson RN  Outcome: Progressing  12/3/2022 1645 by Malcom Kehr, RN  Outcome: Progressing

## 2022-12-04 NOTE — DISCHARGE INSTR - COC
Continuity of Care Form    Patient Name: Juan Mendenhall   :  1960  MRN:  [id number]    Admit date:  12/3/2022  Discharge date:  2022    Code Status Order: Full Code   Advance Directives:     Admitting Physician:  Kim Sandra DO  PCP: Dora Goddard MD    Discharging Nurse:  [address] [place] [address] Unit/Room#: Z955/G403-31  Discharging Unit Phone Number: [phone]    Emergency Contact:   Extended Emergency Contact Information  Primary Emergency Contact: Maxim Rizzo [address] Phone: [phone]  Mobile Phone: [phone]  Relation: Other    Past Surgical History:  Past Surgical History:   Procedure Laterality Date    DILATION AND CURETTAGE OF UTERUS      ENDOMETRIAL ABLATION  2012    EYE REMOVAL      OD for melanoma, age [address]    FOOT OSTEOTOMY Left 2016    B JACKMAN DPM      HYSTERECTOMY (CERVIX STATUS UNKNOWN)      BALDOMERO STEROTACTIC LOC BREAST BIOPSY RIGHT Right 2021    BALDOMERO STEROTACTIC LOC BREAST BIOPSY RIGHT 2021 [facility]    LINDSAY AND BSO (CERVIX REMOVED)      Dr Karine Copeland       Immunization History:   Immunization History   Administered Date(s) Administered    COVID-19, MODERNA Booster BLUE border, (age 18y+), IM, 50mcg/0.25mL 2021    COVID-19, PFIZER PURPLE top, DILUTE for use, (age 15 y+), 30mcg/0.3mL 2021, 2021    Influenza Vaccine, unspecified formulation 09/10/2016    Influenza Virus Vaccine 2014, 2015, 2017, 2018, 2019, 2021    Influenza Whole 2014, 2015    Influenza, AFLURIA (age 1 yrs+), FLUZONE, (age 10 mo+), MDV, 0.5mL 2017, 2018    Influenza, FLUARIX, FLULAVAL, FLUZONE (age 10 mo+) AND AFLURIA, (age 1 y+), PF, 0.5mL 2019, 2020, 2021    Influenza, FLUCELVAX, (age 10 mo+), MDCK, PF, 0.5mL 2022    Influenza, Triv, 3 Years and older, IM (Afluria (5 yrs and older) 09/10/2016    Pneumococcal Polysaccharide (Iguhinsap20) 08/10/2018    Td, unspecified formulation 12/12/2017    Tdap (Boostrix, Adacel) 03/11/2016    Zoster Recombinant (Shingrix) 03/19/2019, 07/19/2019, 02/21/2020       Active Problems:  Patient Active Problem List   Diagnosis Code    PTSD (post-traumatic stress disorder) F43.10    Depression F32. A    Reactive airway disease J45.909    Environmental allergies     Hyperlipidemia E78.5    S/P colonoscopy Z98.890    Fatty liver disease, nonalcoholic H36.6    Vitamin D deficiency E55.9    IFG (impaired fasting glucose) R73.01    Obesity (BMI 30-39. 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    Combined forms of age-related cataract of left eye H25.812    History of COVID-19 Z86.16    Foreign body in esophagus T18.108A    Chest pain R07.9       Isolation/Infection:   Isolation            No Isolation          Patient Infection Status       Infection Onset Added Last Indicated Last Indicated By Review Planned Expiration Resolved Resolved By    None active    Resolved    C-diff Rule Out 03/09/21 03/09/21 03/09/21 Gastrointestinal Panel by DNA (Ordered)   03/10/21 Rule-Out Test Resulted    COVID-19 (Rule Out) 02/26/21 02/26/21 02/26/21 COVID-19, Rapid (Ordered)   02/26/21 Rule-Out Test Resulted    C-diff Rule Out 10/28/20 10/28/20 10/28/20 Gastrointestinal Panel by DNA (Ordered)   10/29/20 Rule-Out Test Resulted            Nurse Assessment:  Last Vital Signs: /73   Pulse (!) 102   Temp 97.3 °F (36.3 °C)   Resp 20   Ht 5' 6\" (1.676 m)   Wt 251 lb 1.6 oz (113.9 kg)   SpO2 96%   BMI 40.53 kg/m²     Last documented pain score (0-10 scale): Pain Level: 6  Last Weight:   Wt Readings from Last 1 Encounters:   22 251 lb 1.6 oz (113.9 kg)     Mental Status:  oriented and alert    IV Access:  - None    Nursing Mobility/ADLs:  Walking   Independent  Transfer  Independent  Bathing  Independent  Dressing  Independent  Toileting  Independent  Feeding  Independent  Med Admin  Independent  Med Delivery   whole    Wound Care Documentation and Therapy:        Elimination:  Continence: Bowel: Yes  Bladder: Yes  Urinary Catheter: None   Colostomy/Ileostomy/Ileal Conduit: No       Date of Last BM: 2022    Intake/Output Summary (Last 24 hours) at 2022 0844  Last data filed at 2022 0649  Gross per 24 hour   Intake 1356.63 ml   Output 2800 ml   Net -1443.37 ml     I/O last 3 completed shifts:   In: 1356.6 [P.O.:160; I.V.:23; IV Piggyback:1173.6]  Out: 2800 [Urine:2750; Emesis/NG output:50]    Safety Concerns:     None    Impairments/Disabilities:      None    Nutrition Therapy:  Current Nutrition Therapy:   - Oral Diet:  General    Routes of Feeding: Oral  Liquids: No Restrictions  Daily Fluid Restriction: no  Last Modified Barium Swallow with Video (Video Swallowing Test): not done    Treatments at the Time of Hospital Discharge:   Respiratory Treatments: none  Oxygen Therapy:  {Therapy; copd oxygen:33013}  Ventilator:    { CC Vent FSSL:174063765}    Rehab Therapies: {THERAPEUTIC INTERVENTION:1233116055}  Weight Bearing Status/Restrictions: { CC Weight Bearin}  Other Medical Equipment (for information only, NOT a DME order):  {EQUIPMENT:151546746}  Other Treatments: ***    Patient's personal belongings (please select all that are sent with patient):  {CHP DME Belongings:349596102}    RN SIGNATURE:  {Esignature:232393677}    CASE MANAGEMENT/SOCIAL WORK SECTION    Inpatient Status Date: ***    Readmission Risk Assessment Score:  Readmission Risk              Risk of Unplanned Readmission:  17           Discharging to Facility/ Agency   Name:  [facility]  Address:  Phone:  [phone]  Fax:        / signature: {Esignature:538421257}    PHYSICIAN SECTION    Prognosis: {Prognosis:2281409108}    Condition at Discharge: [address] Patient Condition:314584191}    Rehab Potential (if transferring to Rehab): {Prognosis:0250201035}    Recommended Labs or Other Treatments After Discharge: ***    Physician Certification: I certify the above information and transfer of Yoni Santos  is necessary for the continuing treatment of the diagnosis listed and that she requires {Admit to Appropriate Level of Care:76093} for {GREATER/LESS:314308249} 30 days.      Update Admission H&P: {CHP DME Changes in JDI:386366947}    PHYSICIAN SIGNATURE:  {Esignature:594274534}

## 2022-12-04 NOTE — ACP (ADVANCE CARE PLANNING)
Advance Care Planning     Advance Care Planning Activator (Inpatient)  Conversation Note      Date of ACP Conversation: 12/3/2022     Conversation Conducted with: Patient with Decision Making Capacity    ACP Activator: Cherelle Mtz RN    Pt has advance directives on this chart and states that they are current.     Health Care Decision Maker:     Current Designated Health Care Decision Maker:     Primary Decision Maker: Morena [facility] - [phone]

## 2022-12-05 ENCOUNTER — APPOINTMENT (OUTPATIENT)
Dept: CARDIAC CATH/INVASIVE PROCEDURES | Age: 62
DRG: 190 | End: 2022-12-05
Payer: COMMERCIAL

## 2022-12-05 LAB
ANION GAP SERPL CALCULATED.3IONS-SCNC: 15 MEQ/L (ref 9–15)
ANION GAP SERPL CALCULATED.3IONS-SCNC: 19 MEQ/L (ref 9–15)
BUN BLDV-MCNC: 19 MG/DL (ref 8–23)
BUN BLDV-MCNC: 21 MG/DL (ref 8–23)
CALCIUM SERPL-MCNC: 9.4 MG/DL (ref 8.5–9.9)
CALCIUM SERPL-MCNC: 9.8 MG/DL (ref 8.5–9.9)
CHLORIDE BLD-SCNC: 95 MEQ/L (ref 95–107)
CHLORIDE BLD-SCNC: 99 MEQ/L (ref 95–107)
CO2: 22 MEQ/L (ref 20–31)
CO2: 24 MEQ/L (ref 20–31)
CREAT SERPL-MCNC: 1.33 MG/DL (ref 0.5–0.9)
CREAT SERPL-MCNC: 1.33 MG/DL (ref 0.5–0.9)
EKG ATRIAL RATE: 105 BPM
EKG ATRIAL RATE: 107 BPM
EKG ATRIAL RATE: 118 BPM
EKG P AXIS: 61 DEGREES
EKG P AXIS: 63 DEGREES
EKG P AXIS: 65 DEGREES
EKG P-R INTERVAL: 132 MS
EKG P-R INTERVAL: 138 MS
EKG P-R INTERVAL: 140 MS
EKG Q-T INTERVAL: 340 MS
EKG Q-T INTERVAL: 346 MS
EKG Q-T INTERVAL: 366 MS
EKG QRS DURATION: 86 MS
EKG QRS DURATION: 88 MS
EKG QRS DURATION: 96 MS
EKG QTC CALCULATION (BAZETT): 457 MS
EKG QTC CALCULATION (BAZETT): 476 MS
EKG QTC CALCULATION (BAZETT): 488 MS
EKG R AXIS: -15 DEGREES
EKG R AXIS: -8 DEGREES
EKG R AXIS: 94 DEGREES
EKG T AXIS: 25 DEGREES
EKG T AXIS: 34 DEGREES
EKG T AXIS: 39 DEGREES
EKG VENTRICULAR RATE: 105 BPM
EKG VENTRICULAR RATE: 107 BPM
EKG VENTRICULAR RATE: 118 BPM
GFR SERPL CREATININE-BSD FRML MDRD: 45.3 ML/MIN/{1.73_M2}
GFR SERPL CREATININE-BSD FRML MDRD: 45.3 ML/MIN/{1.73_M2}
GLUCOSE BLD-MCNC: 134 MG/DL (ref 70–99)
GLUCOSE BLD-MCNC: 147 MG/DL (ref 70–99)
GLUCOSE BLD-MCNC: 149 MG/DL (ref 70–99)
GLUCOSE BLD-MCNC: 151 MG/DL (ref 70–99)
GLUCOSE BLD-MCNC: 232 MG/DL (ref 70–99)
GLUCOSE BLD-MCNC: 92 MG/DL (ref 70–99)
HCT VFR BLD CALC: 40.6 % (ref 37–47)
HEMOGLOBIN: 14 G/DL (ref 12–16)
MCH RBC QN AUTO: 31.7 PG (ref 27–31.3)
MCHC RBC AUTO-ENTMCNC: 34.6 % (ref 33–37)
MCV RBC AUTO: 91.7 FL (ref 79.4–94.8)
PDW BLD-RTO: 13.5 % (ref 11.5–14.5)
PERFORMED ON: ABNORMAL
PERFORMED ON: NORMAL
PLATELET # BLD: 286 K/UL (ref 130–400)
POTASSIUM REFLEX MAGNESIUM: 3.9 MEQ/L (ref 3.4–4.9)
POTASSIUM SERPL-SCNC: 3.7 MEQ/L (ref 3.4–4.9)
RBC # BLD: 4.42 M/UL (ref 4.2–5.4)
SODIUM BLD-SCNC: 134 MEQ/L (ref 135–144)
SODIUM BLD-SCNC: 140 MEQ/L (ref 135–144)
WBC # BLD: 11.2 K/UL (ref 4.8–10.8)

## 2022-12-05 PROCEDURE — 6360000002 HC RX W HCPCS

## 2022-12-05 PROCEDURE — 2580000003 HC RX 258: Performed by: INTERNAL MEDICINE

## 2022-12-05 PROCEDURE — 6370000000 HC RX 637 (ALT 250 FOR IP): Performed by: INTERNAL MEDICINE

## 2022-12-05 PROCEDURE — 99233 SBSQ HOSP IP/OBS HIGH 50: CPT | Performed by: INTERNAL MEDICINE

## 2022-12-05 PROCEDURE — 80048 BASIC METABOLIC PNL TOTAL CA: CPT

## 2022-12-05 PROCEDURE — 93306 TTE W/DOPPLER COMPLETE: CPT

## 2022-12-05 PROCEDURE — 36415 COLL VENOUS BLD VENIPUNCTURE: CPT

## 2022-12-05 PROCEDURE — 85027 COMPLETE CBC AUTOMATED: CPT

## 2022-12-05 PROCEDURE — 6360000002 HC RX W HCPCS: Performed by: INTERNAL MEDICINE

## 2022-12-05 PROCEDURE — 2700000000 HC OXYGEN THERAPY PER DAY

## 2022-12-05 PROCEDURE — 2060000000 HC ICU INTERMEDIATE R&B

## 2022-12-05 PROCEDURE — 2500000003 HC RX 250 WO HCPCS

## 2022-12-05 RX ORDER — SODIUM CHLORIDE 0.9 % (FLUSH) 0.9 %
5-40 SYRINGE (ML) INJECTION EVERY 12 HOURS SCHEDULED
Status: DISCONTINUED | OUTPATIENT
Start: 2022-12-05 | End: 2022-12-07 | Stop reason: HOSPADM

## 2022-12-05 RX ORDER — DIPHENHYDRAMINE HYDROCHLORIDE 50 MG/ML
50 INJECTION INTRAMUSCULAR; INTRAVENOUS ONCE
Status: DISCONTINUED | OUTPATIENT
Start: 2022-12-05 | End: 2022-12-07 | Stop reason: HOSPADM

## 2022-12-05 RX ORDER — SODIUM CHLORIDE 450 MG/100ML
75 INJECTION, SOLUTION INTRAVENOUS CONTINUOUS
Status: DISCONTINUED | OUTPATIENT
Start: 2022-12-05 | End: 2022-12-05

## 2022-12-05 RX ORDER — SODIUM CHLORIDE 9 MG/ML
INJECTION, SOLUTION INTRAVENOUS PRN
Status: DISCONTINUED | OUTPATIENT
Start: 2022-12-05 | End: 2022-12-07 | Stop reason: HOSPADM

## 2022-12-05 RX ORDER — SODIUM CHLORIDE 0.9 % (FLUSH) 0.9 %
5-40 SYRINGE (ML) INJECTION PRN
Status: DISCONTINUED | OUTPATIENT
Start: 2022-12-05 | End: 2022-12-07 | Stop reason: HOSPADM

## 2022-12-05 RX ORDER — ONDANSETRON 2 MG/ML
4 INJECTION INTRAMUSCULAR; INTRAVENOUS EVERY 6 HOURS PRN
Status: DISCONTINUED | OUTPATIENT
Start: 2022-12-05 | End: 2022-12-07 | Stop reason: HOSPADM

## 2022-12-05 RX ORDER — NITROGLYCERIN 0.4 MG/1
0.4 TABLET SUBLINGUAL EVERY 5 MIN PRN
Status: DISCONTINUED | OUTPATIENT
Start: 2022-12-05 | End: 2022-12-07 | Stop reason: HOSPADM

## 2022-12-05 RX ORDER — SODIUM CHLORIDE 450 MG/100ML
75 INJECTION, SOLUTION INTRAVENOUS CONTINUOUS
Status: ACTIVE | OUTPATIENT
Start: 2022-12-05 | End: 2022-12-06

## 2022-12-05 RX ADMIN — ASPIRIN 81 MG CHEWABLE TABLET 81 MG: 81 TABLET CHEWABLE at 12:00

## 2022-12-05 RX ADMIN — SODIUM CHLORIDE 75 ML/HR: 4.5 INJECTION, SOLUTION INTRAVENOUS at 06:34

## 2022-12-05 RX ADMIN — Medication 10 ML: at 20:22

## 2022-12-05 RX ADMIN — NITROGLYCERIN 0.5 INCH: 20 OINTMENT TOPICAL at 14:50

## 2022-12-05 RX ADMIN — POTASSIUM CHLORIDE 20 MEQ: 1500 TABLET, EXTENDED RELEASE ORAL at 17:12

## 2022-12-05 RX ADMIN — METOPROLOL SUCCINATE 50 MG: 50 TABLET, EXTENDED RELEASE ORAL at 08:34

## 2022-12-05 RX ADMIN — SODIUM CHLORIDE 75 ML/HR: 4.5 INJECTION, SOLUTION INTRAVENOUS at 13:32

## 2022-12-05 RX ADMIN — NITROGLYCERIN 0.5 INCH: 20 OINTMENT TOPICAL at 06:17

## 2022-12-05 RX ADMIN — CLOPIDOGREL BISULFATE 75 MG: 75 TABLET, FILM COATED ORAL at 12:01

## 2022-12-05 RX ADMIN — ENOXAPARIN SODIUM 120 MG: 150 INJECTION SUBCUTANEOUS at 20:17

## 2022-12-05 RX ADMIN — ATORVASTATIN CALCIUM 40 MG: 40 TABLET, FILM COATED ORAL at 20:17

## 2022-12-05 RX ADMIN — ACETAMINOPHEN 650 MG: 325 TABLET ORAL at 20:17

## 2022-12-05 RX ADMIN — NITROGLYCERIN 0.5 INCH: 20 OINTMENT TOPICAL at 17:12

## 2022-12-05 RX ADMIN — NITROGLYCERIN 0.5 INCH: 20 OINTMENT TOPICAL at 00:36

## 2022-12-05 RX ADMIN — Medication 10 ML: at 20:21

## 2022-12-05 RX ADMIN — PANTOPRAZOLE SODIUM 40 MG: 40 TABLET, DELAYED RELEASE ORAL at 06:19

## 2022-12-05 RX ADMIN — INSULIN LISPRO 3 UNITS: 100 INJECTION, SOLUTION INTRAVENOUS; SUBCUTANEOUS at 16:57

## 2022-12-05 RX ADMIN — Medication 10 ML: at 14:16

## 2022-12-05 ASSESSMENT — ENCOUNTER SYMPTOMS
EYES NEGATIVE: 1
STRIDOR: 0
WHEEZING: 0
NAUSEA: 0
CHEST TIGHTNESS: 1
SHORTNESS OF BREATH: 1
GASTROINTESTINAL NEGATIVE: 1
COUGH: 0
BLOOD IN STOOL: 0

## 2022-12-05 ASSESSMENT — PAIN SCALES - GENERAL
PAINLEVEL_OUTOF10: 3
PAINLEVEL_OUTOF10: 0
PAINLEVEL_OUTOF10: 3

## 2022-12-05 NOTE — PROGRESS NOTES
1215: Pt returned to pre/post d/t creatinine. Plan is for hydration at 75 cc/hr NS 0.9%. [facility], transport requested.

## 2022-12-05 NOTE — PROGRESS NOTES
Assessment completed, c/o headache pain 8/10, oral pain medication given, able to ambulate to bathroom, skin intact, speech clear

## 2022-12-05 NOTE — PROGRESS NOTES
Progress Note  Patient: Isadora Nunes  Unit/Bed: I719/K699-07  YOB: 1960  MRN: [id number]  Acct: [de-identified]   Admitting Diagnosis: Lactic acidosis [E87.20]  Chest pain [R07.9]  NSTEMI (non-ST elevated myocardial infarction) [facility]) [I21.4]  Chest pain, unspecified type [R07.9]  Admit Date:  12/3/2022  Hospital Day: 2    Chief Complaint: NSTEMI    Histories:  Past Medical History:   Diagnosis Date    Asthma 2015    Bilateral renal cysts 2013    CAD (coronary artery disease)     Depression     since age 35, was with Dr Steph Vazquez, now the [facility]    Diverticulosis of sigmoid colon 2012    Dr Alf Pendleton    DJD of left shoulder     Environmental allergies     Fatty liver 2013    GERD (gastroesophageal reflux disease)     History of cardiac arrhythmia     \"due to stress, was placed on medication\"    Hydatidiform mole     age 26     Hyperlipidemia     Hypothyroidism 2011    IFG (impaired fasting glucose) 2013    Melanoma of eye ([address]     age 30, R eye prosthesis, Dr Clarissa Casas    Obesity     Prediabetes     PTSD (post-traumatic stress disorder)     age 35, [facility]    S/P colonoscopy 04/19/2012    Dr. Ashley oRmero    S/P hysterectomy with oophorectomy 2012    Dr Bishnu Yo    Tremor     Dr Paige Ryan    Vitamin D deficiency      Past Surgical History:   Procedure Laterality Date    DILATION AND CURETTAGE OF UTERUS      ENDOMETRIAL ABLATION  06/2012    EYE REMOVAL      OD for melanoma, age 30    FOOT OSTEOTOMY Left 09/23/2016    B AUGUSTINA DPM      HYSTERECTOMY (CERVIX STATUS UNKNOWN)      BALDOMERO STEROTACTIC LOC BREAST BIOPSY RIGHT Right 07/19/2021    BALDOMERO STEROTACTIC LOC BREAST BIOPSY RIGHT 7/19/2021 [facility]    LINDSAY AND BSO (CERVIX REMOVED)  2012    Dr Bishnu Yo     Family History   Problem Relation Age of Onset    Heart Failure Mother         dec 76    Diabetes Mother     Diabetes Father     Stroke Father         dec age 80    Cancer Father         Sarcoma    Breast Cancer Maternal Aunt     Breast Cancer Paternal Aunt Stomach Cancer Other     Colon Cancer Neg Hx      Social History     Socioeconomic History    Marital status:      Spouse name: None    Number of children: 3    Years of education: 12    Highest education level: High school graduate   Occupational History    Occupation: [employer]   Tobacco Use    Smoking status: Never    Smokeless tobacco: Never   Vaping Use    Vaping Use: Never used   Substance and Sexual Activity    Alcohol use: No    Drug use: No    Sexual activity: Not Currently     Birth control/protection: Surgical   Social History Narrative    Born in [place], one of 4 children, one sister disappeared at age 30, never found    Moved to [place] at age 40        Lives in an apartment in [facility], alone    , 3 children, all grown, in [place]     2 granddaughters     Hobbies exercising, birds    Attends [employer], has volunteered at [employer]     Social Determinants of Health     Financial Resource Strain: Low Risk     Difficulty of Paying Living Expenses: Not hard at all   Food Insecurity: No Food Insecurity    Worried About [address] in the Last Year: Never true    [address] in the Last Year: Never true       Subjective/HPI  had mild chest pressure last night. Did not tell RN. Lasted briefly. Did make her little SOB during. Fine now. She is NPO. Diuresed well. Breathing is otherwise better. No fever on 2L O2    EKG: SR        Review of Systems:   Review of Systems   Constitutional: Negative. Negative for diaphoresis and fatigue. HENT: Negative. Eyes: Negative. Respiratory:  Positive for chest tightness and shortness of breath. Negative for cough, wheezing and stridor. Cardiovascular: Negative. Negative for chest pain, palpitations and leg swelling. Gastrointestinal: Negative. Negative for blood in stool and nausea. Genitourinary: Negative. Musculoskeletal: Negative. Skin: Negative. Neurological: Negative.   Negative for dizziness, syncope, weakness and light-headedness. Hematological: Negative. Psychiatric/Behavioral: Negative. Physical Examination:    BP (!) 90/54   Pulse 96   Temp 97.7 °F (36.5 °C) (Oral)   Resp 18   Ht 5' 6\" (1.676 m)   Wt 244 lb (110.7 kg)   SpO2 92%   BMI 39.38 kg/m²    Physical Exam   Constitutional: No distress. She appears chronically ill and acutely ill. HENT:   Normal cephalic and Atraumatic   Eyes: Pupils are equal, round, and reactive to light. Neck: Thyroid normal. No JVD present. No neck adenopathy. No thyromegaly present. Cardiovascular: Normal rate, regular rhythm, normal heart sounds, intact distal pulses and normal pulses. Pulmonary/Chest: Effort normal and breath sounds normal. She has no wheezes. She has no rales. She exhibits no tenderness. Abdominal: Soft. Bowel sounds are normal. There is no abdominal tenderness. Musculoskeletal:         General: Edema (trace) present. No tenderness. Normal range of motion. Cervical back: Normal range of motion and neck supple. Neurological: She is alert and oriented to person, place, and time. Skin: Skin is warm. No cyanosis. Nails show no clubbing.      LABS:  CBC:   Lab Results   Component Value Date/Time    WBC 11.2 12/05/2022 04:44 AM    RBC 4.42 12/05/2022 04:44 AM    RBC 4.57 05/14/2012 02:03 PM    HGB 14.0 12/05/2022 04:44 AM    HCT 40.6 12/05/2022 04:44 AM    MCV 91.7 12/05/2022 04:44 AM    MCH 31.7 12/05/2022 04:44 AM    MCHC 34.6 12/05/2022 04:44 AM    RDW 13.5 12/05/2022 04:44 AM     12/05/2022 04:44 AM    MPV 9.0 08/16/2015 07:30 PM     CBC with Differential:    Lab Results   Component Value Date/Time    WBC 11.2 12/05/2022 04:44 AM    RBC 4.42 12/05/2022 04:44 AM    RBC 4.57 05/14/2012 02:03 PM    HGB 14.0 12/05/2022 04:44 AM    HCT 40.6 12/05/2022 04:44 AM     12/05/2022 04:44 AM    MCV 91.7 12/05/2022 04:44 AM    MCH 31.7 12/05/2022 04:44 AM    MCHC 34.6 12/05/2022 04:44 AM    RDW 13.5 12/05/2022 04:44 AM    LYMPHOPCT 6.5 12/03/2022 11:15 AM    MONOPCT 2.1 12/03/2022 11:15 AM    BASOPCT 0.1 12/03/2022 11:15 AM    MONOSABS 0.3 12/03/2022 11:15 AM    LYMPHSABS 0.8 12/03/2022 11:15 AM    EOSABS 0.0 12/03/2022 11:15 AM    BASOSABS 0.0 12/03/2022 11:15 AM     CMP:    Lab Results   Component Value Date/Time     12/05/2022 04:44 AM    K 3.9 12/05/2022 04:44 AM    K 3.7 12/05/2022 01:26 AM    CL 99 12/05/2022 04:44 AM    CO2 22 12/05/2022 04:44 AM    BUN 21 12/05/2022 04:44 AM    CREATININE 1.33 12/05/2022 04:44 AM    GFRAA >60.0 10/25/2021 12:00 PM    LABGLOM 45.3 12/05/2022 04:44 AM    GLUCOSE 147 12/05/2022 04:44 AM    GLUCOSE 92 05/14/2012 02:03 PM    PROT 6.5 12/03/2022 11:15 AM    LABALBU 4.0 12/03/2022 11:15 AM    LABALBU 3.8 03/22/2012 11:36 AM    CALCIUM 9.8 12/05/2022 04:44 AM    BILITOT 0.4 12/03/2022 11:15 AM    ALKPHOS 86 12/03/2022 11:15 AM    AST 28 12/03/2022 11:15 AM    ALT 33 12/03/2022 11:15 AM     BMP:    Lab Results   Component Value Date/Time     12/05/2022 04:44 AM    K 3.9 12/05/2022 04:44 AM    K 3.7 12/05/2022 01:26 AM    CL 99 12/05/2022 04:44 AM    CO2 22 12/05/2022 04:44 AM    BUN 21 12/05/2022 04:44 AM    LABALBU 4.0 12/03/2022 11:15 AM    LABALBU 3.8 03/22/2012 11:36 AM    CREATININE 1.33 12/05/2022 04:44 AM    CALCIUM 9.8 12/05/2022 04:44 AM    GFRAA >60.0 10/25/2021 12:00 PM    LABGLOM 45.3 12/05/2022 04:44 AM    GLUCOSE 147 12/05/2022 04:44 AM    GLUCOSE 92 05/14/2012 02:03 PM     Magnesium:    Lab Results   Component Value Date/Time    MG 2.0 10/25/2021 12:00 PM     Troponin:    Lab Results   Component Value Date/Time    TROPONINI 0.267 12/03/2022 06:35 PM        Active Hospital Problems    Diagnosis Date Noted    Chest pain [R07.9] 12/03/2022     Priority: Medium        Assessment/Plan:  NSTEMI - ASA PLlavix 300 given Friday night. Continued Plavix. Nitropaste. Statin and BB. Pt will need cath. RBA discussed with pt.   CHF likely comibned due both tho HTN and Myocardial ischemia- Peripherally pt is not much volume overloaded. responded well to iv Lasix last night with total 3.2L out. This AM Prerenal - will Holdiv Lasix and ARB today. Follow Labs. Echo -P  HTN controlled- meds adjusted. Low salt diet. Fluid restrict. Strict I/Os  PAF- in SR. Continue Lovenox for now. Will need to resume DOAC prior to DC. HPL - statin low arnel diet.         Electronically signed by Arturo Fall MD on 12/5/2022 at 7:35 AM

## 2022-12-05 NOTE — PROGRESS NOTES
1145 pt arrived to cath lab via bed. Pt checked in. Consents and sedation info obtained, IV fluid hung.  Groin prepped prior to arrival.

## 2022-12-05 NOTE — CARE COORDINATION
[facility] REFERRAL CALLED TO JAMIR AT [facility], GLADYS CONFIRMED WITH PATIENT. PT FOR CATH TODAY.

## 2022-12-05 NOTE — FLOWSHEET NOTE
Assessment completed. VS taken. Denies chest pain,sob or nausea. NPO status continues for cath lab procedure this am.Electronically signed by Sukhdev Renae RN on 12/5/2022 at 2:38 PM

## 2022-12-05 NOTE — PROGRESS NOTES
Patient with doubling of CR since admission. Will hold of cardiac cath today. Losartan and lasix already on hold. Will add IVF for one liter or so and plan for LHC when renal function improves.      Electronically signed by Belen Deleon DO on 12/5/2022 at 1:00 PM

## 2022-12-05 NOTE — FLOWSHEET NOTE
Cardiac cath procedure on hold for today CR results. Electronically signed by Sukhdev Renae RN on 12/5/2022 at 2:39 PM

## 2022-12-05 NOTE — CARE COORDINATION
Definition and description of a heart attack explained. Brief overview of the heart anatomy reviewed with pt. CAD progression discussed. During a MI, lack of oxygen and damage to heart muscle explained. Symptoms of a MI reviewed. Pt. reports experiencing: Chest burning  Post MI complications reviewed including arrhythmias, decreased cardiac output, and inflammation (pericarditis). Hospital course reviewed, including testing: Lab work, B/P, ECG, ECHO, Stress testing, and Heart Cath. Treatments also reviewed from medication, angioplasty and stenting, to CABG. Patient having a cath with possible PCI today. Plan post discharge includes follow up with cardiology and home health care. Physical activity discussed including cardiac rehab. Pt advised to follow their physician's discharge instructions for activity restrictions, if any. Risk factors reviewed including: smoking, high cholesterol, HTN, DM, obesity, sedentary lifestyle, and stress. Pt. encouraged to make lifestyle changes to improve their health and lower these risk factors. Stress and depression were also discussed. S/S depression reviewed as well as encouragement to talk with someone if symptoms occur. Importance of follow up with the cardiologist reinforced. MI Zone booklet also reviewed. Goal is to keep patient in the \"green\" zone. She verbalizes understanding to call the doctor ASAP when experiencing symptoms in the \"yellow\" zone. Instructed to call 911 when S/S of \"red\" zone begin. Reminder to never drive after taking nitroglycerine. Copy of MI booklet and zone pamphlet provided to the patient for review. Offer for patient to verbalize any questions. All questions answered and patient denies further questions at this time.     Electronically signed by Janes Sawyer RN on 12/5/2022 at 11:31 AM

## 2022-12-05 NOTE — FLOWSHEET NOTE
Cath lab procedure cancelled for tomorrow d/t scheduling conflict. Electronically signed by Dee Rodas RN on 12/5/2022 at 5:51 PM

## 2022-12-06 ENCOUNTER — APPOINTMENT (OUTPATIENT)
Dept: CARDIAC CATH/INVASIVE PROCEDURES | Age: 62
DRG: 190 | End: 2022-12-06
Payer: COMMERCIAL

## 2022-12-06 LAB
ANION GAP SERPL CALCULATED.3IONS-SCNC: 14 MEQ/L (ref 9–15)
BUN BLDV-MCNC: 21 MG/DL (ref 8–23)
CALCIUM SERPL-MCNC: 9.1 MG/DL (ref 8.5–9.9)
CHLORIDE BLD-SCNC: 100 MEQ/L (ref 95–107)
CO2: 22 MEQ/L (ref 20–31)
CREAT SERPL-MCNC: 0.8 MG/DL (ref 0.5–0.9)
GFR SERPL CREATININE-BSD FRML MDRD: >60 ML/MIN/{1.73_M2}
GLUCOSE BLD-MCNC: 123 MG/DL (ref 70–99)
GLUCOSE BLD-MCNC: 148 MG/DL (ref 70–99)
GLUCOSE BLD-MCNC: 148 MG/DL (ref 70–99)
GLUCOSE BLD-MCNC: 173 MG/DL (ref 70–99)
GLUCOSE BLD-MCNC: 207 MG/DL (ref 70–99)
HCT VFR BLD CALC: 37.6 % (ref 37–47)
HEMOGLOBIN: 13.1 G/DL (ref 12–16)
MAGNESIUM: 1.9 MG/DL (ref 1.7–2.4)
MCH RBC QN AUTO: 31.9 PG (ref 27–31.3)
MCHC RBC AUTO-ENTMCNC: 34.8 % (ref 33–37)
MCV RBC AUTO: 91.8 FL (ref 79.4–94.8)
PDW BLD-RTO: 13.2 % (ref 11.5–14.5)
PERFORMED ON: ABNORMAL
PLATELET # BLD: 247 K/UL (ref 130–400)
POTASSIUM REFLEX MAGNESIUM: 3.4 MEQ/L (ref 3.4–4.9)
RBC # BLD: 4.09 M/UL (ref 4.2–5.4)
SODIUM BLD-SCNC: 136 MEQ/L (ref 135–144)
WBC # BLD: 8.1 K/UL (ref 4.8–10.8)

## 2022-12-06 PROCEDURE — 6360000002 HC RX W HCPCS

## 2022-12-06 PROCEDURE — 2700000000 HC OXYGEN THERAPY PER DAY

## 2022-12-06 PROCEDURE — 2060000000 HC ICU INTERMEDIATE R&B

## 2022-12-06 PROCEDURE — 83735 ASSAY OF MAGNESIUM: CPT

## 2022-12-06 PROCEDURE — 80048 BASIC METABOLIC PNL TOTAL CA: CPT

## 2022-12-06 PROCEDURE — C1887 CATHETER, GUIDING: HCPCS

## 2022-12-06 PROCEDURE — 2580000003 HC RX 258: Performed by: INTERNAL MEDICINE

## 2022-12-06 PROCEDURE — C1725 CATH, TRANSLUMIN NON-LASER: HCPCS

## 2022-12-06 PROCEDURE — 92928 PRQ TCAT PLMT NTRAC ST 1 LES: CPT | Performed by: INTERNAL MEDICINE

## 2022-12-06 PROCEDURE — B2151ZZ FLUOROSCOPY OF LEFT HEART USING LOW OSMOLAR CONTRAST: ICD-10-PCS | Performed by: INTERNAL MEDICINE

## 2022-12-06 PROCEDURE — 4A023N7 MEASUREMENT OF CARDIAC SAMPLING AND PRESSURE, LEFT HEART, PERCUTANEOUS APPROACH: ICD-10-PCS | Performed by: INTERNAL MEDICINE

## 2022-12-06 PROCEDURE — 36415 COLL VENOUS BLD VENIPUNCTURE: CPT

## 2022-12-06 PROCEDURE — 2709999900 HC NON-CHARGEABLE SUPPLY

## 2022-12-06 PROCEDURE — 99152 MOD SED SAME PHYS/QHP 5/>YRS: CPT | Performed by: INTERNAL MEDICINE

## 2022-12-06 PROCEDURE — 6370000000 HC RX 637 (ALT 250 FOR IP): Performed by: INTERNAL MEDICINE

## 2022-12-06 PROCEDURE — 85027 COMPLETE CBC AUTOMATED: CPT

## 2022-12-06 PROCEDURE — 92921 HC PRQ CARDIAC ANGIO ADDL ART: CPT

## 2022-12-06 PROCEDURE — C1874 STENT, COATED/COV W/DEL SYS: HCPCS

## 2022-12-06 PROCEDURE — 99233 SBSQ HOSP IP/OBS HIGH 50: CPT | Performed by: INTERNAL MEDICINE

## 2022-12-06 PROCEDURE — B2111ZZ FLUOROSCOPY OF MULTIPLE CORONARY ARTERIES USING LOW OSMOLAR CONTRAST: ICD-10-PCS | Performed by: INTERNAL MEDICINE

## 2022-12-06 PROCEDURE — 6370000000 HC RX 637 (ALT 250 FOR IP)

## 2022-12-06 PROCEDURE — C1769 GUIDE WIRE: HCPCS

## 2022-12-06 PROCEDURE — 93458 L HRT ARTERY/VENTRICLE ANGIO: CPT

## 2022-12-06 PROCEDURE — 2500000003 HC RX 250 WO HCPCS

## 2022-12-06 PROCEDURE — 93458 L HRT ARTERY/VENTRICLE ANGIO: CPT | Performed by: INTERNAL MEDICINE

## 2022-12-06 PROCEDURE — 6360000002 HC RX W HCPCS: Performed by: INTERNAL MEDICINE

## 2022-12-06 PROCEDURE — 6360000004 HC RX CONTRAST MEDICATION: Performed by: INTERNAL MEDICINE

## 2022-12-06 PROCEDURE — 2580000003 HC RX 258

## 2022-12-06 PROCEDURE — 92928 PRQ TCAT PLMT NTRAC ST 1 LES: CPT

## 2022-12-06 PROCEDURE — 027134Z DILATION OF CORONARY ARTERY, TWO ARTERIES WITH DRUG-ELUTING INTRALUMINAL DEVICE, PERCUTANEOUS APPROACH: ICD-10-PCS | Performed by: INTERNAL MEDICINE

## 2022-12-06 PROCEDURE — C1894 INTRO/SHEATH, NON-LASER: HCPCS

## 2022-12-06 RX ORDER — EPTIFIBATIDE 0.75 MG/ML
2 INJECTION, SOLUTION INTRAVENOUS CONTINUOUS
Status: DISPENSED | OUTPATIENT
Start: 2022-12-06 | End: 2022-12-07

## 2022-12-06 RX ORDER — SODIUM CHLORIDE 9 MG/ML
INJECTION, SOLUTION INTRAVENOUS CONTINUOUS
Status: DISPENSED | OUTPATIENT
Start: 2022-12-06 | End: 2022-12-07

## 2022-12-06 RX ORDER — LABETALOL HYDROCHLORIDE 5 MG/ML
10 INJECTION, SOLUTION INTRAVENOUS EVERY 30 MIN PRN
Status: DISCONTINUED | OUTPATIENT
Start: 2022-12-06 | End: 2022-12-07 | Stop reason: HOSPADM

## 2022-12-06 RX ORDER — MORPHINE SULFATE 2 MG/ML
2 INJECTION, SOLUTION INTRAMUSCULAR; INTRAVENOUS
Status: COMPLETED | OUTPATIENT
Start: 2022-12-06 | End: 2022-12-06

## 2022-12-06 RX ORDER — ACETAMINOPHEN 325 MG/1
650 TABLET ORAL EVERY 4 HOURS PRN
Status: DISCONTINUED | OUTPATIENT
Start: 2022-12-06 | End: 2022-12-07 | Stop reason: HOSPADM

## 2022-12-06 RX ORDER — FENTANYL CITRATE 50 UG/ML
25 INJECTION, SOLUTION INTRAMUSCULAR; INTRAVENOUS
Status: DISCONTINUED | OUTPATIENT
Start: 2022-12-06 | End: 2022-12-07 | Stop reason: HOSPADM

## 2022-12-06 RX ORDER — MIDAZOLAM HYDROCHLORIDE 1 MG/ML
2 INJECTION INTRAMUSCULAR; INTRAVENOUS
Status: DISCONTINUED | OUTPATIENT
Start: 2022-12-06 | End: 2022-12-07 | Stop reason: HOSPADM

## 2022-12-06 RX ADMIN — ACETAMINOPHEN 650 MG: 325 TABLET ORAL at 03:43

## 2022-12-06 RX ADMIN — IOPAMIDOL 160 ML: 612 INJECTION, SOLUTION INTRAVENOUS at 14:27

## 2022-12-06 RX ADMIN — Medication 10 ML: at 08:36

## 2022-12-06 RX ADMIN — POTASSIUM CHLORIDE 20 MEQ: 1500 TABLET, EXTENDED RELEASE ORAL at 08:28

## 2022-12-06 RX ADMIN — Medication 10 ML: at 20:43

## 2022-12-06 RX ADMIN — MORPHINE SULFATE 1 MG: 2 INJECTION, SOLUTION INTRAMUSCULAR; INTRAVENOUS at 17:00

## 2022-12-06 RX ADMIN — Medication 10 ML: at 20:44

## 2022-12-06 RX ADMIN — MORPHINE SULFATE 1 MG: 2 INJECTION, SOLUTION INTRAMUSCULAR; INTRAVENOUS at 15:12

## 2022-12-06 RX ADMIN — METOPROLOL SUCCINATE 50 MG: 50 TABLET, EXTENDED RELEASE ORAL at 08:29

## 2022-12-06 RX ADMIN — POTASSIUM CHLORIDE 20 MEQ: 1500 TABLET, EXTENDED RELEASE ORAL at 18:31

## 2022-12-06 RX ADMIN — EPTIFIBATIDE 2 MCG/KG/MIN: 0.75 INJECTION INTRAVENOUS at 20:31

## 2022-12-06 RX ADMIN — Medication 10 ML: at 08:37

## 2022-12-06 RX ADMIN — PANTOPRAZOLE SODIUM 40 MG: 40 TABLET, DELAYED RELEASE ORAL at 05:37

## 2022-12-06 RX ADMIN — MORPHINE SULFATE 2 MG: 2 INJECTION, SOLUTION INTRAMUSCULAR; INTRAVENOUS at 20:31

## 2022-12-06 RX ADMIN — SODIUM CHLORIDE: 9 INJECTION, SOLUTION INTRAVENOUS at 18:35

## 2022-12-06 RX ADMIN — ENOXAPARIN SODIUM 120 MG: 150 INJECTION SUBCUTANEOUS at 08:28

## 2022-12-06 RX ADMIN — SODIUM CHLORIDE: 9 INJECTION, SOLUTION INTRAVENOUS at 20:43

## 2022-12-06 RX ADMIN — ATORVASTATIN CALCIUM 40 MG: 40 TABLET, FILM COATED ORAL at 20:31

## 2022-12-06 RX ADMIN — EPTIFIBATIDE 2 MCG/KG/MIN: 0.75 INJECTION INTRAVENOUS at 16:53

## 2022-12-06 ASSESSMENT — ENCOUNTER SYMPTOMS
EYES NEGATIVE: 1
BLOOD IN STOOL: 0
NAUSEA: 0
GASTROINTESTINAL NEGATIVE: 1
WHEEZING: 0
COUGH: 0
CHEST TIGHTNESS: 1
SHORTNESS OF BREATH: 1
STRIDOR: 0

## 2022-12-06 ASSESSMENT — PAIN DESCRIPTION - LOCATION
LOCATION: CHEST
LOCATION: ARM
LOCATION: HEAD

## 2022-12-06 ASSESSMENT — PAIN SCALES - GENERAL
PAINLEVEL_OUTOF10: 3
PAINLEVEL_OUTOF10: 5
PAINLEVEL_OUTOF10: 7
PAINLEVEL_OUTOF10: 9
PAINLEVEL_OUTOF10: 0
PAINLEVEL_OUTOF10: 7
PAINLEVEL_OUTOF10: 0

## 2022-12-06 ASSESSMENT — PAIN DESCRIPTION - DESCRIPTORS
DESCRIPTORS: SORE
DESCRIPTORS: ACHING
DESCRIPTORS: SHARP

## 2022-12-06 ASSESSMENT — PAIN DESCRIPTION - ORIENTATION: ORIENTATION: MID;ANTERIOR

## 2022-12-06 ASSESSMENT — PAIN DESCRIPTION - PAIN TYPE: TYPE: ACUTE PAIN

## 2022-12-06 ASSESSMENT — PAIN DESCRIPTION - DIRECTION: RADIATING_TOWARDS: BILAT ARMS

## 2022-12-06 ASSESSMENT — PAIN DESCRIPTION - FREQUENCY: FREQUENCY: CONTINUOUS

## 2022-12-06 ASSESSMENT — PAIN DESCRIPTION - ONSET: ONSET: ON-GOING

## 2022-12-06 NOTE — BRIEF OP NOTE
Section of Cardiology  Adult Brief Cardiac Cath Procedure Note        Procedure(s):  LHC, b/l coronary angio, PCI of mid LAD with ADELINE, PTCA of ostial D1    Pre-operative Diagnosis:  nstemi    H&P Status: Completed and reviewed. Post-operative Diagnosis:      LV EF of 55%  LM normal   LAD 99% mid. D1 with ostial 50%, prox 60-70^  CX mild disease  RCA mild disease. Findings:  See full report    Complications:  none    Primary Proceduralist:   Dr.Wes Moore DO    Plan  DAPT  RFM  Max med rx  Integrilin gttx12 hours.        Full procedure note to follow

## 2022-12-06 NOTE — PROGRESS NOTES
Report called to [address] [place] [address] on one west.  Right groin stable with no bleeding or hematoma. Pure wick catheter placed.   Monitor attached to patient and transport notified

## 2022-12-06 NOTE — CARE COORDINATION
DC PLAN REMAINS HOME WITH [facility] WHICH WAS PREVIOUSLY ARRANGED. WILL NEED HOME 02 EVAL.  WILL FOLLOW

## 2022-12-06 NOTE — PROGRESS NOTES
Dr. Yosvany Forte at the bedside reviewing results with patient. She c/o chest pain and ekg done per Dr. Yosvany Forte, no changes.   1 mg of morphine given

## 2022-12-06 NOTE — PROGRESS NOTES
Arrived to pre/post from the cath lab and report from [facility]. 6 Bulgarian sheath to right groin with no bleeding or hematoma attached to pressure bag.   Vitals are stable

## 2022-12-06 NOTE — PROGRESS NOTES
Hospitalist Progress Note      Date of Admission: 12/3/2022  Chief Complaint:    Chief Complaint   Patient presents with    Chest Pain     Epigastric, burning     Subjective:  No new complaints. No nausea, vomiting, chest pain, or headache.       Medications:    Infusion Medications    sodium chloride Stopped (12/06/22 0546)    sodium chloride      dextrose       Scheduled Medications    sodium chloride flush  5-40 mL IntraVENous 2 times per day    diphenhydrAMINE  50 mg IntraVENous Once    hydrocortisone sodium succinate PF  200 mg IntraVENous Once    clopidogrel  75 mg Oral Daily    metoprolol succinate  50 mg Oral Daily    potassium chloride  20 mEq Oral BID WC    [Held by provider] losartan  50 mg Oral Daily    sodium chloride flush  5-40 mL IntraVENous 2 times per day    aspirin  81 mg Oral Daily    atorvastatin  40 mg Oral Nightly    insulin lispro  0-4 Units SubCUTAneous TID WC    insulin lispro  0-4 Units SubCUTAneous Nightly    enoxaparin  1 mg/kg SubCUTAneous BID    pantoprazole  40 mg Oral QAM AC    nitroglycerin  0.5 inch Topical 4 times per day     PRN Meds: sodium chloride flush, sodium chloride, ondansetron, nitroGLYCERIN, sodium chloride flush, sodium chloride, ondansetron **OR** ondansetron, acetaminophen **OR** acetaminophen, polyethylene glycol, potassium chloride **OR** potassium alternative oral replacement **OR** potassium chloride, magnesium sulfate, glucose, dextrose bolus **OR** dextrose bolus, glucagon (rDNA), dextrose, nitroGLYCERIN, morphine    Intake/Output Summary (Last 24 hours) at 12/6/2022 1058  Last data filed at 12/6/2022 0716  Gross per 24 hour   Intake 240 ml   Output 200 ml   Net 40 ml       Exam:  BP (!) 100/54   Pulse 85   Temp 98.1 °F (36.7 °C) (Oral)   Resp 18   Ht 5' 6\" (1.676 m)   Wt 243 lb 8 oz (110.5 kg)   SpO2 92%   BMI 39.30 kg/m²   Head: Normocephalic, atraumatic  Sclera clear  Neck JVD flat  Lungs: normal effort of breathing    Labs:   Recent Labs 12/04/22  0433 12/05/22  0444 12/06/22  0442   WBC 13.4* 11.2* 8.1   HGB 13.8 14.0 13.1   HCT 40.9 40.6 37.6    286 247       Recent Labs     12/03/22  1115 12/04/22  0433 12/05/22  0126 12/05/22  0444 12/06/22 0442      < > 134* 140 136   K 4.0   < > 3.7 3.9 3.4      < > 95 99 100   CO2 17*   < > 24 22 22   BUN 13   < > 19 21 21   CREATININE 0.79   < > 1.33* 1.33* 0.80   CALCIUM 9.1   < > 9.4 9.8 9.1   AST 28  --   --   --   --    ALT 33  --   --   --   --    BILITOT 0.4  --   --   --   --    ALKPHOS 86  --   --   --   --     < > = values in this interval not displayed. No results for input(s): INR in the last 72 hours. Recent Labs     12/03/22  1115 12/03/22  1545 12/03/22  1835   TROPONINI 0.099* 0.201* 0.267*       Radiology:  CTA CHEST W WO CONTRAST PE Eval   Final Result   1. No evidence of pulmonary embolus. 2.  Cardiomegaly. 3.  Pulmonary vascular congestion. 4.  Fatty liver. 5.  Small hiatal hernia. XR CHEST PORTABLE   Final Result   Cardiomegaly with findings of mild vascular congestion      Suboptimal inspiration was obtained for the chest x-ray. Assessment/Plan:    Non-ST elevation MI: For cardiac catheterization. Holding arb and diuretic as recommended by cards. 12/5 - cath rescheduled due to mild alberto, diuretic withheld. 12/6 - for cath today, creat wnl. Suspected CHF: Respiratory status significantly improved with IV diuresis. Improving volume status, diuretics currently held while renal function being monitored. Creat now wnl. Mild ALBERTO: resolved. History of pulmonary embolism: On Lovenox 1 mg/kg  History of atrial fibrillation: Rate controlled, on anticoagulation.     35 minutes total care time, >1/2 in unit/floor time and care coordination         Prem Marinelli MD ,MD

## 2022-12-06 NOTE — FLOWSHEET NOTE
Pt returned to floor from cath lab. VSS. Denies chest pain,sob or nausea. Rt groin stable no bleeding noted to site. Call light within reach. Lucille ulrich

## 2022-12-06 NOTE — PROGRESS NOTES
Hospitalist Progress Note      Date of Admission: 12/3/2022  Chief Complaint:    Chief Complaint   Patient presents with    Chest Pain     Epigastric, burning     Subjective:  No new complaints.   No nausea, vomiting, chest pain, or headache      Medications:    Infusion Medications    sodium chloride      sodium chloride 75 mL/hr (12/05/22 1332)    sodium chloride      dextrose       Scheduled Medications    sodium chloride flush  5-40 mL IntraVENous 2 times per day    diphenhydrAMINE  50 mg IntraVENous Once    hydrocortisone sodium succinate PF  200 mg IntraVENous Once    clopidogrel  75 mg Oral Daily    [Held by provider] furosemide  40 mg IntraVENous BID    metoprolol succinate  50 mg Oral Daily    potassium chloride  20 mEq Oral BID WC    [Held by provider] losartan  50 mg Oral Daily    sodium chloride flush  5-40 mL IntraVENous 2 times per day    aspirin  81 mg Oral Daily    atorvastatin  40 mg Oral Nightly    insulin lispro  0-4 Units SubCUTAneous TID WC    insulin lispro  0-4 Units SubCUTAneous Nightly    enoxaparin  1 mg/kg SubCUTAneous BID    pantoprazole  40 mg Oral QAM AC    nitroglycerin  0.5 inch Topical 4 times per day     PRN Meds: sodium chloride flush, sodium chloride, ondansetron, nitroGLYCERIN, sodium chloride flush, sodium chloride, ondansetron **OR** ondansetron, acetaminophen **OR** acetaminophen, polyethylene glycol, potassium chloride **OR** potassium alternative oral replacement **OR** potassium chloride, magnesium sulfate, glucose, dextrose bolus **OR** dextrose bolus, glucagon (rDNA), dextrose, nitroGLYCERIN, morphine    Intake/Output Summary (Last 24 hours) at 12/5/2022 2032  Last data filed at 12/5/2022 0600  Gross per 24 hour   Intake 465 ml   Output 900 ml   Net -435 ml     Exam:  BP (!) 90/54   Pulse 96   Temp 97.7 °F (36.5 °C) (Oral)   Resp 18   Ht 5' 6\" (1.676 m)   Wt 244 lb (110.7 kg)   SpO2 92%   BMI 39.38 kg/m²   Head: Normocephalic, atraumatic  Sclera clear  Neck JVD flat  Lungs: normal effort of breathing    Labs:   Recent Labs     12/03/22  1115 12/04/22  0433 12/05/22  0444   WBC 12.4* 13.4* 11.2*   HGB 13.4 13.8 14.0   HCT 40.7 40.9 40.6    305 286     Recent Labs     12/03/22  0238 12/03/22  0304 12/03/22  1115 12/04/22  0433 12/05/22  0126 12/05/22  0444     --  139 139 134* 140   K 3.7  --  4.0 3.7 3.7 3.9     --  105 100 95 99   CO2 20  --  17* 23 24 22   BUN 13  --  13 13 19 21   CREATININE 0.84   < > 0.79 0.73 1.33* 1.33*   CALCIUM 9.3  --  9.1 9.3 9.4 9.8   AST 32  --  28  --   --   --    ALT 36*  --  33  --   --   --    BILITOT 0.5  --  0.4  --   --   --    ALKPHOS 88  --  86  --   --   --     < > = values in this interval not displayed. No results for input(s): INR in the last 72 hours. Recent Labs     12/03/22  1115 12/03/22  1545 12/03/22  1835   TROPONINI 0.099* 0.201* 0.267*     Radiology:  CTA CHEST W WO CONTRAST PE Eval   Final Result   1. No evidence of pulmonary embolus. 2.  Cardiomegaly. 3.  Pulmonary vascular congestion. 4.  Fatty liver. 5.  Small hiatal hernia. XR CHEST PORTABLE   Final Result   Cardiomegaly with findings of mild vascular congestion      Suboptimal inspiration was obtained for the chest x-ray. Assessment/Plan:    Non-ST elevation MI: For cardiac catheterization. Initially plan for today but given rising creatinine, holding until tomorrow. ARB/ACE already on hold, diuretics suspended. Following with cardiology. Suspected CHF: Respiratory status significantly improved with IV diuresis. Improving volume status, diuretics currently held while renal function being monitored. Mild ALBERTO: Most likely prerenal.  Holding diuretics, continue to monitor renal function. History of pulmonary embolism: On Lovenox 1 mg/kg  History of atrial fibrillation: Rate controlled, on anticoagulation.     35 minutes total care time, >1/2 in unit/floor time and care coordination         [facility] Ginger Pratt MD ,MD

## 2022-12-07 VITALS
TEMPERATURE: 98.6 F | BODY MASS INDEX: 36.82 KG/M2 | HEIGHT: 66 IN | RESPIRATION RATE: 18 BRPM | DIASTOLIC BLOOD PRESSURE: 75 MMHG | HEART RATE: 85 BPM | OXYGEN SATURATION: 92 % | SYSTOLIC BLOOD PRESSURE: 119 MMHG | WEIGHT: 229.1 LBS

## 2022-12-07 LAB
ANION GAP SERPL CALCULATED.3IONS-SCNC: 15 MEQ/L (ref 9–15)
BUN BLDV-MCNC: 15 MG/DL (ref 8–23)
CALCIUM SERPL-MCNC: 8.8 MG/DL (ref 8.5–9.9)
CHLORIDE BLD-SCNC: 103 MEQ/L (ref 95–107)
CO2: 20 MEQ/L (ref 20–31)
CREAT SERPL-MCNC: 0.84 MG/DL (ref 0.5–0.9)
GFR SERPL CREATININE-BSD FRML MDRD: >60 ML/MIN/{1.73_M2}
GLUCOSE BLD-MCNC: 140 MG/DL (ref 70–99)
GLUCOSE BLD-MCNC: 146 MG/DL (ref 70–99)
GLUCOSE BLD-MCNC: 208 MG/DL (ref 70–99)
HCT VFR BLD CALC: 36.9 % (ref 37–47)
HEMOGLOBIN: 12.8 G/DL (ref 12–16)
MCH RBC QN AUTO: 31.9 PG (ref 27–31.3)
MCHC RBC AUTO-ENTMCNC: 34.6 % (ref 33–37)
MCV RBC AUTO: 92.3 FL (ref 79.4–94.8)
PDW BLD-RTO: 13.3 % (ref 11.5–14.5)
PERFORMED ON: ABNORMAL
PERFORMED ON: ABNORMAL
PLATELET # BLD: 254 K/UL (ref 130–400)
POTASSIUM REFLEX MAGNESIUM: 3.8 MEQ/L (ref 3.4–4.9)
RBC # BLD: 4 M/UL (ref 4.2–5.4)
SODIUM BLD-SCNC: 138 MEQ/L (ref 135–144)
WBC # BLD: 8.1 K/UL (ref 4.8–10.8)

## 2022-12-07 PROCEDURE — 80048 BASIC METABOLIC PNL TOTAL CA: CPT

## 2022-12-07 PROCEDURE — 6370000000 HC RX 637 (ALT 250 FOR IP): Performed by: INTERNAL MEDICINE

## 2022-12-07 PROCEDURE — 2700000000 HC OXYGEN THERAPY PER DAY

## 2022-12-07 PROCEDURE — 85027 COMPLETE CBC AUTOMATED: CPT

## 2022-12-07 PROCEDURE — 99233 SBSQ HOSP IP/OBS HIGH 50: CPT | Performed by: INTERNAL MEDICINE

## 2022-12-07 PROCEDURE — 2580000003 HC RX 258: Performed by: INTERNAL MEDICINE

## 2022-12-07 PROCEDURE — 36415 COLL VENOUS BLD VENIPUNCTURE: CPT

## 2022-12-07 RX ORDER — LOSARTAN POTASSIUM 25 MG/1
25 TABLET ORAL DAILY
Status: DISCONTINUED | OUTPATIENT
Start: 2022-12-07 | End: 2022-12-07 | Stop reason: HOSPADM

## 2022-12-07 RX ORDER — FUROSEMIDE 40 MG/1
40 TABLET ORAL DAILY
Status: DISCONTINUED | OUTPATIENT
Start: 2022-12-07 | End: 2022-12-07 | Stop reason: HOSPADM

## 2022-12-07 RX ORDER — CLOPIDOGREL BISULFATE 75 MG/1
75 TABLET ORAL DAILY
Qty: 30 TABLET | Refills: 3 | Status: SHIPPED | OUTPATIENT
Start: 2022-12-08

## 2022-12-07 RX ORDER — FUROSEMIDE 40 MG/1
40 TABLET ORAL DAILY
Qty: 60 TABLET | Refills: 3 | Status: SHIPPED | OUTPATIENT
Start: 2022-12-07

## 2022-12-07 RX ORDER — LOSARTAN POTASSIUM 25 MG/1
25 TABLET ORAL DAILY
Qty: 30 TABLET | Refills: 3 | Status: SHIPPED | OUTPATIENT
Start: 2022-12-07

## 2022-12-07 RX ORDER — POTASSIUM CHLORIDE 20 MEQ/1
20 TABLET, EXTENDED RELEASE ORAL
Status: DISCONTINUED | OUTPATIENT
Start: 2022-12-08 | End: 2022-12-07 | Stop reason: HOSPADM

## 2022-12-07 RX ORDER — NITROGLYCERIN 0.4 MG/1
0.4 TABLET SUBLINGUAL EVERY 5 MIN PRN
Qty: 25 TABLET | Refills: 3 | Status: SHIPPED | OUTPATIENT
Start: 2022-12-07

## 2022-12-07 RX ORDER — ATORVASTATIN CALCIUM 40 MG/1
40 TABLET, FILM COATED ORAL NIGHTLY
Qty: 30 TABLET | Refills: 3 | Status: SHIPPED | OUTPATIENT
Start: 2022-12-07

## 2022-12-07 RX ORDER — METOPROLOL SUCCINATE 50 MG/1
50 TABLET, EXTENDED RELEASE ORAL DAILY
Qty: 30 TABLET | Refills: 3 | Status: SHIPPED | OUTPATIENT
Start: 2022-12-08

## 2022-12-07 RX ORDER — POTASSIUM CHLORIDE 20 MEQ/1
20 TABLET, EXTENDED RELEASE ORAL
Qty: 60 TABLET | Refills: 3 | Status: SHIPPED | OUTPATIENT
Start: 2022-12-08

## 2022-12-07 RX ADMIN — FUROSEMIDE 40 MG: 40 TABLET ORAL at 10:07

## 2022-12-07 RX ADMIN — ASPIRIN 81 MG CHEWABLE TABLET 81 MG: 81 TABLET CHEWABLE at 09:21

## 2022-12-07 RX ADMIN — Medication 10 ML: at 09:21

## 2022-12-07 RX ADMIN — CLOPIDOGREL BISULFATE 75 MG: 75 TABLET, FILM COATED ORAL at 09:21

## 2022-12-07 RX ADMIN — PANTOPRAZOLE SODIUM 40 MG: 40 TABLET, DELAYED RELEASE ORAL at 05:56

## 2022-12-07 RX ADMIN — ACETAMINOPHEN 650 MG: 325 TABLET ORAL at 15:06

## 2022-12-07 RX ADMIN — APIXABAN 5 MG: 5 TABLET, FILM COATED ORAL at 10:07

## 2022-12-07 RX ADMIN — ACETAMINOPHEN 650 MG: 325 TABLET ORAL at 03:18

## 2022-12-07 RX ADMIN — METOPROLOL SUCCINATE 50 MG: 50 TABLET, EXTENDED RELEASE ORAL at 09:21

## 2022-12-07 RX ADMIN — POTASSIUM CHLORIDE 20 MEQ: 1500 TABLET, EXTENDED RELEASE ORAL at 09:21

## 2022-12-07 ASSESSMENT — PAIN SCALES - GENERAL
PAINLEVEL_OUTOF10: 0
PAINLEVEL_OUTOF10: 3

## 2022-12-07 ASSESSMENT — ENCOUNTER SYMPTOMS
BLOOD IN STOOL: 0
COUGH: 0
NAUSEA: 0
STRIDOR: 0
GASTROINTESTINAL NEGATIVE: 1
EYES NEGATIVE: 1
WHEEZING: 0

## 2022-12-07 NOTE — DISCHARGE INSTR - DIET

## 2022-12-07 NOTE — PROGRESS NOTES
Hospitalist Progress Note      Date of Admission: 12/3/2022  Chief Complaint:    Chief Complaint   Patient presents with    Chest Pain     Epigastric, burning     Subjective:  No new complaints. No nausea, vomiting, chest pain, or headache.       Medications:    Infusion Medications    sodium chloride Stopped (12/06/22 0546)    sodium chloride      dextrose       Scheduled Medications    losartan  25 mg Oral Daily    furosemide  40 mg Oral Daily    [START ON 12/8/2022] potassium chloride  20 mEq Oral Daily with breakfast    apixaban  5 mg Oral BID    sodium chloride flush  5-40 mL IntraVENous 2 times per day    diphenhydrAMINE  50 mg IntraVENous Once    hydrocortisone sodium succinate PF  200 mg IntraVENous Once    clopidogrel  75 mg Oral Daily    metoprolol succinate  50 mg Oral Daily    sodium chloride flush  5-40 mL IntraVENous 2 times per day    aspirin  81 mg Oral Daily    atorvastatin  40 mg Oral Nightly    insulin lispro  0-4 Units SubCUTAneous TID WC    insulin lispro  0-4 Units SubCUTAneous Nightly    pantoprazole  40 mg Oral QAM AC     PRN Meds: acetaminophen, fentanNYL, midazolam, labetalol, sodium chloride flush, sodium chloride, ondansetron, nitroGLYCERIN, sodium chloride flush, sodium chloride, ondansetron **OR** ondansetron, acetaminophen **OR** acetaminophen, polyethylene glycol, potassium chloride **OR** potassium alternative oral replacement **OR** potassium chloride, magnesium sulfate, glucose, dextrose bolus **OR** dextrose bolus, glucagon (rDNA), dextrose, nitroGLYCERIN, morphine  No intake or output data in the 24 hours ending 12/07/22 1520    Exam:  /75   Pulse 85   Temp 98.6 °F (37 °C) (Oral)   Resp 18   Ht 5' 6\" (1.676 m)   Wt 229 lb 1.6 oz (103.9 kg)   SpO2 92%   BMI 36.98 kg/m²   Head: Normocephalic, atraumatic  Sclera clear  Neck JVD flat  Lungs: normal effort of breathing    Labs:   Recent Labs     12/05/22  0444 12/06/22  0442 12/07/22  0518   WBC 11.2* 8.1 8.1   HGB 14.0 13.1 12.8   HCT 40.6 37.6 36.9*    247 254       Recent Labs     12/05/22  0444 12/06/22  0442 12/07/22  0518    136 138   K 3.9 3.4 3.8   CL 99 100 103   CO2 22 22 20   BUN 21 21 15   CREATININE 1.33* 0.80 0.84   CALCIUM 9.8 9.1 8.8       No results for input(s): INR in the last 72 hours. No results for input(s): Laban [place] in the last 72 hours. Radiology:  CTA CHEST W WO CONTRAST PE Eval   Final Result   1. No evidence of pulmonary embolus. 2.  Cardiomegaly. 3.  Pulmonary vascular congestion. 4.  Fatty liver. 5.  Small hiatal hernia. XR CHEST PORTABLE   Final Result   Cardiomegaly with findings of mild vascular congestion      Suboptimal inspiration was obtained for the chest x-ray. Assessment/Plan:    Non-ST elevation MI: For cardiac catheterization. Holding arb and diuretic as recommended by cards. 12/5 - cath rescheduled due to mild alberto, diuretic withheld. 12/6 - for cath today, creat wnl.    12/7 - cleared for dc by cardiology. Dc w outpt follow up. Suspected CHF: Respiratory status significantly improved with IV diuresis. Improving volume status, diuretics currently held while renal function being monitored. Creat now wnl. Mild ALBERTO: resolved. History of pulmonary embolism: On Lovenox 1 mg/kg  History of atrial fibrillation: Rate controlled, on anticoagulation.     25 minutes total care time, >1/2 in unit/floor time and care coordination         Jey Burr MD ,MD

## 2022-12-07 NOTE — PROGRESS NOTES
Progress Note  Patient: Chuy Maria  Unit/Bed: K408/L033-85  YOB: 1960  MRN: [id number]  Acct: [de-identified]   Admitting Diagnosis: Lactic acidosis [E87.20]  Chest pain [R07.9]  NSTEMI (non-ST elevated myocardial infarction) [facility]) [I21.4]  Chest pain, unspecified type [R07.9]  Admit Date:  12/3/2022  Hospital Day: 4    Chief Complaint: NSTEMI    Histories:  Past Medical History:   Diagnosis Date    Asthma 2015    Bilateral renal cysts 2013    CAD (coronary artery disease)     Depression     since age 35, was with Dr Meagan Deluca, now the [facility]    Diverticulosis of sigmoid colon 2012    Dr Marilu Verma    DJD of left shoulder     Environmental allergies     Fatty liver 2013    GERD (gastroesophageal reflux disease)     History of cardiac arrhythmia     \"due to stress, was placed on medication\"    Hydatidiform mole     age 26     Hyperlipidemia     Hypothyroidism 2011    IFG (impaired fasting glucose) 2013    Melanoma of eye ([address]     age 30, R eye prosthesis, Dr Arlet Talavera    Obesity     Prediabetes     PTSD (post-traumatic stress disorder)     age 35, [facility]    S/P colonoscopy 04/19/2012    Dr. Surjit Hernandez    S/P hysterectomy with oophorectomy 2012    Dr Garnett Boxer    Tremor     Dr Reyna Scale    Vitamin D deficiency      Past Surgical History:   Procedure Laterality Date    DILATION AND CURETTAGE OF UTERUS      ENDOMETRIAL ABLATION  06/2012    EYE REMOVAL      OD for melanoma, age 30    FOOT OSTEOTOMY Left 09/23/2016    B AUGUSTINA DPM      HYSTERECTOMY (CERVIX STATUS UNKNOWN)      BALDOMERO STEROTACTIC LOC BREAST BIOPSY RIGHT Right 07/19/2021    BALDOMERO STEROTACTIC LOC BREAST BIOPSY RIGHT 7/19/2021 [facility]    LINDSAY AND BSO (CERVIX REMOVED)  2012    Dr Gris Valentino N/A 12/3/2022    EGD DIAGNOSTIC ONLY performed by Marv Badillo MD at [facility]     Family History   Problem Relation Age of Onset    Heart Failure Mother         dec 76    Diabetes Mother     Diabetes Father Stroke Father         dec age 80    Cancer Father         Sarcoma    Breast Cancer Maternal Aunt     Breast Cancer Paternal Aunt     Stomach Cancer Other     Colon Cancer Neg Hx      Social History     Socioeconomic History    Marital status:      Spouse name: None    Number of children: 3    Years of education: 12    Highest education level: High school graduate   Occupational History    Occupation: [employer]   Tobacco Use    Smoking status: Never    Smokeless tobacco: Never   Vaping Use    Vaping Use: Never used   Substance and Sexual Activity    Alcohol use: No    Drug use: No    Sexual activity: Not Currently     Birth control/protection: Surgical   Social History Narrative    Born in [address] [place] [address], one of 4 children, one sister disappeared at age 29, never found    Moved to [place] at age 40        Lives in an apartment in [facility] [place], alone    , 3 children, all grown, in [place]     2 granddaughters     Hobbies exercising, birds    Attends [employer], has volunteered at [id number] Strain: Low Risk     Difficulty of Paying Living Expenses: Not hard at all   Food Insecurity: No Food Insecurity    Worried About [address] in the Last Year: Never true    [address] [facility] [address] in the Last Year: Never true       Subjective/HPI  feels better no further cp walking in room. Wants to go home. No acute events. Cath site stable with no hematoma( RFA)  EKG: SR90        Review of Systems:   Review of Systems   Constitutional: Negative. Negative for diaphoresis and fatigue. HENT: Negative. Eyes: Negative. Respiratory:  Negative for cough, wheezing and stridor. Cardiovascular: Negative. Negative for chest pain, palpitations and leg swelling. Gastrointestinal: Negative. Negative for blood in stool and nausea. Genitourinary: Negative. Musculoskeletal: Negative. Skin: Negative. Neurological: Negative.   Negative for dizziness, syncope, weakness and light-headedness. Hematological: Negative. Psychiatric/Behavioral: Negative. Physical Examination:    /75   Pulse 85   Temp 98.6 °F (37 °C) (Oral)   Resp 18   Ht 5' 6\" (1.676 m)   Wt 229 lb 1.6 oz (103.9 kg)   SpO2 92%   BMI 36.98 kg/m²    Physical Exam   Constitutional: No distress. She appears chronically ill and acutely ill. HENT:   Normal cephalic and Atraumatic   Eyes: Pupils are equal, round, and reactive to light. Neck: Thyroid normal. No JVD present. No neck adenopathy. No thyromegaly present. Cardiovascular: Normal rate, regular rhythm, normal heart sounds, intact distal pulses and normal pulses. Pulmonary/Chest: Effort normal and breath sounds normal. She has no wheezes. She has no rales. She exhibits no tenderness. Abdominal: Soft. Bowel sounds are normal. There is no abdominal tenderness. Musculoskeletal:         General: Edema (trace) present. No tenderness. Normal range of motion. Cervical back: Normal range of motion and neck supple. Neurological: She is alert and oriented to person, place, and time. Skin: Skin is warm. No cyanosis. Nails show no clubbing.      LABS:  CBC:   Lab Results   Component Value Date/Time    WBC 8.1 12/07/2022 05:18 AM    RBC 4.00 12/07/2022 05:18 AM    RBC 4.57 05/14/2012 02:03 PM    HGB 12.8 12/07/2022 05:18 AM    HCT 36.9 12/07/2022 05:18 AM    MCV 92.3 12/07/2022 05:18 AM    MCH 31.9 12/07/2022 05:18 AM    MCHC 34.6 12/07/2022 05:18 AM    RDW 13.3 12/07/2022 05:18 AM     12/07/2022 05:18 AM    MPV 9.0 08/16/2015 07:30 PM     CBC with Differential:    Lab Results   Component Value Date/Time    WBC 8.1 12/07/2022 05:18 AM    RBC 4.00 12/07/2022 05:18 AM    RBC 4.57 05/14/2012 02:03 PM    HGB 12.8 12/07/2022 05:18 AM    HCT 36.9 12/07/2022 05:18 AM     12/07/2022 05:18 AM    MCV 92.3 12/07/2022 05:18 AM    MCH 31.9 12/07/2022 05:18 AM    MCHC 34.6 12/07/2022 05:18 AM    RDW 13.3 12/07/2022 05:18 AM    LYMPHOPCT 6.5 12/03/2022 11:15 AM    MONOPCT 2.1 12/03/2022 11:15 AM    BASOPCT 0.1 12/03/2022 11:15 AM    MONOSABS 0.3 12/03/2022 11:15 AM    LYMPHSABS 0.8 12/03/2022 11:15 AM    EOSABS 0.0 12/03/2022 11:15 AM    BASOSABS 0.0 12/03/2022 11:15 AM     CMP:    Lab Results   Component Value Date/Time     12/07/2022 05:18 AM    K 3.8 12/07/2022 05:18 AM     12/07/2022 05:18 AM    CO2 20 12/07/2022 05:18 AM    BUN 15 12/07/2022 05:18 AM    CREATININE 0.84 12/07/2022 05:18 AM    GFRAA >60.0 10/25/2021 12:00 PM    LABGLOM >60.0 12/07/2022 05:18 AM    GLUCOSE 140 12/07/2022 05:18 AM    GLUCOSE 92 05/14/2012 02:03 PM    PROT 6.5 12/03/2022 11:15 AM    LABALBU 4.0 12/03/2022 11:15 AM    LABALBU 3.8 03/22/2012 11:36 AM    CALCIUM 8.8 12/07/2022 05:18 AM    BILITOT 0.4 12/03/2022 11:15 AM    ALKPHOS 86 12/03/2022 11:15 AM    AST 28 12/03/2022 11:15 AM    ALT 33 12/03/2022 11:15 AM     BMP:    Lab Results   Component Value Date/Time     12/07/2022 05:18 AM    K 3.8 12/07/2022 05:18 AM     12/07/2022 05:18 AM    CO2 20 12/07/2022 05:18 AM    BUN 15 12/07/2022 05:18 AM    LABALBU 4.0 12/03/2022 11:15 AM    LABALBU 3.8 03/22/2012 11:36 AM    CREATININE 0.84 12/07/2022 05:18 AM    CALCIUM 8.8 12/07/2022 05:18 AM    GFRAA >60.0 10/25/2021 12:00 PM    LABGLOM >60.0 12/07/2022 05:18 AM    GLUCOSE 140 12/07/2022 05:18 AM    GLUCOSE 92 05/14/2012 02:03 PM     Magnesium:    Lab Results   Component Value Date/Time    MG 1.9 12/06/2022 04:42 AM     Troponin:    Lab Results   Component Value Date/Time    TROPONINI 0.267 12/03/2022 06:35 PM        Active Hospital Problems    Diagnosis Date Noted    Chest pain [R07.9] 12/03/2022     Priority: Medium        Assessment/Plan:  NSTEMI - DAPT. Pt will also be on Eliquis and therfore we will plan dc ASA in 1 month. BB Statin ARB. Tolerated Integrilin 12 hrs overnight well. LAD stent placed but D1 acutely closed which was Ballooned open with good results. CHF compensated.  Resume PO Lasix 40 and KCL. Resume lower does ARB. GFR remains normal this am.   Echo -EF 40-45 but 55 by Cath  HTN controlled- meds adjusted. Low salt diet. Fluid restrict. Strict I/Os  PAF- in SR. Resume Eliquis for PAF and prior PE. HPL - statin low arnel diet. OK for DC. CV meds reconciled. Thank you. Office in 3 weeks.         Electronically signed by Judy Ortiz MD on 12/7/2022 at 9:34 AM

## 2022-12-07 NOTE — CONSULTS
Cardiac Rehab Consult Note  Name: Monalisa Batista  Age: 65 y.o. Gender: female    Chief Complaint:Chest Pain (Epigastric, burning)    Primary Care Provider: Shai Espinosa MD  InpatientTreatment Team: Treatment Team: Attending Provider: Timmy Ram MD; Consulting Physician: Loraine Grimaldo MD; Utilization Reviewer: Teresita Gonzáles, JARAD; Patient Care Tech: Ashleigh Soulier; : Nevaeh Allison RN; Tech: Santoshperry Edvin; Registered Nurse: Ronn West RN; Registered Nurse: Jean Colon RN  Admission Date: 12/3/2022    Consult Received from Dr. Melania Mcnamara. Reason for consult PCI. Patient visited at bedside. Program and benefits introduced. Brochures given. Patient is interested in cardiac rehab  Principal Problem:    Chest pain  Resolved Problems:    * No resolved hospital problems. *       Plan: f/u as outpatient     All of pt questions were answered. Case will be discussed with physician when appropriate.      Electronically signed by Ayse Mantilla on 12/7/2022 at 11:17 AM

## 2022-12-07 NOTE — CARE COORDINATION
CALL TO JAMIR AT [facility] AND AWARE OF [address].  DID NOT QUALIFY FOR HOME O2

## 2022-12-08 ENCOUNTER — TELEPHONE (OUTPATIENT)
Dept: FAMILY MEDICINE CLINIC | Age: 62
End: 2022-12-08

## 2022-12-08 DIAGNOSIS — E78.5 HYPERLIPIDEMIA, UNSPECIFIED HYPERLIPIDEMIA TYPE: ICD-10-CM

## 2022-12-08 DIAGNOSIS — I48.92 ATRIAL FLUTTER, UNSPECIFIED TYPE (HCC): ICD-10-CM

## 2022-12-08 DIAGNOSIS — G25.0 ESSENTIAL TREMOR: ICD-10-CM

## 2022-12-08 DIAGNOSIS — M62.838 MUSCLE SPASMS OF BOTH LOWER EXTREMITIES: ICD-10-CM

## 2022-12-08 DIAGNOSIS — I95.1 ORTHOSTATIC HYPOTENSION: ICD-10-CM

## 2022-12-08 DIAGNOSIS — E53.8 VITAMIN B12 DEFICIENCY: ICD-10-CM

## 2022-12-08 DIAGNOSIS — E03.9 ACQUIRED HYPOTHYROIDISM: ICD-10-CM

## 2022-12-08 DIAGNOSIS — G62.9 NEUROPATHY: ICD-10-CM

## 2022-12-08 DIAGNOSIS — K21.9 GASTROESOPHAGEAL REFLUX DISEASE WITHOUT ESOPHAGITIS: ICD-10-CM

## 2022-12-08 DIAGNOSIS — I26.99 PULMONARY EMBOLISM ON RIGHT (HCC): ICD-10-CM

## 2022-12-08 RX ORDER — PANTOPRAZOLE SODIUM 40 MG/1
40 TABLET, DELAYED RELEASE ORAL DAILY
Qty: 90 TABLET | Refills: 0 | Status: CANCELLED | OUTPATIENT
Start: 2022-12-08 | End: 2023-03-08

## 2022-12-08 NOTE — TELEPHONE ENCOUNTER
patient requesting medication refill. Please approve or deny this request.    Rx requested:  Requested Prescriptions     Pending Prescriptions Disp Refills    aspirin (ASPIRIN LOW DOSE) 81 MG EC tablet 30 tablet 3     Sig: TAKE 1 TABLET BY MOUTH ONCE A DAY (AM)    albuterol sulfate HFA (VENTOLIN HFA) 108 (90 Base) MCG/ACT inhaler 18 g 3     Sig: Inhale 2 puffs into the lungs every 6 hours as needed for Wheezing Please cover what is under insurance. apixaban (ELIQUIS) 5 MG TABS tablet 60 tablet 3     Sig: Take 1 tablet by mouth 2 times daily START when Eliquis Starter pack completed    ARIPiprazole (ABILIFY) 15 MG tablet 30 tablet 5     Sig: Take 1 tablet by mouth daily    Cyanocobalamin (B-12) 1000 MCG TABS 30 tablet 5     Sig: TAKE 1 TABLET BY MOUTH ONCE A DAY (NOON)    benztropine (COGENTIN) 1 MG tablet 30 tablet 5     Sig: Take 1 tablet by mouth daily    budesonide-formoterol (SYMBICORT) 160-4.5 MCG/ACT AERO 10.2 g 5     Sig: INHALE 2 PUFFS INTO THE LUNGS 2 TIMES DAILY    famotidine (PEPCID) 20 MG tablet 60 tablet 5     Sig: TAKE 1 TABLET BY MOUTH TWICE DAILY (AM,PM)    gabapentin (NEURONTIN) 300 MG capsule 30 capsule 0     Sig: Take 1 capsule by mouth at bedtime for 30 days.     levothyroxine (SYNTHROID) 50 MCG tablet 30 tablet 5     Sig: Take 1 tablet by mouth Daily    Magnesium 400 MG CAPS 30 capsule 3     Sig: Take 1 capsule by mouth nightly as needed (muscle spasms)    meclizine (ANTIVERT) 12.5 MG tablet 30 tablet 5     Sig: Take 1 tablet by mouth nightly as needed for Dizziness    metFORMIN (GLUCOPHAGE) 500 MG tablet 60 tablet 5     Sig: Indications: restart on 12/8/2022    pantoprazole (PROTONIX) 40 MG tablet 90 tablet 0     Sig: Take 1 tablet by mouth daily    VRAYLAR 1.5 MG capsule 30 capsule      Sig: Take 1 capsule by mouth daily    Blood Pressure Monitoring (B-D ASSURE BPM/AUTO ARM CUFF) MISC 1 each 0     Sig: Use as directed         Last Office Visit:   11/4/2022      Next Visit Date:  Future Appointments   Date Time Provider Jay Judith   12/21/2022  9:00 AM ARASH Mcneill - CNP [facility]   1/3/2023 12:45 PM Aide Corbett MD [address] [place] [address]   2/2/2023  3:00 PM MD Kenneth Sanchez [facility] Neurology -   2/3/2023  8:15 AM Montana De La Torre MD [address] [facility]

## 2022-12-08 NOTE — TELEPHONE ENCOUNTER
HHC called was admitted to [facility] was admitted for OT/PT home health aide and nursing. Patient has no Rxs at home going to need refills.  Please advise thank you

## 2022-12-09 LAB
EKG ATRIAL RATE: 106 BPM
EKG ATRIAL RATE: 90 BPM
EKG P AXIS: 41 DEGREES
EKG P AXIS: 55 DEGREES
EKG P-R INTERVAL: 138 MS
EKG P-R INTERVAL: 138 MS
EKG Q-T INTERVAL: 350 MS
EKG Q-T INTERVAL: 366 MS
EKG QRS DURATION: 84 MS
EKG QRS DURATION: 92 MS
EKG QTC CALCULATION (BAZETT): 447 MS
EKG QTC CALCULATION (BAZETT): 464 MS
EKG R AXIS: -28 DEGREES
EKG R AXIS: -33 DEGREES
EKG T AXIS: -2 DEGREES
EKG T AXIS: 16 DEGREES
EKG VENTRICULAR RATE: 106 BPM
EKG VENTRICULAR RATE: 90 BPM

## 2022-12-09 RX ORDER — LEVOTHYROXINE SODIUM 0.05 MG/1
50 TABLET ORAL DAILY
Qty: 30 TABLET | Refills: 5 | Status: SHIPPED | OUTPATIENT
Start: 2022-12-09

## 2022-12-09 RX ORDER — ALBUTEROL SULFATE 90 UG/1
2 AEROSOL, METERED RESPIRATORY (INHALATION) EVERY 6 HOURS PRN
Qty: 18 G | Refills: 5 | Status: SHIPPED | OUTPATIENT
Start: 2022-12-09

## 2022-12-09 RX ORDER — BENZTROPINE MESYLATE 1 MG/1
1 TABLET ORAL DAILY
Qty: 30 TABLET | Refills: 0 | Status: SHIPPED | OUTPATIENT
Start: 2022-12-09

## 2022-12-09 RX ORDER — CYANOCOBALAMIN (VITAMIN B-12) 1000 MCG
TABLET ORAL
Qty: 30 TABLET | Refills: 5 | Status: SHIPPED | OUTPATIENT
Start: 2022-12-09

## 2022-12-09 RX ORDER — GABAPENTIN 300 MG/1
300 CAPSULE ORAL NIGHTLY
Qty: 30 CAPSULE | Refills: 3 | Status: SHIPPED | OUTPATIENT
Start: 2022-12-09 | End: 2023-02-02

## 2022-12-09 RX ORDER — MEDICAL SUPPLY, MISCELLANEOUS
EACH MISCELLANEOUS
Qty: 1 EACH | Refills: 0 | Status: SHIPPED | OUTPATIENT
Start: 2022-12-09

## 2022-12-09 RX ORDER — ARIPIPRAZOLE 15 MG/1
15 TABLET ORAL DAILY
Qty: 30 TABLET | Refills: 5 | Status: SHIPPED | OUTPATIENT
Start: 2022-12-09

## 2022-12-09 RX ORDER — MECLIZINE HCL 12.5 MG/1
12.5 TABLET ORAL NIGHTLY PRN
Qty: 30 TABLET | Refills: 5 | Status: SHIPPED | OUTPATIENT
Start: 2022-12-09

## 2022-12-09 RX ORDER — BUDESONIDE AND FORMOTEROL FUMARATE DIHYDRATE 160; 4.5 UG/1; UG/1
AEROSOL RESPIRATORY (INHALATION)
Qty: 10.2 G | Refills: 5 | Status: SHIPPED | OUTPATIENT
Start: 2022-12-09

## 2022-12-09 RX ORDER — ASPIRIN 81 MG/1
TABLET ORAL
Qty: 30 TABLET | Refills: 5 | Status: SHIPPED | OUTPATIENT
Start: 2022-12-09

## 2022-12-09 RX ORDER — FAMOTIDINE 20 MG/1
TABLET, FILM COATED ORAL
Qty: 60 TABLET | Refills: 5 | Status: ON HOLD
Start: 2022-12-09 | End: 2023-01-27 | Stop reason: HOSPADM

## 2022-12-09 NOTE — TELEPHONE ENCOUNTER
Medications sent. The only thing I question is her psych medications. Please ask if she is still seeing psychiatry? They should be filling the medication. Thank you.

## 2022-12-09 NOTE — TELEPHONE ENCOUNTER
Please ask for the list of medications that she needs? ? All the medications given to her in the hospital had refills. And please confirm pharmacy.

## 2022-12-15 ENCOUNTER — TELEPHONE (OUTPATIENT)
Dept: FAMILY MEDICINE CLINIC | Age: 62
End: 2022-12-15

## 2022-12-15 NOTE — TELEPHONE ENCOUNTER
Charlette Anderson from [facility] called to report that patient was having chest pain one day ago and took nitroglycerin. The pain stopped. Charlette Anderson is also requesting an order for a shower chair for the patient. Thank you.

## 2022-12-15 NOTE — TELEPHONE ENCOUNTER
Will write for the shoulder chair. Patient has an appointment coming up on the 12/21. Needs to make sure she brings up the chest pain.   Also needs to notify her cardiologist.

## 2022-12-21 ENCOUNTER — OFFICE VISIT (OUTPATIENT)
Dept: FAMILY MEDICINE CLINIC | Age: 62
End: 2022-12-21
Payer: COMMERCIAL

## 2022-12-21 VITALS
BODY MASS INDEX: 39.7 KG/M2 | RESPIRATION RATE: 16 BRPM | SYSTOLIC BLOOD PRESSURE: 102 MMHG | HEIGHT: 66 IN | WEIGHT: 247 LBS | HEART RATE: 97 BPM | OXYGEN SATURATION: 99 % | DIASTOLIC BLOOD PRESSURE: 78 MMHG | TEMPERATURE: 96.6 F

## 2022-12-21 DIAGNOSIS — Z09 HOSPITAL DISCHARGE FOLLOW-UP: ICD-10-CM

## 2022-12-21 DIAGNOSIS — I21.4 NSTEMI (NON-ST ELEVATED MYOCARDIAL INFARCTION) (HCC): Primary | ICD-10-CM

## 2022-12-21 DIAGNOSIS — E78.5 HYPERLIPIDEMIA, UNSPECIFIED HYPERLIPIDEMIA TYPE: ICD-10-CM

## 2022-12-21 DIAGNOSIS — N30.00 ACUTE CYSTITIS WITHOUT HEMATURIA: ICD-10-CM

## 2022-12-21 DIAGNOSIS — R39.9 UTI SYMPTOMS: ICD-10-CM

## 2022-12-21 DIAGNOSIS — R73.03 PREDIABETES: ICD-10-CM

## 2022-12-21 DIAGNOSIS — E03.9 ACQUIRED HYPOTHYROIDISM: ICD-10-CM

## 2022-12-21 LAB
BILIRUBIN, POC: ABNORMAL
BLOOD URINE, POC: ABNORMAL
CHOLESTEROL, FASTING: 175 MG/DL (ref 0–199)
CLARITY, POC: CLEAR
COLOR, POC: CLEAR
GLUCOSE URINE, POC: ABNORMAL
HBA1C MFR BLD: 7.4 % (ref 4.8–5.9)
HDLC SERPL-MCNC: 38 MG/DL (ref 40–59)
KETONES, POC: ABNORMAL
LDL CHOLESTEROL CALCULATED: 69 MG/DL (ref 0–129)
LEUKOCYTE EST, POC: ABNORMAL
NITRITE, POC: ABNORMAL
PH, POC: 6
PROTEIN, POC: ABNORMAL
SPECIFIC GRAVITY, POC: 1.01
TRIGLYCERIDE, FASTING: 342 MG/DL (ref 0–150)
TSH SERPL DL<=0.05 MIU/L-ACNC: 1.74 UIU/ML (ref 0.44–3.86)
UROBILINOGEN, POC: ABNORMAL

## 2022-12-21 PROCEDURE — 1111F DSCHRG MED/CURRENT MED MERGE: CPT | Performed by: NURSE PRACTITIONER

## 2022-12-21 PROCEDURE — 81003 URINALYSIS AUTO W/O SCOPE: CPT | Performed by: NURSE PRACTITIONER

## 2022-12-21 PROCEDURE — 99214 OFFICE O/P EST MOD 30 MIN: CPT | Performed by: NURSE PRACTITIONER

## 2022-12-21 RX ORDER — CIPROFLOXACIN 500 MG/1
500 TABLET, FILM COATED ORAL 2 TIMES DAILY
Qty: 10 TABLET | Refills: 0 | Status: SHIPPED | OUTPATIENT
Start: 2022-12-21 | End: 2022-12-26

## 2022-12-21 RX ORDER — DOXEPIN HYDROCHLORIDE 50 MG/1
CAPSULE ORAL
Qty: 28 TABLET | Refills: 5 | Status: SHIPPED | OUTPATIENT
Start: 2022-12-21

## 2022-12-21 ASSESSMENT — ENCOUNTER SYMPTOMS
WHEEZING: 0
BLOOD IN STOOL: 0
GASTROINTESTINAL NEGATIVE: 1
EYES NEGATIVE: 1
NAUSEA: 0
STRIDOR: 0
COUGH: 0

## 2022-12-21 NOTE — PROGRESS NOTES
Post-Discharge Transitional Care Follow Up      Yong Alvarado   YOB: 1960    Date of Office Visit:  12/21/2022  Date of Hospital Admission: 12/3/22  Date of Hospital Discharge: 12/7/22  Readmission Risk Score (high >=14%. Medium >=10%):Readmission Risk Score: 9.8      Care management risk score Rising risk (score 2-5) and Complex Care (Scores >=6): No Risk Score On File     Non face to face  following discharge, date last encounter closed (first attempt may have been earlier): *No documented post hospital discharge outreach found in the last 14 days     Call initiated 2 business days of discharge: *No response recorded in the last 14 days     NSTEMI (non-ST elevated myocardial infarction) ([facility]  Acute cystitis without hematuria  UTI symptoms  -     POCT Urinalysis No Micro (Auto)  -     Culture, Urine; Future      F/U cardiology 1/3  Taking medication as prescribed  Reports [facility] in place  Needs shower chair to assist with ADLs-order provided    Yong Alvarado requires a shower chair to assist with ALDs. Current body weight is Weight: 247 lb (112 kg). Medical Decision Making: moderate complexity  No follow-ups on file. Subjective:   HPI      Inpatient course: Discharge summary reviewed- see chart. Interval history/Current status: stable    Patient Active Problem List   Diagnosis    PTSD (post-traumatic stress disorder)    Depression    Reactive airway disease    Environmental allergies    Hyperlipidemia    S/P colonoscopy    Fatty liver disease, nonalcoholic    Vitamin D deficiency    IFG (impaired fasting glucose)    Obesity (BMI 30-39. 9)    DJD of left shoulder    Asthma    Osteoarthritis of left knee    Cheilosis    Atrial flutter (HCC)    Anxiety    Diarrhea    Right hip pain    Acute pain of both shoulders    Left arm pain    Left elbow pain    Lung infiltrate    Essential tremor    Syncope    Orthostatic hypotension    History of melanoma    Pulmonary embolism on right ([facility] Acute pulmonary embolism with acute cor pulmonale (HCC)    Choroidal nevus of left eye    Combined forms of age-related cataract of left eye    History of COVID-19    Foreign body in esophagus    Chest pain    NSTEMI (non-ST elevated myocardial infarction) ([facility]       Medications listed as ordered at the time of discharge from hospital     Medication List            Accurate as of December 21, 2022  9:18 AM. If you have any questions, ask your nurse or doctor.                 START taking these medications      ciprofloxacin 500 MG tablet  Commonly known as: CIPRO  Take 1 tablet by mouth 2 times daily for 5 days  Started by: ARASH Cardoso CNP            CONTINUE taking these medications      apixaban 5 MG Tabs tablet  Commonly known as: Eliquis  Take 1 tablet by mouth 2 times daily     ARIPiprazole 15 MG tablet  Commonly known as: ABILIFY  Take 1 tablet by mouth daily     aspirin 81 MG EC tablet  Commonly known as: Aspirin Low Dose  TAKE 1 TABLET BY MOUTH ONCE A DAY (AM)     atorvastatin 40 MG tablet  Commonly known as: LIPITOR  Take 1 tablet by mouth nightly     B-12 1000 MCG Tabs  TAKE 1 TABLET BY MOUTH ONCE A DAY (NOON)     B-D ASSURE BPM/AUTO ARM CUFF Misc  Use as directed     benztropine 1 MG tablet  Commonly known as: COGENTIN  Take 1 tablet by mouth daily     Biotin 300 MCG Tabs  Take 1 tablet by mouth daily     budesonide-formoterol 160-4.5 MCG/ACT Aero  Commonly known as: Symbicort  INHALE 2 PUFFS INTO THE LUNGS 2 TIMES DAILY     calamine-zinc oxide 8-8 % Lotn lotion  Apply topically as needed (pruritis.)     cariprazine hcl 1.5 MG capsule  Commonly known as: Vraylar  Take 1 capsule by mouth daily Take 1.5 mg by mouth daily     clopidogrel 75 MG tablet  Commonly known as: PLAVIX  Take 1 tablet by mouth daily     diclofenac sodium 1 % Gel  Commonly known as: VOLTAREN  APPLY 2 G TOPICALLY 4 TIMES DAILY     EPINEPHrine 0.3 MG/0.3ML Soaj injection  Commonly known as: EpiPen 2-Austin  Use as directed for allergic reaction     famotidine 20 MG tablet  Commonly known as: PEPCID  TAKE 1 TABLET BY MOUTH TWICE DAILY (AM,PM)     furosemide 40 MG tablet  Commonly known as: LASIX  Take 1 tablet by mouth daily     gabapentin 300 MG capsule  Commonly known as: Neurontin  Take 1 capsule by mouth at bedtime for 30 days. ketoconazole 2 % shampoo  Commonly known as: NIZORAL  APPLY TOPICALLY DAILY AS NEEDED. levothyroxine 50 MCG tablet  Commonly known as: SYNTHROID  Take 1 tablet by mouth Daily     losartan 25 MG tablet  Commonly known as: COZAAR  Take 1 tablet by mouth daily     Magnesium 400 MG Caps  Take 1 capsule by mouth nightly as needed (muscle spasms)     meclizine 12.5 MG tablet  Commonly known as: ANTIVERT  Take 1 tablet by mouth nightly as needed for Dizziness     metFORMIN 500 MG tablet  Commonly known as: GLUCOPHAGE  Indications: restart on 12/8/2022 TAKE 1 TABLET BY MOUTH TWICE A DAY WITH MEALS     metoprolol succinate 50 MG extended release tablet  Commonly known as: TOPROL XL  Take 1 tablet by mouth daily     nitroGLYCERIN 0.4 MG SL tablet  Commonly known as: NITROSTAT  Place 1 tablet under the tongue every 5 minutes as needed for Chest pain up to max of 3 total doses. If no relief after 1 dose, call 911.      pantoprazole 40 MG tablet  Commonly known as: Protonix  Take 1 tablet by mouth daily     potassium chloride 20 MEQ extended release tablet  Commonly known as: KLOR-CON M  Take 1 tablet by mouth daily (with breakfast)     Rollator Ultra-Light Misc  1 each by Does not apply route once for 1 dose     Tab-A-Juleit/Beta Carotene Tabs  TAKE 1 TABLET BY MOUTH EVERY DAY (AM)     * Ventolin  (90 Base) MCG/ACT inhaler  Generic drug: albuterol sulfate HFA  USE 2 PUFFS EVERY 4 HRS AS NEEDED FOR WHEEZING/SOB-SPACE OUT TO EVERY 6 HR AS SYMPTOMS IMPROVE     * albuterol sulfate  (90 Base) MCG/ACT inhaler  Commonly known as: Ventolin HFA  Inhale 2 puffs into the lungs every 6 hours as needed for Wheezing Please cover what is under insurance. Vitamin D High Potency 25 MCG (1000 UT) Caps  Generic drug: vitamin D  TAKE 1 CAPSULE BY MOUTH DAILY WITH SUPPER (PM)           * This list has 2 medication(s) that are the same as other medications prescribed for you. Read the directions carefully, and ask your doctor or other care provider to review them with you. Where to Get Your Medications        These medications were sent to [address], [address]  [facility], [address] [place] [address] [facility],[address]      Phone: [phone]   ciprofloxacin 500 MG tablet  Tab-A-Mandi/Beta Carotene Tabs          Medications marked \"taking\" at this time  Outpatient Medications Marked as Taking for the 12/21/22 encounter (Office Visit) with ARSAH Greco CNP   Medication Sig Dispense Refill    Multiple Vitamin (TAB-A-MANDI/BETA CAROTENE) TABS TAKE 1 TABLET BY MOUTH EVERY DAY (AM) 28 tablet 5    ciprofloxacin (CIPRO) 500 MG tablet Take 1 tablet by mouth 2 times daily for 5 days 10 tablet 0    aspirin (ASPIRIN LOW DOSE) 81 MG EC tablet TAKE 1 TABLET BY MOUTH ONCE A DAY (AM) 30 tablet 5    albuterol sulfate HFA (VENTOLIN HFA) 108 (90 Base) MCG/ACT inhaler Inhale 2 puffs into the lungs every 6 hours as needed for Wheezing Please cover what is under insurance.  18 g 5    apixaban (ELIQUIS) 5 MG TABS tablet Take 1 tablet by mouth 2 times daily 60 tablet 5    ARIPiprazole (ABILIFY) 15 MG tablet Take 1 tablet by mouth daily 30 tablet 5    Cyanocobalamin (B-12) 1000 MCG TABS TAKE 1 TABLET BY MOUTH ONCE A DAY (NOON) 30 tablet 5    benztropine (COGENTIN) 1 MG tablet Take 1 tablet by mouth daily 30 tablet 0    budesonide-formoterol (SYMBICORT) 160-4.5 MCG/ACT AERO INHALE 2 PUFFS INTO THE LUNGS 2 TIMES DAILY 10.2 g 5    famotidine (PEPCID) 20 MG tablet TAKE 1 TABLET BY MOUTH TWICE DAILY (AM,PM) 60 tablet 5    gabapentin (NEURONTIN) 300 MG capsule Take 1 capsule by mouth at bedtime for 30 days. 30 capsule 3    levothyroxine (SYNTHROID) 50 MCG tablet Take 1 tablet by mouth Daily 30 tablet 5    Magnesium 400 MG CAPS Take 1 capsule by mouth nightly as needed (muscle spasms) 30 capsule 5    meclizine (ANTIVERT) 12.5 MG tablet Take 1 tablet by mouth nightly as needed for Dizziness 30 tablet 5    metFORMIN (GLUCOPHAGE) 500 MG tablet Indications: restart on 12/8/2022 TAKE 1 TABLET BY MOUTH TWICE A DAY WITH MEALS 60 tablet 5    cariprazine hcl (VRAYLAR) 1.5 MG capsule Take 1 capsule by mouth daily Take 1.5 mg by mouth daily 30 capsule 0    Blood Pressure Monitoring (B-D ASSURE BPM/AUTO ARM CUFF) MISC Use as directed 1 each 0    atorvastatin (LIPITOR) 40 MG tablet Take 1 tablet by mouth nightly 30 tablet 3    clopidogrel (PLAVIX) 75 MG tablet Take 1 tablet by mouth daily 30 tablet 3    furosemide (LASIX) 40 MG tablet Take 1 tablet by mouth daily 60 tablet 3    losartan (COZAAR) 25 MG tablet Take 1 tablet by mouth daily 30 tablet 3    metoprolol succinate (TOPROL XL) 50 MG extended release tablet Take 1 tablet by mouth daily 30 tablet 3    nitroGLYCERIN (NITROSTAT) 0.4 MG SL tablet Place 1 tablet under the tongue every 5 minutes as needed for Chest pain up to max of 3 total doses.  If no relief after 1 dose, call 911. 25 tablet 3    potassium chloride (KLOR-CON M) 20 MEQ extended release tablet Take 1 tablet by mouth daily (with breakfast) 60 tablet 3    pantoprazole (PROTONIX) 40 MG tablet Take 1 tablet by mouth daily 90 tablet 0    calamine-zinc oxide 8-8 % LOTN lotion Apply topically as needed (pruritis.) 177 mL 0    vitamin D (VITAMIN D HIGH POTENCY) 25 MCG (1000 UT) CAPS TAKE 1 CAPSULE BY MOUTH DAILY WITH SUPPER (PM) 30 capsule 5    Biotin 300 MCG TABS Take 1 tablet by mouth daily 30 tablet 5    diclofenac sodium (VOLTAREN) 1 % GEL APPLY 2 G TOPICALLY 4 TIMES DAILY 100 g 2    VENTOLIN  (90 Base) MCG/ACT inhaler USE 2 PUFFS EVERY 4 HRS AS NEEDED FOR WHEEZING/SOB-SPACE OUT TO EVERY 6 HR AS SYMPTOMS IMPROVE 18 g 3    ketoconazole (NIZORAL) 2 % shampoo APPLY TOPICALLY DAILY AS NEEDED. 120 mL 2    Misc. Devices (ROLLATOR ULTRA-LIGHT) MISC 1 each by Does not apply route once for 1 dose 1 each 0    EPINEPHrine (EPIPEN 2-PRAVEENA) 0.3 MG/0.3ML SOAJ injection Use as directed for allergic reaction 2 each 2        Medications patient taking as of now reconciled against medications ordered at time of hospital discharge: Yes    Review of Systems   Constitutional:  Positive for fatigue. Negative for chills, diaphoresis, fever and unexpected weight change. HENT: Negative. Eyes: Negative. Respiratory:  Negative for cough, wheezing and stridor. Cardiovascular: Negative. Negative for chest pain, palpitations and leg swelling. Gastrointestinal: Negative. Negative for blood in stool and nausea. Genitourinary:  Positive for dysuria and frequency. Negative for hematuria and pelvic pain. Musculoskeletal: Negative. Skin: Negative. Neurological:  Positive for weakness (improving working with [facility]). Negative for dizziness, syncope and light-headedness. Hematological: Negative. Psychiatric/Behavioral: Negative. Objective:    /78 (Site: Left Upper Arm, Position: Sitting, Cuff Size: Large Adult)   Pulse 97   Temp (!) 96.6 °F (35.9 °C) (Infrared)   Resp 16   Ht 5' 6\" (1.676 m)   Wt 247 lb (112 kg)   SpO2 99%   BMI 39.87 kg/m²   Physical Exam  Constitutional:       General: She is not in acute distress. Appearance: She is well-developed. She is not diaphoretic. HENT:      Head: Normocephalic and atraumatic. Right Ear: External ear normal.      Left Ear: External ear normal.      Mouth/Throat:      Mouth: Mucous membranes are moist.   Eyes:      Extraocular Movements: Extraocular movements intact. Conjunctiva/sclera: Conjunctivae normal.      Pupils: Pupils are equal, round, and reactive to light. Cardiovascular:      Rate and Rhythm: Normal rate and regular rhythm.       Heart sounds: Normal heart sounds. Pulmonary:      Effort: Pulmonary effort is normal. No respiratory distress. Breath sounds: Normal breath sounds. No wheezing. Abdominal:      General: Bowel sounds are normal.      Palpations: Abdomen is soft. Tenderness: There is no abdominal tenderness. There is no right CVA tenderness or left CVA tenderness. Musculoskeletal:      Cervical back: Normal range of motion and neck supple. No tenderness. Right lower leg: No edema. Left lower leg: No edema. Lymphadenopathy:      Cervical: No cervical adenopathy. Skin:     General: Skin is warm and dry. Findings: No rash. Neurological:      General: No focal deficit present. Mental Status: She is alert and oriented to person, place, and time. Psychiatric:         Mood and Affect: Mood normal.         Behavior: Behavior normal.       An electronic signature was used to authenticate this note.   --ARASH Bergeron - CNP

## 2022-12-22 NOTE — PROCEDURES
[address]         CARDIAC CATHETERIZATION LABORATORY       PATIENT NAME: Ad Jolly    : 1960     MEDICAL RECORD NO: [id number]    ROOM: [place] [address]      DATE: 22    CARDIOLOGIST: Mariela Moore DO, DO., [facility]      PROCEDURE PERFORMED:   Right radial artery cannulation  Left cardiac catheterization with selective coronary angiography  Left ventriculogram  PCI of mid LAD with drug-eluting stent, PTCA of the ostial diagonal 1      BRIEF HISTORY:    Ad Jolly is a 65 y.o. female was admitted with non-STEMI. We decided to proceed with cardiac catheterization in this setting of symptoms consistent with acute coronary syndrome. DESCRIPTION OF PROCEDURE:  After informed consent was obtained, the patient was brought to the cardiac catheterization laboratory in no apparent distress. The right wrist was prepped and draped in a sterile fashion and 1 percent lidocaine was used to anesthetize the skin and subcutaneous tissues overlying the right radial artery. Using the modified Seldinger technique and a micropuncture needle, a 6-Russian arterial sheath was placed in the right radial artery without difficulty. Over a J-tipped 035 wire, a JR-4 catheter was advanced easily into the radial artery and all the way up the aortic root without difficulty. The right Judkin catheter was used to selectively engage the right coronary artery. Exchanging the catheter over a long guidewire, I introduced a 5 Western Sara JL 3.5 catheter which was used to selectively engage the left main coronary artery. Exchanging the catheter over a long guidewire, I introduced a 5 Russian pigtail catheter which was advanced in the left ventricle. Hemodynamics were measured and left ventriculogram was performed in HERRERA view.   Pull back was done as per standard technique. Based on the finding, I decided to intervene on LAD and diagonal 1. Interventional procedure:  Cardiac catheterization revealed a 99% lesion in the LAD artery which was the culprit lesion and we decided to intervene on this lesion. A 6 Monegasque XB 3.5 coronary guiding catheter was used to engage the left main coronary artery. Patient was given a bolus heparin and ACT was measured. A BMW coronary guidewire was used to cross the lesion in the LAD. A balloon utilized to predilate the lesion. Also another wire was placed in the diagonal 1 and PTCA of ostial diagonal 1 was performed. Once the lesion was predilated a drug-eluting stent stent was advanced and positioned in such way that it covered the lesion from normal to normal segments. The stent was deployed   initially at low pressure and then I post dilated it with a noncompliant   balloon. Please refer to nursing notes for details of the wound utilizing IVUS was achieved    Followup angiographic images showed no residual stenosis in the LAD and diagonal 1 artery and no dissection. ULISES III flow was confirmed in all vessel segments with 0% residual stenosis. Finale band was applied to right wrist and the patient was transferred to his room in stable condition. Moderate IV conscious sedation was given with IV fentanyl as well as Versed for the duration of the procedure/greater than 50 minutes under my direct supervision as well as independent trained observer. CARDIAC CATHETERIZATION FINDINGS:     1. The right coronary artery showed mild disease    2. The left main coronary artery i was normal     2. Left anterior descending artery is a large-caliber vessel with 99% mid stenosis. Diagonal 1 with ostial 50% stenosis in proximal 60 to 60 to 70% stenosis    5. The left circumflex artery showed mild disease    6. Hemodynamic data:       LV gram:  Ejection fraction estimated at 55% percent. No MR was noted. Normal LVEDP.   No gradient across aortic valve            CONCLUSION:    Coronary artery disease as above. Successful PCI/stenting of a 99% lesion in the mid LAD artery as described above. With 0% residual stenosis and ULISES-3 flow pre and post PCI. Status post accessible PTCA of ostial diagonal 1 with 0% residual stenosis. Good LV systolic function. Normal hemodynamics.      The patient was placed on dual antiplatelet therapy per      CARDIOLOGIST:   DO Zoya, , FACC  8:25 AM, 12/22/2022

## 2022-12-23 LAB
ORGANISM: ABNORMAL
URINE CULTURE, ROUTINE: ABNORMAL
URINE CULTURE, ROUTINE: ABNORMAL

## 2022-12-23 NOTE — PROGRESS NOTES
Physician Progress Note      PATIENT:               Ekaterina Delgado  CSN #:                  [id number]  :                       1960  ADMIT DATE:       12/3/2022 10:54 AM  DISCH DATE:        2022 3:28 PM  RESPONDING  PROVIDER #:        Germaine Hickman MD          QUERY TEXT:     \"Decompensated CHF likely combined\" noted by Dr. Monika Kimble.  Echo showed   LVEF of 38-92%, diastolic dysfunction.  LHC revealed LVEF at 55%. If   possible, please document in the progress notes and discharge if you were   evaluating and/or treating any of the following: The medical record reflects the following:  Risk Factors: NSTEMI, HTN, CAD, PAF, HLD  Clinical Indicators:  echo showed LVEF at 40-45%. Mild left ventricular   systolic dysfunction. Moderate concentric LVH. E/A flow reversal noted. Suggestive of diastolic dysfunction. BNP 3857.  LHC/PCI procedure \"LV   gram:  Ejection fraction estimated at 55% percent. \"  Dr. Monika Kimble   -Decompensated CHF likely combined due both tho HTN and Myocardial ischemia\"   Dr. Monika Kimble \"CHF likely combined due both tho HTN and Myocardial ischemia-   Peripherally pt is not much volume overloaded. responded well to iv Lasix last   night with total 3.2L out. \"  Treatment: Lasix 40 mg IV bid, Cozaar, Mg, Toprol,  Lipitor, ASA, echo,   LHC/PCI, I&O's, cardiology consult    Thank you, Stephanie Verma RN BSN CDS  [phone]  Options provided:  -- Acute on chronic combined CHF confirmed after study  -- Acute on chronic diastolic CHF confirmed after study  -- Acute combined CHF confirmed after study  -- Acute diastolic CHF confirmed after study  -- Other - I will add my own diagnosis  -- Disagree - Not applicable / Not valid  -- Disagree - Clinically unable to determine / Unknown  -- Refer to Clinical Documentation Reviewer    PROVIDER RESPONSE TEXT:    NSTEMI    Query created by: Kay Corbett on 2022 7:47 AM      Electronically signed by:  Germaine Hickman MD 2022 8:51 AM

## 2022-12-23 NOTE — PROGRESS NOTES
Physician Progress Note      PATIENT:               Dhara Miller  CSN #:                  [id number]  :                       1960  ADMIT DATE:       12/3/2022 10:54 AM  DISCH DATE:        2022 3:28 PM  RESPONDING  PROVIDER #:        Vadim Thompson MD          QUERY TEXT:    Pt admitted with NSTEMI.  \"Decompensated CHF likely combined\" noted by Dr. Kalli Morales.  Echo showed LVEF of 78-42%, diastolic dysfunction.  LHC   revealed LVEF at 55%. If possible, please document in the progress notes and   discharge if you were evaluating and/or treating any of the following: The medical record reflects the following:  Risk Factors: NSTEMI, HTN, CAD, PAF, HLD  Clinical Indicators:  echo showed LVEF at 40-45%. Mild left ventricular   systolic dysfunction. Moderate concentric LVH. E/A flow reversal noted. Suggestive of diastolic dysfunction. BNP 3857.  LHC/PCI procedure \"LV   gram:  Ejection fraction estimated at 55% percent. \"  Dr. Kalli Morales   -Decompensated CHF likely combined due both tho HTN and Myocardial ischemia\"   Dr. Kalli Morales \"CHF likely combined due both tho HTN and Myocardial ischemia-   Peripherally pt is not much volume overloaded. responded well to iv Lasix last   night with total 3.2L out. \"  Treatment: Lasix 40 mg IV bid, Cozaar, Mg, Toprol,  Lipitor, ASA, echo,   LHC/PCI, I&O's, cardiology consult    Thank you, Rita Mneendez RN BSN [facility]  [phone]  Options provided:  -- Acute on chronic likely combined CHF confirmed after study  -- Acute likely combined CHF confirmed after study  -- Acute on chronic diastolic CHF confirmed after study  -- Acute diastolic CHF confirmed after study  -- Other - I will add my own diagnosis  -- Disagree - Not applicable / Not valid  -- Disagree - Clinically unable to determine / Unknown  -- Refer to Clinical Documentation Reviewer    PROVIDER RESPONSE TEXT:    Acute on chronic likely combined confirmed after study.     Query created by: Eron Chow on 12/23/2022 9:43 AM      Electronically signed by:  Delano Michaels MD 12/23/2022 10:15 AM

## 2022-12-29 ENCOUNTER — TELEPHONE (OUTPATIENT)
Dept: FAMILY MEDICINE CLINIC | Age: 62
End: 2022-12-29

## 2022-12-29 NOTE — CARE COORDINATION
Telephone call with patient. She relayed that her phone cord was temporarily fixed  and she should receive a new cord in a few days. She is afraid that she missed phone call from [facility]. Discussed that patient call Nancy Phillips, phone number was provided. Patient claims that Nancy Phillips will not allow her to talk to [facility]. Explained she could call Nancy Phillips and leave a message for Jasmin to return phone call. No

## 2022-12-29 NOTE — TELEPHONE ENCOUNTER
[facility] called and requested a prescription for Shower  Bars be sent to [facility], same location Shower Chair was sent.  Please fax prescription to [phone]

## 2023-01-06 NOTE — DISCHARGE SUMMARY
Hospital Medicine Discharge Summary    Seamus Evans  :  1960  MRN:  [id number]    Admit date:  12/3/2022  Discharge date: 22    Admitting Physician:  No admitting provider for patient encounter. Primary Care Physician:  Whit Roger MD    Seamus Evans is a 65 y.o. female that was admitted and treated at [facility] for the following medical issues:     Principal Problem:    Chest pain  Resolved Problems:    * No resolved hospital problems. *      Discharge Diagnoses:    Principal Problem:    Chest pain  Resolved Problems:    * No resolved hospital problems. *    Chief Complaint   Patient presents with    Chest Pain     Epigastric, burning       Hospital Course:   Seamus Evans is a 65 y.o. female who was Non-ST elevation MI: For cardiac catheterization. Holding arb and diuretic as recommended by cards.  - cath rescheduled due to mild deisy, diuretic withheld.  - for cath today, creat wnl.     - cleared for dc by cardiology. Dc w outpt follow up. Suspected CHF: Respiratory status significantly improved with IV diuresis. Improving volume status, diuretics currently held while renal function being monitored. Creat now wnl. Mild DEISY: resolved. History of pulmonary embolism: On Lovenox 1 mg/kg  History of atrial fibrillation: Rate controlled, on anticoagulation. Pt was discharge in a stable condition.        /75   Pulse 85   Temp 98.6 °F (37 °C) (Oral)   Resp 18   Ht 5' 6\" (1.676 m)   Wt 229 lb 1.6 oz (103.9 kg)   SpO2 92%   BMI 36.98 kg/m²     Patient was seen by the following consultants while admitted to [facility]:   Consults:  IP CONSULT TO CARDIOLOGY  IP CONSULT TO CARDIAC REHAB    Significant Diagnostic Studies:    Refer to chart if  CT SOFT TISSUE NECK W CONTRAST    Result Date: 12/3/2022  EXAMINATION: CT OF THE NECK SOFT TISSUE WITH CONTRAST  12/3/2022 TECHNIQUE: CT of the neck was performed with the administration of intravenous contrast. Multiplanar reformatted images are provided for review. Automated exposure control, iterative reconstruction, and/or weight based adjustment of the mA/kV was utilized to reduce the radiation dose to as low as reasonably achievable. COMPARISON: None. HISTORY: ORDERING SYSTEM PROVIDED HISTORY: globus sensation x1 day; intolerant of PO intake TECHNOLOGIST PROVIDED HISTORY: Reason for exam:->globus sensation x1 day; intolerant of PO intake Decision Support Exception - unselect if not a suspected or confirmed emergency medical condition->Emergency Medical Condition (MA) What reading provider will be dictating this exam?->CRC FINDINGS: PHARYNX/LARYNX:  The palatine tonsils are normal in appearance. The tongue is normal in appearance. The valleculae, epiglottis, aryepiglottic folds and pyriform sinuses appear unremarkable. The true and false vocal cords are normal in appearance. No mass or abscess is seen. SALIVARY GLANDS/THYROID:  The parotid and submandibular glands appear unremarkable. The thyroid gland appears unremarkable. LYMPH NODES:  No cervical or supraclavicular lymphadenopathy is seen. SOFT TISSUES:  No appreciable soft tissue swelling or mass is seen. BRAIN/ORBITS/SINUSES:  The visualized portion of the intracranial contents appear unremarkable. 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 The visualized portion of the orbits, paranasal sinuses and mastoid air cells demonstrate no acute abnormality. LUNG APICES/SUPERIOR MEDIASTINUM:  No focal consolidation is seen within the visualized lung apices. No superior mediastinal lymphadenopathy or mass. The visualized portion of the trachea appears unremarkable. BONES:  No aggressive appearing lytic or blastic bony lesion. Minimal multilevel degenerative changes identified throughout the spine most pronounced at the C6/C7 level. No acute abnormality of the soft tissue structures of the neck.      CTA CHEST W WO CONTRAST PE Eval    Result Date: 12/3/2022  EXAMINATION: CTA OF THE CHEST WITH AND WITHOUT CONTRAST 12/3/2022 1:39 pm TECHNIQUE: CTA of the chest was performed before and after the administration of intravenous contrast.  Multiplanar reformatted images are provided for review. MIP images are provided for review. Automated exposure control, iterative reconstruction, and/or weight based adjustment of the mA/kV was utilized to reduce the radiation dose to as low as reasonably achievable. COMPARISON: The previous study performed 10/25/2021. HISTORY: ORDERING SYSTEM PROVIDED HISTORY: PE TECHNOLOGIST PROVIDED HISTORY: Reason for exam:->PE Decision Support Exception - unselect if not a suspected or confirmed emergency medical condition->Emergency Medical Condition (MA) What reading provider will be dictating this exam?->CRC FINDINGS: Pulmonary Arteries: Pulmonary arteries are adequately opacified for evaluation. No evidence of intraluminal filling defect to suggest pulmonary embolism. Main pulmonary artery is normal in caliber. Mediastinum: No evidence of mediastinal, axillary, or hilar lymphadenopathy. Cardiomegaly is noted. There is no acute abnormality of the thoracic aorta. The thyroid gland, esophagus, and trachea are unremarkable. Lungs/pleura: There is no evidence of acute consolidation or infiltrate. Mild, bilateral ground-glass opacities are noted and slightly more prominent in the lower lobes. There is prominence of the central pulmonary vasculature. Given these findings in the cardiomegaly, this indicates pulmonary vascular congestion. Bilateral lower lobe atelectasis is seen. No pulmonary parenchymal nodule or mass is identified. Upper Abdomen: There is fatty infiltration of the liver. A small hiatal hernia is noted. Soft Tissues/Bones: Osteo-degenerative changes are again noted involving the thoracic spine. 1.  No evidence of pulmonary embolus. 2.  Cardiomegaly. 3.  Pulmonary vascular congestion.  4.  Fatty liver. 5.  Small hiatal hernia. XR CHEST PORTABLE    Result Date: 12/3/2022  EXAMINATION: ONE XRAY VIEW OF THE CHEST 12/3/2022 11:27 am COMPARISON: None. HISTORY: ORDERING SYSTEM PROVIDED HISTORY: sob, vomiting TECHNOLOGIST PROVIDED HISTORY: Reason for exam:->sob, vomiting What reading provider will be dictating this exam?->CRC FINDINGS: The cardiac silhouette is enlarged. There are findings of mild vascular congestion. Suboptimal inspiration was obtained for the chest x-ray. There is no consolidation to suggest pneumonia. Cardiomegaly with findings of mild vascular congestion Suboptimal inspiration was obtained for the chest x-ray. EGD    Result Date: 12/3/2022  Site: [address] [place] [address] Patient Name: Nolan Montejo Procedure Date: 12/3/2022 MRN: [id number] YOB: 1960 Gender: Female Attending MD: Demetris Armijo Indications:        -  Dysphagia Medications:        -  Monitored Anesthesia Care Complications:        -  No immediate complications. Estimated Blood Loss:        -  Estimated blood loss: none. Procedure:        - The Gastroscope was introduced through the mouth and advanced to the second           part of the duodenum.        -  The patient tolerated the procedure well. Findings:        -  The examined esophagus was moderately tortuous. No Food Bolus noted        -  A 3 cm hiatal hernia was present. -  Esophagogastric landmarks were identified: the gastroesophageal junction           was found at 35 cm, the lower esophageal sphincter was found at 38 cm and the           upper extent of the gastric folds was found at 35 cm from the incisors. -  The entire examined stomach was normal.        -  The examined duodenum was normal.        -  A medium amount of food (residue) was found in the gastric body and in the           gastric antrum. Impression:        -  Tortuous esophagus. No  Food Bolus noted        -  A 3 cm hiatal hernia.         -  Esophagogastric landmarks identified.        -  Normal stomach. -  Normal examined duodenum. -  A medium amount of food (residue) in the stomach limiting evaluation        -  No specimens collected. Recommendation:        -  Return to GI clinic in 2 weeks. -  Repeat upper endoscopy in 6 weeks. -  Perform an esophagram at appointment to be scheduled. -  Use Protonix (pantoprazole) 40 mg PO daily.        -  Resume Eliquis (apixaban) at prior dose today. -  The patient will be observed post-procedure, until all discharge criteria           are met. -  Patient has a contact number available for emergencies. The signs and           symptoms of potential delayed complications were discussed with the patient. Return to normal activities tomorrow. Written discharge instructions were           provided to the patient. 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 All Rights Reserved. The CPT codes, CCI edits and ICD codes generated are intended as suggestions       and were generated based on input data. These codes are preliminary and upon        review may be revised to meet current compliance and payer requirements. The provider is responsible for the final determination of appropriate codes,       and modifiers. Signature Name: Tracey Rodriguez MD Signature Statement: This document has been electronically signed. Note Initiated On:12/3/2022 Signature Date:12/3/2022 10:06 AM Amended Signature: Tracey Rodriguez MD Signature Statement: This document has been electronically signed. Amended Date:12/3/2022 10:11 AM      Discharge Medications:         Medication List        START taking these medications      clopidogrel 75 MG tablet  Commonly known as: PLAVIX  Take 1 tablet by mouth daily     furosemide 40 MG tablet  Commonly known as: LASIX  Take 1 tablet by mouth daily     losartan 25 MG tablet  Commonly known as: COZAAR  Take 1 tablet by mouth daily     metoprolol succinate 50 MG extended release tablet  Commonly known as: TOPROL XL  Take 1 tablet by mouth daily     nitroGLYCERIN 0.4 MG SL tablet  Commonly known as: NITROSTAT  Place 1 tablet under the tongue every 5 minutes as needed for Chest pain up to max of 3 total doses. If no relief after 1 dose, call 911.      potassium chloride 20 MEQ extended release tablet  Commonly known as: KLOR-CON M  Take 1 tablet by mouth daily (with breakfast)            CHANGE how you take these medications      atorvastatin 40 MG tablet  Commonly known as: LIPITOR  Take 1 tablet by mouth nightly  What changed:   medication strength  how much to take  when to take this            CONTINUE taking these medications      EPINEPHrine 0.3 MG/0.3ML Soaj injection  Commonly known as: EpiPen 2-[place]  Use as directed for allergic reaction     pantoprazole 40 MG tablet  Commonly known as: Protonix  Take 1 tablet by mouth daily     Rollator Ultra-Light Misc  1 each by Does not apply route once for 1 dose     Ventolin  (90 Base) MCG/ACT inhaler  Generic drug: albuterol sulfate HFA  USE 2 PUFFS EVERY 4 HRS AS NEEDED FOR WHEEZING/SOB-SPACE OUT TO EVERY 6 HR AS SYMPTOMS IMPROVE            STOP taking these medications      metoprolol tartrate 50 MG tablet  Commonly known as: LOPRESSOR     midodrine 10 MG tablet  Commonly known as: PROAMATINE     prazosin 1 MG capsule  Commonly known as: MINIPRESS            ASK your doctor about these medications      Biotin 300 MCG Tabs  Take 1 tablet by mouth daily     calamine-zinc oxide 8-8 % Lotn lotion  Apply topically as needed (pruritis.)     diclofenac sodium 1 % Gel  Commonly known as: VOLTAREN  APPLY 2 G TOPICALLY 4 TIMES DAILY     ketoconazole 2 % shampoo  Commonly known as: NIZORAL  APPLY TOPICALLY DAILY AS NEEDED. Vitamin D High Potency 25 MCG (1000 UT) Caps  Generic drug: vitamin D  TAKE 1 CAPSULE BY MOUTH DAILY WITH SUPPER (PM)               Where to Get Your Medications        These medications were sent to [facility] [address] , [facility] -  [phone]  [address]      Phone: [phone]   atorvastatin 40 MG tablet  clopidogrel 75 MG tablet  furosemide 40 MG tablet  losartan 25 MG tablet  metoprolol succinate 50 MG extended release tablet  nitroGLYCERIN 0.4 MG SL tablet  potassium chloride 20 MEQ extended release tablet           Disposition:   If discharged to Home, Any [place] [address] needs that were indicated and/or required as been addressed and set up by Social Work. Condition at discharge: Pt was medically stable at the time of discharge. Activity: activity as tolerated    Total time taken for discharging this patient: 40 minutes. Greater than 70% of time was spent focused exclusively on this patient. Time was taken to review chart, discuss plans with consultants, reconciling medications, discussing plan answering questions with patient.      Signed:  Desmond Tinajero MD

## 2023-01-09 RX ORDER — CIPROFLOXACIN 500 MG/1
TABLET, FILM COATED ORAL
Qty: 10 TABLET | Refills: 0 | OUTPATIENT
Start: 2023-01-09

## 2023-01-09 NOTE — TELEPHONE ENCOUNTER
Rx requested:  Requested Prescriptions     Pending Prescriptions Disp Refills    ciprofloxacin (CIPRO) 500 MG tablet [Pharmacy Med Name: CIPROFLOXACIN HCL 500MG TABS] 10 tablet 0     Sig: TAKE ONE TABLET BY MOUTH TWICE A DAY FOR 5 DAYS         Last Office Visit:   12/21/2022 with Rowena Aguayo    Next Visit Date:  Future Appointments   Date Time Provider Jay Wong   1/17/2023 12:30 PM Larry Arreola MD [facility]   2/2/2023  3:00 PM MD Keri Roldan [facility] -   2/3/2023  8:15 AM Ana Miller MD [facility]

## 2023-01-26 ENCOUNTER — APPOINTMENT (OUTPATIENT)
Dept: GENERAL RADIOLOGY | Age: 63
End: 2023-01-26
Payer: COMMERCIAL

## 2023-01-26 ENCOUNTER — HOSPITAL ENCOUNTER (INPATIENT)
Age: 63
LOS: 1 days | Discharge: HOME | End: 2023-01-27
Attending: INTERNAL MEDICINE | Admitting: INTERNAL MEDICINE
Payer: COMMERCIAL

## 2023-01-26 DIAGNOSIS — I21.4 NSTEMI (NON-ST ELEVATED MYOCARDIAL INFARCTION) (HCC): Primary | ICD-10-CM

## 2023-01-26 PROBLEM — U07.1 COVID: Status: ACTIVE | Noted: 2022-05-26

## 2023-01-26 PROBLEM — F43.10 POSTTRAUMATIC STRESS DISORDER: Status: ACTIVE | Noted: 2022-07-25

## 2023-01-26 PROBLEM — F32.9 MAJOR DEPRESSIVE DISORDER, SINGLE EPISODE, UNSPECIFIED: Status: ACTIVE | Noted: 2022-07-25

## 2023-01-26 LAB
ALBUMIN SERPL-MCNC: 4 G/DL (ref 3.5–4.6)
ALP BLD-CCNC: 93 U/L (ref 40–130)
ALT SERPL-CCNC: 70 U/L (ref 0–33)
ANION GAP SERPL CALCULATED.3IONS-SCNC: 19 MEQ/L (ref 9–15)
APTT: 22.2 SEC (ref 24.4–36.8)
AST SERPL-CCNC: 55 U/L (ref 0–35)
BASOPHILS ABSOLUTE: 0.1 K/UL (ref 0–0.2)
BASOPHILS RELATIVE PERCENT: 1.2 %
BILIRUB SERPL-MCNC: 0.7 MG/DL (ref 0.2–0.7)
BUN BLDV-MCNC: 7 MG/DL (ref 8–23)
CALCIUM SERPL-MCNC: 9.5 MG/DL (ref 8.5–9.9)
CHLORIDE BLD-SCNC: 102 MEQ/L (ref 95–107)
CO2: 16 MEQ/L (ref 20–31)
CREAT SERPL-MCNC: 0.98 MG/DL (ref 0.5–0.9)
EKG ATRIAL RATE: 93 BPM
EKG Q-T INTERVAL: 360 MS
EKG QRS DURATION: 92 MS
EKG QTC CALCULATION (BAZETT): 450 MS
EKG R AXIS: -4 DEGREES
EKG T AXIS: 79 DEGREES
EKG VENTRICULAR RATE: 94 BPM
EOSINOPHILS ABSOLUTE: 0.1 K/UL (ref 0–0.7)
EOSINOPHILS RELATIVE PERCENT: 0.9 %
GFR SERPL CREATININE-BSD FRML MDRD: >60 ML/MIN/{1.73_M2}
GLOBULIN: 2.4 G/DL (ref 2.3–3.5)
GLUCOSE BLD-MCNC: 180 MG/DL (ref 70–99)
HCT VFR BLD CALC: 42.2 % (ref 37–47)
HEMOGLOBIN: 14.1 G/DL (ref 12–16)
INR BLD: 1.2
LYMPHOCYTES ABSOLUTE: 2.5 K/UL (ref 1–4.8)
LYMPHOCYTES RELATIVE PERCENT: 24.9 %
MCH RBC QN AUTO: 31.1 PG (ref 27–31.3)
MCHC RBC AUTO-ENTMCNC: 33.5 % (ref 33–37)
MCV RBC AUTO: 92.8 FL (ref 79.4–94.8)
MONOCYTES ABSOLUTE: 0.6 K/UL (ref 0.2–0.8)
MONOCYTES RELATIVE PERCENT: 5.5 %
NEUTROPHILS ABSOLUTE: 6.8 K/UL (ref 1.4–6.5)
NEUTROPHILS RELATIVE PERCENT: 67.5 %
PDW BLD-RTO: 14.2 % (ref 11.5–14.5)
PLATELET # BLD: 249 K/UL (ref 130–400)
POTASSIUM SERPL-SCNC: 3.2 MEQ/L (ref 3.4–4.9)
PRO-BNP: 414 PG/ML
PRO-BNP: 779 PG/ML
PROCALCITONIN: 0.06 NG/ML (ref 0–0.15)
PROTHROMBIN TIME: 14.9 SEC (ref 12.3–14.9)
RBC # BLD: 4.55 M/UL (ref 4.2–5.4)
SODIUM BLD-SCNC: 137 MEQ/L (ref 135–144)
TOTAL CK: 87 U/L (ref 0–170)
TOTAL PROTEIN: 6.4 G/DL (ref 6.3–8)
TROPONIN: 0.36 NG/ML (ref 0–0.01)
TROPONIN: 1.29 NG/ML (ref 0–0.01)
TROPONIN: <0.01 NG/ML (ref 0–0.01)
WBC # BLD: 10.1 K/UL (ref 4.8–10.8)

## 2023-01-26 PROCEDURE — 93005 ELECTROCARDIOGRAM TRACING: CPT | Performed by: PHYSICIAN ASSISTANT

## 2023-01-26 PROCEDURE — 6370000000 HC RX 637 (ALT 250 FOR IP): Performed by: PHYSICIAN ASSISTANT

## 2023-01-26 PROCEDURE — 80053 COMPREHEN METABOLIC PANEL: CPT

## 2023-01-26 PROCEDURE — 99285 EMERGENCY DEPT VISIT HI MDM: CPT

## 2023-01-26 PROCEDURE — 96374 THER/PROPH/DIAG INJ IV PUSH: CPT

## 2023-01-26 PROCEDURE — 2060000000 HC ICU INTERMEDIATE R&B

## 2023-01-26 PROCEDURE — 96372 THER/PROPH/DIAG INJ SC/IM: CPT

## 2023-01-26 PROCEDURE — 94761 N-INVAS EAR/PLS OXIMETRY MLT: CPT

## 2023-01-26 PROCEDURE — 2580000003 HC RX 258: Performed by: PHYSICIAN ASSISTANT

## 2023-01-26 PROCEDURE — 94664 DEMO&/EVAL PT USE INHALER: CPT

## 2023-01-26 PROCEDURE — 94640 AIRWAY INHALATION TREATMENT: CPT

## 2023-01-26 PROCEDURE — 6360000002 HC RX W HCPCS: Performed by: PHYSICIAN ASSISTANT

## 2023-01-26 PROCEDURE — 99223 1ST HOSP IP/OBS HIGH 75: CPT | Performed by: INTERNAL MEDICINE

## 2023-01-26 PROCEDURE — 96376 TX/PRO/DX INJ SAME DRUG ADON: CPT

## 2023-01-26 PROCEDURE — 83880 ASSAY OF NATRIURETIC PEPTIDE: CPT

## 2023-01-26 PROCEDURE — 96375 TX/PRO/DX INJ NEW DRUG ADDON: CPT

## 2023-01-26 PROCEDURE — 85025 COMPLETE CBC W/AUTO DIFF WBC: CPT

## 2023-01-26 PROCEDURE — 6370000000 HC RX 637 (ALT 250 FOR IP): Performed by: INTERNAL MEDICINE

## 2023-01-26 PROCEDURE — 84484 ASSAY OF TROPONIN QUANT: CPT

## 2023-01-26 PROCEDURE — 36415 COLL VENOUS BLD VENIPUNCTURE: CPT

## 2023-01-26 PROCEDURE — 71045 X-RAY EXAM CHEST 1 VIEW: CPT

## 2023-01-26 PROCEDURE — 85610 PROTHROMBIN TIME: CPT

## 2023-01-26 PROCEDURE — 84145 PROCALCITONIN (PCT): CPT

## 2023-01-26 PROCEDURE — 85730 THROMBOPLASTIN TIME PARTIAL: CPT

## 2023-01-26 PROCEDURE — 82550 ASSAY OF CK (CPK): CPT

## 2023-01-26 PROCEDURE — 93010 ELECTROCARDIOGRAM REPORT: CPT | Performed by: INTERNAL MEDICINE

## 2023-01-26 PROCEDURE — 93005 ELECTROCARDIOGRAM TRACING: CPT | Performed by: INTERNAL MEDICINE

## 2023-01-26 RX ORDER — ACETAMINOPHEN 325 MG/1
650 TABLET ORAL EVERY 6 HOURS PRN
Status: DISCONTINUED | OUTPATIENT
Start: 2023-01-26 | End: 2023-01-27 | Stop reason: SDUPTHER

## 2023-01-26 RX ORDER — LOSARTAN POTASSIUM 25 MG/1
25 TABLET ORAL DAILY
Status: DISCONTINUED | OUTPATIENT
Start: 2023-01-26 | End: 2023-01-27 | Stop reason: HOSPADM

## 2023-01-26 RX ORDER — ENOXAPARIN SODIUM 100 MG/ML
40 INJECTION SUBCUTANEOUS DAILY
Status: CANCELLED | OUTPATIENT
Start: 2023-01-26

## 2023-01-26 RX ORDER — CLOPIDOGREL BISULFATE 75 MG/1
75 TABLET ORAL DAILY
Status: DISCONTINUED | OUTPATIENT
Start: 2023-01-26 | End: 2023-01-27

## 2023-01-26 RX ORDER — FUROSEMIDE 40 MG/1
40 TABLET ORAL DAILY
Status: DISCONTINUED | OUTPATIENT
Start: 2023-01-26 | End: 2023-01-27 | Stop reason: HOSPADM

## 2023-01-26 RX ORDER — SODIUM CHLORIDE 0.9 % (FLUSH) 0.9 %
5-40 SYRINGE (ML) INJECTION PRN
Status: DISCONTINUED | OUTPATIENT
Start: 2023-01-26 | End: 2023-01-27 | Stop reason: HOSPADM

## 2023-01-26 RX ORDER — POLYETHYLENE GLYCOL 3350 17 G/17G
17 POWDER, FOR SOLUTION ORAL DAILY PRN
Status: DISCONTINUED | OUTPATIENT
Start: 2023-01-26 | End: 2023-01-27 | Stop reason: HOSPADM

## 2023-01-26 RX ORDER — MORPHINE SULFATE 4 MG/ML
4 INJECTION, SOLUTION INTRAMUSCULAR; INTRAVENOUS ONCE
Status: COMPLETED | OUTPATIENT
Start: 2023-01-26 | End: 2023-01-26

## 2023-01-26 RX ORDER — ALBUTEROL SULFATE 90 UG/1
2 AEROSOL, METERED RESPIRATORY (INHALATION) EVERY 4 HOURS PRN
Status: DISCONTINUED | OUTPATIENT
Start: 2023-01-26 | End: 2023-01-27

## 2023-01-26 RX ORDER — BENZTROPINE MESYLATE 1 MG/1
1 TABLET ORAL DAILY
Status: DISCONTINUED | OUTPATIENT
Start: 2023-01-26 | End: 2023-01-27 | Stop reason: HOSPADM

## 2023-01-26 RX ORDER — LANOLIN ALCOHOL/MO/W.PET/CERES
400 CREAM (GRAM) TOPICAL NIGHTLY PRN
Status: DISCONTINUED | OUTPATIENT
Start: 2023-01-26 | End: 2023-01-27 | Stop reason: HOSPADM

## 2023-01-26 RX ORDER — SODIUM CHLORIDE 9 MG/ML
INJECTION, SOLUTION INTRAVENOUS PRN
Status: DISCONTINUED | OUTPATIENT
Start: 2023-01-26 | End: 2023-01-27 | Stop reason: HOSPADM

## 2023-01-26 RX ORDER — ALBUTEROL SULFATE 90 UG/1
2 AEROSOL, METERED RESPIRATORY (INHALATION) EVERY 6 HOURS PRN
Status: DISCONTINUED | OUTPATIENT
Start: 2023-01-26 | End: 2023-01-27 | Stop reason: HOSPADM

## 2023-01-26 RX ORDER — ACETAMINOPHEN 650 MG/1
650 SUPPOSITORY RECTAL EVERY 6 HOURS PRN
Status: DISCONTINUED | OUTPATIENT
Start: 2023-01-26 | End: 2023-01-27 | Stop reason: HOSPADM

## 2023-01-26 RX ORDER — ONDANSETRON 4 MG/1
4 TABLET, ORALLY DISINTEGRATING ORAL EVERY 8 HOURS PRN
Status: DISCONTINUED | OUTPATIENT
Start: 2023-01-26 | End: 2023-01-27 | Stop reason: HOSPADM

## 2023-01-26 RX ORDER — ONDANSETRON 2 MG/ML
4 INJECTION INTRAMUSCULAR; INTRAVENOUS EVERY 6 HOURS PRN
Status: DISCONTINUED | OUTPATIENT
Start: 2023-01-26 | End: 2023-01-27 | Stop reason: HOSPADM

## 2023-01-26 RX ORDER — METOPROLOL SUCCINATE 50 MG/1
50 TABLET, EXTENDED RELEASE ORAL DAILY
Status: DISCONTINUED | OUTPATIENT
Start: 2023-01-26 | End: 2023-01-27 | Stop reason: HOSPADM

## 2023-01-26 RX ORDER — ONDANSETRON 2 MG/ML
4 INJECTION INTRAMUSCULAR; INTRAVENOUS ONCE
Status: COMPLETED | OUTPATIENT
Start: 2023-01-26 | End: 2023-01-26

## 2023-01-26 RX ORDER — ASPIRIN 81 MG/1
81 TABLET, CHEWABLE ORAL DAILY
Status: DISCONTINUED | OUTPATIENT
Start: 2023-01-27 | End: 2023-01-27 | Stop reason: HOSPADM

## 2023-01-26 RX ORDER — NITROGLYCERIN 0.4 MG/1
0.4 TABLET SUBLINGUAL EVERY 5 MIN PRN
Status: DISCONTINUED | OUTPATIENT
Start: 2023-01-26 | End: 2023-01-27 | Stop reason: HOSPADM

## 2023-01-26 RX ORDER — LEVOTHYROXINE SODIUM 0.05 MG/1
50 TABLET ORAL DAILY
Status: DISCONTINUED | OUTPATIENT
Start: 2023-01-26 | End: 2023-01-27 | Stop reason: HOSPADM

## 2023-01-26 RX ORDER — FAMOTIDINE 20 MG/1
20 TABLET, FILM COATED ORAL DAILY
Status: DISCONTINUED | OUTPATIENT
Start: 2023-01-26 | End: 2023-01-27 | Stop reason: HOSPADM

## 2023-01-26 RX ORDER — PANTOPRAZOLE SODIUM 40 MG/1
40 TABLET, DELAYED RELEASE ORAL DAILY
Status: DISCONTINUED | OUTPATIENT
Start: 2023-01-26 | End: 2023-01-27 | Stop reason: HOSPADM

## 2023-01-26 RX ORDER — ATORVASTATIN CALCIUM 80 MG/1
80 TABLET, FILM COATED ORAL NIGHTLY
Status: DISCONTINUED | OUTPATIENT
Start: 2023-01-26 | End: 2023-01-27 | Stop reason: HOSPADM

## 2023-01-26 RX ORDER — ENOXAPARIN SODIUM 150 MG/ML
1 INJECTION SUBCUTANEOUS 2 TIMES DAILY
Status: DISCONTINUED | OUTPATIENT
Start: 2023-01-27 | End: 2023-01-27

## 2023-01-26 RX ORDER — ALBUTEROL SULFATE 90 UG/1
2 AEROSOL, METERED RESPIRATORY (INHALATION) 4 TIMES DAILY
Status: DISCONTINUED | OUTPATIENT
Start: 2023-01-26 | End: 2023-01-26

## 2023-01-26 RX ORDER — ENOXAPARIN SODIUM 150 MG/ML
1 INJECTION SUBCUTANEOUS ONCE
Status: COMPLETED | OUTPATIENT
Start: 2023-01-26 | End: 2023-01-26

## 2023-01-26 RX ORDER — NITROGLYCERIN 0.4 MG/1
0.4 TABLET SUBLINGUAL EVERY 5 MIN PRN
Status: DISCONTINUED | OUTPATIENT
Start: 2023-01-26 | End: 2023-01-26 | Stop reason: SDUPTHER

## 2023-01-26 RX ORDER — LANOLIN ALCOHOL/MO/W.PET/CERES
1 CREAM (GRAM) TOPICAL DAILY
Status: DISCONTINUED | OUTPATIENT
Start: 2023-01-26 | End: 2023-01-26 | Stop reason: CLARIF

## 2023-01-26 RX ORDER — POTASSIUM CHLORIDE 20 MEQ/1
20 TABLET, EXTENDED RELEASE ORAL
Status: DISCONTINUED | OUTPATIENT
Start: 2023-01-27 | End: 2023-01-27 | Stop reason: HOSPADM

## 2023-01-26 RX ORDER — BUDESONIDE AND FORMOTEROL FUMARATE DIHYDRATE 160; 4.5 UG/1; UG/1
2 AEROSOL RESPIRATORY (INHALATION) 2 TIMES DAILY
Status: DISCONTINUED | OUTPATIENT
Start: 2023-01-26 | End: 2023-01-27 | Stop reason: HOSPADM

## 2023-01-26 RX ORDER — SODIUM CHLORIDE 0.9 % (FLUSH) 0.9 %
5-40 SYRINGE (ML) INJECTION EVERY 12 HOURS SCHEDULED
Status: DISCONTINUED | OUTPATIENT
Start: 2023-01-26 | End: 2023-01-27 | Stop reason: HOSPADM

## 2023-01-26 RX ORDER — MECLIZINE HCL 12.5 MG/1
12.5 TABLET ORAL NIGHTLY PRN
Status: DISCONTINUED | OUTPATIENT
Start: 2023-01-26 | End: 2023-01-27 | Stop reason: HOSPADM

## 2023-01-26 RX ADMIN — ENOXAPARIN SODIUM 105 MG: 150 INJECTION SUBCUTANEOUS at 15:07

## 2023-01-26 RX ADMIN — METOPROLOL SUCCINATE 50 MG: 50 TABLET, EXTENDED RELEASE ORAL at 18:09

## 2023-01-26 RX ADMIN — ONDANSETRON 4 MG: 2 INJECTION INTRAMUSCULAR; INTRAVENOUS at 12:54

## 2023-01-26 RX ADMIN — ATORVASTATIN CALCIUM 80 MG: 80 TABLET, FILM COATED ORAL at 22:16

## 2023-01-26 RX ADMIN — BENZTROPINE MESYLATE 1 MG: 1 TABLET ORAL at 18:09

## 2023-01-26 RX ADMIN — ARIPIPRAZOLE 15 MG: 10 TABLET ORAL at 18:09

## 2023-01-26 RX ADMIN — CLOPIDOGREL BISULFATE 75 MG: 75 TABLET ORAL at 18:09

## 2023-01-26 RX ADMIN — MORPHINE SULFATE 4 MG: 4 INJECTION, SOLUTION INTRAMUSCULAR; INTRAVENOUS at 15:06

## 2023-01-26 RX ADMIN — PANTOPRAZOLE SODIUM 40 MG: 40 TABLET, DELAYED RELEASE ORAL at 18:09

## 2023-01-26 RX ADMIN — MORPHINE SULFATE 4 MG: 4 INJECTION, SOLUTION INTRAMUSCULAR; INTRAVENOUS at 12:54

## 2023-01-26 RX ADMIN — Medication 10 ML: at 22:17

## 2023-01-26 RX ADMIN — ACETAMINOPHEN 650 MG: 325 TABLET ORAL at 22:16

## 2023-01-26 RX ADMIN — FUROSEMIDE 40 MG: 40 TABLET ORAL at 18:09

## 2023-01-26 RX ADMIN — BUDESONIDE AND FORMOTEROL FUMARATE DIHYDRATE 2 PUFF: 160; 4.5 AEROSOL RESPIRATORY (INHALATION) at 20:58

## 2023-01-26 RX ADMIN — LOSARTAN POTASSIUM 25 MG: 25 TABLET, FILM COATED ORAL at 18:09

## 2023-01-26 RX ADMIN — NITROGLYCERIN 1 INCH: 20 OINTMENT TOPICAL at 15:06

## 2023-01-26 ASSESSMENT — HEART SCORE: ECG: 0

## 2023-01-26 ASSESSMENT — PAIN DESCRIPTION - DESCRIPTORS
DESCRIPTORS: ACHING

## 2023-01-26 ASSESSMENT — ENCOUNTER SYMPTOMS
GASTROINTESTINAL NEGATIVE: 1
CONSTIPATION: 0
COUGH: 0
SHORTNESS OF BREATH: 1
BLOOD IN STOOL: 0
SHORTNESS OF BREATH: 0
WHEEZING: 0
SORE THROAT: 0
ABDOMINAL PAIN: 0
RHINORRHEA: 0
COLOR CHANGE: 0
EYES NEGATIVE: 1
BACK PAIN: 0
ABDOMINAL DISTENTION: 0
NAUSEA: 0
STRIDOR: 0
CHEST TIGHTNESS: 1
DIARRHEA: 0
VOMITING: 0
EYE DISCHARGE: 0

## 2023-01-26 ASSESSMENT — PAIN SCALES - GENERAL
PAINLEVEL_OUTOF10: 6
PAINLEVEL_OUTOF10: 4
PAINLEVEL_OUTOF10: 6
PAINLEVEL_OUTOF10: 7
PAINLEVEL_OUTOF10: 6

## 2023-01-26 ASSESSMENT — PAIN DESCRIPTION - ORIENTATION
ORIENTATION: LEFT
ORIENTATION: LEFT

## 2023-01-26 ASSESSMENT — LIFESTYLE VARIABLES
HOW MANY STANDARD DRINKS CONTAINING ALCOHOL DO YOU HAVE ON A TYPICAL DAY: PATIENT DOES NOT DRINK
HOW OFTEN DO YOU HAVE A DRINK CONTAINING ALCOHOL: NEVER

## 2023-01-26 ASSESSMENT — PAIN DESCRIPTION - LOCATION
LOCATION: HEAD
LOCATION: CHEST

## 2023-01-26 ASSESSMENT — PAIN DESCRIPTION - FREQUENCY: FREQUENCY: CONTINUOUS

## 2023-01-26 ASSESSMENT — PAIN - FUNCTIONAL ASSESSMENT: PAIN_FUNCTIONAL_ASSESSMENT: 0-10

## 2023-01-26 ASSESSMENT — PAIN DESCRIPTION - ONSET: ONSET: SUDDEN

## 2023-01-26 ASSESSMENT — PAIN DESCRIPTION - PAIN TYPE: TYPE: ACUTE PAIN

## 2023-01-26 NOTE — ED TRIAGE NOTES
Pt arrived via EMS from home with C/O CP that started about 1 hour ago. Pt was given nitro by EMS and her pain dropped from 10 to 6 out of 10. Pt is breathing heavily but is unlabored, pulses and circulation are good.

## 2023-01-26 NOTE — H&P
History and Physical  Patient: Brittnee Sellers  Unit/Bed: BRENDA/NONE  YOB: 1960  MRN: [id number]  Acct: [de-identified]   Admitting Diagnosis: Chest pain [R07.9]  NSTEMI (non-ST elevated myocardial infarction) [facility]) [I21.4]  Admit Date:  1/26/2023  Hospital Day: 0      Chief Complaint:  CP      History of Present Illness:  after she came home from grocery shopping she felt mid sternal chest pain no radiation. Felt weak and SOB. States she has been taking all of her meds. Synptoms have now resolved. No ECG Changes. Trops are elevated. On 12/3/22 she has NSTEMI and LAD ADELINE complicated by abrupt closure of D1 which was ballooned open with good results.  LVEF 55%      EKG: SR 80 NSST changes  PMHx:  Past Medical History:   Diagnosis Date    Asthma 2015    Bilateral renal cysts 2013    CAD (coronary artery disease)     Depression     since age 35, was with Dr Joyce Lyons, now the [facility]    Diverticulosis of sigmoid colon 2012    Dr Belen Oropeza    DJD of left shoulder     Environmental allergies     Fatty liver 2013    GERD (gastroesophageal reflux disease)     History of cardiac arrhythmia     \"due to stress, was placed on medication\"    Hydatidiform mole     age 26     Hyperlipidemia     Hypothyroidism 2011    IFG (impaired fasting glucose) 2013    Melanoma of eye ([address]     age 30, R eye prosthesis, Dr Osbaldo Bullock    Obesity     Prediabetes     PTSD (post-traumatic stress disorder)     age 35, [facility]    S/P colonoscopy 04/19/2012    Dr. Mendel Babe    S/P hysterectomy with oophorectomy 2012    Dr Hansen Deep    Tremor     Dr Tawanna Sandoval    Vitamin D deficiency        PSHx:  Past Surgical History:   Procedure Laterality Date    DILATION AND CURETTAGE OF UTERUS      ENDOMETRIAL ABLATION  06/2012    EYE REMOVAL      OD for melanoma, age 30    FOOT OSTEOTOMY Left 09/23/2016    B AUGUSTINA DPM      HYSTERECTOMY (CERVIX STATUS UNKNOWN)      BALDOMERO STEROTACTIC LOC BREAST BIOPSY RIGHT Right 07/19/2021    BALDOMERO STEROTACTIC LOC BREAST BIOPSY RIGHT 7/19/2021 [facility]    LINDSAY AND BSO (CERVIX REMOVED)  2012    Dr Bell Carrillo ENDOSCOPY N/A 12/3/2022    EGD DIAGNOSTIC ONLY performed by Maureen Quinonez MD at [address] Hx:  Social History     Socioeconomic History    Marital status:     Number of children: 3    Years of education: 12    Highest education level: High school graduate   Occupational History    Occupation: [employer]   Tobacco Use    Smoking status: Never    Smokeless tobacco: Never   Vaping Use    Vaping Use: Never used   Substance and Sexual Activity    Alcohol use: No    Drug use: No    Sexual activity: Not Currently     Birth control/protection: Surgical   Social History Narrative    Born in [address] [place] [address], one of 4 children, one sister disappeared at age 30, never found    Moved to [place] at age 40        Lives in an apartment in [facility], alone    , 3 children, all grown, in [place]     2 granddaughters     Hobbies exercising, birds    Attends "[employer] has volunteered at [employer]     Social Determinants of Health     Financial Resource Strain: Low Risk     Difficulty of Paying Living Expenses: Not hard at all   Food Insecurity: No Food Insecurity    Worried About Running Out of Food in the Last Year: Never true    [address] [place] [address] Place of Food in the Last Year: Never true       Family Hx:  Family History   Problem Relation Age of Onset    Heart Failure Mother         dec 76    Diabetes Mother     Diabetes Father     Stroke Father         dec age 80    Cancer Father         Sarcoma    Breast Cancer Maternal Aunt     Breast Cancer Paternal Aunt     Stomach Cancer Other     Colon Cancer Neg Hx        Allergies   Allergen Reactions    Bee Venom        Current Facility-Administered Medications   Medication Dose Route Frequency Provider Last Rate Last Admin    enoxaparin (LOVENOX) injection 105 mg  1 mg/kg SubCUTAneous BID Larry Arreola MD         Current Outpatient Medications   Medication Sig Dispense Refill    Multiple Vitamin (TAB-A-MANDI/BETA CAROTENE) TABS TAKE 1 TABLET BY MOUTH EVERY DAY (AM) 28 tablet 5    aspirin (ASPIRIN LOW DOSE) 81 MG EC tablet TAKE 1 TABLET BY MOUTH ONCE A DAY (AM) 30 tablet 5    albuterol sulfate HFA (VENTOLIN HFA) 108 (90 Base) MCG/ACT inhaler Inhale 2 puffs into the lungs every 6 hours as needed for Wheezing Please cover what is under insurance. 18 g 5    apixaban (ELIQUIS) 5 MG TABS tablet Take 1 tablet by mouth 2 times daily 60 tablet 5    ARIPiprazole (ABILIFY) 15 MG tablet Take 1 tablet by mouth daily 30 tablet 5    Cyanocobalamin (B-12) 1000 MCG TABS TAKE 1 TABLET BY MOUTH ONCE A DAY (NOON) 30 tablet 5    benztropine (COGENTIN) 1 MG tablet Take 1 tablet by mouth daily 30 tablet 0    budesonide-formoterol (SYMBICORT) 160-4.5 MCG/ACT AERO INHALE 2 PUFFS INTO THE LUNGS 2 TIMES DAILY 10.2 g 5    famotidine (PEPCID) 20 MG tablet TAKE 1 TABLET BY MOUTH TWICE DAILY (AM,PM) 60 tablet 5    gabapentin (NEURONTIN) 300 MG capsule Take 1 capsule by mouth at bedtime for 30 days.  30 capsule 3    levothyroxine (SYNTHROID) 50 MCG tablet Take 1 tablet by mouth Daily 30 tablet 5    Magnesium 400 MG CAPS Take 1 capsule by mouth nightly as needed (muscle spasms) 30 capsule 5    meclizine (ANTIVERT) 12.5 MG tablet Take 1 tablet by mouth nightly as needed for Dizziness 30 tablet 5    metFORMIN (GLUCOPHAGE) 500 MG tablet Indications: restart on 12/8/2022 TAKE 1 TABLET BY MOUTH TWICE A DAY WITH MEALS 60 tablet 5    cariprazine hcl (VRAYLAR) 1.5 MG capsule Take 1 capsule by mouth daily Take 1.5 mg by mouth daily 30 capsule 0    Blood Pressure Monitoring (B-D ASSURE BPM/AUTO ARM CUFF) MISC Use as directed 1 each 0    atorvastatin (LIPITOR) 40 MG tablet Take 1 tablet by mouth nightly 30 tablet 3    clopidogrel (PLAVIX) 75 MG tablet Take 1 tablet by mouth daily 30 tablet 3    furosemide (LASIX) 40 MG tablet Take 1 tablet by mouth daily 60 tablet 3    losartan (COZAAR) 25 MG tablet Take 1 tablet by mouth daily 30 tablet 3    metoprolol succinate (TOPROL XL) 50 MG extended release tablet Take 1 tablet by mouth daily 30 tablet 3    nitroGLYCERIN (NITROSTAT) 0.4 MG SL tablet Place 1 tablet under the tongue every 5 minutes as needed for Chest pain up to max of 3 total doses. If no relief after 1 dose, call 911. 25 tablet 3    potassium chloride (KLOR-CON M) 20 MEQ extended release tablet Take 1 tablet by mouth daily (with breakfast) 60 tablet 3    pantoprazole (PROTONIX) 40 MG tablet Take 1 tablet by mouth daily 90 tablet 0    calamine-zinc oxide 8-8 % LOTN lotion Apply topically as needed (pruritis.) 177 mL 0    vitamin D (VITAMIN D HIGH POTENCY) 25 MCG (1000 UT) CAPS TAKE 1 CAPSULE BY MOUTH DAILY WITH SUPPER (PM) 30 capsule 5    Biotin 300 MCG TABS Take 1 tablet by mouth daily 30 tablet 5    diclofenac sodium (VOLTAREN) 1 % GEL APPLY 2 G TOPICALLY 4 TIMES DAILY 100 g 2    VENTOLIN  (90 Base) MCG/ACT inhaler USE 2 PUFFS EVERY 4 HRS AS NEEDED FOR WHEEZING/SOB-SPACE OUT TO EVERY 6 HR AS SYMPTOMS IMPROVE 18 g 3    ketoconazole (NIZORAL) 2 % shampoo APPLY TOPICALLY DAILY AS NEEDED. 120 mL 2    Misc. Devices (ROLLATOR ULTRA-LIGHT) MISC 1 each by Does not apply route once for 1 dose 1 each 0    EPINEPHrine (EPIPEN 2-PRAVEENA) 0.3 MG/0.3ML SOAJ injection Use as directed for allergic reaction 2 each 2       Review of Systems:   Review of Systems   Constitutional: Negative. Negative for diaphoresis and fatigue. HENT: Negative. Eyes: Negative. Respiratory:  Positive for chest tightness and shortness of breath. Negative for cough, wheezing and stridor. Cardiovascular:  Positive for chest pain. Negative for palpitations and leg swelling. Gastrointestinal: Negative. Negative for blood in stool and nausea. Genitourinary: Negative. Musculoskeletal: Negative. Skin: Negative. Neurological: Negative. Negative for dizziness, syncope, weakness and light-headedness. Hematological: Negative. Psychiatric/Behavioral: Negative. Physical Examination:    BP (!) 134/91   Pulse 92   Temp 98.1 °F (36.7 °C) (Oral)   Resp 18   Ht 5' 6\" (1.676 m)   Wt 245 lb (111.1 kg)   SpO2 93%   BMI 39.54 kg/m²    Physical Exam   Constitutional: She appears healthy. No distress. HENT:   Normal cephalic and Atraumatic   Eyes: Pupils are equal, round, and reactive to light. Neck: Thyroid normal. No JVD present. No neck adenopathy. No thyromegaly present. Cardiovascular: Normal rate, regular rhythm, normal heart sounds, intact distal pulses and normal pulses. Pulmonary/Chest: Effort normal and breath sounds normal. She has no wheezes. She has no rales. She exhibits no tenderness. Abdominal: Soft. Bowel sounds are normal. There is no abdominal tenderness. Musculoskeletal:         General: No tenderness or edema. Normal range of motion. Cervical back: Normal range of motion and neck supple. Neurological: She is alert and oriented to person, place, and time. Skin: Skin is warm. No cyanosis. Nails show no clubbing.        LABS:  CBC:   Lab Results   Component Value Date/Time    WBC 10.1 01/26/2023 12:45 PM    RBC 4.55 01/26/2023 12:45 PM    RBC 4.57 05/14/2012 02:03 PM    HGB 14.1 01/26/2023 12:45 PM    HCT 42.2 01/26/2023 12:45 PM    MCV 92.8 01/26/2023 12:45 PM    MCH 31.1 01/26/2023 12:45 PM    MCHC 33.5 01/26/2023 12:45 PM    RDW 14.2 01/26/2023 12:45 PM     01/26/2023 12:45 PM    MPV 9.0 08/16/2015 07:30 PM     CBC with Differential:    Lab Results   Component Value Date/Time    WBC 10.1 01/26/2023 12:45 PM    RBC 4.55 01/26/2023 12:45 PM    RBC 4.57 05/14/2012 02:03 PM    HGB 14.1 01/26/2023 12:45 PM    HCT 42.2 01/26/2023 12:45 PM     01/26/2023 12:45 PM    MCV 92.8 01/26/2023 12:45 PM    MCH 31.1 01/26/2023 12:45 PM    MCHC 33.5 01/26/2023 12:45 PM    RDW 14.2 01/26/2023 12:45 PM    LYMPHOPCT 24.9 01/26/2023 12:45 PM    MONOPCT 5.5 01/26/2023 12:45 PM    BASOPCT 1.2 01/26/2023 12:45 PM    MONOSABS 0.6 01/26/2023 12:45 PM    LYMPHSABS 2.5 01/26/2023 12:45 PM    EOSABS 0.1 01/26/2023 12:45 PM    BASOSABS 0.1 01/26/2023 12:45 PM     CMP:    Lab Results   Component Value Date/Time     01/26/2023 12:45 PM    K 3.2 01/26/2023 12:45 PM    K 3.8 12/07/2022 05:18 AM     01/26/2023 12:45 PM    CO2 16 01/26/2023 12:45 PM    BUN 7 01/26/2023 12:45 PM    CREATININE 0.98 01/26/2023 12:45 PM    GFRAA >60.0 10/25/2021 12:00 PM    LABGLOM >60.0 01/26/2023 12:45 PM    GLUCOSE 180 01/26/2023 12:45 PM    GLUCOSE 92 05/14/2012 02:03 PM    PROT 6.4 01/26/2023 12:45 PM    LABALBU 4.0 01/26/2023 12:45 PM    LABALBU 3.8 03/22/2012 11:36 AM    CALCIUM 9.5 01/26/2023 12:45 PM    BILITOT 0.7 01/26/2023 12:45 PM    ALKPHOS 93 01/26/2023 12:45 PM    AST 55 01/26/2023 12:45 PM    ALT 70 01/26/2023 12:45 PM     BMP:    Lab Results   Component Value Date/Time     01/26/2023 12:45 PM    K 3.2 01/26/2023 12:45 PM    K 3.8 12/07/2022 05:18 AM     01/26/2023 12:45 PM    CO2 16 01/26/2023 12:45 PM    BUN 7 01/26/2023 12:45 PM    LABALBU 4.0 01/26/2023 12:45 PM    LABALBU 3.8 03/22/2012 11:36 AM    CREATININE 0.98 01/26/2023 12:45 PM    CALCIUM 9.5 01/26/2023 12:45 PM    GFRAA >60.0 10/25/2021 12:00 PM    LABGLOM >60.0 01/26/2023 12:45 PM    GLUCOSE 180 01/26/2023 12:45 PM    GLUCOSE 92 05/14/2012 02:03 PM     Magnesium:    Lab Results   Component Value Date/Time    MG 1.9 12/06/2022 04:42 AM     Troponin:    Lab Results   Component Value Date/Time    TROPONINI 0.365 01/26/2023 02:45 PM       Active Hospital Problems    Diagnosis Date Noted    NSTEMI (non-ST elevated myocardial infarction) ([facility] [address] [I21.4] 12/21/2022     Priority: Medium    Chest pain [R07.9] 12/03/2022     Priority: Medium        Assessment/Plan:  NSTEMI - possible D1 involvement. Full dose Lovenox. ASA Plavix. Plavix Statin  LVEF 55  HTN continue meds low slat diet  HPL- statin low fat diet  PAF - hold Eliquis.   Rate control Lovenox,.       Electronically signed by Jeferson Altman MD on 1/26/2023 at 4:35 PM

## 2023-01-26 NOTE — LETTER
[facility] Telemetry  [place] [address]  Phone: [phone]    No name on file. January 27, 2023     Patient: Wandalee Nissen   YOB: 1960   Date of Visit: 1/26/2023       To Whom It May Concern: It is my medical opinion that Jayson Villanueva {Work release (duty restriction):69329}. If you have any questions or concerns, please don't hesitate to call. Sincerely,        No name on file.

## 2023-01-26 NOTE — Clinical Note
Discharge Plan[de-identified] Other/[facility])   Telemetry/Cardiac Monitoring Required?: Yes

## 2023-01-26 NOTE — PROGRESS NOTES
Herbal and Nutritional Product Restrictions      The following herbal, alternative, and/or nutritional/dietary supplement product(s) has been discontinued per P&T/[facility] approved policy:    Biotin 1 tablet daily    Please reorder upon discharge if appropriate.     Thank you,  Kristine Lees, [facility]  1/26/2023 4:59 PM

## 2023-01-26 NOTE — CARE COORDINATION
[facility] [place] Case Management Initial Discharge Assessment    Met with Patient to discuss discharge plan. PCP: Morris Waddell MD                                Date of Last Visit: N/A    [facility] Patient: No        [facility] Notified: no    If no PCP, list provided? N/A    Discharge Planning    Living Arrangements: independently at home    Who do you live with? SELF    Who helps you with your care:  self    If lives at home:     Do you have any barriers navigating in your home? no    Patient can perform ADL? Yes    Current Services (outpatient and in home) :  None    Dialysis: No    Is transportation available to get to your appointments? Yes    DME Equipment:  no    Respiratory equipment: None    Respiratory provider:  no     Pharmacy:  yes - [facility]    Consult with Medication Assistance Program?  No      Patient agreeable to [facility]? N/A    Patient agreeable to SNF/Rehab? N/A    Other discharge needs identified? N/A    Does Patient Have a High-Risk for Readmission Diagnosis (CHF, PN, MI, COPD)? No    Initial Discharge Plan? (Note: please see concurrent daily documentation for any updates after initial note). LSW meet with pt at bedside. Discuss DC plan with pt. Pt agree with DC plan Home alone. Denies any needs. Readmission Risk              Risk of Unplanned Readmission:  0        CMI done.    Electronically signed by TITO Cartwright on 1/26/2023 at 3:25 PM

## 2023-01-26 NOTE — CARE COORDINATION
LSW meet with pt at bedside. Pt report living at home independent at baseline. No DME and No Home O2. Discuss DC plan with pt. Pt agree with DC plan Home alone. Denies any needs at this time. LSW will follow.      Electronically signed by TITO Alfaro on 1/26/2023 at 3:32 PM

## 2023-01-26 NOTE — CARE COORDINATION
01/26/23    From:HOME ALONE; INDEPENDENT; NO DME; NO HOME O2    Admit:1/26/2023      PMH:    Anticipated Discharge Disposition: HOME ALONE    Patient Mobility or PT/OT ordered:  Consults:     Clinical: VACCINATED X2    Barriers to Discharge:    Assessments: CMI DONE.

## 2023-01-26 NOTE — PROGRESS NOTES
[facility] Respiratory Therapy Evaluation   Current Order:  alb inh 4x daily + alb q6 prn      Home Regimen: prn      Ordering Physician: alin  Re-evaluation Date:  1/29     Diagnosis: sob      Patient Status: Stable / Unstable + Physician notified    The following MDI Criteria must be met in order to convert aerosol to MDI with spacer. If unable to meet, MDI will be converted to aerosol:  []  Patient able to demonstrate the ability to use MDI effectively  []  Patient alert and cooperative  []  Patient able to take deep breath with 5-10 second hold  []  Medication(s) available in this delivery method   []  Peak flow greater than or equal to 200 ml/min            Current Order Substituted To  (same drug, same frequency)   Aerosol to MDI [] Albuterol Sulfate 0.083% unit dose by aerosol Albuterol Sulfate MDI 2 puffs by inhalation with spacer    [] Levalbuterol 1.25 mg unit dose by aerosol Levalbuterol MDI 2 puffs by inhalation with spacer    [] Levalbuterol 0.63 mg unit dose by aerosol Levalbuterol MDI 2 puffs by inhalation with spacer    [] Ipratropium Bromide 0.02% unit dose by aerosol Ipratropium Bromide MDI 2 puffs by inhalation with spacer    [] Duoneb (Ipratropium + Albuterol) unit dose by aerosol Ipratropium MDI + Albuterol MDI 2 puffs by inhalation w/spacer   MDI to Aerosol [] Albuterol Sulfate MDI Albuterol Sulfate 0.083% unit dose by aerosol    [] Levalbuterol MDI 2 puffs by inhalation Levalbuterol 1.25 mg unit dose by aerosol    [] Ipratropium Bromide MDI by inhalation Ipratropium Bromide 0.02% unit dose by aerosol    [] Combivent (Ipratropium + Albuterol) MDI by inhalation Duoneb (Ipratropium + Albuterol) unit dose by aerosol       Treatment Assessment [Frequency/Schedule]:  Change frequency to: ___________________alb inh q4 prn_______________________________per Protocol, P&T, MEC      Points 0 1 2 3 4   Pulmonary Status  Non-Smoker  []   Smoking history   < 20 pack years  []   Smoking history  ?  20 pack years  []   Pulmonary Disorder  (acute or chronic)  [x]   Severe or Chronic w/ Exacerbation  []     Surgical Status No [x]   Surgeries     General []   Surgery Lower []   Abdominal Thoracic or []   Upper Abdominal Thoracic with  PulmonaryDisorder  []     Chest X-ray Clear/Not  Ordered     [x]  Chronic Changes  Results Pending  []  Infiltrates, atelectasis, pleural effusion, or edema  []  Infiltrates in more than one lobe []  Infiltrate + Atelectasis, &/or pleural effusion  []    Respiratory Pattern Regular,  RR = 12-20 [x]  Increased,  RR = 21-25 []  MORALES, irregular,  or RR = 26-30 []  Decreased FEV1  or RR = 31-35 []  Severe SOB, use  of accessory muscles, or RR ? 35  []    Mental Status Alert, oriented,  Cooperative [x]  Confused but Follows commands []  Lethargic or unable to follow commands []  Obtunded  []  Comatose  []    Breath Sounds Clear to  auscultation  []  Decreased unilaterally or  in bases only []  Decreased  bilaterally  [x]  Crackles or intermittent wheezes []  Wheezes []    Cough Strong, Spontan., & nonproductive [x]  Strong,  spontaneous, &  productive []  Weak,  Nonproductive []  Weak, productive or  with wheezes []  No spontaneous  cough or may require suctioning []    Level of Activity Ambulatory [x]  Ambulatory w/ Assist  []  Non-ambulatory []  Paraplegic []  Quadriplegic []    Total    Score:___5____     Triage Score:___5_____      Tri       Triage:     1. (>20) Freq: Q3    2. (16-20) Freq: Q4   3. (11-15) Freq: QID & Albuterol Q2 PRN    4. (6-10) Freq: TID & Albuterol Q2 PRN    5. (0-5) Freq Q4prn

## 2023-01-26 NOTE — ED PROVIDER NOTES
[address] [facility] ED  eMERGENCY dEPARTMENT eNCOUnter      Pt Name: Christianne Wang  MRN: [id number]  [place] 1960  Date of evaluation: 1/26/2023  Provider: Jacquelyn Patton PA-C    CHIEF COMPLAINT       Chief Complaint   Patient presents with    Chest Pain         HISTORY OF PRESENT ILLNESS   (Location/Symptom, Timing/Onset,Context/Setting, Quality, Duration, Modifying Factors, Severity)  Note limiting factors. Christianne Wang is a 65 y.o. female who presents to the emergency department complaint of midsternal chest pain which patient states started approximately 1 hour ago, she states is been ongoing since that time, initially rates as an 8 out of 10, she was given aspirin, and nitroglycerin x3 by EMS, states her current pain is a 6 out of 10. She claims mild shortness of breath, mild nausea, no diaphoresis. She did have a non-STEMI MI in December of this past year 2022. She does have 1 stent after catheterization at that time which showed a 90% blockage of LAD. Terri Buckley Her cardiologist is Dr. Raad Amanda. Past medical history is significant for PT SD, hypothyroidism, DJD, asthma, depression, gastric reflux, prediabetes, coronary artery disease. HPI    NursingNotes were reviewed. REVIEW OF SYSTEMS    (2-9 systems for level 4, 10 or more for level 5)     Review of Systems   Constitutional:  Negative for activity change and appetite change. HENT:  Negative for congestion, ear discharge, ear pain, nosebleeds, rhinorrhea and sore throat. Eyes:  Negative for discharge. Respiratory:  Negative for shortness of breath. Cardiovascular:  Positive for chest pain. Negative for palpitations and leg swelling. Gastrointestinal:  Negative for abdominal distention, abdominal pain, constipation, diarrhea, nausea and vomiting. Genitourinary:  Negative for difficulty urinating and dysuria. Musculoskeletal:  Negative for arthralgias and back pain. Skin:  Negative for color change.    Neurological:  Negative for dizziness, tremors, syncope, weakness, numbness and headaches. Psychiatric/Behavioral:  Negative for agitation and confusion. Except as noted above the remainder of the review of systems was reviewed and negative.        PAST MEDICAL HISTORY     Past Medical History:   Diagnosis Date    Asthma 2015    Bilateral renal cysts 2013    CAD (coronary artery disease)     Depression     since age 35, was with Dr Karin Rios, now the [facility]    Diverticulosis of sigmoid colon 2012    Dr Becky Santoyo    DJD of left shoulder     Environmental allergies     Fatty liver 2013    GERD (gastroesophageal reflux disease)     History of cardiac arrhythmia     \"due to stress, was placed on medication\"    Hydatidiform mole     age 26     Hyperlipidemia     Hypothyroidism 2011    IFG (impaired fasting glucose) 2013    Melanoma of eye [facility])     age 30, R eye prosthesis, Dr Phu Ramirez    Obesity     Prediabetes     PTSD (post-traumatic stress disorder)     age 35, [facility]    S/P colonoscopy 04/19/2012    Dr. Alexander Carver    S/P hysterectomy with oophorectomy 2012    Dr Gricelda Orantes    Tremor     Dr Erica Virk    Vitamin D deficiency          SURGICALHISTORY       Past Surgical History:   Procedure Laterality Date    DILATION AND CURETTAGE OF UTERUS      ENDOMETRIAL ABLATION  06/2012    EYE REMOVAL      OD for melanoma, age 30    FOOT OSTEOTOMY Left 09/23/2016    B AUGUSTINA DPM      HYSTERECTOMY (CERVIX STATUS UNKNOWN)      BALDOMERO STEROTACTIC LOC BREAST BIOPSY RIGHT Right 07/19/2021    BALDOMERO STEROTACTIC LOC BREAST BIOPSY RIGHT 7/19/2021 [facility]    LINDSAY AND BSO (CERVIX REMOVED)  2012    Dr Jay Beard N/A 12/3/2022    EGD DIAGNOSTIC ONLY performed by India Mccurdy MD at [address] [place] [address]       Previous Medications    ALBUTEROL SULFATE HFA (VENTOLIN HFA) 108 (90 BASE) MCG/ACT INHALER    Inhale 2 puffs into the lungs every 6 hours as needed for Wheezing Please cover what is under insurance. APIXABAN (ELIQUIS) 5 MG TABS TABLET    Take 1 tablet by mouth 2 times daily    ARIPIPRAZOLE (ABILIFY) 15 MG TABLET    Take 1 tablet by mouth daily    ASPIRIN (ASPIRIN LOW DOSE) 81 MG EC TABLET    TAKE 1 TABLET BY MOUTH ONCE A DAY (AM)    ATORVASTATIN (LIPITOR) 40 MG TABLET    Take 1 tablet by mouth nightly    BENZTROPINE (COGENTIN) 1 MG TABLET    Take 1 tablet by mouth daily    BIOTIN 300 MCG TABS    Take 1 tablet by mouth daily    BLOOD PRESSURE MONITORING (B-D ASSURE BPM/AUTO ARM CUFF) MISC    Use as directed    BUDESONIDE-FORMOTEROL (SYMBICORT) 160-4.5 MCG/ACT AERO    INHALE 2 PUFFS INTO THE LUNGS 2 TIMES DAILY    CALAMINE-ZINC OXIDE 8-8 % LOTN LOTION    Apply topically as needed (pruritis.)    CARIPRAZINE HCL (VRAYLAR) 1.5 MG CAPSULE    Take 1 capsule by mouth daily Take 1.5 mg by mouth daily    CLOPIDOGREL (PLAVIX) 75 MG TABLET    Take 1 tablet by mouth daily    CYANOCOBALAMIN (B-12) 1000 MCG TABS    TAKE 1 TABLET BY MOUTH ONCE A DAY (NOON)    DICLOFENAC SODIUM (VOLTAREN) 1 % GEL    APPLY 2 G TOPICALLY 4 TIMES DAILY    EPINEPHRINE (EPIPEN 2-PRAVEENA) 0.3 MG/0.3ML SOAJ INJECTION    Use as directed for allergic reaction    FAMOTIDINE (PEPCID) 20 MG TABLET    TAKE 1 TABLET BY MOUTH TWICE DAILY (AM,PM)    FUROSEMIDE (LASIX) 40 MG TABLET    Take 1 tablet by mouth daily    GABAPENTIN (NEURONTIN) 300 MG CAPSULE    Take 1 capsule by mouth at bedtime for 30 days. KETOCONAZOLE (NIZORAL) 2 % SHAMPOO    APPLY TOPICALLY DAILY AS NEEDED.     LEVOTHYROXINE (SYNTHROID) 50 MCG TABLET    Take 1 tablet by mouth Daily    LOSARTAN (COZAAR) 25 MG TABLET    Take 1 tablet by mouth daily    MAGNESIUM 400 MG CAPS    Take 1 capsule by mouth nightly as needed (muscle spasms)    MECLIZINE (ANTIVERT) 12.5 MG TABLET    Take 1 tablet by mouth nightly as needed for Dizziness    METFORMIN (GLUCOPHAGE) 500 MG TABLET    Indications: restart on 12/8/2022 TAKE 1 TABLET BY MOUTH TWICE A DAY WITH MEALS    METOPROLOL SUCCINATE (TOPROL XL) 50 MG EXTENDED RELEASE TABLET    Take 1 tablet by mouth daily    MISC. DEVICES (ROLLATOR ULTRA-LIGHT) MISC    1 each by Does not apply route once for 1 dose    MULTIPLE VITAMIN (TAB-A-MANDI/BETA CAROTENE) TABS    TAKE 1 TABLET BY MOUTH EVERY DAY (AM)    NITROGLYCERIN (NITROSTAT) 0.4 MG SL TABLET    Place 1 tablet under the tongue every 5 minutes as needed for Chest pain up to max of 3 total doses. If no relief after 1 dose, call 911.     PANTOPRAZOLE (PROTONIX) 40 MG TABLET    Take 1 tablet by mouth daily    POTASSIUM CHLORIDE (KLOR-CON M) 20 MEQ EXTENDED RELEASE TABLET    Take 1 tablet by mouth daily (with breakfast)    VENTOLIN  (90 BASE) MCG/ACT INHALER    USE 2 PUFFS EVERY 4 HRS AS NEEDED FOR WHEEZING/SOB-SPACE OUT TO EVERY 6 HR AS SYMPTOMS IMPROVE    VITAMIN D (VITAMIN D HIGH POTENCY) 25 MCG (1000 UT) CAPS    TAKE 1 CAPSULE BY MOUTH DAILY WITH SUPPER (PM)       ALLERGIES     Bee venom    FAMILY HISTORY       Family History   Problem Relation Age of Onset    Heart Failure Mother         dec 76    Diabetes Mother     Diabetes Father     Stroke Father         dec age 80    Cancer Father         Sarcoma    Breast Cancer Maternal Aunt     Breast Cancer Paternal Aunt     Stomach Cancer Other     Colon Cancer Neg Hx           SOCIAL HISTORY       Social History     Socioeconomic History    Marital status:     Number of children: 3    Years of education: 12    Highest education level: High school graduate   Occupational History    Occupation: [employer]   Tobacco Use    Smoking status: Never    Smokeless tobacco: Never   Vaping Use    Vaping Use: Never used   Substance and Sexual Activity    Alcohol use: No    Drug use: No    Sexual activity: Not Currently     Birth control/protection: Surgical   Social History Narrative    [place] one of 4 children, one sister disappeared at age 29, never found    Moved to [place] at age 40        Lives in an apartment in [facility], alone    , 3 children, all grown, in [place]     Wilfred johnson     Hobbies exercising, birds    Attends [employer], has volunteered at [id number] Strain: Low Risk     Difficulty of Paying Living Expenses: Not hard at all   Food Insecurity: No Food Insecurity    Worried About [facility] in the Last Year: Never true    [address] in the Last Year: Never true       SCREENINGS    [place] Coma Scale  Eye Opening: Spontaneous  Best Verbal Response: Oriented  Best Motor Response: Obeys commands  [place] Coma Scale Score: 15 @FLOW([id number])@      PHYSICAL EXAM    (up to 7 for level 4, 8 or more for level 5)     ED Triage Vitals [01/26/23 1229]   BP Temp Temp src Heart Rate Resp SpO2 Height Weight   112/80 -- -- 81 24 98 % 5' 6\" (1.676 m) 245 lb (111.1 kg)       Physical Exam  Vitals and nursing note reviewed. Constitutional:       General: She is not in acute distress. Appearance: She is well-developed. She is not ill-appearing, toxic-appearing or diaphoretic. HENT:      Head: Normocephalic. Nose: Nose normal. No congestion. Mouth/Throat:      Mouth: Mucous membranes are moist.      Pharynx: No oropharyngeal exudate or posterior oropharyngeal erythema. Eyes:      Extraocular Movements: Extraocular movements intact. Conjunctiva/sclera: Conjunctivae normal.      Pupils: Pupils are equal, round, and reactive to light. Neck:      Vascular: No JVD. Trachea: No tracheal deviation. Cardiovascular:      Rate and Rhythm: Normal rate. Pulses: Normal pulses. Heart sounds: Normal heart sounds. No murmur heard. No friction rub. No gallop. Pulmonary:      Effort: Pulmonary effort is normal. No tachypnea, accessory muscle usage, respiratory distress or retractions. Breath sounds: Normal breath sounds. No stridor. No wheezing, rhonchi or rales.       Comments: Lung sounds are clear in all fields, there is no wheezes rales or rhonchi, no excess moisture, tractions, speaks in full sentences without acute distress. Chest:      Chest wall: No tenderness. Abdominal:      General: Abdomen is flat. Bowel sounds are normal. There is no distension or abdominal bruit. Palpations: There is no shifting dullness, fluid wave, hepatomegaly, splenomegaly, mass or pulsatile mass. Tenderness: There is no abdominal tenderness. There is no right CVA tenderness, left CVA tenderness, guarding or rebound. Negative signs include Cannon's sign, Rovsing's sign and McBurney's sign. Musculoskeletal:         General: No deformity. Cervical back: Normal range of motion and neck supple. No rigidity. Right lower leg: No edema. Left lower leg: No edema. Skin:     General: Skin is warm and dry. Capillary Refill: Capillary refill takes less than 2 seconds. Coloration: Skin is not jaundiced. Neurological:      General: No focal deficit present. Mental Status: She is alert and oriented to person, place, and time. Mental status is at baseline. Cranial Nerves: No cranial nerve deficit. Sensory: No sensory deficit. Motor: No weakness. Coordination: Coordination normal.   Psychiatric:         Mood and Affect: Mood normal.       DIAGNOSTIC RESULTS     EKG: All EKG's are interpreted by the Emergency Department Physician who either signs or Co-signsthis chart in the absence of a cardiologist.    EKG shows accelerated junctional rhythm at 94 bpm there is incomplete right bundle branch block. Septal infarct of undetermined age, this was present on previous EKG of 12/3/2022. QTc is 450 ms    EKG shows normal sinus rhythm at 90 bpm there is incomplete right bundle branch block.   There is T wave inversions in V1 V2 no ventricular ectopy  ms    RADIOLOGY:   Non-plain filmimages such as CT, Ultrasound and MRI are read by the radiologist. Plain radiographic images are visualized and preliminarily interpreted by the emergency physician with the below findings:      Interpretation per the Radiologist below, if available at the time ofthis note:    XR CHEST PORTABLE   Final Result   No acute process. ED BEDSIDE ULTRASOUND:   Performed by ED Physician - none    LABS:  Labs Reviewed   CBC WITH AUTO DIFFERENTIAL - Abnormal; Notable for the following components:       Result Value    Neutrophils Absolute 6.8 (*)     All other components within normal limits   COMPREHENSIVE METABOLIC PANEL - Abnormal; Notable for the following components:    Potassium 3.2 (*)     CO2 16 (*)     Anion Gap 19 (*)     Glucose 180 (*)     BUN 7 (*)     Creatinine 0.98 (*)     ALT 70 (*)     AST 55 (*)     All other components within normal limits   APTT - Abnormal; Notable for the following components:    aPTT 22.2 (*)     All other components within normal limits   TROPONIN - Abnormal; Notable for the following components:    Troponin 0.365 (*)     All other components within normal limits    Narrative:     CALL  [place]  [facility] tel. [phone],  Trop  results called to and read back by Negin Burrows, 01/26/2023 15:39, by  GALDINO BROTHERS [facility]   TROPONIN   CK   PROTIME-INR   BRAIN NATRIURETIC PEPTIDE   PROCALCITONIN       All other labs were within normal range or not returned as of this dictation.     EMERGENCY DEPARTMENT COURSE and DIFFERENTIAL DIAGNOSIS/MDM:   Vitals:    Vitals:    01/26/23 1229 01/26/23 1400 01/26/23 1420 01/26/23 1430   BP: 112/80 134/89  (!) 137/94   Pulse: 81 77 91 89   Resp: 24 24 17 14   SpO2: 98%  96% 96%   Weight: 245 lb (111.1 kg)      Height: 5' 6\" (1.676 m)               MDM  Number of Diagnoses or Management Options  NSTEMI (non-ST elevated myocardial infarction) [facility])  Diagnosis management comments: Lv presented to the emergency department with complaint of midsternal chest pain which she states been ongoing for approximately 1 hour, she initially rates her pain as an 8 out of 10, was given 3 nitroglycerin and aspirin by EMS without any relief according to patient. She was given a dose of morphine while in the ED, and states her pain was down to a 4 out of 10. EKG showed no acute changes from previous studies. Chest x-ray shows no acute pulmonary process, troponin is 0.010. I did speak with Dr. Nahid Hughes of [facility] cardiology, he will accept admissions patient for further evaluation management for non specific chest pain. CRITICAL CARE TIME   Total Critical Care time was 0 minutes, excluding separately reportableprocedures. There was a high probability of clinicallysignificant/life threatening deterioration in the patient's condition which required my urgent intervention. CONSULTS:  None    PROCEDURES:  Unless otherwise noted below, none     Procedures    FINAL IMPRESSION      1. NSTEMI (non-ST elevated myocardial infarction) [facility])          DISPOSITION/PLAN   DISPOSITION Decision To Admit 01/26/2023 02:45:03 PM      PATIENT REFERRED TO:  No follow-up provider specified.     DISCHARGE MEDICATIONS:  New Prescriptions    No medications on file          (Please note that portions of this note were completed with a voice recognition program.  Efforts were made to edit the dictations but occasionally words are mis-transcribed.)    Cathleen Palm PA-C (electronically signed)  Attending Emergency Physician         Cathleen Palm PA-C  01/26/23 [facility]  01/26/23 [facility]  01/26/23 [address] [place] [address] Italo Ventura PA-C  01/26/23 1546

## 2023-01-27 ENCOUNTER — APPOINTMENT (OUTPATIENT)
Dept: CARDIAC CATH/INVASIVE PROCEDURES | Age: 63
End: 2023-01-27
Payer: COMMERCIAL

## 2023-01-27 VITALS
SYSTOLIC BLOOD PRESSURE: 120 MMHG | BODY MASS INDEX: 39.05 KG/M2 | OXYGEN SATURATION: 95 % | HEIGHT: 66 IN | WEIGHT: 243 LBS | TEMPERATURE: 97.5 F | HEART RATE: 92 BPM | RESPIRATION RATE: 18 BRPM | DIASTOLIC BLOOD PRESSURE: 88 MMHG

## 2023-01-27 LAB
CHOLESTEROL, TOTAL: 150 MG/DL (ref 0–199)
EKG ATRIAL RATE: 72 BPM
EKG ATRIAL RATE: 90 BPM
EKG P AXIS: 31 DEGREES
EKG P AXIS: 49 DEGREES
EKG P-R INTERVAL: 136 MS
EKG P-R INTERVAL: 156 MS
EKG Q-T INTERVAL: 370 MS
EKG Q-T INTERVAL: 440 MS
EKG QRS DURATION: 96 MS
EKG QRS DURATION: 96 MS
EKG QTC CALCULATION (BAZETT): 452 MS
EKG QTC CALCULATION (BAZETT): 481 MS
EKG R AXIS: -3 DEGREES
EKG R AXIS: 140 DEGREES
EKG T AXIS: 106 DEGREES
EKG T AXIS: 52 DEGREES
EKG VENTRICULAR RATE: 72 BPM
EKG VENTRICULAR RATE: 90 BPM
HCT VFR BLD CALC: 39.5 % (ref 37–47)
HDLC SERPL-MCNC: 41 MG/DL (ref 40–59)
HEMOGLOBIN: 13.6 G/DL (ref 12–16)
LDL CHOLESTEROL CALCULATED: 72 MG/DL (ref 0–129)
MAGNESIUM: 1.4 MG/DL (ref 1.7–2.4)
MCH RBC QN AUTO: 31.2 PG (ref 27–31.3)
MCHC RBC AUTO-ENTMCNC: 34.5 % (ref 33–37)
MCV RBC AUTO: 90.5 FL (ref 79.4–94.8)
PDW BLD-RTO: 13.9 % (ref 11.5–14.5)
PLATELET # BLD: 251 K/UL (ref 130–400)
RBC # BLD: 4.36 M/UL (ref 4.2–5.4)
TRIGL SERPL-MCNC: 184 MG/DL (ref 0–150)
WBC # BLD: 7.7 K/UL (ref 4.8–10.8)

## 2023-01-27 PROCEDURE — 4A023N7 MEASUREMENT OF CARDIAC SAMPLING AND PRESSURE, LEFT HEART, PERCUTANEOUS APPROACH: ICD-10-PCS | Performed by: INTERNAL MEDICINE

## 2023-01-27 PROCEDURE — 6370000000 HC RX 637 (ALT 250 FOR IP)

## 2023-01-27 PROCEDURE — 6360000004 HC RX CONTRAST MEDICATION: Performed by: INTERNAL MEDICINE

## 2023-01-27 PROCEDURE — 99233 SBSQ HOSP IP/OBS HIGH 50: CPT | Performed by: INTERNAL MEDICINE

## 2023-01-27 PROCEDURE — C1725 CATH, TRANSLUMIN NON-LASER: HCPCS

## 2023-01-27 PROCEDURE — 92920 PRQ TRLUML C ANGIOP 1ART&/BR: CPT

## 2023-01-27 PROCEDURE — C1769 GUIDE WIRE: HCPCS

## 2023-01-27 PROCEDURE — 83735 ASSAY OF MAGNESIUM: CPT

## 2023-01-27 PROCEDURE — 93005 ELECTROCARDIOGRAM TRACING: CPT | Performed by: PHYSICIAN ASSISTANT

## 2023-01-27 PROCEDURE — 93010 ELECTROCARDIOGRAM REPORT: CPT | Performed by: INTERNAL MEDICINE

## 2023-01-27 PROCEDURE — 92978 ENDOLUMINL IVUS OCT C 1ST: CPT

## 2023-01-27 PROCEDURE — 94640 AIRWAY INHALATION TREATMENT: CPT

## 2023-01-27 PROCEDURE — 94761 N-INVAS EAR/PLS OXIMETRY MLT: CPT

## 2023-01-27 PROCEDURE — 2709999900 HC NON-CHARGEABLE SUPPLY

## 2023-01-27 PROCEDURE — 6370000000 HC RX 637 (ALT 250 FOR IP): Performed by: PHYSICIAN ASSISTANT

## 2023-01-27 PROCEDURE — 93458 L HRT ARTERY/VENTRICLE ANGIO: CPT

## 2023-01-27 PROCEDURE — 6360000002 HC RX W HCPCS

## 2023-01-27 PROCEDURE — 36415 COLL VENOUS BLD VENIPUNCTURE: CPT

## 2023-01-27 PROCEDURE — 6360000002 HC RX W HCPCS: Performed by: INTERNAL MEDICINE

## 2023-01-27 PROCEDURE — 6370000000 HC RX 637 (ALT 250 FOR IP): Performed by: INTERNAL MEDICINE

## 2023-01-27 PROCEDURE — 85027 COMPLETE CBC AUTOMATED: CPT

## 2023-01-27 PROCEDURE — 2500000003 HC RX 250 WO HCPCS

## 2023-01-27 PROCEDURE — C1887 CATHETER, GUIDING: HCPCS

## 2023-01-27 PROCEDURE — 02703ZZ DILATION OF CORONARY ARTERY, ONE ARTERY, PERCUTANEOUS APPROACH: ICD-10-PCS | Performed by: INTERNAL MEDICINE

## 2023-01-27 PROCEDURE — B240ZZ3 ULTRASONOGRAPHY OF SINGLE CORONARY ARTERY, INTRAVASCULAR: ICD-10-PCS | Performed by: INTERNAL MEDICINE

## 2023-01-27 PROCEDURE — B2111ZZ FLUOROSCOPY OF MULTIPLE CORONARY ARTERIES USING LOW OSMOLAR CONTRAST: ICD-10-PCS | Performed by: INTERNAL MEDICINE

## 2023-01-27 PROCEDURE — C1894 INTRO/SHEATH, NON-LASER: HCPCS

## 2023-01-27 PROCEDURE — 80061 LIPID PANEL: CPT

## 2023-01-27 PROCEDURE — C1753 CATH, INTRAVAS ULTRASOUND: HCPCS

## 2023-01-27 PROCEDURE — 2580000003 HC RX 258: Performed by: PHYSICIAN ASSISTANT

## 2023-01-27 RX ORDER — ACETAMINOPHEN 325 MG/1
650 TABLET ORAL EVERY 4 HOURS PRN
Status: DISCONTINUED | OUTPATIENT
Start: 2023-01-27 | End: 2023-01-27 | Stop reason: HOSPADM

## 2023-01-27 RX ORDER — DIPHENHYDRAMINE HYDROCHLORIDE 50 MG/ML
50 INJECTION INTRAMUSCULAR; INTRAVENOUS ONCE
Status: DISCONTINUED | OUTPATIENT
Start: 2023-01-27 | End: 2023-01-27 | Stop reason: HOSPADM

## 2023-01-27 RX ORDER — CLOPIDOGREL BISULFATE 75 MG/1
75 TABLET ORAL DAILY
Status: DISCONTINUED | OUTPATIENT
Start: 2023-01-27 | End: 2023-01-27 | Stop reason: HOSPADM

## 2023-01-27 RX ORDER — SODIUM CHLORIDE 0.9 % (FLUSH) 0.9 %
5-40 SYRINGE (ML) INJECTION EVERY 12 HOURS SCHEDULED
Status: DISCONTINUED | OUTPATIENT
Start: 2023-01-27 | End: 2023-01-27 | Stop reason: HOSPADM

## 2023-01-27 RX ORDER — FENTANYL CITRATE 0.05 MG/ML
25 INJECTION, SOLUTION INTRAMUSCULAR; INTRAVENOUS
Status: DISCONTINUED | OUTPATIENT
Start: 2023-01-27 | End: 2023-01-27 | Stop reason: HOSPADM

## 2023-01-27 RX ORDER — SODIUM CHLORIDE 9 MG/ML
INJECTION, SOLUTION INTRAVENOUS PRN
Status: DISCONTINUED | OUTPATIENT
Start: 2023-01-27 | End: 2023-01-27 | Stop reason: HOSPADM

## 2023-01-27 RX ORDER — NITROGLYCERIN 0.4 MG/1
0.4 TABLET SUBLINGUAL EVERY 5 MIN PRN
Status: DISCONTINUED | OUTPATIENT
Start: 2023-01-27 | End: 2023-01-27 | Stop reason: HOSPADM

## 2023-01-27 RX ORDER — MORPHINE SULFATE 2 MG/ML
2 INJECTION, SOLUTION INTRAMUSCULAR; INTRAVENOUS
Status: DISCONTINUED | OUTPATIENT
Start: 2023-01-27 | End: 2023-01-27 | Stop reason: HOSPADM

## 2023-01-27 RX ORDER — SODIUM CHLORIDE 450 MG/100ML
75 INJECTION, SOLUTION INTRAVENOUS CONTINUOUS
Status: DISCONTINUED | OUTPATIENT
Start: 2023-01-27 | End: 2023-01-27 | Stop reason: HOSPADM

## 2023-01-27 RX ORDER — SODIUM CHLORIDE 0.9 % (FLUSH) 0.9 %
5-40 SYRINGE (ML) INJECTION PRN
Status: DISCONTINUED | OUTPATIENT
Start: 2023-01-27 | End: 2023-01-27 | Stop reason: HOSPADM

## 2023-01-27 RX ORDER — ONDANSETRON 2 MG/ML
4 INJECTION INTRAMUSCULAR; INTRAVENOUS EVERY 6 HOURS PRN
Status: DISCONTINUED | OUTPATIENT
Start: 2023-01-27 | End: 2023-01-27 | Stop reason: HOSPADM

## 2023-01-27 RX ORDER — PANTOPRAZOLE SODIUM 40 MG/1
40 TABLET, DELAYED RELEASE ORAL DAILY
Qty: 90 TABLET | Refills: 0 | Status: SHIPPED | OUTPATIENT
Start: 2023-01-27 | End: 2023-04-27

## 2023-01-27 RX ORDER — MIDAZOLAM HYDROCHLORIDE 1 MG/ML
2 INJECTION INTRAMUSCULAR; INTRAVENOUS
Status: DISCONTINUED | OUTPATIENT
Start: 2023-01-27 | End: 2023-01-27 | Stop reason: HOSPADM

## 2023-01-27 RX ADMIN — IOPAMIDOL 82 ML: 612 INJECTION, SOLUTION INTRAVENOUS at 14:06

## 2023-01-27 RX ADMIN — METOPROLOL SUCCINATE 50 MG: 50 TABLET, EXTENDED RELEASE ORAL at 09:26

## 2023-01-27 RX ADMIN — CARIPRAZINE 1.5 MG: 1.5 CAPSULE, GELATIN COATED ORAL at 09:26

## 2023-01-27 RX ADMIN — Medication 10 ML: at 09:28

## 2023-01-27 RX ADMIN — ACETAMINOPHEN 650 MG: 325 TABLET ORAL at 04:25

## 2023-01-27 RX ADMIN — CLOPIDOGREL BISULFATE 75 MG: 75 TABLET ORAL at 09:26

## 2023-01-27 RX ADMIN — BENZTROPINE MESYLATE 1 MG: 1 TABLET ORAL at 09:26

## 2023-01-27 RX ADMIN — ENOXAPARIN SODIUM 105 MG: 150 INJECTION SUBCUTANEOUS at 02:54

## 2023-01-27 RX ADMIN — BUDESONIDE AND FORMOTEROL FUMARATE DIHYDRATE 2 PUFF: 160; 4.5 AEROSOL RESPIRATORY (INHALATION) at 07:36

## 2023-01-27 RX ADMIN — POTASSIUM CHLORIDE 20 MEQ: 1500 TABLET, EXTENDED RELEASE ORAL at 09:26

## 2023-01-27 RX ADMIN — PANTOPRAZOLE SODIUM 40 MG: 40 TABLET, DELAYED RELEASE ORAL at 09:26

## 2023-01-27 RX ADMIN — FAMOTIDINE 20 MG: 20 TABLET, FILM COATED ORAL at 09:26

## 2023-01-27 RX ADMIN — ARIPIPRAZOLE 15 MG: 10 TABLET ORAL at 09:26

## 2023-01-27 RX ADMIN — FUROSEMIDE 40 MG: 40 TABLET ORAL at 09:26

## 2023-01-27 RX ADMIN — LOSARTAN POTASSIUM 25 MG: 25 TABLET, FILM COATED ORAL at 09:26

## 2023-01-27 RX ADMIN — ASPIRIN 81 MG: 81 TABLET, CHEWABLE ORAL at 09:26

## 2023-01-27 RX ADMIN — LEVOTHYROXINE SODIUM 50 MCG: 50 TABLET ORAL at 06:26

## 2023-01-27 ASSESSMENT — ENCOUNTER SYMPTOMS
COUGH: 0
WHEEZING: 0
BLOOD IN STOOL: 0
GASTROINTESTINAL NEGATIVE: 1
STRIDOR: 0
SHORTNESS OF BREATH: 1
EYES NEGATIVE: 1
CHEST TIGHTNESS: 1
NAUSEA: 0

## 2023-01-27 ASSESSMENT — PAIN SCALES - GENERAL
PAINLEVEL_OUTOF10: 0
PAINLEVEL_OUTOF10: 2
PAINLEVEL_OUTOF10: 0
PAINLEVEL_OUTOF10: 0

## 2023-01-27 ASSESSMENT — PAIN DESCRIPTION - LOCATION: LOCATION: HEAD

## 2023-01-27 NOTE — PLAN OF CARE
Problem: Discharge Planning  Goal: Discharge to home or other facility with appropriate resources  Outcome: Progressing  Flowsheets (Taken 1/26/2023 1713 by Geremias Teixeira RN)  Discharge to home or other facility with appropriate resources:   Identify barriers to discharge with patient and caregiver   Identify discharge learning needs (meds, wound care, etc)   Arrange for needed discharge resources and transportation as appropriate     Problem: Pain  Goal: Verbalizes/displays adequate comfort level or baseline comfort level  Outcome: Progressing  Flowsheets (Taken 1/26/2023 1617 by Geremias Teixeira RN)  Verbalizes/displays adequate comfort level or baseline comfort level: Encourage patient to monitor pain and request assistance     Problem: Safety - Adult  Goal: Free from fall injury  Outcome: Progressing     Problem: ABCDS Injury Assessment  Goal: Absence of physical injury  Outcome: Progressing

## 2023-01-27 NOTE — FLOWSHEET NOTE
Patient discharged to home as ordered patient in agreement a&ox4 up ad aby denies pain resp even/unlabored on RA discharge instructions/summarry provided including medications, follow-up, post cath care, when to seek 911/er assist patient verbalizes good understanding waiting for lyft to  for transport home at this time no s/s distress noted

## 2023-01-27 NOTE — PROGRESS NOTES
1415: Returned from cath lab to pre/post cath, alert and oriented. Vasc band to R wrist intact, no bleeding or hematoma. VSS, resting comfortably. 1515: Began removing air from vasc band, R wrist remains stable, no bleeding or hematoma. VSS.     1600: All air removed from vasc band. Vasc band removed from R wrist, no bleeding or hematoma. VSS.     1625: R wrist remains stable, no bleeding or hematoma. VSS. Report given to JARAD Magallanes.  Transport requested for pt to return to [place] [address].

## 2023-01-27 NOTE — PROGRESS NOTES
Assessment completed, c/o headache pain 7/10, pm meds given with tylenol, up to bathroom per self , will continue to monitor

## 2023-01-27 NOTE — DISCHARGE INSTR - DIET
Good nutrition is important when healing from an illness, injury, or surgery. Follow any nutrition recommendations given to you during your hospital stay. If you were given an oral nutrition supplement while in the hospital, continue to take this supplement at home. You can take it with meals, in-between meals, and/or before bedtime. These supplements can be purchased at most local grocery stores, pharmacies, and chain [employer]. If you have any questions about your diet or nutrition, call the hospital and ask for the dietitian. Low fat, low salt, heart healthy diet. Diabetic diet.

## 2023-01-27 NOTE — FLOWSHEET NOTE
Patient returned from cath lab verbal report received from [facility] a&ox4 vss denies pain resp even/unlabored on RA puncture site to right wrist stable no bleeding no hematoma noted clean dry dsg intact peripheral pulses palpable no s/s distress noted will cont to monitor patient anticipating discharge to home this evening after 1800 as ordered

## 2023-01-27 NOTE — PROGRESS NOTES
PROGRESS VISIT      Patient: Mia Galan  Unit/Bed: L098/I942-26  YOB: 1960  MRN: [id number]  Acct: [de-identified]   Admitting Diagnosis: Chest pain [R07.9]  NSTEMI (non-ST elevated myocardial infarction) [facility]) [I21.4]  Admit Date:  1/26/2023  Hospital Day: 1      Chief Complaint:  CP      History of Present Illness:  after she came home from grocery shopping she felt mid sternal chest pain no radiation. Felt weak and SOB. States she has been taking all of her meds. Synptoms have now resolved. No ECG Changes. Trops are elevated. On 12/3/22 she has NSTEMI and LAD ADELINE complicated by abrupt closure of D1 which was ballooned open with good results. LVEF 55%    1/27/23 no angina overnight. Troponin peaked at 1.29 no fever. No supplemental O2.      EKG: SR 70  PMHx:  Past Medical History:   Diagnosis Date    Asthma 2015    Bilateral renal cysts 2013    CAD (coronary artery disease)     Depression     since age 35, was with Dr Braydon Vincent, now the [facility]    Diverticulosis of sigmoid colon 2012    Dr Araujo Ear    DJD of left shoulder     Environmental allergies     Fatty liver 2013    GERD (gastroesophageal reflux disease)     History of cardiac arrhythmia     \"due to stress, was placed on medication\"    Hydatidiform mole     age 26     Hyperlipidemia     Hypothyroidism 2011    IFG (impaired fasting glucose) 2013    Melanoma of eye ([address]     age 30, R eye prosthesis, Dr Mau Whitfield    Neuropathy     Obesity     Prediabetes     PTSD (post-traumatic stress disorder)     age 35, [facility]    S/P colonoscopy 04/19/2012    Dr. Jennifer Young    S/P hysterectomy with oophorectomy 2012    Dr Bg Garner    Tremor     Dr Gerardo Monson    Vitamin D deficiency        PSHx:  Past Surgical History:   Procedure Laterality Date    DILATION AND CURETTAGE OF UTERUS      ENDOMETRIAL ABLATION  06/2012    EYE REMOVAL      OD for melanoma, age 30    FOOT OSTEOTOMY Left 09/23/2016    B AUGUSTINA DPMICHAEL      HYSTERECTOMY (CERVIX STATUS UNKNOWN)      BALDOMERO STEROTACTIC LOC BREAST BIOPSY RIGHT Right 07/19/2021    BALDOMERO STEROTACTIC LOC BREAST BIOPSY RIGHT 7/19/2021 [facility]    LINDSAY AND BSO (CERVIX REMOVED)  2012    Dr Shaver Roles N/A 12/3/2022    EGD DIAGNOSTIC ONLY performed by Tammy Olivo MD at [address] [address] Hx:  Social History     Socioeconomic History    Marital status:      Spouse name: None    Number of children: 3    Years of education: 12    Highest education level: High school graduate   Occupational History    Occupation: [employer]   Tobacco Use    Smoking status: Never    Smokeless tobacco: Never   Vaping Use    Vaping Use: Never used   Substance and Sexual Activity    Alcohol use: No    Drug use: No    Sexual activity: Not Currently     Birth control/protection: Surgical   Social History Narrative    Born in [address] [place] [address], one of 4 children, one sister disappeared at age 30, never found    Moved to [place] at age 40        Lives in an apartment in [facility], alone    , 3 children, all grown, in [place]     2 granddaughters     Hobbies exercising, birds    Attends [employer], has volunteered at [employer]     Social Determinants of Health     Financial Resource Strain: Low Risk     Difficulty of Paying Living Expenses: Not hard at all   Food Insecurity: No Food Insecurity    Worried About [address] in the Last Year: Never true    [address] in the Last Year: Never true       Family Hx:  Family History   Problem Relation Age of Onset    Heart Failure Mother         dec 76    Diabetes Mother     Diabetes Father     Stroke Father         dec age 80    Cancer Father         Sarcoma    Breast Cancer Maternal Aunt     Breast Cancer Paternal Aunt     Stomach Cancer Other     Colon Cancer Neg Hx        Allergies   Allergen Reactions    Bee Venom        Current Facility-Administered Medications   Medication Dose Route Frequency Provider Last Rate Last Admin    sodium chloride flush 0.9 % injection 5-40 mL  5-40 mL IntraVENous 2 times per day Checo Benavides PA-C   10 mL at 01/26/23 2217    sodium chloride flush 0.9 % injection 5-40 mL  5-40 mL IntraVENous PRN Checo Benavides PA-C        0.9 % sodium chloride infusion   IntraVENous PRN Checo Benavides PA-C        ondansetron (ZOFRAN-ODT) disintegrating tablet 4 mg  4 mg Oral Q8H PRN Checo Benavides PA-C        Or    ondansetron [facility]) injection 4 mg  4 mg IntraVENous Q6H PRN Checo Benavides PA-C        acetaminophen (TYLENOL) tablet 650 mg  650 mg Oral Q6H PRN Checo Benavides PA-C   650 mg at 01/27/23 0425    Or    acetaminophen (TYLENOL) suppository 650 mg  650 mg Rectal Q6H PRN Checo Benavides PA-C        polyethylene glycol (GLYCOLAX) packet 17 g  17 g Oral Daily PRN Checo Benavides PA-C        aspirin chewable tablet 81 mg  81 mg Oral Daily Aniket Thompson PA-C        atorvastatin (LIPITOR) tablet 80 mg  80 mg Oral Nightly Checo Benavides PA-C   80 mg at 01/26/23 2216    nitroGLYCERIN (NITROSTAT) SL tablet 0.4 mg  0.4 mg SubLINGual Q5 Min PRN Berenice Thompson PA-C        pantoprazole (PROTONIX) tablet 40 mg  40 mg Oral Daily Hima Angel MD   40 mg at 01/26/23 1809    clopidogrel (PLAVIX) tablet 75 mg  75 mg Oral Daily Hima Angel MD   75 mg at 01/26/23 1809    furosemide (LASIX) tablet 40 mg  40 mg Oral Daily Hima Angel MD   40 mg at 01/26/23 1809    losartan (COZAAR) tablet 25 mg  25 mg Oral Daily Hima Angel MD   25 mg at 01/26/23 1809    metoprolol succinate (TOPROL XL) extended release tablet 50 mg  50 mg Oral Daily Hima Angel MD   50 mg at 01/26/23 1809    potassium chloride (KLOR-CON M) extended release tablet 20 mEq  20 mEq Oral Daily with breakfast Hima Angel MD        albuterol sulfate HFA (PROVENTIL;VENTOLIN;PROAIR) 108 (90 Base) MCG/ACT inhaler 2 puff  2 puff Inhalation Q6H PRN Hima Angel MD        ARIPiprazole (ABILIFY) tablet 15 mg  15 mg Oral Daily Hima Angel MD   15 mg at 01/26/23 5801 benztropine (COGENTIN) tablet 1 mg  1 mg Oral Daily Larry Arreola MD   1 mg at 01/26/23 1809    budesonide-formoterol (SYMBICORT) 160-4.5 MCG/ACT inhaler 2 puff  2 puff Inhalation BID Larry Arreola MD   2 puff at 01/26/23 2058    famotidine (PEPCID) tablet 20 mg  20 mg Oral Daily Larry Arreola MD        levothyroxine (SYNTHROID) tablet 50 mcg  50 mcg Oral Daily Larry Arreola MD   50 mcg at 01/27/23 1868    magnesium oxide (MAG-OX) tablet 400 mg  400 mg Oral Nightly PRN Larry Arreola MD        meclizine (ANTIVERT) tablet 12.5 mg  12.5 mg Oral Nightly PRN Larry Arreola MD        cariprazine hcl (VRAYLAR) capsule 1.5 mg  1.5 mg Oral Daily Larry Arreola MD        enoxaparin (LOVENOX) injection 105 mg  1 mg/kg SubCUTAneous BID Larry Arreola MD   105 mg at 01/27/23 0254       Review of Systems:   Review of Systems   Constitutional: Negative. Negative for diaphoresis and fatigue. HENT: Negative. Eyes: Negative. Respiratory:  Positive for chest tightness and shortness of breath. Negative for cough, wheezing and stridor. Cardiovascular:  Positive for chest pain. Negative for palpitations and leg swelling. Gastrointestinal: Negative. Negative for blood in stool and nausea. Genitourinary: Negative. Musculoskeletal: Negative. Skin: Negative. Neurological: Negative. Negative for dizziness, syncope, weakness and light-headedness. Hematological: Negative. Psychiatric/Behavioral: Negative. Physical Examination:    BP 98/62   Pulse 73   Temp 98.4 °F (36.9 °C) (Oral)   Resp 18   Ht 5' 6\" (1.676 m)   Wt 243 lb (110.2 kg)   SpO2 95%   BMI 39.22 kg/m²    Physical Exam   Constitutional: She appears healthy. No distress. HENT:   Normal cephalic and Atraumatic   Eyes: Pupils are equal, round, and reactive to light. Neck: Thyroid normal. No JVD present. No neck adenopathy. No thyromegaly present.    Cardiovascular: Normal rate, regular rhythm, normal heart sounds, intact distal pulses and normal pulses. Pulmonary/Chest: Effort normal and breath sounds normal. She has no wheezes. She has no rales. She exhibits no tenderness. Abdominal: Soft. Bowel sounds are normal. There is no abdominal tenderness. Musculoskeletal:         General: No tenderness or edema. Normal range of motion. Cervical back: Normal range of motion and neck supple. Neurological: She is alert and oriented to person, place, and time. Skin: Skin is warm. No cyanosis. Nails show no clubbing.        LABS:  CBC:   Lab Results   Component Value Date/Time    WBC 7.7 01/27/2023 05:08 AM    RBC 4.36 01/27/2023 05:08 AM    RBC 4.57 05/14/2012 02:03 PM    HGB 13.6 01/27/2023 05:08 AM    HCT 39.5 01/27/2023 05:08 AM    MCV 90.5 01/27/2023 05:08 AM    MCH 31.2 01/27/2023 05:08 AM    MCHC 34.5 01/27/2023 05:08 AM    RDW 13.9 01/27/2023 05:08 AM     01/27/2023 05:08 AM    MPV 9.0 08/16/2015 07:30 PM     CBC with Differential:    Lab Results   Component Value Date/Time    WBC 7.7 01/27/2023 05:08 AM    RBC 4.36 01/27/2023 05:08 AM    RBC 4.57 05/14/2012 02:03 PM    HGB 13.6 01/27/2023 05:08 AM    HCT 39.5 01/27/2023 05:08 AM     01/27/2023 05:08 AM    MCV 90.5 01/27/2023 05:08 AM    MCH 31.2 01/27/2023 05:08 AM    MCHC 34.5 01/27/2023 05:08 AM    RDW 13.9 01/27/2023 05:08 AM    LYMPHOPCT 24.9 01/26/2023 12:45 PM    MONOPCT 5.5 01/26/2023 12:45 PM    BASOPCT 1.2 01/26/2023 12:45 PM    MONOSABS 0.6 01/26/2023 12:45 PM    LYMPHSABS 2.5 01/26/2023 12:45 PM    EOSABS 0.1 01/26/2023 12:45 PM    BASOSABS 0.1 01/26/2023 12:45 PM     CMP:    Lab Results   Component Value Date/Time     01/26/2023 12:45 PM    K 3.2 01/26/2023 12:45 PM    K 3.8 12/07/2022 05:18 AM     01/26/2023 12:45 PM    CO2 16 01/26/2023 12:45 PM    BUN 7 01/26/2023 12:45 PM    CREATININE 0.98 01/26/2023 12:45 PM    GFRAA >60.0 10/25/2021 12:00 PM    LABGLOM >60.0 01/26/2023 12:45 PM    GLUCOSE 180 01/26/2023 12:45 PM    GLUCOSE 92 05/14/2012 02:03 PM PROT 6.4 01/26/2023 12:45 PM    LABALBU 4.0 01/26/2023 12:45 PM    LABALBU 3.8 03/22/2012 11:36 AM    CALCIUM 9.5 01/26/2023 12:45 PM    BILITOT 0.7 01/26/2023 12:45 PM    ALKPHOS 93 01/26/2023 12:45 PM    AST 55 01/26/2023 12:45 PM    ALT 70 01/26/2023 12:45 PM     BMP:    Lab Results   Component Value Date/Time     01/26/2023 12:45 PM    K 3.2 01/26/2023 12:45 PM    K 3.8 12/07/2022 05:18 AM     01/26/2023 12:45 PM    CO2 16 01/26/2023 12:45 PM    BUN 7 01/26/2023 12:45 PM    LABALBU 4.0 01/26/2023 12:45 PM    LABALBU 3.8 03/22/2012 11:36 AM    CREATININE 0.98 01/26/2023 12:45 PM    CALCIUM 9.5 01/26/2023 12:45 PM    GFRAA >60.0 10/25/2021 12:00 PM    LABGLOM >60.0 01/26/2023 12:45 PM    GLUCOSE 180 01/26/2023 12:45 PM    GLUCOSE 92 05/14/2012 02:03 PM     Magnesium:    Lab Results   Component Value Date/Time    MG 1.4 01/27/2023 05:08 AM     Troponin:    Lab Results   Component Value Date/Time    TROPONINI 1.290 01/26/2023 05:04 PM       Active Hospital Problems    Diagnosis Date Noted    NSTEMI (non-ST elevated myocardial infarction) ([facility] [I21.4] 12/21/2022     Priority: Medium    Chest pain [R07.9] 12/03/2022     Priority: Medium        Assessment/Plan:  NSTEMI - possible D1 involvement. Recent (12/3/22) LAD ADELINE for NSTEMI. ASA Plavix. Statin. RBA LHC/PCI discussed w pt- she will proceed this PM w Dr. Carl Steele. LVEF 55  HTN continue meds low slat diet  HPL- statin low fat diet  PAF - Eliquis on hold. .  Rate control Lovenox.       Electronically signed by Mitul Barrios MD on 1/27/2023 at 7:34 AM

## 2023-01-27 NOTE — DISCHARGE INSTR - COC
Continuity of Care Form    Patient Name: Ike Vernon   :  1960  MRN:  [id number]    Admit date:  2023  Discharge date:  ***    Code Status Order: Full Code   Advance Directives:     Admitting Physician:  Yousif Caballero MD  PCP: Yazmin Henry MD    Discharging Nurse: [facility] Unit/Room#: A686/V921-32  Discharging Unit Phone Number: ***    Emergency Contact:   Extended Emergency Contact Information  Primary Emergency Contact: Louisa Drake [address] Phone: [phone]  Mobile Phone: [phone]  Relation: Other    Past Surgical History:  Past Surgical History:   Procedure Laterality Date    DILATION AND CURETTAGE OF UTERUS      ENDOMETRIAL ABLATION  2012    EYE REMOVAL      OD for melanoma, age 30    FOOT OSTEOTOMY Left 2016    B JACKMAN DPM      HYSTERECTOMY (CERVIX STATUS UNKNOWN)      BALDOMERO STEROTACTIC LOC BREAST BIOPSY RIGHT Right 2021    BALDOMERO STEROTACTIC LOC BREAST BIOPSY RIGHT 2021 [facility]    LINDSAY AND BSO (CERVIX REMOVED)      Dr Lio Connors N/A 12/3/2022    EGD DIAGNOSTIC ONLY performed by Brain Calixto MD at [facility]       Immunization History:   Immunization History   Administered Date(s) Administered    COVID-19, MODERNA Booster BLUE border, (age 18y+), IM, 50mcg/0.25mL 2021    COVID-19, PFIZER PURPLE top, DILUTE for use, (age 15 y+), 30mcg/0.3mL 2021, 2021    Influenza Vaccine, unspecified formulation 09/10/2016    Influenza Virus Vaccine 2014, 2015, 2017, 2018, 2019, 2021    Influenza Whole 2014, 2015    Influenza, AFLURIA (age 1 yrs+), FLUZONE, (age 10 mo+), MDV, 0.5mL 2017, 2018    Influenza, FLUARIX, FLULAVAL, FLUZONE (age 10 mo+) AND AFLURIA, (age 1 y+), PF, 0.5mL 2019, 2020, 2021    Influenza, FLUCELVAX, (age 10 mo+), MDCK, PF, 0.5mL 2022    Influenza, Triv, 3 Years and older, IM (Afluria (5 yrs and older) 09/10/2016    Pneumococcal Polysaccharide ([id number]) 08/10/2018    Td, unspecified formulation 12/12/2017    Tdap (Boostrix, Adacel) 03/11/2016    Zoster Recombinant (Shingrix) 03/19/2019, 07/19/2019, 02/21/2020       Active Problems:  Patient Active Problem List   Diagnosis Code    Posttraumatic stress disorder F43.10    Depression F32. A    Reactive airway disease J45.909    Environmental allergies     Hyperlipidemia E78.5    S/P colonoscopy Z98.890    Fatty liver disease, nonalcoholic P96.3    Vitamin D deficiency E55.9    IFG (impaired fasting glucose) R73.01    Obesity (BMI 30-39. 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    Combined forms of age-related cataract of left eye H25.812    History of COVID-19 Z86.16    Foreign body in esophagus T18.108A    Chest pain R07.9    NSTEMI (non-ST elevated myocardial infarction) ([facility]) I21.4    COVID U07.1    Major depressive disorder, single episode, unspecified F32.9       Isolation/Infection:   Isolation            No Isolation          Patient Infection Status       Infection Onset Added Last Indicated Last Indicated By Review Planned Expiration Resolved Resolved By    None active    Resolved    C-diff Rule Out 03/09/21 03/09/21 03/09/21 Gastrointestinal Panel by DNA (Ordered)   03/10/21 Rule-Out Test Resulted    COVID-19 (Rule Out) 02/26/21 02/26/21 02/26/21 COVID-19, Rapid (Ordered)   02/26/21 Rule-Out Test Resulted    C-diff Rule Out 10/28/20 10/28/20 10/28/20 Gastrointestinal Panel by DNA (Ordered)   10/29/20 Rule-Out Test Resulted            Nurse Assessment:  Last Vital Signs: /75   Pulse 79   Temp 98.1 °F (36.7 °C) (Oral)   Resp 16   Ht 5' 6\" (1.676 m)   Wt 243 lb (110.2 kg)   SpO2 93%   BMI 39.22 kg/m²     Last documented pain score (0-10 scale): Pain Level: 0  Last Weight:   Wt Readings from Last 1 Encounters:   01/27/23 243 lb (110.2 kg)     Mental Status:  {IP PT MENTAL STATUS:20030}    IV Access:  { JUDSON IV ACCESS:402128977}    Nursing Mobility/ADLs:  Walking   {CHP DME GCYW:373126126}  Transfer  {CHP DME UIHH:449296272}  Bathing  {CHP DME MZCM:363361311}  Dressing  {CHP DME GHHN:066174755}  Toileting  {CHP DME RTYE:326524926}  Feeding  {P DME HBCJ:252735489}  Med Admin  {[facility] DME DMUO:554166262}  Med Delivery   { JUDSON MED Delivery:360262800}    Wound Care Documentation and Therapy:        Elimination:  Continence: Bowel: {YES / PA:61719}  Bladder: {YES / CE:65970}  Urinary Catheter: {Urinary Catheter:332633454}   Colostomy/Ileostomy/Ileal Conduit: {YES / ZI:32879}       Date of Last BM: ***    Intake/Output Summary (Last 24 hours) at 1/27/2023 1528  Last data filed at 1/26/2023 2217  Gross per 24 hour   Intake 10 ml   Output --   Net 10 ml     I/O last 3 completed shifts:   In: 10 [I.V.:10]  Out: -     Safety Concerns:     [address] [employer] Safety Concerns:942696974}    Impairments/Disabilities:      [address] [employer] Impairments/Disabilities:438199556}    Nutrition Therapy:  Current Nutrition Therapy:   [address] [employer] Diet List:546536708}    Routes of Feeding: {P DME Other Feedings:270052229}  Liquids: {Slp liquid thickness:56299}  Daily Fluid Restriction: {CHP DME Yes amt example:643162539}  Last Modified Barium Swallow with Video (Video Swallowing Test): {Done Not Done ADPW:495983696}    Treatments at the Time of Hospital Discharge:   Respiratory Treatments: ***  Oxygen Therapy: {Therapy; copd oxygen:57251}  Ventilator:    { CC Vent KBMB:817787821}    Rehab Therapies: {THERAPEUTIC INTERVENTION:9275611065}  Weight Bearing Status/Restrictions: 50Marino CHANCE Weight Bearin}  Other Medical Equipment (for information only, NOT a DME order):  {EQUIPMENT:089206458}  Other Treatments: ***    Patient's personal belongings (please select all that are sent with patient):  {CHP DME Belongings:634898195}    RN SIGNATURE:  {Esignature:283096635}    CASE MANAGEMENT/SOCIAL WORK SECTION    Inpatient Status Date: ***    Readmission Risk Assessment Score:  Readmission Risk              Risk of Unplanned Readmission:  23           Discharging to Facility/ Agency   Name:   Address:  Phone:  Fax:    Dialysis Facility (if applicable)   Name:  Address:  Dialysis Schedule:  Phone:  Fax:    / signature: {Esignature:233173431}    PHYSICIAN SECTION    Prognosis: {Prognosis:4758902563}    Condition at Discharge: 50Marino Perkins Patient Condition:587457855}    Rehab Potential (if transferring to Rehab): {Prognosis:1842004235}    Recommended Labs or Other Treatments After Discharge: ***    Physician Certification: I certify the above information and transfer of Oly Templeton  is necessary for the continuing treatment of the diagnosis listed and that she requires {Admit to Appropriate Level of Care:78175} for {GREATER/LESS:718261949} 30 days.      Update Admission H&P: {CHP DME Changes in SKDBJ:757032609}    PHYSICIAN SIGNATURE:  {Esignature:187694555}

## 2023-01-27 NOTE — BRIEF OP NOTE
Section of Cardiology  Adult Brief Cardiac Cath Procedure Note        Procedure(s):  LHC, b/l coronary angio, IVUS, PTCA of mid LAD ISR with 4.0x15mm NC balloon at 16atm. Pre-operative Diagnosis:  nstemi    H&P Status: Completed and reviewed. Post-operative Diagnosis:        IVUS of LAD: mild stent under expansion prox segment. Thrombus in mid segment. LV EF of 55%  LM normal  LAD mid LAD non obstructive thrombus noted. Mid to distal LAD 50%  CX mild disease. RCA small caliber, mild disease.      Findings:  See full report    Complications:  none    Primary Proceduralist:   Dr.Wes Moore DO      Plan    dAPT  RFM  Max med rx      Full procedure note to follow

## 2023-01-27 NOTE — PLAN OF CARE
Problem: Discharge Planning  Goal: Discharge to home or other facility with appropriate resources  1/27/2023 1504 by Dona Jamil RN  Outcome: Progressing  1/27/2023 0153 by Allyson aPredes RN  Outcome: Progressing  Flowsheets (Taken 1/26/2023 1713 by Elmer Paul, JARAD)  Discharge to home or other facility with appropriate resources:   Identify barriers to discharge with patient and caregiver   Identify discharge learning needs (meds, wound care, etc)   Arrange for needed discharge resources and transportation as appropriate     Problem: Pain  Goal: Verbalizes/displays adequate comfort level or baseline comfort level  1/27/2023 1504 by Dona Jamil RN  Outcome: Progressing  1/27/2023 0153 by Allyson Paredes RN  Outcome: Progressing  Flowsheets (Taken 1/26/2023 1617 by Elmer Paul RN)  Verbalizes/displays adequate comfort level or baseline comfort level: Encourage patient to monitor pain and request assistance     Problem: Safety - Adult  Goal: Free from fall injury  1/27/2023 1504 by Dona Jamil RN  Outcome: Progressing  1/27/2023 0153 by Allyson Paredes RN  Outcome: Progressing     Problem: ABCDS Injury Assessment  Goal: Absence of physical injury  1/27/2023 1504 by Dona Jamil RN  Outcome: Progressing  1/27/2023 0153 by Allyson Paredes RN  Outcome: Progressing     Problem: Chronic Conditions and Co-morbidities  Goal: Patient's chronic conditions and co-morbidity symptoms are monitored and maintained or improved  Outcome: Progressing

## 2023-01-27 NOTE — PLAN OF CARE
Problem: Discharge Planning  Goal: Discharge to home or other facility with appropriate resources  1/27/2023 1857 by Jessica Richards RN  Outcome: Completed  1/27/2023 1504 by Jessica Richards RN  Outcome: Progressing     Problem: Pain  Goal: Verbalizes/displays adequate comfort level or baseline comfort level  1/27/2023 1857 by Jessica Richards RN  Outcome: Completed  1/27/2023 1504 by Jessica Richards RN  Outcome: Progressing     Problem: Safety - Adult  Goal: Free from fall injury  1/27/2023 1857 by Jessica Richards RN  Outcome: Completed  1/27/2023 1504 by Jessica Richards RN  Outcome: Progressing     Problem: ABCDS Injury Assessment  Goal: Absence of physical injury  1/27/2023 1857 by Jessica Richards RN  Outcome: Completed  1/27/2023 1504 by Jessica Richards RN  Outcome: Progressing     Problem: Chronic Conditions and Co-morbidities  Goal: Patient's chronic conditions and co-morbidity symptoms are monitored and maintained or improved  1/27/2023 1857 by Jessica Richards RN  Outcome: Completed  1/27/2023 1504 by Jessica Richards RN  Outcome: Progressing

## 2023-01-27 NOTE — CARE COORDINATION
Definition and description of a heart attack explained. Brief overview of the heart anatomy reviewed with pt. CAD progression discussed. During a MI, lack of oxygen and damage to heart muscle explained. Symptoms of a MI reviewed. Pt. reports experiencing: mid sternal chest pain, shortness of breath and nausea. Post MI complications reviewed including arrhythmias, decreased cardiac output, and inflammation (pericarditis). Hospital course reviewed, including testing: Lab work, B/P, ECG, ECHO, Stress testing, and Heart Cath. Treatments also reviewed from medication, angioplasty and stenting, to CABG. Patient had:   Plan post discharge includes follow up with cardiology. Physical activity discussed including cardiac rehab. Pt advised to follow their physician's discharge instructions for activity restrictions, if any. Risk factors reviewed including: smoking, high cholesterol, HTN, DM, obesity, sedentary lifestyle, and stress. Pt. encouraged to make lifestyle changes to improve their health and lower these risk factors. Stress and depression were also discussed. S/S depression reviewed as well as encouragement to talk with someone if symptoms occur. Importance of follow up with the cardiologist reinforced. MI Zone booklet also reviewed. Goal is to keep patient in the \"green\" zone. She verbalizes understanding to call the doctor ASAP when experiencing symptoms in the \"yellow\" zone. Instructed to call 911 when S/S of \"red\" zone begin. Reminder to never drive after taking nitroglycerine. Copy of MI booklet and zone pamphlet provided to the patient for review. Offer for patient to verbalize any questions. All questions answered and patient denies further questions at this time.     Electronically signed by Abbi Reese RN on 1/27/2023 at 11:52 AM

## 2023-01-27 NOTE — PROGRESS NOTES
Spiritual Care Services     Summary of Visit:  Patient was visited as part of regular rounding. She is known to this  from previous visits. She was resting in bed and appeared to be in good spirits. She remembered completing her [address] and affirmed that it was current. Her concern was for her code status. Patient expressed that she had been on life support in the past and did not want to experience that again. We discussed the various DNR and her wish was for a DNR-CCA document. This information was relayed to the nurse and the patient's Dr. She is going for a Catherization today so she would remain full code until after the procedure. Encounter Summary  Encounter Overview/Reason : Initial Encounter, Advance Care Planning  Service Provided For[de-identified] Patient  Referral/Consult From[de-identified] Rounding  Support System: Friends/neighbors  Complexity of Encounter: Low  Begin Time: 5087  End Time : 1025  Total Time Calculated: 11 min        Spiritual/Emotional needs  Type: Spiritual Support                 Advance Care Planning  Type: ACP conversation (Confirmed her documents/updated code status to nurse and DrKiesha)    Spiritual Assessment/Intervention/Outcomes:    Assessment: Impaired resilience, Calm, Coping    Intervention: Active listening, Discussed belief system/Mormon practices/gideon, Discussed illness injury and its impact, Explored/Affirmed feelings, thoughts, concerns, Nurtured Hope    Outcome: Acceptance, Concerns relieved, Engaged in conversation, Expressed feelings, needs, and concerns, Expressed Gratitude, Receptive      Care Plan:    Plan and Referrals  Plan/Referrals: No future visits requested    No follow up unless patient requests.       Spiritual Care Services   Electronically signed by Aleksey Franco, [employer] on 1/27/2023 at 12:50 PM.    To reach a  for emotional and spiritual support, place an [facility] consult request.   If a  is needed immediately, dial 0 and ask to page the on-call .

## 2023-02-02 ENCOUNTER — OFFICE VISIT (OUTPATIENT)
Dept: CARDIOLOGY CLINIC | Age: 63
End: 2023-02-02
Payer: COMMERCIAL

## 2023-02-02 ENCOUNTER — OFFICE VISIT (OUTPATIENT)
Dept: NEUROLOGY | Age: 63
End: 2023-02-02
Payer: COMMERCIAL

## 2023-02-02 VITALS
WEIGHT: 258.8 LBS | BODY MASS INDEX: 41.77 KG/M2 | DIASTOLIC BLOOD PRESSURE: 72 MMHG | HEART RATE: 94 BPM | SYSTOLIC BLOOD PRESSURE: 126 MMHG | OXYGEN SATURATION: 94 %

## 2023-02-02 VITALS
SYSTOLIC BLOOD PRESSURE: 115 MMHG | DIASTOLIC BLOOD PRESSURE: 80 MMHG | HEART RATE: 99 BPM | BODY MASS INDEX: 41.97 KG/M2 | WEIGHT: 260 LBS

## 2023-02-02 DIAGNOSIS — R06.09 DOE (DYSPNEA ON EXERTION): ICD-10-CM

## 2023-02-02 DIAGNOSIS — R27.0 ATAXIA: ICD-10-CM

## 2023-02-02 DIAGNOSIS — G25.2 ORTHOSTATIC TREMOR: ICD-10-CM

## 2023-02-02 DIAGNOSIS — J45.30 MILD PERSISTENT ASTHMA WITHOUT COMPLICATION: ICD-10-CM

## 2023-02-02 DIAGNOSIS — E78.5 DYSLIPIDEMIA: ICD-10-CM
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
## 2023-02-02 DIAGNOSIS — M17.12 OSTEOARTHRITIS OF LEFT KNEE, UNSPECIFIED OSTEOARTHRITIS TYPE: ICD-10-CM

## 2023-02-02 DIAGNOSIS — I21.4 NSTEMI (NON-ST ELEVATED MYOCARDIAL INFARCTION) (HCC): Primary | ICD-10-CM

## 2023-02-02 DIAGNOSIS — I48.92 ATRIAL FLUTTER, UNSPECIFIED TYPE (HCC): Primary | ICD-10-CM

## 2023-02-02 DIAGNOSIS — I10 HYPERTENSION, UNSPECIFIED TYPE: ICD-10-CM

## 2023-02-02 DIAGNOSIS — G25.0 ESSENTIAL TREMOR: Primary | ICD-10-CM

## 2023-02-02 DIAGNOSIS — E78.5 HYPERLIPIDEMIA, UNSPECIFIED HYPERLIPIDEMIA TYPE: ICD-10-CM

## 2023-02-02 PROCEDURE — 3078F DIAST BP <80 MM HG: CPT | Performed by: INTERNAL MEDICINE

## 2023-02-02 PROCEDURE — G8417 CALC BMI ABV UP PARAM F/U: HCPCS | Performed by: PSYCHIATRY & NEUROLOGY

## 2023-02-02 PROCEDURE — 3017F COLORECTAL CA SCREEN DOC REV: CPT | Performed by: INTERNAL MEDICINE

## 2023-02-02 PROCEDURE — G8427 DOCREV CUR MEDS BY ELIG CLIN: HCPCS | Performed by: INTERNAL MEDICINE

## 2023-02-02 PROCEDURE — 1111F DSCHRG MED/CURRENT MED MERGE: CPT | Performed by: PSYCHIATRY & NEUROLOGY

## 2023-02-02 PROCEDURE — 3074F SYST BP LT 130 MM HG: CPT | Performed by: INTERNAL MEDICINE

## 2023-02-02 PROCEDURE — 1036F TOBACCO NON-USER: CPT | Performed by: INTERNAL MEDICINE

## 2023-02-02 PROCEDURE — 3017F COLORECTAL CA SCREEN DOC REV: CPT | Performed by: PSYCHIATRY & NEUROLOGY

## 2023-02-02 PROCEDURE — 99214 OFFICE O/P EST MOD 30 MIN: CPT | Performed by: INTERNAL MEDICINE

## 2023-02-02 PROCEDURE — G8482 FLU IMMUNIZE ORDER/ADMIN: HCPCS | Performed by: PSYCHIATRY & NEUROLOGY

## 2023-02-02 PROCEDURE — 1036F TOBACCO NON-USER: CPT | Performed by: PSYCHIATRY & NEUROLOGY

## 2023-02-02 PROCEDURE — 1111F DSCHRG MED/CURRENT MED MERGE: CPT | Performed by: INTERNAL MEDICINE

## 2023-02-02 PROCEDURE — 99215 OFFICE O/P EST HI 40 MIN: CPT | Performed by: PSYCHIATRY & NEUROLOGY

## 2023-02-02 PROCEDURE — G8427 DOCREV CUR MEDS BY ELIG CLIN: HCPCS | Performed by: PSYCHIATRY & NEUROLOGY

## 2023-02-02 PROCEDURE — G8482 FLU IMMUNIZE ORDER/ADMIN: HCPCS | Performed by: INTERNAL MEDICINE

## 2023-02-02 PROCEDURE — G8417 CALC BMI ABV UP PARAM F/U: HCPCS | Performed by: INTERNAL MEDICINE

## 2023-02-02 RX ORDER — AMANTADINE HYDROCHLORIDE 100 MG/1
100 CAPSULE, GELATIN COATED ORAL 2 TIMES DAILY
Qty: 60 CAPSULE | Refills: 3 | Status: SHIPPED | OUTPATIENT
Start: 2023-02-02

## 2023-02-02 ASSESSMENT — ENCOUNTER SYMPTOMS
SHORTNESS OF BREATH: 1
EYES NEGATIVE: 1
CHEST TIGHTNESS: 0
NAUSEA: 0
GASTROINTESTINAL NEGATIVE: 1
STRIDOR: 0
WHEEZING: 0
BLOOD IN STOOL: 0
COUGH: 0

## 2023-02-02 NOTE — PROGRESS NOTES
Subjective:      Patient ID: Marisabel Gonzalez is a 65 y.o. female who presents today for:  Chief Complaint   Patient presents with    Follow-up     Pt states she is still getting tremors and feels like they are worse since she was last seen. Pt states her neuropathy in her feet is worse as well, feet are more cold than usual.           HPI 62-year right-handed female with a history of essential tremors. Patient has not been seen by me patient was seen by Senia Perkins. Patient was seen here for urgent falls with frequent falls as well. She appeared to be lightheaded. Patient has history of orthostatic hypotension and was on midodrine when seen. Patient was also tried on Cogentin. MRI was recommended though she has an eye implant and was not done. The tremor appeared to be minimal when last seen. Patient has underlying respiratory issues and does use inhalers as well. Tremor difficult to classify as this combination of Biden essential tremor with possibility of a subtle rest tremor as well.     Past Medical History:   Diagnosis Date    Asthma 2015    Bilateral renal cysts 2013    CAD (coronary artery disease)     Depression     since age 35, was with Dr Melia Sutton, now the [facility]    Diverticulosis of sigmoid colon 2012    Dr Jak Solorio    DJD of left shoulder     Environmental allergies     Fatty liver 2013    GERD (gastroesophageal reflux disease)     History of cardiac arrhythmia     \"due to stress, was placed on medication\"    Hydatidiform mole     age 26     Hyperlipidemia     Hypothyroidism 2011    IFG (impaired fasting glucose) 2013    Melanoma of eye [facility])     age 30, R eye prosthesis, Dr Mackenzie Fitting    Neuropathy     Obesity     Prediabetes     PTSD (post-traumatic stress disorder)     age 35, [facility]    S/P colonoscopy 04/19/2012    Dr. Jake Whitfield    S/P hysterectomy with oophorectomy 2012    Dr Gisela Porter    Tremor     Dr Hansa Ferraro    Vitamin D deficiency      Past Surgical History:   Procedure Laterality Date DILATION AND CURETTAGE OF UTERUS      ENDOMETRIAL ABLATION  06/2012    EYE REMOVAL      OD for melanoma, age 30    FOOT OSTEOTOMY Left 09/23/2016    B JACKMAN DPM      HYSTERECTOMY (CERVIX STATUS UNKNOWN)      BALDOMERO STEROTACTIC LOC BREAST BIOPSY RIGHT Right 07/19/2021    BALDOMERO STEROTACTIC LOC BREAST BIOPSY RIGHT 7/19/2021 [facility] [place]    LINDSAY AND BSO (CERVIX REMOVED)  2012    Dr Sofiya Ramirez N/A 12/3/2022    EGD DIAGNOSTIC ONLY performed by Emperatriz Singh MD at [facility] [address] History     Socioeconomic History    Marital status:      Spouse name: Not on file    Number of children: 3    Years of education: 12    Highest education level: High school graduate   Occupational History    Occupation: [employer]SI   Tobacco Use    Smoking status: Never    Smokeless tobacco: Never   Vaping Use    Vaping Use: Never used   Substance and Sexual Activity    Alcohol use: No    Drug use: No    Sexual activity: Not Currently     Birth control/protection: Surgical   Other Topics Concern    Not on file   Social History Narrative    Born in [place], one of 4 children, one sister disappeared at age 30, never found    Moved to [place] at age 40        Lives in an apartment in [place] [place], alone    , 3 children, all grown, in [place]     2 granddaughters     Hobbies exercising, birds    Attends [employer], has volunteered at [address] Strain: Low Risk     Difficulty of Paying Living Expenses: Not hard at all   Food Insecurity: No Food Insecurity    Worried About [address] in the Last Year: Never true    Anurag of Food in the Last Year: Never true   Transportation Needs: Not on file   Physical Activity: Not on file   Stress: Not on file   Social Connections: Not on file   Intimate Partner Violence: Not on file   Housing Stability: Not on file     Family History   Problem Relation Age of Onset    Heart Failure Mother dec 76    Diabetes Mother     Diabetes Father     Stroke Father         dec age 80    Cancer Father         Sarcoma    Breast Cancer Maternal Aunt     Breast Cancer Paternal Aunt     Stomach Cancer Other     Colon Cancer Neg Hx      Allergies   Allergen Reactions    Bee Venom        Current Outpatient Medications   Medication Sig Dispense Refill    amantadine (SYMMETREL) 100 MG capsule Take 1 capsule by mouth 2 times daily 60 capsule 3    pantoprazole (PROTONIX) 40 MG tablet Take 1 tablet by mouth daily 90 tablet 0    Multiple Vitamin (TAB-A-MANDI/BETA CAROTENE) TABS TAKE 1 TABLET BY MOUTH EVERY DAY (AM) 28 tablet 5    aspirin (ASPIRIN LOW DOSE) 81 MG EC tablet TAKE 1 TABLET BY MOUTH ONCE A DAY (AM) 30 tablet 5    albuterol sulfate HFA (VENTOLIN HFA) 108 (90 Base) MCG/ACT inhaler Inhale 2 puffs into the lungs every 6 hours as needed for Wheezing Please cover what is under insurance. 18 g 5    apixaban (ELIQUIS) 5 MG TABS tablet Take 1 tablet by mouth 2 times daily 60 tablet 5    ARIPiprazole (ABILIFY) 15 MG tablet Take 1 tablet by mouth daily 30 tablet 5    Cyanocobalamin (B-12) 1000 MCG TABS TAKE 1 TABLET BY MOUTH ONCE A DAY (NOON) 30 tablet 5    benztropine (COGENTIN) 1 MG tablet Take 1 tablet by mouth daily 30 tablet 0    gabapentin (NEURONTIN) 300 MG capsule Take 1 capsule by mouth at bedtime for 30 days.  30 capsule 3    levothyroxine (SYNTHROID) 50 MCG tablet Take 1 tablet by mouth Daily 30 tablet 5    Magnesium 400 MG CAPS Take 1 capsule by mouth nightly as needed (muscle spasms) 30 capsule 5    meclizine (ANTIVERT) 12.5 MG tablet Take 1 tablet by mouth nightly as needed for Dizziness 30 tablet 5    metFORMIN (GLUCOPHAGE) 500 MG tablet Indications: restart on 12/8/2022 TAKE 1 TABLET BY MOUTH TWICE A DAY WITH MEALS 60 tablet 5    cariprazine hcl (VRAYLAR) 1.5 MG capsule Take 1 capsule by mouth daily Take 1.5 mg by mouth daily 30 capsule 0    Blood Pressure Monitoring (B-D ASSURE BPM/AUTO ARM CUFF) MISC Use as directed 1 each 0    atorvastatin (LIPITOR) 40 MG tablet Take 1 tablet by mouth nightly 30 tablet 3    clopidogrel (PLAVIX) 75 MG tablet Take 1 tablet by mouth daily 30 tablet 3    furosemide (LASIX) 40 MG tablet Take 1 tablet by mouth daily 60 tablet 3    losartan (COZAAR) 25 MG tablet Take 1 tablet by mouth daily 30 tablet 3    metoprolol succinate (TOPROL XL) 50 MG extended release tablet Take 1 tablet by mouth daily 30 tablet 3    nitroGLYCERIN (NITROSTAT) 0.4 MG SL tablet Place 1 tablet under the tongue every 5 minutes as needed for Chest pain up to max of 3 total doses. If no relief after 1 dose, call 911. 25 tablet 3    potassium chloride (KLOR-CON M) 20 MEQ extended release tablet Take 1 tablet by mouth daily (with breakfast) 60 tablet 3    vitamin D (VITAMIN D HIGH POTENCY) 25 MCG (1000 UT) CAPS TAKE 1 CAPSULE BY MOUTH DAILY WITH SUPPER (PM) 30 capsule 5    VENTOLIN  (90 Base) MCG/ACT inhaler USE 2 PUFFS EVERY 4 HRS AS NEEDED FOR WHEEZING/SOB-SPACE OUT TO EVERY 6 HR AS SYMPTOMS IMPROVE 18 g 3    EPINEPHrine (EPIPEN 2-PRAVEENA) 0.3 MG/0.3ML SOAJ injection Use as directed for allergic reaction 2 each 2    budesonide-formoterol (SYMBICORT) 160-4.5 MCG/ACT AERO INHALE 2 PUFFS INTO THE LUNGS 2 TIMES DAILY (Patient not taking: Reported on 2/2/2023) 10.2 g 5    Misc. Devices (ROLLATOR ULTRA-LIGHT) MISC 1 each by Does not apply route once for 1 dose 1 each 0     No current facility-administered medications for this visit. Review of Systems   Constitutional:  Negative for fever. HENT:  Negative for ear pain, tinnitus and trouble swallowing. Eyes:  Negative for photophobia and visual disturbance. Respiratory:  Negative for choking and shortness of breath. Cardiovascular:  Negative for chest pain and palpitations. Gastrointestinal:  Negative for nausea and vomiting. Musculoskeletal:  Positive for gait problem. Negative for back pain, joint swelling, myalgias, neck pain and neck stiffness. Skin:  Negative for color change. Allergic/Immunologic: Negative for food allergies. Neurological:  Positive for tremors. Negative for dizziness, seizures, syncope, facial asymmetry, speech difficulty, weakness, light-headedness, numbness and headaches. Psychiatric/Behavioral:  Negative for behavioral problems, confusion, hallucinations and sleep disturbance. Objective:   /80 (Site: Right Upper Arm, Position: Sitting, Cuff Size: Medium Adult)   Pulse 99   Wt 260 lb (117.9 kg)   BMI 41.97 kg/m²     Physical Exam  Vitals reviewed. Eyes:      Pupils: Pupils are equal, round, and reactive to light. Cardiovascular:      Rate and Rhythm: Normal rate and regular rhythm. Heart sounds: No murmur heard. Pulmonary:      Effort: Pulmonary effort is normal.      Breath sounds: Normal breath sounds. Abdominal:      General: Bowel sounds are normal.   Musculoskeletal:         General: Normal range of motion. Cervical back: Normal range of motion. Skin:     General: Skin is warm. Neurological:      Mental Status: She is alert and oriented to person, place, and time. Cranial Nerves: No cranial nerve deficit. Sensory: No sensory deficit. Motor: No abnormal muscle tone. Coordination: Coordination normal.      Deep Tendon Reflexes: Reflexes are normal and symmetric. Babinski sign absent on the right side. Babinski sign absent on the left side. Comments: Patient has a to and fro tremor identified with action and this appears to be mostly an action tremor. There appears to be a very subtle suggestion of rest tremor as well on the right thumb. Patient is not myelopathic there is no bradykinesia   Psychiatric:         Mood and Affect: Mood normal.       XR CHEST PORTABLE    Result Date: 1/26/2023  EXAMINATION: ONE XRAY VIEW OF THE CHEST 1/26/2023 1:10 pm COMPARISON: None.  HISTORY: ORDERING SYSTEM PROVIDED HISTORY: chest pain TECHNOLOGIST PROVIDED HISTORY: Reason for exam:->chest pain What reading provider will be dictating this exam?->CRC FINDINGS: The lungs are without acute focal process. There is no effusion or pneumothorax. The cardiomediastinal silhouette is without acute process. The osseous structures are without acute process. No acute process.        Lab Results   Component Value Date/Time    WBC 7.7 01/27/2023 05:08 AM    RBC 4.36 01/27/2023 05:08 AM    RBC 4.57 05/14/2012 02:03 PM    HGB 13.6 01/27/2023 05:08 AM    HCT 39.5 01/27/2023 05:08 AM    MCV 90.5 01/27/2023 05:08 AM    MCH 31.2 01/27/2023 05:08 AM    MCHC 34.5 01/27/2023 05:08 AM    RDW 13.9 01/27/2023 05:08 AM     01/27/2023 05:08 AM    MPV 9.0 08/16/2015 07:30 PM     Lab Results   Component Value Date/Time     01/26/2023 12:45 PM    K 3.2 01/26/2023 12:45 PM    K 3.8 12/07/2022 05:18 AM     01/26/2023 12:45 PM    CO2 16 01/26/2023 12:45 PM    BUN 7 01/26/2023 12:45 PM    CREATININE 0.98 01/26/2023 12:45 PM    GFRAA >60.0 10/25/2021 12:00 PM    LABGLOM >60.0 01/26/2023 12:45 PM    GLUCOSE 180 01/26/2023 12:45 PM    GLUCOSE 92 05/14/2012 02:03 PM    PROT 6.4 01/26/2023 12:45 PM    LABALBU 4.0 01/26/2023 12:45 PM    LABALBU 3.8 03/22/2012 11:36 AM    CALCIUM 9.5 01/26/2023 12:45 PM    BILITOT 0.7 01/26/2023 12:45 PM    ALKPHOS 93 01/26/2023 12:45 PM    AST 55 01/26/2023 12:45 PM    ALT 70 01/26/2023 12:45 PM     Lab Results   Component Value Date/Time    PROTIME 14.9 01/26/2023 12:45 PM    INR 1.2 01/26/2023 12:45 PM     Lab Results   Component Value Date/Time    TSH 1.740 12/21/2022 09:01 AM    OUDHQYNB85 1747 05/19/2020 11:12 AM    FOLATE 18.1 02/07/2020 10:33 AM     Lab Results   Component Value Date/Time    TRIG 184 01/27/2023 05:08 AM    HDL 41 01/27/2023 05:08 AM    LDLCALC 72 01/27/2023 05:08 AM     Lab Results   Component Value Date/Time    LABAMPH Neg 06/07/2017 03:45 PM    BARBSCNU Neg 06/07/2017 03:45 PM    LABBENZ Neg 06/07/2017 03:45 PM    OPIATESCREENURINE Neg 06/07/2017 03:45 PM    PHENCYCLIDINESCREENURINE Neg 06/07/2017 03:45 PM    ETOH <10 09/15/2021 12:45 PM     No results found for: LITHIUM, DILFRTOT, VALPROATE    Assessment:       Diagnosis Orders   1. Essential tremor        2. Fall, subsequent encounter        3. Ataxia        4. Orthostatic tremor        Mixed tremor with insertion of a mostly an essential tremor with action tremor with some orthostatic tremor. The patient has orthostasis as well and patient has not been seen by me a complete chart review was done prior to my evaluation and therefore an extended time of 40 minutes. Patient at this time does not appear to be on midodrine though she was on midodrine for a period of time. She is on multiple psychiatric medications as well which may be adding to her subtle rest tremor as well. Recommended that referral start her on amantadine to see if this would be something that would help. We have are somewhat cautious using a trial of dopamine agonist given her blood pressures. Patient does not appear to be myelopathic. Complete chart review done prior to patient's arrival and therefore total time of 40 minutes since patient was not seen by me     Rafael Santana MD, Scooter Romero, American Board of Psychiatry & Neurology  Board Certified in Vascular Neurology  Board Certified in Neuromuscular Medicine  Certified in [facility]:      No orders of the defined types were placed in this encounter. Orders Placed This Encounter   Medications    amantadine (SYMMETREL) 100 MG capsule     Sig: Take 1 capsule by mouth 2 times daily     Dispense:  60 capsule     Refill:  3       Return in about 6 months (around 8/2/2023).       Marleen Reyna MD

## 2023-02-02 NOTE — PROGRESS NOTES
Subsequent Progress Note  Patient: Yong Alvarado  YOB: 1960  MRN: [id number]    Chief Complaint: HTN AFL   Chief Complaint   Patient presents with    Follow-Up from [facility] [place]: 1/26-1/27 for NSTEMI       CV Data:  6/20 Spect negative  2/20 Echo EF 60  12/22 Echo EF 40-45  12/3/22 LAD ADELINE abrupt closure of D1 - ballooned. 1/26/23 for NSTEMI Cath showed under deployed LAD - re-expanded. D1 stable      Subjective/HPI: DR. Moran pt. More MORALES and CP. Substernal. No falls no bleed. 2/2/23 recent NSTEMI doing well no cp no sob no falls no lbeed wlaks. Takes emds.       EKG:    Lives alone  Nonsmoker  No etoh  Retired- marketing  +FH    Past Medical History:   Diagnosis Date    Asthma 2015    Bilateral renal cysts 2013    CAD (coronary artery disease)     Depression     since age 35, was with Dr Twin Khan, now the [facility]    Diverticulosis of sigmoid colon 2012    Dr Florence Anderson    DJD of left shoulder     Environmental allergies     Fatty liver 2013    GERD (gastroesophageal reflux disease)     History of cardiac arrhythmia     \"due to stress, was placed on medication\"    Hydatidiform mole     age 26     Hyperlipidemia     Hypothyroidism 2011    IFG (impaired fasting glucose) 2013    Melanoma of eye [facility])     age 30, R eye prosthesis, Dr Gris Morales    Neuropathy     Obesity     Prediabetes     PTSD (post-traumatic stress disorder)     age 35, [facility]    S/P colonoscopy 04/19/2012    Dr. Amanda Pacheco    S/P hysterectomy with oophorectomy 2012    Dr Teri Flynn    Tremor     Dr Mason Zhu    Vitamin D deficiency        Past Surgical History:   Procedure Laterality Date    DILATION AND CURETTAGE OF UTERUS      ENDOMETRIAL ABLATION  06/2012    EYE REMOVAL      OD for melanoma, age 30    FOOT OSTEOTOMY Left 09/23/2016    B AUGUSTINA DPM      HYSTERECTOMY (CERVIX STATUS UNKNOWN)      BALDOMERO STEROTACTIC LOC BREAST BIOPSY RIGHT Right 07/19/2021    BALDOMERO STEROTACTIC LOC BREAST BIOPSY RIGHT 7/19/2021 [address] [facility]SO (CERVIX REMOVED)  2012    Dr Chelle Sosa N/A 12/3/2022    EGD DIAGNOSTIC ONLY performed by Alison Hayden MD at [facility]       Family History   Problem Relation Age of Onset    Heart Failure Mother         dec 76    Diabetes Mother     Diabetes Father     Stroke Father         dec age 80    Cancer Father         Sarcoma    Breast Cancer Maternal Aunt     Breast Cancer Paternal Aunt     Stomach Cancer Other     Colon Cancer Neg Hx        Social History     Socioeconomic History    Marital status:      Spouse name: None    Number of children: 3    Years of education: 12    Highest education level: High school graduate   Occupational History    Occupation: [employer]   Tobacco Use    Smoking status: Never    Smokeless tobacco: Never   Vaping Use    Vaping Use: Never used   Substance and Sexual Activity    Alcohol use: No    Drug use: No    Sexual activity: Not Currently     Birth control/protection: Surgical   Social History Narrative    Born in [address] [place] [address], one of 4 children, one sister disappeared at age 29, never found    Moved to [place] at age 40        Lives in an apartment in [place], alone    , 3 children, all grown, in [place]     2 granddaughters     Hobbies exercising, birds    Attends [employer], has volunteered at [employer] Strain: Low Risk     Difficulty of Paying Living Expenses: Not hard at all   Food Insecurity: No Food Insecurity    Worried About [facility] in the Last Year: Never true    [facility] [address] in the Last Year: Never true       Allergies   Allergen Reactions    Bee Venom        Current Outpatient Medications   Medication Sig Dispense Refill    pantoprazole (PROTONIX) 40 MG tablet Take 1 tablet by mouth daily 90 tablet 0    Multiple Vitamin (TAB-A-MANDI/BETA CAROTENE) TABS TAKE 1 TABLET BY MOUTH EVERY DAY (AM) 28 tablet 5    aspirin (ASPIRIN LOW DOSE) 81 MG EC tablet TAKE 1 TABLET BY MOUTH ONCE A DAY (AM) 30 tablet 5    albuterol sulfate HFA (VENTOLIN HFA) 108 (90 Base) MCG/ACT inhaler Inhale 2 puffs into the lungs every 6 hours as needed for Wheezing Please cover what is under insurance. 18 g 5    apixaban (ELIQUIS) 5 MG TABS tablet Take 1 tablet by mouth 2 times daily 60 tablet 5    ARIPiprazole (ABILIFY) 15 MG tablet Take 1 tablet by mouth daily 30 tablet 5    Cyanocobalamin (B-12) 1000 MCG TABS TAKE 1 TABLET BY MOUTH ONCE A DAY (NOON) 30 tablet 5    benztropine (COGENTIN) 1 MG tablet Take 1 tablet by mouth daily 30 tablet 0    budesonide-formoterol (SYMBICORT) 160-4.5 MCG/ACT AERO INHALE 2 PUFFS INTO THE LUNGS 2 TIMES DAILY 10.2 g 5    levothyroxine (SYNTHROID) 50 MCG tablet Take 1 tablet by mouth Daily 30 tablet 5    Magnesium 400 MG CAPS Take 1 capsule by mouth nightly as needed (muscle spasms) 30 capsule 5    meclizine (ANTIVERT) 12.5 MG tablet Take 1 tablet by mouth nightly as needed for Dizziness 30 tablet 5    metFORMIN (GLUCOPHAGE) 500 MG tablet Indications: restart on 12/8/2022 TAKE 1 TABLET BY MOUTH TWICE A DAY WITH MEALS 60 tablet 5    cariprazine hcl (VRAYLAR) 1.5 MG capsule Take 1 capsule by mouth daily Take 1.5 mg by mouth daily 30 capsule 0    Blood Pressure Monitoring (B-D ASSURE BPM/AUTO ARM CUFF) MISC Use as directed 1 each 0    atorvastatin (LIPITOR) 40 MG tablet Take 1 tablet by mouth nightly 30 tablet 3    clopidogrel (PLAVIX) 75 MG tablet Take 1 tablet by mouth daily 30 tablet 3    furosemide (LASIX) 40 MG tablet Take 1 tablet by mouth daily 60 tablet 3    losartan (COZAAR) 25 MG tablet Take 1 tablet by mouth daily 30 tablet 3    metoprolol succinate (TOPROL XL) 50 MG extended release tablet Take 1 tablet by mouth daily 30 tablet 3    nitroGLYCERIN (NITROSTAT) 0.4 MG SL tablet Place 1 tablet under the tongue every 5 minutes as needed for Chest pain up to max of 3 total doses.  If no relief after 1 dose, call 911. 25 tablet 3    potassium chloride (KLOR-CON M) 20 MEQ extended release tablet Take 1 tablet by mouth daily (with breakfast) 60 tablet 3    vitamin D (VITAMIN D HIGH POTENCY) 25 MCG (1000 UT) CAPS TAKE 1 CAPSULE BY MOUTH DAILY WITH SUPPER (PM) 30 capsule 5    VENTOLIN  (90 Base) MCG/ACT inhaler USE 2 PUFFS EVERY 4 HRS AS NEEDED FOR WHEEZING/SOB-SPACE OUT TO EVERY 6 HR AS SYMPTOMS IMPROVE 18 g 3    EPINEPHrine (EPIPEN 2-PRAVEENA) 0.3 MG/0.3ML SOAJ injection Use as directed for allergic reaction 2 each 2    gabapentin (NEURONTIN) 300 MG capsule Take 1 capsule by mouth at bedtime for 30 days. 30 capsule 3    Misc. Devices (ROLLATOR ULTRA-LIGHT) MISC 1 each by Does not apply route once for 1 dose 1 each 0     No current facility-administered medications for this visit. Review of Systems:   Review of Systems   Constitutional: Negative. Negative for diaphoresis and fatigue. HENT: Negative. Eyes: Negative. Respiratory:  Positive for shortness of breath. Negative for cough, chest tightness, wheezing and stridor. Cardiovascular:  Positive for chest pain. Negative for palpitations and leg swelling. Gastrointestinal: Negative. Negative for blood in stool and nausea. Genitourinary: Negative. Musculoskeletal: Negative. Skin: Negative. Neurological: Negative. Negative for dizziness, syncope, weakness and light-headedness. Hematological: Negative. Psychiatric/Behavioral: Negative. Physical Examination:    /72 (Site: Left Lower Arm, Position: Sitting, Cuff Size: Medium Adult)   Pulse 94   Wt 258 lb 12.8 oz (117.4 kg)   SpO2 94%   BMI 41.77 kg/m²    Physical Exam   Constitutional: She appears healthy. No distress. HENT:   Normal cephalic and Atraumatic   Eyes: Pupils are equal, round, and reactive to light. Neck: Thyroid normal. No JVD present. No neck adenopathy. No thyromegaly present. Cardiovascular: Normal rate, regular rhythm, normal heart sounds, intact distal pulses and normal pulses.    Pulmonary/Chest: Effort normal and breath sounds normal. She has no wheezes. She has no rales. She exhibits no tenderness. Abdominal: Soft. Bowel sounds are normal. There is no abdominal tenderness. Musculoskeletal:         General: No tenderness or edema. Normal range of motion. Cervical back: Normal range of motion and neck supple. Neurological: She is alert and oriented to person, place, and time. Skin: Skin is warm. No cyanosis. Nails show no clubbing.      LABS:  CBC:   Lab Results   Component Value Date/Time    WBC 7.7 01/27/2023 05:08 AM    RBC 4.36 01/27/2023 05:08 AM    RBC 4.57 05/14/2012 02:03 PM    HGB 13.6 01/27/2023 05:08 AM    HCT 39.5 01/27/2023 05:08 AM    MCV 90.5 01/27/2023 05:08 AM    MCH 31.2 01/27/2023 05:08 AM    MCHC 34.5 01/27/2023 05:08 AM    RDW 13.9 01/27/2023 05:08 AM     01/27/2023 05:08 AM    MPV 9.0 08/16/2015 07:30 PM     Lipids:  Lab Results   Component Value Date    CHOL 150 01/27/2023    CHOL 315 (H) 09/16/2021    CHOL 194 11/05/2019     Lab Results   Component Value Date    TRIG 184 (H) 01/27/2023    TRIG 494 (H) 09/16/2021    TRIG 127 11/05/2019     Lab Results   Component Value Date    HDL 41 01/27/2023    HDL 38 (L) 12/21/2022    HDL 36 (L) 09/16/2021     Lab Results   Component Value Date    LDLCALC 72 01/27/2023    LDLCALC 69 12/21/2022    LDLCALC see below 09/16/2021     No results found for: LABVLDL, VLDL  Lab Results   Component Value Date    CHOLHDLRATIO 6.3 03/22/2012     CMP:    Lab Results   Component Value Date/Time     01/26/2023 12:45 PM    K 3.2 01/26/2023 12:45 PM    K 3.8 12/07/2022 05:18 AM     01/26/2023 12:45 PM    CO2 16 01/26/2023 12:45 PM    BUN 7 01/26/2023 12:45 PM    CREATININE 0.98 01/26/2023 12:45 PM    GFRAA >60.0 10/25/2021 12:00 PM    LABGLOM >60.0 01/26/2023 12:45 PM    GLUCOSE 180 01/26/2023 12:45 PM    GLUCOSE 92 05/14/2012 02:03 PM    PROT 6.4 01/26/2023 12:45 PM    LABALBU 4.0 01/26/2023 12:45 PM    LABALBU 3.8 03/22/2012 11:36 AM    CALCIUM 9.5 01/26/2023 12:45 PM    BILITOT 0.7 01/26/2023 12:45 PM    ALKPHOS 93 01/26/2023 12:45 PM    AST 55 01/26/2023 12:45 PM    ALT 70 01/26/2023 12:45 PM     BMP:    Lab Results   Component Value Date/Time     01/26/2023 12:45 PM    K 3.2 01/26/2023 12:45 PM    K 3.8 12/07/2022 05:18 AM     01/26/2023 12:45 PM    CO2 16 01/26/2023 12:45 PM    BUN 7 01/26/2023 12:45 PM    LABALBU 4.0 01/26/2023 12:45 PM    LABALBU 3.8 03/22/2012 11:36 AM    CREATININE 0.98 01/26/2023 12:45 PM    CALCIUM 9.5 01/26/2023 12:45 PM    GFRAA >60.0 10/25/2021 12:00 PM    LABGLOM >60.0 01/26/2023 12:45 PM    GLUCOSE 180 01/26/2023 12:45 PM    GLUCOSE 92 05/14/2012 02:03 PM     Magnesium:    Lab Results   Component Value Date/Time    MG 1.4 01/27/2023 05:08 AM     TSH:  Lab Results   Component Value Date    TSH 1.740 12/21/2022       Patient Active Problem List   Diagnosis    Posttraumatic stress disorder    Depression    Reactive airway disease    Environmental allergies    Hyperlipidemia    S/P colonoscopy    Fatty liver disease, nonalcoholic    Vitamin D deficiency    IFG (impaired fasting glucose)    Obesity (BMI 30-39. 9)    DJD of left shoulder    Asthma    Osteoarthritis of left knee    Cheilosis    Atrial flutter (HCC)    Anxiety    Diarrhea    Right hip pain    Acute pain of both shoulders    Left arm pain    Left elbow pain    Lung infiltrate    Essential tremor    Syncope    Orthostatic hypotension    History of melanoma    Pulmonary embolism on right (HCC)    Acute pulmonary embolism with acute cor pulmonale (HCC)    Choroidal nevus of left eye    Combined forms of age-related cataract of left eye    History of COVID-19    Foreign body in esophagus    Chest pain    NSTEMI (non-ST elevated myocardial infarction) ([facility] [address]    COVID    Major depressive disorder, single episode, unspecified       There are no discontinued medications.       Modified Medications    No medications on file       No orders of the defined types were placed in this encounter. Assessment/Plan:    1. HTN  Stable. Continue meds. Low salt diet    2. Reactive airway disease without complication, unspecified asthma severity, unspecified whether persistent       3. Atrial flutter, unspecified type ([address]  DOAC and rate control     4. Hyperlipidemia, unspecified hyperlipidemia type   Statin to be continued. Low fat deit. F/u labs     5. Syncope, unspecified syncope type   none in years     6. CAD- LAD ADELINE D1 PTCA - stable enroll in CR               Counseling:  Heart Healthy Lifestyle, Improve BMI, Low Salt Diet, Take Precautions to Prevent Falls and Walk Daily    No follow-ups on file.       Electronically signed by Kai Garcia MD on 2/2/2023 at 12:20 PM

## 2023-02-06 ENCOUNTER — TELEPHONE (OUTPATIENT)
Dept: FAMILY MEDICINE CLINIC | Age: 63
End: 2023-02-06

## 2023-02-06 PROBLEM — R27.0 ATAXIA: Status: ACTIVE | Noted: 2023-02-06

## 2023-02-06 PROBLEM — G25.2 ORTHOSTATIC TREMOR: Status: ACTIVE | Noted: 2023-02-06

## 2023-02-06 ASSESSMENT — ENCOUNTER SYMPTOMS
BACK PAIN: 0
SHORTNESS OF BREATH: 0
PHOTOPHOBIA: 0
COLOR CHANGE: 0
TROUBLE SWALLOWING: 0
NAUSEA: 0
VOMITING: 0
CHOKING: 0

## 2023-02-06 NOTE — TELEPHONE ENCOUNTER
Callie from [employer] states the ICD-10 codes for obesity and asthma can not be used on the script for shower  bars. She states the code for tremors can be used, but would need another code with it that states why the patient has tremors. Callie can be reached at [phone] ext. [phone]    Please advise. Thank you.

## 2023-02-07 NOTE — TELEPHONE ENCOUNTER
Callie from [employer] states the ICD-10 codes for obesity and asthma can not be used on the script for shower  bars. She states the code for tremors can be used, but would need another code with it that states why the patient has tremors. Callie can be reached at [phone] ext. [phone]     Please advise. Thank you.

## 2023-02-16 ENCOUNTER — OFFICE VISIT (OUTPATIENT)
Dept: FAMILY MEDICINE CLINIC | Age: 63
End: 2023-02-16
Payer: COMMERCIAL

## 2023-02-16 VITALS
WEIGHT: 250 LBS | HEART RATE: 98 BPM | DIASTOLIC BLOOD PRESSURE: 88 MMHG | BODY MASS INDEX: 40.18 KG/M2 | HEIGHT: 66 IN | TEMPERATURE: 97.1 F | OXYGEN SATURATION: 96 % | SYSTOLIC BLOOD PRESSURE: 118 MMHG

## 2023-02-16 DIAGNOSIS — Z12.11 COLON CANCER SCREENING: ICD-10-CM

## 2023-02-16 DIAGNOSIS — K21.9 GASTROESOPHAGEAL REFLUX DISEASE WITHOUT ESOPHAGITIS: ICD-10-CM

## 2023-02-16 DIAGNOSIS — J45.30 MILD PERSISTENT ASTHMA WITHOUT COMPLICATION: ICD-10-CM

## 2023-02-16 DIAGNOSIS — E11.42 TYPE 2 DIABETES MELLITUS WITH DIABETIC POLYNEUROPATHY, WITHOUT LONG-TERM CURRENT USE OF INSULIN (HCC): ICD-10-CM

## 2023-02-16 DIAGNOSIS — I21.4 NSTEMI (NON-ST ELEVATED MYOCARDIAL INFARCTION) (HCC): Primary | ICD-10-CM

## 2023-02-16 DIAGNOSIS — Z86.711 HISTORY OF PULMONARY EMBOLISM: ICD-10-CM

## 2023-02-16 DIAGNOSIS — E78.5 HYPERLIPIDEMIA, UNSPECIFIED HYPERLIPIDEMIA TYPE: ICD-10-CM

## 2023-02-16 DIAGNOSIS — E03.9 ACQUIRED HYPOTHYROIDISM: ICD-10-CM

## 2023-02-16 DIAGNOSIS — I48.92 ATRIAL FLUTTER, UNSPECIFIED TYPE (HCC): ICD-10-CM

## 2023-02-16 DIAGNOSIS — F32.A DEPRESSION, UNSPECIFIED DEPRESSION TYPE: ICD-10-CM

## 2023-02-16 PROBLEM — I26.09 ACUTE PULMONARY EMBOLISM WITH ACUTE COR PULMONALE (HCC): Status: RESOLVED | Noted: 2021-09-15 | Resolved: 2023-02-16

## 2023-02-16 PROBLEM — I26.99 PULMONARY EMBOLISM ON RIGHT (HCC): Status: RESOLVED | Noted: 2021-09-15 | Resolved: 2023-02-16

## 2023-02-16 PROCEDURE — G8482 FLU IMMUNIZE ORDER/ADMIN: HCPCS | Performed by: FAMILY MEDICINE

## 2023-02-16 PROCEDURE — G8417 CALC BMI ABV UP PARAM F/U: HCPCS | Performed by: FAMILY MEDICINE

## 2023-02-16 PROCEDURE — 3017F COLORECTAL CA SCREEN DOC REV: CPT | Performed by: FAMILY MEDICINE

## 2023-02-16 PROCEDURE — 1111F DSCHRG MED/CURRENT MED MERGE: CPT | Performed by: FAMILY MEDICINE

## 2023-02-16 PROCEDURE — 99214 OFFICE O/P EST MOD 30 MIN: CPT | Performed by: FAMILY MEDICINE

## 2023-02-16 PROCEDURE — G8427 DOCREV CUR MEDS BY ELIG CLIN: HCPCS | Performed by: FAMILY MEDICINE

## 2023-02-16 PROCEDURE — 2022F DILAT RTA XM EVC RTNOPTHY: CPT | Performed by: FAMILY MEDICINE

## 2023-02-16 PROCEDURE — 3046F HEMOGLOBIN A1C LEVEL >9.0%: CPT | Performed by: FAMILY MEDICINE

## 2023-02-16 PROCEDURE — 1036F TOBACCO NON-USER: CPT | Performed by: FAMILY MEDICINE

## 2023-02-16 SDOH — ECONOMIC STABILITY: INCOME INSECURITY: HOW HARD IS IT FOR YOU TO PAY FOR THE VERY BASICS LIKE FOOD, HOUSING, MEDICAL CARE, AND HEATING?: NOT HARD AT ALL

## 2023-02-16 SDOH — ECONOMIC STABILITY: FOOD INSECURITY: WITHIN THE PAST 12 MONTHS, THE FOOD YOU BOUGHT JUST DIDN'T LAST AND YOU DIDN'T HAVE MONEY TO GET MORE.: NEVER TRUE

## 2023-02-16 SDOH — ECONOMIC STABILITY: FOOD INSECURITY: WITHIN THE PAST 12 MONTHS, YOU WORRIED THAT YOUR FOOD WOULD RUN OUT BEFORE YOU GOT MONEY TO BUY MORE.: NEVER TRUE

## 2023-02-16 SDOH — ECONOMIC STABILITY: HOUSING INSECURITY
IN THE LAST 12 MONTHS, WAS THERE A TIME WHEN YOU DID NOT HAVE A STEADY PLACE TO SLEEP OR SLEPT IN A SHELTER (INCLUDING NOW)?: NO

## 2023-02-16 NOTE — PROGRESS NOTES
Chief Complaint   Patient presents with    3 Month Follow-Up     No issues/concerns    Health Maintenance     Yes to colonoscopy. HPI: Celine Suero 65 y.o. female presenting for     History of Pulmonary embolism / afib  Currently seeing the cardiologist and on blood thinners   Stable   No issues or concerns  admits to dyspnea on exertion when pushing up her grocery cart      F/u   Stable no issues   Has not refilled her eliquis or seen the cardiologist recently (social factors including being homeless prevented her from seeing the cardiologist and taking her medications). Patient is on metoprolol     Vertigo   Takes meclizine   Stable     GERD   Patient is taking pepcid BID   Stable         Hpyothyroidism   Stable. Takes medication. Lab Results   Component Value Date    TSH 1.740 12/21/2022       Mood disorder   Sees psych   Stable at this time. Patient denies any SI or HI     Neuropathy   In the feet bilaterally   Was on gabapentin in the past which did help   Also compalins of fine tremors. Type 2 diabetes   Hemoglobin A1C   Date Value Ref Range Status   12/21/2022 7.4 (H) 4.8 - 5.9 % Final     Taking metformin   Has neuropathy- takes gabapentin. Current Outpatient Medications   Medication Sig Dispense Refill    amantadine (SYMMETREL) 100 MG capsule Take 1 capsule by mouth 2 times daily 60 capsule 3    pantoprazole (PROTONIX) 40 MG tablet Take 1 tablet by mouth daily 90 tablet 0    Multiple Vitamin (TAB-A-MANDI/BETA CAROTENE) TABS TAKE 1 TABLET BY MOUTH EVERY DAY (AM) 28 tablet 5    aspirin (ASPIRIN LOW DOSE) 81 MG EC tablet TAKE 1 TABLET BY MOUTH ONCE A DAY (AM) 30 tablet 5    albuterol sulfate HFA (VENTOLIN HFA) 108 (90 Base) MCG/ACT inhaler Inhale 2 puffs into the lungs every 6 hours as needed for Wheezing Please cover what is under insurance.  18 g 5    apixaban (ELIQUIS) 5 MG TABS tablet Take 1 tablet by mouth 2 times daily 60 tablet 5    ARIPiprazole (ABILIFY) 15 MG tablet Take 1 tablet by mouth daily 30 tablet 5    Cyanocobalamin (B-12) 1000 MCG TABS TAKE 1 TABLET BY MOUTH ONCE A DAY (NOON) 30 tablet 5    benztropine (COGENTIN) 1 MG tablet Take 1 tablet by mouth daily 30 tablet 0    budesonide-formoterol (SYMBICORT) 160-4.5 MCG/ACT AERO INHALE 2 PUFFS INTO THE LUNGS 2 TIMES DAILY 10.2 g 5    levothyroxine (SYNTHROID) 50 MCG tablet Take 1 tablet by mouth Daily 30 tablet 5    Magnesium 400 MG CAPS Take 1 capsule by mouth nightly as needed (muscle spasms) 30 capsule 5    meclizine (ANTIVERT) 12.5 MG tablet Take 1 tablet by mouth nightly as needed for Dizziness 30 tablet 5    metFORMIN (GLUCOPHAGE) 500 MG tablet Indications: restart on 12/8/2022 TAKE 1 TABLET BY MOUTH TWICE A DAY WITH MEALS 60 tablet 5    cariprazine hcl (VRAYLAR) 1.5 MG capsule Take 1 capsule by mouth daily Take 1.5 mg by mouth daily 30 capsule 0    Blood Pressure Monitoring (B-D ASSURE BPM/AUTO ARM CUFF) MISC Use as directed 1 each 0    atorvastatin (LIPITOR) 40 MG tablet Take 1 tablet by mouth nightly 30 tablet 3    clopidogrel (PLAVIX) 75 MG tablet Take 1 tablet by mouth daily 30 tablet 3    furosemide (LASIX) 40 MG tablet Take 1 tablet by mouth daily 60 tablet 3    losartan (COZAAR) 25 MG tablet Take 1 tablet by mouth daily 30 tablet 3    metoprolol succinate (TOPROL XL) 50 MG extended release tablet Take 1 tablet by mouth daily 30 tablet 3    nitroGLYCERIN (NITROSTAT) 0.4 MG SL tablet Place 1 tablet under the tongue every 5 minutes as needed for Chest pain up to max of 3 total doses.  If no relief after 1 dose, call 911. 25 tablet 3    potassium chloride (KLOR-CON M) 20 MEQ extended release tablet Take 1 tablet by mouth daily (with breakfast) 60 tablet 3    vitamin D (VITAMIN D HIGH POTENCY) 25 MCG (1000 UT) CAPS TAKE 1 CAPSULE BY MOUTH DAILY WITH SUPPER (PM) 30 capsule 5    VENTOLIN  (90 Base) MCG/ACT inhaler USE 2 PUFFS EVERY 4 HRS AS NEEDED FOR WHEEZING/SOB-SPACE OUT TO EVERY 6 HR AS SYMPTOMS IMPROVE 18 g 3 EPINEPHrine (EPIPEN 2-PRAVEENA) 0.3 MG/0.3ML SOAJ injection Use as directed for allergic reaction 2 each 2    gabapentin (NEURONTIN) 300 MG capsule Take 1 capsule by mouth at bedtime for 30 days. 30 capsule 3    Misc. Devices (ROLLATOR ULTRA-LIGHT) MISC 1 each by Does not apply route once for 1 dose 1 each 0     No current facility-administered medications for this visit. ROS  CONSTITUTIONAL: The patient denies fevers, chills, sweats and body ache. HEENT: Denies headache, blurry vision, eye pain, tinnitus, vertigo, admits to sore throat, denies neck or thyroid masses. Admits to cracked lips. RESPIRATORY: admits to shortness of breath on exertion (specifically when pushing up the cart)  CARDIAC: Denies chest pain, pressure, palpitations, Denies lower extremity edema. GASTROINTESTINAL: Denies abdominal pain, constipation, diarrhea, bleeding in the stools,   GENITOURINARY: Denies dysuria, hematuria, nocturia or frequency, urinary incontinence. NEUROLOGIC: Denies headaches, admits to dizziness, denies syncope, weakness  MUSCULOSKELETAL: denies changes in range of motion, joint pain, stiffness. ENDOCRINOLOGY: Denies heat or cold intolerance. HEMATOLOGY: Denies easy bleeding or blood transfusion,anemia  DERMATOLOGY: negative. PSYCHIATRY: Denies depression, agitation or anxiety.     Past Medical History:   Diagnosis Date    Asthma 2015    Bilateral renal cysts 2013    CAD (coronary artery disease)     Depression     since age 35, was with Dr Laisha Stone, now the [facility]    Diverticulosis of sigmoid colon 2012    Dr Cristhian Junior    DJD of left shoulder     Environmental allergies     Fatty liver 2013    GERD (gastroesophageal reflux disease)     History of cardiac arrhythmia     \"due to stress, was placed on medication\"    Hydatidiform mole     age 26     Hyperlipidemia     Hypothyroidism 2011    IFG (impaired fasting glucose) 2013    Melanoma of eye ([facility] [address]     age 30, R eye prosthesis, Dr Sean Swan    Neuropathy Obesity     Prediabetes     PTSD (post-traumatic stress disorder)     age 35, [facility]    S/P colonoscopy 04/19/2012    Dr. Rosa Major    S/P hysterectomy with oophorectomy 2012    Dr Obi Henry    Tremor     Dr Arnie Garcia    Vitamin D deficiency         Past Surgical History:   Procedure Laterality Date    DILATION AND CURETTAGE OF UTERUS      ENDOMETRIAL ABLATION  06/2012    EYE REMOVAL      OD for melanoma, age 30    FOOT OSTEOTOMY Left 09/23/2016    B JACKMAN DPM      HYSTERECTOMY (CERVIX STATUS UNKNOWN)      BALDOMERO STEROTACTIC LOC BREAST BIOPSY RIGHT Right 07/19/2021    BALDOMERO STEROTACTIC LOC BREAST BIOPSY RIGHT 7/19/2021 [facility]    LINDSAY AND BSO (CERVIX REMOVED)  2012    Dr Dimple Bee N/A 12/3/2022    EGD DIAGNOSTIC ONLY performed by Sameer Dyson MD at [facility]        Family History   Problem Relation Age of Onset    Heart Failure Mother         dec 76    Diabetes Mother     Diabetes Father     Stroke Father         dec age 80    Cancer Father         Sarcoma    Breast Cancer Maternal Aunt     Breast Cancer Paternal Aunt     Stomach Cancer Other     Colon Cancer Neg Hx         Social History     Socioeconomic History    Marital status:      Spouse name: Not on file    Number of children: 3    Years of education: 12    Highest education level: High school graduate   Occupational History    Occupation: [employer]SI   Tobacco Use    Smoking status: Never    Smokeless tobacco: Never   Vaping Use    Vaping Use: Never used   Substance and Sexual Activity    Alcohol use: No    Drug use: No    Sexual activity: Not Currently     Birth control/protection: Surgical   Other Topics Concern    Not on file   Social History Narrative    Born in [place], one of 4 children, one sister disappeared at age 29, never found    Moved to [place] at age 40        Lives in an apartment in [facility], alone    , 3 children, all grown, in [place]     2 granddaughters     Hobbies exercising, birds Attends [address], has volunteered at [employer] Strain: Low Risk     Difficulty of Paying Living Expenses: Not hard at all   Food Insecurity: No Food Insecurity    Worried About [address] in the Last Year: Never true    [address] in the Last Year: Never true   Transportation Needs: Unknown    Lack of Transportation (Medical): Not on file    Lack of Transportation (Non-Medical): No   Physical Activity: Not on file   Stress: Not on file   Social Connections: Not on file   Intimate Partner Violence: Not on file   Housing Stability: Unknown    Unable to Pay for Housing in the Last Year: Not on file    Number of Places Lived in the Last Year: Not on file    Unstable Housing in the Last Year: No        /88   Pulse 98   Temp 97.1 °F (36.2 °C) (Temporal)   Ht 5' 6\" (1.676 m)   Wt 250 lb (113.4 kg)   SpO2 96%   BMI 40.35 kg/m²        Physical Exam:    General appearance - alert, well appearing, and in no distress, obese  Mental Status - alert, oriented to person, place, and time  Eyes - pupils equal and reactive, extraocular eye movements intact   Ears - bilateral TM's and external ear canals normal   Nose - normal and patent, no erythema, discharge or polyps   Sinuses - Normal paranasal sinuses without tenderness   Throat - mucous membranes moist, pharynx normal without lesions   Neck - supple, no significant adenopathy   Thyroid - thyroid is normal in size without nodules or tenderness    Chest - clear to auscultation, no wheezes, rales or rhonchi, symmetric air entry   Heart - irregular irregular   Abdomen - soft, nontender, nondistended, no masses or organomegaly   Back exam - full range of motion, no tenderness, palpable spasm or pain on motion   Neurological - upper extremity tremors  Musculoskeletal -slight swelling in the upper extremities bilaterally. Extremities - non pitting swelling noted in the left arm.   No erythema or drainage noted from the arm. Skin - stable. Labs       TSH   Date Value Ref Range Status   09/15/2021 1.900 0.440 - 3.860 uIU/mL Final   05/05/2021 4.420 (H) 0.440 - 3.860 uIU/mL Final     TSH   Date Value Ref Range Status   12/21/2022 1.740 0.440 - 3.860 uIU/mL Final   02/02/2022 1.840 0.440 - 3.860 uIU/mL Final   10/25/2021 1.670 0.440 - 3.860 uIU/mL Final   08/23/2021 2.180 0.440 - 3.860 uIU/mL Final   02/07/2020 2.700 0.440 - 3.860 uIU/mL Final   06/14/2019 2.430 0.440 - 3.860 uIU/mL Final   06/11/2018 3.820 0.270 - 4.200 uIU/mL Final   06/07/2017 3.500 0.270 - 4.200 uIU/mL Final   01/27/2017 3.810 0.270 - 4.200 uIU/mL Final   03/04/2016 1.990 0.270 - 4.200 uIU/mL Final   11/20/2015 1.940 0.270 - 4.200 uIU/mL Final   08/28/2015 1.970 0.270 - 4.200 uIU/mL Final   10/25/2014 3.130 0.270 - 4.200 uIU/mL Final   12/11/2013 3.910 0.270 - 4.200 uIU/mL Final   04/04/2013 1.346 0.550 - 4.780 uIU/mL Final   10/16/2012 1.607 0.550 - 4.780 uIU/mL Final   05/14/2012 1.771 0.550 - 4.780 uIU/mL Final   03/22/2012 4.082 0.550 - 4.780 uIU/mL Final     Lab Results   Component Value Date     01/26/2023    K 3.2 (L) 01/26/2023     01/26/2023    CO2 16 (L) 01/26/2023    BUN 7 (L) 01/26/2023    CREATININE 0.98 (H) 01/26/2023    GLUCOSE 180 (H) 01/26/2023    CALCIUM 9.5 01/26/2023    PROT 6.4 01/26/2023    LABALBU 4.0 01/26/2023    BILITOT 0.7 01/26/2023    ALKPHOS 93 01/26/2023    AST 55 (H) 01/26/2023    ALT 70 (H) 01/26/2023    LABGLOM >60.0 01/26/2023    GFRAA >60.0 10/25/2021    GLOB 2.4 01/26/2023       Lab Results   Component Value Date    WBC 7.7 01/27/2023    HGB 13.6 01/27/2023    HCT 39.5 01/27/2023    MCV 90.5 01/27/2023     01/27/2023     Lab Results   Component Value Date    LABA1C 7.4 (H) 12/21/2022     No results found for: EAG      A/P: Emilee Abreu 65 y.o. female presenting for     1. Colon cancer screening    - [facility]    2.  NSTEMI (non-ST elevated myocardial infarction) [facility])  Managed by the cardiologist   Continue blood thinner and blood pressure medications       3. Hyperlipidemia, unspecified hyperlipidemia type  Continue statin   Has fatty liver - encourage healthy lifestyle. Lab Results   Component Value Date    CHOL 150 01/27/2023    CHOL 315 (H) 09/16/2021    CHOL 194 11/05/2019     Lab Results   Component Value Date    TRIG 184 (H) 01/27/2023    TRIG 494 (H) 09/16/2021    TRIG 127 11/05/2019     Lab Results   Component Value Date    HDL 41 01/27/2023    HDL 38 (L) 12/21/2022    HDL 36 (L) 09/16/2021     Lab Results   Component Value Date    LDLCALC 72 01/27/2023    LDLCALC 69 12/21/2022    LDLCALC see below 09/16/2021     No results found for: LABVLDL, VLDL  Lab Results   Component Value Date    CHOLHDLRATIO 6.3 03/22/2012         4. Depression, unspecified depression type  Denies any SI or HI   Managed by psych     5. Atrial flutter, unspecified type [facility])  Managed by cardiology   Continue eliquis and metoprolol       6. Acquired hypothyroidism  Lab Results   Component Value Date    TSH 1.740 12/21/2022    TSHREFLEX 1.900 09/15/2021         7. History of pulmonary embolism  No longer visualized     8. Gastroesophageal reflux disease without esophagitis      9. Mild persistent asthma without complication  Stable     10. Type 2 diabetes mellitus with diabetic polyneuropathy, without long-term current use of insulin ([facility] [address]  OARRs reviewed. Continue metformin. Please note, this report has been partially produced using speech recognition software  and may cause  and /or contain errors related to that system including grammar, punctuation and spelling as well as words and phrases that may seem inappropriate. If there are questions or concerns please feel free to contact me to clarify.

## 2023-02-21 ENCOUNTER — TELEPHONE (OUTPATIENT)
Dept: ENDOSCOPY | Age: 63
End: 2023-02-21

## 2023-02-21 NOTE — TELEPHONE ENCOUNTER
Good Afternoon, your patient Марина Henning is in the process of being scheduled for a colonoscopy, we need to know how long she will be able to hold Eliquis and Plavix prior to having this procedure done.  Thank you

## 2023-02-24 ENCOUNTER — TELEPHONE (OUTPATIENT)
Dept: FAMILY MEDICINE CLINIC | Age: 63
End: 2023-02-24
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
## 2023-05-17 ENCOUNTER — TELEPHONE (OUTPATIENT)
Dept: FAMILY MEDICINE CLINIC | Age: 63
End: 2023-05-17

## 2024-09-22 ENCOUNTER — HOSPITAL ENCOUNTER (EMERGENCY)
Age: 64
Discharge: HOME | End: 2024-09-22
Attending: STUDENT IN AN ORGANIZED HEALTH CARE EDUCATION/TRAINING PROGRAM
Payer: COMMERCIAL

## 2024-09-22 VITALS
WEIGHT: 230 LBS | HEIGHT: 66 IN | SYSTOLIC BLOOD PRESSURE: 128 MMHG | HEART RATE: 105 BPM | DIASTOLIC BLOOD PRESSURE: 88 MMHG | OXYGEN SATURATION: 93 % | RESPIRATION RATE: 19 BRPM | BODY MASS INDEX: 36.96 KG/M2 | TEMPERATURE: 97.9 F

## 2024-09-22 DIAGNOSIS — M54.32 SCIATICA OF LEFT SIDE: Primary | ICD-10-CM

## 2024-09-22 DIAGNOSIS — M79.652 PAIN OF LEFT THIGH: ICD-10-CM

## 2024-09-22 PROCEDURE — 6370000000 HC RX 637 (ALT 250 FOR IP): Performed by: STUDENT IN AN ORGANIZED HEALTH CARE EDUCATION/TRAINING PROGRAM

## 2024-09-22 PROCEDURE — 96372 THER/PROPH/DIAG INJ SC/IM: CPT

## 2024-09-22 PROCEDURE — 6360000002 HC RX W HCPCS: Performed by: STUDENT IN AN ORGANIZED HEALTH CARE EDUCATION/TRAINING PROGRAM

## 2024-09-22 PROCEDURE — 99284 EMERGENCY DEPT VISIT MOD MDM: CPT

## 2024-09-22 RX ORDER — CYCLOBENZAPRINE HCL 10 MG
10 TABLET ORAL ONCE
Status: COMPLETED | OUTPATIENT
Start: 2024-09-22 | End: 2024-09-22

## 2024-09-22 RX ORDER — PREDNISONE 50 MG/1
50 TABLET ORAL DAILY
Qty: 4 TABLET | Refills: 0 | Status: SHIPPED | OUTPATIENT
Start: 2024-09-22 | End: 2024-09-26

## 2024-09-22 RX ORDER — PREDNISONE 20 MG/1
50 TABLET ORAL ONCE
Status: COMPLETED | OUTPATIENT
Start: 2024-09-22 | End: 2024-09-22

## 2024-09-22 RX ORDER — LIDOCAINE 4 G/G
1 PATCH TOPICAL
Status: DISCONTINUED | OUTPATIENT
Start: 2024-09-22 | End: 2024-09-22 | Stop reason: HOSPADM

## 2024-09-22 RX ORDER — LIDOCAINE 4 G/G
1 PATCH TOPICAL DAILY
Qty: 10 EACH | Refills: 0 | Status: SHIPPED | OUTPATIENT
Start: 2024-09-22

## 2024-09-22 RX ORDER — KETOROLAC TROMETHAMINE 30 MG/ML
30 INJECTION, SOLUTION INTRAMUSCULAR; INTRAVENOUS ONCE
Status: COMPLETED | OUTPATIENT
Start: 2024-09-22 | End: 2024-09-22

## 2024-09-22 RX ORDER — NAPROXEN 500 MG/1
500 TABLET ORAL 2 TIMES DAILY WITH MEALS
Qty: 60 TABLET | Refills: 3 | Status: SHIPPED | OUTPATIENT
Start: 2024-09-22

## 2024-09-22 RX ORDER — CYCLOBENZAPRINE HCL 5 MG
5 TABLET ORAL 3 TIMES DAILY PRN
Qty: 20 TABLET | Refills: 0 | Status: SHIPPED | OUTPATIENT
Start: 2024-09-22 | End: 2024-10-02

## 2024-09-22 RX ADMIN — PREDNISONE 50 MG: 20 TABLET ORAL at 02:08

## 2024-09-22 RX ADMIN — KETOROLAC TROMETHAMINE 30 MG: 30 INJECTION, SOLUTION INTRAMUSCULAR at 02:08

## 2024-09-22 RX ADMIN — CYCLOBENZAPRINE HYDROCHLORIDE 10 MG: 10 TABLET, FILM COATED ORAL at 02:08

## 2024-09-22 ASSESSMENT — PAIN SCALES - GENERAL
PAINLEVEL_OUTOF10: 3
PAINLEVEL_OUTOF10: 10

## 2024-09-22 ASSESSMENT — PAIN DESCRIPTION - ORIENTATION: ORIENTATION: LEFT

## 2024-09-22 ASSESSMENT — PAIN DESCRIPTION - LOCATION: LOCATION: LEG

## 2024-09-22 ASSESSMENT — PAIN - FUNCTIONAL ASSESSMENT: PAIN_FUNCTIONAL_ASSESSMENT: 0-10

## 2025-02-11 ENCOUNTER — APPOINTMENT (OUTPATIENT)
Dept: GENERAL RADIOLOGY | Age: 65
End: 2025-02-11
Payer: COMMERCIAL

## 2025-02-11 ENCOUNTER — HOSPITAL ENCOUNTER (EMERGENCY)
Age: 65
Discharge: HOME | End: 2025-02-12
Attending: EMERGENCY MEDICINE
Payer: COMMERCIAL

## 2025-02-11 VITALS
BODY MASS INDEX: 36.96 KG/M2 | HEART RATE: 90 BPM | TEMPERATURE: 97.8 F | SYSTOLIC BLOOD PRESSURE: 117 MMHG | HEIGHT: 66 IN | RESPIRATION RATE: 18 BRPM | WEIGHT: 230 LBS | OXYGEN SATURATION: 96 % | DIASTOLIC BLOOD PRESSURE: 79 MMHG

## 2025-02-11 DIAGNOSIS — I26.99 PULMONARY EMBOLISM ON RIGHT (HCC): ICD-10-CM

## 2025-02-11 DIAGNOSIS — E78.5 HYPERLIPIDEMIA, UNSPECIFIED HYPERLIPIDEMIA TYPE: ICD-10-CM

## 2025-02-11 DIAGNOSIS — I48.92 ATRIAL FLUTTER, UNSPECIFIED TYPE (HCC): ICD-10-CM

## 2025-02-11 DIAGNOSIS — Z76.0 PRESCRIPTION REFILL: Primary | ICD-10-CM

## 2025-02-11 DIAGNOSIS — E03.9 ACQUIRED HYPOTHYROIDISM: ICD-10-CM

## 2025-02-11 DIAGNOSIS — E78.00 HYPERCHOLESTEROLEMIA: ICD-10-CM

## 2025-02-11 DIAGNOSIS — N39.0 URINARY TRACT INFECTION WITHOUT HEMATURIA, SITE UNSPECIFIED: ICD-10-CM

## 2025-02-11 DIAGNOSIS — G25.0 ESSENTIAL TREMOR: ICD-10-CM

## 2025-02-11 DIAGNOSIS — G62.9 NEUROPATHY: ICD-10-CM

## 2025-02-11 LAB
ALBUMIN SERPL-MCNC: 4.1 G/DL (ref 3.5–4.6)
ALP SERPL-CCNC: 96 U/L (ref 40–130)
ALT SERPL-CCNC: 20 U/L (ref 0–33)
AMPHET UR QL SCN: NORMAL
ANION GAP SERPL CALCULATED.3IONS-SCNC: 17 MEQ/L (ref 9–15)
APAP SERPL-MCNC: <5 UG/ML (ref 10–30)
APTT PPP: 23.1 SEC (ref 24.4–36.8)
AST SERPL-CCNC: 20 U/L (ref 0–35)
BACTERIA URNS QL MICRO: ABNORMAL /HPF
BARBITURATES UR QL SCN: NORMAL
BASOPHILS # BLD: 0 K/UL (ref 0–0.2)
BASOPHILS NFR BLD: 0.3 %
BENZODIAZ UR QL SCN: NORMAL
BILIRUB SERPL-MCNC: 0.4 MG/DL (ref 0.2–0.7)
BILIRUB UR QL STRIP: NEGATIVE
BUN SERPL-MCNC: 17 MG/DL (ref 8–23)
CALCIUM SERPL-MCNC: 10 MG/DL (ref 8.5–9.9)
CANNABINOIDS UR QL SCN: NORMAL
CHLORIDE SERPL-SCNC: 97 MEQ/L (ref 95–107)
CHOLEST SERPL-MCNC: 324 MG/DL (ref 0–199)
CK SERPL-CCNC: 46 U/L (ref 0–170)
CLARITY UR: ABNORMAL
CO2 SERPL-SCNC: 19 MEQ/L (ref 20–31)
COCAINE UR QL SCN: NORMAL
COLOR UR: YELLOW
CREAT SERPL-MCNC: 0.7 MG/DL (ref 0.5–0.9)
DRUG SCREEN COMMENT UR-IMP: NORMAL
EOSINOPHIL # BLD: 0 K/UL (ref 0–0.7)
EOSINOPHIL NFR BLD: 0.4 %
EPI CELLS #/AREA URNS AUTO: ABNORMAL /HPF (ref 0–5)
ERYTHROCYTE [DISTWIDTH] IN BLOOD BY AUTOMATED COUNT: 13.4 % (ref 11.5–14.5)
ESTIMATED AVERAGE GLUCOSE: 214 MG/DL
ETHANOL PERCENT: NORMAL G/DL
ETHANOLAMINE SERPL-MCNC: <10 MG/DL (ref 0–0.08)
FENTANYL SCREEN, URINE: NORMAL
GLOBULIN SER CALC-MCNC: 2.7 G/DL (ref 2.3–3.5)
GLUCOSE SERPL-MCNC: 263 MG/DL (ref 70–99)
GLUCOSE UR STRIP-MCNC: 100 MG/DL
HBA1C MFR BLD: 9.1 % (ref 4–6)
HCT VFR BLD AUTO: 43.7 % (ref 37–47)
HDLC SERPL-MCNC: 39 MG/DL (ref 40–59)
HGB BLD-MCNC: 15.9 G/DL (ref 12–16)
HGB UR QL STRIP: NEGATIVE
HYALINE CASTS #/AREA URNS AUTO: ABNORMAL /HPF (ref 0–5)
HYALINE CASTS #/AREA URNS LPF: ABNORMAL /LPF (ref 0–5)
INR PPP: 1
KETONES UR STRIP-MCNC: ABNORMAL MG/DL
LDLC SERPL CALC-MCNC: ABNORMAL MG/DL (ref 0–129)
LEUKOCYTE ESTERASE UR QL STRIP: ABNORMAL
LYMPHOCYTES # BLD: 1.9 K/UL (ref 1–4.8)
LYMPHOCYTES NFR BLD: 24.3 %
MCH RBC QN AUTO: 31.3 PG (ref 27–31.3)
MCHC RBC AUTO-ENTMCNC: 36.4 % (ref 33–37)
MCV RBC AUTO: 86 FL (ref 79.4–94.8)
METHADONE UR QL SCN: NORMAL
MONOCYTES # BLD: 0.5 K/UL (ref 0.2–0.8)
MONOCYTES NFR BLD: 6.1 %
NEUTROPHILS # BLD: 5.5 K/UL (ref 1.4–6.5)
NEUTS SEG NFR BLD: 68.3 %
NITRITE UR QL STRIP: NEGATIVE
OPIATES UR QL SCN: NORMAL
OXYCODONE UR QL SCN: NORMAL
PCP UR QL SCN: NORMAL
PH UR STRIP: 5 [PH] (ref 5–9)
PLATELET # BLD AUTO: 280 K/UL (ref 130–400)
POTASSIUM SERPL-SCNC: 4 MEQ/L (ref 3.4–4.9)
PROPOXYPH UR QL SCN: NORMAL
PROT SERPL-MCNC: 6.8 G/DL (ref 6.3–8)
PROT UR STRIP-MCNC: 30 MG/DL
PROTHROMBIN TIME: 14.1 SEC (ref 12.3–14.9)
RBC # BLD AUTO: 5.08 M/UL (ref 4.2–5.4)
RBC #/AREA URNS AUTO: ABNORMAL /HPF (ref 0–5)
RENAL EPI CELLS #/AREA URNS HPF: ABNORMAL /HPF
SALICYLATES SERPL-MCNC: <0.3 MG/DL (ref 15–30)
SODIUM SERPL-SCNC: 133 MEQ/L (ref 135–144)
SP GR UR STRIP: 1.03 (ref 1–1.03)
TRIGL SERPL-MCNC: 430 MG/DL (ref 0–150)
TROPONIN, HIGH SENSITIVITY: 12 NG/L (ref 0–19)
TROPONIN, HIGH SENSITIVITY: 12 NG/L (ref 0–19)
TSH SERPL-MCNC: 3.49 UIU/ML (ref 0.44–3.86)
URINE REFLEX TO CULTURE: YES
UROBILINOGEN UR STRIP-ACNC: 0.2 E.U./DL
WBC # BLD AUTO: 8 K/UL (ref 4.8–10.8)
WBC #/AREA URNS AUTO: ABNORMAL /HPF (ref 0–5)

## 2025-02-11 PROCEDURE — 85025 COMPLETE CBC W/AUTO DIFF WBC: CPT

## 2025-02-11 PROCEDURE — 84484 ASSAY OF TROPONIN QUANT: CPT

## 2025-02-11 PROCEDURE — 36415 COLL VENOUS BLD VENIPUNCTURE: CPT

## 2025-02-11 PROCEDURE — 87086 URINE CULTURE/COLONY COUNT: CPT

## 2025-02-11 PROCEDURE — 80053 COMPREHEN METABOLIC PANEL: CPT

## 2025-02-11 PROCEDURE — 71045 X-RAY EXAM CHEST 1 VIEW: CPT

## 2025-02-11 PROCEDURE — 80179 DRUG ASSAY SALICYLATE: CPT

## 2025-02-11 PROCEDURE — 93005 ELECTROCARDIOGRAM TRACING: CPT | Performed by: EMERGENCY MEDICINE

## 2025-02-11 PROCEDURE — 82550 ASSAY OF CK (CPK): CPT

## 2025-02-11 PROCEDURE — 82077 ASSAY SPEC XCP UR&BREATH IA: CPT

## 2025-02-11 PROCEDURE — 84443 ASSAY THYROID STIM HORMONE: CPT

## 2025-02-11 PROCEDURE — 85730 THROMBOPLASTIN TIME PARTIAL: CPT

## 2025-02-11 PROCEDURE — 99285 EMERGENCY DEPT VISIT HI MDM: CPT

## 2025-02-11 PROCEDURE — 83036 HEMOGLOBIN GLYCOSYLATED A1C: CPT

## 2025-02-11 PROCEDURE — 81001 URINALYSIS AUTO W/SCOPE: CPT

## 2025-02-11 PROCEDURE — 6370000000 HC RX 637 (ALT 250 FOR IP): Performed by: PHYSICIAN ASSISTANT

## 2025-02-11 PROCEDURE — 80307 DRUG TEST PRSMV CHEM ANLYZR: CPT

## 2025-02-11 PROCEDURE — 80143 DRUG ASSAY ACETAMINOPHEN: CPT

## 2025-02-11 PROCEDURE — 85610 PROTHROMBIN TIME: CPT

## 2025-02-11 PROCEDURE — 80061 LIPID PANEL: CPT

## 2025-02-11 RX ORDER — FUROSEMIDE 40 MG/1
40 TABLET ORAL DAILY
Qty: 60 TABLET | Refills: 3 | Status: SHIPPED | OUTPATIENT
Start: 2025-02-11

## 2025-02-11 RX ORDER — PANTOPRAZOLE SODIUM 40 MG/1
40 TABLET, DELAYED RELEASE ORAL DAILY
Qty: 30 TABLET | Refills: 0 | Status: SHIPPED
Start: 2025-02-11 | End: 2025-02-11

## 2025-02-11 RX ORDER — PANTOPRAZOLE SODIUM 40 MG/1
40 TABLET, DELAYED RELEASE ORAL DAILY
Qty: 30 TABLET | Refills: 0 | Status: SHIPPED | OUTPATIENT
Start: 2025-02-11 | End: 2025-03-13

## 2025-02-11 RX ORDER — LOSARTAN POTASSIUM 25 MG/1
25 TABLET ORAL DAILY
Qty: 30 TABLET | Refills: 0 | Status: SHIPPED | OUTPATIENT
Start: 2025-02-11

## 2025-02-11 RX ORDER — AMANTADINE HYDROCHLORIDE 100 MG/1
100 CAPSULE, GELATIN COATED ORAL 2 TIMES DAILY
Qty: 60 CAPSULE | Refills: 0 | Status: SHIPPED
Start: 2025-02-11 | End: 2025-02-11

## 2025-02-11 RX ORDER — ARIPIPRAZOLE 15 MG/1
15 TABLET ORAL DAILY
Qty: 30 TABLET | Refills: 0 | Status: SHIPPED | OUTPATIENT
Start: 2025-02-11

## 2025-02-11 RX ORDER — ASPIRIN 81 MG/1
TABLET ORAL
Qty: 30 TABLET | Refills: 0 | Status: SHIPPED | OUTPATIENT
Start: 2025-02-11

## 2025-02-11 RX ORDER — LEVOTHYROXINE SODIUM 50 UG/1
50 TABLET ORAL ONCE
Status: COMPLETED | OUTPATIENT
Start: 2025-02-11 | End: 2025-02-11

## 2025-02-11 RX ORDER — ACETAMINOPHEN 500 MG
1000 TABLET ORAL ONCE
Status: COMPLETED | OUTPATIENT
Start: 2025-02-11 | End: 2025-02-11

## 2025-02-11 RX ORDER — BENZTROPINE MESYLATE 1 MG/1
1 TABLET ORAL DAILY
Qty: 30 TABLET | Refills: 0 | Status: SHIPPED | OUTPATIENT
Start: 2025-02-11

## 2025-02-11 RX ORDER — ALBUTEROL SULFATE 90 UG/1
2 INHALANT RESPIRATORY (INHALATION) EVERY 6 HOURS PRN
Qty: 18 G | Refills: 0 | Status: SHIPPED | OUTPATIENT
Start: 2025-02-11

## 2025-02-11 RX ORDER — AMANTADINE HYDROCHLORIDE 100 MG/1
100 CAPSULE, GELATIN COATED ORAL 2 TIMES DAILY
Qty: 60 CAPSULE | Refills: 0 | Status: SHIPPED | OUTPATIENT
Start: 2025-02-11

## 2025-02-11 RX ORDER — LEVOTHYROXINE SODIUM 50 UG/1
50 TABLET ORAL DAILY
Qty: 30 TABLET | Refills: 0 | Status: SHIPPED | OUTPATIENT
Start: 2025-02-11

## 2025-02-11 RX ORDER — GABAPENTIN 300 MG/1
300 CAPSULE ORAL NIGHTLY
Qty: 30 CAPSULE | Refills: 0 | Status: SHIPPED | OUTPATIENT
Start: 2025-02-11 | End: 2025-03-13

## 2025-02-11 RX ORDER — CEPHALEXIN 500 MG/1
500 CAPSULE ORAL ONCE
Status: COMPLETED | OUTPATIENT
Start: 2025-02-11 | End: 2025-02-11

## 2025-02-11 RX ORDER — CEPHALEXIN 500 MG/1
500 CAPSULE ORAL 2 TIMES DAILY
Qty: 14 CAPSULE | Refills: 0 | Status: SHIPPED | OUTPATIENT
Start: 2025-02-11 | End: 2025-02-18

## 2025-02-11 RX ORDER — ATORVASTATIN CALCIUM 40 MG/1
40 TABLET, FILM COATED ORAL NIGHTLY
Qty: 30 TABLET | Refills: 0 | Status: SHIPPED | OUTPATIENT
Start: 2025-02-11

## 2025-02-11 RX ORDER — HYDRALAZINE HYDROCHLORIDE 20 MG/ML
10 INJECTION INTRAMUSCULAR; INTRAVENOUS ONCE
Status: DISCONTINUED | OUTPATIENT
Start: 2025-02-11 | End: 2025-02-11

## 2025-02-11 RX ORDER — BUDESONIDE AND FORMOTEROL FUMARATE DIHYDRATE 160; 4.5 UG/1; UG/1
AEROSOL RESPIRATORY (INHALATION)
Qty: 10.2 G | Refills: 0 | Status: SHIPPED | OUTPATIENT
Start: 2025-02-11

## 2025-02-11 RX ORDER — ALBUTEROL SULFATE 90 UG/1
2 INHALANT RESPIRATORY (INHALATION) EVERY 6 HOURS PRN
Qty: 18 G | Refills: 0 | Status: SHIPPED
Start: 2025-02-11 | End: 2025-02-11

## 2025-02-11 RX ADMIN — ACETAMINOPHEN 1000 MG: 500 TABLET ORAL at 14:22

## 2025-02-11 RX ADMIN — METFORMIN HYDROCHLORIDE 500 MG: 500 TABLET ORAL at 15:30

## 2025-02-11 RX ADMIN — CEPHALEXIN 500 MG: 500 CAPSULE ORAL at 15:30

## 2025-02-11 RX ADMIN — LEVOTHYROXINE SODIUM 50 MCG: 0.05 TABLET ORAL at 16:24

## 2025-02-11 ASSESSMENT — ENCOUNTER SYMPTOMS
SHORTNESS OF BREATH: 0
CONSTIPATION: 0
ABDOMINAL PAIN: 0
RHINORRHEA: 0
EYE DISCHARGE: 0
ABDOMINAL DISTENTION: 0
COLOR CHANGE: 0
SORE THROAT: 0

## 2025-02-11 ASSESSMENT — PAIN SCALES - GENERAL: PAINLEVEL_OUTOF10: 3

## 2025-02-11 ASSESSMENT — PAIN - FUNCTIONAL ASSESSMENT
PAIN_FUNCTIONAL_ASSESSMENT: NONE - DENIES PAIN
PAIN_FUNCTIONAL_ASSESSMENT: NONE - DENIES PAIN

## 2025-02-11 ASSESSMENT — PAIN DESCRIPTION - LOCATION: LOCATION: HEAD

## 2025-02-11 ASSESSMENT — PAIN DESCRIPTION - DESCRIPTORS: DESCRIPTORS: ACHING

## 2025-02-11 NOTE — ED TRIAGE NOTES
Pt was pink slipped by Clarissa for failure to perform ADLs, Pt states she has been out of her medications for over a month, Pt is A&OX3, afebrile, breathes are equal and unlabored, visible tremors in BUE.

## 2025-02-11 NOTE — ED PROVIDER NOTES
Notable for the following components:    Clarity, UA CLOUDY (*)     Glucose, Ur 100 (*)     Ketones, Urine TRACE (*)     Protein, UA 30 (*)     Leukocyte Esterase, Urine TRACE (*)     All other components within normal limits   MICROSCOPIC URINALYSIS - Abnormal; Notable for the following components:    Bacteria, UA MANY (*)     WBC, UA 10-20 (*)     RBC, UA 6-10 (*)     All other components within normal limits   APTT - Abnormal; Notable for the following components:    aPTT 23.1 (*)     All other components within normal limits   CULTURE, URINE   CBC WITH AUTO DIFFERENTIAL   CK   ETHANOL   TSH   URINE DRUG SCREEN   TROPONIN   TROPONIN   PROTIME-INR   HEMOGLOBIN A1C       All other labs were within normal range or not returned as of this dictation.    EMERGENCY DEPARTMENT COURSE and DIFFERENTIAL DIAGNOSIS/MDM:   Vitals:    Vitals:    02/11/25 1140 02/11/25 1402 02/11/25 1511 02/11/25 1600   BP: (!) 174/106 (!) 141/86 129/87 (!) 134/98   Pulse: (!) 114 (!) 103 99 98   Resp: 18 17 16 16   Temp: 97.7 °F (36.5 °C)   97.8 °F (36.6 °C)   TempSrc: Oral   Oral   SpO2: 98% 94% 94% 91%   Weight: 104.3 kg (230 lb)      Height: 1.676 m (5' 6\")            Medical Decision Making  After interview with patient, and reviewing pink slip, I feel that patient does not require psychiatric evaluation, she displays no psychiatric disability or illness at this time.  Patient states that she is nervous about losing her apartment, she has a toilet which is malfunctioning in her apartment and she is afraid to tell the landlord, as she is afraid that they will make her move.  Patient states she has been defecating in the tub because this is the only place that she can go and remain some sense: Assistance toilet is not functioning.  Patient states that whenever she had talked with her landlord in the past about repairing items or broken she states that they give her very hard time, and she is afraid she would be yelled at, patient appears to

## 2025-02-11 NOTE — CARE COORDINATION
This nurse obtained all of the patient's medication with the exception of her Amantadine from the [facility] outpatient pharmacy and security will lock them up for her. This nurse received a call from [employer]'s manager Shonda who stated that the patient \"can't come back to her apartment, she has no working bathroom.\" They had to remove her toilet and another one has to be ordered for her and she states that the soonest they will get one is tomorrow morning. They also have to do some extensive cleaning to make the apartment habitable and safe. The patient states that she has no family or friends that she can stay with. This nurse spoke with Vivian ABRAHAM charge nurse and the patient is signing back into the ER so that she can be provided with safe accommodations until her apartment is safe for her to return.

## 2025-02-11 NOTE — CARE COORDINATION
This nurse spoke with Abhijeet MCNAIR who states that the patient hasn't been able to get to the pharmacy to get her medications. This nurse called the patient's pharmacy, [facility], and was told that they do deliver. They have a  available mon/tues/thurs and the delivery is free. The patient was informed of this. She states she has no copays that she has to pay. She states that she lives in an apartment and that her toilet has been broken so she is defecating in the bath tub. She states she was \"afraid they would yell at me\" and so she hadn't told the apartment management. This nurse called the [facility] manager Madison who informed me that [employer] was in the apartment to clean out the tub and toilet and that she and another staff member would be coming to her apartment tomorrow morning to help her clean her apartment. She states that she had contacted [employer] to assist her if she is unable to take care of herself in the apartment for further living arrangements. I informed the patient of this and she stated understanding. She was provided with food and drink.

## 2025-02-12 LAB
BACTERIA UR CULT: NORMAL
EKG ATRIAL RATE: 103 BPM
EKG P AXIS: 62 DEGREES
EKG P-R INTERVAL: 146 MS
EKG Q-T INTERVAL: 358 MS
EKG QRS DURATION: 86 MS
EKG QTC CALCULATION (BAZETT): 468 MS
EKG R AXIS: -12 DEGREES
EKG T AXIS: 18 DEGREES
EKG VENTRICULAR RATE: 103 BPM

## 2025-02-12 PROCEDURE — 93010 ELECTROCARDIOGRAM REPORT: CPT | Performed by: INTERNAL MEDICINE

## 2025-02-12 RX ORDER — MECOBALAMIN 5000 MCG
5 TABLET,DISINTEGRATING ORAL ONCE
Status: DISCONTINUED | OUTPATIENT
Start: 2025-02-12 | End: 2025-02-12 | Stop reason: HOSPADM

## 2025-02-13 ENCOUNTER — OFFICE VISIT (OUTPATIENT)
Age: 65
End: 2025-02-13
Payer: COMMERCIAL

## 2025-02-13 VITALS
HEIGHT: 66 IN | DIASTOLIC BLOOD PRESSURE: 82 MMHG | HEART RATE: 118 BPM | SYSTOLIC BLOOD PRESSURE: 112 MMHG | TEMPERATURE: 96.6 F | OXYGEN SATURATION: 96 % | WEIGHT: 230 LBS | BODY MASS INDEX: 36.96 KG/M2

## 2025-02-13 DIAGNOSIS — L60.0 INGROWN TOENAIL: ICD-10-CM

## 2025-02-13 DIAGNOSIS — K59.00 CONSTIPATION, UNSPECIFIED CONSTIPATION TYPE: ICD-10-CM

## 2025-02-13 DIAGNOSIS — E11.65 TYPE 2 DIABETES MELLITUS WITH HYPERGLYCEMIA, WITHOUT LONG-TERM CURRENT USE OF INSULIN (HCC): ICD-10-CM

## 2025-02-13 DIAGNOSIS — N39.0 URINARY TRACT INFECTION WITHOUT HEMATURIA, SITE UNSPECIFIED: ICD-10-CM

## 2025-02-13 DIAGNOSIS — R91.8 OPACITY OF LUNG ON IMAGING STUDY: ICD-10-CM

## 2025-02-13 DIAGNOSIS — F32.A DEPRESSION, UNSPECIFIED DEPRESSION TYPE: Primary | ICD-10-CM

## 2025-02-13 PROCEDURE — G8417 CALC BMI ABV UP PARAM F/U: HCPCS | Performed by: STUDENT IN AN ORGANIZED HEALTH CARE EDUCATION/TRAINING PROGRAM

## 2025-02-13 PROCEDURE — 1036F TOBACCO NON-USER: CPT | Performed by: STUDENT IN AN ORGANIZED HEALTH CARE EDUCATION/TRAINING PROGRAM

## 2025-02-13 PROCEDURE — 99214 OFFICE O/P EST MOD 30 MIN: CPT | Performed by: STUDENT IN AN ORGANIZED HEALTH CARE EDUCATION/TRAINING PROGRAM

## 2025-02-13 PROCEDURE — 2022F DILAT RTA XM EVC RTNOPTHY: CPT | Performed by: STUDENT IN AN ORGANIZED HEALTH CARE EDUCATION/TRAINING PROGRAM

## 2025-02-13 PROCEDURE — 99213 OFFICE O/P EST LOW 20 MIN: CPT | Performed by: STUDENT IN AN ORGANIZED HEALTH CARE EDUCATION/TRAINING PROGRAM

## 2025-02-13 PROCEDURE — G8427 DOCREV CUR MEDS BY ELIG CLIN: HCPCS | Performed by: STUDENT IN AN ORGANIZED HEALTH CARE EDUCATION/TRAINING PROGRAM

## 2025-02-13 PROCEDURE — 3017F COLORECTAL CA SCREEN DOC REV: CPT | Performed by: STUDENT IN AN ORGANIZED HEALTH CARE EDUCATION/TRAINING PROGRAM

## 2025-02-13 PROCEDURE — 3046F HEMOGLOBIN A1C LEVEL >9.0%: CPT | Performed by: STUDENT IN AN ORGANIZED HEALTH CARE EDUCATION/TRAINING PROGRAM

## 2025-02-13 RX ORDER — DOCUSATE SODIUM 100 MG/1
100 CAPSULE, LIQUID FILLED ORAL 2 TIMES DAILY PRN
Qty: 60 CAPSULE | Refills: 0 | Status: SHIPPED | OUTPATIENT
Start: 2025-02-13 | End: 2025-03-15

## 2025-02-13 SDOH — ECONOMIC STABILITY: FOOD INSECURITY: WITHIN THE PAST 12 MONTHS, YOU WORRIED THAT YOUR FOOD WOULD RUN OUT BEFORE YOU GOT MONEY TO BUY MORE.: NEVER TRUE

## 2025-02-13 SDOH — ECONOMIC STABILITY: FOOD INSECURITY: WITHIN THE PAST 12 MONTHS, THE FOOD YOU BOUGHT JUST DIDN'T LAST AND YOU DIDN'T HAVE MONEY TO GET MORE.: NEVER TRUE

## 2025-02-13 ASSESSMENT — PATIENT HEALTH QUESTIONNAIRE - PHQ9
7. TROUBLE CONCENTRATING ON THINGS, SUCH AS READING THE NEWSPAPER OR WATCHING TELEVISION: NOT AT ALL
3. TROUBLE FALLING OR STAYING ASLEEP: NOT AT ALL
9. THOUGHTS THAT YOU WOULD BE BETTER OFF DEAD, OR OF HURTING YOURSELF: NOT AT ALL
SUM OF ALL RESPONSES TO PHQ QUESTIONS 1-9: 0
2. FEELING DOWN, DEPRESSED OR HOPELESS: NOT AT ALL
10. IF YOU CHECKED OFF ANY PROBLEMS, HOW DIFFICULT HAVE THESE PROBLEMS MADE IT FOR YOU TO DO YOUR WORK, TAKE CARE OF THINGS AT HOME, OR GET ALONG WITH OTHER PEOPLE: NOT DIFFICULT AT ALL
SUM OF ALL RESPONSES TO PHQ QUESTIONS 1-9: 0
8. MOVING OR SPEAKING SO SLOWLY THAT OTHER PEOPLE COULD HAVE NOTICED. OR THE OPPOSITE, BEING SO FIGETY OR RESTLESS THAT YOU HAVE BEEN MOVING AROUND A LOT MORE THAN USUAL: NOT AT ALL
6. FEELING BAD ABOUT YOURSELF - OR THAT YOU ARE A FAILURE OR HAVE LET YOURSELF OR YOUR FAMILY DOWN: NOT AT ALL
4. FEELING TIRED OR HAVING LITTLE ENERGY: NOT AT ALL
1. LITTLE INTEREST OR PLEASURE IN DOING THINGS: NOT AT ALL
5. POOR APPETITE OR OVEREATING: NOT AT ALL
SUM OF ALL RESPONSES TO PHQ9 QUESTIONS 1 & 2: 0
SUM OF ALL RESPONSES TO PHQ QUESTIONS 1-9: 0
SUM OF ALL RESPONSES TO PHQ QUESTIONS 1-9: 0

## 2025-02-13 ASSESSMENT — ENCOUNTER SYMPTOMS: CONSTIPATION: 1

## 2025-02-13 NOTE — PROGRESS NOTES
Subjective  Joy Rodriguez, 64 y.o. female presents today with:  Chief Complaint   Patient presents with    Follow-Up from Hospital     Pt states she stayed overnight at the hospital for depression.   Feels better now.      She is here for ER follow-up of depression from 2 days ago.  She was initially pink slipped by reviewer for inability to perform ADLs.  She was having issues with her apartment and also was out of her medications for months.  The patient was put into contact with  to have her apartment issues resolved.  She is taking all of her medications as prescribed.  No issues.  Was also found to have a UTI and was prescribed Keflex for 7 days.    Reports that she is feeling depressed. Issues at apartment fixed. No dysuria. Is on vraylar 1.5 mg daily, cogentin 1 mg daily, and abilify 15 mg daily. Follows up with [facility]. Also reports constipation - had a BM before office visit but feels bloated. Plans on getting medications through delivery by [facility].    Also having issues with ingrown toenails. Would like to see podiatry.     Review of Systems   Gastrointestinal:  Positive for constipation.   Genitourinary:  Negative for dysuria.   Psychiatric/Behavioral:  Positive for dysphoric mood.        Past Medical History:   Diagnosis Date    Asthma 2015    Bilateral renal cysts 2013    CAD (coronary artery disease)     Depression     since age 30, was with Dr Saxena, now the [facility]    Diverticulosis of sigmoid colon 2012    Dr Yumi VILLANUEVA of left shoulder     Environmental allergies     Fatty liver 2013    GERD (gastroesophageal reflux disease)     History of cardiac arrhythmia     \"due to stress, was placed on medication\"    Hydatidiform mole     age 19     Hyperlipidemia     Hypothyroidism 2011    IFG (impaired fasting glucose) 2013    Melanoma of eye (HCC)     age 35, R eye prosthesis, Dr Murillo    Neuropathy     Obesity     Prediabetes     PTSD (post-traumatic stress disorder)     age 30,

## 2025-05-02 ENCOUNTER — INITIAL CONSULT (OUTPATIENT)
Age: 65
End: 2025-05-02
Payer: COMMERCIAL

## 2025-05-02 VITALS
HEIGHT: 66 IN | OXYGEN SATURATION: 96 % | WEIGHT: 210 LBS | RESPIRATION RATE: 16 BRPM | HEART RATE: 98 BPM | TEMPERATURE: 97.7 F | BODY MASS INDEX: 33.75 KG/M2

## 2025-05-02 DIAGNOSIS — M79.675 PAIN IN TOES OF BOTH FEET: Primary | ICD-10-CM

## 2025-05-02 DIAGNOSIS — M79.674 PAIN IN TOES OF BOTH FEET: Primary | ICD-10-CM

## 2025-05-02 DIAGNOSIS — G62.9 POLYNEUROPATHY: ICD-10-CM

## 2025-05-02 DIAGNOSIS — L60.3 NAIL DYSTROPHY: ICD-10-CM

## 2025-05-02 DIAGNOSIS — B35.1 DERMATOPHYTOSIS OF NAIL: ICD-10-CM

## 2025-05-02 PROCEDURE — 99212 OFFICE O/P EST SF 10 MIN: CPT | Performed by: PODIATRIST

## 2025-05-02 PROCEDURE — 11721 DEBRIDE NAIL 6 OR MORE: CPT | Performed by: PODIATRIST

## 2025-05-02 RX ORDER — CICLOPIROX 80 MG/ML
SOLUTION TOPICAL
Qty: 6 ML | Refills: 5 | Status: SHIPPED | OUTPATIENT
Start: 2025-05-02

## 2025-05-02 ASSESSMENT — ENCOUNTER SYMPTOMS
VOMITING: 0
SHORTNESS OF BREATH: 0
BACK PAIN: 0
NAUSEA: 0

## 2025-05-02 NOTE — PROGRESS NOTES
[facility] [place] [facility], [facility]  [address] [place], [place] [address]  Dept: [phone]  Loc: [phone]       Joy Rodriguez  (1960)    5/2/25    Subjective     Joy Rodriguez is 65 y.o. female who complains today of:    Chief Complaint   Patient presents with    Nail Problem     Both Feet    Ingrown Toenail       HPI    History of Present Illness  The patient presents for evaluation of painful toenails and toenail fungus, patient was referred by Alfreda Beard MD.    Discomfort in the toenails is reported, which have not been trimmed since the last visit which was in 2022. Attempts to trim them independently were unsuccessful due to their thickness. The overgrown nails have caused damage to socks and shoes, there is discomfort when walking in close toed shoes.  Care did she contact. No previous use of topical treatments for this condition is reported.    Mild numbness, tingling, and burning sensations are experienced in both feet. There is no official diagnosis of diabetes at this time.    FAMILY HISTORY  - Mother: Diabetes  - Father: Diabetes    Review of Systems   Constitutional:  Negative for chills and fever.   HENT:  Negative for hearing loss.    Respiratory:  Negative for shortness of breath.    Cardiovascular:  Negative for chest pain.   Gastrointestinal:  Negative for nausea and vomiting.   Genitourinary:  Negative for difficulty urinating.   Musculoskeletal:  Positive for gait problem. Negative for back pain.   Skin:  Negative for wound.   Neurological:  Negative for numbness.   Hematological:  Does not bruise/bleed easily.   Psychiatric/Behavioral:  Negative for sleep disturbance.        The patient is a pre-diabetic.   PCP: Alfreda Beard MD   Date last seen: February 13, 2025    Allergies:  Bee venom    Current Outpatient Medications on File Prior to Visit   Medication Sig Dispense Refill    ARIPiprazole (ABILIFY) 15

## (undated) DEVICE — TUBING, SUCTION, 1/4" X 10', STRAIGHT: Brand: MEDLINE

## (undated) DEVICE — SINGLE PORT MANIFOLD: Brand: NEPTUNE 2

## (undated) DEVICE — BRUSH ENDO COMBO

## (undated) DEVICE — Z DISCONTINUED PER STERIS KIT PEG INIT PLCMNT SAFT 20FR

## (undated) DEVICE — ENDO CARRY-ON PROCEDURE KIT: Brand: ENDO CARRY-ON PROCEDURE KIT

## (undated) DEVICE — Device: Brand: ENDO SMARTCAP

## (undated) DEVICE — TUBE SET 96 MM 64 MM H2O PERISTALTIC STD AUX CHANNEL

## (undated) DEVICE — COVER,TABLE,44X90,STERILE: Brand: MEDLINE

## (undated) DEVICE — CONMED SCOPE SAVER BITE BLOCK, 20X27 MM: Brand: SCOPE SAVER

## (undated) DEVICE — RESTRAINT EXTREMITY CONTACT CLOSURE